# Patient Record
Sex: FEMALE | Race: BLACK OR AFRICAN AMERICAN | NOT HISPANIC OR LATINO | Employment: FULL TIME | URBAN - METROPOLITAN AREA
[De-identification: names, ages, dates, MRNs, and addresses within clinical notes are randomized per-mention and may not be internally consistent; named-entity substitution may affect disease eponyms.]

---

## 2019-10-12 ENCOUNTER — APPOINTMENT (OUTPATIENT)
Dept: CT IMAGING | Facility: CLINIC | Age: 49
End: 2019-10-12
Attending: EMERGENCY MEDICINE
Payer: MEDICAID

## 2019-10-12 ENCOUNTER — HOSPITAL ENCOUNTER (EMERGENCY)
Facility: CLINIC | Age: 49
Discharge: HOME | End: 2019-10-12
Attending: EMERGENCY MEDICINE | Admitting: EMERGENCY MEDICINE
Payer: MEDICAID

## 2019-10-12 VITALS
HEIGHT: 64 IN | RESPIRATION RATE: 11 BRPM | TEMPERATURE: 98.9 F | OXYGEN SATURATION: 100 % | SYSTOLIC BLOOD PRESSURE: 142 MMHG | HEART RATE: 88 BPM | DIASTOLIC BLOOD PRESSURE: 90 MMHG | BODY MASS INDEX: 44.39 KG/M2 | WEIGHT: 260 LBS

## 2019-10-12 DIAGNOSIS — R07.89 ATYPICAL CHEST PAIN: ICD-10-CM

## 2019-10-12 LAB
ANION GAP SERPL CALCULATED.3IONS-SCNC: 5 MMOL/L (ref 3–14)
ANION GAP SERPL CALCULATED.3IONS-SCNC: NORMAL MMOL/L (ref 6–17)
BASOPHILS # BLD AUTO: 0 10E9/L (ref 0–0.2)
BASOPHILS NFR BLD AUTO: 0.3 %
BUN SERPL-MCNC: 13 MG/DL (ref 7–30)
BUN SERPL-MCNC: NORMAL MG/DL (ref 7–30)
CALCIUM SERPL-MCNC: 8.5 MG/DL (ref 8.5–10.1)
CALCIUM SERPL-MCNC: NORMAL MG/DL (ref 8.5–10.1)
CHLORIDE SERPL-SCNC: 108 MMOL/L (ref 94–109)
CHLORIDE SERPL-SCNC: NORMAL MMOL/L (ref 94–109)
CO2 SERPL-SCNC: 27 MMOL/L (ref 20–32)
CO2 SERPL-SCNC: NORMAL MMOL/L (ref 20–32)
CREAT SERPL-MCNC: 0.76 MG/DL (ref 0.52–1.04)
CREAT SERPL-MCNC: NORMAL MG/DL (ref 0.52–1.04)
D DIMER PPP FEU-MCNC: 0.6 UG/ML FEU (ref 0–0.5)
DIFFERENTIAL METHOD BLD: ABNORMAL
EOSINOPHIL # BLD AUTO: 0.2 10E9/L (ref 0–0.7)
EOSINOPHIL NFR BLD AUTO: 2.2 %
ERYTHROCYTE [DISTWIDTH] IN BLOOD BY AUTOMATED COUNT: 14.3 % (ref 10–15)
GFR SERPL CREATININE-BSD FRML MDRD: >90 ML/MIN/{1.73_M2}
GFR SERPL CREATININE-BSD FRML MDRD: NORMAL ML/MIN/{1.73_M2}
GLUCOSE SERPL-MCNC: 112 MG/DL (ref 70–99)
GLUCOSE SERPL-MCNC: NORMAL MG/DL (ref 70–99)
HCT VFR BLD AUTO: 35.2 % (ref 35–47)
HGB BLD-MCNC: 11.6 G/DL (ref 11.7–15.7)
IMM GRANULOCYTES # BLD: 0 10E9/L (ref 0–0.4)
IMM GRANULOCYTES NFR BLD: 0.4 %
INTERPRETATION ECG - MUSE: NORMAL
INTERPRETATION ECG - MUSE: NORMAL
LYMPHOCYTES # BLD AUTO: 2.9 10E9/L (ref 0.8–5.3)
LYMPHOCYTES NFR BLD AUTO: 38.7 %
MCH RBC QN AUTO: 28.5 PG (ref 26.5–33)
MCHC RBC AUTO-ENTMCNC: 33 G/DL (ref 31.5–36.5)
MCV RBC AUTO: 87 FL (ref 78–100)
MONOCYTES # BLD AUTO: 0.5 10E9/L (ref 0–1.3)
MONOCYTES NFR BLD AUTO: 6.6 %
NEUTROPHILS # BLD AUTO: 3.8 10E9/L (ref 1.6–8.3)
NEUTROPHILS NFR BLD AUTO: 51.8 %
NRBC # BLD AUTO: 0 10*3/UL
NRBC BLD AUTO-RTO: 0 /100
PLATELET # BLD AUTO: 253 10E9/L (ref 150–450)
POTASSIUM SERPL-SCNC: 3.9 MMOL/L (ref 3.4–5.3)
POTASSIUM SERPL-SCNC: NORMAL MMOL/L (ref 3.4–5.3)
RBC # BLD AUTO: 4.07 10E12/L (ref 3.8–5.2)
SODIUM SERPL-SCNC: 140 MMOL/L (ref 133–144)
SODIUM SERPL-SCNC: NORMAL MMOL/L (ref 133–144)
TROPONIN I SERPL-MCNC: <0.015 UG/L (ref 0–0.04)
TROPONIN I SERPL-MCNC: <0.015 UG/L (ref 0–0.04)
TROPONIN I SERPL-MCNC: NORMAL UG/L (ref 0–0.04)
WBC # BLD AUTO: 7.4 10E9/L (ref 4–11)

## 2019-10-12 PROCEDURE — 71275 CT ANGIOGRAPHY CHEST: CPT

## 2019-10-12 PROCEDURE — 99285 EMERGENCY DEPT VISIT HI MDM: CPT | Mod: 25

## 2019-10-12 PROCEDURE — 96374 THER/PROPH/DIAG INJ IV PUSH: CPT | Mod: 59

## 2019-10-12 PROCEDURE — 96375 TX/PRO/DX INJ NEW DRUG ADDON: CPT

## 2019-10-12 PROCEDURE — 80048 BASIC METABOLIC PNL TOTAL CA: CPT | Performed by: EMERGENCY MEDICINE

## 2019-10-12 PROCEDURE — 85025 COMPLETE CBC W/AUTO DIFF WBC: CPT | Performed by: EMERGENCY MEDICINE

## 2019-10-12 PROCEDURE — 85379 FIBRIN DEGRADATION QUANT: CPT | Performed by: EMERGENCY MEDICINE

## 2019-10-12 PROCEDURE — 25000128 H RX IP 250 OP 636: Performed by: EMERGENCY MEDICINE

## 2019-10-12 PROCEDURE — 93005 ELECTROCARDIOGRAM TRACING: CPT

## 2019-10-12 PROCEDURE — 93005 ELECTROCARDIOGRAM TRACING: CPT | Mod: 76

## 2019-10-12 PROCEDURE — 25000125 ZZHC RX 250: Performed by: EMERGENCY MEDICINE

## 2019-10-12 PROCEDURE — 84484 ASSAY OF TROPONIN QUANT: CPT | Mod: 91 | Performed by: EMERGENCY MEDICINE

## 2019-10-12 PROCEDURE — 96361 HYDRATE IV INFUSION ADD-ON: CPT

## 2019-10-12 RX ORDER — LORAZEPAM 0.5 MG/1
0.5 TABLET ORAL EVERY 6 HOURS PRN
Qty: 10 TABLET | Refills: 0 | Status: ON HOLD | OUTPATIENT
Start: 2019-10-12 | End: 2020-01-23

## 2019-10-12 RX ORDER — OMEGA-3 FATTY ACIDS/FISH OIL 300-1000MG
200 CAPSULE ORAL EVERY 4 HOURS PRN
Status: ON HOLD | COMMUNITY
End: 2020-01-23

## 2019-10-12 RX ORDER — LORAZEPAM 2 MG/ML
0.5 INJECTION INTRAMUSCULAR ONCE
Status: COMPLETED | OUTPATIENT
Start: 2019-10-12 | End: 2019-10-12

## 2019-10-12 RX ORDER — KETOROLAC TROMETHAMINE 10 MG/1
10 TABLET, FILM COATED ORAL EVERY 6 HOURS PRN
Qty: 10 TABLET | Refills: 0 | Status: ON HOLD | OUTPATIENT
Start: 2019-10-12 | End: 2020-01-23

## 2019-10-12 RX ORDER — IOPAMIDOL 755 MG/ML
80 INJECTION, SOLUTION INTRAVASCULAR ONCE
Status: COMPLETED | OUTPATIENT
Start: 2019-10-12 | End: 2019-10-12

## 2019-10-12 RX ORDER — SODIUM CHLORIDE 9 MG/ML
1000 INJECTION, SOLUTION INTRAVENOUS CONTINUOUS
Status: DISCONTINUED | OUTPATIENT
Start: 2019-10-12 | End: 2019-10-13 | Stop reason: HOSPADM

## 2019-10-12 RX ORDER — KETOROLAC TROMETHAMINE 15 MG/ML
15 INJECTION, SOLUTION INTRAMUSCULAR; INTRAVENOUS ONCE
Status: COMPLETED | OUTPATIENT
Start: 2019-10-12 | End: 2019-10-12

## 2019-10-12 RX ADMIN — IOPAMIDOL 80 ML: 755 INJECTION, SOLUTION INTRAVENOUS at 22:29

## 2019-10-12 RX ADMIN — LORAZEPAM 0.5 MG: 2 INJECTION INTRAMUSCULAR; INTRAVENOUS at 20:45

## 2019-10-12 RX ADMIN — SODIUM CHLORIDE 100 ML: 9 INJECTION, SOLUTION INTRAVENOUS at 22:29

## 2019-10-12 RX ADMIN — SODIUM CHLORIDE 1000 ML: 9 INJECTION, SOLUTION INTRAVENOUS at 20:45

## 2019-10-12 RX ADMIN — KETOROLAC TROMETHAMINE 15 MG: 15 INJECTION, SOLUTION INTRAMUSCULAR; INTRAVENOUS at 20:45

## 2019-10-12 ASSESSMENT — MIFFLIN-ST. JEOR: SCORE: 1794.35

## 2019-10-12 ASSESSMENT — ENCOUNTER SYMPTOMS
DIAPHORESIS: 0
NUMBNESS: 0
SHORTNESS OF BREATH: 0
COUGH: 0

## 2019-10-12 NOTE — ED AVS SNAPSHOT
Emergency Department  [address] [facility] [address]  Phone:  [phone]  Fax:  [phone]                                    Yancy Hoover   MRN: [id number]    Department:   Emergency Department   Date of Visit:  10/12/2019           After Visit Summary Signature Page    I have received my discharge instructions, and my questions have been answered. I have discussed any challenges I see with this plan with the nurse or doctor.    ..........................................................................................................................................  Patient/Patient Representative Signature      ..........................................................................................................................................  Patient Representative Print Name and Relationship to Patient    ..................................................               ................................................  Date                                   Time    ..........................................................................................................................................  Reviewed by Signature/Title    ...................................................              ..............................................  Date                                               Time          22EPIC Rev 08/18

## 2019-10-13 NOTE — ED NOTES
Bed: ED12  Expected date: 10/12/19  Expected time: 8:10 PM  Means of arrival: Ambulance  Comments:  PATRICK [phone] [id number] CP

## 2019-10-13 NOTE — ED TRIAGE NOTES
Patient reports midsternal  Chest discomfort, non radiating, occurs intermediately, on and  Off since 1900. Also reports right arm and leg tingling for some time. Also reports head pressure and dizziness.

## 2019-10-13 NOTE — DISCHARGE INSTRUCTIONS
Your EKG and lab work are normal.  There is no signs of heart attack blood clots to the lungs are only in any other emergency.  We cannot explain all your symptoms tonight with lab tests or imaging.  Please follow-up with a clinic to discuss further work-up for cardiac testing including stress testing as an outpatient.  Return with worsening symptoms increased shortness of breath fever or other concerns.

## 2019-10-13 NOTE — ED PROVIDER NOTES
History   Chief Complaint:  Chest Pain    HPI   Yancy Tran is a 49 year old female, who presents to the ED for evaluation of chest pain. The patient reports having chest discomfort for the past week, however, today the pain became worse an hour and half ago, which is what prompted her to call EMS. She states the pain feels like someone is beating a drum on her chest and the pain is a 7/10. She states the pain radiates to her right arm and right calf. EMS reports she had a systolic pressure of 170 and had some head pressure. Currently she states she has some consistent chest pressure and feels slightly faint. The patient is right handed. She denies any diaphoresis, visual disturbances, shortness of breath, cough, leg swelling, numbness, or any other acute symptoms.    CARDIAC RISK FACTORS:  Sex:    Female  Tobacco:   Yes  Hypertension:   No  Hyperlipidemia:  No  Diabetes:   No  Family History:  No    PE/DVT RISK FACTORS:  Sex:    Female  Hormones:   No  Tobacco:   Yes  Cancer:   No  Travel:   No  Surgery:   No  Other immobilization: No  Personal history:  No  Family history:  No    Allergies:  No known drug allergies    Medications:    The patient is not currently taking any prescribed medications.    Past Medical History:    The patient is not currently taking any prescribed medications.    Past Surgical History:    History reviewed. No pertinent surgical history.    Family History:    History reviewed. No pertinent family history.     Social History:  Smoking status: Yes  Alcohol use: No  Drug use: No  Marital Status:   [2]    Review of Systems   Constitutional: Negative for diaphoresis.   Respiratory: Negative for cough and shortness of breath.    Cardiovascular: Positive for chest pain. Negative for leg swelling.   Neurological: Negative for numbness.   All other systems reviewed and are negative.    Physical Exam     Patient Vitals for the past 24 hrs:   BP Temp Temp src Pulse Heart Rate  "Resp SpO2 Height Weight   10/12/19 2215 -- -- -- -- 101 10 100 % -- --   10/12/19 2200 133/78 -- -- 84 84 13 100 % -- --   10/12/19 2145 -- -- -- -- 91 10 99 % -- --   10/12/19 2130 (!) 129/94 -- -- 95 90 14 99 % -- --   10/12/19 2115 -- -- -- -- 90 17 98 % -- --   10/12/19 2100 133/83 -- -- 88 89 12 99 % -- --   10/12/19 2045 135/87 -- -- -- 89 15 -- -- --   10/12/19 2029 (!) 148/86 98.9  F (37.2  C) Oral -- 90 18 100 % 1.626 m (5' 4\") 117.9 kg (260 lb)     Physical Exam  Vitals signs reviewed.   HENT:      Head: Normocephalic.   Eyes:      Pupils: Pupils are equal, round, and reactive to light.   Neck:      Musculoskeletal: Normal range of motion.   Cardiovascular:      Rate and Rhythm: Normal rate and regular rhythm.      Heart sounds: Normal heart sounds.   Pulmonary:      Effort: Pulmonary effort is normal.      Breath sounds: Normal breath sounds.   Abdominal:      General: Bowel sounds are normal.      Palpations: Abdomen is soft.   Musculoskeletal: Normal range of motion.   Skin:     General: Skin is warm.      Capillary Refill: Capillary refill takes less than 2 seconds.   Neurological:      General: No focal deficit present.      Mental Status: She is alert.   Psychiatric:         Mood and Affect: Mood is anxious.           Emergency Department Course   ECG (20:25:26):  Rate 87 bpm. VA interval 162. QRS duration 84. QT/QTc 344/413. P-R-T axes 71 16 35. Sinus rhythm with occasional premature ventricular complexes. Nonspecific T wave abnormality. Abnormal ECG. Interpreted at 2030 by Zack Kwon MD.    ECG (22:37:28):  Rate 90 bpm. VA interval 180. QRS duration 88. QT/QTc 346/423. P-R-T axes 69 -14 36. Sinus rhythm with occasional premature ventricular complexes. Nonspecific T wave abnormality. Abnormal ECG.  Interpreted at 2242 by Zack Kwon MD.    Imaging:  Radiographic findings were communicated with the patient who voiced understanding of the findings.    CT Chest PE with " contrast:  There is no pulmonary embolus, aortic aneurysm or  dissection. No acute abnormality.    Imaging independently reviewed and agree with radiologist interpretation.    Laboratory:  CBC: HGB 1.6 (L), o/w WNL (WBC 7.4, )    BMP:  (H), o/w WNL (Creatinine 0.76)    Troponin: <0.015  Troponin: <0.015    D-dimer: 0.6 (H)    Interventions:  2045: NS 1L IV Bolus   2045: Toradol 15 mg IV  2045: Ativan 0.5 mg IV    Emergency Department Course:  The patient arrived in the emergency department via EMS.  Past medical records, nursing notes, and vitals reviewed.  2027: I performed an exam of the patient and obtained history, as documented above.  IV inserted and blood drawn.  The patient was sent for a chest CT while in the emergency department, findings above.    2200: I rechecked the patient. Explained findings to patient.    2314: I rechecked the patient. Explained findings to patient.    Findings and plan explained to the Patient. Patient discharged home with instructions regarding supportive care, medications, and reasons to return. The importance of close follow-up was reviewed.     Impression & Plan    Medical Decision Making:  Patient presents with a multiplicity of complaints including chest pain dizziness arm and leg pain.  Patient is obese but well-appearing -American female her EKG shows slight flattening of her T waves compared to an EKG from 10 years ago.  This is nonspecific.  Patient's troponin is negative.  Her symptoms seem atypical and patient seems quite anxious.  She has does complain of leg pain in the setting of chest pain and therefore d-dimer was sent and due to his slight elevation in investigation into PE was performed and also negative.  Patient serial troponins x2 were negative EKGs are unchanged.  Doubt that this EKG was represents a significant cardiac event problem.  Her presentation is likely anxiety and seems to improve with Ativan and Toradol.  I did consider stroke  in the setting of an arm and leg numbness but her symptoms seem to be more arm and leg pain which are not consistent with stroke.  Patient is recommended discharge and follow-up with primary care.  Patient is offered reassurance at discharge.    Diagnosis:    ICD-10-CM    1. Atypical chest pain R07.89      Discharge Medications:  New Prescriptions    KETOROLAC (TORADOL) 10 MG TABLET    Take 1 tablet (10 mg) by mouth every 6 hours as needed for moderate pain    LORAZEPAM (ATIVAN) 0.5 MG TABLET    Take 1 tablet (0.5 mg) by mouth every 6 hours as needed for anxiety     Disposition:  Discharged to home.    Jose Ware  10/12/2019    EMERGENCY DEPARTMENT  Scribe Disclosure:  I, Jose Ware, am serving as a scribe at 8:27 PM on 10/12/2019 to document services personally performed by Zack Kwon MD based on my observations and the provider's statements to me.         Zack Kwon MD  10/13/19 0009

## 2019-10-22 ENCOUNTER — APPOINTMENT (OUTPATIENT)
Dept: CT IMAGING | Facility: CLINIC | Age: 49
End: 2019-10-22
Attending: EMERGENCY MEDICINE
Payer: MEDICAID

## 2019-10-22 ENCOUNTER — HOSPITAL ENCOUNTER (EMERGENCY)
Facility: CLINIC | Age: 49
Discharge: HOME | End: 2019-10-22
Attending: EMERGENCY MEDICINE | Admitting: EMERGENCY MEDICINE
Payer: MEDICAID

## 2019-10-22 VITALS
TEMPERATURE: 99.1 F | OXYGEN SATURATION: 99 % | BODY MASS INDEX: 44.39 KG/M2 | RESPIRATION RATE: 18 BRPM | HEIGHT: 64 IN | SYSTOLIC BLOOD PRESSURE: 152 MMHG | HEART RATE: 91 BPM | DIASTOLIC BLOOD PRESSURE: 90 MMHG | WEIGHT: 260 LBS

## 2019-10-22 DIAGNOSIS — R55 NEAR SYNCOPE: ICD-10-CM

## 2019-10-22 LAB
ALBUMIN SERPL-MCNC: 3.6 G/DL (ref 3.4–5)
ALBUMIN UR-MCNC: NEGATIVE MG/DL
ALP SERPL-CCNC: 66 U/L (ref 40–150)
ALT SERPL W P-5'-P-CCNC: 19 U/L (ref 0–50)
ANION GAP SERPL CALCULATED.3IONS-SCNC: 6 MMOL/L (ref 3–14)
APPEARANCE UR: CLEAR
AST SERPL W P-5'-P-CCNC: 11 U/L (ref 0–45)
BACTERIA #/AREA URNS HPF: ABNORMAL /HPF
BASOPHILS # BLD AUTO: 0 10E9/L (ref 0–0.2)
BASOPHILS NFR BLD AUTO: 0.4 %
BILIRUB SERPL-MCNC: 0.2 MG/DL (ref 0.2–1.3)
BILIRUB UR QL STRIP: NEGATIVE
BUN SERPL-MCNC: 12 MG/DL (ref 7–30)
CALCIUM SERPL-MCNC: 8.3 MG/DL (ref 8.5–10.1)
CHLORIDE SERPL-SCNC: 107 MMOL/L (ref 94–109)
CO2 SERPL-SCNC: 24 MMOL/L (ref 20–32)
COLOR UR AUTO: YELLOW
CREAT SERPL-MCNC: 0.83 MG/DL (ref 0.52–1.04)
DIFFERENTIAL METHOD BLD: NORMAL
EOSINOPHIL # BLD AUTO: 0.2 10E9/L (ref 0–0.7)
EOSINOPHIL NFR BLD AUTO: 2.3 %
ERYTHROCYTE [DISTWIDTH] IN BLOOD BY AUTOMATED COUNT: 14.4 % (ref 10–15)
GFR SERPL CREATININE-BSD FRML MDRD: 83 ML/MIN/{1.73_M2}
GLUCOSE SERPL-MCNC: 102 MG/DL (ref 70–99)
GLUCOSE UR STRIP-MCNC: NEGATIVE MG/DL
HCT VFR BLD AUTO: 37 % (ref 35–47)
HGB BLD-MCNC: 12.2 G/DL (ref 11.7–15.7)
HGB UR QL STRIP: NEGATIVE
IMM GRANULOCYTES # BLD: 0 10E9/L (ref 0–0.4)
IMM GRANULOCYTES NFR BLD: 0.1 %
KETONES UR STRIP-MCNC: NEGATIVE MG/DL
LEUKOCYTE ESTERASE UR QL STRIP: ABNORMAL
LYMPHOCYTES # BLD AUTO: 2.9 10E9/L (ref 0.8–5.3)
LYMPHOCYTES NFR BLD AUTO: 35.1 %
MCH RBC QN AUTO: 28.4 PG (ref 26.5–33)
MCHC RBC AUTO-ENTMCNC: 33 G/DL (ref 31.5–36.5)
MCV RBC AUTO: 86 FL (ref 78–100)
MONOCYTES # BLD AUTO: 0.6 10E9/L (ref 0–1.3)
MONOCYTES NFR BLD AUTO: 7.4 %
MUCOUS THREADS #/AREA URNS LPF: PRESENT /LPF
NEUTROPHILS # BLD AUTO: 4.4 10E9/L (ref 1.6–8.3)
NEUTROPHILS NFR BLD AUTO: 54.7 %
NITRATE UR QL: NEGATIVE
NRBC # BLD AUTO: 0 10*3/UL
NRBC BLD AUTO-RTO: 0 /100
PH UR STRIP: 5.5 PH (ref 5–7)
PLATELET # BLD AUTO: 263 10E9/L (ref 150–450)
POTASSIUM SERPL-SCNC: 3.8 MMOL/L (ref 3.4–5.3)
PROT SERPL-MCNC: 7.8 G/DL (ref 6.8–8.8)
RBC # BLD AUTO: 4.3 10E12/L (ref 3.8–5.2)
RBC #/AREA URNS AUTO: 1 /HPF (ref 0–2)
SODIUM SERPL-SCNC: 137 MMOL/L (ref 133–144)
SOURCE: ABNORMAL
SP GR UR STRIP: 1.02 (ref 1–1.03)
SQUAMOUS #/AREA URNS AUTO: 4 /HPF (ref 0–1)
TROPONIN I SERPL-MCNC: <0.015 UG/L (ref 0–0.04)
TSH SERPL DL<=0.005 MIU/L-ACNC: 2.62 MU/L (ref 0.4–4)
UROBILINOGEN UR STRIP-MCNC: NORMAL MG/DL (ref 0–2)
WBC # BLD AUTO: 8.1 10E9/L (ref 4–11)
WBC #/AREA URNS AUTO: 1 /HPF (ref 0–5)

## 2019-10-22 PROCEDURE — 96361 HYDRATE IV INFUSION ADD-ON: CPT

## 2019-10-22 PROCEDURE — 84443 ASSAY THYROID STIM HORMONE: CPT | Performed by: EMERGENCY MEDICINE

## 2019-10-22 PROCEDURE — 96360 HYDRATION IV INFUSION INIT: CPT

## 2019-10-22 PROCEDURE — 84484 ASSAY OF TROPONIN QUANT: CPT | Performed by: EMERGENCY MEDICINE

## 2019-10-22 PROCEDURE — 81001 URINALYSIS AUTO W/SCOPE: CPT | Performed by: EMERGENCY MEDICINE

## 2019-10-22 PROCEDURE — 85025 COMPLETE CBC W/AUTO DIFF WBC: CPT | Performed by: EMERGENCY MEDICINE

## 2019-10-22 PROCEDURE — 70450 CT HEAD/BRAIN W/O DYE: CPT

## 2019-10-22 PROCEDURE — 99284 EMERGENCY DEPT VISIT MOD MDM: CPT | Mod: 25

## 2019-10-22 PROCEDURE — 25000128 H RX IP 250 OP 636: Performed by: EMERGENCY MEDICINE

## 2019-10-22 PROCEDURE — 80053 COMPREHEN METABOLIC PANEL: CPT | Performed by: EMERGENCY MEDICINE

## 2019-10-22 RX ORDER — SODIUM CHLORIDE 9 MG/ML
1000 INJECTION, SOLUTION INTRAVENOUS CONTINUOUS
Status: DISCONTINUED | OUTPATIENT
Start: 2019-10-22 | End: 2019-10-23 | Stop reason: HOSPADM

## 2019-10-22 RX ADMIN — SODIUM CHLORIDE 1000 ML: 9 INJECTION, SOLUTION INTRAVENOUS at 20:23

## 2019-10-22 ASSESSMENT — ENCOUNTER SYMPTOMS
SHORTNESS OF BREATH: 0
POLYDIPSIA: 1
DIZZINESS: 1
HEADACHES: 1
NUMBNESS: 1
LIGHT-HEADEDNESS: 1

## 2019-10-22 ASSESSMENT — MIFFLIN-ST. JEOR: SCORE: 1794.35

## 2019-10-22 NOTE — ED AVS SNAPSHOT
Emergency Department  [address] [facility] [address]  Phone:  [phone]  Fax:  [phone]                                    Yancy Hoover   MRN: [id number]    Department:   Emergency Department   Date of Visit:  10/22/2019           After Visit Summary Signature Page    I have received my discharge instructions, and my questions have been answered. I have discussed any challenges I see with this plan with the nurse or doctor.    ..........................................................................................................................................  Patient/Patient Representative Signature      ..........................................................................................................................................  Patient Representative Print Name and Relationship to Patient    ..................................................               ................................................  Date                                   Time    ..........................................................................................................................................  Reviewed by Signature/Title    ...................................................              ..............................................  Date                                               Time          22EPIC Rev 08/18

## 2019-10-23 NOTE — ED PROVIDER NOTES
"  History     Chief Complaint:  Dizziness    HPI supplemented with electronic chart review.    HPI   Yancy Hoover is a 49 year old female who presents with dizziness. She reports that she has been having dizzy spells that last 5-30 minutes, are worst in the mornings after she gets up, and have been getting worse for \"a while\". She reports that she also experiences trouble seeing, tingling in her fingers and toes, lightheadedness, a feeling of pressure behind her right eye, and headache with these episodes. Secondary to this she reports intense thirst, urinary frequency, and a metal taste \"like pennies\" in her mouth. She denies shortness of breath. She reports that she was seen here 10 days ago for these symptoms as well as chest pain and was discharged home with prescriptions for Toradol and Ativan which she has taken 2 and 1 doses of, respectively. She denies chest pain since then but reports that her episodes of dizziness and other associated symptoms have continued. She reports that she is a single mother of 4 kids and that her episodes have not stopped her from going to work. She denies recent travel, taking herbal medications, or a change in her diet or living circumstance. She reports a family history of diabetes mellitus but denies a personal history.    Allergies:  Codeine     Medications:    Ativan  Toradol    Past Medical History:    The patient denies any significant past medical history.    Past Surgical History:    C section    Family History:    No past pertinent family history.    Social History:  Smoking status: Current, everyday    Marital Status:       Review of Systems   Eyes: Positive for visual disturbance.   Respiratory: Negative for shortness of breath.    Cardiovascular: Negative for chest pain.   Endocrine: Positive for polydipsia and polyuria.   Neurological: Positive for dizziness, light-headedness, numbness and headaches.   All other systems reviewed and are " "negative.    Physical Exam     Physical Exam    Patient Vitals for the past 24 hrs:   BP Temp Temp src Pulse Heart Rate Resp SpO2 Height Weight   10/22/19 2200 -- -- -- -- -- -- 99 % -- --   10/22/19 2130 -- -- -- -- -- -- 99 % -- --   10/22/19 2100 -- -- -- -- -- -- 99 % -- --   10/22/19 2018 -- -- -- -- -- -- 99 % -- --   10/22/19 2017 (!) 152/90 -- -- 91 -- -- -- -- --   10/22/19 1823 (!) 153/86 99.1  F (37.3  C) Oral -- 116 18 98 % 1.626 m (5' 4\") 117.9 kg (260 lb)       General: Alert and Interactive.   Head: No signs of trauma.   Mouth/Throat: Oropharynx is clear and moist.   Eyes: Conjunctivae are normal. Pupils are equal, round, and reactive to light.   Neck: Normal range of motion. No nuchal rigidity.   CV: Normal rate and regular rhythm.    Resp: Effort normal and breath sounds normal. No respiratory distress.   GI: Soft. There is no tenderness or guarding.   MSK: Normal range of motion. no edema.   Neuro: The patient is alert and oriented to person, place, and time.  PERRLA, EOMI, strength in upper/lower extremities normal and symmetrical.   Sensation normal. Speech normal.  GCS eye subscore is 4. GCS verbal subscore is 5. GCS motor subscore is 6.   Skin: Skin is warm and dry. No rash noted.   Psych: normal mood and affect. behavior is normal.     Emergency Department Course     Imaging:  Radiographic findings were communicated with the patient who voiced understanding of the findings.    CT Head without contrast:   No evidence of acute intracranial hemorrhage, mass, or  Herniation.    Read as per radiology.    Laboratory:  CBC: WBC: 8.1, HGB: 12.2, PLT: 263    CMP: Glucose 102 (H), calcium: 8.3 (L), o/w WNL (Creatinine: 0.83)    TSH: 2.62    2051 Troponin: <0.015    UA with Microscopic: leukocyte esterase: trace, bacteria: few, squamous epithelial: 4 (H), mucous present, o/w WNL    Interventions:  2023: NS 1L IV Bolus     Emergency Department Course:  Nursing notes and vitals reviewed. (1935) I performed " an exam of the patient as documented above.     IV inserted. Medicine administered as documented above. Blood drawn. This was sent to the lab for further testing, results above.    The patient was sent for a head CT while in the emergency department, findings above.     2218 I rechecked the patient and discussed the results of her workup thus far.     Findings and plan explained to the Patient. Patient discharged home with instructions regarding supportive care and reasons to return. The importance of close follow-up was reviewed.    I personally reviewed the laboratory results with the Patient and answered all related questions prior to discharge.    Impression & Plan    Medical Decision Making:  Yancy Hoover is a 49 year old female who presents for evaluation of recurring episodes of near-syncope.  They did not have syncope.  The differential for near-syncope is broad and includes etiologies such as cardiac arrythmia, ACS, aortic stenosis, HOCM, PE, orthostatic hypotension, drugs, situational, carotid hypersensitivity, seizure, TIA, stroke, vasovagal.  There are no signs of a concerning etiology for near- syncope at this point.  In addition, there is no family history of sudden death, no chest pain, no seizure activity or post-ictal period, no murmur, and no signs of orthostasis in the ED, no focal neurologic symptoms, and no complaints of concerning headache.  The workup in the ED is negative and the physical exam is re-assurring.  Supportive outpatient management is therefore indicated.    Diagnosis:    ICD-10-CM    1. Near syncope R55        Disposition:  discharged to home    Isidoro Case  10/22/2019    EMERGENCY DEPARTMENT  Scribe Disclosure:  I, Isidoro Case, am serving as a scribe on 10/22/2019 at 7:35 PM to personally document services performed by Ronan Melton MD based on my observations and the provider's statements to me.     Ronan Melton MD  10/23/19 0051

## 2020-01-23 ENCOUNTER — ANESTHESIA (OUTPATIENT)
Dept: SURGERY | Facility: CLINIC | Age: 50
End: 2020-01-23
Payer: MEDICAID

## 2020-01-23 ENCOUNTER — HOSPITAL ENCOUNTER (OUTPATIENT)
Facility: CLINIC | Age: 50
Discharge: HOME | End: 2020-01-23
Attending: EMERGENCY MEDICINE | Admitting: OBSTETRICS & GYNECOLOGY
Payer: MEDICAID

## 2020-01-23 ENCOUNTER — ANESTHESIA EVENT (OUTPATIENT)
Dept: SURGERY | Facility: CLINIC | Age: 50
End: 2020-01-23
Payer: MEDICAID

## 2020-01-23 ENCOUNTER — APPOINTMENT (OUTPATIENT)
Dept: ULTRASOUND IMAGING | Facility: CLINIC | Age: 50
End: 2020-01-23
Attending: EMERGENCY MEDICINE
Payer: MEDICAID

## 2020-01-23 ENCOUNTER — APPOINTMENT (OUTPATIENT)
Dept: CT IMAGING | Facility: CLINIC | Age: 50
End: 2020-01-23
Attending: EMERGENCY MEDICINE
Payer: MEDICAID

## 2020-01-23 VITALS
TEMPERATURE: 99.5 F | SYSTOLIC BLOOD PRESSURE: 140 MMHG | BODY MASS INDEX: 46.95 KG/M2 | RESPIRATION RATE: 16 BRPM | OXYGEN SATURATION: 95 % | DIASTOLIC BLOOD PRESSURE: 88 MMHG | HEIGHT: 64 IN | HEART RATE: 80 BPM | WEIGHT: 275 LBS

## 2020-01-23 DIAGNOSIS — N83.519 OVARIAN TORSION: Primary | ICD-10-CM

## 2020-01-23 LAB
ALBUMIN SERPL-MCNC: 3.3 G/DL (ref 3.4–5)
ALBUMIN UR-MCNC: 10 MG/DL
ALP SERPL-CCNC: 69 U/L (ref 40–150)
ALT SERPL W P-5'-P-CCNC: 21 U/L (ref 0–50)
ANION GAP SERPL CALCULATED.3IONS-SCNC: 6 MMOL/L (ref 3–14)
APPEARANCE UR: CLEAR
AST SERPL W P-5'-P-CCNC: 16 U/L (ref 0–45)
B-HCG SERPL-ACNC: <1 IU/L (ref 0–5)
BACTERIA #/AREA URNS HPF: ABNORMAL /HPF
BASOPHILS # BLD AUTO: 0 10E9/L (ref 0–0.2)
BASOPHILS NFR BLD AUTO: 0.7 %
BILIRUB SERPL-MCNC: 0.2 MG/DL (ref 0.2–1.3)
BILIRUB UR QL STRIP: NEGATIVE
BUN SERPL-MCNC: 11 MG/DL (ref 7–30)
CALCIUM SERPL-MCNC: 9 MG/DL (ref 8.5–10.1)
CHLORIDE SERPL-SCNC: 110 MMOL/L (ref 94–109)
CO2 SERPL-SCNC: 21 MMOL/L (ref 20–32)
COLOR UR AUTO: YELLOW
CREAT SERPL-MCNC: 0.73 MG/DL (ref 0.52–1.04)
DIFFERENTIAL METHOD BLD: NORMAL
EOSINOPHIL # BLD AUTO: 0.1 10E9/L (ref 0–0.7)
EOSINOPHIL NFR BLD AUTO: 2.1 %
ERYTHROCYTE [DISTWIDTH] IN BLOOD BY AUTOMATED COUNT: 14 % (ref 10–15)
GFR SERPL CREATININE-BSD FRML MDRD: >90 ML/MIN/{1.73_M2}
GLUCOSE SERPL-MCNC: 138 MG/DL (ref 70–99)
GLUCOSE UR STRIP-MCNC: NEGATIVE MG/DL
HCT VFR BLD AUTO: 40.8 % (ref 35–47)
HGB BLD-MCNC: 13 G/DL (ref 11.7–15.7)
HGB UR QL STRIP: NEGATIVE
IMM GRANULOCYTES # BLD: 0 10E9/L (ref 0–0.4)
IMM GRANULOCYTES NFR BLD: 0.2 %
KETONES UR STRIP-MCNC: NEGATIVE MG/DL
LEUKOCYTE ESTERASE UR QL STRIP: NEGATIVE
LIPASE SERPL-CCNC: 89 U/L (ref 73–393)
LYMPHOCYTES # BLD AUTO: 2.1 10E9/L (ref 0.8–5.3)
LYMPHOCYTES NFR BLD AUTO: 36.4 %
MCH RBC QN AUTO: 27.6 PG (ref 26.5–33)
MCHC RBC AUTO-ENTMCNC: 31.9 G/DL (ref 31.5–36.5)
MCV RBC AUTO: 87 FL (ref 78–100)
MONOCYTES # BLD AUTO: 0.4 10E9/L (ref 0–1.3)
MONOCYTES NFR BLD AUTO: 7.2 %
MUCOUS THREADS #/AREA URNS LPF: PRESENT /LPF
NEUTROPHILS # BLD AUTO: 3.1 10E9/L (ref 1.6–8.3)
NEUTROPHILS NFR BLD AUTO: 53.4 %
NITRATE UR QL: NEGATIVE
NRBC # BLD AUTO: 0 10*3/UL
NRBC BLD AUTO-RTO: 0 /100
PH UR STRIP: 6 PH (ref 5–7)
PLATELET # BLD AUTO: 246 10E9/L (ref 150–450)
POTASSIUM SERPL-SCNC: 4.4 MMOL/L (ref 3.4–5.3)
PROT SERPL-MCNC: 7.6 G/DL (ref 6.8–8.8)
RBC # BLD AUTO: 4.71 10E12/L (ref 3.8–5.2)
RBC #/AREA URNS AUTO: 4 /HPF (ref 0–2)
SODIUM SERPL-SCNC: 137 MMOL/L (ref 133–144)
SOURCE: ABNORMAL
SP GR UR STRIP: >1.035 (ref 1–1.03)
SQUAMOUS #/AREA URNS AUTO: 15 /HPF (ref 0–1)
UROBILINOGEN UR STRIP-MCNC: NORMAL MG/DL (ref 0–2)
WBC # BLD AUTO: 5.8 10E9/L (ref 4–11)
WBC #/AREA URNS AUTO: 0 /HPF (ref 0–5)

## 2020-01-23 PROCEDURE — 93976 VASCULAR STUDY: CPT

## 2020-01-23 PROCEDURE — 25800030 ZZH RX IP 258 OP 636: Performed by: EMERGENCY MEDICINE

## 2020-01-23 PROCEDURE — 71000013 ZZH RECOVERY PHASE 1 LEVEL 1 EA ADDTL HR: Performed by: OBSTETRICS & GYNECOLOGY

## 2020-01-23 PROCEDURE — 88305 TISSUE EXAM BY PATHOLOGIST: CPT | Performed by: OBSTETRICS & GYNECOLOGY

## 2020-01-23 PROCEDURE — 96375 TX/PRO/DX INJ NEW DRUG ADDON: CPT

## 2020-01-23 PROCEDURE — 00000155 ZZHCL STATISTIC H-CELL BLOCK W/STAIN: Performed by: OBSTETRICS & GYNECOLOGY

## 2020-01-23 PROCEDURE — 25000128 H RX IP 250 OP 636: Performed by: NURSE ANESTHETIST, CERTIFIED REGISTERED

## 2020-01-23 PROCEDURE — 36000056 ZZH SURGERY LEVEL 3 1ST 30 MIN: Performed by: OBSTETRICS & GYNECOLOGY

## 2020-01-23 PROCEDURE — 25000128 H RX IP 250 OP 636: Performed by: EMERGENCY MEDICINE

## 2020-01-23 PROCEDURE — 80053 COMPREHEN METABOLIC PANEL: CPT | Performed by: EMERGENCY MEDICINE

## 2020-01-23 PROCEDURE — 83690 ASSAY OF LIPASE: CPT | Performed by: EMERGENCY MEDICINE

## 2020-01-23 PROCEDURE — 71000012 ZZH RECOVERY PHASE 1 LEVEL 1 FIRST HR: Performed by: OBSTETRICS & GYNECOLOGY

## 2020-01-23 PROCEDURE — 25000125 ZZHC RX 250: Performed by: NURSE ANESTHETIST, CERTIFIED REGISTERED

## 2020-01-23 PROCEDURE — 81001 URINALYSIS AUTO W/SCOPE: CPT | Performed by: EMERGENCY MEDICINE

## 2020-01-23 PROCEDURE — 74177 CT ABD & PELVIS W/CONTRAST: CPT

## 2020-01-23 PROCEDURE — 85025 COMPLETE CBC W/AUTO DIFF WBC: CPT | Performed by: EMERGENCY MEDICINE

## 2020-01-23 PROCEDURE — 88112 CYTOPATH CELL ENHANCE TECH: CPT | Mod: 26 | Performed by: OBSTETRICS & GYNECOLOGY

## 2020-01-23 PROCEDURE — 25000125 ZZHC RX 250: Performed by: OBSTETRICS & GYNECOLOGY

## 2020-01-23 PROCEDURE — 37000008 ZZH ANESTHESIA TECHNICAL FEE, 1ST 30 MIN: Performed by: OBSTETRICS & GYNECOLOGY

## 2020-01-23 PROCEDURE — 96374 THER/PROPH/DIAG INJ IV PUSH: CPT | Mod: 59

## 2020-01-23 PROCEDURE — 88112 CYTOPATH CELL ENHANCE TECH: CPT | Performed by: OBSTETRICS & GYNECOLOGY

## 2020-01-23 PROCEDURE — 27210794 ZZH OR GENERAL SUPPLY STERILE: Performed by: OBSTETRICS & GYNECOLOGY

## 2020-01-23 PROCEDURE — 99285 EMERGENCY DEPT VISIT HI MDM: CPT | Mod: 25

## 2020-01-23 PROCEDURE — 25800030 ZZH RX IP 258 OP 636: Performed by: NURSE ANESTHETIST, CERTIFIED REGISTERED

## 2020-01-23 PROCEDURE — 84702 CHORIONIC GONADOTROPIN TEST: CPT | Performed by: EMERGENCY MEDICINE

## 2020-01-23 PROCEDURE — 40000170 ZZH STATISTIC PRE-PROCEDURE ASSESSMENT II: Performed by: OBSTETRICS & GYNECOLOGY

## 2020-01-23 PROCEDURE — 88305 TISSUE EXAM BY PATHOLOGIST: CPT | Mod: 26 | Performed by: OBSTETRICS & GYNECOLOGY

## 2020-01-23 PROCEDURE — 25000125 ZZHC RX 250: Performed by: EMERGENCY MEDICINE

## 2020-01-23 PROCEDURE — 25000128 H RX IP 250 OP 636: Performed by: ANESTHESIOLOGY

## 2020-01-23 PROCEDURE — 36000058 ZZH SURGERY LEVEL 3 EA 15 ADDTL MIN: Performed by: OBSTETRICS & GYNECOLOGY

## 2020-01-23 PROCEDURE — 37000009 ZZH ANESTHESIA TECHNICAL FEE, EACH ADDTL 15 MIN: Performed by: OBSTETRICS & GYNECOLOGY

## 2020-01-23 PROCEDURE — 25000132 ZZH RX MED GY IP 250 OP 250 PS 637: Performed by: OBSTETRICS & GYNECOLOGY

## 2020-01-23 PROCEDURE — 25000566 ZZH SEVOFLURANE, EA 15 MIN: Performed by: OBSTETRICS & GYNECOLOGY

## 2020-01-23 RX ORDER — BUPIVACAINE HYDROCHLORIDE AND EPINEPHRINE 2.5; 5 MG/ML; UG/ML
INJECTION, SOLUTION EPIDURAL; INFILTRATION; INTRACAUDAL; PERINEURAL
Status: DISCONTINUED
Start: 2020-01-23 | End: 2020-01-23 | Stop reason: HOSPADM

## 2020-01-23 RX ORDER — ONDANSETRON 2 MG/ML
INJECTION INTRAMUSCULAR; INTRAVENOUS PRN
Status: DISCONTINUED | OUTPATIENT
Start: 2020-01-23 | End: 2020-01-23

## 2020-01-23 RX ORDER — GLYCOPYRROLATE 0.2 MG/ML
INJECTION, SOLUTION INTRAMUSCULAR; INTRAVENOUS PRN
Status: DISCONTINUED | OUTPATIENT
Start: 2020-01-23 | End: 2020-01-23

## 2020-01-23 RX ORDER — HYDROCODONE BITARTRATE AND ACETAMINOPHEN 5; 325 MG/1; MG/1
1-2 TABLET ORAL EVERY 4 HOURS PRN
Qty: 20 TABLET | Refills: 0 | Status: SHIPPED | OUTPATIENT
Start: 2020-01-23 | End: 2020-06-05

## 2020-01-23 RX ORDER — ONDANSETRON 4 MG/1
4 TABLET, ORALLY DISINTEGRATING ORAL EVERY 30 MIN PRN
Status: DISCONTINUED | OUTPATIENT
Start: 2020-01-23 | End: 2020-01-23 | Stop reason: HOSPADM

## 2020-01-23 RX ORDER — METOCLOPRAMIDE HYDROCHLORIDE 5 MG/ML
10 INJECTION INTRAMUSCULAR; INTRAVENOUS ONCE
Status: COMPLETED | OUTPATIENT
Start: 2020-01-23 | End: 2020-01-23

## 2020-01-23 RX ORDER — SODIUM CHLORIDE, SODIUM LACTATE, POTASSIUM CHLORIDE, CALCIUM CHLORIDE 600; 310; 30; 20 MG/100ML; MG/100ML; MG/100ML; MG/100ML
INJECTION, SOLUTION INTRAVENOUS CONTINUOUS PRN
Status: DISCONTINUED | OUTPATIENT
Start: 2020-01-23 | End: 2020-01-23

## 2020-01-23 RX ORDER — ONDANSETRON 2 MG/ML
4 INJECTION INTRAMUSCULAR; INTRAVENOUS EVERY 30 MIN PRN
Status: DISCONTINUED | OUTPATIENT
Start: 2020-01-23 | End: 2020-01-23 | Stop reason: HOSPADM

## 2020-01-23 RX ORDER — HYDROXYZINE HYDROCHLORIDE 25 MG/1
25 TABLET, FILM COATED ORAL
Status: DISCONTINUED | OUTPATIENT
Start: 2020-01-23 | End: 2020-01-23 | Stop reason: HOSPADM

## 2020-01-23 RX ORDER — IOPAMIDOL 755 MG/ML
135 INJECTION, SOLUTION INTRAVASCULAR ONCE
Status: COMPLETED | OUTPATIENT
Start: 2020-01-23 | End: 2020-01-23

## 2020-01-23 RX ORDER — FENTANYL CITRATE 50 UG/ML
25-50 INJECTION, SOLUTION INTRAMUSCULAR; INTRAVENOUS
Status: DISCONTINUED | OUTPATIENT
Start: 2020-01-23 | End: 2020-01-23 | Stop reason: HOSPADM

## 2020-01-23 RX ORDER — HYDROMORPHONE HYDROCHLORIDE 1 MG/ML
0.5 INJECTION, SOLUTION INTRAMUSCULAR; INTRAVENOUS; SUBCUTANEOUS
Status: COMPLETED | OUTPATIENT
Start: 2020-01-23 | End: 2020-01-23

## 2020-01-23 RX ORDER — PROPOFOL 10 MG/ML
INJECTION, EMULSION INTRAVENOUS PRN
Status: DISCONTINUED | OUTPATIENT
Start: 2020-01-23 | End: 2020-01-23

## 2020-01-23 RX ORDER — LIDOCAINE HYDROCHLORIDE 20 MG/ML
INJECTION, SOLUTION INFILTRATION; PERINEURAL PRN
Status: DISCONTINUED | OUTPATIENT
Start: 2020-01-23 | End: 2020-01-23

## 2020-01-23 RX ORDER — MAGNESIUM HYDROXIDE 1200 MG/15ML
LIQUID ORAL PRN
Status: DISCONTINUED | OUTPATIENT
Start: 2020-01-23 | End: 2020-01-23 | Stop reason: HOSPADM

## 2020-01-23 RX ORDER — NEOSTIGMINE METHYLSULFATE 1 MG/ML
VIAL (ML) INJECTION PRN
Status: DISCONTINUED | OUTPATIENT
Start: 2020-01-23 | End: 2020-01-23

## 2020-01-23 RX ORDER — FENTANYL CITRATE 50 UG/ML
INJECTION, SOLUTION INTRAMUSCULAR; INTRAVENOUS PRN
Status: DISCONTINUED | OUTPATIENT
Start: 2020-01-23 | End: 2020-01-23

## 2020-01-23 RX ORDER — BUPIVACAINE HYDROCHLORIDE AND EPINEPHRINE 5; 5 MG/ML; UG/ML
INJECTION, SOLUTION EPIDURAL; INTRACAUDAL; PERINEURAL
Status: DISCONTINUED
Start: 2020-01-23 | End: 2020-01-23 | Stop reason: HOSPADM

## 2020-01-23 RX ORDER — HYDROMORPHONE HYDROCHLORIDE 1 MG/ML
.3-.5 INJECTION, SOLUTION INTRAMUSCULAR; INTRAVENOUS; SUBCUTANEOUS EVERY 5 MIN PRN
Status: DISCONTINUED | OUTPATIENT
Start: 2020-01-23 | End: 2020-01-23 | Stop reason: HOSPADM

## 2020-01-23 RX ORDER — SODIUM CHLORIDE, SODIUM LACTATE, POTASSIUM CHLORIDE, CALCIUM CHLORIDE 600; 310; 30; 20 MG/100ML; MG/100ML; MG/100ML; MG/100ML
INJECTION, SOLUTION INTRAVENOUS CONTINUOUS
Status: DISCONTINUED | OUTPATIENT
Start: 2020-01-23 | End: 2020-01-23 | Stop reason: HOSPADM

## 2020-01-23 RX ORDER — PHENAZOPYRIDINE HYDROCHLORIDE 100 MG/1
200 TABLET, FILM COATED ORAL ONCE
Status: DISCONTINUED | OUTPATIENT
Start: 2020-01-23 | End: 2020-01-23 | Stop reason: HOSPADM

## 2020-01-23 RX ORDER — ONDANSETRON 4 MG/1
4 TABLET, ORALLY DISINTEGRATING ORAL
Status: DISCONTINUED | OUTPATIENT
Start: 2020-01-23 | End: 2020-01-23 | Stop reason: HOSPADM

## 2020-01-23 RX ORDER — PROPOFOL 10 MG/ML
INJECTION, EMULSION INTRAVENOUS CONTINUOUS PRN
Status: DISCONTINUED | OUTPATIENT
Start: 2020-01-23 | End: 2020-01-23

## 2020-01-23 RX ORDER — OMEGA-3 FATTY ACIDS/FISH OIL 300-1000MG
600 CAPSULE ORAL EVERY 6 HOURS PRN
Qty: 60 CAPSULE | Refills: 0 | Status: SHIPPED | OUTPATIENT
Start: 2020-01-23 | End: 2020-07-03

## 2020-01-23 RX ORDER — DEXAMETHASONE SODIUM PHOSPHATE 4 MG/ML
INJECTION, SOLUTION INTRA-ARTICULAR; INTRALESIONAL; INTRAMUSCULAR; INTRAVENOUS; SOFT TISSUE PRN
Status: DISCONTINUED | OUTPATIENT
Start: 2020-01-23 | End: 2020-01-23

## 2020-01-23 RX ORDER — HYDROCODONE BITARTRATE AND ACETAMINOPHEN 5; 325 MG/1; MG/1
2 TABLET ORAL
Status: COMPLETED | OUTPATIENT
Start: 2020-01-23 | End: 2020-01-23

## 2020-01-23 RX ORDER — KETOROLAC TROMETHAMINE 30 MG/ML
INJECTION, SOLUTION INTRAMUSCULAR; INTRAVENOUS PRN
Status: DISCONTINUED | OUTPATIENT
Start: 2020-01-23 | End: 2020-01-23

## 2020-01-23 RX ORDER — NALOXONE HYDROCHLORIDE 0.4 MG/ML
.1-.4 INJECTION, SOLUTION INTRAMUSCULAR; INTRAVENOUS; SUBCUTANEOUS
Status: DISCONTINUED | OUTPATIENT
Start: 2020-01-23 | End: 2020-01-23 | Stop reason: HOSPADM

## 2020-01-23 RX ADMIN — FENTANYL CITRATE 25 MCG: 50 INJECTION, SOLUTION INTRAMUSCULAR; INTRAVENOUS at 17:23

## 2020-01-23 RX ADMIN — ROCURONIUM BROMIDE 50 MG: 10 INJECTION INTRAVENOUS at 16:10

## 2020-01-23 RX ADMIN — FENTANYL CITRATE 100 MCG: 50 INJECTION, SOLUTION INTRAMUSCULAR; INTRAVENOUS at 16:00

## 2020-01-23 RX ADMIN — HYDROMORPHONE HYDROCHLORIDE 0.5 MG: 1 INJECTION, SOLUTION INTRAMUSCULAR; INTRAVENOUS; SUBCUTANEOUS at 17:13

## 2020-01-23 RX ADMIN — KETOROLAC TROMETHAMINE 30 MG: 30 INJECTION, SOLUTION INTRAMUSCULAR at 17:24

## 2020-01-23 RX ADMIN — SODIUM CHLORIDE 1000 ML: 9 INJECTION, SOLUTION INTRAVENOUS at 09:56

## 2020-01-23 RX ADMIN — DEXAMETHASONE SODIUM PHOSPHATE 4 MG: 4 INJECTION, SOLUTION INTRA-ARTICULAR; INTRALESIONAL; INTRAMUSCULAR; INTRAVENOUS; SOFT TISSUE at 16:15

## 2020-01-23 RX ADMIN — FENTANYL CITRATE 50 MCG: 50 INJECTION, SOLUTION INTRAMUSCULAR; INTRAVENOUS at 18:07

## 2020-01-23 RX ADMIN — FENTANYL CITRATE 50 MCG: 50 INJECTION, SOLUTION INTRAMUSCULAR; INTRAVENOUS at 19:06

## 2020-01-23 RX ADMIN — ONDANSETRON 4 MG: 2 INJECTION INTRAMUSCULAR; INTRAVENOUS at 17:23

## 2020-01-23 RX ADMIN — DEXMEDETOMIDINE HYDROCHLORIDE 12 MCG: 100 INJECTION, SOLUTION INTRAVENOUS at 16:15

## 2020-01-23 RX ADMIN — IOPAMIDOL 135 ML: 755 INJECTION, SOLUTION INTRAVENOUS at 10:32

## 2020-01-23 RX ADMIN — LIDOCAINE HYDROCHLORIDE 100 MG: 20 INJECTION, SOLUTION INFILTRATION; PERINEURAL at 16:00

## 2020-01-23 RX ADMIN — NEOSTIGMINE METHYLSULFATE 5 MG: 1 INJECTION, SOLUTION INTRAVENOUS at 17:23

## 2020-01-23 RX ADMIN — HYDROMORPHONE HYDROCHLORIDE 0.5 MG: 1 INJECTION, SOLUTION INTRAMUSCULAR; INTRAVENOUS; SUBCUTANEOUS at 09:56

## 2020-01-23 RX ADMIN — MIDAZOLAM 2 MG: 1 INJECTION INTRAMUSCULAR; INTRAVENOUS at 15:56

## 2020-01-23 RX ADMIN — PROPOFOL 300 MG: 10 INJECTION, EMULSION INTRAVENOUS at 16:00

## 2020-01-23 RX ADMIN — SUCCINYLCHOLINE CHLORIDE 100 MG: 20 INJECTION, SOLUTION INTRAMUSCULAR; INTRAVENOUS; PARENTERAL at 16:00

## 2020-01-23 RX ADMIN — FENTANYL CITRATE 25 MCG: 50 INJECTION, SOLUTION INTRAMUSCULAR; INTRAVENOUS at 17:11

## 2020-01-23 RX ADMIN — SODIUM CHLORIDE, POTASSIUM CHLORIDE, SODIUM LACTATE AND CALCIUM CHLORIDE: 600; 310; 30; 20 INJECTION, SOLUTION INTRAVENOUS at 15:25

## 2020-01-23 RX ADMIN — SODIUM CHLORIDE, POTASSIUM CHLORIDE, SODIUM LACTATE AND CALCIUM CHLORIDE: 600; 310; 30; 20 INJECTION, SOLUTION INTRAVENOUS at 16:48

## 2020-01-23 RX ADMIN — FENTANYL CITRATE 50 MCG: 50 INJECTION, SOLUTION INTRAMUSCULAR; INTRAVENOUS at 16:38

## 2020-01-23 RX ADMIN — HYDROMORPHONE HYDROCHLORIDE 0.5 MG: 1 INJECTION, SOLUTION INTRAMUSCULAR; INTRAVENOUS; SUBCUTANEOUS at 18:18

## 2020-01-23 RX ADMIN — GLYCOPYRROLATE 0.8 MG: 0.2 INJECTION, SOLUTION INTRAMUSCULAR; INTRAVENOUS at 17:23

## 2020-01-23 RX ADMIN — HYDROCODONE BITARTRATE AND ACETAMINOPHEN 2 TABLET: 5; 325 TABLET ORAL at 19:16

## 2020-01-23 RX ADMIN — SODIUM CHLORIDE 80 ML: 9 INJECTION, SOLUTION INTRAVENOUS at 10:32

## 2020-01-23 RX ADMIN — METOCLOPRAMIDE 10 MG: 5 INJECTION, SOLUTION INTRAMUSCULAR; INTRAVENOUS at 09:56

## 2020-01-23 RX ADMIN — PROPOFOL 50 MCG/KG/MIN: 10 INJECTION, EMULSION INTRAVENOUS at 16:08

## 2020-01-23 ASSESSMENT — ENCOUNTER SYMPTOMS
NAUSEA: 1
DYSURIA: 0
BACK PAIN: 1
SEIZURES: 0
ABDOMINAL PAIN: 1
HEMATURIA: 0
DIARRHEA: 0
HEADACHES: 1
FEVER: 0
FREQUENCY: 0
DIZZINESS: 1
VOMITING: 1

## 2020-01-23 ASSESSMENT — COPD QUESTIONNAIRES: COPD: 0

## 2020-01-23 ASSESSMENT — LIFESTYLE VARIABLES: TOBACCO_USE: 0

## 2020-01-23 ASSESSMENT — MIFFLIN-ST. JEOR: SCORE: 1862.39

## 2020-01-23 NOTE — ED NOTES
Bed: ED27  Expected date:   Expected time:   Means of arrival:   [place]  [facility] [place] [facility] - 49 F abd pain eta 0936

## 2020-01-23 NOTE — ED PROVIDER NOTES
"  History     Chief Complaint:  Abdominal Pain      HPI   Yancy Hoover is a 49 year old female who presents via EMS for evaluation of sudden onset of 10/10 bilateral low abdominal pain, with associated nausea, emesis, headache, and dizziness beginning this morning. On EMS arrival, patient was diaphoretic and guarding. She was provided 1 mg dilaudid en route which decreased her pain to 4/10. She describes that she was trying to get into an [employer] when her abdominal pain began and states this was as though her \"abdomen locked up.\" Immediately after onset, she had an episode of emesis and notes she unable to move due to the pain. She remarks that yesterday she was nauseated and gassy with mild abdominal pain and left sided back pain. Here, she notes her nausea has alleviated.     Of note, patient reports of frequent headaches since October for which she has been seen multiple times. She has been managing her headaches with Advil and she expresses concern for the amount of Advil she has been taking. She reports taking less than 6 a day, but has been taking Advil daily. Two days ago, she believed she had taken too much Advil as she woke yesterday feeling unwell.     Denies hematemsis or coffee ground type emesis, diarrhea, fever, rashes, dysuria, frequency, vaginal bleeding, discharge, or any other concerns. Patient has history of C section. Denies the possibility of pregnancy.     Allergies:  Codeine      Medications:    Toradol    Ativan      Past Medical History:    Migraine     Past Surgical History:    C section     Family History:    History reviewed. No pertinent family history.      Social History:  Smoking Status: Current every day smoker  Marital Status:        Review of Systems   Constitutional: Negative for fever.   Gastrointestinal: Positive for abdominal pain, nausea and vomiting. Negative for diarrhea.   Genitourinary: Negative for dysuria, frequency, hematuria, urgency, vaginal bleeding " "and vaginal discharge.   Musculoskeletal: Positive for back pain.   Skin: Negative for rash.   Neurological: Positive for dizziness and headaches.   All other systems reviewed and are negative.      Physical Exam     Patient Vitals for the past 24 hrs:   BP Temp Temp src Pulse Resp SpO2 Height Weight   01/23/20 1445 -- -- -- -- -- 99 % -- --   01/23/20 1430 127/77 -- -- 74 -- 98 % -- --   01/23/20 1400 (!) 135/99 -- -- 82 -- 98 % -- --   01/23/20 1345 137/76 -- -- 73 -- 99 % -- --   01/23/20 1015 -- -- -- -- -- 98 % -- --   01/23/20 1000 -- -- -- -- -- 94 % -- --   01/23/20 0945 -- -- -- -- -- 97 % -- --   01/23/20 0934 (!) 150/95 98  F (36.7  C) Oral 92 18 97 % 1.626 m (5' 4\") 124.7 kg (275 lb)   01/23/20 0930 (!) 150/95 -- -- 94 -- -- -- --        Physical Exam  General: Patient is awake, alert and interactive when I enter the room. Appears uncomfortable.   Head: The scalp, face, and head appear normal  Eyes: The pupils are equal, round, and reactive to light. Conjunctivae and sclerae are normal  ENT: External acoustic canals are normal. The oropharynx is normal without erythema. Uvula is in the midline  Neck: Normal range of motion. No anterior cervical lymphadenopathy noted  CV: Regular rate. S1/S2. No murmurs.   Resp: Lungs are clear without wheezes or rales. No respiratory distress.   GI: Abdomen is soft, no rigidity. No evidence of pulsatile mass. No fluid waves or evidence of ascites. No distension. She is tender to palpation in the epigastrium. There is no rebound or guarding. Bowel sounds are normal. No hernias or bruising are noted in detailed exam. No CVA tenderness.     MS: Normal tone. Joints grossly normal without effusions. No asymmetric leg swelling, calf or thigh tenderness.    Skin: No rash or lesions noted. Normal capillary refill noted  Neuro: Speech is normal and fluent. Face is symmetric. Moving all extremities.   Psych:  Normal affect.  Appropriate interactions.    Emergency Department Course "     Imaging:  Radiographic findings were communicated with the patient who voiced understanding of the findings.    CT Abdomen Pelvis w Contrast  1. 9.1 cm pelvic cystic lesion overlying the urinary bladder and  abutting the uterus and both ovaries, indeterminate, could be ovarian,  can be further evaluated with pelvic ultrasound.  2. Hepatic steatosis.  3. Sclerotic areas along the iliac side of the sacroiliac joints,  bilaterally, left greater than right, likely represent sacroiliitis.  As read by Radiology.    US Pelvic Complete w Transvaginal & Abd/Pel Duplex Limited  1.  10 cm left adnexal simple cyst. Guidelines reproduced below  recommend nonemergent follow-up MRI. Alternatively, ultrasound could  be obtained in 2-3 months to evaluate for resolution.     Management Recommendation:  Simple Cyst:  Reproductive Age Patient  <5 cm: No follow up.  5-7 cm: Yearly US.  >7 cm: MRI  Reference: Asymptomatic Ovarian and Other Adnexal Cysts Imaged at US.  Radiology: Volume 256: Number 3-September 2010  As read by Radiology.    Laboratory:  Lipase: 89  CBC: WBC 5.8, HGB 13.0,    CMP: Glucose 138 (H), chloride 110 (H), albumin 3.3 (L), o/w WNL (Creatinine: 0.73)    UA: specific gravity >1.035 (H), albumin 10, RBC 4 (H), bacteria few, squamous 15 (H), mucous present, o/w negative     Interventions:  0956: NS 1L IV Bolus   0956: Reglan 10 mg IV  0956: Dilaudid 0.5 mg IV    Emergency Department Course:  0943: Nursing notes and vitals reviewed. I performed an exam of the patient as documented above.     IV inserted. Medicine administered as documented above. Blood drawn. This was sent to the lab for further testing, results above. The patient provided a urine sample here in the emergency department. This was sent for laboratory testing, findings above.      The patient was sent for an abdomen pelvis CT and pelvic ultrasound while in the emergency department, findings above.     1130: I rechecked the patient and  discussed the results of her workup thus far.     1325: I rechecked the patient and updated her on the plan.     1330: I consulted with Dr. Cuevas , OB GYN, regarding the patient's history and presentation here in the emergency department.     Findings and plan explained to the Patient. Patient will be sent to the operating room. She is agreeable to this plan.        Impression & Plan      Medical Decision Making:  Patient is a 49-year-old female who presents the emergency department with sudden onset lower abdominal pain with vomiting.  Upon initial evaluation she is tachycardic but otherwise hemodynamically stable with no vital signs.  Patient has some epigastric pain and lower abdominal tenderness but overall does not have a concerning abdomen.  CT scan was obtained which shows large pelvic mass concerning for cyst.  Follow-up pelvic ultrasound confirms 10 cm left-sided simple ovarian cyst.  Unfortunately we are not able to visualize the ovarian tissue on the left side therefore cannot fully rule out ovarian torsion.  Given the patient's overall presentation and work-up here I am still concerned about ovarian torsion.  I discussed the case with on-call OB/GYN who agrees that the patient should be further evaluated.  Patient be taken to the operating room for a laparoscopic evaluation and further treatment.  I discussed the plan with the patient who is amenable at this time.  All questions were answered and patient remained hemodynamically stable under my care.    Diagnosis:    ICD-10-CM    1. Ovarian torsion N83.519 Case Request: operative laparoscopy, left ovarian cystectomy vs. left SALPINGO-OOPHORECTOMY     Case Request: operative laparoscopy, left ovarian cystectomy vs. left SALPINGO-OOPHORECTOMY       Disposition:  Sent to the operating room.     Judie DEL ANGEL, am serving as a scribe at 9:43 AM on 1/23/2020 to document services personally performed by Manuel Arora MD based on my observations and  the provider's statements to me.       Nadia Maria  1/23/2020    EMERGENCY DEPARTMENT       Manuel Arora MD  01/23/20 3571

## 2020-01-23 NOTE — ANESTHESIA CARE TRANSFER NOTE
Patient: Yancy Hoover    Procedure(s):  LAPAROSCOPIC ovarian cystectomy,    Diagnosis: Ovarian torsion [N83.519]  Diagnosis Additional Information: No value filed.    Anesthesia Type:   General, ETT, RSI     Note:  Airway :Face Mask  Patient transferred to:PACU  Comments: Neuromuscular blockade reversed after TOF 4/4, spontaneous respirations, adequate tidal volumes, followed commands to voice, oropharynx suctioned with soft flexible catheter, extubated atraumatically, extubated with suction, airway patent after extubation.  Oxygen via facemask at 6 liters per minute to PACU. Oxygen tubing connected to wall O2 in PACU, SpO2, NiBP, and EKG monitors and alarms on and functioning, report on patient's clinical status given to PACU RN. Handoff Report: Identifed the Patient, Identified the Reponsible Provider, Reviewed the pertinent medical history, Discussed the surgical course, Reviewed Intra-OP anesthesia mangement and issues during anesthesia, Set expectations for post-procedure period and Allowed opportunity for questions and acknowledgement of understanding      Vitals: (Last set prior to Anesthesia Care Transfer)    CRNA VITALS  1/23/2020 1712 - 1/23/2020 1749      1/23/2020             Pulse:  98    SpO2:  95 %    Resp Rate (observed):  (!) 2                Electronically Signed By: LYDIA Lerner CRNA  January 23, 2020  5:49 PM

## 2020-01-23 NOTE — ED TRIAGE NOTES
Pt was at home walking out to the [employer] car when had emesis and sudden onset of 10/10 pain. Pt was sweaty and guarding when EMS arrived. EMS gave 1mg dilaudid IV which brought pain to 4/10. Still has appendix and gallbladder. Pain in located around bladder. Had tubes tied about 5 years ago.

## 2020-01-23 NOTE — ANESTHESIA PREPROCEDURE EVALUATION
Anesthesia Pre-Procedure Evaluation    Patient: Yancy Hoover   MRN: [id number] : 1970          Preoperative Diagnosis: Ovarian torsion [N83.519]    Procedure(s):  LAPAROSCOPIC ovarian cystectomy, possible oopherectomy. Harmonic scalpel.    Past Medical History:   Diagnosis Date     NO ACTIVE PROBLEMS      Past Surgical History:   Procedure Laterality Date     GYN SURGERY      c  section 2014       Anesthesia Evaluation     . Pt has had prior anesthetic. Type: General    No history of anesthetic complications          ROS/MED HX    ENT/Pulmonary:     (+)sleep apnea, doesn't use CPAP , recent URI unresolved light cough: . .   (-) tobacco use, asthma and COPD   Neurologic:     (+)migraines,    (-) seizures and CVA   Cardiovascular:        (-) hypertension and CAD   METS/Exercise Tolerance:  >4 METS   Hematologic:  - neg hematologic  ROS       Musculoskeletal:         GI/Hepatic:        (-) GERD and liver disease   Renal/Genitourinary:      (-) renal disease   Endo:      (-) Type II DM and thyroid disease   Psychiatric:         Infectious Disease:         Malignancy:         Other:                          Physical Exam  Normal systems: cardiovascular, pulmonary and dental    Airway   Mallampati: III  TM distance: >3 FB  Neck ROM: full    Dental   Comment: The patient denies any loose, chipped, or missing teeth.    Cardiovascular       Pulmonary             Lab Results   Component Value Date    WBC 5.8 2020    HGB 13.0 2020    HCT 40.8 2020     2020     2020    POTASSIUM 4.4 2020    CHLORIDE 110 (H) 2020    CO2 21 2020    BUN 11 2020    CR 0.73 2020     (H) 2020    RUSSELL 9.0 2020    ALBUMIN 3.3 (L) 2020    PROTTOTAL 7.6 2020    ALT 21 2020    AST 16 2020    ALKPHOS 69 2020    BILITOTAL 0.2 2020    LIPASE 89 2020    TSH 2.62 10/22/2019       Preop Vitals  BP Readings from  "Last 3 Encounters:   01/23/20 (!) 135/99   10/22/19 (!) 152/90   10/12/19 (!) 142/90    Pulse Readings from Last 3 Encounters:   01/23/20 82   10/22/19 91   10/12/19 88      Resp Readings from Last 3 Encounters:   01/23/20 18   10/22/19 18   10/12/19 11    SpO2 Readings from Last 3 Encounters:   01/23/20 98%   10/22/19 99%   10/12/19 100%      Temp Readings from Last 1 Encounters:   01/23/20 36.7  C (98  F) (Oral)    Ht Readings from Last 1 Encounters:   01/23/20 1.626 m (5' 4\")      Wt Readings from Last 1 Encounters:   01/23/20 124.7 kg (275 lb)    Estimated body mass index is 47.2 kg/m  as calculated from the following:    Height as of this encounter: 1.626 m (5' 4\").    Weight as of this encounter: 124.7 kg (275 lb).       Anesthesia Plan      History & Physical Review  History and physical reviewed and following examination; no interval change.    ASA Status:  2 .    NPO Status:  > 6 hours and waived due to emergency    Plan for General, ETT and RSI with Intravenous induction. Maintenance will be Balanced.    PONV prophylaxis:  Ondansetron (or other 5HT-3) and Dexamethasone or Solumedrol  Additional equipment: Videolaryngoscope      Postoperative Care  Postoperative pain management:  Multi-modal analgesia.  Plan for postoperative opioid use.    Consents  Anesthetic plan, risks, benefits and alternatives discussed with:  Patient..                 Tristian Harrington MD  "

## 2020-01-23 NOTE — H&P
"Labor and delivery admit note.  HPI    Yancy Hoover  is a 49 year old   who presented to the ED for evaluation of sudden onset of 10/10 bilateral low abdominal pain, with associated nausea, emesis, headache, and dizziness beginning this morning. On EMS arrival, patient was diaphoretic and guarding. She was provided 1 mg dilaudid en route which decreased her pain to 4/10. She describes that she was trying to get into an [employer] when her abdominal pain began and states this was as though her \"abdomen locked up.\" Immediately after onset, she had an episode of emesis and notes she unable to move due to the pain. She remarks that yesterday she was nauseated and gassy with mild abdominal pain and left sided back pain. Here, she notes her nausea has alleviated.      She had a CT scan performed which demonstrated a large adnexal mass and subsequent TVUS demonstrated a large 10 cm left adnexal mass with no identifiable blood flow - consistent with ovarian torsion.  OBGYN was contacted to discuss urgent surgery.     Allergies:  Codeine       Medications:    Toradol    Ativan       Past Medical History:    Migraine      Past Surgical History:     x2, bilateral tubal ligation     Family History:    History reviewed. No pertinent family history.       Social History:  Smoking Status: Current every day smoker  Marital Status:          Past Medical History:   Diagnosis Date     NO ACTIVE PROBLEMS      Past Surgical History:   Procedure Laterality Date     GYN SURGERY      c  section      Social History     Tobacco Use     Smoking status: Current Every Day Smoker     Packs/day: 0.00   Substance Use Topics     Alcohol use: Not on file     Drug use: Not on file     Objective  Alert and oriented  /77   Pulse 74   Temp 98  F (36.7  C) (Oral)   Resp 18   Ht 1.626 m (5' 4\")   Wt 124.7 kg (275 lb)   SpO2 99%   BMI 47.20 kg/m    Heart and Lungs- normal  Abdomen - tender, mildly " distended    Assessment/Plan:   - suspected ovarian torsion of left ovarian  Uneventful prenatal course.    Consent for laparoscopic ovarian cystectomy vs. Salpingo-oophorectomy performed  Routine pre-op orders  No antibiotics indicated.    Sofi Cuevas MD

## 2020-01-23 NOTE — LETTER
[facility] PACU  [address]  [place] [address]  Phone: [phone]    January 23, 2020        Yancy Brown Sneha  [address]  [place] [place] [address]          To whom it may concern:    RE: Yancy Beauchampwin    Patient may return to work  Monday 1/27/20 with the following:  Lifting restriction of 15 lbs for 4 weeks.        Sincerely,        Jaymie Gaviria RN

## 2020-01-23 NOTE — DISCHARGE INSTRUCTIONS
Today you received Toradol, an antiinflammatory medication similar to Ibuprofen.  You should not take other antiinflammatory medication, such as Ibuprofen, Motrin, Advil, Aleve, Naprosyn, etc until 11:25pm.     Same Day Surgery Discharge Instructions for  Sedation and General Anesthesia       It's not unusual to feel dizzy, light-headed or faint for up to 24 hours after surgery or while taking pain medication.  If you have these symptoms: sit for a few minutes before standing and have someone assist you when you get up to walk or use the bathroom.      You should rest and relax for the next 24 hours. We recommend you make arrangements to have an adult stay with you for at least 24 hours after your discharge.  Avoid hazardous and strenuous activity.      DO NOT DRIVE any vehicle or operate mechanical equipment for 24 hours following the end of your surgery.  Even though you may feel normal, your reactions may be affected by the medication you have received.      Do not drink alcoholic beverages for 24 hours following surgery.       Slowly progress to your regular diet as you feel able. It's not unusual to feel nauseated and/or vomit after receiving anesthesia.  If you develop these symptoms, drink clear liquids (apple juice, ginger ale, broth, 7-up, etc. ) until you feel better.  If your nausea and vomiting persists for 24 hours, please notify your surgeon.        All narcotic pain medications, along with inactivity and anesthesia, can cause constipation. Drinking plenty of liquids and increasing fiber intake will help.      For any questions of a medical nature, call your surgeon.      Do not make important decisions for 24 hours.      If you had general anesthesia, you may have a sore throat for a couple of days related to the breathing tube used during surgery.  You may use Cepacol lozenges to help with this discomfort.  If it worsens or if you develop a fever, contact your surgeon.       If you feel your pain is  not well managed with the pain medications prescribed by your surgeon, please contact your surgeon's office to let them know so they can address your concerns.       Please call [phone] to schedule a post-op appt with Dr. Cuevas at [facility] at one of her office locations.  Please call if you get intractable pain, or develop signs of infection.      Reasons to contact your surgeon:    1. Signs of possible infection: Check your incision daily for redness, swelling, warmth, red streaks or foul drainage.   2. Elevated temperature.  3. Pain not controlled with pain medication and/or rest.   4. Uncontrolled nausea or vomiting.  5. Any questions or concerns.      **If you have questions or concerns about your procedure,   call Dr Cuevas at  [phone]*

## 2020-01-23 NOTE — ED NOTES
"[facility]  ED Nurse Handoff Report    ED Chief complaint: Abdominal Pain      ED Diagnosis:   Final diagnoses:   None       Code Status: not addressed     Allergies:   Allergies   Allergen Reactions     Codeine        Patient Story: sudden onset abd pain going to work today,   Focused Assessment:  abd pain, hx tubes tied,     Treatments and/or interventions provided: US and ct show ovarian cyst awaiting GYN to come evaluate, has had total 1.5 dilaudid so far   Patient's response to treatments and/or interventions: better pain control     To be done/followed up on inpatient unit:  unknown     Does this patient have any cognitive concerns?: none     Activity level - Baseline/Home:  Independent  Activity Level - Current:   Independent    Patient's Preferred language: English   Needed?: No    Isolation: None  Infection: Not Applicable  Bariatric?: No    Vital Signs:   Vitals:    01/23/20 0934   BP: (!) 150/95   Pulse: 92   Resp: 18   Temp: 98  F (36.7  C)   TempSrc: Oral   SpO2: 97%   Weight: 124.7 kg (275 lb)   Height: 1.626 m (5' 4\")       Cardiac Rhythm:     Was the PSS-3 completed:   Yes  What interventions are required if any?               Family Comments: none   OBS brochure/video discussed/provided to patient/family: No              Name of person given brochure if not patient: na              Relationship to patient: na    For the majority of the shift this patient's behavior was Green.   Behavioral interventions performed were na.    ED NURSE PHONE NUMBER: [phone]       "

## 2020-01-23 NOTE — OP NOTE
Yancy Hoover  January 23, 2020    PREOP DX: suspected left ovarian torsion                         10 cm adnexal mass                          Morbid obesity, class III    POSTOP DX: serous cystadenomoa     PROCEDURE: Laparoscopic left salpingo-oophorectomy, peritoneal washings    ANESTHESIA: EMIL     SURGEON: Sofi Cuevas MD     ASSISTANT: LONNIE Webster    FINDINGS: normal appearing uterus, adhesions of the left adnexa and omental adhesions in the upper abdomen; normal appearing right ovary, some normal appearing left ovarian tissue with a very large mucinous cystadenoma of the left ovary, drained for serous fluid and mucinous fluid    EBL: 50 mL    COMPLICATIONS: none    CONDITION: stable    SPECIMENS: left fallopian tube and ovary and drained ovarian cyst    PROCEDURE:   The patient was brought to the operating room where following adequate general anesthesia and placed in the dorsolithotomy position where she was prepped and draped in routine fashion.   A straight catheter was placed which drained clear yellow urine.  A speculum was placed and the cervix was grasped with a tenaculum. The uterine manipulator was placed and secured.   Attention was directed to the patients abdomen where an infraumbilical incision was made. The step veress was placed and passed the sterile drop test. The abdomen was insufflated following an opening pressure of 4mmHg. The abdomen was filled with 2.5 liters of CO2. The veress was removed and the trocar was placed under direct visualization.   Two accessory ports of 5mm were placed in the right and left lower quadrants under direct visualization.   The upper abdomen was explored with the findings above.  Reverse trendelenberg was needed to drop the large ovarian mass down into the pelvis.  It was opened and drained after peritoneal washings were obtained and suction caused the large cyst to collapse.  The LLQ port was converted to a 12 mm port and an endocatch  bag was utilized to remove the left fallopian tube and ovary.  A tirso nava device was utilized to close the fascia.    Following copious irrigation the operative field was hemostatic. Surgicel powder was placed over the left IP ligament which was slightly oozy, making it hemostatic.There were no complications. The CO2 gas was allow to escape. The accessory ports were removed under direct visualization. The laparoscope and its sheath were removed under direct visualization. The ports were closed with dermabond.  All sponge, needle, instrument counts were correct.  The patient was awakened and removed to the recovery room in stable condition.     Sofi Cuevas MD

## 2020-01-24 NOTE — ANESTHESIA POSTPROCEDURE EVALUATION
Patient: Yancy Hoover    Procedure(s):  LAPAROSCOPIC ovarian cystectomy,    Diagnosis:Ovarian torsion [N83.519]  Diagnosis Additional Information: No value filed.    Anesthesia Type:  General, ETT, RSI    Note:  Anesthesia Post Evaluation    Patient location during evaluation: PACU  Patient participation: Able to fully participate in evaluation  Level of consciousness: awake and alert  Pain management: adequate  Airway patency: patent  Cardiovascular status: acceptable and hemodynamically stable  Respiratory status: acceptable and unassisted  Hydration status: acceptable  PONV: none             Last vitals:  Vitals:    01/23/20 1906 01/23/20 1910 01/23/20 1950   BP: (!) 139/91 (!) 155/91 (!) 140/88   Pulse:  105 80   Resp: 14 10 16   Temp: 37.4  C (99.3  F) 37.5  C (99.5  F)    SpO2: 95% 95%          Electronically Signed By: Ag rFias DO  January 23, 2020  10:26 PM

## 2020-01-27 LAB
COPATH REPORT: NORMAL
COPATH REPORT: NORMAL

## 2020-02-03 ENCOUNTER — APPOINTMENT (OUTPATIENT)
Dept: CT IMAGING | Facility: CLINIC | Age: 50
End: 2020-02-03
Attending: EMERGENCY MEDICINE
Payer: MEDICAID

## 2020-02-03 ENCOUNTER — APPOINTMENT (OUTPATIENT)
Dept: ULTRASOUND IMAGING | Facility: CLINIC | Age: 50
End: 2020-02-03
Attending: EMERGENCY MEDICINE
Payer: MEDICAID

## 2020-02-03 ENCOUNTER — HOSPITAL ENCOUNTER (EMERGENCY)
Facility: CLINIC | Age: 50
Discharge: HOME | End: 2020-02-04
Attending: EMERGENCY MEDICINE | Admitting: EMERGENCY MEDICINE
Payer: MEDICAID

## 2020-02-03 DIAGNOSIS — G89.18 POSTOPERATIVE PAIN: ICD-10-CM

## 2020-02-03 LAB
ALBUMIN SERPL-MCNC: 3.5 G/DL (ref 3.4–5)
ALP SERPL-CCNC: 66 U/L (ref 40–150)
ALT SERPL W P-5'-P-CCNC: 21 U/L (ref 0–50)
ANION GAP SERPL CALCULATED.3IONS-SCNC: 2 MMOL/L (ref 3–14)
AST SERPL W P-5'-P-CCNC: 8 U/L (ref 0–45)
BASOPHILS # BLD AUTO: 0 10E9/L (ref 0–0.2)
BASOPHILS NFR BLD AUTO: 0.2 %
BILIRUB SERPL-MCNC: 0.1 MG/DL (ref 0.2–1.3)
BUN SERPL-MCNC: 15 MG/DL (ref 7–30)
CALCIUM SERPL-MCNC: 8.8 MG/DL (ref 8.5–10.1)
CHLORIDE SERPL-SCNC: 107 MMOL/L (ref 94–109)
CO2 SERPL-SCNC: 27 MMOL/L (ref 20–32)
CREAT SERPL-MCNC: 0.75 MG/DL (ref 0.52–1.04)
D DIMER PPP FEU-MCNC: 1.1 UG/ML FEU (ref 0–0.5)
DIFFERENTIAL METHOD BLD: NORMAL
EOSINOPHIL # BLD AUTO: 0.2 10E9/L (ref 0–0.7)
EOSINOPHIL NFR BLD AUTO: 2.3 %
ERYTHROCYTE [DISTWIDTH] IN BLOOD BY AUTOMATED COUNT: 14.2 % (ref 10–15)
GFR SERPL CREATININE-BSD FRML MDRD: >90 ML/MIN/{1.73_M2}
GLUCOSE SERPL-MCNC: 134 MG/DL (ref 70–99)
HCG SERPL QL: NEGATIVE
HCT VFR BLD AUTO: 37.6 % (ref 35–47)
HGB BLD-MCNC: 12.3 G/DL (ref 11.7–15.7)
IMM GRANULOCYTES # BLD: 0 10E9/L (ref 0–0.4)
IMM GRANULOCYTES NFR BLD: 0.3 %
LYMPHOCYTES # BLD AUTO: 3.6 10E9/L (ref 0.8–5.3)
LYMPHOCYTES NFR BLD AUTO: 35.4 %
MCH RBC QN AUTO: 27.8 PG (ref 26.5–33)
MCHC RBC AUTO-ENTMCNC: 32.7 G/DL (ref 31.5–36.5)
MCV RBC AUTO: 85 FL (ref 78–100)
MONOCYTES # BLD AUTO: 0.7 10E9/L (ref 0–1.3)
MONOCYTES NFR BLD AUTO: 6.7 %
NEUTROPHILS # BLD AUTO: 5.6 10E9/L (ref 1.6–8.3)
NEUTROPHILS NFR BLD AUTO: 55.1 %
NRBC # BLD AUTO: 0 10*3/UL
NRBC BLD AUTO-RTO: 0 /100
PLATELET # BLD AUTO: 294 10E9/L (ref 150–450)
POTASSIUM SERPL-SCNC: 3.8 MMOL/L (ref 3.4–5.3)
PROT SERPL-MCNC: 7.4 G/DL (ref 6.8–8.8)
RBC # BLD AUTO: 4.43 10E12/L (ref 3.8–5.2)
SODIUM SERPL-SCNC: 136 MMOL/L (ref 133–144)
WBC # BLD AUTO: 10.1 10E9/L (ref 4–11)

## 2020-02-03 PROCEDURE — 25000128 H RX IP 250 OP 636: Performed by: EMERGENCY MEDICINE

## 2020-02-03 PROCEDURE — 96374 THER/PROPH/DIAG INJ IV PUSH: CPT | Mod: 59

## 2020-02-03 PROCEDURE — 74177 CT ABD & PELVIS W/CONTRAST: CPT

## 2020-02-03 PROCEDURE — 99285 EMERGENCY DEPT VISIT HI MDM: CPT | Mod: 25

## 2020-02-03 PROCEDURE — 76830 TRANSVAGINAL US NON-OB: CPT

## 2020-02-03 PROCEDURE — 80053 COMPREHEN METABOLIC PANEL: CPT | Performed by: EMERGENCY MEDICINE

## 2020-02-03 PROCEDURE — 85025 COMPLETE CBC W/AUTO DIFF WBC: CPT | Performed by: EMERGENCY MEDICINE

## 2020-02-03 PROCEDURE — 84703 CHORIONIC GONADOTROPIN ASSAY: CPT | Performed by: EMERGENCY MEDICINE

## 2020-02-03 PROCEDURE — 25000125 ZZHC RX 250: Performed by: EMERGENCY MEDICINE

## 2020-02-03 PROCEDURE — 85379 FIBRIN DEGRADATION QUANT: CPT | Performed by: EMERGENCY MEDICINE

## 2020-02-03 RX ORDER — HYDROMORPHONE HYDROCHLORIDE 1 MG/ML
0.5 INJECTION, SOLUTION INTRAMUSCULAR; INTRAVENOUS; SUBCUTANEOUS ONCE
Status: COMPLETED | OUTPATIENT
Start: 2020-02-03 | End: 2020-02-03

## 2020-02-03 RX ORDER — IOPAMIDOL 755 MG/ML
83 INJECTION, SOLUTION INTRAVASCULAR ONCE
Status: COMPLETED | OUTPATIENT
Start: 2020-02-03 | End: 2020-02-03

## 2020-02-03 RX ADMIN — IOPAMIDOL 83 ML: 755 INJECTION, SOLUTION INTRAVENOUS at 23:47

## 2020-02-03 RX ADMIN — SODIUM CHLORIDE 100 ML: 9 INJECTION, SOLUTION INTRAVENOUS at 23:47

## 2020-02-03 RX ADMIN — HYDROMORPHONE HYDROCHLORIDE 0.5 MG: 1 INJECTION, SOLUTION INTRAMUSCULAR; INTRAVENOUS; SUBCUTANEOUS at 22:16

## 2020-02-03 ASSESSMENT — MIFFLIN-ST. JEOR: SCORE: 1862.39

## 2020-02-03 ASSESSMENT — ENCOUNTER SYMPTOMS
CONSTIPATION: 1
COUGH: 0
NAUSEA: 0
SORE THROAT: 0
FEVER: 0
DIARRHEA: 0
ABDOMINAL PAIN: 1

## 2020-02-03 NOTE — ED AVS SNAPSHOT
Emergency Department  [address] [facility] [address]  Phone:  [phone]  Fax:  [phone]                                    Yancy Hoover   MRN: [id number]    Department:   Emergency Department   Date of Visit:  2/3/2020           After Visit Summary Signature Page    I have received my discharge instructions, and my questions have been answered. I have discussed any challenges I see with this plan with the nurse or doctor.    ..........................................................................................................................................  Patient/Patient Representative Signature      ..........................................................................................................................................  Patient Representative Print Name and Relationship to Patient    ..................................................               ................................................  Date                                   Time    ..........................................................................................................................................  Reviewed by Signature/Title    ...................................................              ..............................................  Date                                               Time          22EPIC Rev 08/18

## 2020-02-04 VITALS
TEMPERATURE: 98.6 F | HEART RATE: 104 BPM | HEIGHT: 64 IN | RESPIRATION RATE: 18 BRPM | OXYGEN SATURATION: 100 % | DIASTOLIC BLOOD PRESSURE: 78 MMHG | WEIGHT: 275 LBS | SYSTOLIC BLOOD PRESSURE: 117 MMHG | BODY MASS INDEX: 46.95 KG/M2

## 2020-02-04 LAB
ALBUMIN UR-MCNC: 10 MG/DL
APPEARANCE UR: CLEAR
BILIRUB UR QL STRIP: NEGATIVE
COLOR UR AUTO: YELLOW
GLUCOSE UR STRIP-MCNC: NEGATIVE MG/DL
HGB UR QL STRIP: NEGATIVE
KETONES UR STRIP-MCNC: NEGATIVE MG/DL
LEUKOCYTE ESTERASE UR QL STRIP: NEGATIVE
MUCOUS THREADS #/AREA URNS LPF: PRESENT /LPF
NITRATE UR QL: NEGATIVE
PH UR STRIP: 5.5 PH (ref 5–7)
RBC #/AREA URNS AUTO: 12 /HPF (ref 0–2)
SOURCE: ABNORMAL
SP GR UR STRIP: 1.01 (ref 1–1.03)
SQUAMOUS #/AREA URNS AUTO: 2 /HPF (ref 0–1)
UROBILINOGEN UR STRIP-MCNC: NORMAL MG/DL (ref 0–2)
WBC #/AREA URNS AUTO: 1 /HPF (ref 0–5)

## 2020-02-04 PROCEDURE — 81001 URINALYSIS AUTO W/SCOPE: CPT | Performed by: EMERGENCY MEDICINE

## 2020-02-04 NOTE — ED PROVIDER NOTES
History     Chief Complaint:  Abdominal Pain       HPI   Yancy Hoover is a 49 year old female who presents with abdominal pain. The patient recently had left sided ovarian torsion surgery on  and has been having intermittent abdominal pain since then. She reports having left lower quadrant abdominal pain today that shoots up her left side. She also reports having dizziness, constipation, and shortness of breath when she wakes up in the morning. She has been using hydrocodone and tylenol at home for her pain but states that hydrocodone makes her sleepy. The patient last took tylenol 2 hours prior to arrival. She denies having any fevers, cough, congestion, sore throat, vaginal bleeding, vaginal discharge, diarrhea, or nausea. Her last bowel movement was 2 days ago which is unusual for her. She has a follow up appointment with her OB/GYN in 4 days but she cannot wait until then due to the pain.     Allergies:  Codeine     Medications:    norco     Past Medical History:    Migraine     Past Surgical History:     section   Lap cystectomy ovarian     Family History:    Family history reviewed. No pertinent family history.      Social History:  Smoking Status: Current every day smoker  Marital Status:          Review of Systems   Constitutional: Negative for fever.   HENT: Negative for congestion and sore throat.    Respiratory: Negative for cough.    Gastrointestinal: Positive for abdominal pain (left lower quadrant) and constipation. Negative for diarrhea and nausea.   Genitourinary: Negative for vaginal bleeding and vaginal discharge.   All other systems reviewed and are negative.      Physical Exam     Patient Vitals for the past 24 hrs:   BP Temp Temp src Pulse Heart Rate Resp SpO2 Height Weight   20 0045 117/78 -- -- -- 88 18 100 % -- --   20 2354 123/82 -- -- 104 -- -- 98 % -- --   20 2217 -- -- -- -- -- -- 99 % -- --   20 2142 134/89 98.6  F (37  C) Oral 114  "-- 18 98 % 1.626 m (5' 4\") 124.7 kg (275 lb)        Physical Exam  General: Appears well-developed and well-nourished.   Head: No signs of trauma.   Mouth/Throat: Oropharynx is clear and moist.   CV: Normal rate and regular rhythm.    Resp: Effort normal and breath sounds normal. No respiratory distress.   GI: Soft. There is lower tenderness.  No rebound or guarding.  Normal bowel sounds.  No CVA tenderness.  MSK: Normal range of motion. no edema. No Calf tenderness.  Neuro: The patient is alert and oriented.  Speech normal.  GCS 15  Skin: Skin is warm and dry. No rash noted.   Psych: normal mood and affect. behavior is normal.       Emergency Department Course     Imaging:  Radiology findings were communicated with the patient who voiced understanding of the findings.    CT Chest (PE) Abdomen Pelvis w Contrast  Preliminary Result  IMPRESSION:  1.  No free fluid or abscess.  2.  No bowel obstruction.  3.  2.4 x 2.8 cm right ovarian complex cyst.  Reading per radiology       US Pelvic Complete w Transvaginal & Abd/Pel Duplex Limited  Final Result  IMPRESSION:    1.  3.2 cm complex cyst right ovary is likely a hemorrhagic cyst. No other acute findings.  2.  Difficult Doppler evaluation of right ovary. Arterial waveforms not demonstrated possibly due to technical factors. Venous waveforms seen in periphery of ovary.  Reading per radiology       Laboratory:  Laboratory findings were communicated with the patient who voiced understanding of the findings.    UA with micro: Protein Albumin Urine 10 (A), RBC/HPF 12 (H), Squamous epithelial/HPF urine 2 (H), Mucous urine present (A)  o/w wnl/negative       CBC:  WBC 10.1, HGB 12.3, , o/w WNL     CMP: Glucose 134 (H), anion gap 2 (L), bilirubin total 0.1 (L), o/w WNL (Creatinine: 0.75)     HCG Qualitative pregnancy blood: negative     D dimer quantitative: 1.1 (H)    Interventions:  2216 Dilaudid 0.5 mg IV    Emergency Department Course:  Past medical records, nursing " notes, and vitals reviewed.    2205 I performed an exam of the patient as documented above.    IV was inserted and blood was drawn for laboratory testing, results above.     The patient provided a urine sample here in the emergency department. This was sent for laboratory testing, findings above.     The patient was sent for a CT chest abdomen pelvis and pelvic US while in the emergency department, results above.       0031 Patient rechecked and updated.       Findings and plan explained to the Patient. Patient discharged home with instructions regarding supportive care, medications, and reasons to return. The importance of close follow-up was reviewed.     Impression & Plan     Medical Decision Making:  Yancy Hoover is a 49 year old female who presents to the emergency department today with abdominal pain.  She had recently had surgery for a large ovarian cyst and torsion.  She states that she had been improving but then her pain became worse prompting her to come to the ER.  I did obtain an ultrasound followed by a CT scan along with blood work.  Per the operative note, the gynecologist had removed the patient's left ovary and this was demonstrated on the imaging.  There is no signs of fluid collection, hematoma, abscess, or other significant acute process.  In speaking with the patient, it seems that she had been doing better and had some increase in activities.  This may have exacerbated her postoperative state causing some increased pain this evening.  She states that she had not been using the hydrocodone much recommend that she resume using this along with ibuprofen and heating pad.  She recommended follow with her gynecologist and return for any worsening symptoms or further concerns.      Discharge Diagnosis:    ICD-10-CM    1. Postoperative pain G89.18        Disposition:  The patient is discharged to home.      Scribe Disclosure:  Gagandeep DEL ANGEL, am serving as a scribe at 10:05 PM on 2/3/2020 to  document services personally performed by Isidoro Vora MD based on my observations and the provider's statements to me.      2/3/2020   Isidoro Vora MD Bergenstal, John A, MD  02/05/20 151

## 2020-02-04 NOTE — DISCHARGE INSTRUCTIONS
I do not see any signs of complication from the surgery or other concerning process.  Please use your pain medication to manage your symptoms and follow up closely with your gynecologist for a recheck

## 2020-02-04 NOTE — ED TRIAGE NOTES
Pt c/o left sided abd pain. Pt had a cyst on her left ovary removed on Jan. 23rd and continues to have pain. Pt c/o discomfort in bilateral legs more in the right leg then the left.

## 2020-06-05 ENCOUNTER — APPOINTMENT (OUTPATIENT)
Dept: CT IMAGING | Facility: CLINIC | Age: 50
End: 2020-06-05
Attending: PHYSICIAN ASSISTANT
Payer: COMMERCIAL

## 2020-06-05 ENCOUNTER — HOSPITAL ENCOUNTER (EMERGENCY)
Facility: CLINIC | Age: 50
Discharge: HOME | End: 2020-06-05
Attending: PHYSICIAN ASSISTANT | Admitting: PHYSICIAN ASSISTANT
Payer: COMMERCIAL

## 2020-06-05 ENCOUNTER — APPOINTMENT (OUTPATIENT)
Dept: ULTRASOUND IMAGING | Facility: CLINIC | Age: 50
End: 2020-06-05
Attending: PHYSICIAN ASSISTANT
Payer: COMMERCIAL

## 2020-06-05 VITALS
SYSTOLIC BLOOD PRESSURE: 124 MMHG | WEIGHT: 250 LBS | HEART RATE: 94 BPM | DIASTOLIC BLOOD PRESSURE: 80 MMHG | HEIGHT: 64 IN | TEMPERATURE: 98.1 F | OXYGEN SATURATION: 98 % | BODY MASS INDEX: 42.68 KG/M2 | RESPIRATION RATE: 18 BRPM

## 2020-06-05 DIAGNOSIS — N83.209 OVARIAN CYST: ICD-10-CM

## 2020-06-05 DIAGNOSIS — R10.31 ABDOMINAL PAIN, RIGHT LOWER QUADRANT: ICD-10-CM

## 2020-06-05 LAB
ALBUMIN SERPL-MCNC: 3.4 G/DL (ref 3.4–5)
ALBUMIN UR-MCNC: 10 MG/DL
ALP SERPL-CCNC: 74 U/L (ref 40–150)
ALT SERPL W P-5'-P-CCNC: 23 U/L (ref 0–50)
ANION GAP SERPL CALCULATED.3IONS-SCNC: 6 MMOL/L (ref 3–14)
APPEARANCE UR: CLEAR
AST SERPL W P-5'-P-CCNC: 12 U/L (ref 0–45)
BASOPHILS # BLD AUTO: 0 10E9/L (ref 0–0.2)
BASOPHILS NFR BLD AUTO: 0.3 %
BILIRUB SERPL-MCNC: 0.1 MG/DL (ref 0.2–1.3)
BILIRUB UR QL STRIP: NEGATIVE
BUN SERPL-MCNC: 11 MG/DL (ref 7–30)
CALCIUM SERPL-MCNC: 8.4 MG/DL (ref 8.5–10.1)
CHLORIDE SERPL-SCNC: 106 MMOL/L (ref 94–109)
CO2 SERPL-SCNC: 27 MMOL/L (ref 20–32)
COLOR UR AUTO: YELLOW
CREAT SERPL-MCNC: 0.86 MG/DL (ref 0.52–1.04)
DIFFERENTIAL METHOD BLD: NORMAL
EOSINOPHIL # BLD AUTO: 0.1 10E9/L (ref 0–0.7)
EOSINOPHIL NFR BLD AUTO: 0.8 %
ERYTHROCYTE [DISTWIDTH] IN BLOOD BY AUTOMATED COUNT: 14.3 % (ref 10–15)
GFR SERPL CREATININE-BSD FRML MDRD: 80 ML/MIN/{1.73_M2}
GLUCOSE SERPL-MCNC: 96 MG/DL (ref 70–99)
GLUCOSE UR STRIP-MCNC: NEGATIVE MG/DL
HCG UR QL: NEGATIVE
HCT VFR BLD AUTO: 37.6 % (ref 35–47)
HGB BLD-MCNC: 11.9 G/DL (ref 11.7–15.7)
HGB UR QL STRIP: NEGATIVE
IMM GRANULOCYTES # BLD: 0 10E9/L (ref 0–0.4)
IMM GRANULOCYTES NFR BLD: 0.3 %
KETONES UR STRIP-MCNC: NEGATIVE MG/DL
LEUKOCYTE ESTERASE UR QL STRIP: NEGATIVE
LYMPHOCYTES # BLD AUTO: 2.3 10E9/L (ref 0.8–5.3)
LYMPHOCYTES NFR BLD AUTO: 31 %
MCH RBC QN AUTO: 27.4 PG (ref 26.5–33)
MCHC RBC AUTO-ENTMCNC: 31.6 G/DL (ref 31.5–36.5)
MCV RBC AUTO: 87 FL (ref 78–100)
MONOCYTES # BLD AUTO: 0.4 10E9/L (ref 0–1.3)
MONOCYTES NFR BLD AUTO: 5.2 %
MUCOUS THREADS #/AREA URNS LPF: PRESENT /LPF
NEUTROPHILS # BLD AUTO: 4.7 10E9/L (ref 1.6–8.3)
NEUTROPHILS NFR BLD AUTO: 62.4 %
NITRATE UR QL: NEGATIVE
NRBC # BLD AUTO: 0 10*3/UL
NRBC BLD AUTO-RTO: 0 /100
PH UR STRIP: 5.5 PH (ref 5–7)
PLATELET # BLD AUTO: 294 10E9/L (ref 150–450)
POTASSIUM SERPL-SCNC: 4 MMOL/L (ref 3.4–5.3)
PROT SERPL-MCNC: 7.4 G/DL (ref 6.8–8.8)
RBC # BLD AUTO: 4.34 10E12/L (ref 3.8–5.2)
RBC #/AREA URNS AUTO: 1 /HPF (ref 0–2)
SODIUM SERPL-SCNC: 139 MMOL/L (ref 133–144)
SOURCE: ABNORMAL
SP GR UR STRIP: 1.02 (ref 1–1.03)
SQUAMOUS #/AREA URNS AUTO: 5 /HPF (ref 0–1)
UROBILINOGEN UR STRIP-MCNC: NORMAL MG/DL (ref 0–2)
WBC # BLD AUTO: 7.5 10E9/L (ref 4–11)
WBC #/AREA URNS AUTO: 1 /HPF (ref 0–5)

## 2020-06-05 PROCEDURE — 96374 THER/PROPH/DIAG INJ IV PUSH: CPT | Mod: 59

## 2020-06-05 PROCEDURE — 93976 VASCULAR STUDY: CPT | Mod: XS

## 2020-06-05 PROCEDURE — 25000128 H RX IP 250 OP 636: Performed by: PHYSICIAN ASSISTANT

## 2020-06-05 PROCEDURE — 74177 CT ABD & PELVIS W/CONTRAST: CPT

## 2020-06-05 PROCEDURE — 81025 URINE PREGNANCY TEST: CPT | Performed by: PHYSICIAN ASSISTANT

## 2020-06-05 PROCEDURE — 85025 COMPLETE CBC W/AUTO DIFF WBC: CPT | Performed by: PHYSICIAN ASSISTANT

## 2020-06-05 PROCEDURE — 25000125 ZZHC RX 250: Performed by: PHYSICIAN ASSISTANT

## 2020-06-05 PROCEDURE — 81001 URINALYSIS AUTO W/SCOPE: CPT | Performed by: PHYSICIAN ASSISTANT

## 2020-06-05 PROCEDURE — 80053 COMPREHEN METABOLIC PANEL: CPT | Performed by: PHYSICIAN ASSISTANT

## 2020-06-05 PROCEDURE — 96375 TX/PRO/DX INJ NEW DRUG ADDON: CPT

## 2020-06-05 PROCEDURE — 99285 EMERGENCY DEPT VISIT HI MDM: CPT | Mod: 25

## 2020-06-05 RX ORDER — IOPAMIDOL 755 MG/ML
135 INJECTION, SOLUTION INTRAVASCULAR ONCE
Status: COMPLETED | OUTPATIENT
Start: 2020-06-05 | End: 2020-06-05

## 2020-06-05 RX ORDER — ONDANSETRON 2 MG/ML
4 INJECTION INTRAMUSCULAR; INTRAVENOUS EVERY 30 MIN PRN
Status: DISCONTINUED | OUTPATIENT
Start: 2020-06-05 | End: 2020-06-05 | Stop reason: HOSPADM

## 2020-06-05 RX ORDER — TOPIRAMATE 25 MG/1
25 TABLET, FILM COATED ORAL
COMMUNITY
Start: 2020-05-22 | End: 2020-07-03

## 2020-06-05 RX ORDER — OXYCODONE HYDROCHLORIDE 5 MG/1
5 TABLET ORAL EVERY 6 HOURS PRN
Qty: 10 TABLET | Refills: 0 | Status: SHIPPED | OUTPATIENT
Start: 2020-06-05 | End: 2020-07-03

## 2020-06-05 RX ORDER — HYDROMORPHONE HYDROCHLORIDE 1 MG/ML
0.5 INJECTION, SOLUTION INTRAMUSCULAR; INTRAVENOUS; SUBCUTANEOUS
Status: DISCONTINUED | OUTPATIENT
Start: 2020-06-05 | End: 2020-06-05 | Stop reason: HOSPADM

## 2020-06-05 RX ADMIN — IOPAMIDOL 135 ML: 755 INJECTION, SOLUTION INTRAVENOUS at 16:47

## 2020-06-05 RX ADMIN — HYDROMORPHONE HYDROCHLORIDE 0.5 MG: 1 INJECTION, SOLUTION INTRAMUSCULAR; INTRAVENOUS; SUBCUTANEOUS at 16:02

## 2020-06-05 RX ADMIN — SODIUM CHLORIDE 79 ML: 9 INJECTION, SOLUTION INTRAVENOUS at 16:47

## 2020-06-05 RX ADMIN — ONDANSETRON 4 MG: 2 INJECTION INTRAMUSCULAR; INTRAVENOUS at 16:02

## 2020-06-05 ASSESSMENT — ENCOUNTER SYMPTOMS
NAUSEA: 1
DYSURIA: 0
VOMITING: 1
DIFFICULTY URINATING: 0
FREQUENCY: 0
DIARRHEA: 0
BLOOD IN STOOL: 0
CONSTIPATION: 0
ABDOMINAL PAIN: 1
HEMATURIA: 0

## 2020-06-05 ASSESSMENT — MIFFLIN-ST. JEOR: SCORE: 1748.99

## 2020-06-05 NOTE — ED AVS SNAPSHOT
Emergency Department  [address] [facility] [address]  Phone:  [phone]  Fax:  [phone]                                    Yanyc Hoover   MRN: [id number]    Department:   Emergency Department   Date of Visit:  6/5/2020           After Visit Summary Signature Page    I have received my discharge instructions, and my questions have been answered. I have discussed any challenges I see with this plan with the nurse or doctor.    ..........................................................................................................................................  Patient/Patient Representative Signature      ..........................................................................................................................................  Patient Representative Print Name and Relationship to Patient    ..................................................               ................................................  Date                                   Time    ..........................................................................................................................................  Reviewed by Signature/Title    ...................................................              ..............................................  Date                                               Time          22EPIC Rev 08/18

## 2020-06-05 NOTE — ED TRIAGE NOTES
RLQ abdominal pain. Been feeling pain for about 2 weeks. Got really bad today.  January had emergency surgery for ovarian cyst. This feels the exact same. Unable to sit upright. N/V.     A and Ox4. Airway patent. Respirations are regular and unlabored. Speaking in full sentences. Denies cough. Denies chest pain/pressure. Patient skin is warm, dry and normal color. Cap refill is less than 3 seconds. GCS 15

## 2020-06-05 NOTE — ED PROVIDER NOTES
History     Chief Complaint:  Abdominal Pain      The history is provided by the patient.      Yancy Hoover is a 49 year old female with a history of ovarian cysts who presents for evaluation of RLQ abdominal pain. The patient has been having abdominal pain for the pat two weeks. It worsened at 12PM today when she was getting into the bathtub. The pain is constant with episodes of sharp, stabbing 10/10 pain. She reports associated vomiting. She denies urinary symptoms, abnormal bowel movements, and vaginal complaints.  She denies any risk of STDs.    She has a history of ovarian cysts. She had emergency surgery on a left ovarian cyst six months ago in 2020 as there was concern for ovarian torsion.  The left fallopian tube and ovary were removed.  Obvious ovarian torsion was not seen.  She reports this pain feels the same as her ovarian cyst pain.  No other abdominal surgeries.  She also notes tubal ligation history.  Her OB is Dr. Cuevas with [facility].    Allergies:  No Known Allergies     Medications:    Topiramate     Past Medical History:    Ovarian cyst    Past Surgical History:     section  Laparoscopic cystectomy ovarian    Family History:    No past pertinent family history.    Social History:  Marital Status:   [2]  Dr. Cuevas, [facility]  Positive for tobacco use.  Negative for smokeless tobacco use.  Negative for alcohol use.  Negative for drug use.      Review of Systems   Gastrointestinal: Positive for abdominal pain, nausea and vomiting. Negative for blood in stool, constipation and diarrhea.   Genitourinary: Negative for decreased urine volume, difficulty urinating, dysuria, frequency, hematuria, urgency, vaginal bleeding, vaginal discharge and vaginal pain.   All other systems reviewed and are negative.    Physical Exam     Patient Vitals for the past 24 hrs:   BP Temp Temp src Pulse Resp SpO2 Height Weight   20 1732 124/80 98.1  F (36.7  C) Oral 94 18 98 %  "1.626 m (5' 4\") 113.4 kg (250 lb)   06/05/20 1606 -- -- -- -- -- 99 % -- --     Physical Exam  General: Well appearing, well nourished. Normal mood and affect.  Skin: Good turgor, no rash, no unusual bruising or prominent lesions.  HEENT: Head: Normocephalic, atraumatic, no visible masses.   Eyes: Conjunctiva clear.  Cardiac: Normal rate and regular rhythm, no murmur or gallop.   Lungs: Clear to auscultation.  Abdomen: Right lower quadrant tenderness with palpation.  No rebound or guarding.  No CVA tenderness bilaterally.  Musculoskeletal: Normal gait and station.   Neurologic: Oriented x 3. GCS: 15.  Psychiatric: Intact recent and remote memory, judgment and insight, normal mood and affect.     Emergency Department Course     Imaging:  Radiology findings were communicated with the patient who voiced understanding of the findings.    CMP: Glucose 96, Calcium: 8.4(L), Bilirubin Total: 0.1(L), o/w WNL (Creatinine: 0.86)    CBC: WBC: 7.5, HGB: 11.9, PLT: 294     UA with Microscopic: Protein Albumin: 10, Squamous Epithelial: 5(H), Mucous: present, o/w WNL     HCG Qualitative Pregnancy: Negative     Laboratory:  Laboratory findings were communicated with the patient who voiced understanding of the findings.    CT Abdomen Pelvis w/ IV contrast:   No acute process demonstrated within the abdomen and   pelvis, as per radiology.    US Pelvis Complete w/ transvag & doppler LmtPel:  Simple cyst in the right ovary, no torsion demonstrated, as per radiology.       Procedures:  None    Interventions:  1602 Dilaudid, 0.5 mg, IV  1602 Zofran, 4 mg, IV    Emergency Department Course:  Past medical records, nursing notes, and vitals reviewed.    1545 I performed an exam of the patient as documented above.     IV was inserted and blood was drawn for laboratory testing, results above.  The patient provided a urine sample here in the emergency department. This was sent for laboratory testing, findings above.  The patient was sent for a " pelvic ultrasound and an abdomen/pelvis CT while in the emergency department, results above.     1720 I rechecked the patient and discussed the results of her workup thus far.     Findings and plan explained to the Patient. Patient discharged home with instructions regarding supportive care, medications, and reasons to return. The importance of close follow-up was reviewed. The patient was prescribed oxycodone.     I personally reviewed the laboratory and imaging results with the Patient and answered all related questions prior to discharge.     Impression & Plan     Medical Decision Making:  Yancy Hoover is a 49 year old female who presents the ED today for evaluation of right lower quadrant abdominal pain.  Details of the patient's history can be known the HPI.  Differentials considered included appendicitis, diverticulitis, ovarian cyst, ovarian torsion, ectopic pregnancy, tubo-ovarian abscess, amongst others.  On my exam she did have right lower quadrant discomfort.  This prompted ultrasound given her previous history of large ovarian cyst with concern for torsion.  This did return showing a small simple cyst of the right ovary had good blood flow, unlikely causing torsion or the patient's discomfort.  This then prompted a CT study of the abdomen, which returned showing normal appendix and no other pathology within the abdomen.  CT does show large amount of stool within the right lower quadrant.  This could be contributing to her pain.  She may have had a another small ruptured cyst, or placement of current cyst may be causing her some discomfort.  Her pain has resolved with interventions as noted above.  Again, low concern for torsion given the small size of the cyst, and resolution of pain with reassuring ultrasound.  Advised her to continue Tylenol and ibuprofen at home, oxycodone for breakthrough pain, and follow-up closely with OB next week.  She will return for changing or worsening pain,  fevers, uncontrolled vomiting, new concerns.  UA negative here as well today.  No laboratory abnormalities.  All questions were answered.  She is in agreement with the treatment plan as stated above.    Diagnosis:    ICD-10-CM    1. Abdominal pain, right lower quadrant  R10.31    2. Ovarian cyst  N83.209      Disposition:  Discharged to home.    Discharge Medications:  Discharge Medication List as of 6/5/2020  5:35 PM      START taking these medications    Details   oxyCODONE (ROXICODONE) 5 MG tablet Take 1 tablet (5 mg) by mouth every 6 hours as needed for pain, Disp-10 tablet,R-0, Local Print             Scribe Disclosure:  Arlin DEL ANGEL, am serving as a scribe at 3:45 PM on 6/5/2020 to document services personally performed by Kya Bone PA based on my observations and the provider's statements to me.      EMERGENCY DEPARTMENT    This was created at least in part with a voice recognition software. Mistakes/typos may be present.      Kya Bone PA  06/05/20 [id number]

## 2020-06-05 NOTE — DISCHARGE INSTRUCTIONS
Tylenol and ibuprofen at home for pain control. You can take up to 1000 mg or 1 g of Tylenol.  You can take 600 mg of ibuprofen at one time.  You can take these together every 6 hours if needed.  Always take ibuprofen with food.  Never take more than 4 g (4000 mg) of Tylenol in 1 day.  Do not take this amount for more than 1 week at a time. Oxycodone if pain is not controlled. Return for worsening pain, fevers, persistent nausea and vomiting, new concerns. Call OB on Monday for recheck if pain persists.     Discharge Instructions  Abdominal Pain    Abdominal pain can be caused by many things. Your evaluation today does not show the exact cause for your pain. Your doctor today has decided that it is unlikely your pain is due to a life threatening problem, or a problem requiring surgery or hospital admission. Sometimes those problems cannot be found right away, so it is very important that you follow up as directed.  Sometimes only the changes which occur over time allow the cause of your pain to be found.    Return to the Emergency Department for a recheck in 8-12 hours if your pain continues.  If your pain gets worse, changes in location, or feels different, return to the Emergency Department right away.    ADULTS:  Return to the Emergency Department right away if:    You get an oral temperature above 102oF or as directed by your doctor.  You have blood in your stools (bright red or black, tarry stools).  You keep throwing up or can t drink liquids.  You see blood when you throw up.  You can t have a bowel movement or you can t pass gas.  Your stomach gets bloated or bigger.  Your skin or the whites of your eyes look yellow.  You faint.  You have bloody, frequent or painful urination.  You have new symptoms or anything that worries you.    CHILDREN:  Return to the Emergency Department right away if your child has any of the above-listed symptoms or the following:    Pushes your hand away or screams/cries when  his/her belly is touched.  You notice your child is very fussy or weak.  Your child is very tired and is too tired to eat or drink.  Your child is dehydrated.  Signs of dehydration can be:  Your infant has had no wet diapers in 4-5 hours.  Your older child has not passed urine in 6-8 hours.  Your infant or child starts to have dry mouth and lips, or no saliva or tears.    PREGNANT WOMEN:  Return to the Emergency Department right away if you have any of the above-listed symptoms or the following:    You have bleeding, leaking fluid or passing tissue from the vagina.  You have worse pain or cramping, or pain in your shoulder or back.  You have vomiting that will not stop.  You have painful or bloody urination.  You have a temperature of 100oF or more.  Your baby is not moving as much as usual.  You faint.  You get a bad headache with or without eye problems and abdominal pain.  You have a convulsion or seizure.  You have unusual discharge from your vagina and abdominal pain.    Abdominal pain is pretty common during pregnancy.  Your pain may or may not be related to your pregnancy. You should follow-up closely with your OB doctor so they can evaluate you and your baby.  Until you follow-up with your regular doctor, do the following:     Avoid sex and do not put anything in your vagina.  Drink clear fluids.  Only take medications approved by your doctor.    MORE INFORMATION:    Appendicitis:  A possible cause of abdominal pain in any person who still has their appendix is acute appendicitis. Appendicitis is often hard to diagnose.  Testing does not always rule out early appendicitis or other causes of abdominal pain. Close follow-up with your doctor and re-evaluations may be needed to figure out the reason for your abdominal pain.    Follow-up:  It is very important that you make an appointment with your clinic and go to the appointment.  If you do not follow-up with your primary doctor, it may result in missing an  "important development which could result in permanent injury or disability and/or lasting pain.  If there is any problem keeping your appointment, call your doctor or return to the Emergency Department.    Medications:  Take your medications as directed by your doctor today.  Before using over-the-counter medications, ask your doctor and make sure to take the medications as directed.  If you have any questions about medications, ask your doctor.    Diet:  Resume your normal diet as much as possible, but do not eat fried, fatty or spicy foods while you have pain.  Do not drink alcohol or have caffeine.  Do not smoke tobacco.    Probiotics: If you have been given an antibiotic, you may want to also take a probiotic pill or eat yogurt with live cultures. Probiotics have \"good bacteria\" to help your intestines stay healthy. Studies have shown that probiotics help prevent diarrhea and other intestine problems (including C. diff infection) when you take antibiotics. You can buy these without a prescription in the pharmacy section of the store.     If you were given a prescription for medicine here today, be sure to read all of the information (including the package insert) that comes with your prescription.  This will include important information about the medicine, its side effects, and any warnings that you need to know about.  The pharmacist who fills the prescription can provide more information and answer questions you may have about the medicine.  If you have questions or concerns that the pharmacist cannot address, please call or return to the Emergency Department.         Opioid Medication Information    Pain medications are among the most commonly prescribed medicines, so we are including this information for all our patients. If you did not receive pain medication or get a prescription for pain medicine, you can ignore it.     You may have been given a prescription for an opioid (narcotic) pain medicine and/or " have received a pain medicine while here in the Emergency Department. These medicines can make you drowsy or impaired. You must not drive, operate dangerous equipment, or engage in any other dangerous activities while taking these medications. If you drive while taking these medications, you could be arrested for DUI, or driving under the influence. Do not drink any alcohol while you are taking these medications.     Opioid pain medications can cause addiction. If you have a history of chemical dependency of any type, you are at a higher risk of becoming addicted to pain medications.  Only take these prescribed medications to treat your pain when all other options have been tried. Take it for as short a time and as few doses as possible. Store your pain pills in a secure place, as they are frequently stolen and provide a dangerous opportunity for children or visitors in your house to start abusing these powerful medications. We will not replace any lost or stolen medicine.  As soon as your pain is better, you should flush all your remaining medication.     Many prescription pain medications contain Tylenol  (acetaminophen), including Vicodin , Tylenol #3 , Norco , Lortab , and Percocet .  You should not take any extra pills of Tylenol  if you are using these prescription medications or you can get very sick.  Do not ever take more than 3000 mg of acetaminophen in any 24 hour period.    All opioids tend to cause constipation. Drink plenty of water and eat foods that have a lot of fiber, such as fruits, vegetables, prune juice, apple juice and high fiber cereal.  Take a laxative if you don t move your bowels at least every other day. Miralax , Milk of Magnesia, Colace , or Senna  can be used to keep you regular.      Remember that you can always come back to the Emergency Department if you are not able to see your regular doctor in the amount of time listed above, if you get any new symptoms, or if there is anything  that worries you.

## 2020-06-07 ENCOUNTER — HOSPITAL ENCOUNTER (EMERGENCY)
Facility: CLINIC | Age: 50
Discharge: HOME | End: 2020-06-07
Attending: EMERGENCY MEDICINE | Admitting: EMERGENCY MEDICINE
Payer: COMMERCIAL

## 2020-06-07 ENCOUNTER — APPOINTMENT (OUTPATIENT)
Dept: CT IMAGING | Facility: CLINIC | Age: 50
End: 2020-06-07
Attending: EMERGENCY MEDICINE
Payer: COMMERCIAL

## 2020-06-07 VITALS
SYSTOLIC BLOOD PRESSURE: 143 MMHG | OXYGEN SATURATION: 98 % | DIASTOLIC BLOOD PRESSURE: 74 MMHG | WEIGHT: 280 LBS | HEART RATE: 108 BPM | TEMPERATURE: 98.9 F | RESPIRATION RATE: 18 BRPM | HEIGHT: 64 IN | BODY MASS INDEX: 47.8 KG/M2

## 2020-06-07 DIAGNOSIS — R10.31 ABDOMINAL PAIN, RIGHT LOWER QUADRANT: ICD-10-CM

## 2020-06-07 DIAGNOSIS — R11.2 NAUSEA AND VOMITING, INTRACTABILITY OF VOMITING NOT SPECIFIED, UNSPECIFIED VOMITING TYPE: ICD-10-CM

## 2020-06-07 LAB
ALBUMIN UR-MCNC: 10 MG/DL
ANION GAP SERPL CALCULATED.3IONS-SCNC: 6 MMOL/L (ref 3–14)
APPEARANCE UR: ABNORMAL
BACTERIA #/AREA URNS HPF: ABNORMAL /HPF
BASOPHILS # BLD AUTO: 0 10E9/L (ref 0–0.2)
BASOPHILS NFR BLD AUTO: 0.2 %
BILIRUB UR QL STRIP: NEGATIVE
BUN SERPL-MCNC: 13 MG/DL (ref 7–30)
CALCIUM SERPL-MCNC: 8.8 MG/DL (ref 8.5–10.1)
CHLORIDE SERPL-SCNC: 106 MMOL/L (ref 94–109)
CO2 SERPL-SCNC: 26 MMOL/L (ref 20–32)
COLOR UR AUTO: YELLOW
CREAT SERPL-MCNC: 0.84 MG/DL (ref 0.52–1.04)
DIFFERENTIAL METHOD BLD: NORMAL
EOSINOPHIL # BLD AUTO: 0.1 10E9/L (ref 0–0.7)
EOSINOPHIL NFR BLD AUTO: 1.6 %
ERYTHROCYTE [DISTWIDTH] IN BLOOD BY AUTOMATED COUNT: 14.5 % (ref 10–15)
GFR SERPL CREATININE-BSD FRML MDRD: 82 ML/MIN/{1.73_M2}
GLUCOSE SERPL-MCNC: 121 MG/DL (ref 70–99)
GLUCOSE UR STRIP-MCNC: NEGATIVE MG/DL
HCG UR QL: NEGATIVE
HCT VFR BLD AUTO: 36.9 % (ref 35–47)
HGB BLD-MCNC: 12.1 G/DL (ref 11.7–15.7)
HGB UR QL STRIP: NEGATIVE
IMM GRANULOCYTES # BLD: 0 10E9/L (ref 0–0.4)
IMM GRANULOCYTES NFR BLD: 0.2 %
KETONES UR STRIP-MCNC: 5 MG/DL
LEUKOCYTE ESTERASE UR QL STRIP: ABNORMAL
LYMPHOCYTES # BLD AUTO: 3.1 10E9/L (ref 0.8–5.3)
LYMPHOCYTES NFR BLD AUTO: 34.8 %
MCH RBC QN AUTO: 28.5 PG (ref 26.5–33)
MCHC RBC AUTO-ENTMCNC: 32.8 G/DL (ref 31.5–36.5)
MCV RBC AUTO: 87 FL (ref 78–100)
MONOCYTES # BLD AUTO: 0.5 10E9/L (ref 0–1.3)
MONOCYTES NFR BLD AUTO: 5.8 %
MUCOUS THREADS #/AREA URNS LPF: PRESENT /LPF
NEUTROPHILS # BLD AUTO: 5.2 10E9/L (ref 1.6–8.3)
NEUTROPHILS NFR BLD AUTO: 57.4 %
NITRATE UR QL: NEGATIVE
NRBC # BLD AUTO: 0 10*3/UL
NRBC BLD AUTO-RTO: 0 /100
PH UR STRIP: 6 PH (ref 5–7)
PLATELET # BLD AUTO: 290 10E9/L (ref 150–450)
POTASSIUM SERPL-SCNC: 4.4 MMOL/L (ref 3.4–5.3)
RBC # BLD AUTO: 4.25 10E12/L (ref 3.8–5.2)
RBC #/AREA URNS AUTO: 3 /HPF (ref 0–2)
SODIUM SERPL-SCNC: 138 MMOL/L (ref 133–144)
SOURCE: ABNORMAL
SP GR UR STRIP: 1.03 (ref 1–1.03)
SQUAMOUS #/AREA URNS AUTO: 12 /HPF (ref 0–1)
UROBILINOGEN UR STRIP-MCNC: NORMAL MG/DL (ref 0–2)
WBC # BLD AUTO: 9.1 10E9/L (ref 4–11)
WBC #/AREA URNS AUTO: 5 /HPF (ref 0–5)

## 2020-06-07 PROCEDURE — 99284 EMERGENCY DEPT VISIT MOD MDM: CPT | Mod: 25

## 2020-06-07 PROCEDURE — 81001 URINALYSIS AUTO W/SCOPE: CPT | Performed by: EMERGENCY MEDICINE

## 2020-06-07 PROCEDURE — 25000132 ZZH RX MED GY IP 250 OP 250 PS 637: Performed by: EMERGENCY MEDICINE

## 2020-06-07 PROCEDURE — 74176 CT ABD & PELVIS W/O CONTRAST: CPT

## 2020-06-07 PROCEDURE — 85025 COMPLETE CBC W/AUTO DIFF WBC: CPT | Performed by: EMERGENCY MEDICINE

## 2020-06-07 PROCEDURE — 80048 BASIC METABOLIC PNL TOTAL CA: CPT | Performed by: EMERGENCY MEDICINE

## 2020-06-07 PROCEDURE — 81025 URINE PREGNANCY TEST: CPT | Performed by: EMERGENCY MEDICINE

## 2020-06-07 PROCEDURE — 25000128 H RX IP 250 OP 636: Performed by: EMERGENCY MEDICINE

## 2020-06-07 RX ORDER — ONDANSETRON 4 MG/1
4 TABLET, ORALLY DISINTEGRATING ORAL ONCE
Status: COMPLETED | OUTPATIENT
Start: 2020-06-07 | End: 2020-06-07

## 2020-06-07 RX ORDER — ONDANSETRON 2 MG/ML
4 INJECTION INTRAMUSCULAR; INTRAVENOUS EVERY 30 MIN PRN
Status: DISCONTINUED | OUTPATIENT
Start: 2020-06-07 | End: 2020-06-07 | Stop reason: ALTCHOICE

## 2020-06-07 RX ORDER — OXYCODONE HYDROCHLORIDE 5 MG/1
5 TABLET ORAL ONCE
Status: COMPLETED | OUTPATIENT
Start: 2020-06-07 | End: 2020-06-07

## 2020-06-07 RX ADMIN — ONDANSETRON 4 MG: 4 TABLET, ORALLY DISINTEGRATING ORAL at 20:11

## 2020-06-07 RX ADMIN — OXYCODONE HYDROCHLORIDE 5 MG: 5 TABLET ORAL at 20:11

## 2020-06-07 ASSESSMENT — ENCOUNTER SYMPTOMS
HEMATURIA: 1
BACK PAIN: 0
ABDOMINAL PAIN: 1
SORE THROAT: 0
NAUSEA: 0
FEVER: 0
VOMITING: 1
COUGH: 0

## 2020-06-07 ASSESSMENT — MIFFLIN-ST. JEOR: SCORE: 1885.07

## 2020-06-07 NOTE — ED AVS SNAPSHOT
Emergency Department  [address] [facility] [address]  Phone:  [phone]  Fax:  [phone]                                    Yancy Hoover   MRN: [id number]    Department:   Emergency Department   Date of Visit:  6/7/2020           After Visit Summary Signature Page    I have received my discharge instructions, and my questions have been answered. I have discussed any challenges I see with this plan with the nurse or doctor.    ..........................................................................................................................................  Patient/Patient Representative Signature      ..........................................................................................................................................  Patient Representative Print Name and Relationship to Patient    ..................................................               ................................................  Date                                   Time    ..........................................................................................................................................  Reviewed by Signature/Title    ...................................................              ..............................................  Date                                               Time          22EPIC Rev 08/18

## 2020-06-08 NOTE — DISCHARGE INSTRUCTIONS
*Get plenty of rest and avoid strenuous activities.  *Continue current medications.  *Follow-up with your doctor for a recheck within 12-36 hours.  *Return to the ER if you develop fever, worsening pain, intractable vomiting, faint or feel like you will faint or become worse in any way.      Discharge Instructions  Abdominal Pain    Abdominal pain can be caused by many things. Your evaluation today does not show the exact cause for your pain. Your doctor today has decided that it is unlikely your pain is due to a life threatening problem, or a problem requiring surgery or hospital admission. Sometimes those problems cannot be found right away, so it is very important that you follow up as directed.  Sometimes only the changes which occur over time allow the cause of your pain to be found.    Return to the Emergency Department for a recheck in 8-12 hours if your pain continues.  If your pain gets worse, changes in location, or feels different, return to the Emergency Department right away.    ADULTS:  Return to the Emergency Department right away if:    You get an oral temperature above 102oF or as directed by your doctor.  You have blood in your stools (bright red or black, tarry stools).  You keep throwing up or can t drink liquids.  You see blood when you throw up.  You can t have a bowel movement or you can t pass gas.  Your stomach gets bloated or bigger.  Your skin or the whites of your eyes look yellow.  You faint.  You have bloody, frequent or painful urination.  You have new symptoms or anything that worries you.    CHILDREN:  Return to the Emergency Department right away if your child has any of the above-listed symptoms or the following:    Pushes your hand away or screams/cries when his/her belly is touched.  You notice your child is very fussy or weak.  Your child is very tired and is too tired to eat or drink.  Your child is dehydrated.  Signs of dehydration can be:  Your infant has had no wet diapers  in 4-5 hours.  Your older child has not passed urine in 6-8 hours.  Your infant or child starts to have dry mouth and lips, or no saliva or tears.    PREGNANT WOMEN:  Return to the Emergency Department right away if you have any of the above-listed symptoms or the following:    You have bleeding, leaking fluid or passing tissue from the vagina.  You have worse pain or cramping, or pain in your shoulder or back.  You have vomiting that will not stop.  You have painful or bloody urination.  You have a temperature of 100oF or more.  Your baby is not moving as much as usual.  You faint.  You get a bad headache with or without eye problems and abdominal pain.  You have a convulsion or seizure.  You have unusual discharge from your vagina and abdominal pain.    Abdominal pain is pretty common during pregnancy.  Your pain may or may not be related to your pregnancy. You should follow-up closely with your OB doctor so they can evaluate you and your baby.  Until you follow-up with your regular doctor, do the following:     Avoid sex and do not put anything in your vagina.  Drink clear fluids.  Only take medications approved by your doctor.    MORE INFORMATION:    Appendicitis:  A possible cause of abdominal pain in any person who still has their appendix is acute appendicitis. Appendicitis is often hard to diagnose.  Testing does not always rule out early appendicitis or other causes of abdominal pain. Close follow-up with your doctor and re-evaluations may be needed to figure out the reason for your abdominal pain.    Follow-up:  It is very important that you make an appointment with your clinic and go to the appointment.  If you do not follow-up with your primary doctor, it may result in missing an important development which could result in permanent injury or disability and/or lasting pain.  If there is any problem keeping your appointment, call your doctor or return to the Emergency Department.    Medications:  Take  "your medications as directed by your doctor today.  Before using over-the-counter medications, ask your doctor and make sure to take the medications as directed.  If you have any questions about medications, ask your doctor.    Diet:  Resume your normal diet as much as possible, but do not eat fried, fatty or spicy foods while you have pain.  Do not drink alcohol or have caffeine.  Do not smoke tobacco.    Probiotics: If you have been given an antibiotic, you may want to also take a probiotic pill or eat yogurt with live cultures. Probiotics have \"good bacteria\" to help your intestines stay healthy. Studies have shown that probiotics help prevent diarrhea and other intestine problems (including C. diff infection) when you take antibiotics. You can buy these without a prescription in the pharmacy section of the store.     If you were given a prescription for medicine here today, be sure to read all of the information (including the package insert) that comes with your prescription.  This will include important information about the medicine, its side effects, and any warnings that you need to know about.  The pharmacist who fills the prescription can provide more information and answer questions you may have about the medicine.  If you have questions or concerns that the pharmacist cannot address, please call or return to the Emergency Department.         Opioid Medication Information    Pain medications are among the most commonly prescribed medicines, so we are including this information for all our patients. If you did not receive pain medication or get a prescription for pain medicine, you can ignore it.     You may have been given a prescription for an opioid (narcotic) pain medicine and/or have received a pain medicine while here in the Emergency Department. These medicines can make you drowsy or impaired. You must not drive, operate dangerous equipment, or engage in any other dangerous activities while taking " these medications. If you drive while taking these medications, you could be arrested for DUI, or driving under the influence. Do not drink any alcohol while you are taking these medications.     Opioid pain medications can cause addiction. If you have a history of chemical dependency of any type, you are at a higher risk of becoming addicted to pain medications.  Only take these prescribed medications to treat your pain when all other options have been tried. Take it for as short a time and as few doses as possible. Store your pain pills in a secure place, as they are frequently stolen and provide a dangerous opportunity for children or visitors in your house to start abusing these powerful medications. We will not replace any lost or stolen medicine.  As soon as your pain is better, you should flush all your remaining medication.     Many prescription pain medications contain Tylenol  (acetaminophen), including Vicodin , Tylenol #3 , Norco , Lortab , and Percocet .  You should not take any extra pills of Tylenol  if you are using these prescription medications or you can get very sick.  Do not ever take more than 3000 mg of acetaminophen in any 24 hour period.    All opioids tend to cause constipation. Drink plenty of water and eat foods that have a lot of fiber, such as fruits, vegetables, prune juice, apple juice and high fiber cereal.  Take a laxative if you don t move your bowels at least every other day. Miralax , Milk of Magnesia, Colace , or Senna  can be used to keep you regular.      Remember that you can always come back to the Emergency Department if you are not able to see your regular doctor in the amount of time listed above, if you get any new symptoms, or if there is anything that worries you.      Discharge Instructions  Ovarian Cyst    Abdominal pain can be caused by many things. Your doctor today has found that you have a cyst on the ovary, which appears to be the cause of your pain. Women in  their reproductive years form cysts every month, but only cause pain if they are very large, or if they rupture and release blood or fluid. Fortunately, they rarely require surgery or hospitalization. The pain from a ruptured cyst usually gets gradually better, and should be much better within a few days. If there is a large cyst, it will usually go away within 1-2 months, but needs to be watched to be sure it does go away, since sometimes a large cyst can become a cancer. There can be complications of a cyst, or other problems that cannot be found right away, so it is very important that you follow up as directed.      Return to the Emergency Department for a recheck if your pain gets worse, changes in location, or feels different.    Return to the Emergency Department right away if:  You get an oral temperature above 102oF or as directed by your doctor.  You have blood in your stools (bright red or black, tarry stools), or in your vomit.  You keep throwing up or can t drink liquids.  You can t have a bowel movement or you can t pass gas.  You faint, or feel very weak.  You have bloody, frequent or painful urination.  You have new symptoms or anything that worries you.    What can I do to help myself?  Take any medication prescribed by your doctor.  You may use Tylenol  (acetaminophen) or Advil , Motrin  (ibuprofen) for pain. Be sure to read and follow the package directions, and ask your doctor if you have questions.  Narcotic pain pills. If you have been given a narcotic such as Vicodin  (hydrocodone with acetaminophen), Percocet  (oxycodone with acetaminophen), codeine, do not drive for four hours after you have taken it.  If the narcotic contains Tylenol  (acetaminophen), do not take Tylenol  with it. All narcotics will cause constipation, so eat a high fiber diet.    Avoid sex for several days, because it will probably be painful.    Follow-up:    See your doctor within 2-3 days for a re-check.    If you were  given a prescription for medicine here today, be sure to read all of the information (including the package insert) that comes with your prescription.  This will include important information about the medicine, its side effects, and any warnings that you need to know about.  The pharmacist who fills the prescription can provide more information and answer questions you may have about the medicine.  If you have questions or concerns that the pharmacist cannot address, please call or return to the Emergency Department.         Opioid Medication Information    Pain medications are among the most commonly prescribed medicines, so we are including this information for all our patients. If you did not receive pain medication or get a prescription for pain medicine, you can ignore it.     You may have been given a prescription for an opioid (narcotic) pain medicine and/or have received a pain medicine while here in the Emergency Department. These medicines can make you drowsy or impaired. You must not drive, operate dangerous equipment, or engage in any other dangerous activities while taking these medications. If you drive while taking these medications, you could be arrested for DUI, or driving under the influence. Do not drink any alcohol while you are taking these medications.     Opioid pain medications can cause addiction. If you have a history of chemical dependency of any type, you are at a higher risk of becoming addicted to pain medications.  Only take these prescribed medications to treat your pain when all other options have been tried. Take it for as short a time and as few doses as possible. Store your pain pills in a secure place, as they are frequently stolen and provide a dangerous opportunity for children or visitors in your house to start abusing these powerful medications. We will not replace any lost or stolen medicine.  As soon as your pain is better, you should flush all your remaining medication.      Many prescription pain medications contain Tylenol  (acetaminophen), including Vicodin , Tylenol #3 , Norco , Lortab , and Percocet .  You should not take any extra pills of Tylenol  if you are using these prescription medications or you can get very sick.  Do not ever take more than 3000 mg of acetaminophen in any 24 hour period.    All opioids tend to cause constipation. Drink plenty of water and eat foods that have a lot of fiber, such as fruits, vegetables, prune juice, apple juice and high fiber cereal.  Take a laxative if you don t move your bowels at least every other day. Miralax , Milk of Magnesia, Colace , or Senna  can be used to keep you regular.      Remember that you can always come back to the Emergency Department if you are not able to see your regular doctor in the amount of time listed above, if you get any new symptoms, or if there is anything that worries you.

## 2020-06-08 NOTE — ED TRIAGE NOTES
Pt seen here the other day, diagnosed with ovarian cyst. Pt here for worsening pain with vomiting, pain to LRQ of abdomen.

## 2020-06-08 NOTE — ED PROVIDER NOTES
History     Chief Complaint:  Abdominal Pain    HPI   Yancy Hoover is a 49 year old female with a history of left side ovarian cysts who presents with RLQ abdominal pain. She states that the pain began in the LLQ and later migrated to the right. She was seen in the ED 2 days ago for these symptoms, but they have not improved. She also began vomiting 2 days ago, although she states that is associated with her pain and she does not have nausea unless the pain is severe. She took oxycodone yesterday and both tylenol and ibuprofen today, with minimal relief. She is also experiencing hematuria. She denies fever, sore throat, cough and nausea. She also denies having a rash and back pain. She has had no contact with sick individuals.    From ED Visit 6/5/20:    CT Abdomen Pelvis w/ IV contrast:   No acute process demonstrated within the abdomen and   pelvis, as per radiology.     US Pelvis Complete w/ transvag & doppler LmtPel:  FINDINGS: No fibroids are evident. The uterus is 9.5 x 5.4 x 5.6 cm.  Endometrial stripe measures 15 mm and is normal for patient's age and  menstrual status. The right ovary demonstrates a simple cyst measuring  2.7 x 2.6 x 2.1 cm. The left ovary is surgically absent. Doppler  spectral waveform analysis demonstrates blood flow to the right ovary.  No right adnexal masses are present. No free pelvic fluid is present.        Allergies:  Gadolinium Derivatives     Medications:    Roxicodone  Topamax     Past Medical History:    Ovarian cyst    Past Surgical History:    C section  Ovarian cystectomy  Oophotrectomy    Family History:    Migraines    Social History:  Smoking status: yes, 0.25 ppd  Alcohol use: no  Marital Status:   [2]     Review of Systems   Constitutional: Negative for fever.   HENT: Negative for sore throat.    Respiratory: Negative for cough.    Gastrointestinal: Positive for abdominal pain and vomiting. Negative for nausea.   Genitourinary: Positive for  "hematuria.   Musculoskeletal: Negative for back pain.   Skin: Negative for rash.   All other systems reviewed and are negative.      Physical Exam     Patient Vitals for the past 24 hrs:   BP Temp Temp src Pulse Resp SpO2 Height Weight   06/07/20 1920 (!) 143/74 98.9  F (37.2  C) Oral 108 18 98 % 1.626 m (5' 4\") 127 kg (280 lb)       Physical Exam  General: Well-nourished  Eyes: PERRL, conjunctivae pink no scleral icterus or conjunctival injection  ENT:  Moist mucus membranes, posterior oropharynx clear without erythema or exudates  Respiratory:  Lungs clear to auscultation bilaterally, no crackles/rubs/wheezes.  Good air movement  CV: Normal rate and rhythm, no murmurs/rubs/gallops  GI:  Abdomen soft and protuberant.  Normoactive BS.  Mild right lower quadrant tenderness, no guarding or rebound  Skin: Warm, dry.  No rashes or petechiae  Musculoskeletal: No peripheral edema or calf tenderness.  No flank tenderness to percussion  Neuro: Alert and oriented to person/place/time  Psychiatric: Normal affect      Emergency Department Course     Imaging:  Radiographic findings were communicated with the patient who voiced understanding of the findings.  CT Abdomen Pelvis wo contrast   IMPRESSION:   1. Nonaneurysmal atherosclerosis and pelvic phleboliths are noted.   2. No other significant abnormalities are identified. No evidence for   urinary system calculus or obstruction. No evidence for appendicitis   or diverticulitis. No definite etiology for patient's symptoms is   seen.   3. Evaluation of the solid organs of the abdomen and pelvis is limited   due to lack of IV contrast. Contrast-enhanced study was done 2 days   ago  Reading per radiology    Laboratory:  CBC: o/w WNL. (WBC 9.1, HGB 12.1, )   BMP: Glucose 121 (H), o/w WNL (Creatinine: 0.84)    UA: Urineketon 5, Protein albumin 10, Leukocyte esterase large, RBC/HPF 3 (H), bacteria few, squamous 12 (H), mucous urine present o/w Negative    HCG Qualitative " Urine: negative    Interventions:  2011 Zofran 4mg IV injection   2011 oxycodone 5 mg PO    Emergency Department Course:  Past medical records, nursing notes, and vitals reviewed.  1942: I performed an exam of the patient and obtained history, as documented above.     IV was inserted and blood was drawn for laboratory testing, results above.  The patient provided a urine sample here in the emergency department. This was sent for laboratory testing, findings above.  The patient was sent for a CT while in the emergency department, findings above    2200: I rechecked the patient. Explained findings to patient.    I personally reviewed the laboratory and imaging results with the Patient and answered all related questions prior to discharge.     Findings and plan explained to the Patient. Patient discharged home with instructions regarding supportive care, medications, and reasons to return. The importance of close follow-up was reviewed.    Impression & Plan      Medical Decision Making:  Yancy Hoover is a 49 year old female who presents for recurrent right lower quadrant discomfort.  She was seen in the emergency department 2 days ago and found to have a simple cyst in the right ovary that was small enough that it would not put her at increased risk for torsion.  The CT scan was done with contrast on the other day and given the report of hematuria as well as vomiting with the spasms of pain, I did consider the possibility of ureteric lithiasis versus developing appendicitis.  Urine appears fairly bland and likely contaminated.  She has no leukocytosis or fever to suggest a urinary tract infection.  No other symptoms to suggest this.  She underwent CT scanning and it showed no evidence of kidney stone nor evidence of appendicitis.  No obvious cause for her pain is identified.  She is vitally stable.  I think at this point she safe for discharge home.  She states she has medications to take at home.  She is to  follow-up with her doctor in the next few days.  She will return if she becomes worse in any way.    Diagnosis:    ICD-10-CM   1. Abdominal pain, right lower quadrant  R10.31   2. Nausea and vomiting, intractability of vomiting not specified, unspecified vomiting type  R11.2       Disposition:  discharged to home    Scribe Disclosure:  IZuleyka, am serving as a scribe at 7:42 PM on 6/7/2020 to document services personally performed by Samreen Nolasco MD based on my observations and the provider's statements to me.     Scribe Disclosure:  Libertad DEL ANGEL, am serving as a scribe at 10:07 PM on 6/7/2020 to document services personally performed by Samreen Nolasco MD based on my observations and the provider's statements to me.     Zuleyka Kwan  6/7/2020    EMERGENCY DEPARTMENT       Samreen Nolasco MD  06/08/20 0123

## 2020-07-03 ENCOUNTER — VIRTUAL VISIT (OUTPATIENT)
Dept: ENDOCRINOLOGY | Facility: CLINIC | Age: 50
End: 2020-07-03
Payer: COMMERCIAL

## 2020-07-03 VITALS — HEIGHT: 64 IN | BODY MASS INDEX: 50.02 KG/M2 | WEIGHT: 293 LBS

## 2020-07-03 DIAGNOSIS — E08.00 DIABETES MELLITUS DUE TO UNDERLYING CONDITION WITH HYPEROSMOLARITY WITHOUT COMA, WITHOUT LONG-TERM CURRENT USE OF INSULIN (H): ICD-10-CM

## 2020-07-03 DIAGNOSIS — E66.01 MORBID OBESITY DUE TO EXCESS CALORIES (H): Primary | ICD-10-CM

## 2020-07-03 RX ORDER — TOPIRAMATE 25 MG/1
TABLET, FILM COATED ORAL
Qty: 90 TABLET | Refills: 4 | Status: SHIPPED | OUTPATIENT
Start: 2020-07-03 | End: 2021-09-10

## 2020-07-03 ASSESSMENT — MIFFLIN-ST. JEOR: SCORE: 1953.11

## 2020-07-03 ASSESSMENT — PAIN SCALES - GENERAL: PAINLEVEL: NO PAIN (0)

## 2020-07-03 NOTE — NURSING NOTE
"(   Chief Complaint   Patient presents with     Weight Problem     New weight management visit.    )    ( Weight: 133.8 kg (295 lb)(Pt reported) )  ( Height: 162.6 cm (5' 4\")(Pt reported) )  ( BMI (Calculated): 50.64 )  (   )  ( Cumulative weight loss (lbs): 0 )  (   )  (   )  (   )  (   )    (   )  (   )  (   )  (   )  (   )  (   )  (   )    ( There is no problem list on file for this patient.   )  (   Current Outpatient Medications   Medication Sig Dispense Refill     metFORMIN (GLUCOPHAGE) 500 MG tablet        topiramate (TOPAMAX) 25 MG tablet Take 25 mg by mouth       ibuprofen (ADVIL/MOTRIN) 200 MG capsule Take 3 capsules (600 mg) by mouth every 6 hours as needed for fever (Patient not taking: Reported on 7/3/2020) 60 capsule 0     oxyCODONE (ROXICODONE) 5 MG tablet Take 1 tablet (5 mg) by mouth every 6 hours as needed for pain (Patient not taking: Reported on 7/3/2020) 10 tablet 0    )  ( Diabetes Eval:    )    ( Pain Eval:  No Pain (0) )    ( Wound Eval:       )    (   History   Smoking Status     Former Smoker     Packs/day: 0.25     Years: 20.00     Types: Cigarettes     Quit date: 7/1/2020   Smokeless Tobacco     Never Used    )    ( Signed By:  Berkley Martinez CMA; July 3, 2020; 11:04 AM )    "

## 2020-07-03 NOTE — PATIENT INSTRUCTIONS
Thank you for allowing us the privilege of caring for you. We hope we provided you with the excellent service you deserve.    Please let us know if there is anything else we can do for you so that we can be sure you are completely satisfied with your care experience.    Your visit was with Dr. Velarde today.    Instructions per today's visit:  You have the following goals--  1.  Take a walk for 30 min daily and you have a plan for gradually working toward 10,000 steps daily (and will use your phone tracker)  2.  You have a plan to stop drinking the pop and switch to 0 calorie options; the topiramate can help with this goal as we titrate up  3.  You have a goal to not eat after supper    We are going to work up getting the topiramate up to a higher dose which may help with appetite suppression/cravings and boredom eating, and can help you with your goal of stopping pop. Instructions are below for this med.  You are already working with your other doctor on increasing to full dose metformin.  In the future you and I discussed adding a medication like semaglutide to help also with blood sugar control, and to help with appetite reduction  I have information below about that medication also.    We will schedule a virtual appointment with our medication management pharmacist, Lauren Bloch, in about 4 wks, and with Dr. Velarde again in about 2 months.    Please call our contact center at [phone] to schedule your next appointments.          Additional information for patients--  Meal Replacement Products:    Here is the link to our new e-store where you can purchase our meal replacement products    [facility] E-Store  [email or web address]    The one week starter kit is a great way to sample a variety of products and see what works for you.    If you want more information about the product go to: Fresh Amimon    Free Shipping for orders over $75    Benefits of meal replacements  products:    Portion and calorie control  Improved nutrition  Structured eating  Simplified food choices  Avoid contact with trigger foods            Interested in working with a health ?  Health coaches work with you to improve your overall health and wellbeing.  They look at the whole person, and may involve discussion of different areas of life, including, but not limited to the four pillars of health (sleep, exercise, nutrition, and stress management). Discuss with your care team if you would like to start working a health .    Health Coaching-3 Pack: Schedule by calling [phone]    $99 for three health coaching visits    Visits may be done in person or via phone    Coaching is a partnership between the  and the client; Coaches do not prescribe or diagnose    Coaching helps inspire the client to reach his/her personal goals            Bluetooth Scale:    We hope to provide you with high quality telephone and virtual healthcare visits while social distancing for COVID-19 is necessary, as well as in the future when virtual visits may be more convenient for you.    Our technology team made it possible for Bluetooth scales to send weight measurements to our electronic medical record. This allows weights from you weighing at home to securely flow into the medical record, which will improve telephone and virtual visits.  Additionally, studies have shown that adults actually lose more weight when their weights are automatically sent to someone else, and also that this process is not stressful for those adults.    Below is a link for purchasing the scale, with a discount code for our patients. You may call your insurance company to see if they will reimburse you for the cost of the scale, as a piece of durable medical equipment. The scales only go up to a weight of 400 pounds. This is an issue and we are working with the developer on increasing this. We found no scales that go over 400lb that have  blue-tooth for connecting to Nivela.    Scale to purchase: the [employer]  Body  Scale: [email or web address]=aw.ds    Discount Code: We have a discount code for our patients to bring the cost down to $50, the code is:  withmed     Steps to link the scale to [address] via an Android Phone (you can always disconnect at any point in the future):  1. The order must be placed first before the patient can access Track My Health within [address].  2. Download Google Fit gilbert from the Google Play Store  a. Log in or register using your Google account  3. Download the [address] gilbert from Google Play Store  a. Select add organization  b. Search for [employer] and select it  c. Log into Nivela  d. Select Track My Health  e. Select the green connect my account button  f. When prompted log into your Google account  g. Select okay to confirm the account  4. Download the [employer] Health Mate gilbert from Google Play Store  a. [place] for [employer]  b. Go to profile  c. Tap google fit under the Apps section  d. Select the option to activate Google Fit integration  e. Select the same Google account  f. Select okay to confirm the account  g.  Steps to link the scale to Nivela via an iPhone (you can always disconnect at any point in the future):  **Note [employer] Systems is not available for download on an iPad**  1. The order must be placed first before the patient can access Track My Health within [address].  2. Locate the Health gilbert on your iPhone.  a. Set up your Apple Health account as prompted  b. The Sources page will show Apps that communicate with your Health gilbert. Once all steps are completed, you should see Health Marketbright and [address] listed under the Apps section and your iPhone under the devices section.  i. Select Health Mate  1. Under 'ALLOW  HEALTH MATE  TO WRITE DATA ensure the toggle is on for Weight.  2. This will allow the scale to add your weight to  the Apple Health  ii. Select [employer]  1. Under 'ALLOW  FIGHTER Interactive  TO READ DATA ensure the toggle is on for Weight.  2. This allows [employer] to grab the weight from Honey so your provider can see your weights.  3. Download the [employer] maddie from the Maddie Store  a. Select add organization                                                  b. Search for [employer]s and select it  c. Log into [employer]  d. Select Track My Health  e. Select the green connect my account button  f. Follow prompts to link your device to [employer].  4. Download the [place] health mate maddie in the Maddie Store  a. [place] for [employer]  b. Go to profile  c. [employer] under the Apps section  d. If prompted to allow access with the Health Maddie, toggle weight on for read and write access.      For any questions/concerns contact Ebony Barragan LPN at [phone]    To schedule appointments with our team, please call [phone]    Please call during clinic hours Monday through Friday 8:00a - 4:00p if you have questions or you can contact us via [employer] at anytime.      Lab results will be communicated through My Chart or letter (if My Chart not used). Please call the clinic if you have not received communication after 1 week or if you have any questions.    Clinic Fax: [phone]    Thank you,  Medical Weight Management Team          MEDICATION STARTED AT THIS APPOINTMENT  We are starting topiramate before supper.  Start one tab, 25 mg, for a week. Go up to 50 mg (2 tabs) for the next week. At the third week, take   3 tabs (75 mg).  Stay at 3 tabs until you are seen again. Call the nurse if you have any questions or concerns. (Do not stop taking it if you don't think it's working. For some people it works even though they do not feel much different.)    Topiramate (Topamax) is a medication that is used most often to treat migraine headaches or for seizures. It has also been found to help with weight loss. Although it's not currently FDA approved for  weight loss, it has been used safely for a number of years to help people who are carrying extra weight.     Just how topiramate helps with weight loss has not been exactly determined. However it seems to work on areas of the brain to quiet down signals related to eating.      Topiramate may make you:    >feel less interest in eating in between meals   >think less about food and eating   >find it easier to push the plate away   >find giving up pop easier    >have an easier time eating less    For some of our patients, the pills work right away. They feel and think quite differently about food. Other patients don't feel much of a change but find in fact they have lost weight! Like all weight loss medications, topiramate works best when you help it work.  This means:    1) Have less tempting high calorie (fattening) food around the house or office    2) Have lower calorie food (fruits, vegetables,low fat meats and dairy) for snacks    3) Eat out only one time or less each week.   4) Eat your meals at a table with the TV or computer off.    Side-effects. Topiramate is generally well tolerated. The main side-effects we see are:   Tingling in hands,feet, or face (usually not very troublesome)   Mental confusion and word finding trouble (about 10% of patients have this.)     Feeling sleepy or a bit dopey- this goes away very soon after starting.    One of the dangers of topiramate is the possibility of birth defects--if you get pregnant when you are on it, there is the risk that your baby will be born with a cleft lip or palate.  If you are on topiramate and of child bearing age, you need to be on a reliable form of birth control or refrain from sexual intercourse.     Please refer to the pharmacy insert for more information on side-effects. Since many pharmacists are not familiar with the use of topiramate in weight loss, calling the clinic will get you the most accurate information on the use of this medication for  weight loss.     In order to get refills of this or any medication we prescribe you must be seen in the medical weight mgmt clinic every 2-3 months. Please have your pharmacy fax a refill request.          MEDICATION we discussed as an additional future start    We are starting a GLP-1 (Glucagon-like Peptide-1) medication. Examples of this medication include Byetta, Bydureon, or Victoza, semaglutide (Ozempic), etc. The medication chosen will depend on insurance coverage, and can be changed to the medication that is covered under your plan. These medications work the same, in that they can help you lose weight and control your blood sugar. One of the ways it works is by slowing down the rate that food leaves your stomach. You feel morrell and will eat less.  It also helps regulate hormones that can help improve your blood sugars.    Types of GLP-1 medications include:    Victoza: Starting dose is 0.6 mg for a week, then 1.2 mg for a week, and then 1.8 mg thereafter (if prescribed by doctor). This medication is given daily. Pen needles need to be ordered also.  Ozempic: Starting dose is 0.25 mg once weekly for 4 weeks, and then increase to 0.5 mg weekly. If after 4 weeks of being on 0.5 mg weekly dosing and still wanting additional weight loss and/or blood sugar benefits, check with your doctor to see if they want to increase the dose to 1 mg weekly. Pen needles come in the Ozempic pen box.  Trulicity: Starting dose is 0.75 mg once weekly.  If after 1-3 months of being on 0.75 mg weekly and still wanting additional weight loss and/or blood sugar benefits, check with your doctor to see if they want to increase the dose to 1.5 mg weekly.  Bydureon: Starting dose is 2 mg and is given weekly. The patient should pick one day a week and give the medication on that day. Pen needles need to be ordered also.  Byetta: Starting dose is 5 mcg twice daily. This is given for the first month. Check with the doctor after a month to  see if they want the dose increased to 10 mcg BID. Pen needles need to be ordered also.     Side effects of GLP- Medications include: The most common side effects are all GI related and consist of: nausea, constipation, diarrhea, burping, or gassiness. Patients are advised to eat slowly and less, and nausea typically passes if people can stick it out.     The risk of pancreatitis (inflammation of the pancreas) has been associated with this type of medication, but is very rare.  If you have had pancreatitis in the past, this medication may not be for you. Please let us know about any past history of pancreas problems.      Symptoms of pancreatitis include: Pain in your upper stomach area which  may travel to your back and be worse after eating. Your stomach area may be tender to the touch.  You may have vomiting or nausea and/or have a fever. If you should develop any of these symptoms, stop the medication and contact your primary care doctor. They will do a blood test to check for pancreatitis.         There is a small chance you may have some low blood sugar after taking the medication.   The signs of low blood sugar are:  o Weakness  o Shaky   o Hungry  o Sweating  o Confusion      See below for ways to treat low blood sugar without adding in lots of extra calories.      Treating Low Blood Sugar    If you have symptoms of low blood sugar (sweating, shaking, dizzy, confused) eat 15 grams of carbs and wait 15 minutes:      Glucose Tabs are best for sugars under 70 -  Dex4 or BD Glucose tablets are good, you will need to take 3-4 of these to equal 15 grams.       One small box of raisins    4 oz fruit juice box or   cup fruit juice    1 small apple    1 small banana      cup canned fruit in water      English muffin or a slice of bread with jelly     1 low fat frozen waffle with sugar-free syrup      cup cottage cheese with   cup frozen or fresh blueberries    1 cup skim or low-fat milk      cup whole grain  cereal    4-6 crackers such as Triscuits      This medication is usually covered by insurance for patients with a diagnosis of Type 2 Diabetes. Depending on insurance coverage, the medication may be changed to a different formulary, but they all work in the same way. Sometimes a prior authorization is required, which may take up to 1-2 weeks for an insurance company to make a decision if they will cover the medication. Please be patient, you will be notified either way.     For any questions/concerns contact Ebony Barragan LPN at [phone]     (Do not stop taking it if you don't think it's working. For some people it works without them knowing it.)     In order to get refills of this or any medication we prescribe you must be seen in the medical weight mgmt clinic every 2-4 months. Please have your pharmacy fax a refill request to [phone].

## 2020-07-03 NOTE — PROGRESS NOTES
"Yancy Hoover is a 49 year old female who is being evaluated via a billable telephone visit.      The patient has been notified of following:     \"This telephone visit will be conducted via a call between you and your physician/provider. We have found that certain health care needs can be provided without the need for a physical exam.  This service lets us provide the care you need with a short phone conversation.  If a prescription is necessary we can send it directly to your pharmacy.  If lab work is needed we can place an order for that and you can then stop by our lab to have the test done at a later time.    Telephone visits are billed at different rates depending on your insurance coverage. During this emergency period, for some insurers they may be billed the same as an in-person visit.  Please reach out to your insurance provider with any questions.    If during the course of the call the physician/provider feels a telephone visit is not appropriate, you will not be charged for this service.\"    Patient has given verbal consent for Telephone visit?  Yes    What phone number would you like to be contacted at? [phone]    How would you like to obtain your AVS? Mail a copy    Phone call duration: 50 minutes    Danial Velarde MD        [facility]    PATIENT:  Yancy Hoover  MRN:         [id number]  :         1970  MARLON:         7/3/2020      I had the pleasure of seeing your patient, Yancy Hoover. Full intake/assessment was done to determine barriers to weight loss success and develop a treatment plan. Yancy Hoover is a 49 year old female interested in treatment of medical problems associated with excess weight. She has a height of 5' 4\"[Pt reported[, a weight of 295 lbs 0 oz, and the calculated Body mass index is 50.64 kg/m .    ASSESSMENT/PLAN:    ## morbid obesity  ## T2DM without complications, not treated with ins    Yancy " is a patient with early onset morbid obesity with significant element of familial/genetic influence and with current health consequences. She does need aggressive weight loss plan due to new dx T2DM.  Yancy Hoover eats a high carb diet, eats a high fat diet, eats to obtain specific degree of fullness, and has a lot of liquid calories.    Her problem is complicated by a hunger disorder and strong craving/reward pathways    Her ability to lose weight is impacted by current work life (sedentary work and working at home now with COVID19 pandemic).    PLAN:    Decrease portion sizes  Purge house of food triggers  No meal skipping  Decrease caloric beverages by daily walking plan (has already put this in place with another provider and has plan for escalating as tolerated)  Dietician visit of education  Volumetrics eating plan  Hypocaloric/low fat diet  Increase activity by daily walking     Diabetes   Ancillary testing:  N/A. Frequent home glucose monitoring with report to nurse as normal weight decreases.   Food Plan:  Reduced calorie and Low carbohydrate.  Activity Plan:  Daily walking, can do this after meals (such as after supper in the evenings in the summer--supper is around 5p).  Supplementary:  N/A.  Medication:  Discussed future options The patient is just today beginning medication in pursuit of improved medical status--has prescription for 500mg BID metformin and is aware of potential side effects, today is her first dosing, and she will be increasing to 1000mg BID in a couple of weeks as tolerated. She will re-start topiramate. Was given prescription by another provider for 25mg BID (for migraine HAs) and took for a little whilc but has stopped weeks ago.  She will restart per our up-titration protocol as she was tolerating this med.  Patient was made aware that topiramate is not approved for the treatment of obesity.  There is a mutual understanding of the goals and risks of this therapy. The  patient is in agreement. She is educated on dosage regimen and possible side effects. We also discussed adding another med besides these that she is on now/just starting--GLP1 agonist class if/as covered by her insurance.  Options discussed in detail in terms of potential risks and benefits and possible side effects.  Questions addressed.    Follow up--Lauren Bloch in 4 wks for MTM; Prachi in 8 wks for virtual visit    She also developed goals during the visit--  1.  Take a walk for 30 min daily and she has a plan for gradually working toward 10,000 steps daily (and will use her phone tracker)  2.  She has a plan to stop drinking the pop and switch to 0 calorie options; the topiramate can help with this goal as we titrate up  3.  She has a goal to not eat after supper--eating supper around 5P so this will be time-restricted approach          FOLLOW-UP:   8 weeks with Prachi; 4 wks with Lauren Bloch    Discussed that at the visit with Zulma in 4 wks--could consider adjust topiramate or add phentermine (if able to follow BPs) or add semaglutide  RTC with me in 8 wks    Nutritionist visit in near future also is needed (for programmatic approach as she will benefit from working with nutritionist, has a lot of questions and is highly motivated to make healthy changes); needs MyChart set up--will alert metabolic nurse coordinator regarding the follow up issues        Orders Placed This Encounter   Procedures     MED THERAPY MANAGE REFERRAL           She has the following co-morbidities:  No HTN, no CVD, elevated cholesterol, DM2 newly disgnosed (had HbA1c of 7.0 by her report)      Has struggled with wt whole life, and this is pt's max wt now.    With COVID19 is working at home and sedentary otherwise  Drinking a lot soda; customer service work    Has been working with the following provider on wt loss--  [facility] ([facility] Lantigua)  Pt notes she went to see a wt loss doctor and wanted wt loss pills.  He did labs and told  pt she had DM.  HbA1c 7.0.  Cholesterol was a little high also--208    Pt notes she was referred to a dietitian but the dietitian is not taking new pts and pt has a lot of questions--is motivated to make health changes and halt/slow DM-related progressions/complications..     Off of work this next week so would like to have virtual visit with nutrition then if possible.    [place]--interested in goal of walking 30 min 5 days per wk; gradually work up to 10,000 steps per day (can use phone tracker)    Can stop eating after supper--made goal for this and the family has plans for eating dinner around 5p.    Will cut out the soda and switch to 0 paul options, has been having at least several 20 oz sodas (sprite) daily, did note while on topiramate that it was tasting flat and she found herself leaving unfinished sodas and opening new ones because of this change in taste---> now understands that this is likely related to topiramate and she can set a goal of decreasing/stopping the sugared soda and use this med to help her make this change.    Pt notes cravings and boredom and happens in the evening after supper; she will re-start and shift topiramate to prior to supper with up-titration schedule I provided in AVS.      MEDICATIONS:   Current Outpatient Medications   Medication Sig Dispense Refill     metFORMIN (GLUCOPHAGE) 500 MG tablet        topiramate (TOPAMAX) 25 MG tablet Take 25 mg by mouth       ibuprofen (ADVIL/MOTRIN) 200 MG capsule Take 3 capsules (600 mg) by mouth every 6 hours as needed for fever (Patient not taking: Reported on 7/3/2020) 60 capsule 0     oxyCODONE (ROXICODONE) 5 MG tablet Take 1 tablet (5 mg) by mouth every 6 hours as needed for pain (Patient not taking: Reported on 7/3/2020) 10 tablet 0     PMH--ovarian cyst (and had ibuprofen and oxycodone for awhile after that, none in months); had tubal ligation    DM in the family--grandmother (took insulin) older age    Current  meds--topiramate (25mg BID) but has not taking in 3 wks for HAs (sinus? vs. migraine);  New metformin--starting today (has had prescription but was waiting to start it)  500mg twice per day now and will escalate to 1000 mg BID in a few wks      Daily routine-  Up around 6-7A  BR--2 sodas; or eggs/sausage/toast (daughter cooks 1-2 times per wk)  MAYKEL--little to no lunch (but snacking throughout the day), occasionally sandwich and chips  DI--(night before last) chicken wings, French fries    Has daughter with obesity home from college (COVID19) and they are eating healthier at dinner      ALLERGIES:   Allergies   Allergen Reactions     Gadolinium Derivatives Nausea and Vomiting     Caused excessive vomiting, administered 4mg zofran       Sincerely,    Danial Velarde MD

## 2020-07-03 NOTE — LETTER
"7/3/2020       RE: Yancy Hoover  [address]  [place] [place] [address]     Dear Colleague,    Thank you for referring your patient, Yancy Hoover, to the [facility] WEIGHT MANAGEMENT at [facility]. Please see a copy of my visit note below.    Yancy Hoover is a 49 year old female who is being evaluated via a billable telephone visit.      The patient has been notified of following:     \"This telephone visit will be conducted via a call between you and your physician/provider. We have found that certain health care needs can be provided without the need for a physical exam.  This service lets us provide the care you need with a short phone conversation.  If a prescription is necessary we can send it directly to your pharmacy.  If lab work is needed we can place an order for that and you can then stop by our lab to have the test done at a later time.    Telephone visits are billed at different rates depending on your insurance coverage. During this emergency period, for some insurers they may be billed the same as an in-person visit.  Please reach out to your insurance provider with any questions.    If during the course of the call the physician/provider feels a telephone visit is not appropriate, you will not be charged for this service.\"    Patient has given verbal consent for Telephone visit?  Yes    What phone number would you like to be contacted at? [phone]    How would you like to obtain your AVS? Mail a copy    Phone call duration: 50 minutes    Danial Velarde MD        [facility] Weight Management Consult    PATIENT:  Yancy Hoover  MRN:         [id number]  :         1970  MARLON:         7/3/2020      I had the pleasure of seeing your patient, Yancy Hoover. Full intake/assessment was done to determine barriers to weight loss success and develop a treatment plan. Yancy Hoover is a 49 " "year old female interested in treatment of medical problems associated with excess weight. She has a height of 5' 4\"[Pt reported[, a weight of 295 lbs 0 oz, and the calculated Body mass index is 50.64 kg/m .    ASSESSMENT/PLAN:    ## morbid obesity  ## T2DM without complications, not treated with ins    Yancy is a patient with early onset morbid obesity with significant element of familial/genetic influence and with current health consequences. She does need aggressive weight loss plan due to new dx T2DM.  Yancy Hoover eats a high carb diet, eats a high fat diet, eats to obtain specific degree of fullness, and has a lot of liquid calories.    Her problem is complicated by a hunger disorder and strong craving/reward pathways    Her ability to lose weight is impacted by current work life (sedentary work and working at home now with COVID19 pandemic).    PLAN:    Decrease portion sizes  Purge house of food triggers  No meal skipping  Decrease caloric beverages by daily walking plan (has already put this in place with another provider and has plan for escalating as tolerated)  Dietician visit of education  Volumetrics eating plan  Hypocaloric/low fat diet  Increase activity by daily walking     Diabetes   Ancillary testing:  N/A. Frequent home glucose monitoring with report to nurse as normal weight decreases.   Food Plan:  Reduced calorie and Low carbohydrate.  Activity Plan:  Daily walking, can do this after meals (such as after supper in the evenings in the summer--supper is around 5p).  Supplementary:  N/A.  Medication:  Discussed future options The patient is just today beginning medication in pursuit of improved medical status--has prescription for 500mg BID metformin and is aware of potential side effects, today is her first dosing, and she will be increasing to 1000mg BID in a couple of weeks as tolerated. She will re-start topiramate. Was given prescription by another provider for 25mg BID (for " migraine HAs) and took for a little whilc but has stopped weeks ago.  She will restart per our up-titration protocol as she was tolerating this med.  Patient was made aware that topiramate is not approved for the treatment of obesity.  There is a mutual understanding of the goals and risks of this therapy. The patient is in agreement. She is educated on dosage regimen and possible side effects. We also discussed adding another med besides these that she is on now/just starting--GLP1 agonist class if/as covered by her insurance.  Options discussed in detail in terms of potential risks and benefits and possible side effects.  Questions addressed.    Follow up--Lauren Bloch in 4 wks for MTM; Prachi in 8 wks for virtual visit    She also developed goals during the visit--  1.  Take a walk for 30 min daily and she has a plan for gradually working toward 10,000 steps daily (and will use her phone tracker)  2.  She has a plan to stop drinking the pop and switch to 0 calorie options; the topiramate can help with this goal as we titrate up  3.  She has a goal to not eat after supper--eating supper around 5P so this will be time-restricted approach          FOLLOW-UP:   8 weeks with Prachi; 4 wks with Lauren Bloch    Discussed that at the visit with Zulma in 4 wks--could consider adjust topiramate or add phentermine (if able to follow BPs) or add semaglutide  RTC with me in 8 wks    Nutritionist visit in near future also is needed (for programmatic approach as she will benefit from working with nutritionist, has a lot of questions and is highly motivated to make healthy changes); needs MyChart set up--will alert metabolic nurse coordinator regarding the follow up issues        Orders Placed This Encounter   Procedures     MED THERAPY MANAGE REFERRAL           She has the following co-morbidities:  No HTN, no CVD, elevated cholesterol, DM2 newly disgnosed (had HbA1c of 7.0 by her report)      Has struggled with wt whole life,  and this is pt's max wt now.    With COVID19 is working at home and sedentary otherwise  Drinking a lot soda; customer service work    Has been working with the following provider on wt loss--  [facility] ([facility] Lantigua)  Pt notes she went to see a wt loss doctor and wanted wt loss pills.  He did labs and told pt she had DM.  HbA1c 7.0.  Cholesterol was a little high also--208    Pt notes she was referred to a dietitian but the dietitian is not taking new pts and pt has a lot of questions--is motivated to make health changes and halt/slow DM-related progressions/complications..     Off of work this next week so would like to have virtual visit with nutrition then if possible.    [place] to [place]--interested in goal of walking 30 min 5 days per wk; gradually work up to 10,000 steps per day (can use phone tracker)    Can stop eating after supper--made goal for this and the family has plans for eating dinner around 5p.    Will cut out the soda and switch to 0 paul options, has been having at least several 20 oz sodas (sprite) daily, did note while on topiramate that it was tasting flat and she found herself leaving unfinished sodas and opening new ones because of this change in taste---> now understands that this is likely related to topiramate and she can set a goal of decreasing/stopping the sugared soda and use this med to help her make this change.    Pt notes cravings and boredom and happens in the evening after supper; she will re-start and shift topiramate to prior to supper with up-titration schedule I provided in AVS.      MEDICATIONS:   Current Outpatient Medications   Medication Sig Dispense Refill     metFORMIN (GLUCOPHAGE) 500 MG tablet        topiramate (TOPAMAX) 25 MG tablet Take 25 mg by mouth       ibuprofen (ADVIL/MOTRIN) 200 MG capsule Take 3 capsules (600 mg) by mouth every 6 hours as needed for fever (Patient not taking: Reported on 7/3/2020) 60 capsule 0     oxyCODONE (ROXICODONE) 5 MG  tablet Take 1 tablet (5 mg) by mouth every 6 hours as needed for pain (Patient not taking: Reported on 7/3/2020) 10 tablet 0     PMH--ovarian cyst (and had ibuprofen and oxycodone for awhile after that, none in months); had tubal ligation    DM in the family--grandmother (took insulin) older age    Current meds--topiramate (25mg BID) but has not taking in 3 wks for HAs (sinus? vs. migraine);  New metformin--starting today (has had prescription but was waiting to start it)  500mg twice per day now and will escalate to 1000 mg BID in a few wks      Daily routine-  Up around 6-7A  BR--2 sodas; or eggs/sausage/toast (daughter cooks 1-2 times per wk)  MAYKEL--little to no lunch (but snacking throughout the day), occasionally sandwich and chips  DI--(night before last) chicken wings, French fries    Has daughter with obesity home from college (COVID19) and they are eating healthier at dinner      ALLERGIES:   Allergies   Allergen Reactions     Gadolinium Derivatives Nausea and Vomiting     Caused excessive vomiting, administered 4mg zofran       Sincerely,    Danial Velarde MD

## 2020-07-06 ENCOUNTER — TELEPHONE (OUTPATIENT)
Dept: ENDOCRINOLOGY | Facility: CLINIC | Age: 50
End: 2020-07-06

## 2020-07-06 DIAGNOSIS — E11.9 TYPE 2 DIABETES MELLITUS (H): Primary | ICD-10-CM

## 2020-07-08 ENCOUNTER — TELEPHONE (OUTPATIENT)
Dept: SURGERY | Facility: CLINIC | Age: 50
End: 2020-07-08

## 2020-07-15 ENCOUNTER — TELEPHONE (OUTPATIENT)
Dept: SURGERY | Facility: CLINIC | Age: 50
End: 2020-07-15

## 2020-07-15 NOTE — TELEPHONE ENCOUNTER
Patient interested in weight loss surgery    Yancy Hoover    Spoke to patient and got disconnected. Called again and left message Regarding appointment on 07/16/2020.    Scheduled to see Jeannie Noe NP at 12:30pm.    Instructed to check with insurance company regarding exclusions prior to first appt.    Can find information on bariatrix products at: [email or web address]    Instructed to  view seminar and to call back to set up [employer] to complete pre-visit questionnaire prior to visit at [email or web address].    [phone] contact center phone number      Berkley Martinez CMA

## 2020-07-16 ENCOUNTER — VIRTUAL VISIT (OUTPATIENT)
Dept: SURGERY | Facility: CLINIC | Age: 50
End: 2020-07-16
Payer: COMMERCIAL

## 2020-07-16 VITALS — WEIGHT: 293 LBS | HEIGHT: 64 IN | BODY MASS INDEX: 50.02 KG/M2

## 2020-07-16 DIAGNOSIS — E66.01 MORBID OBESITY (H): Primary | ICD-10-CM

## 2020-07-16 DIAGNOSIS — Z71.3 NUTRITIONAL COUNSELING: ICD-10-CM

## 2020-07-16 DIAGNOSIS — E66.01 CLASS 3 SEVERE OBESITY DUE TO EXCESS CALORIES WITH SERIOUS COMORBIDITY AND BODY MASS INDEX (BMI) OF 50.0 TO 59.9 IN ADULT (H): Primary | ICD-10-CM

## 2020-07-16 DIAGNOSIS — Z72.0 TOBACCO ABUSE: ICD-10-CM

## 2020-07-16 DIAGNOSIS — E66.813 CLASS 3 SEVERE OBESITY DUE TO EXCESS CALORIES WITH SERIOUS COMORBIDITY AND BODY MASS INDEX (BMI) OF 50.0 TO 59.9 IN ADULT (H): Primary | ICD-10-CM

## 2020-07-16 ASSESSMENT — MIFFLIN-ST. JEOR: SCORE: 1957.65

## 2020-07-16 ASSESSMENT — PAIN SCALES - GENERAL: PAINLEVEL: NO PAIN (0)

## 2020-07-16 NOTE — LETTER
"7/16/2020       RE: Yancy Hoover  [address]  [facility] [address]     Dear Colleague,    Thank you for referring your patient, Yancy Hoover, to the [facility] at [facility]. Please see a copy of my visit note below.    Yancy Hoover is a 49 year old female who is being evaluated via a billable video visit.      The patient has been notified of following:     \"This video visit will be conducted via a call between you and your physician/provider. We have found that certain health care needs can be provided without the need for an in-person physical exam.  This service lets us provide the care you need with a video conversation.  If a prescription is necessary we can send it directly to your pharmacy.  If lab work is needed we can place an order for that and you can then stop by our lab to have the test done at a later time.    Video visits are billed at different rates depending on your insurance coverage.  Please reach out to your insurance provider with any questions.    If during the course of the call the physician/provider feels a video visit is not appropriate, you will not be charged for this service.\"    Patient has given verbal consent for Video visit? Yes  How would you like to obtain your AVS? MyChart  If you are dropped from the video visit, the video invite should be resent to: Text to cell phone: [phone]  Will anyone else be joining your video visit? No        Video-Visit Details    Type of service:  Video Visit    Video Start Time: 1:07 pm   Video End Time: 1:39 pm    Time spent with patient: 32 minutes.    Originating Location (pt. Location): Home    Distant Location (provider location):  [facility]     Platform used for Video Visit: [employer] Bariatric Nutrition Consultation Note    Reason For Visit: Nutrition Assessment    Yancy Hoover is a 49 year old " "presenting today for new bariatric nutrition consult. Pt is interested in laparoscopic sleeve gastrectomy with Dr. Pena expected surgery in Oct 2020.  Patient is accompanied by self.  This is pt's first of 3 required nutrition visits prior to surgery.     Pt referred by Jeannie Noe NP on July 16, 2020.  Patient with Co-morbidities of obesity including:  Type II DM yes (diagnosed 1 month ago)  Renal Failure no  Sleep apnea no  Hypertension no   Dyslipidemia yes  Joint pain no  Back pain no  GERD no     Support System Reviewed With Patient 7/16/2020   Who do you have in your support network that can be available to help you for the first 2 weeks after surgery? Yes   Who can you count on for support throughout your weight loss surgery journey? Yes       ANTHROPOMETRICS:  Estimated body mass index is 50.81 kg/m  as calculated from the following:    Height as of an earlier encounter on 7/16/20: 1.626 m (5' 4\").    Weight as of an earlier encounter on 7/16/20: 134.3 kg (296 lb).    Required weight loss goal pre-op: -10 lbs from initial consult weight (goal weight 286 lbs or less before surgery)    Wt Readings from Last 10 Encounters:   07/16/20 134.3 kg (296 lb)   07/03/20 133.8 kg (295 lb)   06/07/20 127 kg (280 lb)   06/05/20 113.4 kg (250 lb)   02/03/20 124.7 kg (275 lb)   01/23/20 124.7 kg (275 lb)   10/22/19 117.9 kg (260 lb)   10/12/19 117.9 kg (260 lb)          7/16/2020   I have tried the following methods to lose weight Watching portions or calories, Exercise, Weight Watchers, Atkins type diet (low carb/high protein), Slimfast       Weight Loss Questions Reviewed With Patient 7/16/2020   How long have you been overweight? Since early childhood       SUPPLEMENT INFORMATION:  Vitamin D (h/o vitamin D deficiency)     MEDICATIONS FOR WEIGHT LOSS:  Metformin, topiramate    NUTRITION HISTORY:  Patient met with Dr. Velarde for a new MWM consult on 7/3/20. At that time they set the following goals:  1.  Take a walk for 30 " min daily and she has a plan for gradually working toward 10,000 steps daily (and will use her phone tracker)  2.  She has a plan to stop drinking the pop and switch to 0 calorie options; the topiramate can help with this goal as we titrate up  3.  She has a goal to not eat after supper--eating supper around 5P so this will be time-restricted approach    Pt reports being up to 6224-4048 steps per day, working on reducing soda (2 sprite per day, reduced from 12 per day), eliminated intake after dinner.      No known food allergies/intolerances.     Doesn't like the taste of water. Needs to have ice cold.     Putting food on a saucer lately to help reduce portion sizes.     Recall Diet Questions Reviewed With Patient 7/16/2020   Describe what you typically consume for breakfast (typical or most recent): I dont eat breakfast - no appetite for breakfast. Occ having a banana with meds   Describe what you typically consume for lunch (typical or most recent): 1pm: Pc of chicken and vegetables; Hamburger and fries - improved choice since DM dx; egg roll   Describe what you typically consume for supper (typical or most recent): Chicken and potatoes; rosales beans fried chicken + couple bites watermelon   Describe what you typically consume as snacks (typical or most recent): Chips, fruit i drink alot of soda   How many ounces of water, or other low calorie drinks, do you drink daily (8 oz=1 glass)? 8 oz   How many ounces of caffeine (coffee, tea, pop) do you drink daily (8 oz=1 glass)? 64 oz or more (2 cans sprite per day)   How many ounces of juice, pop, sweet tea, sports drinks, protein drinks, other sweetened drinks, do you drink daily (8 oz=1 glass)? 64 oz or more   Please indicate the type of milk: 2% (rarely)   Alcohol: none     Eating Habits 7/16/2020   Do you have any dietary restrictions? Yes   Do you currently binge eat (eat a large amount of food in a short time)? No   Are you an emotional eater? No   Do you get up  to eat after falling asleep? Yes   What foods do you crave? Soda       ADDITIONAL INFORMATION:  Patients mother is living with her currently, and helping support healthier diet/lifestyle since diagnosed with DM.     Dining Out History Reviewed With Patient 7/16/2020   How often do you dine out? A couple of times a week.   Where do you dine out? (select all that apply) fast food chains   What types of food do you order when you dine out? Pizza, or buugers and fries       Physical Activity Reviewed With Patient 7/16/2020   How often do you exercise? 1 to 2 times per week   What is the duration of your exercise (in minutes)? 30 Minutes   What types of exercise do you do? walking   What keeps you from being more active?  I should be more active but I just have not gotten around to it     MALNUTRITION  % Intake: Decreased intake does not meet criteria  % Weight Loss: None noted  Subcutaneous Fat Loss: None observed  Muscle Loss: None observed  Fluid Accumulation/Edema: None noted  Malnutrition Diagnosis: Patient does not meet two of the established criteria necessary for diagnosing malnutrition    NUTRITION DIAGNOSIS:  Obesity r/t long history of self-monitoring deficit and excessive energy intake aeb BMI >30 kg/m2.    INTERVENTION:  Intervention Provided/Education Provided on post-op diet guidelines, vitamins/minerals essential post-operatively, GI anatomy of bariatric surgeries, ways to help prepare for post-op diet guidelines pre-operatively, portion/calorie-control, mindful eating and sources of protein. Provided education on the Plate Method including healthy food choice and portion control for pre-operative weight loss and improved BG mgmt. Provided pt with list of goals RD contact information.      Questions Reviewed With Patient 7/16/2020   How ready are you to make changes regarding your weight? Number 1 = Not ready at all to make changes up to 10 = very ready. 10   How confident are you that you can change? 1 =  "Not confident that you will be successful making changes up to 10 = very confident. 10       Patient Understanding: good  Expected Compliance: good    GOALS:  Relating To Eating:  - Eat 3 meals per day  - Use the 9\" Plate method to structure meals (1/2 plate non-starchy vegetables/fruit, 1/4 plate lean protein, 1/4 plate whole grain starch - no more than 1/2 cup carb/meal)   - You may use a protein shake as a meal replacement (see recommendations below)  - Eat slowly (20-30 minutes per meal), chewing foods well (25 chews per bite/applesauce consistency)  - Avoid snacking    Relating to beverages:  - Separate fluids from meals by 30 minutes before, during, and after eating  - Drink 48-64 ounces of fluid per day. Small, frequent sips.   - Conitnue to reduce soda intake. Try limiting to one can per day over next month.     Relating to dietary supplements:  - Start a multivitamin containing iron daily    Relating to activity:  - Increase activity as able. Continue building on steps per day. Try aiming for 5000 steps consistently over next month.    Meal Replacements:  You may purchase meal replacement products online at our e-store. Visit e-store at [email or web address]   - The one week starter kits is a great way to sample a variety of products.  - For recipes and ideas on how to use products, visit - Restlet    *Protein Shake Criteria: no more than 210 Calories, at least 20 grams of protein, and less than 10 grams of sugar     Meal Replacement Shake Options:   [facility] smoothie (160 Calories, 20 g protein)   Premier Protein (160 Calories, 30 g protein)  Slim Fast Advanced Nutrition (180 Calories, 20 g protein)  Muscle Milk, lactose-free, 17 oz bottle (210 Calories, 30 g protein)  Integrated Supplements, no artificial sugars (110 Calories, 20 g protein)  Genepro, unflavored protein powder (60 Calories, 30 g protein)    Meal Replacement Bar Options:  [facility] Protein Shake (160 Calories, 15 g " protein)  Quest Protein Bars (190 Calories, 20 g protein)  Built Bar (170 Calories, 15-20 g protein)  One Protein Bar (210 calories, 20 g protein)  Santiago Signature Protein Bar ([employer]) (190 Calories, 21 g protein)  Pure Protein Bars (180 Calories, 21 g protein)    Frozen Meal Replacements  Healthy Choice  Lean Cuisine  Atkins Meals  Smart Ones    Nutrition Handouts:    Create a Plate  [email or web address]     Protein Sources for Weight Loss  [email or web address]     Carbohydrates  [email or web address]     Mindful Eating  [email or web address]     Diet Guidelines after Weight Loss Surgery  [email or web address]       Sharon Osborn RD, LD                Again, thank you for allowing me to participate in the care of your patient.      Sincerely,    Sharon Osborn RD

## 2020-07-16 NOTE — PROGRESS NOTES
"Yancy Hoover is a 49 year old female who is being evaluated via a billable video visit.      The patient has been notified of following:     \"This video visit will be conducted via a call between you and your physician/provider. We have found that certain health care needs can be provided without the need for an in-person physical exam.  This service lets us provide the care you need with a video conversation.  If a prescription is necessary we can send it directly to your pharmacy.  If lab work is needed we can place an order for that and you can then stop by our lab to have the test done at a later time.    Video visits are billed at different rates depending on your insurance coverage.  Please reach out to your insurance provider with any questions.    If during the course of the call the physician/provider feels a video visit is not appropriate, you will not be charged for this service.\"    Patient has given verbal consent for Video visit? Yes  How would you like to obtain your AVS? MyChart  If you are dropped from the video visit, the video invite should be resent to: Text to cell phone: [phone]  Will anyone else be joining your video visit? No        Video-Visit Details    Type of service:  Video Visit    Video Start Time: 1225  Video End Time: 1304    Originating Location (pt. Location): Home    Distant Location (provider location):   [facility]     Platform used for Video Visit: Radha Noe NP      New Bariatric Surgery Consultation Note    2020    RE: Yancy Hoover  MR#: [id number]  : 1970      Referring provider:       2020   Who referred you? Katie       Chief Complaint/Reason for visit: evaluation for possible weight loss surgery    Dear No Ref-Primary, Physician (General),    I had the pleasure of seeing your patient, Yancy Hoover, to evaluate her obesity and consider her for possible weight loss surgery. As " "you know, Yancy Hoover is 49 year old.  She has a height of 5' 4\", a weight of 296 lbs 0 oz, and calculated Body mass index is 50.81 kg/m .    HISTORY OF PRESENT ILLNESS:  Weight Loss History Reviewed with Patient 7/16/2020   How long have you been overweight? Since early childhood   What is the most that you have ever weighed? 296   What is the most weight you have lost? 50   I have tried the following methods to lose weight Watching portions or calories, Exercise, Weight Watchers, Atkins type diet (low carb/high protein), Slimfast   I have tried the following weight loss medications? (Check all that apply) Topamax/Topiramate   Have you ever had weight loss surgery? No     Always considered overweight or the \"fat kid\"  Before her first child, she had been dieting and lost from 190 to 150. Gained to 200 with pregnancy and has had hard time getting below 200lb since then. Youngest child is 6 years old and since he was born, hasn't gotten below 216lb     Feeling the weight now and wants to discuss weight loss surgery. Was going through the process 10 years ago but then lost her job and insurance.     Has a friend who had sleeve which didn't work for her but then she had a gastric bypass which was beneficial.     CO-MORBIDITIES OF OBESITY INCLUDE:     7/16/2020   I have the following health issues associated with obesity: Type II Diabetes     Gerd: none   No hx blood clots   Sleep: snores, wakes herself with snoring, has been told she stops breathing at night, has never followed up with sleep clinic, headaches in the morning, doesn't wake up refreshed,       PAST MEDICAL HISTORY:  Past Medical History:   Diagnosis Date     NO ACTIVE PROBLEMS      Headaches in the morning, have improved since starting metformin for DMII     PAST SURGICAL HISTORY:  Past Surgical History:   Procedure Laterality Date     GYN SURGERY      c  section 2014     LAPAROSCOPIC CYSTECTOMY OVARIAN (BENIGN) Left 1/23/2020    Procedure: " LAPAROSCOPIC ovarian cystectomy,;  Surgeon: Sofi Cuevas MD;  Location: [facility] OR       FAMILY HISTORY:   No family history on file.    SOCIAL HISTORY:   Social History Questions Reviewed With Patient 7/16/2020   Which best describes your employment status (select all that apply) I work full-time   If you work, what is your occupation?    Which best describes your marital status:    Do you have children? Yes   Who do you have in your support network that can be available to help you for the first 2 weeks after surgery? Yes   Who can you count on for support throughout your weight loss surgery journey? Yes   Can you afford 3 meals a day?  Yes   Can you afford 50-60 dollars a month for vitamins? Yes     Newly working from home with covid     HABITS:     7/16/2020   Have you ever used any of the following nicotine products? Cigarettes   If you previously used any of these products, what year did you quit? 2020   Have you or are you currently using street drugs or prescription strength medication for which you do not have a prescription for? No   Do you have a history of chemical dependency (alcohol or drug abuse)? No     Nicotine quit date 5/1/2020- didn't like it any more   -noticed increased weight gain since quitting, no medications or cessation aids       PSYCHOLOGICAL HISTORY:   Psychological History Reviewed With Patient 7/16/2020   Have you ever attempted suicide? Never.   Have you had thoughts of suicide in the past year? No   Have you ever been hospitalized for mental illness or a suicide attempt? Never.   Do you have a history of chronic pain? No   Have you ever been diagnosed with fibromyalgia? No   Are you currently seeing a therapist or counselor?  No   Are you currently seeing a psychiatrist? No       ROS:     7/16/2020   Skin:  None of the above   HEENT: None of these   Musculoskeletal: None of the above   Cardiovascular: None of the above   Pulmonary: Snoring, Awaken  "from sleep to catch your breath   Gastrointestinal: None of the above   Genitourinary: None of the above   Hematological: None of the above   Neurological: None of the above   Female only: Regular menstrual cycles       EATING BEHAVIORS:     7/16/2020   Have you or anyone else thought that you had an eating disorder? No   Do you currently binge eat (eat a large amount of food in a short time)? No   Are you an emotional eater? No   Do you get up to eat after falling asleep? Yes     Drinks a lot of caloric beverages- especially soda. At one time would drink a 12pack a day. This has significantly decreased since diagnosis of DMII but she does still drink some soda. (DMII diagnosis 1 month ago)    EXERCISE:     7/16/2020   How often do you exercise? 1 to 2 times per week   What is the duration of your exercise (in minutes)? 30 Minutes   What types of exercise do you do? walking   What keeps you from being more active?  I should be more active but I just have not gotten around to it       MEDICATIONS:  Current Outpatient Medications   Medication Sig Dispense Refill     metFORMIN (GLUCOPHAGE) 500 MG tablet        topiramate (TOPAMAX) 25 MG tablet wk1--25 mg before supper; wk2--50 mg then, wk3 and after--75 mg 90 tablet 4       ALLERGIES:  Allergies   Allergen Reactions     Gadolinium Derivatives Nausea and Vomiting     Caused excessive vomiting, administered 4mg zofran       LABS/IMAGING/MEDICAL RECORDS REVIEW:     PHYSICAL EXAM:  Ht 1.626 m (5' 4\")   Wt 134.3 kg (296 lb)   BMI 50.81 kg/m      In summary, Yancy Hoover has Class III obesity with a body mass index of Body mass index is 50.81 kg/m . kg/m2 and the comorbidities stated above. She completed an informational seminar and is a candidate for the laparoscopic gastric sleeve.  She will have to complete the following pre-requisites:    Received weight loss goal of 10 lb prior to surgery.  3 dietitian visits   Have preoperative laboratory tests " drawn.  Psychological Evaluation with MMPI and clearance for weight loss surgery.  Letter of clearance from the following sleep medicine, primary care provider     Today in the office we discussed gastric sleeve surgery. Preoperative, perioperative, and postoperative processes, management, and follow up were addressed.  Risks and benefits were outlined including the risk of death, staple line leak (1-2%), PE, DVT, ulcer, worsening GERD, N/V, stricture, hernia, wound infection, weight regain, and vitamin deficiencies. I emphasized exercise and activity along with appropriate food choice as the main foundation for weight loss with surgery providing surgical reinforcement of this.  All questions were answered.  A goal sheet and support group handout were given to the patient.      Once the patient has completed the requirements in their task list and there are no further recommendations, the pt will be allowed to see the surgeon of her choice for consultation on the laparoscopic gastric sleeve surgery. Patient verbalizes understanding of the process to surgery and expectations for the postoperative period including the need for lifelong lifestyle changes, vitamin supplementation, and laboratory monitoring.        Sincerely,     Jeannie Noe NP      Bariatric Task List  Status:  Is patient a candidate for bariatric surgery?:  patient is a candidate for bariatric surgery -     Cleared to schedule surgeon consult?:    -     Status:    -     Surgeon: Dr. Pena -     Tentative surgery month/year: October 2020 -        Insurance: Insurance:  [employer] -     [employer]igna: PCP Recommendation and Medical Clearance:    -     HP Referral:  Needed -     Other:    -        Patient Info: Initial Weight:  296 -     Date of Initial Weight/Height:  7/16/2020 -     Goal Weight (lbs):  286 -     Required Weight Loss:  10 -     Surgery Type:  sleeve gastrectomy -     Multidisciplinary Meeting:    -        Dietician Visits: Structured weight  "loss required by insurance?:  structured weight loss required -     Dietician Visit 1:  Needed -     Dietician Visit 2:  Needed -     Dietician Visit 3:  Needed -     Dietician Visit 4:    -     Dietician Visit 5:    -     Dietician Visit 6:    -     Dietician Visit additional:    -     Clearance from dietician to see surgeon?:    -     Dietician Notes:    -        Psychological Evaluation: Psych eval:  Needed -     Therapist letter of support:    -     Psychiatrist letter of support:    -     Establish care with therapist:    -     Complete eating disorder evaluation:    -     Letter of clearance from therapist/eating disorder program:    -     Other:    -        Lab Work: Complete Blood Count:  Completed -     Comprehensive Metabolic Panel:  Completed -     Vitamin D:  Needed -     Hgb A1c:  Needed -     PTH:  Needed -     H. pylori:    -     TSH:  Completed - 10/22/19   Nicotine Testing:  Needed - Quit Date 5/1/2020   Other:    -        Consults/ Clearance: Sleep Medicine:  Needed -     Cardiac:    -     Pain:   -     Dental:    -     Endocrine:    -     Gastroenterology:    -     Vascular Medicine:    -     Hematology:    -     Medical Weight Management:   -     Physical Therapy/Exercise:    -     Nephrology:    -     Neurology:    -     Pulmonology:    -     Rheumatology:    -     Other    -     Other    -     Other    -           Testing: UGI:    -     EGD:    -     Other:    -        PCP: Establish care with PCP:  Completed -     Follow up with PCP:    -     PCP letter of support:  Needed -        Smoking: Quit tobacco use (3 months smoke free)?:    -     Quit date:    -        Patient Education:  Information Session:  Completed -     Given \"Making your decision\" handout?:  Given \"\"Making your decision\"\" handout? -     Given support group information?:  support group contact information provided -     Attended support group?:    -     Support plan in place?:  Completed -     Research consents signed?:    -   "      Additional Surgery Requirements: Review Coag plan:    -     HgA1c <8:  Needed -     Inpatient pain consult:    -     Final nicotine screen:    -     Dental work complete:    -     Birth control plan:    -     Other Requirements:    -     Other Requirements:    -        Final Tasks:  Before surgery online class:  Needed -     Before surgery online class website link:  [email or web address]   After surgery online class:  Needed -     After surgery online class website link:  [email or web address]   Nurse visit for weigh-in and information:  Needed -     Pre-assessment clinic visit with anesthesia team for H&P:  Needed -     Final labs (Hgb, plt, T&S, UA):  Needed -        Notes:   -

## 2020-07-16 NOTE — LETTER
Date:July 25, 2020      Patient was self referred, no letter generated. Do not send.        [facility] Health Information

## 2020-07-16 NOTE — PATIENT INSTRUCTIONS
"Thank you for allowing us the privilege of caring for you. We hope we provided you with the excellent service you deserve.   Please let us know if there is anything else we can do for you so that we can be sure you are completely satisfied with your care experience.    To ensure the quality of our services you may be receiving a patient satisfaction survey from an independent patient satisfaction monitoring company.    The greatest compliment you can give is a \"Likely to Recommend\"    Your visit was with Jeannie Noe NP today.    Instructions per today's visit:   Labs as ordered (including nicotine test)  Schedule Sleep Consult- [phone]  Schedule Psych Consult - see list of providers  See Dr. Pena next month (surgeon)  See Dietitan monthly  Work on weight loss   Lauren Bloch, MTM pharmacist 8/3/2020  Continue with Dr. Velarde for medical weight management and diabetes     Please call our contact center at [phone] to schedule your next appointments.  For any nursing questions or concerns call Ebony Barragan LPN at [phone] or Samreen Burton RN at [phone]  Please call during clinic hours Monday through Friday 8:00a - 4:00p if you have questions or you can contact us via [facility] at anytime and we will reply during clinic hours.    Lab results will be communicated through My Chart or letter (if My Chart not used). Please call the clinic if you have not received communication after 1 week or if you have any questions.?  Clinic Fax: [phone]  ______________________________________________________________________________________________________________________  Meal Replacement Products:    Here is the link to our new e-store where you can purchase our meal replacement products    [facility] E-Techpoint  [email or web address]    The one week starter kit is a great way to sample a variety of products and see what works for you.    If you want more information about the product go to: Fresh Steps " Meals  [place] Thin brick    Free Shipping for orders over $75     Benefits of meal replacements products:    Portion and calorie control  Improved nutrition  Structured eating  Simplified food choices  Avoid contact with trigger foods  _________________________________________________________________________________________________________________________________  Interested in working with a health ?  Health coaches work with you to improve your overall health and wellbeing.  They look at the whole person, and may involve discussion of different areas of life, including, but not limited to the four pillars of health (sleep, exercise, nutrition, and stress management). Discuss with your care team if you would like to start working a health .  Health Coaching-3 Pack: Schedule by calling [phone]    $99 for three health coaching visits    Visits may be done in person or via phone    Coaching is a partnership between the  and the client; Coaches do not prescribe or diagnose    Coaching helps inspire the client to reach his/her personal goals   _________________________________________________________________________________________________________________________________  Healthy Lifestyle Support Group   [facility] Weight Management Program  Virtual Support Group Now Available    60 minutes of small group guided discussion, support and resources    Led by Health , Darya Mccloud    For questions, and to receive the invite information, contact Darya at [email or web address]    All our welcome!    One Friday per month, 12:30pm to 1:30pm  SELF COMPASSION: July 31st  CREATING MY WELLNESS VISION: August 28th  MINDFULNESS PRACTICES FOR A CALM BODY & MIND: September 25th  MINDFUL EATING TOOLS: October 30th  HEALTHY& HAPPY HOLIDAYS: November 20th  OPEN FORUM: December  18th  _______________________________________________________________________________________________________________________________  Connections: Bariatric Care support group  Due to the Covid-19 restrictions, we will be doing our support group virtually using Microsoft Teams. You will need to get an invitation to the group from Dariusz Yang, Ph.D., LP at [email or web address] and to learn about using Microsoft Teams. The next meeting is on Tuesday, July 14 between 6:30 and 8 PM and there will be no formal speaker.    This group meets the second Tuesday of each month from 6:30 to 8 PM and will be held again at [facility] in the [address] [place] [address] (A-B room) when the Covid-19 restrictions are lifted. The group is led by Dariusz Yang, Ph.D., Licensed Psychologist, [facility] Comprehensive Weight Management Program. There is no cost for group participation and is open to all [facility] (and those external to this program) pre- and post-operative bariatric surgery patients as well as their support system.   _________________________________________________________________________________________________________________________________  [facility] Get Moving Program  Our team of physical therapists is trained to help you understand and take control of your condition. They will perform a thorough evaluation to determine your ability for activity and develop a customized plan to fit your goals and physical ability.  Scheduling: Unsure if the Get Moving program is right for you? Discuss the program with your medical provider or diabetes educator. You can also call us at [phone] to ask questions or schedule an appointment.   [facility] Get Moving Program  ______________________________________________________________________________________________________________________  Bluetooth Scale:    We hope to provide you with high quality telephone and virtual  healthcare visits while social distancing for COVID-19 is necessary, as well as in the future when virtual visits may be more convenient for you.     Our technology team made it possible for Bluetooth scales to send weight measurements to our electronic medical record. This allows weights from you weighing at home to securely flow into the medical record, which will improve telephone and virtual visits.   Additionally, studies have shown that adults actually lose more weight when their weights are automatically sent to someone else, and also that this process is not stressful for those adults.    Below is a link for purchasing the scale, with a discount code for our patients. You may call your insurance company to see if they will reimburse you for the cost of the scale, as a piece of durable medical equipment. The scales only go up to a weight of 400 pounds. This is an issue and we are working with the developer on increasing this. We found no scales that go over 400lb that have blue-tooth for connecting to [employer] [facility].    Scale to purchase: the OnRequest Images  Body  Scale: https://[email or web address].Dezineforce/us/en/body/shop?[email or web address]=aw.ds    Discount Code: We have a discount code for our patients to bring the cost down to $50, the code is:  withmed     Steps to link the scale to [facility] via an Android Phone (you can always disconnect at any point in the future):  1. The order must be placed first before the patient can access Track My Health within [employer].  2. Download Google Fit gilbert from the Google Play Store   a. Log in or register using your Google account   3. Download the Mir [facility]acha gilbert from Google Play Store  a. Select add organization   b. Search for [facility] and select it   c. Log into [facility]  d. Select Track My Health   e. Select the green connect my account button   f. When prompted log into your Google account   g. Select okay to confirm the account   4. Download  the [employer] Mate maddie from Google Play Store  a. Mcadoo for WithinLinqia   b. Go to profile   c. Tap google fit under the Apps section  d. Select the option to activate Google Fit integration   e. Select the same Google account   f. Select okay to confirm the account  g.   Steps to link the scale to Nanovi via an iPhone (you can always disconnect at any point in the future):  **Note Taggify is not available for download on an iPad**  1. The order must be placed first before the patient can access Track My Health within Nanovi.  2. Locate the Health maddie on your iPhone.  a. Set up your Apple Health account as prompted  b. The Sources page will show Apps that communicate with your Health maddie. Once all steps are completed, you should see FreeWheel and InstyBookt listed under the Apps section and your iPhone under the devices section.  i. Select Better Finance Mate  1. Under 'ALLOW  Datamyne  TO WRITE DATA ensure the toggle is on for Weight.  2. This will allow the scale to add your weight to the Apple Health  ii. Select InstyBookt  1. Under 'ALLOW  [employer]  TO READ DATA ensure the toggle is on for Weight.  2. This allows Nanovi to grab the weight from Taggify so your provider can see your weights.  3. Download the Nanovi maddie from the Maddie Store   a. Select add organization                                                  b. Search for BrandBacker and select it  c. Log into Nanovi  d. Select Track My Health   e. Select the green connect my account button   f. Follow prompts to link your device to Nanovi.  4. Download the [employer] health mate maddie in the Maddie Store   a. Mcadoo for Withings   b. Go to profile   c. Atlas Powered under the Apps section  d. If prompted to allow access with the Health Maddie, toggle weight on for read and write access.   ______________________________________________________________________________________________________________________    Thank you,  [facility] Comprehensive Weight  Management Team      Bariatric Task List  Status:  Is patient a candidate for bariatric surgery?:  patient is a candidate for bariatric surgery -     Cleared to schedule surgeon consult?:    -     Status:    -     Surgeon: Dr. Pena -     Tentative surgery month/year: October 2020 -        Insurance: Insurance:  [employer] -     Cigna: PCP Recommendation and Medical Clearance:    -     HP Referral:  Needed -     Other:    -        Patient Info: Initial Weight:  296 -     Date of Initial Weight/Height:  7/16/2020 -     Goal Weight (lbs):  286 -     Required Weight Loss:  10 -     Surgery Type:  sleeve gastrectomy -     Multidisciplinary Meeting:    -        Dietician Visits: Structured weight loss required by insurance?:  structured weight loss required -     Dietician Visit 1:  Needed -     Dietician Visit 2:  Needed -     Dietician Visit 3:  Needed -     Dietician Visit 4:    -     Dietician Visit 5:    -     Dietician Visit 6:    -     Dietician Visit additional:    -     Clearance from dietician to see surgeon?:    -     Dietician Notes:    -        Psychological Evaluation: Psych eval:  Needed -     Therapist letter of support:    -     Psychiatrist letter of support:    -     Establish care with therapist:    -     Complete eating disorder evaluation:    -     Letter of clearance from therapist/eating disorder program:    -     Other:    -        Lab Work: Complete Blood Count:  Completed -     Comprehensive Metabolic Panel:  Completed -     Vitamin D:  Needed -     Hgb A1c:  Needed -     PTH:  Needed -     H. pylori:    -     TSH:  Completed - 10/22/19   Nicotine Testing:  Needed - Quit Date 5/1/2020   Other:    -        Consults/ Clearance: Sleep Medicine:  Needed -     Cardiac:    -     Pain:   -     Dental:    -     Endocrine:    -     Gastroenterology:    -     Vascular Medicine:    -     Hematology:    -     Medical Weight Management:   -     Physical Therapy/Exercise:    -     Nephrology:    -    "  Neurology:    -     Pulmonology:    -     Rheumatology:    -     Other    -     Other    -     Other    -           Testing: UGI:    -     EGD:    -     Other:    -        PCP: Establish care with PCP:  Completed -     Follow up with PCP:    -     PCP letter of support:  Needed -        Smoking: Quit tobacco use (3 months smoke free)?:    -     Quit date:    -        Patient Education:  Information Session:  Completed -     Given \"Making your decision\" handout?:  Given \"\"Making your decision\"\" handout? -     Given support group information?:  support group contact information provided -     Attended support group?:    -     Support plan in place?:  Completed -     Research consents signed?:    -        Additional Surgery Requirements: Review Coag plan:    -     HgA1c <8:  Needed -     Inpatient pain consult:    -     Final nicotine screen:    -     Dental work complete:    -     Birth control plan:    -     Other Requirements:    -     Other Requirements:    -        Final Tasks:  Before surgery online class:  Needed -     Before surgery online class website link:  [email or web address]   After surgery online class:  Needed -     After surgery online class website link:  [email or web address]   Nurse visit for weigh-in and information:  Needed -     Pre-assessment clinic visit with anesthesia team for H&P:  Needed -     Final labs (Hgb, plt, T&S, UA):  Needed -        Notes:   -                              "

## 2020-07-16 NOTE — LETTER
Date:July 25, 2020      Patient was self referred, no letter generated. Do not send.        [facility] Health Information

## 2020-07-16 NOTE — Clinical Note
Patient is very new to [employer]. She just got it today. I am having stephany mail everything to her. But, I did encourage her to practice with [facility]. But, she may need some encouragement :)

## 2020-07-16 NOTE — Clinical Note
Can you please send her NBS packet to her by mail? Please add list of psych providers. Also needs clearance from sleep and primary care provider     Thanks!

## 2020-07-16 NOTE — NURSING NOTE
"(   Chief Complaint   Patient presents with     Weight Problem     new [facility], [place]    )    ( Weight: 134.3 kg (296 lb)(per pt) )  ( Height: 162.6 cm (5' 4\") )  ( BMI (Calculated): 50.81 )  (   )  ( Cumulative weight loss (lbs): -1 )  ( Last Visits Weight: 133.8 kg (295 lb) )  ( Wt change since last visit (lbs): 1 )  (   )  (   )    (   )  (   )  (   )  (   )  (   )  (   )  (   )    ( There is no problem list on file for this patient.   )  (   Current Outpatient Medications   Medication Sig Dispense Refill     metFORMIN (GLUCOPHAGE) 500 MG tablet        topiramate (TOPAMAX) 25 MG tablet wk1--25 mg before supper; wk2--50 mg then, wk3 and after--75 mg 90 tablet 4    )  ( Diabetes Eval:    )    ( Pain Eval:  No Pain (0) )    ( Wound Eval:       )    (   History   Smoking Status     Former Smoker     Packs/day: 0.25     Years: 20.00     Types: Cigarettes     Quit date: 5/1/2020   Smokeless Tobacco     Never Used    )    ( Signed By:  Navarro Aguirre, EMT; July 16, 2020; 12:20 PM )   "

## 2020-07-16 NOTE — LETTER
"2020       RE: Yancy Hoover  [address]  [facility] [address]     Dear Colleague,    Thank you for referring your patient, Yancy Hoover, to the [facility] at [facility]. Please see a copy of my visit note below.    Yancy Hoover is a 49 year old female who is being evaluated via a billable video visit.      The patient has been notified of following:     \"This video visit will be conducted via a call between you and your physician/provider. We have found that certain health care needs can be provided without the need for an in-person physical exam.  This service lets us provide the care you need with a video conversation.  If a prescription is necessary we can send it directly to your pharmacy.  If lab work is needed we can place an order for that and you can then stop by our lab to have the test done at a later time.    Video visits are billed at different rates depending on your insurance coverage.  Please reach out to your insurance provider with any questions.    If during the course of the call the physician/provider feels a video visit is not appropriate, you will not be charged for this service.\"    Patient has given verbal consent for Video visit? Yes  How would you like to obtain your AVS? MyChart  If you are dropped from the video visit, the video invite should be resent to: Text to cell phone: [phone]  Will anyone else be joining your video visit? No        Video-Visit Details    Type of service:  Video Visit    Video Start Time: 1225  Video End Time: 1304    Originating Location (pt. Location): Home    Distant Location (provider location):   [facility]     Platform used for Video Visit: Radha Noe NP      New Bariatric Surgery Consultation Note    2020    RE: Yancy Hoover  MR#: [id number]  : 1970      Referring provider:       " "7/16/2020   Who referred you? Katie       Chief Complaint/Reason for visit: evaluation for possible weight loss surgery    Dear No Ref-Primary, Physician (General),    I had the pleasure of seeing your patient, Yancy Hoover, to evaluate her obesity and consider her for possible weight loss surgery. As you know, Yancy Hoover is 49 year old.  She has a height of 5' 4\", a weight of 296 lbs 0 oz, and calculated Body mass index is 50.81 kg/m .    HISTORY OF PRESENT ILLNESS:  Weight Loss History Reviewed with Patient 7/16/2020   How long have you been overweight? Since early childhood   What is the most that you have ever weighed? 296   What is the most weight you have lost? 50   I have tried the following methods to lose weight Watching portions or calories, Exercise, Weight Watchers, Atkins type diet (low carb/high protein), Slimfast   I have tried the following weight loss medications? (Check all that apply) Topamax/Topiramate   Have you ever had weight loss surgery? No     Always considered overweight or the \"fat kid\"  Before her first child, she had been dieting and lost from 190 to 150. Gained to 200 with pregnancy and has had hard time getting below 200lb since then. Youngest child is 6 years old and since he was born, hasn't gotten below 216lb     Feeling the weight now and wants to discuss weight loss surgery. Was going through the process 10 years ago but then lost her job and insurance.     Has a friend who had sleeve which didn't work for her but then she had a gastric bypass which was beneficial.     CO-MORBIDITIES OF OBESITY INCLUDE:     7/16/2020   I have the following health issues associated with obesity: Type II Diabetes     Gerd: none   No hx blood clots   Sleep: snores, wakes herself with snoring, has been told she stops breathing at night, has never followed up with sleep clinic, headaches in the morning, doesn't wake up refreshed,       PAST MEDICAL HISTORY:  Past Medical " History:   Diagnosis Date     NO ACTIVE PROBLEMS      Headaches in the morning, have improved since starting metformin for DMII     PAST SURGICAL HISTORY:  Past Surgical History:   Procedure Laterality Date     GYN SURGERY      c  section 2014     LAPAROSCOPIC CYSTECTOMY OVARIAN (BENIGN) Left 1/23/2020    Procedure: LAPAROSCOPIC ovarian cystectomy,;  Surgeon: Sofi Cuevas MD;  Location:  OR       FAMILY HISTORY:   No family history on file.    SOCIAL HISTORY:   Social History Questions Reviewed With Patient 7/16/2020   Which best describes your employment status (select all that apply) I work full-time   If you work, what is your occupation?    Which best describes your marital status:    Do you have children? Yes   Who do you have in your support network that can be available to help you for the first 2 weeks after surgery? Yes   Who can you count on for support throughout your weight loss surgery journey? Yes   Can you afford 3 meals a day?  Yes   Can you afford 50-60 dollars a month for vitamins? Yes     Newly working from home with covid     HABITS:     7/16/2020   Have you ever used any of the following nicotine products? Cigarettes   If you previously used any of these products, what year did you quit? 2020   Have you or are you currently using street drugs or prescription strength medication for which you do not have a prescription for? No   Do you have a history of chemical dependency (alcohol or drug abuse)? No     Nicotine quit date 5/1/2020- didn't like it any more   -noticed increased weight gain since quitting, no medications or cessation aids       PSYCHOLOGICAL HISTORY:   Psychological History Reviewed With Patient 7/16/2020   Have you ever attempted suicide? Never.   Have you had thoughts of suicide in the past year? No   Have you ever been hospitalized for mental illness or a suicide attempt? Never.   Do you have a history of chronic pain? No   Have you  "ever been diagnosed with fibromyalgia? No   Are you currently seeing a therapist or counselor?  No   Are you currently seeing a psychiatrist? No       ROS:     7/16/2020   Skin:  None of the above   HEENT: None of these   Musculoskeletal: None of the above   Cardiovascular: None of the above   Pulmonary: Snoring, Awaken from sleep to catch your breath   Gastrointestinal: None of the above   Genitourinary: None of the above   Hematological: None of the above   Neurological: None of the above   Female only: Regular menstrual cycles       EATING BEHAVIORS:     7/16/2020   Have you or anyone else thought that you had an eating disorder? No   Do you currently binge eat (eat a large amount of food in a short time)? No   Are you an emotional eater? No   Do you get up to eat after falling asleep? Yes     Drinks a lot of caloric beverages- especially soda. At one time would drink a 12pack a day. This has significantly decreased since diagnosis of DMII but she does still drink some soda. (DMII diagnosis 1 month ago)    EXERCISE:     7/16/2020   How often do you exercise? 1 to 2 times per week   What is the duration of your exercise (in minutes)? 30 Minutes   What types of exercise do you do? walking   What keeps you from being more active?  I should be more active but I just have not gotten around to it       MEDICATIONS:  Current Outpatient Medications   Medication Sig Dispense Refill     metFORMIN (GLUCOPHAGE) 500 MG tablet        topiramate (TOPAMAX) 25 MG tablet wk1--25 mg before supper; wk2--50 mg then, wk3 and after--75 mg 90 tablet 4       ALLERGIES:  Allergies   Allergen Reactions     Gadolinium Derivatives Nausea and Vomiting     Caused excessive vomiting, administered 4mg zofran       LABS/IMAGING/MEDICAL RECORDS REVIEW:     PHYSICAL EXAM:  Ht 1.626 m (5' 4\")   Wt 134.3 kg (296 lb)   BMI 50.81 kg/m      In summary, Yancy Hoover has Class III obesity with a body mass index of Body mass index is 50.81 " kg/m . kg/m2 and the comorbidities stated above. She completed an informational seminar and is a candidate for the laparoscopic gastric sleeve.  She will have to complete the following pre-requisites:    Received weight loss goal of 10 lb prior to surgery.  3 dietitian visits   Have preoperative laboratory tests drawn.  Psychological Evaluation with MMPI and clearance for weight loss surgery.  Letter of clearance from the following sleep medicine, primary care provider     Today in the office we discussed gastric sleeve surgery. Preoperative, perioperative, and postoperative processes, management, and follow up were addressed.  Risks and benefits were outlined including the risk of death, staple line leak (1-2%), PE, DVT, ulcer, worsening GERD, N/V, stricture, hernia, wound infection, weight regain, and vitamin deficiencies. I emphasized exercise and activity along with appropriate food choice as the main foundation for weight loss with surgery providing surgical reinforcement of this.  All questions were answered.  A goal sheet and support group handout were given to the patient.      Once the patient has completed the requirements in their task list and there are no further recommendations, the pt will be allowed to see the surgeon of her choice for consultation on the laparoscopic gastric sleeve surgery. Patient verbalizes understanding of the process to surgery and expectations for the postoperative period including the need for lifelong lifestyle changes, vitamin supplementation, and laboratory monitoring.        Sincerely,     Jeannie Noe NP      Bariatric Task List  Status:  Is patient a candidate for bariatric surgery?:  patient is a candidate for bariatric surgery -     Cleared to schedule surgeon consult?:    -     Status:    -     Surgeon: Dr. Pena -     Tentative surgery month/year: October 2020 -        Insurance: Insurance:  [employer] -     [employer]ign[employer]: PCP Recommendation and Medical Clearance:    -      HP Referral:  Needed -     Other:    -        Patient Info: Initial Weight:  296 -     Date of Initial Weight/Height:  7/16/2020 -     Goal Weight (lbs):  286 -     Required Weight Loss:  10 -     Surgery Type:  sleeve gastrectomy -     Multidisciplinary Meeting:    -        Dietician Visits: Structured weight loss required by insurance?:  structured weight loss required -     Dietician Visit 1:  Needed -     Dietician Visit 2:  Needed -     Dietician Visit 3:  Needed -     Dietician Visit 4:    -     Dietician Visit 5:    -     Dietician Visit 6:    -     Dietician Visit additional:    -     Clearance from dietician to see surgeon?:    -     Dietician Notes:    -        Psychological Evaluation: Psych eval:  Needed -     Therapist letter of support:    -     Psychiatrist letter of support:    -     Establish care with therapist:    -     Complete eating disorder evaluation:    -     Letter of clearance from therapist/eating disorder program:    -     Other:    -        Lab Work: Complete Blood Count:  Completed -     Comprehensive Metabolic Panel:  Completed -     Vitamin D:  Needed -     Hgb A1c:  Needed -     PTH:  Needed -     H. pylori:    -     TSH:  Completed - 10/22/19   Nicotine Testing:  Needed - Quit Date 5/1/2020   Other:    -        Consults/ Clearance: Sleep Medicine:  Needed -     Cardiac:    -     Pain:   -     Dental:    -     Endocrine:    -     Gastroenterology:    -     Vascular Medicine:    -     Hematology:    -     Medical Weight Management:   -     Physical Therapy/Exercise:    -     Nephrology:    -     Neurology:    -     Pulmonology:    -     Rheumatology:    -     Other    -     Other    -     Other    -           Testing: UGI:    -     EGD:    -     Other:    -        PCP: Establish care with PCP:  Completed -     Follow up with PCP:    -     PCP letter of support:  Needed -        Smoking: Quit tobacco use (3 months smoke free)?:    -     Quit date:    -        Patient Education:   "Information Session:  Completed -     Given \"Making your decision\" handout?:  Given \"\"Making your decision\"\" handout? -     Given support group information?:  support group contact information provided -     Attended support group?:    -     Support plan in place?:  Completed -     Research consents signed?:    -        Additional Surgery Requirements: Review Coag plan:    -     HgA1c <8:  Needed -     Inpatient pain consult:    -     Final nicotine screen:    -     Dental work complete:    -     Birth control plan:    -     Other Requirements:    -     Other Requirements:    -        Final Tasks:  Before surgery online class:  Needed -     Before surgery online class website link:  [email or web address]   After surgery online class:  Needed -     After surgery online class website link:  [email or web address]   Nurse visit for weigh-in and information:  Needed -     Pre-assessment clinic visit with anesthesia team for H&P:  Needed -     Final labs (Hgb, plt, T&S, UA):  Needed -        Notes:   -              Again, thank you for allowing me to participate in the care of your patient.      Sincerely,    Jaennie Noe NP      "

## 2020-07-16 NOTE — PROGRESS NOTES
"Yancy Hoover is a 49 year old female who is being evaluated via a billable video visit.      The patient has been notified of following:     \"This video visit will be conducted via a call between you and your physician/provider. We have found that certain health care needs can be provided without the need for an in-person physical exam.  This service lets us provide the care you need with a video conversation.  If a prescription is necessary we can send it directly to your pharmacy.  If lab work is needed we can place an order for that and you can then stop by our lab to have the test done at a later time.    Video visits are billed at different rates depending on your insurance coverage.  Please reach out to your insurance provider with any questions.    If during the course of the call the physician/provider feels a video visit is not appropriate, you will not be charged for this service.\"    Patient has given verbal consent for Video visit? Yes  How would you like to obtain your AVS? MyChart  If you are dropped from the video visit, the video invite should be resent to: Text to cell phone: [phone]  Will anyone else be joining your video visit? No        Video-Visit Details    Type of service:  Video Visit    Video Start Time: 1:07 pm   Video End Time: 1:39 pm    Time spent with patient: 32 minutes.    Originating Location (pt. Location): Home    Distant Location (provider location):  [facility]     Platform used for Video Visit: [employer]    New Bariatric Nutrition Consultation Note    Reason For Visit: Nutrition Assessment    Yancy Hoover is a 49 year old presenting today for new bariatric nutrition consult. Pt is interested in laparoscopic sleeve gastrectomy with Dr. Pena expected surgery in Oct 2020.  Patient is accompanied by self.  This is pt's first of 3 required nutrition visits prior to surgery.     Pt referred by Jeannie Noe NP on July 16, 2020.  Patient " "with Co-morbidities of obesity including:  Type II DM yes (diagnosed 1 month ago)  Renal Failure no  Sleep apnea no  Hypertension no   Dyslipidemia yes  Joint pain no  Back pain no  GERD no     Support System Reviewed With Patient 7/16/2020   Who do you have in your support network that can be available to help you for the first 2 weeks after surgery? Yes   Who can you count on for support throughout your weight loss surgery journey? Yes       ANTHROPOMETRICS:  Estimated body mass index is 50.81 kg/m  as calculated from the following:    Height as of an earlier encounter on 7/16/20: 1.626 m (5' 4\").    Weight as of an earlier encounter on 7/16/20: 134.3 kg (296 lb).    Required weight loss goal pre-op: -10 lbs from initial consult weight (goal weight 286 lbs or less before surgery)    Wt Readings from Last 10 Encounters:   07/16/20 134.3 kg (296 lb)   07/03/20 133.8 kg (295 lb)   06/07/20 127 kg (280 lb)   06/05/20 113.4 kg (250 lb)   02/03/20 124.7 kg (275 lb)   01/23/20 124.7 kg (275 lb)   10/22/19 117.9 kg (260 lb)   10/12/19 117.9 kg (260 lb)          7/16/2020   I have tried the following methods to lose weight Watching portions or calories, Exercise, Weight Watchers, Atkins type diet (low carb/high protein), Slimfast       Weight Loss Questions Reviewed With Patient 7/16/2020   How long have you been overweight? Since early childhood       SUPPLEMENT INFORMATION:  Vitamin D (h/o vitamin D deficiency)     MEDICATIONS FOR WEIGHT LOSS:  Metformin, topiramate    NUTRITION HISTORY:  Patient met with Dr. Velarde for a new MWM consult on 7/3/20. At that time they set the following goals:  1.  Take a walk for 30 min daily and she has a plan for gradually working toward 10,000 steps daily (and will use her phone tracker)  2.  She has a plan to stop drinking the pop and switch to 0 calorie options; the topiramate can help with this goal as we titrate up  3.  She has a goal to not eat after supper--eating supper around 5P " so this will be time-restricted approach    Pt reports being up to 3950-8136 steps per day, working on reducing soda (2 sprite per day, reduced from 12 per day), eliminated intake after dinner.      No known food allergies/intolerances.     Doesn't like the taste of water. Needs to have ice cold.     Putting food on a saucer lately to help reduce portion sizes.     Recall Diet Questions Reviewed With Patient 7/16/2020   Describe what you typically consume for breakfast (typical or most recent): I dont eat breakfast - no appetite for breakfast. Occ having a banana with meds   Describe what you typically consume for lunch (typical or most recent): 1pm: Pc of chicken and vegetables; Hamburger and fries - improved choice since DM dx; egg roll   Describe what you typically consume for supper (typical or most recent): Chicken and potatoes; rosales beans fried chicken + couple bites watermelon   Describe what you typically consume as snacks (typical or most recent): Chips, fruit i drink alot of soda   How many ounces of water, or other low calorie drinks, do you drink daily (8 oz=1 glass)? 8 oz   How many ounces of caffeine (coffee, tea, pop) do you drink daily (8 oz=1 glass)? 64 oz or more (2 cans sprite per day)   How many ounces of juice, pop, sweet tea, sports drinks, protein drinks, other sweetened drinks, do you drink daily (8 oz=1 glass)? 64 oz or more   Please indicate the type of milk: 2% (rarely)   Alcohol: none     Eating Habits 7/16/2020   Do you have any dietary restrictions? Yes   Do you currently binge eat (eat a large amount of food in a short time)? No   Are you an emotional eater? No   Do you get up to eat after falling asleep? Yes   What foods do you crave? Soda       ADDITIONAL INFORMATION:  Patients mother is living with her currently, and helping support healthier diet/lifestyle since diagnosed with DM.     Dining Out History Reviewed With Patient 7/16/2020   How often do you dine out? A couple of times  "a week.   Where do you dine out? (select all that apply) fast food chains   What types of food do you order when you dine out? Pizza, or buugers and fries       Physical Activity Reviewed With Patient 7/16/2020   How often do you exercise? 1 to 2 times per week   What is the duration of your exercise (in minutes)? 30 Minutes   What types of exercise do you do? walking   What keeps you from being more active?  I should be more active but I just have not gotten around to it     MALNUTRITION  % Intake: Decreased intake does not meet criteria  % Weight Loss: None noted  Subcutaneous Fat Loss: None observed  Muscle Loss: None observed  Fluid Accumulation/Edema: None noted  Malnutrition Diagnosis: Patient does not meet two of the established criteria necessary for diagnosing malnutrition    NUTRITION DIAGNOSIS:  Obesity r/t long history of self-monitoring deficit and excessive energy intake aeb BMI >30 kg/m2.    INTERVENTION:  Intervention Provided/Education Provided on post-op diet guidelines, vitamins/minerals essential post-operatively, GI anatomy of bariatric surgeries, ways to help prepare for post-op diet guidelines pre-operatively, portion/calorie-control, mindful eating and sources of protein. Provided education on the Plate Method including healthy food choice and portion control for pre-operative weight loss and improved BG mgmt. Provided pt with list of goals RD contact information.      Questions Reviewed With Patient 7/16/2020   How ready are you to make changes regarding your weight? Number 1 = Not ready at all to make changes up to 10 = very ready. 10   How confident are you that you can change? 1 = Not confident that you will be successful making changes up to 10 = very confident. 10       Patient Understanding: good  Expected Compliance: good    GOALS:  Relating To Eating:  - Eat 3 meals per day  - Use the 9\" Plate method to structure meals (1/2 plate non-starchy vegetables/fruit, 1/4 plate lean protein, " 1/4 plate whole grain starch - no more than 1/2 cup carb/meal)   - You may use a protein shake as a meal replacement (see recommendations below)  - Eat slowly (20-30 minutes per meal), chewing foods well (25 chews per bite/applesauce consistency)  - Avoid snacking    Relating to beverages:  - Separate fluids from meals by 30 minutes before, during, and after eating  - Drink 48-64 ounces of fluid per day. Small, frequent sips.   - Conitnue to reduce soda intake. Try limiting to one can per day over next month.     Relating to dietary supplements:  - Start a multivitamin containing iron daily    Relating to activity:  - Increase activity as able. Continue building on steps per day. Try aiming for 5000 steps consistently over next month.    Meal Replacements:  You may purchase meal replacement products online at our e-store. Visit e-store at [email or web address]   - The one week starter kits is a great way to sample a variety of products.  - For recipes and ideas on how to use products, visit - Coupad    *Protein Shake Criteria: no more than 210 Calories, at least 20 grams of protein, and less than 10 grams of sugar     Meal Replacement Shake Options:   [facility] smoothie (160 Calories, 20 g protein)   Premier Protein (160 Calories, 30 g protein)  Slim Fast Advanced Nutrition (180 Calories, 20 g protein)  Muscle Milk, lactose-free, 17 oz bottle (210 Calories, 30 g protein)  Integrated Supplements, no artificial sugars (110 Calories, 20 g protein)  Genepro, unflavored protein powder (60 Calories, 30 g protein)    Meal Replacement Bar Options:  [facility] Protein Shake (160 Calories, 15 g protein)  Quest Protein Bars (190 Calories, 20 g protein)  Built Bar (170 Calories, 15-20 g protein)  One Protein Bar (210 calories, 20 g protein)  Orgas Signature Protein Bar ([employer]) (190 Calories, 21 g protein)  Pure Protein Bars (180 Calories, 21 g protein)    Frozen Meal Replacements  Healthy  Choice  Lean Cuisine  Atkins Meals  Smart Ones    Nutrition Handouts:    Create a Plate  [email or web address]     Protein Sources for Weight Loss  [email or web address]     Carbohydrates  [email or web address]     Mindful Eating  [email or web address]     Diet Guidelines after Weight Loss Surgery  [email or web address]       Sharon Osborn RD, LD

## 2020-07-17 NOTE — PROGRESS NOTES
"Yancy Hoover is a 49 year old female who is being evaluated via a billable video visit.      The patient has been notified of following:     \"This video visit will be conducted via a call between you and your physician/provider. We have found that certain health care needs can be provided without the need for an in-person physical exam.  This service lets us provide the care you need with a video conversation.  If a prescription is necessary we can send it directly to your pharmacy.  If lab work is needed we can place an order for that and you can then stop by our lab to have the test done at a later time.    Video visits are billed at different rates depending on your insurance coverage.  Please reach out to your insurance provider with any questions.    If during the course of the call the physician/provider feels a video visit is not appropriate, you will not be charged for this service.\"    Patient has given verbal consent for Video visit? Yes  How would you like to obtain your AVS? MyChart  If you are dropped from the video visit, the video invite should be resent to: Text to cell phone: [phone]  Will anyone else be joining your video visit? No      Video-Visit Details    Type of service:  Video Visit    Video Start Time: 1pm  Video End Time: 1:30pm    Originating Location (pt. Location): Home    Distant Location (provider location):  [facility]     Platform used for Video Visit: [email or web address] consult for high clinical risk for obstructive sleep apnea and need for sleep clearance prior to bariatric surgery.    49-year-old female with past medical history of type 2 diabetes, obesity (BMI of 50.8).    Overall, her primary sleep concern today is socially disruptive snoring.  She denies any daytime fatigue or sleepiness.  She denies any difficulty falling asleep, but does have frequent awakenings at nighttime to urinate.    She reports very loud disruptive snoring, this " has been present since childhood but appears to have worsened that she has gained weight.  She does report observed apnea and choking/gasping arousals.  Family history of sleep apnea and a few cousins, but no direct family members.    Prior to social distancing, sleep pattern was approximately 10 PM-6 AM.  Currently in bed around 1 AM falls asleep quickly.  Will awaken 3-5 times, usually to use the bathroom/urinate, she is somewhat vague if she has difficulty returning to sleep and at times may watch TV or play games on her phone until she returns to sleep.  She will awaken naturally between 9-9:30 AM.  Denies any daytime napping.    Negative narcolepsy triad.  Denies any sleepwalking or other abnormal nocturnal behaviors.    10 point ROS of systems including Constitutional, Eyes, Respiratory, Cardiovascular, Gastroenterology, Genitourinary, Integumentary, Muscularskeletal, Psychiatric were all negative except for pertinent positives noted in my HPI.    Physical Exam  Constitutional:       General: She is not in acute distress.     Appearance: Normal appearance. She is obese. She is not ill-appearing, toxic-appearing or diaphoretic.   HENT:      Head: Normocephalic and atraumatic.      Right Ear: External ear normal.      Left Ear: External ear normal.      Nose: Nose normal.   Eyes:      Conjunctiva/sclera: Conjunctivae normal.   Pulmonary:      Effort: Pulmonary effort is normal. No respiratory distress.   Skin:     Coloration: Skin is not jaundiced or pale.      Findings: No bruising or erythema.   Neurological:      General: No focal deficit present.      Mental Status: She is alert and oriented to person, place, and time.   Psychiatric:         Mood and Affect: Mood normal.         Behavior: Behavior normal.         Thought Content: Thought content normal.         Judgment: Judgment normal.       Assessment/plan:    - High pretest probability for obstructive sleep apnea with stop bang score of 3+  - Increased  risk for hypoventilation with BMI greater than 50    We discussed obstructive sleep apnea and its potential long-term health concerns, and need for clearance from a bariatric surgery standpoint.  Given BMI greater than 50, not felt to be a good candidate for home sleep testing.  Plan to proceed with PSG with TCM and prestudy venous blood gas.  We also reviewed that she will require coronavirus testing within 72 hours before her sleep study.      Chip Nieves MD, MD

## 2020-07-20 ENCOUNTER — TELEPHONE (OUTPATIENT)
Dept: SURGERY | Facility: CLINIC | Age: 50
End: 2020-07-20

## 2020-07-20 ENCOUNTER — VIRTUAL VISIT (OUTPATIENT)
Dept: SLEEP MEDICINE | Facility: CLINIC | Age: 50
End: 2020-07-20
Payer: COMMERCIAL

## 2020-07-20 DIAGNOSIS — R06.81 APNEA: ICD-10-CM

## 2020-07-20 DIAGNOSIS — E66.813 CLASS 3 SEVERE OBESITY DUE TO EXCESS CALORIES WITH BODY MASS INDEX (BMI) OF 50.0 TO 59.9 IN ADULT, UNSPECIFIED WHETHER SERIOUS COMORBIDITY PRESENT (H): ICD-10-CM

## 2020-07-20 DIAGNOSIS — R06.83 SNORING: Primary | ICD-10-CM

## 2020-07-20 DIAGNOSIS — E66.01 CLASS 3 SEVERE OBESITY DUE TO EXCESS CALORIES WITH BODY MASS INDEX (BMI) OF 50.0 TO 59.9 IN ADULT, UNSPECIFIED WHETHER SERIOUS COMORBIDITY PRESENT (H): ICD-10-CM

## 2020-07-20 PROCEDURE — 99204 OFFICE O/P NEW MOD 45 MIN: CPT | Mod: GT | Performed by: FAMILY MEDICINE

## 2020-07-20 NOTE — TELEPHONE ENCOUNTER
Called [employer] to see if patient's policy has coverage for bariatric surgery, it does.     Called patient to discuss [employer] phone call criteria prior to bariatric surgery. Per patient the best time for [employer] to call her is before 9:15 a.m.     [employer] phone referral submitted 7/20/20.

## 2020-07-30 ENCOUNTER — APPOINTMENT (OUTPATIENT)
Dept: CT IMAGING | Facility: CLINIC | Age: 50
End: 2020-07-30
Attending: PHYSICIAN ASSISTANT
Payer: COMMERCIAL

## 2020-07-30 ENCOUNTER — HOSPITAL ENCOUNTER (EMERGENCY)
Facility: CLINIC | Age: 50
Discharge: HOME | End: 2020-07-30
Attending: PHYSICIAN ASSISTANT | Admitting: PHYSICIAN ASSISTANT
Payer: COMMERCIAL

## 2020-07-30 ENCOUNTER — CARE COORDINATION (OUTPATIENT)
Dept: ENDOCRINOLOGY | Facility: CLINIC | Age: 50
End: 2020-07-30

## 2020-07-30 VITALS
OXYGEN SATURATION: 99 % | HEIGHT: 64 IN | SYSTOLIC BLOOD PRESSURE: 128 MMHG | WEIGHT: 293 LBS | DIASTOLIC BLOOD PRESSURE: 84 MMHG | BODY MASS INDEX: 50.02 KG/M2 | RESPIRATION RATE: 16 BRPM | TEMPERATURE: 96.4 F

## 2020-07-30 DIAGNOSIS — H53.8 BLURRED VISION, BILATERAL: ICD-10-CM

## 2020-07-30 DIAGNOSIS — R42 LIGHTHEADEDNESS: ICD-10-CM

## 2020-07-30 DIAGNOSIS — M62.81 GENERALIZED MUSCLE WEAKNESS: ICD-10-CM

## 2020-07-30 DIAGNOSIS — T50.905A ADVERSE EFFECT OF DRUG, INITIAL ENCOUNTER: ICD-10-CM

## 2020-07-30 LAB
ALBUMIN SERPL-MCNC: 3.2 G/DL (ref 3.4–5)
ALP SERPL-CCNC: 64 U/L (ref 40–150)
ALT SERPL W P-5'-P-CCNC: 22 U/L (ref 0–50)
ANION GAP SERPL CALCULATED.3IONS-SCNC: 3 MMOL/L (ref 3–14)
AST SERPL W P-5'-P-CCNC: 19 U/L (ref 0–45)
BASOPHILS # BLD AUTO: 0 10E9/L (ref 0–0.2)
BASOPHILS NFR BLD AUTO: 0.2 %
BILIRUB SERPL-MCNC: 0.2 MG/DL (ref 0.2–1.3)
BUN SERPL-MCNC: 11 MG/DL (ref 7–30)
CALCIUM SERPL-MCNC: 8.2 MG/DL (ref 8.5–10.1)
CHLORIDE SERPL-SCNC: 108 MMOL/L (ref 94–109)
CO2 SERPL-SCNC: 26 MMOL/L (ref 20–32)
CREAT SERPL-MCNC: 0.84 MG/DL (ref 0.52–1.04)
DIFFERENTIAL METHOD BLD: NORMAL
EOSINOPHIL # BLD AUTO: 0.1 10E9/L (ref 0–0.7)
EOSINOPHIL NFR BLD AUTO: 1.7 %
ERYTHROCYTE [DISTWIDTH] IN BLOOD BY AUTOMATED COUNT: 14.5 % (ref 10–15)
GFR SERPL CREATININE-BSD FRML MDRD: 81 ML/MIN/{1.73_M2}
GLUCOSE BLDC GLUCOMTR-MCNC: 133 MG/DL (ref 70–99)
GLUCOSE SERPL-MCNC: 158 MG/DL (ref 70–99)
HCT VFR BLD AUTO: 38.1 % (ref 35–47)
HGB BLD-MCNC: 12.4 G/DL (ref 11.7–15.7)
IMM GRANULOCYTES # BLD: 0 10E9/L (ref 0–0.4)
IMM GRANULOCYTES NFR BLD: 0.2 %
INTERPRETATION ECG - MUSE: NORMAL
LYMPHOCYTES # BLD AUTO: 1.9 10E9/L (ref 0.8–5.3)
LYMPHOCYTES NFR BLD AUTO: 30.3 %
MCH RBC QN AUTO: 28.1 PG (ref 26.5–33)
MCHC RBC AUTO-ENTMCNC: 32.5 G/DL (ref 31.5–36.5)
MCV RBC AUTO: 86 FL (ref 78–100)
MONOCYTES # BLD AUTO: 0.3 10E9/L (ref 0–1.3)
MONOCYTES NFR BLD AUTO: 4.8 %
NEUTROPHILS # BLD AUTO: 4 10E9/L (ref 1.6–8.3)
NEUTROPHILS NFR BLD AUTO: 62.8 %
NRBC # BLD AUTO: 0 10*3/UL
NRBC BLD AUTO-RTO: 0 /100
PLATELET # BLD AUTO: 282 10E9/L (ref 150–450)
POTASSIUM SERPL-SCNC: 4.1 MMOL/L (ref 3.4–5.3)
PROT SERPL-MCNC: 7.4 G/DL (ref 6.8–8.8)
RBC # BLD AUTO: 4.42 10E12/L (ref 3.8–5.2)
SODIUM SERPL-SCNC: 137 MMOL/L (ref 133–144)
TROPONIN I SERPL-MCNC: <0.015 UG/L (ref 0–0.04)
TSH SERPL DL<=0.005 MIU/L-ACNC: 0.95 MU/L (ref 0.4–4)
WBC # BLD AUTO: 6.3 10E9/L (ref 4–11)

## 2020-07-30 PROCEDURE — 25000132 ZZH RX MED GY IP 250 OP 250 PS 637: Performed by: PHYSICIAN ASSISTANT

## 2020-07-30 PROCEDURE — 85025 COMPLETE CBC W/AUTO DIFF WBC: CPT | Performed by: EMERGENCY MEDICINE

## 2020-07-30 PROCEDURE — 99285 EMERGENCY DEPT VISIT HI MDM: CPT | Mod: 25

## 2020-07-30 PROCEDURE — 84443 ASSAY THYROID STIM HORMONE: CPT | Performed by: PHYSICIAN ASSISTANT

## 2020-07-30 PROCEDURE — 25800030 ZZH RX IP 258 OP 636: Performed by: PHYSICIAN ASSISTANT

## 2020-07-30 PROCEDURE — 93005 ELECTROCARDIOGRAM TRACING: CPT

## 2020-07-30 PROCEDURE — 70450 CT HEAD/BRAIN W/O DYE: CPT

## 2020-07-30 PROCEDURE — 96360 HYDRATION IV INFUSION INIT: CPT

## 2020-07-30 PROCEDURE — 25000125 ZZHC RX 250: Performed by: PHYSICIAN ASSISTANT

## 2020-07-30 PROCEDURE — 84484 ASSAY OF TROPONIN QUANT: CPT | Performed by: EMERGENCY MEDICINE

## 2020-07-30 PROCEDURE — 00000146 ZZHCL STATISTIC GLUCOSE BY METER IP

## 2020-07-30 PROCEDURE — 96361 HYDRATE IV INFUSION ADD-ON: CPT

## 2020-07-30 PROCEDURE — 80053 COMPREHEN METABOLIC PANEL: CPT | Performed by: EMERGENCY MEDICINE

## 2020-07-30 PROCEDURE — 84443 ASSAY THYROID STIM HORMONE: CPT | Performed by: EMERGENCY MEDICINE

## 2020-07-30 RX ORDER — MECLIZINE HYDROCHLORIDE 25 MG/1
25 TABLET ORAL ONCE
Status: COMPLETED | OUTPATIENT
Start: 2020-07-30 | End: 2020-07-30

## 2020-07-30 RX ORDER — ONDANSETRON 4 MG/1
4 TABLET, ORALLY DISINTEGRATING ORAL EVERY 8 HOURS PRN
Qty: 10 TABLET | Refills: 0 | Status: SHIPPED | OUTPATIENT
Start: 2020-07-30 | End: 2021-10-18

## 2020-07-30 RX ORDER — MECLIZINE HYDROCHLORIDE 25 MG/1
25 TABLET ORAL 3 TIMES DAILY PRN
Qty: 15 TABLET | Refills: 1 | Status: SHIPPED | OUTPATIENT
Start: 2020-07-30 | End: 2021-10-18

## 2020-07-30 RX ADMIN — MECLIZINE HYDROCHLORIDE 25 MG: 25 TABLET ORAL at 12:00

## 2020-07-30 RX ADMIN — FLUORESCEIN SODIUM 600 MCG: 0.6 STRIP OPHTHALMIC at 13:12

## 2020-07-30 RX ADMIN — SODIUM CHLORIDE 1000 ML: 9 INJECTION, SOLUTION INTRAVENOUS at 11:35

## 2020-07-30 ASSESSMENT — ENCOUNTER SYMPTOMS
HEADACHES: 1
CHILLS: 0
FEVER: 0
NUMBNESS: 1
NECK PAIN: 0
SHORTNESS OF BREATH: 0
MYALGIAS: 0
DYSURIA: 0
CONSTIPATION: 1
HEMATURIA: 0
FREQUENCY: 1
DIFFICULTY URINATING: 0
LIGHT-HEADEDNESS: 1

## 2020-07-30 ASSESSMENT — MIFFLIN-ST. JEOR: SCORE: 1957.65

## 2020-07-30 NOTE — ED AVS SNAPSHOT
Emergency Department  [address] [facility] [address]  Phone:  [phone]  Fax:  [phone]                                    Yancy Hoover   MRN: [id number]    Department:   Emergency Department   Date of Visit:  7/30/2020           After Visit Summary Signature Page    I have received my discharge instructions, and my questions have been answered. I have discussed any challenges I see with this plan with the nurse or doctor.    ..........................................................................................................................................  Patient/Patient Representative Signature      ..........................................................................................................................................  Patient Representative Print Name and Relationship to Patient    ..................................................               ................................................  Date                                   Time    ..........................................................................................................................................  Reviewed by Signature/Title    ...................................................              ..............................................  Date                                               Time          22EPIC Rev 08/18

## 2020-07-30 NOTE — ED PROVIDER NOTES
History   Chief Complaint  Generalized Weakness    The history is provided by the patient.      Yancy Hoover is a 49 year old female with a history of diabetes on metformin who presents for evaluation of two days of lightheadedness as well as blurred vision and tingling in her fingers and toes. No falls or head trauma though. She has some watering from her eyes, but no burning. She does not wear contacts or glasses. She endorses having a headache, but this is not a new symptom for her. She does not have any urinary symptoms outside of frequency. Yancy also endorses some abdominal discomfort and constipation. However, she was diagnosed with diabetes about a month ago and started on Metformin, which she endorses taking with meals. She has not been checking her blood sugar. She also had some right arm pain when coming in, that has since resolved, but denies chest pain and shortness of breath. She also denies fevers, chills, neck pain, and body aches. No known exposure to COVID.     Allergies  Gadolinium Derivatives    Medications  Metformin   Topamax     Past Medical History  Ovarian cyst     Past Surgical History   section  Laparoscopic cystectomy ovarian     Family History  Sister - migraines     Social History  The patient was unaccompanied to the ED.  Smoking Status: former smoker  Smokeless Tobacco: never  Alcohol Use: not currently   Drug Use: never    Review of Systems   Constitutional: Negative for chills and fever.   Eyes: Positive for visual disturbance.   Respiratory: Negative for shortness of breath.    Cardiovascular: Negative for chest pain.   Gastrointestinal: Positive for constipation.   Genitourinary: Positive for frequency. Negative for decreased urine volume, difficulty urinating, dysuria and hematuria.   Musculoskeletal: Negative for myalgias and neck pain.   Neurological: Positive for light-headedness, numbness and headaches.   All other systems reviewed and are  "negative.    Physical Exam     Physical Exam    Patient Vitals for the past 24 hrs:   BP Temp Temp src Heart Rate Resp SpO2 Height Weight   20 1040 128/84 96.4  F (35.8  C) Temporal 104 16 99 % 1.626 m (5' 4\") 134.3 kg (296 lb)     General: Alert and interactive. Appears well. Cooperative and pleasant.     Eyes: The pupils are equal and round. EOMs intact. No scleral icterus.  Wood's Lamp: No focal uptake to suggest corneal abrasions, corneal ulcer, herpes ophthalmicus.   ENT: No abnormalities to the external nose or ears. Mucous membranes moist. Posterior oropharynx is non-erythematous.      Neck: Trachea is in the midline. No nuchal rigidity.     CV: Regular rate and rhythm. S1 and S2 normal without murmur, click, gallop or rub.   Resp: Breath sounds are clear bilaterally, without rhonchi, wheezes, rales. Non-labored, no retractions or accessory muscle use.     GI: Abdomen is soft without distension. No tenderness to palpation. No peritoneal signs.    MS: Moving all extremities well. Good muscle tone.   Skin: Warm and dry. No rash or lesions noted.  Neuro:  Speech is normal and fluent.   Face is symmetric without droop. CNs II-XII intact. Negative pronator drift. Finger to nose intact. Heel to shin intact.   Right Arm: Good  strength. 5/5 elbow flexion. 5/5 elbow extension. Sensation intact to light touch.   Left Arm: Good  strength. 5/5 elbow flexion. 5/5 elbow extension. Sensation intact to light touch.   Right Le/5 straight leg raise, 5/5 knee flexion, 5/5 knee extension, 5/5 dorsiflexion, 5/5 plantar flexion. Sensation intact to light touch.   Left Le/5 straight leg raise, 5/5 knee flexion, 5/5 knee extension, 5/5 dorsiflexion, 5/5 plantar flexion. Sensation intact to light touch. Gait: Normal.    Psych: Awake. Alert.  Normal affect. Appropriate interactions.  Lymph: No anterior or posterior cervical lymphadenopathy noted.    Emergency Department Course   EKG  Indication: " lightheaded  Time: 1137  Rate 94 bpm. IN interval 170. QRS duration 82. QT/QTc 370/462. P-R-T axes 66 -15 44.   Normal sinus rhythm  Nonspecific T wave abnormality    Read time: 1140  Read by Dr. Jacobs    Imaging:  Radiology findings were communicated with the patient who voiced understanding of the findings.    Head CT w/o contrast:  IMPRESSION: Normal CT scan of the head.  Report per radiology     Laboratory:  Laboratory findings were communicated with the patient who voiced understanding of the findings.    TSH with free T4 reflex: 0.95  Troponin (Collected 1046): <0.015  CMP: glucose 158 (H), calcium 8.2 (L), albumin 3.2 (L) o/w WNL (Creatinine 0.84)  CBC: AWNL (WBC 6.3, HGB 12.4, )    Glucose by Meter (Collected 1109): 133 (H)    Interventions:  1135 NS 1L IV Bolus  1200 Meclizine 25 mg PO    Emergency Department Course:  Past medical records, nursing notes, and vitals reviewed.    1118 I physically examined the patient as documented above.    EKG obtained in the ED, see results above.     IV was inserted and blood was drawn for laboratory testing, results above.    The patient was sent for radiographs while in the emergency department, results above.     1215 I rechecked the patient and discussed the findings of their workup thus far.    1300 I performed a wood's lamp exam.      Findings and plan explained to the Patient. Patient discharged home with instructions regarding supportive care, medications, and reasons to return. The importance of close follow-up was reviewed. The patient was prescribed Meclizine and Zofran.     I personally reviewed the laboratory and imaging results with the Patient and answered all related questions prior to discharge.     Impression & Plan   Medical Decision Making:  Yancy Hoover is a 49 year old female with a history of diabetes, recently started on Metformin per primary care, who presents for evaluation of generalized lightheadedness, intermittent blurry vision,  and generalized muscle weakness. Her work up here has been negative and reassuring. I considered a broad differential here for her symptoms, including CVA, tumor, intracranial hemorrhage, anemia, infectious process, metabolic abnormality and many others. Hemoglobin is normal here, there is no leukocytosis or electrolyte abnormalities. Her glucose is 158. EKG is not ischemic and her troponin is negative. The patient has no chest pain or shortness of breath to suggest PE or cardiopulmonary etiology. Thyroid function is within the normal range. I obtained a CT of her head given her lightheadedness, intermittent dizziness, intermittent blurry vision. CT reveal no signs of hemorrhage, mass, acute infarct, or anomaly. Eyes appear normal on wood's lamp exam without any scratches or obvious abnormalities. No pain, tearing, redness to suggest keratitis, glaucoma. She was given a liter of fluid here and her symptoms completely resolved. I suspect a large portion of this is dehydration, possibly a result of Metformin-induced diarrhea.Neurologic exam is completely normal, and I am highly doubtful of further cerebrovascular process. I suggested using Zofran and Meclizine for her symptoms and talking to her doctor promptly about alternatives to Metformin. She will return her immediately for weakness in her arms or legs, difficulty speaking, loss of vision, or other worrisome concerns. If any blurry vision persists, she may need to follow up with ophthalmologist.     Diagnosis:    ICD-10-CM    1. Generalized muscle weakness  M62.81    2. Adverse effect of drug, initial encounter  T50.905A     Metformin   3. Blurred vision, bilateral  H53.8    4. Lightheadedness  R42      Disposition:  Discharged to home.    Discharge Medications:  New Prescriptions    MECLIZINE (ANTIVERT) 25 MG TABLET    Take 1 tablet (25 mg) by mouth 3 times daily as needed for dizziness    ONDANSETRON (ZOFRAN ODT) 4 MG ODT TAB    Take 1 tablet (4 mg) by mouth  every 8 hours as needed for nausea       Scribe Disclosure:  I, Zulema Wheeler, am serving as a scribe at 11:03 AM on 7/30/2020 to document services personally performed by Alva Dominguez PA-C based on my observations and the provider's statements to me.      Alva Dominguez PA-C  07/30/20 2010

## 2020-07-30 NOTE — PROGRESS NOTES
Bariatric Task List Updated.  Patient information and clearance letters sent to patient via [employer].  Bariatric Task List  Status:  Is patient a candidate for bariatric surgery?:  patient is a candidate for bariatric surgery -     Cleared to schedule surgeon consult?:    -     Status:    -     Surgeon: Dr. Pena -     Tentative surgery month/year: October 2020 -        Insurance: Insurance:  [employer] -     [employer]: PCP Recommendation and Medical Clearance:    -     HP Referral:  Needed -     Other:    -        Patient Info: Initial Weight:  296 -     Date of Initial Weight/Height:  7/16/2020 -     Goal Weight (lbs):  286 -     Required Weight Loss:  10 -     Surgery Type:  sleeve gastrectomy -     Multidisciplinary Meeting:    -        Dietician Visits: Structured weight loss required by insurance?:  structured weight loss required -     Dietician Visit 1:  Needed -     Dietician Visit 2:  Needed -     Dietician Visit 3:  Needed -     Dietician Visit 4:    -     Dietician Visit 5:    -     Dietician Visit 6:    -     Dietician Visit additional:    -     Clearance from dietician to see surgeon?:    -     Dietician Notes:    -        Psychological Evaluation: Psych eval:  Needed - List and letter sent 07/30/20 -AS   Therapist letter of support:    -     Psychiatrist letter of support:    -     Establish care with therapist:    -     Complete eating disorder evaluation:    -     Letter of clearance from therapist/eating disorder program:    -     Other:    -        Lab Work: Complete Blood Count:  Completed - 7/30/20 -AS   Comprehensive Metabolic Panel:  Completed - 7/30/20 -AS   Vitamin D:  Needed - Ordered 7/16/20 -AS   Hgb A1c:  Needed - Ordered 7/16/20 -AS   PTH:  Needed - Ordered 7/16/20 -AS   H. pylori:    -     TSH:  Completed - 10/22/19; 7/30/20   Nicotine Testing:  Needed - Quit Date 5/1/2020; Ordered 7/16/20 -AS   Other:    -        Consults/ Clearance: Sleep Medicine:  Needed - Letter sent 07/30/20 -AS  "  Cardiac:    -     Pain:   -     Dental:    -     Endocrine:    -     Gastroenterology:    -     Vascular Medicine:    -     Hematology:    -     Medical Weight Management:   -     Physical Therapy/Exercise:    -     Nephrology:    -     Neurology:    -     Pulmonology:    -     Rheumatology:    -     Other    -     Other    -     Other    -           Testing: UGI:    -     EGD:    -     Other:    -        PCP: Establish care with PCP:  Completed -     Follow up with PCP:    -     PCP letter of support:  Needed - Letter sent 07/30/20 -AS      Smoking: Quit tobacco use (3 months smoke free)?:    -     Quit date:    -        Patient Education:  Information Session:  Completed -     Given \"Making your decision\" handout?:  Given \"\"Making your decision\"\" handout? - 07/30/20 -AS   Given support group information?:  support group contact information provided - 07/30/20 -AS   Attended support group?:    -     Support plan in place?:  Completed -     Research consents signed?:    -        Additional Surgery Requirements: Review Coag plan:    -     HgA1c <8:  Needed -     Inpatient pain consult:    -     Final nicotine screen:    -     Dental work complete:    -     Birth control plan:    -     Other Requirements:    -     Other Requirements:    -        Final Tasks:  Before surgery online class:  Needed -     Before surgery online class website link:  [email or web address]   After surgery online class:  Needed -     After surgery online class website link:  [email or web address]   Nurse visit for weigh-in and information:  Needed -     Pre-assessment clinic visit with anesthesia team for H&P:  Needed -     Final labs (Hgb, plt, T&S, UA):  Needed -        Notes:   -           "

## 2020-07-30 NOTE — DISCHARGE INSTRUCTIONS
Schedule an appointment with your primary care to discuss metformin.  Use Zofran as needed for nausea.  Use meclizine if you feel dizzy.  Make sure you are drinking appropriate amounts of water.  Return immediately for loss of vision, weakness in your arms or legs, difficulty speaking, other neurologic deficits.  See an eye doctor if any of your eye blurry symptoms persist.  Discharge Instructions  Dizziness (Lightheaded)  Today you were seen for dizziness.  Dizziness can be caused by many things and it can be very difficult to determine the cause of dizziness.  At this time, your provider has found no signs that your dizziness is due to a serious or life-threatening condition. However, sometimes there is a serious problem that does not show up right away, and it is important for you to follow up with your regular provider as instructed.  Generally, every Emergency Department visit should have a follow-up clinic visit with either a primary or a specialty clinic/provider. Please follow-up as instructed by your emergency provider today.      Return to the Emergency Department if:    You pass out (fainting or falling out), especially during exercise.    You develop chest pain, chest pressure or difficulty breathing.  Your feel an irregular heartbeat.  You have excessive vaginal bleeding, or blood in your stool or vomit (throw up).  You have a high fever.  Your symptoms get worse or more frequent.    If when you begin to feel dizzy or lightheaded, it is important to sit down or lay down immediately to prevent injury from falling.  If you were given a prescription for medicine here today, be sure to read all of the information (including the package insert) that comes with your prescription.  This will include important information about the medicine, its side effects, and any warnings that you need to know about.  The pharmacist who fills the prescription can provide more information and answer questions you may have  about the medicine.  If you have questions or concerns that the pharmacist cannot address, please call or return to the Emergency Department.   Remember that you can always come back to the Emergency Department if you are not able to see your regular provider in the amount of time listed above, if you get any new symptoms, or if there is anything that worries you.

## 2020-08-02 NOTE — PROGRESS NOTES
MT ENCOUNTER  SUBJECTIVE/OBJECTIVE:                           Yancy Hoover is a 49 year old female called for an initial visit. She was referred to me from Jeannie Noe CNP.      Patient consented to a telehealth visit: yes  Telemedicine Visit Details  Type of service:  Telephone visit  Start Time: 8:31 AM - Patient requested call back in 5 minutes from this. Called back at that time (8:37 AM) and completed visit. Visit was cut short as patient had to go back to work (was on break).   End Time: 9:05 AM  Originating Location (pt. Location): Home  Distant Location (provider location):  [facility]  Mode of Communication:  Telephone    Chief Complaint: Metformin - diarrhea, upset stomach.    Allergies/ADRs: Reviewed in EHR  Tobacco: She quit smoking at end of May 2020. She reports that it has been a struggle not to smoke at times. She would get headaches when smoking.   Alcohol: not currently using  Caffeine: 0-2 sodas/week, used to be a heavy soda drinker before finding out her diagnosis of T2DM.   Activity: see below   PMH: Reviewed in EHR. No hx heart attack or stroke. No Hx HTN.     Medication Adherence/Access:   Patient takes medications directly from bottles.  Patient takes medications 2 time(s) per day.   Per patient, misses medication 0 times per week.     Obesity:   Topiramate 75 mg once daily in evening.     Followed by Jeannie Noe NP, seen 7/16/2020 for New Bariatric Consult. Was also previously seen by Dr. Velarde, in [facility] on 7/3/2020. Initial bariatric labs ordered, not completed yet. History of ED visit 7/30, complained of blurred vision, tingling in fingers/toes, and lightheadedness - was given fluids and ondansetron and meclizine for dizziness and improved. Never used meclizine or ondansetron. She reports she had increased topiramate 75 mg daily on 7/27. She does have tingling in hands/feet sporadically, but now not bothersome. Reports blurred vision is  "ongoing because glasses are broken and Eye doctor isn't open. Had greater issues of blurred vision that day at ED, No eye pain but this is now resolved. No lightheadedness or dizziness since ED visit.    Weight History: overweight as child, gained weight with pregnancy. Since pregnancy, hasn't been able to get lower than 216 before rebounding. Went through surgical process 10 years ago but wasn't able to complete due to losing insurance. Referred to sleep clinic due to snoring and stops breathing at night.   Diet/Eating Habits: Patient reports history of drinking a lot of caloric beverages, especially soda (would drink one 12 pack/day), decreased soda since T2DM diagnosis, but still drinks, this last week hasn't drank any soda.    Exercise/Activity: did not have time to discuss today    Initial Consult Weight 7/16/2020: 296 lb    Wt Readings from Last 4 Encounters:   07/30/20 296 lb (134.3 kg)   07/16/20 296 lb (134.3 kg)   07/03/20 295 lb (133.8 kg)   06/07/20 280 lb (127 kg)     Estimated body mass index is 50.81 kg/m  as calculated from the following:    Height as of 7/30/20: 5' 4\" (1.626 m).    Weight as of 7/30/20: 296 lb (134.3 kg).    BP Readings from Last 3 Encounters:   07/30/20 128/84   06/07/20 (!) 143/74   06/05/20 124/80     Potassium   Date Value Ref Range Status   07/30/2020 4.1 3.4 - 5.3 mmol/L Final     Comment:     Specimen slightly hemolyzed, potassium may be falsely elevated       Type 2 Diabetes:    Metformin  mg twice daily (breakfast and dinner) - recent start ~ 1 month ago    Recent diabetes diagnosis. Pt is experiencing the following side effects: GI upset, nausea and diarrhea. Not every day, but comes on suddenly. She reports that when she went to Emergency Room, providers there thought it was dehydration associated with metformin induced diarrhea.   SMBG: never.   Symptoms of low blood sugar? none  Symptoms of high blood sugar? none  Eye exam: due  Foot exam: due  Diet/Exercise: see " above  Aspirin: Not taking   Statin: No   ACEi/ARB: No.   Urine Albumin: No results found for: UMALCR     Creatinine   Date Value Ref Range Status   07/30/2020 0.84 0.52 - 1.04 mg/dL Final     GFR Estimate   Date Value Ref Range Status   07/30/2020 81 >60 mL/min/[1.73_m2] Final     Comment:     Non  GFR Calc  Starting 12/18/2018, serum creatinine based estimated GFR (eGFR) will be   calculated using the Chronic Kidney Disease Epidemiology Collaboration   (CKD-EPI) equation.       GFR Estimate If Black   Date Value Ref Range Status   07/30/2020 >90 >60 mL/min/[1.73_m2] Final     Comment:      GFR Calc  Starting 12/18/2018, serum creatinine based estimated GFR (eGFR) will be   calculated using the Chronic Kidney Disease Epidemiology Collaboration   (CKD-EPI) equation.       No A1c on file or in CareEverywhere - patient reports that A1c was 7% at beginning of July 2020.     ASSESSMENT:                            Medication Adherence: good, no issues identified    Obesity: Stable. Patient would benefit from continuing topiramate for now as potential side effects of topiramate (blurred vision, tingling, dizziness/lightheadedness) are resolved or much improved and patient wishes to continue.     Type 2 Diabetes: Needs improvement. Patient would benefit from changing metformin from IR to ER to reduce GI side effects. Discussed current issues of some metformin ER products and NMDA content and that not all ER metformin products have been found to contain this chemical, product she would get from pharmacy has not been recalled. Would benefit from BMP and A1c repeat in future, ~ 2 months. Did not have times to discuss statin, so will hold off until next visit. Due to patient not having other CV risk factors discussed, can hold off on low dose aspirin use for now.    PLAN:                            1. Continue topiramate 75 mg daily in evening.     2. Switch Metformin from IR to ER - new  prescription sent to pharmacy. Patient can take metformin  mg tablet: 1 tablet twice daily with meals or 2 tablets once daily with meals.     3. Important to drink at least 64 oz water daily    4. Continue to refrain from soda intake.     5. Continue to work on nutrition goals set with dietitian.     6. Labs to be completed in 2 months (A1c, BMP, Urine albumin). Nurse working on getting access to previous labs taken.     Future: statin     I spent 28 minutes with this patient today. All changes were made via collaborative practice agreement with Dr. Velarde. A copy of the visit note was provided to the patient's referring provider.    Will follow up in 1 month.    The patient was sent via [employer] a summary of these recommendations.     Lauren Bloch, PharmD  Medication Therapy Management Pharmacist   [facility]   Phone: [phone]

## 2020-08-03 ENCOUNTER — ALLIED HEALTH/NURSE VISIT (OUTPATIENT)
Dept: PHARMACY | Facility: CLINIC | Age: 50
End: 2020-08-03
Payer: COMMERCIAL

## 2020-08-03 DIAGNOSIS — E66.813 CLASS 3 SEVERE OBESITY DUE TO EXCESS CALORIES WITH SERIOUS COMORBIDITY AND BODY MASS INDEX (BMI) OF 50.0 TO 59.9 IN ADULT (H): ICD-10-CM

## 2020-08-03 DIAGNOSIS — E66.01 CLASS 3 SEVERE OBESITY DUE TO EXCESS CALORIES WITH SERIOUS COMORBIDITY AND BODY MASS INDEX (BMI) OF 50.0 TO 59.9 IN ADULT (H): ICD-10-CM

## 2020-08-03 DIAGNOSIS — E11.9 TYPE 2 DIABETES MELLITUS WITHOUT COMPLICATION, WITHOUT LONG-TERM CURRENT USE OF INSULIN (H): Primary | ICD-10-CM

## 2020-08-03 PROCEDURE — 99607 MTMS BY PHARM ADDL 15 MIN: CPT | Performed by: PHARMACIST

## 2020-08-03 PROCEDURE — 99605 MTMS BY PHARM NP 15 MIN: CPT | Performed by: PHARMACIST

## 2020-08-03 RX ORDER — METFORMIN HCL 500 MG
500 TABLET, EXTENDED RELEASE 24 HR ORAL 2 TIMES DAILY WITH MEALS
Qty: 180 TABLET | Refills: 1 | Status: SHIPPED | OUTPATIENT
Start: 2020-08-03 | End: 2021-02-07

## 2020-08-03 NOTE — Clinical Note
We had to do an abbreviated visit but in that time her side effects from increasing topiramate have resolved. I switched metformin from IR to ER due to continued GI effects with IR. She is to ensure she is staying adequately hydrated. She is going to continue to work with dietitian with those goals set. I am following up with her in 1 month. Zulma DE JESUS

## 2020-08-18 ENCOUNTER — VIRTUAL VISIT (OUTPATIENT)
Dept: SURGERY | Facility: CLINIC | Age: 50
End: 2020-08-18
Payer: COMMERCIAL

## 2020-08-18 DIAGNOSIS — Z71.3 NUTRITIONAL COUNSELING: Primary | ICD-10-CM

## 2020-08-18 DIAGNOSIS — E66.813 CLASS 3 SEVERE OBESITY DUE TO EXCESS CALORIES WITH SERIOUS COMORBIDITY AND BODY MASS INDEX (BMI) OF 50.0 TO 59.9 IN ADULT (H): ICD-10-CM

## 2020-08-18 DIAGNOSIS — E66.01 CLASS 3 SEVERE OBESITY DUE TO EXCESS CALORIES WITH SERIOUS COMORBIDITY AND BODY MASS INDEX (BMI) OF 50.0 TO 59.9 IN ADULT (H): ICD-10-CM

## 2020-08-18 NOTE — PROGRESS NOTES
"Yancy Hoover is a 49 year old female who is being evaluated via a billable video visit.      The patient has been notified of following:     \"This video visit will be conducted via a call between you and your physician/provider. We have found that certain health care needs can be provided without the need for an in-person physical exam.  This service lets us provide the care you need with a video conversation.  If a prescription is necessary we can send it directly to your pharmacy.  If lab work is needed we can place an order for that and you can then stop by our lab to have the test done at a later time.    Video visits are billed at different rates depending on your insurance coverage.  Please reach out to your insurance provider with any questions.    If during the course of the call the physician/provider feels a video visit is not appropriate, you will not be charged for this service.\"    Patient has given verbal consent for Video visit? Yes  How would you like to obtain your AVS? MyChart  If you are dropped from the video visit, the video invite should be resent to: Text to cell phone: [phone]  Will anyone else be joining your video visit? No        Video-Visit Details    Type of service:  Video Visit    Video Start Time: 7:00  Video End Time: 7:28   Time spent with patient: 28 minutes.    Originating Location (pt. Location): Home    Distant Location (provider location):  [employer] [facility]     Platform used for Video Visit: Wellkeeper     During this virtual visit the patient is located in [place], patient verifies this as the location during the entirety of this visit.       Bariatric Nutrition Consultation Note    Reason For Visit: Nutrition Assessment    Yancy Hoover is a 49 year old presenting today for return bariatric nutrition consult. Pt is interested in laparoscopic sleeve gastrectomy with Dr. Alcaraz expected surgery in Oct 2020.  Patient is accompanied by self.  " "This is pt's 2nd of 3 required nutrition visits prior to surgery.     Pt referred by Jeannie Noe NP on July 16, 2020.  Patient with Co-morbidities of obesity including:  Type II DM yes (diagnosed 1 month ago)  Renal Failure no  Sleep apnea no  Hypertension no   Dyslipidemia yes  Joint pain no  Back pain no  GERD no     Support System Reviewed With Patient 7/16/2020   Who do you have in your support network that can be available to help you for the first 2 weeks after surgery? Yes   Who can you count on for support throughout your weight loss surgery journey? Yes       ANTHROPOMETRICS:  Estimated body mass index is 50.81 kg/m  as calculated from the following:    Height as of an earlier encounter on 7/16/20: 1.626 m (5' 4\").    Weight as of an earlier encounter on 7/16/20: 134.3 kg (296 lb).    Current weight (per pt report): 287 (-9 lbs since initial)    Required weight loss goal pre-op: -10 lbs from initial consult weight (goal weight 286 lbs or less before surgery)    Wt Readings from Last 10 Encounters:   07/30/20 134.3 kg (296 lb)   07/16/20 134.3 kg (296 lb)   07/03/20 133.8 kg (295 lb)   06/07/20 127 kg (280 lb)   06/05/20 113.4 kg (250 lb)   02/03/20 124.7 kg (275 lb)   01/23/20 124.7 kg (275 lb)   10/22/19 117.9 kg (260 lb)   10/12/19 117.9 kg (260 lb)          7/16/2020   I have tried the following methods to lose weight Watching portions or calories, Exercise, Weight Watchers, Atkins type diet (low carb/high protein), Slimfast       Weight Loss Questions Reviewed With Patient 7/16/2020   How long have you been overweight? Since early childhood       SUPPLEMENT INFORMATION:  Vitamin D (h/o vitamin D deficiency), Flinstones complete with iron     MEDICATIONS FOR WEIGHT LOSS:  Metformin, topiramate    NUTRITION HISTORY:  Pt reports being up to 0852-5717 steps per day, working on reducing soda (2 sprite per day, reduced from 12 per day), eliminated intake after dinner.      No known food " "allergies/intolerances.     Doesn't like the taste of water. Needs to have ice cold.     Putting food on a saucer lately to help reduce portion sizes.     Over the past month, pt has eliminated pork intake. Eliminated soda intake.     Recent Diet Recall:  Breakfast: Smoothie (Kale, strawberries, bananas, almond milk, protein powder); 2 boiled egg and pc of honey wheat toast    Lunch: skipping (d/t busy at work)  - PM snack: Occ trail mix (nuts, dried fruit, yogurt covered raisins)   Dinner: Baked chicken/slamon/shelfish and 1 cup vegetables (asparagus, green beans, half ear of corn)   Beverages: Water (almost at 50 oz), occ Ice   Dining Out: Once for her bday    Progress Towards Previous Goals:  Relating To Eating:  - Eat 3 meals per day - Improving, lunch is often a snack   - Use the 9\" Plate method to structure meals (1/2 plate non-starchy vegetables/fruit, 1/4 plate lean protein, 1/4 plate whole grain starch - no more than 1/2 cup carb/meal) - Improving, focusing on lean protein at meals, and consuming servings of fruits/vegetables, limiting starches.     - You may use a protein shake as a meal replacement (see recommendations below)   - Eat slowly (20-30 minutes per meal), chewing foods well (25 chews per bite/applesauce consistency) - Improving, focusing on chewing foods to a fine texture, taking smaller bites.   - Avoid snacking - Improving, avoiding after dinner. Only having if skipped lunch. Discussed high-calorie content of trail mix.     Relating to beverages:  - Separate fluids from meals by 30 minutes before, during, and after eating - Improving  - Drink 48-64 ounces of fluid per day. Small, frequent sips. - Improving, finds she is forgetting to drink after work. Drinking about 50 oz per day.   - Conitnue to reduce soda intake. Try limiting to one can per day over next month. - Met, has stopped buying it, and eliminated intake.     Relating to dietary supplements:  - Start a multivitamin containing iron " daily - Met, continues    Relating to activity:  - Increase activity as able. Continue building on steps per day. Try aiming for 5000 steps consistently over next month. - Improving. Trying to get a 30 mins walk daily, finding difficult with current overtime work scheduled. Was able 8,000 steps in yesterday.     Eating Habits 7/16/2020   Do you have any dietary restrictions? Yes   Do you currently binge eat (eat a large amount of food in a short time)? No   Are you an emotional eater? No   Do you get up to eat after falling asleep? Yes   What foods do you crave? Soda       ADDITIONAL INFORMATION:  Patients mother is living with her currently, and helping support healthier diet/lifestyle since diagnosed with DM.     Dining Out History Reviewed With Patient 7/16/2020   How often do you dine out? A couple of times a week.   Where do you dine out? (select all that apply) fast food chains   What types of food do you order when you dine out? Pizza, or buugers and fries       Physical Activity Reviewed With Patient 7/16/2020   How often do you exercise? 1 to 2 times per week   What is the duration of your exercise (in minutes)? 30 Minutes   What types of exercise do you do? walking   What keeps you from being more active?  I should be more active but I just have not gotten around to it     MALNUTRITION  % Intake: Decreased intake does not meet criteria  % Weight Loss: None noted  Subcutaneous Fat Loss: None observed  Muscle Loss: None observed  Fluid Accumulation/Edema: None noted  Malnutrition Diagnosis: Patient does not meet two of the established criteria necessary for diagnosing malnutrition    NUTRITION DIAGNOSIS:  Obesity r/t long history of self-monitoring deficit and excessive energy intake aeb BMI >30 kg/m2. - Improving/continues    INTERVENTION:  Intervention Provided/Education Provided:  - Reviewed post-op diet guidelines, ways to help prepare for post-op diet guidelines pre-operatively, portion/calorie-control,  "mindful eating and sources of protein.   - Reviewed progress towards previous goals  - Reviewed healthy food choice and portion control for pre-operative weight loss and improved BG mgmt.   - Provided encouragement and support. Praised pt on positive diet and lifestyle changes she has made.   - Reviewed task list  - Provided pt with list of goals RD contact information.      Questions Reviewed With Patient 7/16/2020   How ready are you to make changes regarding your weight? Number 1 = Not ready at all to make changes up to 10 = very ready. 10   How confident are you that you can change? 1 = Not confident that you will be successful making changes up to 10 = very confident. 10       Patient Understanding: good  Expected Compliance: good    GOALS:  Relating To Eating:  - Eat 3 meals per day. Try to have some protein at lunch.  - Use the 9\" Plate method to structure meals (1/2 plate non-starchy vegetables/fruit, 1/4 plate lean protein, 1/4 plate whole grain starch - no more than 1/2 cup carb/meal)   - You may use a protein shake as a meal replacement (see recommendations below)  - Eat slowly (20-30 minutes per meal), chewing foods well (25 chews per bite/applesauce consistency)  - Avoid snacking    Relating to beverages:  - Separate fluids from meals by 30 minutes before, during, and after eating  - Drink 64 ounces of fluid per day. Small, frequent sips.   - Conitnue to reduce soda/carbonated beverage intake. Try limiting to one per week.     Relating to dietary supplements:  - Continue multivitamin containing iron daily    Relating to activity:  - Increase activity as able. Continue building on steps per day. Try aiming for 8000 steps consistently over next month.    Meal Replacements:  You may purchase meal replacement products online at our e-store. Visit e-store at [email or web address]   - The one week starter kits is a great way to sample a variety of products.  - For recipes and ideas on how to use " products, visit - [email or web address]    *Protein Shake Criteria: no more than 210 Calories, at least 20 grams of protein, and less than 10 grams of sugar     Meal Replacement Shake Options:   [facility] smoothie (160 Calories, 20 g protein)   Premier Protein (160 Calories, 30 g protein)  Slim Fast Advanced Nutrition (180 Calories, 20 g protein)  Muscle Milk, lactose-free, 17 oz bottle (210 Calories, 30 g protein)  Integrated Supplements, no artificial sugars (110 Calories, 20 g protein)  Genepro, unflavored protein powder (60 Calories, 30 g protein)    Meal Replacement Bar Options:  [facility] Protein Shake (160 Calories, 15 g protein)  Quest Protein Bars (190 Calories, 20 g protein)  Built Bar (170 Calories, 15-20 g protein)  One Protein Bar (210 calories, 20 g protein)  [employer] Signature Protein Bar ([employer]) (190 Calories, 21 g protein)  Pure Protein Bars (180 Calories, 21 g protein)    Frozen Meal Replacements  Healthy Choice  Lean Cuisine  Atkins Meals  Smart Ones    Nutrition Handouts:    Create a Plate  [email or web address]     Protein Sources for Weight Loss  [email or web address]     Carbohydrates  [email or web address]     Mindful Eating  [email or web address]     Diet Guidelines after Weight Loss Surgery  [email or web address]       Sharon Osborn RD, LD

## 2020-08-18 NOTE — LETTER
"8/18/2020       RE: Yancy Hoover  [address]  [facility] [address]     Dear Colleague,    Thank you for referring your patient, Yancy Hoover, to the [facility] at [facility]. Please see a copy of my visit note below.    Yancy Hoover is a 49 year old female who is being evaluated via a billable video visit.        Bariatric Nutrition Consultation Note    Reason For Visit: Nutrition Assessment    Yancy Hoover is a 49 year old presenting today for return bariatric nutrition consult. Pt is interested in laparoscopic sleeve gastrectomy with Dr. Alcaraz expected surgery in Oct 2020.  Patient is accompanied by self.  This is pt's 2nd of 3 required nutrition visits prior to surgery.     Pt referred by Jeannie Noe NP on July 16, 2020.  Patient with Co-morbidities of obesity including:  Type II DM yes (diagnosed 1 month ago)  Renal Failure no  Sleep apnea no  Hypertension no   Dyslipidemia yes  Joint pain no  Back pain no  GERD no     Support System Reviewed With Patient 7/16/2020   Who do you have in your support network that can be available to help you for the first 2 weeks after surgery? Yes   Who can you count on for support throughout your weight loss surgery journey? Yes       ANTHROPOMETRICS:  Estimated body mass index is 50.81 kg/m  as calculated from the following:    Height as of an earlier encounter on 7/16/20: 1.626 m (5' 4\").    Weight as of an earlier encounter on 7/16/20: 134.3 kg (296 lb).    Current weight (per pt report): 287 (-9 lbs since initial)    Required weight loss goal pre-op: -10 lbs from initial consult weight (goal weight 286 lbs or less before surgery)    Wt Readings from Last 10 Encounters:   07/30/20 134.3 kg (296 lb)   07/16/20 134.3 kg (296 lb)   07/03/20 133.8 kg (295 lb)   06/07/20 127 kg (280 lb)   06/05/20 113.4 kg (250 lb)   02/03/20 124.7 kg (275 lb)   01/23/20 124.7 kg " "(275 lb)   10/22/19 117.9 kg (260 lb)   10/12/19 117.9 kg (260 lb)          7/16/2020   I have tried the following methods to lose weight Watching portions or calories, Exercise, Weight Watchers, Atkins type diet (low carb/high protein), Slimfast       Weight Loss Questions Reviewed With Patient 7/16/2020   How long have you been overweight? Since early childhood       SUPPLEMENT INFORMATION:  Vitamin D (h/o vitamin D deficiency), Flinstones complete with iron     MEDICATIONS FOR WEIGHT LOSS:  Metformin, topiramate    NUTRITION HISTORY:  Pt reports being up to 0605-2764 steps per day, working on reducing soda (2 sprite per day, reduced from 12 per day), eliminated intake after dinner.      No known food allergies/intolerances.     Doesn't like the taste of water. Needs to have ice cold.     Putting food on a saucer lately to help reduce portion sizes.     Over the past month, pt has eliminated pork intake. Eliminated soda intake.     Recent Diet Recall:  Breakfast: Smoothie (Kale, strawberries, bananas, almond milk, protein powder); 2 boiled egg and pc of honey wheat toast    Lunch: skipping (d/t busy at work)  - PM snack: Occ trail mix (nuts, dried fruit, yogurt covered raisins)   Dinner: Baked chicken/slamon/shelfish and 1 cup vegetables (asparagus, green beans, half ear of corn)   Beverages: Water (almost at 50 oz), occ Ice   Dining Out: Once for her bday    Progress Towards Previous Goals:  Relating To Eating:  - Eat 3 meals per day - Improving, lunch is often a snack   - Use the 9\" Plate method to structure meals (1/2 plate non-starchy vegetables/fruit, 1/4 plate lean protein, 1/4 plate whole grain starch - no more than 1/2 cup carb/meal) - Improving, focusing on lean protein at meals, and consuming servings of fruits/vegetables, limiting starches.     - You may use a protein shake as a meal replacement (see recommendations below)   - Eat slowly (20-30 minutes per meal), chewing foods well (25 chews per " bite/applesauce consistency) - Improving, focusing on chewing foods to a fine texture, taking smaller bites.   - Avoid snacking - Improving, avoiding after dinner. Only having if skipped lunch. Discussed high-calorie content of trail mix.     Relating to beverages:  - Separate fluids from meals by 30 minutes before, during, and after eating - Improving  - Drink 48-64 ounces of fluid per day. Small, frequent sips. - Improving, finds she is forgetting to drink after work. Drinking about 50 oz per day.   - Conitnue to reduce soda intake. Try limiting to one can per day over next month. - Met, has stopped buying it, and eliminated intake.     Relating to dietary supplements:  - Start a multivitamin containing iron daily - Met, continues    Relating to activity:  - Increase activity as able. Continue building on steps per day. Try aiming for 5000 steps consistently over next month. - Improving. Trying to get a 30 mins walk daily, finding difficult with current overtime work scheduled. Was able 8,000 steps in yesterday.     Eating Habits 7/16/2020   Do you have any dietary restrictions? Yes   Do you currently binge eat (eat a large amount of food in a short time)? No   Are you an emotional eater? No   Do you get up to eat after falling asleep? Yes   What foods do you crave? Soda       ADDITIONAL INFORMATION:  Patients mother is living with her currently, and helping support healthier diet/lifestyle since diagnosed with DM.       Dining Out History Reviewed With Patient 7/16/2020   How often do you dine out? A couple of times a week.   Where do you dine out? (select all that apply) fast food chains   What types of food do you order when you dine out? Pizza, or buugers and fries       Physical Activity Reviewed With Patient 7/16/2020   How often do you exercise? 1 to 2 times per week   What is the duration of your exercise (in minutes)? 30 Minutes   What types of exercise do you do? walking   What keeps you from being more  "active?  I should be more active but I just have not gotten around to it     MALNUTRITION  % Intake: Decreased intake does not meet criteria  % Weight Loss: None noted  Subcutaneous Fat Loss: None observed  Muscle Loss: None observed  Fluid Accumulation/Edema: None noted  Malnutrition Diagnosis: Patient does not meet two of the established criteria necessary for diagnosing malnutrition    NUTRITION DIAGNOSIS:  Obesity r/t long history of self-monitoring deficit and excessive energy intake aeb BMI >30 kg/m2. - Improving/continues    INTERVENTION:  Intervention Provided/Education Provided:  - Reviewed post-op diet guidelines, ways to help prepare for post-op diet guidelines pre-operatively, portion/calorie-control, mindful eating and sources of protein.   - Reviewed progress towards previous goals  - Reviewed healthy food choice and portion control for pre-operative weight loss and improved BG mgmt.   - Provided encouragement and support. Praised pt on positive diet and lifestyle changes she has made.   - Reviewed task list  - Provided pt with list of goals RD contact information.      Questions Reviewed With Patient 7/16/2020   How ready are you to make changes regarding your weight? Number 1 = Not ready at all to make changes up to 10 = very ready. 10   How confident are you that you can change? 1 = Not confident that you will be successful making changes up to 10 = very confident. 10       Patient Understanding: good  Expected Compliance: good        GOALS:  Relating To Eating:  - Eat 3 meals per day. Try to have some protein at lunch.  - Use the 9\" Plate method to structure meals (1/2 plate non-starchy vegetables/fruit, 1/4 plate lean protein, 1/4 plate whole grain starch - no more than 1/2 cup carb/meal)   - You may use a protein shake as a meal replacement (see recommendations below)  - Eat slowly (20-30 minutes per meal), chewing foods well (25 chews per bite/applesauce consistency)  - Avoid " snacking    Relating to beverages:  - Separate fluids from meals by 30 minutes before, during, and after eating  - Drink 64 ounces of fluid per day. Small, frequent sips.   - Conitnue to reduce soda/carbonated beverage intake. Try limiting to one per week.     Relating to dietary supplements:  - Continue multivitamin containing iron daily    Relating to activity:  - Increase activity as able. Continue building on steps per day. Try aiming for 8000 steps consistently over next month.    Meal Replacements:  You may purchase meal replacement products online at our e-store. Visit e-store at [email or web address]   - The one week starter kits is a great way to sample a variety of products.  - For recipes and ideas on how to use products, visit - [employer]    *Protein Shake Criteria: no more than 210 Calories, at least 20 grams of protein, and less than 10 grams of sugar     Meal Replacement Shake Options:   [facility] smoothie (160 Calories, 20 g protein)   Premier Protein (160 Calories, 30 g protein)  Slim Fast Advanced Nutrition (180 Calories, 20 g protein)  Muscle Milk, lactose-free, 17 oz bottle (210 Calories, 30 g protein)  Integrated Supplements, no artificial sugars (110 Calories, 20 g protein)  Genepro, unflavored protein powder (60 Calories, 30 g protein)    Meal Replacement Bar Options:  [facility] Protein Shake (160 Calories, 15 g protein)  Quest Protein Bars (190 Calories, 20 g protein)  Built Bar (170 Calories, 15-20 g protein)  One Protein Bar (210 calories, 20 g protein)  [place] Signature Protein Bar ([employer]) (190 Calories, 21 g protein)  Pure Protein Bars (180 Calories, 21 g protein)    Frozen Meal Replacements  Healthy Choice  Lean Cuisine  Atkins Meals  Smart Ones    Nutrition Handouts:    Create a Plate  [email or web address]     Protein Sources for Weight Loss  [email or web address]     Carbohydrates  [email or web address]     Mindful  Eating  [email or web address]     Diet Guidelines after Weight Loss Surgery  [email or web address]       Sharon Osborn RD, LD

## 2020-08-21 ENCOUNTER — CARE COORDINATION (OUTPATIENT)
Dept: SURGERY | Facility: CLINIC | Age: 50
End: 2020-08-21

## 2020-08-21 DIAGNOSIS — E66.01 MORBID OBESITY (H): Primary | ICD-10-CM

## 2020-08-21 NOTE — PROGRESS NOTES
"Tasklist updated.    Bariatric Task List  Status:  Is patient a candidate for bariatric surgery?:  patient is a candidate for bariatric surgery -     Cleared to schedule surgeon consult?:  cleared to schedule surgeon consult -     Status:  surgery evaluation in process -     Surgeon: Dr. Robbie Alcaraz -     Tentative surgery month/year: October 2020 -        Insurance: Insurance:  [employer] -      Referral:  Needed -     Other:  Do the 5 coaching phone calls. Call Ray at [phone] to get registered. -        Dietician Visits: Structured weight loss required by insurance?:  structured weight loss required -     Dietician Visit 1:  Completed - 7/16/20 KB   Dietician Visit 2:  Completed - 8/18/20 KB   Dietician Visit 3:  Needed - Scheduled 9/22/20 KB   Dietician Visit additional:    -  Monthly as needed to reach goal weight. Call [phone] to schedule. bks      Psychological Evaluation: Psych eval:  Needed - List and letter sent 07/30/20 -AS      Lab Work: Complete Blood Count:  Completed - 7/30/20 -AS   Comprehensive Metabolic Panel:  Completed - 7/30/20 -AS   Vitamin D:  Needed - Ordered 7/16/20 -AS   Hgb A1c:  Needed - Ordered 7/16/20 -AS   PTH:  Needed - Ordered 7/16/20 -AS   H. pylori:    -     TSH:  Completed - 10/22/19; 7/30/20   Nicotine Testing:  Needed - Quit Date 5/1/2020; Ordered 7/16/20 -AS   Other:  Needed - Lipids      Consults/ Clearance: Sleep Medicine:  Needed - Letter sent 07/30/20 -AS; 7/20/20 Met with Dr Nieves, needs sleep study. bks         PCP: Establish care with PCP:  Completed -     PCP letter of support:  Needed - Letter sent 07/30/20 -AS. Fax to Dr Alcaraz at [phone]. bks      Smoking: Quit tobacco use (3 months smoke free)?:    -     Quit date:    -        Patient Education:  Information Session:  Completed -     Given \"Making your decision\" handout?:  Given \"\"Making your decision\"\" handout? - 07/30/20 -AS   Given support group information?:  support group contact " information provided - 07/30/20 -AS   Attended support group?:    -     Support plan in place?:  Completed -        Additional Surgery Requirements: HgA1c <8:  Needed -        Final Tasks:  Before surgery online class:  Needed -     Before surgery online class website link:  [email or web address]   After surgery online class:  Needed -     After surgery online class website link:  [email or web address]   Nurse visit for weigh-in and information:  Needed -     Pre-assessment clinic visit with anesthesia team for H&P:  Needed -     Final labs (Hgb, plt, T&S, UA):  Needed -        Notes: Please register for the Get Well Loop when you get an email invitation and a surgery date.     The Get Well Loop will give you information via email or text messages that can help you be more successful before and after surgery.  It can also help answer any questions you may have.   -

## 2020-09-18 ENCOUNTER — TELEPHONE (OUTPATIENT)
Dept: SLEEP MEDICINE | Facility: CLINIC | Age: 50
End: 2020-09-18

## 2020-09-18 NOTE — TELEPHONE ENCOUNTER
Patient called today.    [facility]    Please contact patient.    Thank you.    Central Scheduling  Fani JOHNSON

## 2020-10-05 NOTE — PROGRESS NOTES
"Yancy Hoover is a 49 year old female who is being evaluated via a billable video visit.      The patient has been notified of following:     \"This video visit will be conducted via a call between you and your physician/provider. We have found that certain health care needs can be provided without the need for an in-person physical exam.  This service lets us provide the care you need with a video conversation.  If a prescription is necessary we can send it directly to your pharmacy.  If lab work is needed we can place an order for that and you can then stop by our lab to have the test done at a later time.    Video visits are billed at different rates depending on your insurance coverage.  Please reach out to your insurance provider with any questions.    If during the course of the call the physician/provider feels a video visit is not appropriate, you will not be charged for this service.\"    Patient has given verbal consent for Video visit? Yes  How would you like to obtain your AVS? MyChart  If you are dropped from the video visit, the video invite should be resent to: Text to cell phone: [phone]  Will anyone else be joining your video visit? No        Video-Visit Details    Type of service:  Video Visit    Video Start Time: 7:37 AM  Video End Time: 7:55 AM  Time spent with patient: 18 minutes.    Originating Location (pt. Location): Home    Distant Location (provider location):  [facility]     Platform used for Video Visit: [employer]     During this virtual visit the patient is located in [place], patient verifies this as the location during the entirety of this visit.       Bariatric Nutrition Consultation Note    Reason For Visit: Nutrition Assessment    Yancy Hoover is a 49 year old presenting today for return bariatric nutrition consult. Pt is interested in laparoscopic sleeve gastrectomy with Dr. Alcaraz expected surgery in Oct 2020.  Patient is accompanied by " "self.  This is pt's 3rd of 3 required nutrition visits prior to surgery.     Pt referred by Jeannie Noe NP on July 16, 2020.  Patient with Co-morbidities of obesity including:  Type II DM yes (diagnosed 1 month ago)  Renal Failure no  Sleep apnea no  Hypertension no   Dyslipidemia yes  Joint pain no  Back pain no  GERD no     Support System Reviewed With Patient 7/16/2020   Who do you have in your support network that can be available to help you for the first 2 weeks after surgery? Yes   Who can you count on for support throughout your weight loss surgery journey? Yes       ANTHROPOMETRICS:  Estimated body mass index is 50.81 kg/m  as calculated from the following:    Height as of an earlier encounter on 7/16/20: 1.626 m (5' 4\").    Weight as of an earlier encounter on 7/16/20: 134.3 kg (296 lb).    Current weight (per pt report): 280 lbs  (-7 lbs over the past month, -16 lbs since initial)    Required weight loss goal pre-op: -10 lbs from initial consult weight (goal weight 286 lbs or less before surgery)    Wt Readings from Last 10 Encounters:   07/30/20 134.3 kg (296 lb)   07/16/20 134.3 kg (296 lb)   07/03/20 133.8 kg (295 lb)   06/07/20 127 kg (280 lb)   06/05/20 113.4 kg (250 lb)   02/03/20 124.7 kg (275 lb)   01/23/20 124.7 kg (275 lb)   10/22/19 117.9 kg (260 lb)   10/12/19 117.9 kg (260 lb)          7/16/2020   I have tried the following methods to lose weight Watching portions or calories, Exercise, Weight Watchers, Atkins type diet (low carb/high protein), Slimfast       Weight Loss Questions Reviewed With Patient 7/16/2020   How long have you been overweight? Since early childhood       SUPPLEMENT INFORMATION:  Vitamin D (h/o vitamin D deficiency), Flinstones complete with iron     MEDICATIONS FOR WEIGHT LOSS:  Metformin, topiramate    NUTRITION HISTORY:  Pt reports being up to 2420-9639 steps per day, working on reducing soda (2 sprite per day, reduced from 12 per day), eliminated intake after dinner.  " "    No known food allergies/intolerances.     Doesn't like the taste of water. Needs to have ice cold.     Putting food on a saucer lately to help reduce portion sizes.     Eliminated red meat. Eliminated soda intake.     Finding difficulty getting exercise in this month. Working 12 hour shifts at work (6a-6p). Continues to avoid soda (took sip from one in past month and did not like how sweet it tasted)    Recent Diet Recall:  Breakfast: protein smoothie (apple, kale, strawberries and protein powder)  Lunch: protein smoothie (similar to bfast)  Dinner: chicken and vegetables (salad)  Snacks: None. occ apple or grapes   Beverages: Water (64+ oz/day)    Progress Towards Previous Goals:  Relating To Eating:  - Eat 3 meals per day. Try to have some protein at lunch. - Met, continues  - Use the 9\" Plate method to structure meals (1/2 plate non-starchy vegetables/fruit, 1/4 plate lean protein, 1/4 plate whole grain starch - no more than 1/2 cup carb/meal) - Met, continues. Getting in multiple servings of fruit and veggies daily. Using lean protein sources. Avoiding starch.     - You may use a protein shake as a meal replacement (see recommendations below)  - Eat slowly (20-30 minutes per meal), chewing foods well (25 chews per bite/applesauce consistency) -  Improving, occ forgetting   - Avoid snacking - Met, continues. Occ apple or grapes.     Relating to beverages:  - Separate fluids from meals by 30 minutes before, during, and after eating -Improving, finding a bit difficult at times. Reports a hard habit to rework.  - Drink 64 ounces of fluid per day. Small, frequent sips. - Met, continues  - Conitnue to reduce soda/carbonated beverage intake. Try limiting to one per week. - Met, continues    Relating to dietary supplements:  - Continue multivitamin containing iron daily - Met, continues    Relating to activity:  - Increase activity as able. Continue building on steps per day. Try aiming for 8000 steps consistently " over next month. - Continues to track steps, avg 5000 steps per day.     Eating Habits 7/16/2020   Do you have any dietary restrictions? Yes   Do you currently binge eat (eat a large amount of food in a short time)? No   Are you an emotional eater? No   Do you get up to eat after falling asleep? Yes   What foods do you crave? Soda       ADDITIONAL INFORMATION:  Patients mother is living with her currently, and helping support healthier diet/lifestyle since diagnosed with DM.     Dining Out History Reviewed With Patient 7/16/2020   How often do you dine out? A couple of times a week.   Where do you dine out? (select all that apply) fast food chains   What types of food do you order when you dine out? Pizza, or buugers and fries       Physical Activity Reviewed With Patient 7/16/2020   How often do you exercise? 1 to 2 times per week   What is the duration of your exercise (in minutes)? 30 Minutes   What types of exercise do you do? walking   What keeps you from being more active?  I should be more active but I just have not gotten around to it     MALNUTRITION  % Intake: Decreased intake does not meet criteria  % Weight Loss: Does not meet criteria  Subcutaneous Fat Loss: None observed  Muscle Loss: None observed  Fluid Accumulation/Edema: None noted  Malnutrition Diagnosis: Patient does not meet two of the established criteria necessary for diagnosing malnutrition    NUTRITION DIAGNOSIS:  Obesity r/t long history of self-monitoring deficit and excessive energy intake aeb BMI >30 kg/m2. - Improving/continues    INTERVENTION:  - Reviewed post-op diet guidelines, ways to help prepare for post-op diet guidelines pre-operatively, portion/calorie-control, mindful eating and sources of protein.   - Reviewed progress towards previous goals  - Reviewed healthy food choice and portion control for pre-operative weight loss and improved BG mgmt.   - Provided encouragement and support. Praised pt on positive diet and lifestyle  "changes she has made.   - Reviewed task list  - Provided pt with list of goals RD contact information.      Questions Reviewed With Patient 7/16/2020   How ready are you to make changes regarding your weight? Number 1 = Not ready at all to make changes up to 10 = very ready. 10   How confident are you that you can change? 1 = Not confident that you will be successful making changes up to 10 = very confident. 10       Patient Understanding: good  Expected Compliance: good    GOALS:  Relating To Eating:  - Eat 3 meals per day. Try to have some protein at lunch.  - Use the 9\" Plate method to structure meals (1/2 plate non-starchy vegetables/fruit, 1/4 plate lean protein, 1/4 plate whole grain starch - no more than 1/2 cup carb/meal)   - You may use a protein shake as a meal replacement (see recommendations below)  - Eat slowly (20-30 minutes per meal), chewing foods well (25 chews per bite/applesauce consistency)   - Write a post-it reminder to prompt you at beginning of meal  - Avoid snacking    Relating to beverages:  - Separate fluids from meals by 30 minutes before, during, and after eating   - Do not take beverage to table while eating.  - Drink 64 ounces of fluid per day. Small, frequent sips.   - Conitnue to reduce soda/carbonated beverage intake. Try limiting to one per week.     Relating to dietary supplements:  - Continue multivitamin containing iron daily    Relating to activity:  - Increase activity as able. Continue building on steps per day. Try aiming for 8000 steps consistently over next month.    Meal Replacements:  You may purchase meal replacement products online at our e-store. Visit e-store at [email or web address]   - The one week starter kits is a great way to sample a variety of products.  - For recipes and ideas on how to use products, visit - [employer]    *Protein Shake Criteria: no more than 210 Calories, at least 20 grams of protein, and less than 10 grams of sugar     Meal " Replacement Shake Options:   [facility] smoothie (160 Calories, 20 g protein)   Premier Protein (160 Calories, 30 g protein)  Slim Fast Advanced Nutrition (180 Calories, 20 g protein)  Muscle Milk, lactose-free, 17 oz bottle (210 Calories, 30 g protein)  Integrated Supplements, no artificial sugars (110 Calories, 20 g protein)  Genepro, unflavored protein powder (60 Calories, 30 g protein)    Meal Replacement Bar Options:  [facility] Protein Shake (160 Calories, 15 g protein)  Quest Protein Bars (190 Calories, 20 g protein)  Built Bar (170 Calories, 15-20 g protein)  One Protein Bar (210 calories, 20 g protein)  [place] Signature Protein Bar ([employer]) (190 Calories, 21 g protein)  Pure Protein Bars (180 Calories, 21 g protein)    Frozen Meal Replacements  Healthy Choice  Lean Cuisine  Atkins Meals  Smart Ones        Sharon Osborn RD, LD

## 2020-10-06 ENCOUNTER — VIRTUAL VISIT (OUTPATIENT)
Dept: ENDOCRINOLOGY | Facility: CLINIC | Age: 50
End: 2020-10-06
Payer: COMMERCIAL

## 2020-10-06 DIAGNOSIS — Z71.3 NUTRITIONAL COUNSELING: Primary | ICD-10-CM

## 2020-10-06 DIAGNOSIS — E66.9 OBESITY: ICD-10-CM

## 2020-10-06 DIAGNOSIS — E08.00 DIABETES MELLITUS DUE TO UNDERLYING CONDITION WITH HYPEROSMOLARITY WITHOUT COMA, WITHOUT LONG-TERM CURRENT USE OF INSULIN (H): ICD-10-CM

## 2020-10-06 PROCEDURE — 97803 MED NUTRITION INDIV SUBSEQ: CPT | Mod: GT | Performed by: DIETITIAN, REGISTERED

## 2020-10-06 NOTE — LETTER
"10/6/2020       RE: Yancy Hoover  [address]  [facility] [address]     Dear Colleague,    Thank you for referring your patient, Yancy Hoover, to the [facility] [place] [facility]. Please see a copy of my visit note below.    Yancy Hoover is a 49 year old female who is being evaluated via a billable video visit.      The patient has been notified of following:     \"This video visit will be conducted via a call between you and your physician/provider. We have found that certain health care needs can be provided without the need for an in-person physical exam.  This service lets us provide the care you need with a video conversation.  If a prescription is necessary we can send it directly to your pharmacy.  If lab work is needed we can place an order for that and you can then stop by our lab to have the test done at a later time.    Video visits are billed at different rates depending on your insurance coverage.  Please reach out to your insurance provider with any questions.    If during the course of the call the physician/provider feels a video visit is not appropriate, you will not be charged for this service.\"    Patient has given verbal consent for Video visit? Yes  How would you like to obtain your AVS? MyChart  If you are dropped from the video visit, the video invite should be resent to: Text to cell phone: [phone]  Will anyone else be joining your video visit? No        Video-Visit Details    Type of service:  Video Visit    Video Start Time: 7:37 AM  Video End Time: 7:55 AM  Time spent with patient: 18 minutes.    Originating Location (pt. Location): Home    Distant Location (provider location):  [facility] SURGICAL WEIGHT MANAGEMENT     Platform used for Video Visit: Done In :60 Seconds     During this virtual visit the patient is located in [place], patient verifies this as the location during the " "entirety of this visit.       Bariatric Nutrition Consultation Note    Reason For Visit: Nutrition Assessment    Yancy Hoover is a 49 year old presenting today for return bariatric nutrition consult. Pt is interested in laparoscopic sleeve gastrectomy with Dr. Alcaraz expected surgery in Oct 2020.  Patient is accompanied by self.  This is pt's 3rd of 3 required nutrition visits prior to surgery.     Pt referred by Jeannie Noe NP on July 16, 2020.  Patient with Co-morbidities of obesity including:  Type II DM yes (diagnosed 1 month ago)  Renal Failure no  Sleep apnea no  Hypertension no   Dyslipidemia yes  Joint pain no  Back pain no  GERD no     Support System Reviewed With Patient 7/16/2020   Who do you have in your support network that can be available to help you for the first 2 weeks after surgery? Yes   Who can you count on for support throughout your weight loss surgery journey? Yes       ANTHROPOMETRICS:  Estimated body mass index is 50.81 kg/m  as calculated from the following:    Height as of an earlier encounter on 7/16/20: 1.626 m (5' 4\").    Weight as of an earlier encounter on 7/16/20: 134.3 kg (296 lb).    Current weight (per pt report): 280 lbs  (-7 lbs over the past month, -16 lbs since initial)    Required weight loss goal pre-op: -10 lbs from initial consult weight (goal weight 286 lbs or less before surgery)    Wt Readings from Last 10 Encounters:   07/30/20 134.3 kg (296 lb)   07/16/20 134.3 kg (296 lb)   07/03/20 133.8 kg (295 lb)   06/07/20 127 kg (280 lb)   06/05/20 113.4 kg (250 lb)   02/03/20 124.7 kg (275 lb)   01/23/20 124.7 kg (275 lb)   10/22/19 117.9 kg (260 lb)   10/12/19 117.9 kg (260 lb)          7/16/2020   I have tried the following methods to lose weight Watching portions or calories, Exercise, Weight Watchers, Atkins type diet (low carb/high protein), Slimfast       Weight Loss Questions Reviewed With Patient 7/16/2020   How long have you been overweight? Since early " "childhood       SUPPLEMENT INFORMATION:  Vitamin D (h/o vitamin D deficiency), Estefanía complete with iron     MEDICATIONS FOR WEIGHT LOSS:  Metformin, topiramate    NUTRITION HISTORY:  Pt reports being up to 1397-6963 steps per day, working on reducing soda (2 sprite per day, reduced from 12 per day), eliminated intake after dinner.      No known food allergies/intolerances.     Doesn't like the taste of water. Needs to have ice cold.     Putting food on a saucer lately to help reduce portion sizes.     Eliminated red meat. Eliminated soda intake.     Finding difficulty getting exercise in this month. Working 12 hour shifts at work (6a-6p). Continues to avoid soda (took sip from one in past month and did not like how sweet it tasted)    Recent Diet Recall:  Breakfast: protein smoothie (apple, kale, strawberries and protein powder)  Lunch: protein smoothie (similar to bfast)  Dinner: chicken and vegetables (salad)  Snacks: None. occ apple or grapes   Beverages: Water (64+ oz/day)    Progress Towards Previous Goals:  Relating To Eating:  - Eat 3 meals per day. Try to have some protein at lunch. - Met, continues  - Use the 9\" Plate method to structure meals (1/2 plate non-starchy vegetables/fruit, 1/4 plate lean protein, 1/4 plate whole grain starch - no more than 1/2 cup carb/meal) - Met, continues. Getting in multiple servings of fruit and veggies daily. Using lean protein sources. Avoiding starch.     - You may use a protein shake as a meal replacement (see recommendations below)  - Eat slowly (20-30 minutes per meal), chewing foods well (25 chews per bite/applesauce consistency) -  Improving, occ forgetting   - Avoid snacking - Met, continues. Occ apple or grapes.     Relating to beverages:  - Separate fluids from meals by 30 minutes before, during, and after eating -Improving, finding a bit difficult at times. Reports a hard habit to rework.  - Drink 64 ounces of fluid per day. Small, frequent sips. - Met, " continues  - Conitnue to reduce soda/carbonated beverage intake. Try limiting to one per week. - Met, continues    Relating to dietary supplements:  - Continue multivitamin containing iron daily - Met, continues    Relating to activity:  - Increase activity as able. Continue building on steps per day. Try aiming for 8000 steps consistently over next month. - Continues to track steps, avg 5000 steps per day.     Eating Habits 7/16/2020   Do you have any dietary restrictions? Yes   Do you currently binge eat (eat a large amount of food in a short time)? No   Are you an emotional eater? No   Do you get up to eat after falling asleep? Yes   What foods do you crave? Soda       ADDITIONAL INFORMATION:  Patients mother is living with her currently, and helping support healthier diet/lifestyle since diagnosed with DM.     Dining Out History Reviewed With Patient 7/16/2020   How often do you dine out? A couple of times a week.   Where do you dine out? (select all that apply) fast food chains   What types of food do you order when you dine out? Pizza, or buugers and fries       Physical Activity Reviewed With Patient 7/16/2020   How often do you exercise? 1 to 2 times per week   What is the duration of your exercise (in minutes)? 30 Minutes   What types of exercise do you do? walking   What keeps you from being more active?  I should be more active but I just have not gotten around to it     MALNUTRITION  % Intake: Decreased intake does not meet criteria  % Weight Loss: Does not meet criteria  Subcutaneous Fat Loss: None observed  Muscle Loss: None observed  Fluid Accumulation/Edema: None noted  Malnutrition Diagnosis: Patient does not meet two of the established criteria necessary for diagnosing malnutrition    NUTRITION DIAGNOSIS:  Obesity r/t long history of self-monitoring deficit and excessive energy intake aeb BMI >30 kg/m2. - Improving/continues    INTERVENTION:  - Reviewed post-op diet guidelines, ways to help prepare  "for post-op diet guidelines pre-operatively, portion/calorie-control, mindful eating and sources of protein.   - Reviewed progress towards previous goals  - Reviewed healthy food choice and portion control for pre-operative weight loss and improved BG mgmt.   - Provided encouragement and support. Praised pt on positive diet and lifestyle changes she has made.   - Reviewed task list  - Provided pt with list of goals RD contact information.      Questions Reviewed With Patient 7/16/2020   How ready are you to make changes regarding your weight? Number 1 = Not ready at all to make changes up to 10 = very ready. 10   How confident are you that you can change? 1 = Not confident that you will be successful making changes up to 10 = very confident. 10       Patient Understanding: good  Expected Compliance: good    GOALS:  Relating To Eating:  - Eat 3 meals per day. Try to have some protein at lunch.  - Use the 9\" Plate method to structure meals (1/2 plate non-starchy vegetables/fruit, 1/4 plate lean protein, 1/4 plate whole grain starch - no more than 1/2 cup carb/meal)   - You may use a protein shake as a meal replacement (see recommendations below)  - Eat slowly (20-30 minutes per meal), chewing foods well (25 chews per bite/applesauce consistency)   - Write a post-it reminder to prompt you at beginning of meal  - Avoid snacking    Relating to beverages:  - Separate fluids from meals by 30 minutes before, during, and after eating   - Do not take beverage to table while eating.  - Drink 64 ounces of fluid per day. Small, frequent sips.   - Conitnue to reduce soda/carbonated beverage intake. Try limiting to one per week.     Relating to dietary supplements:  - Continue multivitamin containing iron daily    Relating to activity:  - Increase activity as able. Continue building on steps per day. Try aiming for 8000 steps consistently over next month.    Meal Replacements:  You may purchase meal replacement products online at " our e-store. Visit e-store at [email or web address]   - The one week starter kits is a great way to sample a variety of products.  - For recipes and ideas on how to use products, visit - [employer]    *Protein Shake Criteria: no more than 210 Calories, at least 20 grams of protein, and less than 10 grams of sugar     Meal Replacement Shake Options:   [facility] smoothie (160 Calories, 20 g protein)   Premier Protein (160 Calories, 30 g protein)  Slim Fast Advanced Nutrition (180 Calories, 20 g protein)  Muscle Milk, lactose-free, 17 oz bottle (210 Calories, 30 g protein)  Integrated Supplements, no artificial sugars (110 Calories, 20 g protein)  Genepro, unflavored protein powder (60 Calories, 30 g protein)    Meal Replacement Bar Options:  [facility] Protein Shake (160 Calories, 15 g protein)  Quest Protein Bars (190 Calories, 20 g protein)  Built Bar (170 Calories, 15-20 g protein)  One Protein Bar (210 calories, 20 g protein)  [place] Signature Protein Bar ([employer]) (190 Calories, 21 g protein)  Pure Protein Bars (180 Calories, 21 g protein)    Frozen Meal Replacements  Healthy Choice  Lean Cuisine  Atkins Meals  Smart Ones        Sharon Osborn, ALISON, LD

## 2020-10-18 ENCOUNTER — APPOINTMENT (OUTPATIENT)
Dept: CT IMAGING | Facility: CLINIC | Age: 50
End: 2020-10-18
Attending: EMERGENCY MEDICINE
Payer: COMMERCIAL

## 2020-10-18 ENCOUNTER — HOSPITAL ENCOUNTER (EMERGENCY)
Facility: CLINIC | Age: 50
Discharge: HOME | End: 2020-10-18
Attending: EMERGENCY MEDICINE | Admitting: EMERGENCY MEDICINE
Payer: COMMERCIAL

## 2020-10-18 VITALS
OXYGEN SATURATION: 97 % | RESPIRATION RATE: 12 BRPM | TEMPERATURE: 98 F | DIASTOLIC BLOOD PRESSURE: 75 MMHG | HEIGHT: 64 IN | SYSTOLIC BLOOD PRESSURE: 109 MMHG | BODY MASS INDEX: 49.51 KG/M2 | WEIGHT: 290 LBS | HEART RATE: 96 BPM

## 2020-10-18 DIAGNOSIS — R07.89 ATYPICAL CHEST PAIN: ICD-10-CM

## 2020-10-18 LAB
ALBUMIN SERPL-MCNC: 3.8 G/DL (ref 3.4–5)
ALP SERPL-CCNC: 73 U/L (ref 40–150)
ALT SERPL W P-5'-P-CCNC: 27 U/L (ref 0–50)
ANION GAP SERPL CALCULATED.3IONS-SCNC: 3 MMOL/L (ref 3–14)
AST SERPL W P-5'-P-CCNC: 30 U/L (ref 0–45)
BASOPHILS # BLD AUTO: 0 10E9/L (ref 0–0.2)
BASOPHILS NFR BLD AUTO: 0.4 %
BILIRUB SERPL-MCNC: 0.3 MG/DL (ref 0.2–1.3)
BUN SERPL-MCNC: 11 MG/DL (ref 7–30)
CALCIUM SERPL-MCNC: 8.5 MG/DL (ref 8.5–10.1)
CHLORIDE SERPL-SCNC: 103 MMOL/L (ref 94–109)
CO2 SERPL-SCNC: 28 MMOL/L (ref 20–32)
CREAT SERPL-MCNC: 0.93 MG/DL (ref 0.52–1.04)
CRP SERPL-MCNC: 13 MG/L (ref 0–8)
D DIMER PPP FEU-MCNC: 0.6 UG/ML FEU (ref 0–0.5)
DIFFERENTIAL METHOD BLD: NORMAL
EOSINOPHIL # BLD AUTO: 0.1 10E9/L (ref 0–0.7)
EOSINOPHIL NFR BLD AUTO: 1.4 %
ERYTHROCYTE [DISTWIDTH] IN BLOOD BY AUTOMATED COUNT: 14.6 % (ref 10–15)
GFR SERPL CREATININE-BSD FRML MDRD: 72 ML/MIN/{1.73_M2}
GLUCOSE SERPL-MCNC: 164 MG/DL (ref 70–99)
HCG SERPL QL: NEGATIVE
HCT VFR BLD AUTO: 39.9 % (ref 35–47)
HGB BLD-MCNC: 12.9 G/DL (ref 11.7–15.7)
IMM GRANULOCYTES # BLD: 0 10E9/L (ref 0–0.4)
IMM GRANULOCYTES NFR BLD: 0.3 %
INTERPRETATION ECG - MUSE: NORMAL
LYMPHOCYTES # BLD AUTO: 3.3 10E9/L (ref 0.8–5.3)
LYMPHOCYTES NFR BLD AUTO: 42.1 %
MCH RBC QN AUTO: 28.5 PG (ref 26.5–33)
MCHC RBC AUTO-ENTMCNC: 32.3 G/DL (ref 31.5–36.5)
MCV RBC AUTO: 88 FL (ref 78–100)
MONOCYTES # BLD AUTO: 0.5 10E9/L (ref 0–1.3)
MONOCYTES NFR BLD AUTO: 5.8 %
NEUTROPHILS # BLD AUTO: 3.9 10E9/L (ref 1.6–8.3)
NEUTROPHILS NFR BLD AUTO: 50 %
NRBC # BLD AUTO: 0 10*3/UL
NRBC BLD AUTO-RTO: 0 /100
PLATELET # BLD AUTO: 311 10E9/L (ref 150–450)
POTASSIUM SERPL-SCNC: 4.7 MMOL/L (ref 3.4–5.3)
PROT SERPL-MCNC: 8.2 G/DL (ref 6.8–8.8)
RBC # BLD AUTO: 4.52 10E12/L (ref 3.8–5.2)
SODIUM SERPL-SCNC: 134 MMOL/L (ref 133–144)
TROPONIN I SERPL-MCNC: <0.015 UG/L (ref 0–0.04)
WBC # BLD AUTO: 7.8 10E9/L (ref 4–11)

## 2020-10-18 PROCEDURE — 86140 C-REACTIVE PROTEIN: CPT | Performed by: EMERGENCY MEDICINE

## 2020-10-18 PROCEDURE — 80053 COMPREHEN METABOLIC PANEL: CPT | Performed by: EMERGENCY MEDICINE

## 2020-10-18 PROCEDURE — 85025 COMPLETE CBC W/AUTO DIFF WBC: CPT | Performed by: EMERGENCY MEDICINE

## 2020-10-18 PROCEDURE — 99285 EMERGENCY DEPT VISIT HI MDM: CPT | Mod: 25

## 2020-10-18 PROCEDURE — 74177 CT ABD & PELVIS W/CONTRAST: CPT

## 2020-10-18 PROCEDURE — 85379 FIBRIN DEGRADATION QUANT: CPT | Performed by: EMERGENCY MEDICINE

## 2020-10-18 PROCEDURE — 250N000011 HC RX IP 250 OP 636: Performed by: EMERGENCY MEDICINE

## 2020-10-18 PROCEDURE — 84703 CHORIONIC GONADOTROPIN ASSAY: CPT | Performed by: EMERGENCY MEDICINE

## 2020-10-18 PROCEDURE — 84484 ASSAY OF TROPONIN QUANT: CPT | Performed by: EMERGENCY MEDICINE

## 2020-10-18 PROCEDURE — 250N000009 HC RX 250: Performed by: EMERGENCY MEDICINE

## 2020-10-18 PROCEDURE — 96374 THER/PROPH/DIAG INJ IV PUSH: CPT | Mod: 59

## 2020-10-18 PROCEDURE — 93005 ELECTROCARDIOGRAM TRACING: CPT

## 2020-10-18 RX ORDER — KETOROLAC TROMETHAMINE 15 MG/ML
15 INJECTION, SOLUTION INTRAMUSCULAR; INTRAVENOUS ONCE
Status: COMPLETED | OUTPATIENT
Start: 2020-10-18 | End: 2020-10-18

## 2020-10-18 RX ORDER — IOPAMIDOL 755 MG/ML
80 INJECTION, SOLUTION INTRAVASCULAR ONCE
Status: COMPLETED | OUTPATIENT
Start: 2020-10-18 | End: 2020-10-18

## 2020-10-18 RX ADMIN — IOPAMIDOL 80 ML: 755 INJECTION, SOLUTION INTRAVENOUS at 21:22

## 2020-10-18 RX ADMIN — KETOROLAC TROMETHAMINE 15 MG: 15 INJECTION, SOLUTION INTRAMUSCULAR; INTRAVENOUS at 22:21

## 2020-10-18 RX ADMIN — SODIUM CHLORIDE 70 ML: 9 INJECTION, SOLUTION INTRAVENOUS at 21:22

## 2020-10-18 ASSESSMENT — ENCOUNTER SYMPTOMS
FEVER: 0
HEMATURIA: 0
LIGHT-HEADEDNESS: 1
DYSURIA: 0
BACK PAIN: 1
DIARRHEA: 0
COUGH: 0
CONSTIPATION: 0
SORE THROAT: 0
FREQUENCY: 0
SHORTNESS OF BREATH: 0
FACIAL SWELLING: 0

## 2020-10-18 ASSESSMENT — MIFFLIN-ST. JEOR: SCORE: 1925.43

## 2020-10-18 NOTE — ED AVS SNAPSHOT
[facility] Emergency Dept  [address] [facility] [address]  Phone: [phone]  Fax: [phone]                                    Yancy Hoover   MRN: [id number]    Department: [facility]   Date of Visit: 10/18/2020           After Visit Summary Signature Page    I have received my discharge instructions, and my questions have been answered. I have discussed any challenges I see with this plan with the nurse or doctor.    ..........................................................................................................................................  Patient/Patient Representative Signature      ..........................................................................................................................................  Patient Representative Print Name and Relationship to Patient    ..................................................               ................................................  Date                                   Time    ..........................................................................................................................................  Reviewed by Signature/Title    ...................................................              ..............................................  Date                                               Time          22EPIC Rev 08/18

## 2020-10-19 NOTE — ED TRIAGE NOTES
Pt c/o 8/10 chest tightness that radiates to the back started yesterday with SOB. Pt states that she was laying down when it started. Denies n/v or cardiac problems in the past.

## 2020-10-19 NOTE — ED PROVIDER NOTES
History     Chief Complaint:  Chest Pain      HPI   Yancy Hoover is a 50 year old female who presents for the evaluation of chest pain. The patient reports that yesterday evening she started to experience a discomfort in her chest that was intermittent. She notes that the pain was relieved when she woke up today, but then returned and became constant at 1300 this afternoon, prompting her to the ED. She describes that the pain is a stabbing pain, is more prominent in the left side of her chest, and is radiating to the left side of her back. Patient also endorses intermittent lightheadedness since her chest pain first started. The patient denies fever, cough, sore throat, shortness of breath, diarrhea, constipation, dysuria, hematuria, a change in frequency or urgency of urination, ankle swelling, rash, mouth swelling, and other issues. No recent foreign travel. No known heavy lifting or straining that could have caused her pain.     CARDIAC RISK FACTORS:  Sex:    Female  Tobacco:   Current some day smoker  Hypertension:   No  Hyperlipidemia:  No  Diabetes:   Yes  Family History:  No    PE/DVT RISK FACTORS:  Sex:    Female  Hormones:   No  Tobacco:   Current some day smoker  Cancer:   No  Travel:   No  Surgery:   No  Other immobilization: No  Personal history:  No  Family history:  No     Allergies:  Gabolinium derivatives      Medications:    Antivert  Metformin  Topamax     Past Medical History:    Ovarian cyst   Diabetes   Vertigo     Past Surgical History:    Cystectomy ovaria - 1/23/2020     Family History:    History reviewed. No pertinent family history.      Social History:  Smoking status: Current some day smoker  Alcohol use: Not currently   Drug use: No  PCP: No Ref-Primary, Physician   Marital Status:  Single [1]     Review of Systems   Constitutional: Negative for fever.   HENT: Negative for facial swelling and sore throat.    Respiratory: Negative for cough and shortness of breath.   "  Cardiovascular: Positive for chest pain. Negative for leg swelling.   Gastrointestinal: Negative for constipation and diarrhea.   Genitourinary: Negative for dysuria, frequency, hematuria and urgency.   Musculoskeletal: Positive for back pain.   Skin: Negative for rash.   Neurological: Positive for light-headedness.   All other systems reviewed and are negative.      Physical Exam     Patient Vitals for the past 24 hrs:   BP Temp Temp src Pulse Resp SpO2 Height Weight   10/18/20 2205 -- -- -- 96 12 97 % -- --   10/18/20 2200 109/75 -- -- 92 14 97 % -- --   10/18/20 2100 128/82 -- -- 94 -- 98 % -- --   10/18/20 2005 128/82 98  F (36.7  C) Oral 105 20 97 % 1.626 m (5' 4\") 131.5 kg (290 lb)     Right arm BP: 119/82  Left arm BP: 128/82     Physical Exam  Physical Exam   Constitutional:  Patient is oriented to person, place, and time. They appear well-developed and well-nourished. Mild distress secondary to chest pain   HENT:   Mouth/Throat:   Oropharynx is clear and moist.   Eyes:    Conjunctivae normal and EOM are normal. Pupils are equal, round, and reactive to light.   Neck:    Normal range of motion.   Cardiovascular: Normal rate, regular rhythm and normal heart sounds.  Exam reveals no gallop and no friction rub.  No murmur heard. No pleuritic pain, no reproducible pain.   Pulmonary/Chest:  Effort normal and breath sounds normal. Patient has no wheezes. Patient has no rales.   Abdominal:   Soft. Bowel sounds are normal. Patient exhibits no mass. There is no tenderness. There is no rebound and no guarding.   Musculoskeletal:  Normal range of motion. Patient exhibits no edema.   Neurological:   Patient is alert and oriented to person, place, and time. Patient has normal strength. No cranial nerve deficit or sensory deficit. GCS 15  Skin:   Skin is warm and dry. No rash noted. No erythema.   Psychiatric:   Patient has a normal mood and affect. Patient's behavior is normal. Judgment and thought content normal. "     Emergency Department Course   ECG (20:05:30):  Rate 105 bpm. GA interval 54. QRS duration 94. QT/QTc 334/441. P-R-T axes 65 -4 48. Sinus tachycardia. Nonspecific T wave abnormality. Abnormal ECG. No significant change compared to ECG dated 6/30/20.  Interpreted at 2010 by Oliva Canales MD.     Imaging:  Radiographic findings were communicated with the patient who voiced understanding of the findings.  CT Aortic Survey w Contrast   IMPRESSION:  1.  No aortic aneurysm or dissection.  2.  No acute findings in the chest, abdomen and pelvis.  As read by Radiology.    Laboratory:  CRP Inflammation: 13.0 (H)  HCG qualitative pregnancy (blood): Negative   CBC: WNL (WBC 7.8, HGB 12.9, )  CMP: Glucose 164 (H), WNL (Creatinine 0.93)  D dimer quantitative (2022): 0.6 (H)   Troponin (2022): <0.015     Interventions:  2215, Toradol, 15 mg, IV    Emergency Department Course:  Past medical records, nursing notes, and vitals reviewed.  2026: I performed an exam of the patient and obtained history, as documented above.     IV inserted and blood drawn.     The patient was sent for an aortic survey CT while in the emergency department, findings above.    2204: I rechecked the patient. Explained findings to patient.     Findings and plan explained to the Patient. Patient discharged home with instructions regarding supportive care, medications, and reasons to return. The importance of close follow-up was reviewed.     Impression & Plan    Medical Decision Making:  Yancy Hoover is a 50 year old female presenting with atypical chest pain. EKG shows no evidence of ischemia or arrhythmia. Basic blood work as well as advanced imaging was performed. Her troponin was within normal limits. Her CRP is slightly elevated, she had no pleuritic pain. Due to her complaint of left sided chest pain going to the back that was not provoked anything in particular, I did feel that an aortic survey needed to be done to make  sure that there was no evidence of dissection, PE, or other vascular catastrophe. Fortunately the chest CT shows nothing acute including pneumothorax, pericardial effusion, pleural effusion. The remainder of her blood work was unremarkable as well. At this point I do not see any serious cause for her chest pain. Given her work up here and her risk factors, she is safe for discharge. She will follow up closely with her PCP.     Diagnosis:    ICD-10-CM    1. Atypical chest pain  R07.89        Disposition:  Discharged to home.    Scribe Disclosure:  I, Chip Hernandez, am serving as a scribe at 8:15 PM on 10/18/2020 to document services personally performed by Oliva Canales MD based on my observations and the provider's statements to me.      Chip Hernandez  10/18/2020   [facility] EMERGENCY DEPT       Oliva Canales MD  10/19/20 0111

## 2020-10-23 ENCOUNTER — THERAPY VISIT (OUTPATIENT)
Dept: SLEEP MEDICINE | Facility: CLINIC | Age: 50
End: 2020-10-23
Payer: COMMERCIAL

## 2020-10-23 DIAGNOSIS — R06.81 APNEA: ICD-10-CM

## 2020-10-23 DIAGNOSIS — E66.813 CLASS 3 SEVERE OBESITY DUE TO EXCESS CALORIES WITH BODY MASS INDEX (BMI) OF 50.0 TO 59.9 IN ADULT, UNSPECIFIED WHETHER SERIOUS COMORBIDITY PRESENT (H): ICD-10-CM

## 2020-10-23 DIAGNOSIS — E66.01 CLASS 3 SEVERE OBESITY DUE TO EXCESS CALORIES WITH BODY MASS INDEX (BMI) OF 50.0 TO 59.9 IN ADULT, UNSPECIFIED WHETHER SERIOUS COMORBIDITY PRESENT (H): ICD-10-CM

## 2020-10-23 DIAGNOSIS — R06.83 SNORING: ICD-10-CM

## 2020-10-23 PROCEDURE — 95810 POLYSOM 6/> YRS 4/> PARAM: CPT | Performed by: FAMILY MEDICINE

## 2020-10-26 LAB — SLPCOMP: NORMAL

## 2020-11-06 ENCOUNTER — VIRTUAL VISIT (OUTPATIENT)
Dept: SLEEP MEDICINE | Facility: CLINIC | Age: 50
End: 2020-11-06
Payer: COMMERCIAL

## 2020-11-06 DIAGNOSIS — E66.813 CLASS 3 SEVERE OBESITY DUE TO EXCESS CALORIES WITH BODY MASS INDEX (BMI) OF 50.0 TO 59.9 IN ADULT, UNSPECIFIED WHETHER SERIOUS COMORBIDITY PRESENT (H): ICD-10-CM

## 2020-11-06 DIAGNOSIS — E11.9 TYPE 2 DIABETES MELLITUS WITHOUT COMPLICATION, UNSPECIFIED WHETHER LONG TERM INSULIN USE (H): ICD-10-CM

## 2020-11-06 DIAGNOSIS — E66.01 CLASS 3 SEVERE OBESITY DUE TO EXCESS CALORIES WITH BODY MASS INDEX (BMI) OF 50.0 TO 59.9 IN ADULT, UNSPECIFIED WHETHER SERIOUS COMORBIDITY PRESENT (H): ICD-10-CM

## 2020-11-06 DIAGNOSIS — G47.33 OSA (OBSTRUCTIVE SLEEP APNEA): Primary | ICD-10-CM

## 2020-11-06 PROCEDURE — 99214 OFFICE O/P EST MOD 30 MIN: CPT | Mod: GT | Performed by: FAMILY MEDICINE

## 2020-11-06 NOTE — PROGRESS NOTES
"Yancy Hoover is a 50 year old female who is being evaluated via a billable video visit.      The patient has been notified of following:     \"This video visit will be conducted via a call between you and your physician/provider. We have found that certain health care needs can be provided without the need for an in-person physical exam.  This service lets us provide the care you need with a video conversation.  If a prescription is necessary we can send it directly to your pharmacy.  If lab work is needed we can place an order for that and you can then stop by our lab to have the test done at a later time.    Video visits are billed at different rates depending on your insurance coverage.  Please reach out to your insurance provider with any questions.    If during the course of the call the physician/provider feels a video visit is not appropriate, you will not be charged for this service.\"    Patient has given verbal consent for Video visit? Yes  How would you like to obtain your AVS? MyChart  If you are dropped from the video visit, the video invite should be resent to:   Will anyone else be joining your video visit? No      Video-Visit Details    Type of service:  Video Visit    Video Start Time: 0930  Video End Time: 9:50 AM    Originating Location (pt. Location): Home    Distant Location (provider location):  [facility] SLEEP [facility]     Platform used for Video Visit: Ule    Virtual visit to review sleep testing results.    Assessment:  -Severe obstructive sleep apnea with sleep associated hypoxemia  -Concern for hypoventilation is borderline with relative increase of TCM by more than 10, though with no time greater than 55 and oximetry not suggestive of sustained hypoxemia.    Plan:  -I strongly recommended to treat this level obstructive sleep apnea to normalize SPO2, comorbid type 2 diabetes and obesity, and to reduce long-term cardiovascular risk factors.  -We will start " treatment with CPAP auto titrate 5-15 cm H2O.  Would recommend follow-up overnight oxygen recording to ensure no residual hypoxemia on treatment.    49-year-old female with past medical history of type 2 diabetes, obesity (BMI of ~50).  Her primary sleep concern was socially disruptive snoring.    Polysomnogram performed October 23, 2020 with weight 290 pounds, BMI 50.1.  Total sleep time for 15 minutes.  Time in supine REM of 40.5 minutes.  AHI 41.1, RDI 45.5.  Baseline SPO2 91.5%.  Estuardo SPO2 59.2% in supine REM.  Time spent less than or equal to 88% of 39 minutes.  Transcutaneous carbon oxide monitoring was performed with maximum change of 12-13 mmHg, though 0 minutes at or greater than 55 mmHg.  No PLM's observed.  EKG appeared normal sinus rhythm with a relatively high average heart rate of 96.1 bpm.    10 point ROS of systems including Constitutional, Eyes, Respiratory, Cardiovascular, Gastroenterology, Genitourinary, Integumentary, Muscularskeletal, Psychiatric were all negative except for pertinent positives noted in my HPI.    Physical Exam  Constitutional:       General: She is not in acute distress.     Appearance: Normal appearance. She is obese. She is not ill-appearing, toxic-appearing or diaphoretic.   HENT:      Head: Normocephalic and atraumatic.      Right Ear: External ear normal.      Left Ear: External ear normal.      Nose: Nose normal.   Eyes:      Conjunctiva/sclera: Conjunctivae normal.   Pulmonary:      Effort: Pulmonary effort is normal. No respiratory distress.   Skin:     Coloration: Skin is not jaundiced or pale.      Findings: No bruising or erythema.   Neurological:      General: No focal deficit present.      Mental Status: She is alert and oriented to person, place, and time.   Psychiatric:         Mood and Affect: Mood normal.         Behavior: Behavior normal.         Thought Content: Thought content normal.         Judgment: Judgment normal.           Chip Nieves MD,  MD

## 2020-11-12 ENCOUNTER — TELEPHONE (OUTPATIENT)
Dept: SLEEP MEDICINE | Facility: CLINIC | Age: 50
End: 2020-11-12

## 2020-11-19 ENCOUNTER — DOCUMENTATION ONLY (OUTPATIENT)
Dept: SLEEP MEDICINE | Facility: CLINIC | Age: 50
End: 2020-11-19

## 2020-11-19 NOTE — PROGRESS NOTES
Patient was offered choice of vendor and chose [facility].  [facility] [place] [facility].  Patient received a [employer] Respironics DreamStation Auto. Pressures were set at 5-15 cm H2O.   Patient s ramp is 5 cm H2O for 30 min and FLEX/EPR is A Flex, 2.  Patient received a Resmed Mask name: Airfit N20 Nasal mask Size Medium, heated tubing and heated humidifier.  Patient  does need to meet compliance (usage only).  Patient has a follow up on TBD with Dr. Nieves.    Tatianna Torres

## 2020-11-19 NOTE — PROGRESS NOTES
Patient called for virtual mask consult via telephone visit for mask fitting appointment on November 19, 2020.  Patient requested to switch masks because general discomfort and mask leak.  Patient stated that the Medium cushion is too small and requested size Large.  Discussed the following masks: Resmed Airtouch N20 Nasal mask.  Patient decided to switch from Resmed Airfit N20 Nasal mask to a Resmed Mask name: Airtouch Nasal mask size Large.  Mask shipped to patient.

## 2020-11-24 ENCOUNTER — DOCUMENTATION ONLY (OUTPATIENT)
Dept: SLEEP MEDICINE | Facility: CLINIC | Age: 50
End: 2020-11-24

## 2020-11-24 NOTE — PROGRESS NOTES
3 DAY STM VISIT    Diagnostic AHI: 41.1    PSG    Patient contacted for 3 day STM visit    Confirmed with patient at time of call- Yes Patient is still interested in STM service     Subjective measures: Patient reports that she received a new head gear and that has improved comfort.       Replacement device: No  STM ordered by provider: Yes     Device type: Auto-CPAP  PAP settings from order::  CPAP min 5 cm  H20       CPAP max 15 cm  H20        Mask type:    Nasal Mask               Assessment: Nightly usage over four hours.  Action plan: Patient to have 14 day STM visit. Patient has a follow up visit scheduled:   not required by insurance.     Total time spent on accessing and  interpreting remote patient PAP therapy data  10 minutes  Total time spent counseling, coaching  and reviewing PAP therapy data with patient  3 minutes  [id number] no

## 2020-12-04 ENCOUNTER — DOCUMENTATION ONLY (OUTPATIENT)
Dept: SLEEP MEDICINE | Facility: CLINIC | Age: 50
End: 2020-12-04

## 2020-12-04 NOTE — PROGRESS NOTES
14  DAY STM VISIT    Diagnostic AHI: 41.1    PSG    Subjective measures:   Patient reports she is having problems finding a good fit for her nose.  She has been working on mask fit.  She is feeling benefit from therapy   She is working on trying to remember to put it on.     Assessment: Pt not meeting objective benchmarks for compliance  Patient meeting subjective benchmarks.     Action plan: pt to have 30 day STM visit.      Device type: Auto-CPAP    PAP settings: CPAP min 5.0 cm  H20       CPAP max 15.0 cm  H20           Mask type:  Nasal Mask    Objective measures: 14 day rolling measures data in [place]      Compliance  64.3%      Leak  12  lpm  last  upload      AHI 2.1   last  upload      Average number of minutes 124      Objective measure goal  Compliance   Goal >70%  Leak   Goal < 24 lpm  AHI  Goal < 5  Usage  Goal >240        Total time spent on accessing and interpreting remote patient PAP therapy data  10 minutes    Total time spent counseling, coaching  and reviewing PAP therapy data with patient  3 minutes    [id number]  [id number]  no (3 day STM)

## 2020-12-23 ENCOUNTER — DOCUMENTATION ONLY (OUTPATIENT)
Dept: SLEEP MEDICINE | Facility: CLINIC | Age: 50
End: 2020-12-23
Payer: COMMERCIAL

## 2020-12-23 NOTE — PROGRESS NOTES
30 DAY [facility] VISIT    Diagnostic AHI: 41.1  PSG      Message left for patient to return call     Assessment: Pt not meeting objective benchmarks for     Action plan: waiting for patient to return call.   Patient has not scheduled a follow up visit.  Device type: Auto-CPAP  PAP settings: CPAP min 5.0 cm  H20     CPAP max 15.0 cm  H20     90th % pressure 9.7  cm  H20    Mask type:  Nasal Mask    Objective measures: 14 day rolling measures         Compliance  20 %     % of night spent in large leak  4928 % last  upload      AHI 3.33   last  upload      Average number of minutes 243          Objective measure goal  Compliance   Goal >70%  Leak   Goal < 10%  AHI  Goal < 5  Usage  Goal >240      Total time spent on accessing and interpreting remote patient PAP therapy data  10 minutes    Total time spent counseling, coaching  and reviewing PAP therapy data with patient  1 minutes    [id number] no this call  [id number]  at 3 or 14 day [facility]

## 2021-01-15 ENCOUNTER — HEALTH MAINTENANCE LETTER (OUTPATIENT)
Age: 51
End: 2021-01-15

## 2021-01-21 ENCOUNTER — TRANSFERRED RECORDS (OUTPATIENT)
Dept: HEALTH INFORMATION MANAGEMENT | Facility: CLINIC | Age: 51
End: 2021-01-21

## 2021-01-21 LAB
ALT SERPL-CCNC: 18 U/L (ref 0–55)
AST SERPL-CCNC: 13 U/L (ref 10–40)
CREAT SERPL-MCNC: 0.88 MG/DL (ref 0.55–1.02)
GFR SERPL CREATININE-BSD FRML MDRD: >60 ML/MIN/1.73M2
GLUCOSE SERPL-MCNC: 139 MG/DL (ref 70–100)
HBA1C MFR BLD: 7.4 % (ref 0–5.7)
POTASSIUM SERPL-SCNC: 4 MMOL/L (ref 3.5–5.1)

## 2021-01-23 ENCOUNTER — HEALTH MAINTENANCE LETTER (OUTPATIENT)
Age: 51
End: 2021-01-23

## 2021-01-29 ENCOUNTER — VIRTUAL VISIT (OUTPATIENT)
Dept: ENDOCRINOLOGY | Facility: CLINIC | Age: 51
End: 2021-01-29
Payer: COMMERCIAL

## 2021-01-29 VITALS — BODY MASS INDEX: 48.82 KG/M2 | WEIGHT: 293 LBS | HEIGHT: 65 IN

## 2021-01-29 DIAGNOSIS — E66.813 CLASS 3 SEVERE OBESITY DUE TO EXCESS CALORIES WITH BODY MASS INDEX (BMI) OF 50.0 TO 59.9 IN ADULT, UNSPECIFIED WHETHER SERIOUS COMORBIDITY PRESENT (H): Primary | ICD-10-CM

## 2021-01-29 DIAGNOSIS — E11.9 TYPE 2 DIABETES MELLITUS WITHOUT COMPLICATION, UNSPECIFIED WHETHER LONG TERM INSULIN USE (H): ICD-10-CM

## 2021-01-29 DIAGNOSIS — E11.9 TYPE 2 DIABETES MELLITUS WITHOUT COMPLICATION, WITHOUT LONG-TERM CURRENT USE OF INSULIN (H): ICD-10-CM

## 2021-01-29 DIAGNOSIS — E66.01 CLASS 3 SEVERE OBESITY DUE TO EXCESS CALORIES WITH BODY MASS INDEX (BMI) OF 50.0 TO 59.9 IN ADULT, UNSPECIFIED WHETHER SERIOUS COMORBIDITY PRESENT (H): Primary | ICD-10-CM

## 2021-01-29 PROCEDURE — 99215 OFFICE O/P EST HI 40 MIN: CPT | Mod: GT | Performed by: INTERNAL MEDICINE

## 2021-01-29 RX ORDER — BLOOD-GLUCOSE CONTROL, NORMAL
EACH MISCELLANEOUS
Qty: 1 BOTTLE | Refills: 3 | Status: SHIPPED | OUTPATIENT
Start: 2021-01-29

## 2021-01-29 RX ORDER — LANCETS
EACH MISCELLANEOUS
Qty: 102 EACH | Refills: 5 | Status: SHIPPED | OUTPATIENT
Start: 2021-01-29 | End: 2023-01-13

## 2021-01-29 RX ORDER — GLUCOSAMINE HCL/CHONDROITIN SU 500-400 MG
CAPSULE ORAL
Qty: 100 EACH | Refills: 5 | Status: SHIPPED | OUTPATIENT
Start: 2021-01-29 | End: 2022-03-03

## 2021-01-29 RX ORDER — BLOOD SUGAR DIAGNOSTIC
STRIP MISCELLANEOUS
Qty: 100 STRIP | Refills: 5 | Status: SHIPPED | OUTPATIENT
Start: 2021-01-29 | End: 2023-01-13

## 2021-01-29 ASSESSMENT — PAIN SCALES - GENERAL: PAINLEVEL: NO PAIN (0)

## 2021-01-29 ASSESSMENT — MIFFLIN-ST. JEOR: SCORE: 1960.59

## 2021-01-29 NOTE — LETTER
"2021       RE: Yancy Hoover  [address]  [facility] [address]     Dear Colleague,    Thank you for referring your patient, Yancy Hoover, to the [facility] [place] at [facility]. Please see a copy of my visit note below.    Yancy is a 50 year old who is being evaluated via a billable video visit.      How would you like to obtain your AVS? MyChart  If the video visit is dropped, the invitation should be resent by: Text to cell phone: [phone]  Will anyone else be joining your video visit? No    Video Start Time: 12:06  Video-Visit Details    Type of service:  Video Visit    Video End Time:12:22    Originating Location (pt. Location): Home    Distant Location (provider location):  [facility] [place]     Platform used for Video Visit: [employer] [facility] Weight Management Note     Yancy Hoover  MRN:  [id number]  :  1970  MARLON:  2021    Dear Physician No Ref-Primary,    I had the pleasure of seeing your patient Yancy Hoover. She is a 50 year old female who I am continuing to see for treatment of obesity related to:       2020   I have the following health issues associated with obesity: Type II Diabetes       INTERVAL HISTORY:  Returns, last seen about 6-7 mo ago; reviewed and relevant history is as follows, as extracted from earlier note:    --------------------------  \"She has the following co-morbidities:  No HTN, no CVD, elevated cholesterol, DM2 newly disgnosed (had HbA1c of 7.0 by her report)        Has struggled with wt whole life, and this is pt's max wt now.     With COVID19 is working at home and sedentary otherwise  Drinking a lot soda; customer service work     Has been working with the following provider on wt loss--  [facility] (Mack Lantigua)  Pt notes she went to see a wt loss doctor and wanted wt " loss pills.  He did labs and told pt she had DM.  HbA1c 7.0.  Cholesterol was a little high also--208     Pt notes she was referred to a dietitian but the dietitian is not taking new pts and pt has a lot of questions--is motivated to make health changes and halt/slow DM-related progressions/complications..      Off of work this next week so would like to have virtual visit with nutrition then if possible.     [place]--interested in goal of walking 30 min 5 days per wk; gradually work up to 10,000 steps per day (can use phone tracker)     Can stop eating after supper--made goal for this and the family has plans for eating dinner around 5p.     Will cut out the soda and switch to 0 paul options, has been having at least several 20 oz sodas (sprite) daily, did note while on topiramate that it was tasting flat and she found herself leaving unfinished sodas and opening new ones because of this change in taste---> now understands that this is likely related to topiramate and she can set a goal of decreasing/stopping the sugared soda and use this med to help her make this change.     Pt notes cravings and boredom and happens in the evening after supper; she will re-start and shift topiramate to prior to supper with up-titration schedule I provided in AVS.        MEDICATIONS:   Current Outpatient Prescriptions          Current Outpatient Medications   Medication Sig Dispense Refill     metFORMIN (GLUCOPHAGE) 500 MG tablet           topiramate (TOPAMAX) 25 MG tablet Take 25 mg by mouth         ibuprofen (ADVIL/MOTRIN) 200 MG capsule Take 3 capsules (600 mg) by mouth every 6 hours as needed for fever (Patient not taking: Reported on 7/3/2020) 60 capsule 0     oxyCODONE (ROXICODONE) 5 MG tablet Take 1 tablet (5 mg) by mouth every 6 hours as needed for pain (Patient not taking: Reported on 7/3/2020) 10 tablet 0         PMH--ovarian cyst (and had ibuprofen and oxycodone for awhile after that, none in months);  "had tubal ligation     DM in the family--grandmother (took insulin) older age     Current meds--topiramate (25mg BID) but has not taking in 3 wks for HAs (sinus? vs. migraine);  New metformin--starting today (has had prescription but was waiting to start it)  500mg twice per day now and will escalate to 1000 mg BID in a few wks        Daily routine-  Up around 6-7A  BR--2 sodas; or eggs/sausage/toast (daughter cooks 1-2 times per wk)  MAYKEL--little to no lunch (but snacking throughout the day), occasionally sandwich and chips  DI--(night before last) chicken wings, French fries     Has daughter with obesity home from college (COVID19) and they are eating healthier at dinner\"     --------------------------      Mor recently, in the interim she notes the following--  Having ear problems (parotiditis) and saw ENT and was given prednisone which has now been discontinued (seveal days ago) ( had ear tubes placed)    Waking feeling jittery/lightheadedness    Will eat something and take meds--OK around 8a    Current meds--  Metformin (ahs not taken regularly lately, lots of travel--several COVID-related deaths in the family)  Last 3 wks has been taking 500mg BID    topiramate--not taking regularly (only with headaches)    Pt needs to lose 15# (296# baseline) on the bariatric surgical pathway to meet her surgery goal    Pt notes she emotional eater)    CURRENT WEIGHT:   296 lbs 0 oz   Body mass index is 50.02 kg/m .    Initial Weight (lbs): 295 lbs  Last Visits Weight: 134.3 kg (296 lb)  Cumulative weight loss (lbs): -1  Weight Loss Percentage: -0.34%    Changes and Difficulties 1/29/2021   I have made the following changes to my diet since my last visit: no   With regards to my diet, I am still struggling with: yes strugglings-family funerals   I have made the following changes to my activity/exercise since my last visit: no   With regards to my activity/exercise, I am still struggling with: family issues       VITALS:  Ht " "1.638 m (5' 4.5\")   Wt 134.3 kg (296 lb)   BMI 50.02 kg/m      MEDICATIONS:   Current Outpatient Medications   Medication Sig Dispense Refill     metFORMIN (GLUCOPHAGE-XR) 500 MG 24 hr tablet Take 1 tablet (500 mg) by mouth 2 times daily (with meals) 180 tablet 1     topiramate (TOPAMAX) 25 MG tablet wk1--25 mg before supper; wk2--50 mg then, wk3 and after--75 mg 90 tablet 4     meclizine (ANTIVERT) 25 MG tablet Take 1 tablet (25 mg) by mouth 3 times daily as needed for dizziness (Patient not taking: Reported on 1/29/2021) 15 tablet 1     ondansetron (ZOFRAN ODT) 4 MG ODT tab Take 1 tablet (4 mg) by mouth every 8 hours as needed for nausea (Patient not taking: Reported on 1/29/2021) 10 tablet 0       Weight Loss Medication History Reviewed With Patient 1/29/2021   Are you having any side effects from the weight loss medication that we have prescribed you? No   If you are having side effects please describe: none       ASSESSMENT/PLAN:     ## morbid obesity  ## T2DM without complications, not treated with ins     Yancy is a patient with early onset morbid obesity with significant element of familial/genetic influence and with current health consequences. She does need aggressive weight loss plan due to new dx T2DM.  Yancy Hoover eats a high carb diet, eats a high fat diet, eats to obtain specific degree of fullness, and has a lot of liquid calories.     Her problem is complicated by a hunger disorder and strong craving/reward pathways     Her ability to lose weight is impacted by current work life (sedentary work and working at home now with COVID19 pandemic).     PLAN:    Decrease portion sizes  Purge house of food triggers  No meal skipping  Decrease caloric beverages by daily walking plan (has already put this in place with another provider and has plan for escalating as tolerated)  Dietician visit of education  Volumetrics eating plan  Hypocaloric/low fat diet  Increase activity by daily walking "      Diabetes   Ancillary testing:  N/A. Frequent home glucose monitoring with report to nurse as normal weight decreases.   Food Plan:  Reduced calorie and Low carbohydrate.  Activity Plan:  Daily walking, can do this after meals (such as after supper in the evenings in the summer--supper is around 5p).  Supplementary:  N/A.  Medication:  see comments below     Additionally--  --send meter and test strips for 1-2 times daily testing  --for metformin (currently taking 500 BID) will go to 1000A/500P for one wk then 1000 BID thereafter as tolerated  --RTC in 6-8 wks; possibly add Ozempic then (sending info in AVS)      Time: approx 45 min total same day time spent on evaluation, management, counseling, education, & motivational interviewing; combined previsit prep and post visit charting/follow up care    Sincerely,    Danial Velarde MD

## 2021-01-29 NOTE — PROGRESS NOTES
"Yancy is a 50 year old who is being evaluated via a billable video visit.      How would you like to obtain your AVS? MyChart  If the video visit is dropped, the invitation should be resent by: Text to cell phone: [phone]  Will anyone else be joining your video visit? No    Video Start Time: 12:06  Video-Visit Details    Type of service:  Video Visit    Video End Time:12:22    Originating Location (pt. Location): Home    Distant Location (provider location):  [facility] [place]     Platform used for Video Visit: [employer]         [facility] Weight Management Note     Yancy Hoover  MRN:  [id number]  :  1970  MARLON:  2021    Dear Physician No Ref-Primary,    I had the pleasure of seeing your patient Yancy Hoover. She is a 50 year old female who I am continuing to see for treatment of obesity related to:       2020   I have the following health issues associated with obesity: Type II Diabetes       INTERVAL HISTORY:  Returns, last seen about 6-7 mo ago; reviewed and relevant history is as follows, as extracted from earlier note:    --------------------------  \"She has the following co-morbidities:  No HTN, no CVD, elevated cholesterol, DM2 newly disgnosed (had HbA1c of 7.0 by her report)        Has struggled with wt whole life, and this is pt's max wt now.     With COVID19 is working at home and sedentary otherwise  Drinking a lot soda; customer service work     Has been working with the following provider on wt loss--  [facility] (Mack Lantigua)  Pt notes she went to see a wt loss doctor and wanted wt loss pills.  He did labs and told pt she had DM.  HbA1c 7.0.  Cholesterol was a little high also--208     Pt notes she was referred to a dietitian but the dietitian is not taking new pts and pt has a lot of questions--is motivated to make health changes and halt/slow DM-related progressions/complications..      Off of work this next week " so would like to have virtual visit with nutrition then if possible.     [place]--interested in goal of walking 30 min 5 days per wk; gradually work up to 10,000 steps per day (can use phone tracker)     Can stop eating after supper--made goal for this and the family has plans for eating dinner around 5p.     Will cut out the soda and switch to 0 paul options, has been having at least several 20 oz sodas (sprite) daily, did note while on topiramate that it was tasting flat and she found herself leaving unfinished sodas and opening new ones because of this change in taste---> now understands that this is likely related to topiramate and she can set a goal of decreasing/stopping the sugared soda and use this med to help her make this change.     Pt notes cravings and boredom and happens in the evening after supper; she will re-start and shift topiramate to prior to supper with up-titration schedule I provided in AVS.        MEDICATIONS:   Current Outpatient Prescriptions          Current Outpatient Medications   Medication Sig Dispense Refill     metFORMIN (GLUCOPHAGE) 500 MG tablet           topiramate (TOPAMAX) 25 MG tablet Take 25 mg by mouth         ibuprofen (ADVIL/MOTRIN) 200 MG capsule Take 3 capsules (600 mg) by mouth every 6 hours as needed for fever (Patient not taking: Reported on 7/3/2020) 60 capsule 0     oxyCODONE (ROXICODONE) 5 MG tablet Take 1 tablet (5 mg) by mouth every 6 hours as needed for pain (Patient not taking: Reported on 7/3/2020) 10 tablet 0         PMH--ovarian cyst (and had ibuprofen and oxycodone for awhile after that, none in months); had tubal ligation     DM in the family--grandmother (took insulin) older age     Current meds--topiramate (25mg BID) but has not taking in 3 wks for HAs (sinus? vs. migraine);  New metformin--starting today (has had prescription but was waiting to start it)  500mg twice per day now and will escalate to 1000 mg BID in a few  "wks        Daily routine-  Up around 6-7A  BR--2 sodas; or eggs/sausage/toast (daughter cooks 1-2 times per wk)  MAYKEL--little to no lunch (but snacking throughout the day), occasionally sandwich and chips  DI--(night before last) chicken wings, French fries     Has daughter with obesity home from college (COVID19) and they are eating healthier at dinner\"     --------------------------      Mor recently, in the interim she notes the following--  Having ear problems (parotiditis) and saw ENT and was given prednisone which has now been discontinued (seveal days ago) ( had ear tubes placed)    Waking feeling jittery/lightheadedness    Will eat something and take meds--OK around 8a    Current meds--  Metformin (ahs not taken regularly lately, lots of travel--several COVID-related deaths in the family)  Last 3 wks has been taking 500mg BID    topiramate--not taking regularly (only with headaches)    Pt needs to lose 15# (296# baseline) on the bariatric surgical pathway to meet her surgery goal    Pt notes she emotional eater)    CURRENT WEIGHT:   296 lbs 0 oz   Body mass index is 50.02 kg/m .    Initial Weight (lbs): 295 lbs  Last Visits Weight: 134.3 kg (296 lb)  Cumulative weight loss (lbs): -1  Weight Loss Percentage: -0.34%    Changes and Difficulties 1/29/2021   I have made the following changes to my diet since my last visit: no   With regards to my diet, I am still struggling with: yes strugglings-family funerals   I have made the following changes to my activity/exercise since my last visit: no   With regards to my activity/exercise, I am still struggling with: family issues       VITALS:  Ht 1.638 m (5' 4.5\")   Wt 134.3 kg (296 lb)   BMI 50.02 kg/m      MEDICATIONS:   Current Outpatient Medications   Medication Sig Dispense Refill     metFORMIN (GLUCOPHAGE-XR) 500 MG 24 hr tablet Take 1 tablet (500 mg) by mouth 2 times daily (with meals) 180 tablet 1     topiramate (TOPAMAX) 25 MG tablet wk1--25 mg before supper; " wk2--50 mg then, wk3 and after--75 mg 90 tablet 4     meclizine (ANTIVERT) 25 MG tablet Take 1 tablet (25 mg) by mouth 3 times daily as needed for dizziness (Patient not taking: Reported on 1/29/2021) 15 tablet 1     ondansetron (ZOFRAN ODT) 4 MG ODT tab Take 1 tablet (4 mg) by mouth every 8 hours as needed for nausea (Patient not taking: Reported on 1/29/2021) 10 tablet 0       Weight Loss Medication History Reviewed With Patient 1/29/2021   Are you having any side effects from the weight loss medication that we have prescribed you? No   If you are having side effects please describe: none       ASSESSMENT/PLAN:     ## morbid obesity  ## T2DM without complications, not treated with ins     Yancy is a patient with early onset morbid obesity with significant element of familial/genetic influence and with current health consequences. She does need aggressive weight loss plan due to new dx T2DM.  Yancy Hoover eats a high carb diet, eats a high fat diet, eats to obtain specific degree of fullness, and has a lot of liquid calories.     Her problem is complicated by a hunger disorder and strong craving/reward pathways     Her ability to lose weight is impacted by current work life (sedentary work and working at home now with COVID19 pandemic).     PLAN:    Decrease portion sizes  Purge house of food triggers  No meal skipping  Decrease caloric beverages by daily walking plan (has already put this in place with another provider and has plan for escalating as tolerated)  Dietician visit of education  Volumetrics eating plan  Hypocaloric/low fat diet  Increase activity by daily walking      Diabetes   Ancillary testing:  N/A. Frequent home glucose monitoring with report to nurse as normal weight decreases.   Food Plan:  Reduced calorie and Low carbohydrate.  Activity Plan:  Daily walking, can do this after meals (such as after supper in the evenings in the summer--supper is around 5p).  Supplementary:   N/A.  Medication:  see comments below     Additionally--  --send meter and test strips for 1-2 times daily testing  --for metformin (currently taking 500 BID) will go to 1000A/500P for one wk then 1000 BID thereafter as tolerated  --RTC in 6-8 wks; possibly add Ozempic then (sending info in AVS)      Time: approx 45 min total same day time spent on evaluation, management, counseling, education, & motivational interviewing; combined previsit prep and post visit charting/follow up care    Sincerely,    Danial Velarde MD

## 2021-01-29 NOTE — NURSING NOTE
Chief Complaint   Patient presents with     Follow Up     Follow-up nwm       There were no vitals filed for this visit.    There is no height or weight on file to calculate BMI.

## 2021-01-30 NOTE — PATIENT INSTRUCTIONS
Thank you for allowing us the privilege of caring for you. We hope we provided you with the excellent service you deserve.    Please let us know if there is anything else we can do for you so that we can be sure you are completely satisfied with your care experience.    Your visit was with Dr. Velarde today.    Instructions per today's visit:  --prescription sent for meter and test strips for 1-2 times daily testing; please test fasting sugars and on some days do a second sugar test later in the day  --for metformin (given that you are currently taking 500 BID) you can go to 1000A/500P for one wk then 1000mg BID thereafter as tolerated  --return in 6-8 wks for virtual visit with Dr. Velarde; possibly we can  add Ozempic then (see info below in the after visit summary)    Please call our contact center at [phone] to schedule your next appointments.    Meal Replacement Products:    Here is the link to our new e-store where you can purchase our meal replacement products    [facility]/store    The one week starter kit is a great way to sample a variety of products and see what works for you.    If you want more information about the product go to: [employer]    Free Shipping for orders over $75    Benefits of meal replacements products:    Portion and calorie control  Improved nutrition  Structured eating  Simplified food choices  Avoid contact with trigger foods    Interested in working with a health ?  Health coaches work with you to improve your overall health and wellbeing.  They look at the whole person, and may involve discussion of different areas of life, including, but not limited to the four pillars of health (sleep, exercise, nutrition, and stress management). Discuss with your care team if you would like to start working a health .    Health Coaching-3 Pack: Schedule by calling [phone]    $99 for three health coaching visits     Visits may be done in person or via phone    Coaching is a partnership between the  and the client; Coaches do not prescribe or diagnose    Coaching helps inspire the client to reach his/her personal goals    Bluetooth Scale:    We hope to provide you with high quality telephone and virtual healthcare visits while social distancing for COVID-19 is necessary, as well as in the future when virtual visits may be more convenient for you.    Our technology team made it possible for Bluetooth scales to send weight measurements to our electronic medical record. This allows weights from you weighing at home to securely flow into the medical record, which will improve telephone and virtual visits.  Additionally, studies have shown that adults actually lose more weight when their weights are automatically sent to someone else, and also that this process is not stressful for those adults.    Below is a link for purchasing the scale, with a discount code for our patients. You may call your insurance company to see if they will reimburse you for the cost of the scale, as a piece of durable medical equipment. The scales only go up to a weight of 400 pounds. This is an issue and we are working with the developer on increasing this. We found no scales that go over 400lb that have blue-tooth for connecting to [employer].    Scale to purchase: the Mobicious  Body  Scale: [email or web address]=aw.ds    Discount Code: We have a discount code for our patients to bring the cost down to $50, the code is:  withmed     Steps to link the scale to [employer] via an Android Phone (you can always disconnect at any point in the future):  1. The order must be placed first before the patient can access Track My Health within [employer].  2. Download Google Fit gilbert from the Google Play Store  a. Log in or register using your Google account  3. Download the [employer]  maddie from Google Play Store  a. Select add organization  b. Search for [email or web address] and select it  c. Log into [employer]Invoice  d. Select Track My Health  e. Select the green connect my account button  f. When prompted log into your Google account  g. Select okay to confirm the account  4. Download the [employer] Health Mate maddie from Google Play Store  a. [place] for [employer]  b. Go to profile  c. Tap google fit under the Apps section  d. Select the option to activate Google Fit integration  e. Select the same Google account  f. Select okay to confirm the account  g.  Steps to link the scale to [employer] via an iPhone (you can always disconnect at any point in the future):  **Note Jobool is not available for download on an iPad**  1. The order must be placed first before the patient can access "[employer] - The Simple, Fast and Elegant Tablet Sales App" Health within [employer].  2. Locate the Health maddie on your iPhone.  a. Set up your Apple Health account as prompted  b. The Sources page will show Apps that communicate with your Health maddie. Once all steps are completed, you should see [employer] and MyChart listed under the Apps section and your iPhone under the devices section.  i. Select Health Mate  1. Under 'ALLOW  HEALTH MATE  TO WRITE DATA ensure the toggle is on for Weight.  2. This will allow the scale to add your weight to the Apple Health  ii. Select MyChart  1. Under 'ALLOW  [employer]ART  TO READ DATA ensure the toggle is on for Weight.  2. This allows Tiger [place] to grab the weight from Jobool so your provider can see your weights.  3. Download the [employer] maddie from the Maddie Store  a. Select add organization                                                  b. Search for [place] and select it  c. Log into [employer]  d. Select Track My Health  e. Select the green connect my account button  f. Follow prompts to link your device to [employer].  4. Download the [facility] health mate maddie in the Maddie Store  a. [employer] Withings  b. Go to profile  c. Tap Health under the Apps  section  d. If prompted to allow access with the Health Maddie, toggle weight on for read and write access.      For any questions/concerns contact Ebony Barragan LPN at [phone]    To schedule appointments with our team, please call [phone]    Please call during clinic hours Monday through Friday 8:00a - 4:00p if you have questions or you can contact us via SemiNex at anytime.      Lab results will be communicated through My Chart or letter (if My Chart not used). Please call the clinic if you have not received communication after 1 week or if you have any questions.    Clinic Fax: [phone]    Thank you,  Medical Weight Management Team      MEDICATION we discussed could be started in the future to help with blood sugars and appetite control to support wt loss    We are considering starting a GLP-1 (Glucagon-like Peptide-1) medication. Examples of this medication include Byetta, Bydureon, or Victoza, Ozempic, etc. The medication chosen will depend on insurance coverage, and can be changed to the medication that is covered under your plan. These medications work the same, in that they can help you lose weight and control your blood sugar. One of the ways it works is by slowing down the rate that food leaves your stomach. You feel morrell and will eat less.  It also helps regulate hormones that can help improve your blood sugars.    Usually short acting insulins or oral agents are stopped or decreased by the doctor at the appointment. Low blood sugars are rare but can happen if patients are on insulin or other oral agents. If you notice consistent low sugars or high sugars, your medication may need to be adjusted after your appointment. If this is the case, please call RN and provide her your blood sugar record from the last 3-4 days. The nurse will get in touch with the doctor and call you back/NEON Concierge message with recommendations. We tolerate high sugars for a bit, so if sugars are running 180-200, this is  ok. As weight starts dropping the blood sugars should too. If readings are consistently over 200 for 1-2 weeks, then you should call the doctor/nurse.    Types of GLP-1 medications include:    Victoza: Starting dose is 0.6 mg for a week, then 1.2 mg for a week, and then 1.8 mg thereafter (if prescribed by doctor). This medication is given daily. Pen needles need to be ordered also.  Ozempic: Starting dose is 0.25 mg once weekly for 4 weeks, and then increase to 0.5 mg weekly. If after 4 weeks of being on 0.5 mg weekly dosing and still wanting additional weight loss and/or blood sugar benefits, check with your doctor to see if they want to increase the dose to 1 mg weekly. Pen needles come in the Ozempic pen box.  Trulicity: Starting dose is 0.75 mg once weekly.  If after 1-3 months of being on 0.75 mg weekly and still wanting additional weight loss and/or blood sugar benefits, check with your doctor to see if they want to increase the dose to 1.5 mg weekly.  Bydureon: Starting dose is 2 mg and is given weekly. The patient should pick one day a week and give the medication on that day. Pen needles need to be ordered also.  Byetta: Starting dose is 5 mcg twice daily. This is given for the first month. Check with the doctor after a month to see if they want the dose increased to 10 mcg BID. Pen needles need to be ordered also.     Side effects of GLP- Medications include: The most common side effects are all GI related and consist of: nausea, constipation, diarrhea, burping, or gassiness. Patients are advised to eat slowly and less, and nausea typically passes if people can stick it out.     The risk of pancreatitis (inflammation of the pancreas) has been associated with this type of medication, but is very rare.  If you have had pancreatitis in the past, this medication may not be for you. Please let us know about any past history of pancreas problems.      Symptoms of pancreatitis include: Pain in your upper stomach  area which  may travel to your back and be worse after eating. Your stomach area may be tender to the touch.  You may have vomiting or nausea and/or have a fever. If you should develop any of these symptoms, stop the medication and contact your primary care doctor. They will do a blood test to check for pancreatitis.         There is a small chance you may have some low blood sugar after taking the medication.   The signs of low blood sugar are:  o Weakness  o Shaky   o Hungry  o Sweating  o Confusion      See below for ways to treat low blood sugar without adding in lots of extra calories.      Treating Low Blood Sugar    If you have symptoms of low blood sugar (sweating, shaking, dizzy, confused) eat 15 grams of carbs and wait 15 minutes:      Glucose Tabs are best for sugars under 70 -  Dex4 or BD Glucose tablets are good, you will need to take 3-4 of these to equal 15 grams.       One small box of raisins    4 oz fruit juice box or   cup fruit juice    1 small apple    1 small banana      cup canned fruit in water      English muffin or a slice of bread with jelly     1 low fat frozen waffle with sugar-free syrup      cup cottage cheese with   cup frozen or fresh blueberries    1 cup skim or low-fat milk      cup whole grain cereal    4-6 crackers such as Triscuits      This medication is usually covered by insurance for patients with a diagnosis of Type 2 Diabetes. Depending on insurance coverage, the medication may be changed to a different formulary, but they all work in the same way. Sometimes a prior authorization is required, which may take up to 1-2 weeks for an insurance company to make a decision if they will cover the medication. Please be patient, you will be notified either way.     For any questions/concerns contact Ebony Barragan LPN at [phone]     (Do not stop taking it if you don't think it's working. For some people it works without them knowing it.)     In order to get refills of this or  any medication we prescribe you must be seen in the medical weight mgmt clinic every 2-4 months. Please have your pharmacy fax a refill request to [phone].

## 2021-02-07 RX ORDER — METFORMIN HCL 500 MG
TABLET, EXTENDED RELEASE 24 HR ORAL
Qty: 360 TABLET | Refills: 3 | Status: SHIPPED | OUTPATIENT
Start: 2021-02-07 | End: 2021-09-27

## 2021-02-09 ENCOUNTER — VIRTUAL VISIT (OUTPATIENT)
Dept: ENDOCRINOLOGY | Facility: CLINIC | Age: 51
End: 2021-02-09
Payer: COMMERCIAL

## 2021-02-09 DIAGNOSIS — E11.9 TYPE 2 DIABETES MELLITUS WITHOUT COMPLICATION, WITHOUT LONG-TERM CURRENT USE OF INSULIN (H): ICD-10-CM

## 2021-02-09 DIAGNOSIS — E66.9 OBESITY: ICD-10-CM

## 2021-02-09 DIAGNOSIS — Z71.3 NUTRITIONAL COUNSELING: Primary | ICD-10-CM

## 2021-02-09 PROCEDURE — 97803 MED NUTRITION INDIV SUBSEQ: CPT | Mod: GT | Performed by: DIETITIAN, REGISTERED

## 2021-02-09 NOTE — PATIENT INSTRUCTIONS
"Juan J Haines    Follow-up with RD in one month.    Thank you,    Sharon Osborn, RD, LD  If you would like to schedule or reschedule an appointment with the RD, please call [phone]    Nutrition Goals  Relating To Eating:  - Eat 3 meals per day.   - Use the 9\" Plate method to structure meals (1/2 plate non-starchy vegetables/fruit, 1/4 plate lean protein, 1/4 plate whole grain starch - no more than 1/2 cup carb/meal)   - You may use a protein smoothie as a meal replacement   - Eat slowly (20-30 minutes per meal), chewing foods well (25 chews per bite/applesauce consistency)  - Avoid snacking    Relating to beverages:  - Separate fluids from meals by 30 minutes before, during, and after eating  - Drink 64 ounces of fluid per day. Small, frequent sips.   - Conitnue to avoid soda/carbonated beverage intake.     Relating to dietary supplements:  - Continue multivitamin containing iron daily    Relating to activity:  - Set aside 15 mins per day for exercise. Increase activity as able.     Meal Replacements:  You may purchase meal replacement products online at our e-store. Visit e-store at [email or web address]   - The one week starter kits is a great way to sample a variety of products.  - For recipes and ideas on how to use products, visit - [employer]    Meal Replacement Shake Options:   [facility] smoothie (160 Calories, 20 g protein)   Premier Protein (160 Calories, 30 g protein)  Slim Fast Advanced Nutrition (180 Calories, 20 g protein)  Muscle Milk, lactose-free, 17 oz bottle (210 Calories, 30 g protein)  Integrated Supplements, no artificial sugars (110 Calories, 20 g protein)  Genepro, unflavored protein powder (60 Calories, 30 g protein)  *Protein Shake Criteria: no more than 210 Calories, at least 20 grams of protein, and less than 10 grams of sugar     Meal Replacement Bar Options:  [facility] Protein Shake (160 Calories, 15 g protein)  Quest Protein Bars (190 Calories, 20 g protein)  Built " Bar (170 Calories, 15-20 g protein)  One Protein Bar (210 calories, 20 g protein)  Santiago Signature Protein Bar ([employer]) (190 Calories, 21 g protein)  Pure Protein Bars (180 Calories, 21 g protein)    Frozen Meal Replacements  Healthy Choice  Lean Cuisine  Atkins Meals  Smart Ones    Interested in working with a health ?  Health coaches work with you to improve your overall health and wellbeing.  They look at the whole person, and may involve discussion of different areas of life, including, but not limited to the four pillars of health (sleep, exercise, nutrition, and stress management). Discuss with your care team if you would like to start working a health .    Health Coaching-3 Pack:    $99 for three health coaching visits    Visits may be done in person or via phone    Coaching is a partnership between the  and the client; Coaches do not prescribe or diagnose    Coaching helps inspire the client to reach his/her personal goals      Virtual Support Groups are Available             Healthy Lifestyle Support Group      All are welcome!     Facilitator: Darya Mccloud, Certified Health     - Meets once a month on a Friday from 12:30pm to 1:30pm.  - Due to Covid-19 she is doing this Support Group virtually using Microsoft Teams.  - 60 minutes of small group guided discussion, support and resources.  - Please email Darya directly at [email or web address] to receive monthly invitations.  - If you opt to participate in individual health coaching sessions or for general questions, contact Darya via email.  - Darya will send out invites for each session, so the phone number and the conference ID may change for each session.     2020 Meetings      December 18 - Open Forum      2021 Meetings January 29 - How to Stay Active and Healthy during the Winter Months   February 26  - Reading Food Labels: What do I Need to Know?, Guest Speaker: Sharon Osborn, RD   March 19  - Finding Health, Happiness and  Confidence at Every Size; Guest Speaker, Health Psychologist Fellow  April 30 -  Healthy Eating on a Budget, Guest Speaker: Laxmi Walker RD   May 21 - Open Forum

## 2021-02-09 NOTE — LETTER
"2/9/2021       RE: Yancy Hoover  [address]  [facility] [address]     Dear Colleague,    Thank you for referring your patient, Yancy Hoover, to the [facility] [place] [facility]. Please see a copy of my visit note below.    Yancy Hoover is a 49 year old female who is being evaluated via a billable video visit.      The patient has been notified of following:     \"This video visit will be conducted via a call between you and your physician/provider. We have found that certain health care needs can be provided without the need for an in-person physical exam.  This service lets us provide the care you need with a video conversation.  If a prescription is necessary we can send it directly to your pharmacy.  If lab work is needed we can place an order for that and you can then stop by our lab to have the test done at a later time.    Video visits are billed at different rates depending on your insurance coverage.  Please reach out to your insurance provider with any questions.    If during the course of the call the physician/provider feels a video visit is not appropriate, you will not be charged for this service.\"    Patient has given verbal consent for Video visit? Yes  How would you like to obtain your AVS? MyChart  If you are dropped from the video visit, the video invite should be resent to: Text to cell phone: [phone]  Will anyone else be joining your video visit? No        Video-Visit Details    Type of service:  Video Visit    Video Start Time: 3:28 PM  Video End Time: 3:49 PM  Time spent with patient: 21 minutes.    Originating Location (pt. Location): Home    Distant Location (provider location):  [facility] SURGICAL WEIGHT MANAGEMENT     Platform used for Video Visit: DaisyBill     During this virtual visit the patient is located in [place], patient verifies this as the location during " "the entirety of this visit.       Bariatric Nutrition Consultation Note    Reason For Visit: Nutrition Assessment    Yancy Hoover is a 49 year old presenting today for return bariatric nutrition consult. Pt is interested in laparoscopic sleeve gastrectomy with Dr. Alcaraz expected surgery in [place].  Patient is accompanied by self.  Patient has completed all required nutrition visits prior to surgery.     Coordination note: Pt states she is wanting to hold off on surgery for 3 months so she can lose wt and better manage blood sugars.     Pt referred by Jeannie Noe NP on July 16, 2020 and Dr. Velarde.  Patient with Co-morbidities of obesity including:  Type II DM yes (diagnosed 1 month ago)  Renal Failure no  Sleep apnea no  Hypertension no   Dyslipidemia yes  Joint pain no  Back pain no  GERD no     Support System Reviewed With Patient 7/16/2020   Who do you have in your support network that can be available to help you for the first 2 weeks after surgery? Yes   Who can you count on for support throughout your weight loss surgery journey? Yes       ANTHROPOMETRICS:  Initial visit:  Estimated body mass index is 50.81 kg/m  as calculated from the following:    Height as of an earlier encounter on 7/16/20: 1.626 m (5' 4\").    Weight as of an earlier encounter on 7/16/20: 134.3 kg (296 lb).    Current weight (per pt report): 308 lbs with BMI 52.9 (+28 lbs over the past month, +12 lbs since initial)    Required weight loss goal pre-op: -10 lbs from initial consult weight (goal weight 286 lbs or less before surgery)    Wt Readings from Last 10 Encounters:   01/29/21 134.3 kg (296 lb)   10/18/20 131.5 kg (290 lb)   07/30/20 134.3 kg (296 lb)   07/16/20 134.3 kg (296 lb)   07/03/20 133.8 kg (295 lb)   06/07/20 127 kg (280 lb)   06/05/20 113.4 kg (250 lb)   02/03/20 124.7 kg (275 lb)   01/23/20 124.7 kg (275 lb)   10/22/19 117.9 kg (260 lb)          7/16/2020   I have tried the following methods to lose weight Watching " "portions or calories, Exercise, Weight Watchers, Atkins type diet (low carb/high protein), Slimfast       Weight Loss Questions Reviewed With Patient 7/16/2020   How long have you been overweight? Since early childhood       SUPPLEMENT INFORMATION:  Vitamin D (h/o vitamin D deficiency), Estefanía complete with iron     MEDICATIONS FOR WEIGHT LOSS:  Metformin, topiramate    NUTRITION HISTORY:  Pt reports being up to 8278-3904 steps per day, working on reducing soda (2 sprite per day, reduced from 12 per day), eliminated intake after dinner.    No known food allergies/intolerances.  Doesn't like the taste of water. Needs to have ice cold. Eliminated red meat. Eliminated soda intake.  Putting food on a saucer lately to help reduce portion sizes.     Pt states today she has been through a lot of stress in the past few months. She has had many family members pass away from COVID, and has had issues with her ears that led to her being put on prednisone for a short period. This led to an increase in emotional eating, increased hunger, difficulty controlling portion sizes and worsened BG control (HgbA1c 7.4 on 1/21/21). Pt notes this is the highest wt she has ever been, feeling embarrassed and tearful during visit. Really focusing on get back to previous diet changes - protein smoothie meal replacements, decreased starches, avoiding snacking and drinking calorie free beverages only.     Recent Diet Recall:  Breakfast: protein smoothie (kale/spinach, strawberries/apple and protein powder 23 gm pro)  Lunch: protein smoothie (similar to bfast)  Dinner: chicken (5 oz) and vegetables (salad, green beans)  Snacks: Trying to avoid, drink water instead.  Beverages: Water (64+ oz/day), crystal lite. Occ apple juice.     Progress Towards Previous Goals:  Relating To Eating:  - Eat 3 meals per day. Try to have some protein at lunch.  Met, continues  - Use the 9\" Plate method to structure meals (1/2 plate non-starchy vegetables/fruit, " 1/4 plate lean protein, 1/4 plate whole grain starch - no more than 1/2 cup carb/meal) - Improving over past couple weeks, using protein smoothie for meals or focusing on lean protein and non-starchy veg.    - You may use a protein shake as a meal replacement (see recommendations below)  - Eat slowly (20-30 minutes per meal), chewing foods well (25 chews per bite/applesauce consistency)  Met, continues   - Write a post-it reminder to prompt you at beginning of meal  - Avoid snacking - Improving, pt notes she is really focusing on avoiding snacking. Focusing on hydration between meals.     Relating to beverages:  - Separate fluids from meals by 30 minutes before, during, and after eating - Met, continues   - Do not take beverage to table while eating.  - Drink 64 ounces of fluid per day. Small, frequent sips. - Met, continues  - Conitnue to reduce soda/carbonated beverage intake. Try limiting to one per week. - Improving    Relating to dietary supplements:  - Continue multivitamin containing iron daily  Met, continues    Relating to activity:  - Increase activity as able. Continue building on steps per day. Try aiming for 8000 steps consistently over next month. - Improving, not as able to take walks as she was before with the cold weather, but is focusing on taking stairs in her house 10 times, jumping jacks, squats.     Eating Habits 7/16/2020   Do you have any dietary restrictions? Yes   Do you currently binge eat (eat a large amount of food in a short time)? No   Are you an emotional eater? No   Do you get up to eat after falling asleep? Yes   What foods do you crave? Soda       ADDITIONAL INFORMATION:  Patients mother is living with her currently, and helping support healthier diet/lifestyle since diagnosed with DM.     Dining Out History Reviewed With Patient 7/16/2020   How often do you dine out? A couple of times a week.   Where do you dine out? (select all that apply) fast food chains   What types of food do  "you order when you dine out? Pizza, or buugers and fries       Physical Activity Reviewed With Patient 7/16/2020   How often do you exercise? 1 to 2 times per week   What is the duration of your exercise (in minutes)? 30 Minutes   What types of exercise do you do? walking   What keeps you from being more active?  I should be more active but I just have not gotten around to it       NUTRITION DIAGNOSIS:  Obesity r/t long history of self-monitoring deficit and excessive energy intake aeb BMI >30 kg/m2. - Improving/continues    INTERVENTION:  - Reviewed post-op diet guidelines, ways to help prepare for post-op diet guidelines pre-operatively, portion/calorie-control, mindful eating and sources of protein.   - Reviewed progress towards previous goals  - Reviewed healthy food choice and portion control for pre-operative weight loss and improved BG mgmt.   - Provided encouragement and support. Praised pt on positive diet and lifestyle changes she has started to re-focus on.    - Reviewed task list  - Provided pt with list of goals RD contact information.      Questions Reviewed With Patient 7/16/2020   How ready are you to make changes regarding your weight? Number 1 = Not ready at all to make changes up to 10 = very ready. 10   How confident are you that you can change? 1 = Not confident that you will be successful making changes up to 10 = very confident. 10       Patient Understanding: good  Expected Compliance: good    GOALS:  Relating To Eating:  - Eat 3 meals per day.   - Use the 9\" Plate method to structure meals (1/2 plate non-starchy vegetables/fruit, 1/4 plate lean protein, 1/4 plate whole grain starch - no more than 1/2 cup carb/meal)   - You may use a protein smoothie as a meal replacement   - Eat slowly (20-30 minutes per meal), chewing foods well (25 chews per bite/applesauce consistency)  - Avoid snacking    Relating to beverages:  - Separate fluids from meals by 30 minutes before, during, and after " eating  - Drink 64 ounces of fluid per day. Small, frequent sips.   - Conitnue to avoid soda/carbonated beverage intake.     Relating to dietary supplements:  - Continue multivitamin containing iron daily    Relating to activity:  - Set aside 15 mins per day for exercise. Increase activity as able.     Meal Replacements:  You may purchase meal replacement products online at our e-store. Visit e-store at [email or web address]   - The one week starter kits is a great way to sample a variety of products.  - For recipes and ideas on how to use products, visit - Pingup    Meal Replacement Shake Options:   [facility] smoothie (160 Calories, 20 g protein)   Premier Protein (160 Calories, 30 g protein)  Slim Fast Advanced Nutrition (180 Calories, 20 g protein)  Muscle Milk, lactose-free, 17 oz bottle (210 Calories, 30 g protein)  Integrated Supplements, no artificial sugars (110 Calories, 20 g protein)  Genepro, unflavored protein powder (60 Calories, 30 g protein)  *Protein Shake Criteria: no more than 210 Calories, at least 20 grams of protein, and less than 10 grams of sugar     Meal Replacement Bar Options:  [facility] Protein Shake (160 Calories, 15 g protein)  Quest Protein Bars (190 Calories, 20 g protein)  Built Bar (170 Calories, 15-20 g protein)  One Protein Bar (210 calories, 20 g protein)  [place] Signature Protein Bar ([employer]) (190 Calories, 21 g protein)  Pure Protein Bars (180 Calories, 21 g protein)    Frozen Meal Replacements  Healthy Choice  Lean Cuisine  Atkins Meals  Smart Ones        Sharon Osborn RD, LD

## 2021-02-09 NOTE — PROGRESS NOTES
"Yancy Hoover is a 49 year old female who is being evaluated via a billable video visit.      The patient has been notified of following:     \"This video visit will be conducted via a call between you and your physician/provider. We have found that certain health care needs can be provided without the need for an in-person physical exam.  This service lets us provide the care you need with a video conversation.  If a prescription is necessary we can send it directly to your pharmacy.  If lab work is needed we can place an order for that and you can then stop by our lab to have the test done at a later time.    Video visits are billed at different rates depending on your insurance coverage.  Please reach out to your insurance provider with any questions.    If during the course of the call the physician/provider feels a video visit is not appropriate, you will not be charged for this service.\"    Patient has given verbal consent for Video visit? Yes  How would you like to obtain your AVS? MyChart  If you are dropped from the video visit, the video invite should be resent to: Text to cell phone: [phone]  Will anyone else be joining your video visit? No        Video-Visit Details    Type of service:  Video Visit    Video Start Time: 3:28 PM  Video End Time: 3:49 PM  Time spent with patient: 21 minutes.    Originating Location (pt. Location): Home    Distant Location (provider location):  [facility] SURGICAL WEIGHT MANAGEMENT     Platform used for Video Visit: Twillion     During this virtual visit the patient is located in [place], patient verifies this as the location during the entirety of this visit.       Bariatric Nutrition Consultation Note    Reason For Visit: Nutrition Assessment    Yancy Hoover is a 49 year old presenting today for return bariatric nutrition consult. Pt is interested in laparoscopic sleeve gastrectomy with Dr. Alcaraz expected surgery in [place].  Patient is accompanied by self.  " "Patient has completed all required nutrition visits prior to surgery.     Coordination note: Pt states she is wanting to hold off on surgery for 3 months so she can lose wt and better manage blood sugars.     Pt referred by Jeannie Noe NP on July 16, 2020 and Dr. Velarde.  Patient with Co-morbidities of obesity including:  Type II DM yes (diagnosed 1 month ago)  Renal Failure no  Sleep apnea no  Hypertension no   Dyslipidemia yes  Joint pain no  Back pain no  GERD no     Support System Reviewed With Patient 7/16/2020   Who do you have in your support network that can be available to help you for the first 2 weeks after surgery? Yes   Who can you count on for support throughout your weight loss surgery journey? Yes       ANTHROPOMETRICS:  Initial visit:  Estimated body mass index is 50.81 kg/m  as calculated from the following:    Height as of an earlier encounter on 7/16/20: 1.626 m (5' 4\").    Weight as of an earlier encounter on 7/16/20: 134.3 kg (296 lb).    Current weight (per pt report): 308 lbs with BMI 52.9 (+28 lbs over the past month, +12 lbs since initial)    Required weight loss goal pre-op: -10 lbs from initial consult weight (goal weight 286 lbs or less before surgery)    Wt Readings from Last 10 Encounters:   01/29/21 134.3 kg (296 lb)   10/18/20 131.5 kg (290 lb)   07/30/20 134.3 kg (296 lb)   07/16/20 134.3 kg (296 lb)   07/03/20 133.8 kg (295 lb)   06/07/20 127 kg (280 lb)   06/05/20 113.4 kg (250 lb)   02/03/20 124.7 kg (275 lb)   01/23/20 124.7 kg (275 lb)   10/22/19 117.9 kg (260 lb)          7/16/2020   I have tried the following methods to lose weight Watching portions or calories, Exercise, Weight Watchers, Atkins type diet (low carb/high protein), Slimfast       Weight Loss Questions Reviewed With Patient 7/16/2020   How long have you been overweight? Since early childhood       SUPPLEMENT INFORMATION:  Vitamin D (h/o vitamin D deficiency), Flinstones complete with iron     MEDICATIONS FOR " "WEIGHT LOSS:  Metformin, topiramate    NUTRITION HISTORY:  Pt reports being up to 6173-6192 steps per day, working on reducing soda (2 sprite per day, reduced from 12 per day), eliminated intake after dinner.    No known food allergies/intolerances.  Doesn't like the taste of water. Needs to have ice cold. Eliminated red meat. Eliminated soda intake.  Putting food on a saucer lately to help reduce portion sizes.     Pt states today she has been through a lot of stress in the past few months. She has had many family members pass away from COVID, and has had issues with her ears that led to her being put on prednisone for a short period. This led to an increase in emotional eating, increased hunger, difficulty controlling portion sizes and worsened BG control (HgbA1c 7.4 on 1/21/21). Pt notes this is the highest wt she has ever been, feeling embarrassed and tearful during visit. Really focusing on get back to previous diet changes - protein smoothie meal replacements, decreased starches, avoiding snacking and drinking calorie free beverages only.     Recent Diet Recall:  Breakfast: protein smoothie (kale/spinach, strawberries/apple and protein powder 23 gm pro)  Lunch: protein smoothie (similar to bfast)  Dinner: chicken (5 oz) and vegetables (salad, green beans)  Snacks: Trying to avoid, drink water instead.  Beverages: Water (64+ oz/day), crystal lite. Occ apple juice.     Progress Towards Previous Goals:  Relating To Eating:  - Eat 3 meals per day. Try to have some protein at lunch.  Met, continues  - Use the 9\" Plate method to structure meals (1/2 plate non-starchy vegetables/fruit, 1/4 plate lean protein, 1/4 plate whole grain starch - no more than 1/2 cup carb/meal) - Improving over past couple weeks, using protein smoothie for meals or focusing on lean protein and non-starchy veg.    - You may use a protein shake as a meal replacement (see recommendations below)  - Eat slowly (20-30 minutes per meal), chewing " foods well (25 chews per bite/applesauce consistency)  Met, continues   - Write a post-it reminder to prompt you at beginning of meal  - Avoid snacking - Improving, pt notes she is really focusing on avoiding snacking. Focusing on hydration between meals.     Relating to beverages:  - Separate fluids from meals by 30 minutes before, during, and after eating - Met, continues   - Do not take beverage to table while eating.  - Drink 64 ounces of fluid per day. Small, frequent sips. - Met, continues  - Conitnue to reduce soda/carbonated beverage intake. Try limiting to one per week. - Improving    Relating to dietary supplements:  - Continue multivitamin containing iron daily  Met, continues    Relating to activity:  - Increase activity as able. Continue building on steps per day. Try aiming for 8000 steps consistently over next month. - Improving, not as able to take walks as she was before with the cold weather, but is focusing on taking stairs in her house 10 times, jumping jacks, squats.     Eating Habits 7/16/2020   Do you have any dietary restrictions? Yes   Do you currently binge eat (eat a large amount of food in a short time)? No   Are you an emotional eater? No   Do you get up to eat after falling asleep? Yes   What foods do you crave? Soda       ADDITIONAL INFORMATION:  Patients mother is living with her currently, and helping support healthier diet/lifestyle since diagnosed with DM.     Dining Out History Reviewed With Patient 7/16/2020   How often do you dine out? A couple of times a week.   Where do you dine out? (select all that apply) fast food chains   What types of food do you order when you dine out? Pizza, or buugers and fries       Physical Activity Reviewed With Patient 7/16/2020   How often do you exercise? 1 to 2 times per week   What is the duration of your exercise (in minutes)? 30 Minutes   What types of exercise do you do? walking   What keeps you from being more active?  I should be more  "active but I just have not gotten around to it       NUTRITION DIAGNOSIS:  Obesity r/t long history of self-monitoring deficit and excessive energy intake aeb BMI >30 kg/m2. - Improving/continues    INTERVENTION:  - Reviewed post-op diet guidelines, ways to help prepare for post-op diet guidelines pre-operatively, portion/calorie-control, mindful eating and sources of protein.   - Reviewed progress towards previous goals  - Reviewed healthy food choice and portion control for pre-operative weight loss and improved BG mgmt.   - Provided encouragement and support. Praised pt on positive diet and lifestyle changes she has started to re-focus on.    - Reviewed task list  - Provided pt with list of goals RD contact information.      Questions Reviewed With Patient 7/16/2020   How ready are you to make changes regarding your weight? Number 1 = Not ready at all to make changes up to 10 = very ready. 10   How confident are you that you can change? 1 = Not confident that you will be successful making changes up to 10 = very confident. 10       Patient Understanding: good  Expected Compliance: good    GOALS:  Relating To Eating:  - Eat 3 meals per day.   - Use the 9\" Plate method to structure meals (1/2 plate non-starchy vegetables/fruit, 1/4 plate lean protein, 1/4 plate whole grain starch - no more than 1/2 cup carb/meal)   - You may use a protein smoothie as a meal replacement   - Eat slowly (20-30 minutes per meal), chewing foods well (25 chews per bite/applesauce consistency)  - Avoid snacking    Relating to beverages:  - Separate fluids from meals by 30 minutes before, during, and after eating  - Drink 64 ounces of fluid per day. Small, frequent sips.   - Conitnue to avoid soda/carbonated beverage intake.     Relating to dietary supplements:  - Continue multivitamin containing iron daily    Relating to activity:  - Set aside 15 mins per day for exercise. Increase activity as able.     Meal Replacements:  You may " purchase meal replacement products online at our e-store. Visit e-store at [email or web address]   - The one week starter kits is a great way to sample a variety of products.  - For recipes and ideas on how to use products, visit - Samasource    Meal Replacement Shake Options:   [facility] smoothie (160 Calories, 20 g protein)   Premier Protein (160 Calories, 30 g protein)  Slim Fast Advanced Nutrition (180 Calories, 20 g protein)  Muscle Milk, lactose-free, 17 oz bottle (210 Calories, 30 g protein)  Integrated Supplements, no artificial sugars (110 Calories, 20 g protein)  Genepro, unflavored protein powder (60 Calories, 30 g protein)  *Protein Shake Criteria: no more than 210 Calories, at least 20 grams of protein, and less than 10 grams of sugar     Meal Replacement Bar Options:  [facility] Protein Shake (160 Calories, 15 g protein)  Quest Protein Bars (190 Calories, 20 g protein)  Built Bar (170 Calories, 15-20 g protein)  One Protein Bar (210 calories, 20 g protein)  [place] Signature Protein Bar ([employer]) (190 Calories, 21 g protein)  Pure Protein Bars (180 Calories, 21 g protein)    Frozen Meal Replacements  Healthy Choice  Lean Cuisine  Atkins Meals  Smart Ones        Sharon Osborn RD, LD

## 2021-03-29 NOTE — PROGRESS NOTES
"Yancy Hoover is a 49 year old female who is being evaluated via a billable video visit.      The patient has been notified of following:     \"This video visit will be conducted via a call between you and your physician/provider. We have found that certain health care needs can be provided without the need for an in-person physical exam.  This service lets us provide the care you need with a video conversation.  If a prescription is necessary we can send it directly to your pharmacy.  If lab work is needed we can place an order for that and you can then stop by our lab to have the test done at a later time.    Video visits are billed at different rates depending on your insurance coverage.  Please reach out to your insurance provider with any questions.    If during the course of the call the physician/provider feels a video visit is not appropriate, you will not be charged for this service.\"    Patient has given verbal consent for Video visit? Yes  How would you like to obtain your AVS? MyChart  If you are dropped from the video visit, the video invite should be resent to: Text to cell phone: [phone]  Will anyone else be joining your video visit? No  {If patient encounters technical issues they should call [phone] :[id number]}      Video-Visit Details    Type of service:  Video Visit    Video Start Time:   Video End Time:   Time spent with patient: *** minutes.    Originating Location (pt. Location): Home    Distant Location (provider location):  [facility]     Platform used for Video Visit: [employer]     During this virtual visit the patient is located in [place], patient verifies this as the location during the entirety of this visit.       Bariatric Nutrition Consultation Note    Reason For Visit: Nutrition Assessment    Yancy Hoover is a 49 year old presenting today for return bariatric nutrition consult. Pt is interested in laparoscopic sleeve gastrectomy with Dr." "Lissa expected surgery in [place].  Patient is accompanied by self.  Patient has completed all required nutrition visits prior to surgery.     Coordination note: Pt states she is wanting to hold off on surgery for 3 months so she can lose wt and better manage blood sugars.     Pt referred by Jeannie Noe NP on July 16, 2020 and Dr. Velarde.  Patient with Co-morbidities of obesity including:  Type II DM yes (diagnosed 1 month ago)  Renal Failure no  Sleep apnea no  Hypertension no   Dyslipidemia yes  Joint pain no  Back pain no  GERD no     Support System Reviewed With Patient 7/16/2020   Who do you have in your support network that can be available to help you for the first 2 weeks after surgery? Yes   Who can you count on for support throughout your weight loss surgery journey? Yes       ANTHROPOMETRICS:  Initial visit:  Estimated body mass index is 50.81 kg/m  as calculated from the following:    Height as of an earlier encounter on 7/16/20: 1.626 m (5' 4\").    Weight as of an earlier encounter on 7/16/20: 134.3 kg (296 lb).    Current weight (per pt report): *** 308 lbs with BMI 52.9 (+28 lbs over the past month, +12 lbs since initial)    Required weight loss goal pre-op: -10 lbs from initial consult weight (goal weight 286 lbs or less before surgery)    Wt Readings from Last 10 Encounters:   01/29/21 134.3 kg (296 lb)   10/18/20 131.5 kg (290 lb)   07/30/20 134.3 kg (296 lb)   07/16/20 134.3 kg (296 lb)   07/03/20 133.8 kg (295 lb)   06/07/20 127 kg (280 lb)   06/05/20 113.4 kg (250 lb)   02/03/20 124.7 kg (275 lb)   01/23/20 124.7 kg (275 lb)   10/22/19 117.9 kg (260 lb)          7/16/2020   I have tried the following methods to lose weight Watching portions or calories, Exercise, Weight Watchers, Atkins type diet (low carb/high protein), Slimfast       Weight Loss Questions Reviewed With Patient 7/16/2020   How long have you been overweight? Since early childhood       SUPPLEMENT INFORMATION:  Vitamin D (h/o " "vitamin D deficiency), Estefanía complete with iron     MEDICATIONS FOR WEIGHT LOSS:  Metformin, topiramate    NUTRITION HISTORY:  Pt reports being up to 6236-4698 steps per day, working on reducing soda (2 sprite per day, reduced from 12 per day), eliminated intake after dinner.    No known food allergies/intolerances.  Doesn't like the taste of water. Needs to have ice cold. Eliminated red meat. Eliminated soda intake.  Putting food on a saucer lately to help reduce portion sizes.     Pt reports a lot of stress in the past few months. She has had many family members pass away from COVID, and has had issues with her ears that led to her being put on prednisone for a short period. This led to an increase in emotional eating, increased hunger, difficulty controlling portion sizes and worsened BG control (HgbA1c 7.4 on 1/21/21). Pt notes this is the highest wt she has ever been, feeling embarrassed and tearful during visit. Really focusing on get back to previous diet changes - protein smoothie meal replacements, decreased starches, avoiding snacking and drinking calorie free beverages only.     Recent Diet Recall:  Breakfast: protein smoothie (kale/spinach, strawberries/apple and protein powder 23 gm pro)  Lunch: protein smoothie (similar to bfast)  Dinner: chicken (5 oz) and vegetables (salad, green beans)  Snacks: Trying to avoid, drink water instead.  Beverages: Water (64+ oz/day), crystal lite. Occ apple juice.     Progress Towards Previous Goals:  Relating To Eating:  - Eat 3 meals per day.   - Use the 9\" Plate method to structure meals (1/2 plate non-starchy vegetables/fruit, 1/4 plate lean protein, 1/4 plate whole grain starch - no more than 1/2 cup carb/meal)   - You may use a protein smoothie as a meal replacement   - Eat slowly (20-30 minutes per meal), chewing foods well (25 chews per bite/applesauce consistency)  - Avoid snacking    Relating to beverages:  - Separate fluids from meals by 30 minutes " before, during, and after eating  - Drink 64 ounces of fluid per day. Small, frequent sips.   - Conitnue to avoid soda/carbonated beverage intake.     Relating to dietary supplements:  - Continue multivitamin containing iron daily    Relating to activity:  - Set aside 15 mins per day for exercise. Increase activity as able.     Eating Habits 7/16/2020   Do you have any dietary restrictions? Yes   Do you currently binge eat (eat a large amount of food in a short time)? No   Are you an emotional eater? No   Do you get up to eat after falling asleep? Yes   What foods do you crave? Soda       ADDITIONAL INFORMATION:  Patients mother is living with her currently, and helping support healthier diet/lifestyle since diagnosed with DM.     Dining Out History Reviewed With Patient 7/16/2020   How often do you dine out? A couple of times a week.   Where do you dine out? (select all that apply) fast food chains   What types of food do you order when you dine out? Pizza, or buugers and fries       Physical Activity Reviewed With Patient 7/16/2020   How often do you exercise? 1 to 2 times per week   What is the duration of your exercise (in minutes)? 30 Minutes   What types of exercise do you do? walking   What keeps you from being more active?  I should be more active but I just have not gotten around to it       NUTRITION DIAGNOSIS:  Obesity r/t long history of self-monitoring deficit and excessive energy intake aeb BMI >30 kg/m2. - Improving/continues    INTERVENTION:  - Reviewed post-op diet guidelines, ways to help prepare for post-op diet guidelines pre-operatively, portion/calorie-control, mindful eating and sources of protein.   - Reviewed progress towards previous goals  - Reviewed healthy food choice and portion control for pre-operative weight loss and improved BG mgmt.   - Provided encouragement and support. Praised pt on positive diet and lifestyle changes she has started to re-focus on.    - Reviewed task list  -  "Provided pt with list of goals RD contact information.      Questions Reviewed With Patient 7/16/2020   How ready are you to make changes regarding your weight? Number 1 = Not ready at all to make changes up to 10 = very ready. 10   How confident are you that you can change? 1 = Not confident that you will be successful making changes up to 10 = very confident. 10       Patient Understanding: good  Expected Compliance: good    GOALS:  Relating To Eating:  - Eat 3 meals per day.   - Use the 9\" Plate method to structure meals (1/2 plate non-starchy vegetables/fruit, 1/4 plate lean protein, 1/4 plate whole grain starch - no more than 1/2 cup carb/meal)   - You may use a protein smoothie as a meal replacement   - Eat slowly (20-30 minutes per meal), chewing foods well (25 chews per bite/applesauce consistency)  - Avoid snacking    Relating to beverages:  - Separate fluids from meals by 30 minutes before, during, and after eating  - Drink 64 ounces of fluid per day. Small, frequent sips.   - Conitnue to avoid soda/carbonated beverage intake.     Relating to dietary supplements:  - Continue multivitamin containing iron daily    Relating to activity:  - Set aside 15 mins per day for exercise. Increase activity as able.     Meal Replacements:  You may purchase meal replacement products online at our e-store. Visit e-store at [email or web address]   - The one week starter kits is a great way to sample a variety of products.  - For recipes and ideas on how to use products, visit - [employer]    Meal Replacement Shake Options:   [facility] smoothie (160 Calories, 20 g protein)   Premier Protein (160 Calories, 30 g protein)  Slim Fast Advanced Nutrition (180 Calories, 20 g protein)  Muscle Milk, lactose-free, 17 oz bottle (210 Calories, 30 g protein)  Integrated Supplements, no artificial sugars (110 Calories, 20 g protein)  Genepro, unflavored protein powder (60 Calories, 30 g protein)  *Protein Shake " Criteria: no more than 210 Calories, at least 20 grams of protein, and less than 10 grams of sugar     Meal Replacement Bar Options:  [facility] Protein Shake (160 Calories, 15 g protein)  Quest Protein Bars (190 Calories, 20 g protein)  Built Bar (170 Calories, 15-20 g protein)  One Protein Bar (210 calories, 20 g protein)  [place] Signature Protein Bar ([employer]) (190 Calories, 21 g protein)  Pure Protein Bars (180 Calories, 21 g protein)    Frozen Meal Replacements  Healthy Choice  Lean Cuisine  Atkins Meals  Smart Ones        Sharon Osborn, ALISON, LD

## 2021-03-30 ENCOUNTER — VIRTUAL VISIT (OUTPATIENT)
Dept: ENDOCRINOLOGY | Facility: CLINIC | Age: 51
End: 2021-03-30
Payer: COMMERCIAL

## 2021-03-30 DIAGNOSIS — E66.9 OBESITY: ICD-10-CM

## 2021-03-30 DIAGNOSIS — Z71.3 NUTRITIONAL COUNSELING: Primary | ICD-10-CM

## 2021-03-30 PROCEDURE — 97803 MED NUTRITION INDIV SUBSEQ: CPT | Mod: GT | Performed by: DIETITIAN, REGISTERED

## 2021-03-30 NOTE — PATIENT INSTRUCTIONS
"Nutrition Goals:  Relating To Eating:  - Eat 3 meals per day.   - Use the 9\" Plate method to structure meals (1/2 plate non-starchy vegetables/fruit, 1/4 plate lean protein, 1/4 plate whole grain starch - no more than 1/2 cup carb/meal)   - You may use a protein smoothie as a meal replacement   - Eat slowly (20-30 minutes per meal), chewing foods well (25 chews per bite/applesauce consistency)  - Avoid snacking    Relating to beverages:  - Separate fluids from meals by 30 minutes before, during, and after eating  - Drink 64 ounces of fluid per day. Small, frequent sips.   - Conitnue to avoid soda/carbonated beverage intake.     Relating to dietary supplements:  - Continue multivitamin containing iron daily    Relating to activity:  - Increase activity as able.     Meal Replacements:  You may purchase meal replacement products online at our e-store. Visit e-store at [email or web address]   - The one week starter kits is a great way to sample a variety of products.  - For recipes and ideas on how to use products, visit - [employer]    Meal Replacement Shake Options:   [facility] smoothie (160 Calories, 20 g protein)   Premier Protein (160 Calories, 30 g protein)  Slim Fast Advanced Nutrition (180 Calories, 20 g protein)  Muscle Milk, lactose-free, 17 oz bottle (210 Calories, 30 g protein)  Integrated Supplements, no artificial sugars (110 Calories, 20 g protein)  Genepro, unflavored protein powder (60 Calories, 30 g protein)  *Protein Shake Criteria: no more than 210 Calories, at least 20 grams of protein, and less than 10 grams of sugar     Meal Replacement Bar Options:  [facility] Protein Shake (160 Calories, 15 g protein)  Quest Protein Bars (190 Calories, 20 g protein)  Built Bar (170 Calories, 15-20 g protein)  One Protein Bar (210 calories, 20 g protein)  [place] Signature Protein Bar ([employer]) (190 Calories, 21 g protein)  Pure Protein Bars (180 Calories, 21 g protein)    Frozen Meal " Replacements  Healthy Choice  Lean Cuisine  Atkins Meals  Smart Ones

## 2021-03-30 NOTE — LETTER
"3/30/2021       RE: Yancy Hoover  [address]  [facility] [address]     Dear Colleague,    Thank you for referring your patient, Yancy Hoover, to the [facility] [place] at [facility]. Please see a copy of my visit note below.    Yancy Hoover is a 49 year old female who is being evaluated via a billable video visit.      The patient has been notified of following:     \"This video visit will be conducted via a call between you and your physician/provider. We have found that certain health care needs can be provided without the need for an in-person physical exam.  This service lets us provide the care you need with a video conversation.  If a prescription is necessary we can send it directly to your pharmacy.  If lab work is needed we can place an order for that and you can then stop by our lab to have the test done at a later time.    Video visits are billed at different rates depending on your insurance coverage.  Please reach out to your insurance provider with any questions.    If during the course of the call the physician/provider feels a video visit is not appropriate, you will not be charged for this service.\"    Patient has given verbal consent for Video visit? Yes  How would you like to obtain your AVS? MyChart  If you are dropped from the video visit, the video invite should be resent to: Text to cell phone: [phone]  Will anyone else be joining your video visit? No        Video-Visit Details    Type of service:  Video Visit    Video Start Time: 7:03 AM  Video End Time: 7:20 AM  Time spent with patient: 17 minutes.    Originating Location (pt. Location): Home    Distant Location (provider location):  [facility]     Platform used for Video Visit: [employer]     During this virtual visit the patient is located in [place], patient verifies this as the location during " "the entirety of this visit.       Bariatric Nutrition Consultation Note    Reason For Visit: Nutrition Assessment    Yancy Hoover is a 49 year old presenting today for return bariatric nutrition consult. Pt is interested in laparoscopic sleeve gastrectomy with Dr. Alcaraz expected surgery in [place].  Patient is accompanied by self.  Patient has completed all required nutrition visits prior to surgery.     Coordination note: Pt is working towards surgery, completing items on task list as able.    Pt referred by Jeannie Noe NP on July 16, 2020 and Dr. Velarde.  Patient with Co-morbidities of obesity including:  Type II DM yes   Renal Failure no  Sleep apnea no  Hypertension no   Dyslipidemia yes  Joint pain no  Back pain no  GERD no     Support System Reviewed With Patient 7/16/2020   Who do you have in your support network that can be available to help you for the first 2 weeks after surgery? Yes   Who can you count on for support throughout your weight loss surgery journey? Yes       ANTHROPOMETRICS:  Initial visit:  Estimated body mass index is 50.81 kg/m  as calculated from the following:    Height as of an earlier encounter on 7/16/20: 1.626 m (5' 4\").    Weight as of an earlier encounter on 7/16/20: 134.3 kg (296 lb).    Current weight (per pt report): 298 lbs(-10 lbs over the past month, +2 lbs since initial)    Required weight loss goal pre-op: -10 lbs from initial consult weight (goal weight 286 lbs or less before surgery)    Wt Readings from Last 10 Encounters:   01/29/21 134.3 kg (296 lb)   10/18/20 131.5 kg (290 lb)   07/30/20 134.3 kg (296 lb)   07/16/20 134.3 kg (296 lb)   07/03/20 133.8 kg (295 lb)   06/07/20 127 kg (280 lb)   06/05/20 113.4 kg (250 lb)   02/03/20 124.7 kg (275 lb)   01/23/20 124.7 kg (275 lb)   10/22/19 117.9 kg (260 lb)          7/16/2020   I have tried the following methods to lose weight Watching portions or calories, Exercise, Weight Watchers, Atkins type diet (low carb/high " "protein), Slimfast       Weight Loss Questions Reviewed With Patient 7/16/2020   How long have you been overweight? Since early childhood       SUPPLEMENT INFORMATION:  Vitamin D (h/o vitamin D deficiency), Estefanía complete with iron     MEDICATIONS FOR WEIGHT LOSS:  Metformin, topiramate    NUTRITION HISTORY:  Pt reports being up to 5371-9471 steps per day, working on reducing soda (2 sprite per day, reduced from 12 per day), eliminated intake after dinner.    No known food allergies/intolerances.  Doesn't like the taste of water. Needs to have ice cold. Eliminated red meat. Eliminated soda intake.  Putting food on a saucer lately to help reduce portion sizes.     Feb 2021: Pt reports a lot of stress in the past few months. She has had many family members pass away from COVID, and has had issues with her ears that led to her being put on prednisone for a short period. This led to an increase in emotional eating, increased hunger, difficulty controlling portion sizes and worsened BG control (HgbA1c 7.4 on 1/21/21). Pt notes this is the highest wt she has ever been, feeling embarrassed and tearful during visit. Really focusing on get back to previous diet changes - protein smoothie meal replacements, decreased starches, avoiding snacking and drinking calorie free beverages only.     March 2021: Taking walks with neighbor daily for 15-30 mins. Reduced appetite, only eating 1-2 meals per day. Has eliminated red meat. Focusing on 3 oz lean protein and fruits and vegetables at meals.     Recent Diet Recall:  Breakfast: skipping  Lunch: half turkey/ham sandwich with fruit and veggies   Dinner: baked chicken (3 oz) and fruits and vegetables (salad, green beans)  Snacks: Avoiding for the most part.  Beverages: Water (64+ oz/day), crystal lite.     Progress Towards Previous Goals:  Relating To Eating:  - Eat 3 meals per day. - not met, hard to get in bfast lately. Intends on adding protein shake back in.   - Use the 9\" " Plate method to structure meals (1/2 plate non-starchy vegetables/fruit, 1/4 plate lean protein, 1/4 plate whole grain starch - no more than 1/2 cup carb/meal) - Improving   - You may use a protein smoothie as a meal replacement   - Eat slowly (20-30 minutes per meal), chewing foods well (25 chews per bite/applesauce consistency) - Improving  - Avoid snacking - Met, continues    Relating to beverages:  - Separate fluids from meals by 30 minutes before, during, and after eating - Improving  - Drink 64 ounces of fluid per day. Small, frequent sips. - Met, continues  - Conitnue to avoid soda/carbonated beverage intake. - Met, continues    Relating to dietary supplements:  - Continue multivitamin containing iron daily - Met, continues    Relating to activity:  - Set aside 15 mins per day for exercise. Increase activity as able. - Met, continues    Eating Habits 7/16/2020   Do you have any dietary restrictions? Yes   Do you currently binge eat (eat a large amount of food in a short time)? No   Are you an emotional eater? No   Do you get up to eat after falling asleep? Yes   What foods do you crave? Soda       ADDITIONAL INFORMATION:  Patients mother is living with her currently, and helping support healthier diet/lifestyle since diagnosed with DM.     Dining Out History Reviewed With Patient 7/16/2020   How often do you dine out? A couple of times a week.   Where do you dine out? (select all that apply) fast food chains   What types of food do you order when you dine out? Pizza, or buugers and fries       Physical Activity Reviewed With Patient 7/16/2020   How often do you exercise? 1 to 2 times per week   What is the duration of your exercise (in minutes)? 30 Minutes   What types of exercise do you do? walking   What keeps you from being more active?  I should be more active but I just have not gotten around to it       NUTRITION DIAGNOSIS:  Obesity r/t long history of self-monitoring deficit and excessive energy intake  "aeb BMI >30 kg/m2. - Improving/continues    INTERVENTION:  - Reviewed post-op diet guidelines, ways to help prepare for post-op diet guidelines pre-operatively, portion/calorie-control, mindful eating and sources of protein.   - Reviewed progress towards previous goals  - Encouraged more servings of vegetables than fruit, and inclusion of another serving of protein daily.   - Provided encouragement and support. Praised pt on positive diet and lifestyle changes she has started to re-focus on.    - Reviewed task list  - Provided pt with list of goals RD contact information.      Questions Reviewed With Patient 7/16/2020   How ready are you to make changes regarding your weight? Number 1 = Not ready at all to make changes up to 10 = very ready. 10   How confident are you that you can change? 1 = Not confident that you will be successful making changes up to 10 = very confident. 10       Patient Understanding: good  Expected Compliance: good    GOALS:  Relating To Eating:  - Eat 3 meals per day.   - Use the 9\" Plate method to structure meals (1/2 plate non-starchy vegetables/fruit, 1/4 plate lean protein, 1/4 plate whole grain starch - no more than 1/2 cup carb/meal)   - You may use a protein smoothie as a meal replacement   - Eat slowly (20-30 minutes per meal), chewing foods well (25 chews per bite/applesauce consistency)  - Avoid snacking    Relating to beverages:  - Separate fluids from meals by 30 minutes before, during, and after eating  - Drink 64 ounces of fluid per day. Small, frequent sips.   - Conitnue to avoid soda/carbonated beverage intake.     Relating to dietary supplements:  - Continue multivitamin containing iron daily    Relating to activity:  - Increase activity as able.     Meal Replacements:  You may purchase meal replacement products online at our e-store. Visit e-store at [email or web address]   - The one week starter kits is a great way to sample a variety of products.  - For recipes and " ideas on how to use products, visit - [employer]    Meal Replacement Shake Options:   [facility] smoothie (160 Calories, 20 g protein)   Premier Protein (160 Calories, 30 g protein)  Slim Fast Advanced Nutrition (180 Calories, 20 g protein)  Muscle Milk, lactose-free, 17 oz bottle (210 Calories, 30 g protein)  Integrated Supplements, no artificial sugars (110 Calories, 20 g protein)  Genepro, unflavored protein powder (60 Calories, 30 g protein)  *Protein Shake Criteria: no more than 210 Calories, at least 20 grams of protein, and less than 10 grams of sugar     Meal Replacement Bar Options:  [facility] Protein Shake (160 Calories, 15 g protein)  Quest Protein Bars (190 Calories, 20 g protein)  Built Bar (170 Calories, 15-20 g protein)  One Protein Bar (210 calories, 20 g protein)  [place] Signature Protein Bar ([employer]) (190 Calories, 21 g protein)  Pure Protein Bars (180 Calories, 21 g protein)    Frozen Meal Replacements  Healthy Choice  Lean Cuisine  Atkins Meals  Smart Ones        Sharon Osborn RD, LD

## 2021-03-30 NOTE — PROGRESS NOTES
"Yancy Hoover is a 49 year old female who is being evaluated via a billable video visit.      The patient has been notified of following:     \"This video visit will be conducted via a call between you and your physician/provider. We have found that certain health care needs can be provided without the need for an in-person physical exam.  This service lets us provide the care you need with a video conversation.  If a prescription is necessary we can send it directly to your pharmacy.  If lab work is needed we can place an order for that and you can then stop by our lab to have the test done at a later time.    Video visits are billed at different rates depending on your insurance coverage.  Please reach out to your insurance provider with any questions.    If during the course of the call the physician/provider feels a video visit is not appropriate, you will not be charged for this service.\"    Patient has given verbal consent for Video visit? Yes  How would you like to obtain your AVS? MyChart  If you are dropped from the video visit, the video invite should be resent to: Text to cell phone: [phone]  Will anyone else be joining your video visit? No        Video-Visit Details    Type of service:  Video Visit    Video Start Time: 7:03 AM  Video End Time: 7:20 AM  Time spent with patient: 17 minutes.    Originating Location (pt. Location): Home    Distant Location (provider location):  [facility]     Platform used for Video Visit: [employer]     During this virtual visit the patient is located in [place], patient verifies this as the location during the entirety of this visit.       Bariatric Nutrition Consultation Note    Reason For Visit: Nutrition Assessment    Yancy Hoover is a 49 year old presenting today for return bariatric nutrition consult. Pt is interested in laparoscopic sleeve gastrectomy with Dr. Alcaraz expected surgery in TBD.  Patient is accompanied by self.  " "Patient has completed all required nutrition visits prior to surgery.     Coordination note: Pt is working towards surgery, completing items on task list as able.    Pt referred by Jeannie Noe NP on July 16, 2020 and Dr. Velarde.  Patient with Co-morbidities of obesity including:  Type II DM yes   Renal Failure no  Sleep apnea no  Hypertension no   Dyslipidemia yes  Joint pain no  Back pain no  GERD no     Support System Reviewed With Patient 7/16/2020   Who do you have in your support network that can be available to help you for the first 2 weeks after surgery? Yes   Who can you count on for support throughout your weight loss surgery journey? Yes       ANTHROPOMETRICS:  Initial visit:  Estimated body mass index is 50.81 kg/m  as calculated from the following:    Height as of an earlier encounter on 7/16/20: 1.626 m (5' 4\").    Weight as of an earlier encounter on 7/16/20: 134.3 kg (296 lb).    Current weight (per pt report): 298 lbs(-10 lbs over the past month, +2 lbs since initial)    Required weight loss goal pre-op: -10 lbs from initial consult weight (goal weight 286 lbs or less before surgery)    Wt Readings from Last 10 Encounters:   01/29/21 134.3 kg (296 lb)   10/18/20 131.5 kg (290 lb)   07/30/20 134.3 kg (296 lb)   07/16/20 134.3 kg (296 lb)   07/03/20 133.8 kg (295 lb)   06/07/20 127 kg (280 lb)   06/05/20 113.4 kg (250 lb)   02/03/20 124.7 kg (275 lb)   01/23/20 124.7 kg (275 lb)   10/22/19 117.9 kg (260 lb)          7/16/2020   I have tried the following methods to lose weight Watching portions or calories, Exercise, Weight Watchers, Atkins type diet (low carb/high protein), Slimfast       Weight Loss Questions Reviewed With Patient 7/16/2020   How long have you been overweight? Since early childhood       SUPPLEMENT INFORMATION:  Vitamin D (h/o vitamin D deficiency), Flinstones complete with iron     MEDICATIONS FOR WEIGHT LOSS:  Metformin, topiramate    NUTRITION HISTORY:  Pt reports being up to " "4048-7181 steps per day, working on reducing soda (2 sprite per day, reduced from 12 per day), eliminated intake after dinner.    No known food allergies/intolerances.  Doesn't like the taste of water. Needs to have ice cold. Eliminated red meat. Eliminated soda intake.  Putting food on a saucer lately to help reduce portion sizes.     Feb 2021: Pt reports a lot of stress in the past few months. She has had many family members pass away from COVID, and has had issues with her ears that led to her being put on prednisone for a short period. This led to an increase in emotional eating, increased hunger, difficulty controlling portion sizes and worsened BG control (HgbA1c 7.4 on 1/21/21). Pt notes this is the highest wt she has ever been, feeling embarrassed and tearful during visit. Really focusing on get back to previous diet changes - protein smoothie meal replacements, decreased starches, avoiding snacking and drinking calorie free beverages only.     March 2021: Taking walks with neighbor daily for 15-30 mins. Reduced appetite, only eating 1-2 meals per day. Has eliminated red meat. Focusing on 3 oz lean protein and fruits and vegetables at meals.     Recent Diet Recall:  Breakfast: skipping  Lunch: half turkey/ham sandwich with fruit and veggies   Dinner: baked chicken (3 oz) and fruits and vegetables (salad, green beans)  Snacks: Avoiding for the most part.  Beverages: Water (64+ oz/day), crystal lite.     Progress Towards Previous Goals:  Relating To Eating:  - Eat 3 meals per day. - not met, hard to get in bfast lately. Intends on adding protein shake back in.   - Use the 9\" Plate method to structure meals (1/2 plate non-starchy vegetables/fruit, 1/4 plate lean protein, 1/4 plate whole grain starch - no more than 1/2 cup carb/meal) - Improving   - You may use a protein smoothie as a meal replacement   - Eat slowly (20-30 minutes per meal), chewing foods well (25 chews per bite/applesauce consistency) - " Improving  - Avoid snacking - Met, continues    Relating to beverages:  - Separate fluids from meals by 30 minutes before, during, and after eating - Improving  - Drink 64 ounces of fluid per day. Small, frequent sips. - Met, continues  - Conitnue to avoid soda/carbonated beverage intake. - Met, continues    Relating to dietary supplements:  - Continue multivitamin containing iron daily - Met, continues    Relating to activity:  - Set aside 15 mins per day for exercise. Increase activity as able. - Met, continues    Eating Habits 7/16/2020   Do you have any dietary restrictions? Yes   Do you currently binge eat (eat a large amount of food in a short time)? No   Are you an emotional eater? No   Do you get up to eat after falling asleep? Yes   What foods do you crave? Soda       ADDITIONAL INFORMATION:  Patients mother is living with her currently, and helping support healthier diet/lifestyle since diagnosed with DM.     Dining Out History Reviewed With Patient 7/16/2020   How often do you dine out? A couple of times a week.   Where do you dine out? (select all that apply) fast food chains   What types of food do you order when you dine out? Pizza, or buugers and fries       Physical Activity Reviewed With Patient 7/16/2020   How often do you exercise? 1 to 2 times per week   What is the duration of your exercise (in minutes)? 30 Minutes   What types of exercise do you do? walking   What keeps you from being more active?  I should be more active but I just have not gotten around to it       NUTRITION DIAGNOSIS:  Obesity r/t long history of self-monitoring deficit and excessive energy intake aeb BMI >30 kg/m2. - Improving/continues    INTERVENTION:  - Reviewed post-op diet guidelines, ways to help prepare for post-op diet guidelines pre-operatively, portion/calorie-control, mindful eating and sources of protein.   - Reviewed progress towards previous goals  - Encouraged more servings of vegetables than fruit, and  "inclusion of another serving of protein daily.   - Provided encouragement and support. Praised pt on positive diet and lifestyle changes she has started to re-focus on.    - Reviewed task list  - Provided pt with list of goals RD contact information.      Questions Reviewed With Patient 7/16/2020   How ready are you to make changes regarding your weight? Number 1 = Not ready at all to make changes up to 10 = very ready. 10   How confident are you that you can change? 1 = Not confident that you will be successful making changes up to 10 = very confident. 10       Patient Understanding: good  Expected Compliance: good    GOALS:  Relating To Eating:  - Eat 3 meals per day.   - Use the 9\" Plate method to structure meals (1/2 plate non-starchy vegetables/fruit, 1/4 plate lean protein, 1/4 plate whole grain starch - no more than 1/2 cup carb/meal)   - You may use a protein smoothie as a meal replacement   - Eat slowly (20-30 minutes per meal), chewing foods well (25 chews per bite/applesauce consistency)  - Avoid snacking    Relating to beverages:  - Separate fluids from meals by 30 minutes before, during, and after eating  - Drink 64 ounces of fluid per day. Small, frequent sips.   - Conitnue to avoid soda/carbonated beverage intake.     Relating to dietary supplements:  - Continue multivitamin containing iron daily    Relating to activity:  - Increase activity as able.     Meal Replacements:  You may purchase meal replacement products online at our e-store. Visit e-store at [email or web address]   - The one week starter kits is a great way to sample a variety of products.  - For recipes and ideas on how to use products, visit - SeeMe    Meal Replacement Shake Options:   [employer] [facility] smoothie (160 Calories, 20 g protein)   Premier Protein (160 Calories, 30 g protein)  Slim Fast Advanced Nutrition (180 Calories, 20 g protein)  Muscle Milk, lactose-free, 17 oz bottle (210 Calories, 30 g " protein)  Integrated Supplements, no artificial sugars (110 Calories, 20 g protein)  Genepro, unflavored protein powder (60 Calories, 30 g protein)  *Protein Shake Criteria: no more than 210 Calories, at least 20 grams of protein, and less than 10 grams of sugar     Meal Replacement Bar Options:  [facility] Protein Shake (160 Calories, 15 g protein)  Quest Protein Bars (190 Calories, 20 g protein)  Built Bar (170 Calories, 15-20 g protein)  One Protein Bar (210 calories, 20 g protein)  [place] Signature Protein Bar ([employer]) (190 Calories, 21 g protein)  Pure Protein Bars (180 Calories, 21 g protein)    Frozen Meal Replacements  Healthy Choice  Lean Cuisine  Atkins Meals  Smart Ones        Sharon Osborn RD, LD

## 2021-04-02 ENCOUNTER — VIRTUAL VISIT (OUTPATIENT)
Dept: ENDOCRINOLOGY | Facility: CLINIC | Age: 51
End: 2021-04-02
Payer: COMMERCIAL

## 2021-04-02 VITALS — HEIGHT: 64 IN | WEIGHT: 293 LBS | BODY MASS INDEX: 50.02 KG/M2

## 2021-04-02 DIAGNOSIS — E66.813 CLASS 3 SEVERE OBESITY DUE TO EXCESS CALORIES WITH BODY MASS INDEX (BMI) OF 50.0 TO 59.9 IN ADULT, UNSPECIFIED WHETHER SERIOUS COMORBIDITY PRESENT (H): Primary | ICD-10-CM

## 2021-04-02 DIAGNOSIS — E66.01 CLASS 3 SEVERE OBESITY DUE TO EXCESS CALORIES WITH BODY MASS INDEX (BMI) OF 50.0 TO 59.9 IN ADULT, UNSPECIFIED WHETHER SERIOUS COMORBIDITY PRESENT (H): Primary | ICD-10-CM

## 2021-04-02 DIAGNOSIS — E11.9 TYPE 2 DIABETES MELLITUS WITHOUT COMPLICATION, WITHOUT LONG-TERM CURRENT USE OF INSULIN (H): ICD-10-CM

## 2021-04-02 PROCEDURE — 99214 OFFICE O/P EST MOD 30 MIN: CPT | Mod: GT | Performed by: INTERNAL MEDICINE

## 2021-04-02 RX ORDER — CYCLOBENZAPRINE HCL 10 MG
10 TABLET ORAL 3 TIMES DAILY PRN
COMMUNITY
End: 2021-10-18

## 2021-04-02 ASSESSMENT — PAIN SCALES - GENERAL: PAINLEVEL: NO PAIN (0)

## 2021-04-02 ASSESSMENT — MIFFLIN-ST. JEOR: SCORE: 1957.18

## 2021-04-02 NOTE — NURSING NOTE
"Chief Complaint   Patient presents with     Follow Up     Follow-up Weight Management       Vitals:    04/02/21 1106   Weight: 134.7 kg (297 lb)   Height: 1.626 m (5' 4\")       Body mass index is 50.98 kg/m .                            "

## 2021-04-02 NOTE — PATIENT INSTRUCTIONS
Thank you for allowing us the privilege of caring for you. We hope we provided you with the excellent service you deserve.    Please let us know if there is anything else we can do for you so that we can be sure you are completely satisfied with your care experience.    Your visit was with Dr. Velarde today.    Instructions per today's visit:  --We will see if we can get the Ozempic covered/started. We will be following up with you on this. See information below.  --We can plan return visits with Lauren Bloch in about 4 wks and with Dr. Velarde again in about 2 mo.      Please call our contact center at [phone] to schedule your next appointments.    Meal Replacement Products:    Here is the link to our new e-store where you can purchase our meal replacement products    [email or web address]    The one week starter kit is a great way to sample a variety of products and see what works for you.    If you want more information about the product go to: Validus    Free Shipping for orders over $75    Benefits of meal replacements products:    Portion and calorie control  Improved nutrition  Structured eating  Simplified food choices  Avoid contact with trigger foods    Interested in working with a health ?  Health coaches work with you to improve your overall health and wellbeing.  They look at the whole person, and may involve discussion of different areas of life, including, but not limited to the four pillars of health (sleep, exercise, nutrition, and stress management). Discuss with your care team if you would like to start working a health .    Health Coaching-3 Pack: Schedule by calling [phone]    $99 for three health coaching visits    Visits may be done in person or via phone    Coaching is a partnership between the  and the client; Coaches do not prescribe or diagnose    Coaching helps inspire the client to reach his/her personal  goals    Bluetooth Scale:    We hope to provide you with high quality telephone and virtual healthcare visits while social distancing for COVID-19 is necessary, as well as in the future when virtual visits may be more convenient for you.    Our technology team made it possible for Bluetooth scales to send weight measurements to our electronic medical record. This allows weights from you weighing at home to securely flow into the medical record, which will improve telephone and virtual visits.  Additionally, studies have shown that adults actually lose more weight when their weights are automatically sent to someone else, and also that this process is not stressful for those adults.    Below is a link for purchasing the scale, with a discount code for our patients. You may call your insurance company to see if they will reimburse you for the cost of the scale, as a piece of durable medical equipment. The scales only go up to a weight of 400 pounds. This is an issue and we are working with the developer on increasing this. We found no scales that go over 400lb that have blue-tooth for connecting to [employer].    Scale to purchase: the [employer]  Body  Scale: [email or web address]=aw.ds    Discount Code: We have a discount code for our patients to bring the cost down to $50, the code is:  withmed     Steps to link the scale to [employer] via an Android Phone (you can always disconnect at any point in the future):  1. The order must be placed first before the patient can access Track My Health within [employer].  2. Download Google Fit gilbert from the Google Play Store  a. Log in or register using your Google account  3. Download the Emb[employer] gilbert from Google Play Store  a. Select add organization  b. Search for TORIA and select it  c. Log into [employer]  d. Select Track My Health  e. Select the green connect my account button  f. When prompted  log into your Google account  g. Select okay to confirm the account  4. Download the Withings Health Mate maddie from Google Play Store  a. Morton for Hactus  b. Go to profile  c. Tap google fit under the Apps section  d. Select the option to activate Google Fit integration  e. Select the same Google account  f. Select okay to confirm the account  g.  Steps to link the scale to Predikt via an iPhone (you can always disconnect at any point in the future):  **Note MogoTix is not available for download on an iPad**  1. The order must be placed first before the patient can access Track My Health within Predikt.  2. Locate the Health maddie on your iPhone.  a. Set up your Apple Health account as prompted  b. The Sources page will show Apps that communicate with your Health maddie. Once all steps are completed, you should see ZenDay and Pinyon Technologieshart listed under the Apps section and your iPhone under the devices section.  i. Select Health Mate  1. Under 'ALLOW  HEALTH [employer]  TO WRITE DATA ensure the toggle is on for Weight.  2. This will allow the scale to add your weight to the Apple Health  ii. Select P[employer]on [employer]hart  1. Under 'ALLOW  [employer]  TO READ DATA ensure the toggle is on for Weight.  2. This allows Predikt to grab the weight from MogoTix so your provider can see your weights.  3. Download the Crispifyt maddie from the Maddie Store  a. Select add organization                                                  b. Search for HeatGenie and select it  c. Log into Predikt  d. Select Track My Health  e. Select the green connect my account button  f. Follow prompts to link your device to Predikt.  4. Download the Withings health mate maddie in the Maddie Store  a. Morton for Hactus  b. Go to profile  c. Tap Health under the Apps section  d. If prompted to allow access with the Health Maddie, toggle weight on for read and write access.      For any questions/concerns contact Ebony Barragan LPN at [phone]    To schedule appointments  with our team, please call [phone]    Please call during clinic hours Monday through Friday 8:00a - 4:00p if you have questions or you can contact us via [employer] at anytime.      Lab results will be communicated through My Chart or letter (if My Chart not used). Please call the clinic if you have not received communication after 1 week or if you have any questions.    Clinic Fax: [phone]    Thank you,  Medical Weight Management Team      MEDICATION STARTED AT THIS APPOINTMENT    We are starting a GLP-1 (Glucagon-like Peptide-1) medication. Examples of this medication include Byetta, Bydureon, or Victoza, etc. The medication chosen will depend on insurance coverage, and can be changed to the medication that is covered under your plan. These medications work the same, in that they can help you lose weight and control your blood sugar. One of the ways it works is by slowing down the rate that food leaves your stomach. You feel morrell and will eat less.  It also helps regulate hormones that can help improve your blood sugars.    Usually short acting insulins or oral agents are stopped or decreased by the doctor at the appointment. Low blood sugars are rare but can happen if patients are on insulin or other oral agents. If you notice consistent low sugars or high sugars, your medication may need to be adjusted after your appointment. If this is the case, please call RN and provide her your blood sugar record from the last 3-4 days. The nurse will get in touch with the doctor and call you back/Logical Apps message with recommendations. We tolerate high sugars for a bit, so if sugars are running 180-200, this is ok. As weight starts dropping the blood sugars should too. If readings are consistently over 200 for 1-2 weeks, then you should call the doctor/nurse.    Types of GLP-1 medications include:    Victoza: Starting dose is 0.6 mg for a week, then 1.2 mg for a week, and then 1.8 mg thereafter (if prescribed by  doctor). This medication is given daily. Pen needles need to be ordered also.  Ozempic: Starting dose is 0.25 mg once weekly for 4 weeks, and then increase to 0.5 mg weekly. If after 4 weeks of being on 0.5 mg weekly dosing and still wanting additional weight loss and/or blood sugar benefits, check with your doctor to see if they want to increase the dose to 1 mg weekly. Pen needles come in the Ozempic pen box.  Trulicity: Starting dose is 0.75 mg once weekly.  If after 1-3 months of being on 0.75 mg weekly and still wanting additional weight loss and/or blood sugar benefits, check with your doctor to see if they want to increase the dose to 1.5 mg weekly.  Bydureon: Starting dose is 2 mg and is given weekly. The patient should pick one day a week and give the medication on that day. Pen needles need to be ordered also.  Byetta: Starting dose is 5 mcg twice daily. This is given for the first month. Check with the doctor after a month to see if they want the dose increased to 10 mcg BID. Pen needles need to be ordered also.     Side effects of GLP- Medications include: The most common side effects are all GI related and consist of: nausea, constipation, diarrhea, burping, or gassiness. Patients are advised to eat slowly and less, and nausea typically passes if people can stick it out.     The risk of pancreatitis (inflammation of the pancreas) has been associated with this type of medication, but is very rare.  If you have had pancreatitis in the past, this medication may not be for you. Please let us know about any past history of pancreas problems.      Symptoms of pancreatitis include: Pain in your upper stomach area which  may travel to your back and be worse after eating. Your stomach area may be tender to the touch.  You may have vomiting or nausea and/or have a fever. If you should develop any of these symptoms, stop the medication and contact your primary care doctor. They will do a blood test to check for  pancreatitis.         There is a small chance you may have some low blood sugar after taking the medication.   The signs of low blood sugar are:  o Weakness  o Shaky   o Hungry  o Sweating  o Confusion      See below for ways to treat low blood sugar without adding in lots of extra calories.      Treating Low Blood Sugar    If you have symptoms of low blood sugar (sweating, shaking, dizzy, confused) eat 15 grams of carbs and wait 15 minutes:      Glucose Tabs are best for sugars under 70 -  Dex4 or BD Glucose tablets are good, you will need to take 3-4 of these to equal 15 grams.       One small box of raisins    4 oz fruit juice box or   cup fruit juice    1 small apple    1 small banana      cup canned fruit in water      English muffin or a slice of bread with jelly     1 low fat frozen waffle with sugar-free syrup      cup cottage cheese with   cup frozen or fresh blueberries    1 cup skim or low-fat milk      cup whole grain cereal    4-6 crackers such as Triscuits      This medication is usually covered by insurance for patients with a diagnosis of Type 2 Diabetes. Depending on insurance coverage, the medication may be changed to a different formulary, but they all work in the same way. Sometimes a prior authorization is required, which may take up to 1-2 weeks for an insurance company to make a decision if they will cover the medication. Please be patient, you will be notified either way.     For any questions/concerns contact Ebony Shelton LPN at [phone]     (Do not stop taking it if you don't think it's working. For some people it works without them knowing it.)     In order to get refills of this or any medication we prescribe you must be seen in the medical weight mgmt clinic every 2-4 months. Please have your pharmacy fax a refill request to [phone].

## 2021-04-02 NOTE — PROGRESS NOTES
"Yancy is a 50 year old who is being evaluated via a billable video visit.      How would you like to obtain your AVS? MyChart  If the video visit is dropped, the invitation should be resent by: Text to cell phone: [phone]  Will anyone else be joining your video visit? No      Video Start Time: 11:29  Video-Visit Details    Type of service:  Video Visit    Video End Time:11:46    Originating Location (pt. Location): Home    Distant Location (provider location):  [facility] [place]     Platform used for Video Visit: One[employer]         [facility] Medical Weight Management Note     Yancy Hoover  MRN:  [id number]  :  1970  MARLON:  2021    Dear Physician No Ref-Primary,    I had the pleasure of seeing your patient Yancy Hoover. She is a 50 year old female who I am continuing to see for treatment of obesity related to:       2020   I have the following health issues associated with obesity: Type II Diabetes       INTERVAL HISTORY:    Returns, last seen about 2 mo ago; reviewed and relevant history is as follows, as extracted from earlier note:     --------------------------  \"She has the following co-morbidities:  No HTN, no CVD, elevated cholesterol, DM2 newly disgnosed (had HbA1c of 7.0 by her report)        Has struggled with wt whole life, and this is pt's max wt now.     With COVID19 is working at home and sedentary otherwise  Drinking a lot soda; customer service work     Has been working with the following provider on wt loss--  [facility] (Mack Lantigua)  Pt notes she went to see a wt loss doctor and wanted wt loss pills.  He did labs and told pt she had DM.  HbA1c 7.0.  Cholesterol was a little high also--208     Pt notes she was referred to a dietitian but the dietitian is not taking new pts and pt has a lot of questions--is motivated to make health changes and halt/slow DM-related progressions/complications..      Off of work this next " week so would like to have virtual visit with nutrition then if possible.     [place]--interested in goal of walking 30 min 5 days per wk; gradually work up to 10,000 steps per day (can use phone tracker)     Can stop eating after supper--made goal for this and the family has plans for eating dinner around 5p.     Will cut out the soda and switch to 0 paul options, has been having at least several 20 oz sodas (sprite) daily, did note while on topiramate that it was tasting flat and she found herself leaving unfinished sodas and opening new ones because of this change in taste---> now understands that this is likely related to topiramate and she can set a goal of decreasing/stopping the sugared soda and use this med to help her make this change.     Pt notes cravings and boredom and happens in the evening after supper; she will re-start and shift topiramate to prior to supper with up-titration schedule I provided in AVS.  --------------------------    In the interim she notes the following--  --working through the bariatric pathway.  --needing to meet with psychologist  --needing to get referral from PCP  --had been on prednisone (for parotiditis but ultimately saw ENT and was diagnosed with OM--pred discontinued)    Sugars lately--  Sugars around 120 (only checks fasting and will get some bedtime sugars instead)    Working on following a low calorie food plan  Working on getting 10,000 steps daily (trying to--but working 12 hour days)    Meals done by 6p--MISSY component to her plan also    CURRENT WEIGHT:   297 lbs 0 oz    Initial Weight (lbs): 295 lbs  Last Visits Weight: 134.3 kg (296 lb)  Cumulative weight loss (lbs): -2  Weight Loss Percentage: -0.68%    Changes and Difficulties 4/2/2021   I have made the following changes to my diet since my last visit: More e   With regards to my diet, I am still struggling with: -   I have made the following changes to my activity/exercise since my last  "visit: -   With regards to my activity/exercise, I am still struggling with: -       VITALS:  Ht 1.626 m (5' 4\")   Wt 134.7 kg (297 lb)   BMI 50.98 kg/m      MEDICATIONS:   Current Outpatient Medications   Medication Sig Dispense Refill     alcohol swab prep pads Use to swab area of injection/shahla as directed. 100 each 5     blood glucose (ACCU-CHEK SMARTVIEW) test strip Use to test blood sugar 1-2 times daily. 100 strip 5     blood glucose calibration (ACCU-CHEK SMARTVIEW CONTROL) solution Use to calibrate blood glucose monitor as directed. 1 Bottle 3     blood glucose monitoring (ACCU-CHEK FASTCLIX) lancets Use to test blood sugar 1-2 times daily or as directed. 102 each 5     blood glucose monitoring (ACCU-CHEK FAWAD SMARTVIEW) meter device kit Use to test blood sugar 1-2 times daily. 1 kit 0     cyclobenzaprine (FLEXERIL) 10 MG tablet Take 10 mg by mouth 3 times daily as needed for muscle spasms       meclizine (ANTIVERT) 25 MG tablet Take 1 tablet (25 mg) by mouth 3 times daily as needed for dizziness 15 tablet 1     metFORMIN (GLUCOPHAGE-XR) 500 MG 24 hr tablet For 1 wk take 2 tablets AM, and 1 tablet PM; then increase to 2 tablets twice daily (or you can take all 4 tablets at the same time each day if easier) 360 tablet 3     ondansetron (ZOFRAN ODT) 4 MG ODT tab Take 1 tablet (4 mg) by mouth every 8 hours as needed for nausea 10 tablet 0     topiramate (TOPAMAX) 25 MG tablet wk1--25 mg before supper; wk2--50 mg then, wk3 and after--75 mg 90 tablet 4       Weight Loss Medication History Reviewed With Patient 1/29/2021   Are you having any side effects from the weight loss medication that we have prescribed you? No   If you are having side effects please describe: none          ASSESSMENT/PLAN:     ## morbid obesity  ## T2DM without complications, not treated with ins     Yancy is a patient with early onset morbid obesity with significant element of familial/genetic influence and with current health " consequences. She does need aggressive weight loss plan due to new dx T2DM.  Yancy Hoover eats a high carb diet, eats a high fat diet, eats to obtain specific degree of fullness, and has a lot of liquid calories.     Her problem is complicated by a hunger disorder and strong craving/reward pathways     Her ability to lose weight is impacted by current work life (sedentary work and working at home now with COVID19 pandemic).     PLAN:    (continues)  Decrease portion sizes  Purge house of food triggers  No meal skipping  Decrease caloric beverages by daily walking plan (has already put this in place with another provider and has plan for escalating as tolerated)  Dietician visit of education  Volumetrics eating plan  Hypocaloric/low fat diet  Increase activity by daily walking      Diabetes   Ancillary testing:  N/A. Frequent home glucose monitoring with report to nurse as normal weight decreases.   Food Plan:  Reduced calorie and Low carbohydrate.  Activity Plan:  Daily walking, can do this after meals (such as after supper in the evenings in the summer--supper is around 5p).  Supplementary:  N/A.  Medication:  see comments below     additionally  --try for GLP1 (starting with Ozempic)  --RTC with me 4-6 wks if possible  --continue full dose metformin--she is tolerating and benefiting from this med      Time: approx 35 min spent on evaluation, management, counseling, education, & motivational interviewing (video visit) coupled with previsit planning and post visit charting/follow up care same day    Sincerely,    Danial Velarde MD

## 2021-04-02 NOTE — LETTER
"2021       RE: Yancy Hoover  [address]  [place] [facility] [address]     Dear Colleague,    Thank you for referring your patient, Yancy Hoover, to the [facility] [place] at [facility]. Please see a copy of my visit note below.    Yancy is a 50 year old who is being evaluated via a billable video visit.      How would you like to obtain your AVS? MyChart  If the video visit is dropped, the invitation should be resent by: Text to cell phone: [phone]  Will anyone else be joining your video visit? No      Video Start Time: 11:29  Video-Visit Details    Type of service:  Video Visit    Video End Time:11:46    Originating Location (pt. Location): Home    Distant Location (provider location):  [facility] [place]     Platform used for Video Visit: [employer]         Return Medical Weight Management Note     Yancy Hoover  MRN:  [id number]  :  1970  MARLON:  2021    Dear Physician No Ref-Primary,    I had the pleasure of seeing your patient Yancy Hoover. She is a 50 year old female who I am continuing to see for treatment of obesity related to:       2020   I have the following health issues associated with obesity: Type II Diabetes       INTERVAL HISTORY:    Returns, last seen about 2 mo ago; reviewed and relevant history is as follows, as extracted from earlier note:     --------------------------  \"She has the following co-morbidities:  No HTN, no CVD, elevated cholesterol, DM2 newly disgnosed (had HbA1c of 7.0 by her report)        Has struggled with wt whole life, and this is pt's max wt now.     With COVID19 is working at home and sedentary otherwise  Drinking a lot soda; customer service work     Has been working with the following provider on wt loss--  [facility] (Mack Lantigua)  Pt notes she went to see a wt loss doctor and wanted wt " loss pills.  He did labs and told pt she had DM.  HbA1c 7.0.  Cholesterol was a little high also--208     Pt notes she was referred to a dietitian but the dietitian is not taking new pts and pt has a lot of questions--is motivated to make health changes and halt/slow DM-related progressions/complications..      Off of work this next week so would like to have virtual visit with nutrition then if possible.     [place]--interested in goal of walking 30 min 5 days per wk; gradually work up to 10,000 steps per day (can use phone tracker)     Can stop eating after supper--made goal for this and the family has plans for eating dinner around 5p.     Will cut out the soda and switch to 0 paul options, has been having at least several 20 oz sodas (sprite) daily, did note while on topiramate that it was tasting flat and she found herself leaving unfinished sodas and opening new ones because of this change in taste---> now understands that this is likely related to topiramate and she can set a goal of decreasing/stopping the sugared soda and use this med to help her make this change.     Pt notes cravings and boredom and happens in the evening after supper; she will re-start and shift topiramate to prior to supper with up-titration schedule I provided in AVS.  --------------------------    In the interim she notes the following--  --working through the bariatric pathway.  --needing to meet with psychologist  --needing to get referral from PCP  --had been on prednisone (for parotiditis but ultimately saw ENT and was diagnosed with OM--pred discontinued)    Sugars lately--  Sugars around 120 (only checks fasting and will get some bedtime sugars instead)    Working on following a low calorie food plan  Working on getting 10,000 steps daily (trying to--but working 12 hour days)    Meals done by 6p--MISSY component to her plan also    CURRENT WEIGHT:   297 lbs 0 oz    Initial Weight (lbs): 295 lbs  Last Visits  "Weight: 134.3 kg (296 lb)  Cumulative weight loss (lbs): -2  Weight Loss Percentage: -0.68%    Changes and Difficulties 4/2/2021   I have made the following changes to my diet since my last visit: More e   With regards to my diet, I am still struggling with: -   I have made the following changes to my activity/exercise since my last visit: -   With regards to my activity/exercise, I am still struggling with: -       VITALS:  Ht 1.626 m (5' 4\")   Wt 134.7 kg (297 lb)   BMI 50.98 kg/m      MEDICATIONS:   Current Outpatient Medications   Medication Sig Dispense Refill     alcohol swab prep pads Use to swab area of injection/shahla as directed. 100 each 5     blood glucose (ACCU-CHEK SMARTVIEW) test strip Use to test blood sugar 1-2 times daily. 100 strip 5     blood glucose calibration (ACCU-CHEK SMARTVIEW CONTROL) solution Use to calibrate blood glucose monitor as directed. 1 Bottle 3     blood glucose monitoring (ACCU-CHEK FASTCLIX) lancets Use to test blood sugar 1-2 times daily or as directed. 102 each 5     blood glucose monitoring (ACCU-CHEK FAWAD SMARTVIEW) meter device kit Use to test blood sugar 1-2 times daily. 1 kit 0     cyclobenzaprine (FLEXERIL) 10 MG tablet Take 10 mg by mouth 3 times daily as needed for muscle spasms       meclizine (ANTIVERT) 25 MG tablet Take 1 tablet (25 mg) by mouth 3 times daily as needed for dizziness 15 tablet 1     metFORMIN (GLUCOPHAGE-XR) 500 MG 24 hr tablet For 1 wk take 2 tablets AM, and 1 tablet PM; then increase to 2 tablets twice daily (or you can take all 4 tablets at the same time each day if easier) 360 tablet 3     ondansetron (ZOFRAN ODT) 4 MG ODT tab Take 1 tablet (4 mg) by mouth every 8 hours as needed for nausea 10 tablet 0     topiramate (TOPAMAX) 25 MG tablet wk1--25 mg before supper; wk2--50 mg then, wk3 and after--75 mg 90 tablet 4       Weight Loss Medication History Reviewed With Patient 1/29/2021   Are you having any side effects from the weight loss " medication that we have prescribed you? No   If you are having side effects please describe: none          ASSESSMENT/PLAN:     ## morbid obesity  ## T2DM without complications, not treated with ins     Yancy is a patient with early onset morbid obesity with significant element of familial/genetic influence and with current health consequences. She does need aggressive weight loss plan due to new dx T2DM.  Yancy Hoover eats a high carb diet, eats a high fat diet, eats to obtain specific degree of fullness, and has a lot of liquid calories.     Her problem is complicated by a hunger disorder and strong craving/reward pathways     Her ability to lose weight is impacted by current work life (sedentary work and working at home now with COVID19 pandemic).     PLAN:    (continues)  Decrease portion sizes  Purge house of food triggers  No meal skipping  Decrease caloric beverages by daily walking plan (has already put this in place with another provider and has plan for escalating as tolerated)  Dietician visit of education  Volumetrics eating plan  Hypocaloric/low fat diet  Increase activity by daily walking      Diabetes   Ancillary testing:  N/A. Frequent home glucose monitoring with report to nurse as normal weight decreases.   Food Plan:  Reduced calorie and Low carbohydrate.  Activity Plan:  Daily walking, can do this after meals (such as after supper in the evenings in the summer--supper is around 5p).  Supplementary:  N/A.  Medication:  see comments below     additionally  --try for GLP1 (starting with Ozempic)  --RTC with me 4-6 wks if possible  --continue full dose metformin--she is tolerating and benefiting from this med      Time: approx 35 min spent on evaluation, management, counseling, education, & motivational interviewing (video visit) coupled with previsit planning and post visit charting/follow up care same day    Sincerely,    Danial Velarde MD

## 2021-04-20 ENCOUNTER — IMMUNIZATION (OUTPATIENT)
Dept: NURSING | Facility: CLINIC | Age: 51
End: 2021-04-20
Payer: COMMERCIAL

## 2021-04-20 PROCEDURE — 91300 PR COVID VAC PFIZER DIL RECON 30 MCG/0.3 ML IM: CPT

## 2021-04-20 PROCEDURE — 0001A PR COVID VAC PFIZER DIL RECON 30 MCG/0.3 ML IM: CPT

## 2021-04-23 NOTE — PROGRESS NOTES
"Yancy Hoover is a 49 year old female who is being evaluated via a billable video visit.      The patient has been notified of following:     \"This video visit will be conducted via a call between you and your physician/provider. We have found that certain health care needs can be provided without the need for an in-person physical exam.  This service lets us provide the care you need with a video conversation.  If a prescription is necessary we can send it directly to your pharmacy.  If lab work is needed we can place an order for that and you can then stop by our lab to have the test done at a later time.    Video visits are billed at different rates depending on your insurance coverage.  Please reach out to your insurance provider with any questions.    If during the course of the call the physician/provider feels a video visit is not appropriate, you will not be charged for this service.\"    Patient has given verbal consent for Video visit? Yes  How would you like to obtain your AVS? MyChart  If you are dropped from the video visit, the video invite should be resent to: Text to cell phone: [phone]  Will anyone else be joining your video visit? No        Video-Visit Details    Type of service:  Video Visit    Video Start Time: 1:05 PM  Video End Time: 1:27 PM  Time spent with patient: 22 minutes.    Originating Location (pt. Location): Home    Distant Location (provider location):  [facility]     Platform used for Video Visit: [employer]     During this virtual visit the patient is located in [place], patient verifies this as the location during the entirety of this visit.       Bariatric Nutrition Consultation Note    Reason For Visit: Nutrition Assessment    Yancy Hoover is a 49 year old presenting today for return bariatric nutrition consult. Pt is interested in laparoscopic sleeve gastrectomy with Dr. Alcaraz expected surgery in TBD.  Patient is accompanied by self.  " "Patient has completed all required nutrition visits prior to surgery.     Coordination note: Pt is working towards surgery, completing items on task list as able.    Pt referred by Jeannie Noe NP on July 16, 2020 and Dr. Velarde.  Patient with Co-morbidities of obesity including:  Type II DM yes   Renal Failure no  Sleep apnea no  Hypertension no   Dyslipidemia yes  Joint pain no  Back pain no  GERD no     Support System Reviewed With Patient 7/16/2020   Who do you have in your support network that can be available to help you for the first 2 weeks after surgery? Yes   Who can you count on for support throughout your weight loss surgery journey? Yes       ANTHROPOMETRICS:  Initial visit:  Estimated body mass index is 50.81 kg/m  as calculated from the following:    Height as of an earlier encounter on 7/16/20: 1.626 m (5' 4\").    Weight as of an earlier encounter on 7/16/20: 134.3 kg (296 lb).    Current weight (per pt report): 286 lbs (-12 lbs over the past month, -10 lbs since initial)    Required weight loss goal pre-op: -10 lbs from initial consult weight (goal weight 286 lbs or less before surgery)    Wt Readings from Last 10 Encounters:   04/02/21 134.7 kg (297 lb)   01/29/21 134.3 kg (296 lb)   10/18/20 131.5 kg (290 lb)   07/30/20 134.3 kg (296 lb)   07/16/20 134.3 kg (296 lb)   07/03/20 133.8 kg (295 lb)   06/07/20 127 kg (280 lb)   06/05/20 113.4 kg (250 lb)   02/03/20 124.7 kg (275 lb)   01/23/20 124.7 kg (275 lb)          7/16/2020   I have tried the following methods to lose weight Watching portions or calories, Exercise, Weight Watchers, Atkins type diet (low carb/high protein), Slimfast       Weight Loss Questions Reviewed With Patient 7/16/2020   How long have you been overweight? Since early childhood       SUPPLEMENT INFORMATION:  Vitamin D (h/o vitamin D deficiency), Flinstones complete with iron     MEDICATIONS FOR WEIGHT LOSS:  Metformin, topiramate    NUTRITION HISTORY:  Pt reports being up to " 0545-9894 steps per day, working on reducing soda (2 sprite per day, reduced from 12 per day), eliminated intake after dinner.    No known food allergies/intolerances.  Doesn't like the taste of water. Needs to have ice cold. Eliminated red meat. Eliminated soda intake.  Putting food on a saucer lately to help reduce portion sizes.     Feb 2021: Pt reports a lot of stress in the past few months. She has had many family members pass away from COVID, and has had issues with her ears that led to her being put on prednisone for a short period. This led to an increase in emotional eating, increased hunger, difficulty controlling portion sizes and worsened BG control (HgbA1c 7.4 on 1/21/21). Pt notes this is the highest wt she has ever been, feeling embarrassed and tearful during visit. Really focusing on get back to previous diet changes - protein smoothie meal replacements, decreased starches, avoiding snacking and drinking calorie free beverages only.     March 2021: Taking walks with neighbor daily for 15-30 mins. Reduced appetite, only eating 1-2 meals per day. Has eliminated red meat. Focusing on 3 oz lean protein and fruits and vegetables at meals.     April 2021: Met fluid goals, consistently consuming 64 oz/day. Focusing on just having meals TID, avoiding snacking between.  Continues to take walks in the am for 60 hours.  Good support from neighbors with walking routine. Pt mentions she is interested in more pant based meals.     Recent Diet Recall:  Breakfast: smoothie (kale, pineapple/apple, protein powder)  Lunch: salad (L/T, broccoli, cauliflower and cucumber + cheese occ and 2 tsp balsamic vinegar); turkey sandwich on flat bread + 100 paul chip   Dinner: baked skinless chicken (3 oz) and fruits and vegetables (salad, green beans)  Snacks: Avoiding for the most part.  Beverages: Water (64+ oz/day), crystal lite.     Progress Towards Previous Goals:  Relating To Eating:  - Eat 3 meals per day. - Met,  "continues  - Use the 9\" Plate method to structure meals (1/2 plate non-starchy vegetables/fruit, 1/4 plate lean protein, 1/4 plate whole grain starch - no more than 1/2 cup carb/meal) - Met, continues   - You may use a protein smoothie as a meal replacement   - Eat slowly (20-30 minutes per meal), chewing foods well (25 chews per bite/applesauce consistency) - Improving  - Avoid snacking -- Met, continues    Relating to beverages:  - Separate fluids from meals by 30 minutes before, during, and after eating - Improving, not drinking while eating, going 15-20 mins after meals before drinking again.   - Drink 64 ounces of fluid per day. Small, frequent sips. - Met, continues  - Conitnue to avoid soda/carbonated beverage intake. - MEt, continues    Relating to dietary supplements:  - Continue multivitamin containing iron daily - Met, continues    Relating to activity:  - Increase activity as able. - Met, continues    Eating Habits 7/16/2020   Do you have any dietary restrictions? Yes   Do you currently binge eat (eat a large amount of food in a short time)? No   Are you an emotional eater? No   Do you get up to eat after falling asleep? Yes   What foods do you crave? Soda       ADDITIONAL INFORMATION:  Patients mother is living with her currently, and helping support healthier diet/lifestyle since diagnosed with DM.     Dining Out History Reviewed With Patient 7/16/2020   How often do you dine out? A couple of times a week.   Where do you dine out? (select all that apply) fast food chains   What types of food do you order when you dine out? Pizza, or buugers and fries       Physical Activity Reviewed With Patient 7/16/2020   How often do you exercise? 1 to 2 times per week   What is the duration of your exercise (in minutes)? 30 Minutes   What types of exercise do you do? walking   What keeps you from being more active?  I should be more active but I just have not gotten around to it       NUTRITION DIAGNOSIS:  Obesity " r/t long history of self-monitoring deficit and excessive energy intake aeb BMI >30 kg/m2. - Improving/continues    INTERVENTION:  - Reviewed post-op diet guidelines, ways to help prepare for post-op diet guidelines pre-operatively, portion/calorie-control, mindful eating and sources of protein.   - Reviewed progress towards previous goals  - Provided encouragement and support. Praised pt on positive diet and lifestyle changes she has started to re-focus on.    - Reviewed task list  - Provided pt with list of goals RD contact information.      Questions Reviewed With Patient 7/16/2020   How ready are you to make changes regarding your weight? Number 1 = Not ready at all to make changes up to 10 = very ready. 10   How confident are you that you can change? 1 = Not confident that you will be successful making changes up to 10 = very confident. 10       Patient Understanding: good  Expected Compliance: good    GOALS:  Relating To Eating:  - Eat 3 meals per day.   - Eat slowly (20-30 minutes per meal), chewing foods well (25 chews per bite/applesauce consistency)  - Avoid snacking    Relating to beverages:  - Separate fluids from meals by 30 minutes before, during, and after eating  - Drink 64 ounces of fluid per day. Small, frequent sips.     Relating to activity:  - Keep up the great work with your walking routine!    Example Plant Based Meal Plan:  Breakfast: 3/4 cup oatmeal with 8 oz soy milk, 2 tbsp dante seeds and 1/2 cup frozen berries (400 calories, 18 gm protein, 50 gm carbs, and 16 gm fat)    Lunch: 1/2 cup tempeh with 1/2 cup brown rice + 1/2+ cup broccoli with 1 tbsp soy sauce and jodee and garlic (300 calories, 20 gm protein, 36 gm carbs, and 10 gm fat)    Dinner: 2/3 cup quinoa with 1/3 cup edemame, 1/2 cup bell peppers, 1/2 small tomato, 2 tsp olive oil, 1 oz low-fat mozzarella, and italian seasoning (374 calories, 20 gm protein, 42 gm carbs, 15 gm fat.     High-Protein Plant Based Recipe Resources:  The  High Protein Plant Based Cookbook (sold at [employer])  https://www.Ostendo Technologies.[employer]/recipe-categories/high-protein  www.[employer] Systems.[employer]  Www.maufait.org  https://www.SanJet [employer]/best-high-protein-vegan-breakfast-recipes/      Sharon Osborn RD, LD

## 2021-04-26 ENCOUNTER — VIRTUAL VISIT (OUTPATIENT)
Dept: ENDOCRINOLOGY | Facility: CLINIC | Age: 51
End: 2021-04-26
Payer: COMMERCIAL

## 2021-04-26 DIAGNOSIS — E66.9 OBESITY: ICD-10-CM

## 2021-04-26 DIAGNOSIS — E11.9 TYPE 2 DIABETES MELLITUS WITHOUT COMPLICATION, WITHOUT LONG-TERM CURRENT USE OF INSULIN (H): ICD-10-CM

## 2021-04-26 DIAGNOSIS — Z71.3 NUTRITIONAL COUNSELING: Primary | ICD-10-CM

## 2021-04-26 DIAGNOSIS — E66.813 CLASS 3 SEVERE OBESITY DUE TO EXCESS CALORIES WITH BODY MASS INDEX (BMI) OF 50.0 TO 59.9 IN ADULT, UNSPECIFIED WHETHER SERIOUS COMORBIDITY PRESENT (H): ICD-10-CM

## 2021-04-26 DIAGNOSIS — E66.01 CLASS 3 SEVERE OBESITY DUE TO EXCESS CALORIES WITH BODY MASS INDEX (BMI) OF 50.0 TO 59.9 IN ADULT, UNSPECIFIED WHETHER SERIOUS COMORBIDITY PRESENT (H): ICD-10-CM

## 2021-04-26 PROCEDURE — 97803 MED NUTRITION INDIV SUBSEQ: CPT | Mod: GT | Performed by: DIETITIAN, REGISTERED

## 2021-04-26 NOTE — PATIENT INSTRUCTIONS
Juan J Haines,    Follow-up with RD in one month.     Thank you,    Sharon Osborn, RD, LD  If you would like to schedule or reschedule an appointment with the RD, please call [phone]    Nutrition Goals  Relating To Eating:  - Eat 3 meals per day.   - Eat slowly (20-30 minutes per meal), chewing foods well (25 chews per bite/applesauce consistency)  - Avoid snacking    Relating to beverages:  - Separate fluids from meals by 30 minutes before, during, and after eating  - Drink 64 ounces of fluid per day. Small, frequent sips.     Relating to activity:  - Keep up the great work with your walking routine!    Example Plant Based Meal Plan:  Breakfast: 3/4 cup oatmeal with 8 oz soy milk, 2 tbsp dante seeds and 1/2 cup frozen berries (400 calories, 18 gm protein, 50 gm carbs, and 16 gm fat)    Lunch: 1/2 cup tempeh with 1/2 cup brown rice + 1/2+ cup broccoli with 1 tbsp soy sauce and jodee and garlic (300 calories, 20 gm protein, 36 gm carbs, and 10 gm fat)    Dinner: 2/3 cup quinoa with 1/3 cup edemame, 1/2 cup bell peppers, 1/2 small tomato, 2 tsp olive oil, 1 oz low-fat mozzarella, and italian seasoning (374 calories, 20 gm protein, 42 gm carbs, 15 gm fat.     High-Protein Plant Based Recipe Resources:  The High Protein Plant Based Cookbook (sold at [employer])  https://www.NextGreatPlace.Weilos/recipe-categories/high-protein  www.Ideagen.com  Www.Gemmyo.org  https://www.Fishlabs.Weilos/best-high-protein-vegan-breakfast-recipes/      Interested in working with a health ?  Health coaches work with you to improve your overall health and wellbeing.  They look at the whole person, and may involve discussion of different areas of life, including, but not limited to the four pillars of health (sleep, exercise, nutrition, and stress management). Discuss with your care team if you would like to start working a health .    Health Coaching-3 Pack:    $99 for three health coaching visits    Visits may be done in person  or via phone    Coaching is a partnership between the  and the client; Coaches do not prescribe or diagnose    Coaching helps inspire the client to reach his/her personal goals      Virtual Support Groups are Available             Healthy Lifestyle Support Group      All are welcome!     Facilitator: Darya Mccloud, Certified Health     - Meets once a month on a Friday from 12:30pm to 1:30pm.  - Due to Covid-19 she is doing this Support Group virtually using Microsoft Teams.  - 60 minutes of small group guided discussion, support and resources.  - Please email Darya directly at [email or web address] to receive monthly invitations.  - If you opt to participate in individual health coaching sessions or for general questions, contact Darya via email.  - Draya will send out invites for each session, so the phone number and the conference ID may change for each session.     2020 Meetings      December 18 - Open Forum      2021 Meetings      January 29 - How to Stay Active and Healthy during the Winter Months   February 26  - Reading Food Labels: What do I Need to Know?, Guest Speaker: Sharon Osborn RD   March 19  - Finding Health, Happiness and Confidence at Every Size; Guest Speaker, Health Psychologist Fellow  April 30 -  Healthy Eating on a Budget, Guest Speaker: Laxmi Walker RD   May 21 - Open Forum

## 2021-04-26 NOTE — LETTER
"4/26/2021       RE: Yancy Hoover  [address]  [place] [address]     Dear Colleague,    Thank you for referring your patient, Yancy Hoover, to the [facility] [place] [facility]. Please see a copy of my visit note below.    Yancy Hoover is a 49 year old female who is being evaluated via a billable video visit.      The patient has been notified of following:     \"This video visit will be conducted via a call between you and your physician/provider. We have found that certain health care needs can be provided without the need for an in-person physical exam.  This service lets us provide the care you need with a video conversation.  If a prescription is necessary we can send it directly to your pharmacy.  If lab work is needed we can place an order for that and you can then stop by our lab to have the test done at a later time.    Video visits are billed at different rates depending on your insurance coverage.  Please reach out to your insurance provider with any questions.    If during the course of the call the physician/provider feels a video visit is not appropriate, you will not be charged for this service.\"    Patient has given verbal consent for Video visit? Yes  How would you like to obtain your AVS? MyChart  If you are dropped from the video visit, the video invite should be resent to: Text to cell phone: [phone]  Will anyone else be joining your video visit? No        Video-Visit Details    Type of service:  Video Visit    Video Start Time: 1:05 PM  Video End Time: 1:27 PM  Time spent with patient: 22 minutes.    Originating Location (pt. Location): Home    Distant Location (provider location):  [facility]     Platform used for Video Visit: [employer]     During this virtual visit the patient is located in [place], patient verifies this as the location during " "the entirety of this visit.       Bariatric Nutrition Consultation Note    Reason For Visit: Nutrition Assessment    Yancy Hoover is a 49 year old presenting today for return bariatric nutrition consult. Pt is interested in laparoscopic sleeve gastrectomy with Dr. Alcaraz expected surgery in [place].  Patient is accompanied by self.  Patient has completed all required nutrition visits prior to surgery.     Coordination note: Pt is working towards surgery, completing items on task list as able.    Pt referred by Jeannie Noe NP on July 16, 2020 and Dr. Velarde.  Patient with Co-morbidities of obesity including:  Type II DM yes   Renal Failure no  Sleep apnea no  Hypertension no   Dyslipidemia yes  Joint pain no  Back pain no  GERD no     Support System Reviewed With Patient 7/16/2020   Who do you have in your support network that can be available to help you for the first 2 weeks after surgery? Yes   Who can you count on for support throughout your weight loss surgery journey? Yes       ANTHROPOMETRICS:  Initial visit:  Estimated body mass index is 50.81 kg/m  as calculated from the following:    Height as of an earlier encounter on 7/16/20: 1.626 m (5' 4\").    Weight as of an earlier encounter on 7/16/20: 134.3 kg (296 lb).    Current weight (per pt report): 286 lbs (-12 lbs over the past month, -10 lbs since initial)    Required weight loss goal pre-op: -10 lbs from initial consult weight (goal weight 286 lbs or less before surgery)    Wt Readings from Last 10 Encounters:   04/02/21 134.7 kg (297 lb)   01/29/21 134.3 kg (296 lb)   10/18/20 131.5 kg (290 lb)   07/30/20 134.3 kg (296 lb)   07/16/20 134.3 kg (296 lb)   07/03/20 133.8 kg (295 lb)   06/07/20 127 kg (280 lb)   06/05/20 113.4 kg (250 lb)   02/03/20 124.7 kg (275 lb)   01/23/20 124.7 kg (275 lb)          7/16/2020   I have tried the following methods to lose weight Watching portions or calories, Exercise, Weight Watchers, Atkins type diet (low " carb/high protein), Slimfast       Weight Loss Questions Reviewed With Patient 7/16/2020   How long have you been overweight? Since early childhood       SUPPLEMENT INFORMATION:  Vitamin D (h/o vitamin D deficiency), Flinstones complete with iron     MEDICATIONS FOR WEIGHT LOSS:  Metformin, topiramate    NUTRITION HISTORY:  Pt reports being up to 9362-1669 steps per day, working on reducing soda (2 sprite per day, reduced from 12 per day), eliminated intake after dinner.    No known food allergies/intolerances.  Doesn't like the taste of water. Needs to have ice cold. Eliminated red meat. Eliminated soda intake.  Putting food on a saucer lately to help reduce portion sizes.     Feb 2021: Pt reports a lot of stress in the past few months. She has had many [facility], and has had issues with her ears that led to her being put on prednisone for a short period. This led to an increase in emotional eating, increased hunger, difficulty controlling portion sizes and worsened BG control (HgbA1c 7.4 on 1/21/21). Pt notes this is the highest wt she has ever been, feeling embarrassed and tearful during visit. Really focusing on get back to previous diet changes - protein smoothie meal replacements, decreased starches, avoiding snacking and drinking calorie free beverages only.     March 2021: Taking walks with neighbor daily for 15-30 mins. Reduced appetite, only eating 1-2 meals per day. Has eliminated red meat. Focusing on 3 oz lean protein and fruits and vegetables at meals.     April 2021: Met fluid goals, consistently consuming 64 oz/day. Focusing on just having meals TID, avoiding snacking between.  Continues to take walks in the am for 60 hours.  Good support from neighbors with walking routine. Pt mentions she is interested in more pant based meals.     Recent Diet Recall:  Breakfast: smoothie (kale, pineapple/apple, protein powder)  Lunch: salad (L/T, broccoli, cauliflower and cucumber +  "cheese occ and 2 tsp balsamic vinegar); turkey sandwich on flat bread + 100 paul chip   Dinner: baked skinless chicken (3 oz) and fruits and vegetables (salad, green beans)  Snacks: Avoiding for the most part.  Beverages: Water (64+ oz/day), crystal lite.     Progress Towards Previous Goals:  Relating To Eating:  - Eat 3 meals per day. - Met, continues  - Use the 9\" Plate method to structure meals (1/2 plate non-starchy vegetables/fruit, 1/4 plate lean protein, 1/4 plate whole grain starch - no more than 1/2 cup carb/meal) - Met, continues   - You may use a protein smoothie as a meal replacement   - Eat slowly (20-30 minutes per meal), chewing foods well (25 chews per bite/applesauce consistency) - Improving  - Avoid snacking -- Met, continues    Relating to beverages:  - Separate fluids from meals by 30 minutes before, during, and after eating - Improving, not drinking while eating, going 15-20 mins after meals before drinking again.   - Drink 64 ounces of fluid per day. Small, frequent sips. - Met, continues  - Conitnue to avoid soda/carbonated beverage intake. - MEt, continues    Relating to dietary supplements:  - Continue multivitamin containing iron daily - Met, continues    Relating to activity:  - Increase activity as able. - Met, continues    Eating Habits 7/16/2020   Do you have any dietary restrictions? Yes   Do you currently binge eat (eat a large amount of food in a short time)? No   Are you an emotional eater? No   Do you get up to eat after falling asleep? Yes   What foods do you crave? Soda       ADDITIONAL INFORMATION:  Patients mother is living with her currently, and helping support healthier diet/lifestyle since diagnosed with DM.     Dining Out History Reviewed With Patient 7/16/2020   How often do you dine out? A couple of times a week.   Where do you dine out? (select all that apply) fast food chains   What types of food do you order when you dine out? Pizza, or buugers and fries       Physical " Activity Reviewed With Patient 7/16/2020   How often do you exercise? 1 to 2 times per week   What is the duration of your exercise (in minutes)? 30 Minutes   What types of exercise do you do? walking   What keeps you from being more active?  I should be more active but I just have not gotten around to it       NUTRITION DIAGNOSIS:  Obesity r/t long history of self-monitoring deficit and excessive energy intake aeb BMI >30 kg/m2. - Improving/continues    INTERVENTION:  - Reviewed post-op diet guidelines, ways to help prepare for post-op diet guidelines pre-operatively, portion/calorie-control, mindful eating and sources of protein.   - Reviewed progress towards previous goals  - Provided encouragement and support. Praised pt on positive diet and lifestyle changes she has started to re-focus on.    - Reviewed task list  - Provided pt with list of goals RD contact information.      Questions Reviewed With Patient 7/16/2020   How ready are you to make changes regarding your weight? Number 1 = Not ready at all to make changes up to 10 = very ready. 10   How confident are you that you can change? 1 = Not confident that you will be successful making changes up to 10 = very confident. 10       Patient Understanding: good  Expected Compliance: good    GOALS:  Relating To Eating:  - Eat 3 meals per day.   - Eat slowly (20-30 minutes per meal), chewing foods well (25 chews per bite/applesauce consistency)  - Avoid snacking    Relating to beverages:  - Separate fluids from meals by 30 minutes before, during, and after eating  - Drink 64 ounces of fluid per day. Small, frequent sips.     Relating to activity:  - Keep up the great work with your walking routine!    Example Plant Based Meal Plan:  Breakfast: 3/4 cup oatmeal with 8 oz soy milk, 2 tbsp dante seeds and 1/2 cup frozen berries (400 calories, 18 gm protein, 50 gm carbs, and 16 gm fat)    Lunch: 1/2 cup tempeh with 1/2 cup brown rice + 1/2+ cup broccoli with 1 tbsp soy  sauce and jodee and garlic (300 calories, 20 gm protein, 36 gm carbs, and 10 gm fat)    Dinner: 2/3 cup quinoa with 1/3 cup edemame, 1/2 cup bell peppers, 1/2 small tomato, 2 tsp olive oil, 1 oz low-fat mozzarella, and italian seasoning (374 calories, 20 gm protein, 42 gm carbs, 15 gm fat.     High-Protein Plant Based Recipe Resources:  The High Protein Plant Based Cookbook (sold at [employer])  https://www.[employer]XL ricardo [employer]c/recipe-categories/high-protein  www.ThreatMetrix.QXL ricardo plc  Www.Leyou software.org  https://www.H[employer]en Sciences/best-high-protein-vegan-breakfast-recipes/      Sharon Osborn RD, RAJI

## 2021-05-09 ENCOUNTER — HEALTH MAINTENANCE LETTER (OUTPATIENT)
Age: 51
End: 2021-05-09

## 2021-05-11 ENCOUNTER — IMMUNIZATION (OUTPATIENT)
Dept: NURSING | Facility: CLINIC | Age: 51
End: 2021-05-11
Attending: INTERNAL MEDICINE
Payer: COMMERCIAL

## 2021-05-11 PROCEDURE — 0002A PR COVID VAC PFIZER DIL RECON 30 MCG/0.3 ML IM: CPT

## 2021-05-11 PROCEDURE — 91300 PR COVID VAC PFIZER DIL RECON 30 MCG/0.3 ML IM: CPT

## 2021-05-13 ENCOUNTER — CARE COORDINATION (OUTPATIENT)
Dept: ENDOCRINOLOGY | Facility: CLINIC | Age: 51
End: 2021-05-13

## 2021-05-13 NOTE — PROGRESS NOTES
Tasklist reviewed with patient over the phone, updated and sent to patient via [employer].    Bariatric Task List  Status:  Is patient a candidate for bariatric surgery?:  patient is a candidate for bariatric surgery -     Cleared to schedule surgeon consult?:  cleared to schedule surgeon consult - Call [phone] to schedule with Dr Alcaraz. s   Status:  surgery evaluation in process -     Surgeon: Dr. Robbie Alcaraz -     Tentative surgery month/year: To be determined -        Insurance: Insurance:  [employer] -     Cigna: PCP Recommendation and Medical Clearance:    -     HP Referral:  Completed - Done. bks   Other:  Do the 5 coaching phone calls. Call Ray at [phone] to get registered. -  Done      Patient Info: Initial Weight:  296 -     Date of Initial Weight/Height:  7/16/2020 -     Goal Weight (lbs):  286 -     Required Weight Loss:  10 -     Surgery Type:  sleeve gastrectomy -        Dietician Visits: Structured weight loss required by insurance?:  structured weight loss required -     Dietician Visit 1:  Completed - 7/16/20 KB   Dietician Visit 2:  Completed - 8/18/20 KB   Dietician Visit 3:  Completed - 10/6/20 done. bks   Dietician Visit 4:  Completed - 2/9/21 bks   Dietician Visit 5:  Completed - 3/30/21 done   Dietician Visit 6:  Completed - 4/26/21 done. bks   Dietician Visit additional:  Needed - Monthly as needed to reach goal weight, and post-op ed. Call [phone] to schedule. 5/18/21 appt. bks      Psychological Evaluation: Psych eval:  Needed - List and letter sent 07/30/20 -AS; Dr Maritza Bender [phone],or Dr Krissy Tsai [phone]. [facility]      Lab Work:  Call [phone] to schedule at the [facility] ([facility]) lab. Complete Blood Count:  Completed - 7/30/20 -AS   Comprehensive Metabolic Panel:  Completed - 7/30/20 -AS   Vitamin D:  Needed - Ordered 7/16/20 -AS   Hgb A1c:  Completed - Ordered 7/16/20 -AS; 1/21/20= 7.4 bks   PTH:  Needed - Ordered 7/16/20 -AS   TSH:   "Completed - 10/22/19; 7/30/20   Nicotine Testing:  Needed - Quit Date 5/1/2020; Ordered 7/16/20 -AS   Other:  Needed - Lipids      Consults/ Clearance: Sleep Medicine:  Needed - Letter sent 07/30/20 -AS; 7/20/20 Met with Dr Nieves, sleep study done. Call [phone] for follow-up visit. [facility]      PCP: Establish care with PCP:  Completed -     PCP letter of support:  Needed - Letter sent 07/30/20 -AS. Fax to Dr Alcaraz at [phone]. [facility]      Patient Education:  Information Session:  Completed -     Given \"Making your decision\" handout?:  Given \"\"Making your decision\"\" handout? - 07/30/20 -AS   Given support group information?:  support group contact information provided - 07/30/20 -AS   Attended support group?:    -     Support plan in place?:  Completed -        Additional Surgery Requirements: Other Requirements:  covid test 4 days before surgery date bks -     Other Requirements:  postop appts at 1 week and 31+ days bks -        Final Tasks:  Before surgery online class:  Needed -     Before surgery online class website link:  [email or web address]   After surgery online class:  Needed -     After surgery online class website link:  [email or web address]   Nurse visit for weigh-in and information:  Needed -     Pre-assessment clinic visit with anesthesia team for H&P:  Needed -     Final labs (Hgb, plt, T&S, UA):  Needed -        Notes: Please register for the Get Well Loop when you get an email invitation and a surgery date.     The Get Well Loop will give you information via email or text messages that can help you be more successful before and after surgery.  It can also help answer any questions you may have.   -           "

## 2021-06-14 ENCOUNTER — TELEPHONE (OUTPATIENT)
Dept: PHARMACY | Facility: CLINIC | Age: 51
End: 2021-06-14

## 2021-06-14 NOTE — TELEPHONE ENCOUNTER
Please abstract the following data from this visit with this patient into the appropriate field in Epic:    Other Tests found in the patient's chart through Chart Review/Care Everywhere:    A1c done by this group [facility] on this date: 1/21/2021    Note to Abstraction: If this section is blank, no results were found via Chart Review/Care Everywhere.    The above is a part of D5 work -     Lauren Bloch, PharmD  Medication Therapy Management Pharmacist   [facility] [place]

## 2021-08-29 ENCOUNTER — HEALTH MAINTENANCE LETTER (OUTPATIENT)
Age: 51
End: 2021-08-29

## 2021-09-02 ENCOUNTER — TELEPHONE (OUTPATIENT)
Dept: SLEEP MEDICINE | Facility: CLINIC | Age: 51
End: 2021-09-02

## 2021-09-02 NOTE — TELEPHONE ENCOUNTER
RETURNED THE PT CALL AND THE PT STATED NO AIR IS COMING FROM THE CPAP MACHINE. INSTRUCTED THE PT TO FU WITH RP SO THEY CAN REPLACE THE DEVICE. PT STATED SHE WILL DO SO.

## 2021-09-09 NOTE — PROGRESS NOTES
"Yancy Hoover is a 49 year old female who is being evaluated via a billable video visit.      The patient has been notified of following:     \"This video visit will be conducted via a call between you and your physician/provider. We have found that certain health care needs can be provided without the need for an in-person physical exam.  This service lets us provide the care you need with a video conversation.  If a prescription is necessary we can send it directly to your pharmacy.  If lab work is needed we can place an order for that and you can then stop by our lab to have the test done at a later time.    Video visits are billed at different rates depending on your insurance coverage.  Please reach out to your insurance provider with any questions.    If during the course of the call the physician/provider feels a video visit is not appropriate, you will not be charged for this service.\"    Patient has given verbal consent for Video visit? Yes  How would you like to obtain your AVS? MyChart  If you are dropped from the video visit, the video invite should be resent to: Text to cell phone: [phone]  Will anyone else be joining your video visit? No        Video-Visit Details    Type of service:  Video Visit    Video Start Time:  11:33 AM  Video End Time: 11:56 AM  Time spent with patient: 23 minutes.    Originating Location (pt. Location): Home    Distant Location (provider location):  [facility]     Platform used for Video Visit: [employer]     During this virtual visit the patient is located in [place], patient verifies this as the location during the entirety of this visit.       Bariatric Nutrition Consultation Note    Reason For Visit: Nutrition Assessment    Yancy Hoover is a 51 year old presenting today for return bariatric nutrition consult. Pt is interested in laparoscopic sleeve gastrectomy with Dr. Alcaraz expected surgery in TBD.  Patient is accompanied by self. " " Recommend continued monthly RD visits until surgery to help with continued weight loss and post-op diet education.    Coordination note: Pt is working towards surgery, completing items on task list as able. Started smoking again. Plans to reach out to PCP for patch. Part on CPAP was recalled, stopped using, getting a new machine. Tried to get appointment with psych for bariatric eval, was told she needed a referral. Referral placed.       Pt referred by Jeannie Noe NP on July 16, 2020 and Dr. Velarde.  Patient with Co-morbidities of obesity including:  Type II DM yes   Renal Failure no  Sleep apnea no  Hypertension no   Dyslipidemia yes  Joint pain no  Back pain no  GERD no     Support System Reviewed With Patient 7/16/2020   Who do you have in your support network that can be available to help you for the first 2 weeks after surgery? Yes   Who can you count on for support throughout your weight loss surgery journey? Yes       ANTHROPOMETRICS:  Initial visit:  Estimated body mass index is 50.81 kg/m  as calculated from the following:    Height as of an earlier encounter on 7/16/20: 1.626 m (5' 4\").    Weight as of an earlier encounter on 7/16/20: 134.3 kg (296 lb).    Current weight (per pt report): \"Above 300\"  (+~15 lbs over the past 5 months)    Required weight loss goal pre-op: -10 lbs from initial consult weight (goal weight 286 lbs or less before surgery)    Wt Readings from Last 10 Encounters:   04/02/21 134.7 kg (297 lb)   01/29/21 134.3 kg (296 lb)   10/18/20 131.5 kg (290 lb)   07/30/20 134.3 kg (296 lb)   07/16/20 134.3 kg (296 lb)   07/03/20 133.8 kg (295 lb)   06/07/20 127 kg (280 lb)   06/05/20 113.4 kg (250 lb)   02/03/20 124.7 kg (275 lb)   01/23/20 124.7 kg (275 lb)          7/16/2020   I have tried the following methods to lose weight Watching portions or calories, Exercise, Weight Watchers, Atkins type diet (low carb/high protein), Slimfast       Weight Loss Questions Reviewed With Patient " 7/16/2020   How long have you been overweight? Since early childhood       SUPPLEMENT INFORMATION:  Vitamin D (h/o vitamin D deficiency), Estefanía complete with iron     MEDICATIONS FOR WEIGHT LOSS:  Metformin, topiramate. Pt reports she will be starting an injection with Dr. Velarde today.     NUTRITION HISTORY:   No known food allergies/intolerances.  Doesn't like the taste of water. Needs to have ice cold. Eliminated red meat.   Putting food on a saucer lately to help reduce portion sizes.     Feb 2021: Pt reports a lot of stress in the past few months. She has had many family members pass away from COVID, and has had issues with her ears that led to her being put on prednisone for a short period. This led to an increase in emotional eating, increased hunger, difficulty controlling portion sizes and worsened BG control (HgbA1c 7.4 on 1/21/21). Pt notes this is the highest wt she has ever been, feeling embarrassed and tearful during visit. Really focusing on get back to previous diet changes - protein smoothie meal replacements, decreased starches, avoiding snacking and drinking calorie free beverages only.     March 2021: Taking walks with neighbor daily for 15-30 mins. Reduced appetite, only eating 1-2 meals per day. Has eliminated red meat. Focusing on 3 oz lean protein and fruits and vegetables at meals.     April 2021: Met fluid goals, consistently consuming 64 oz/day. Focusing on just having meals TID, avoiding snacking between.  Continues to take walks in the am for 60 hours.  Good support from neighbors with walking routine. Pt mentions she is interested in more pant based meals.     Sept 2021: Really struggling with loss of family members (brother, mom) recently. Started smoking and drinking soda again. Continues more pescatarian diet, occ will have chicken. Getting back on track with meal planning.     Recent Diet Recall:   Breakfast: smoothie (kale, pineapple/apple, protein powder)  Lunch: salad (L/T,  broccoli, cauliflower and cucumber + cheese occ and 2 tsp balsamic vinegar); turkey sandwich on flat bread + 100 paul chip   Dinner: baked skinless chicken (3 oz) and fruits and vegetables (salad, green beans)  Snacks: Avoiding for the most part.  Beverages: a lot of soda lately    Progress Towards Previous Goals:  Relating To Eating:  - Eat 3 meals per day. - Met, continues  - Eat slowly (20-30 minutes per meal), chewing foods well (25 chews per bite/applesauce consistency) - Improving  - Avoid snacking - Did not discuss today    Relating to beverages:  - Separate fluids from meals by 30 minutes before, during, and after eating - Improving  - Drink 64 ounces of fluid per day. Small, frequent sips. - Improving    Relating to activity:  - Keep up the great work with your walking routine! - restrarted recently and has started biking.     Eating Habits 7/16/2020   Do you have any dietary restrictions? Yes   Do you currently binge eat (eat a large amount of food in a short time)? No   Are you an emotional eater? No   Do you get up to eat after falling asleep? Yes   What foods do you crave? Soda       ADDITIONAL INFORMATION:  A lot of stress recently r/t COVID and death of close family members.     Dining Out History Reviewed With Patient 7/16/2020   How often do you dine out? A couple of times a week.   Where do you dine out? (select all that apply) fast food chains   What types of food do you order when you dine out? Pizza, or buugers and fries       Physical Activity Reviewed With Patient 7/16/2020   How often do you exercise? 1 to 2 times per week   What is the duration of your exercise (in minutes)? 30 Minutes   What types of exercise do you do? walking   What keeps you from being more active?  I should be more active but I just have not gotten around to it       NUTRITION DIAGNOSIS:  Obesity r/t long history of self-monitoring deficit and excessive energy intake aeb BMI >30 kg/m2. -  Improving/continues    INTERVENTION:  - Reviewed post-op diet guidelines, ways to help prepare for post-op diet guidelines pre-operatively, portion/calorie-control, mindful eating and sources of protein.   - Reviewed progress towards previous goals  - Provided encouragement and support. Praised pt on positive diet and lifestyle changes she has started to re-focus on.    - Reviewed task list  - Placed referral for mental health and bariatric psychological eval.   - Discussed   - Provided pt with list of goals RD contact information.      Questions Reviewed With Patient 7/16/2020   How ready are you to make changes regarding your weight? Number 1 = Not ready at all to make changes up to 10 = very ready. 10   How confident are you that you can change? 1 = Not confident that you will be successful making changes up to 10 = very confident. 10       Patient Understanding: good  Expected Compliance: good    GOALS:  Relating To Eating:  - Eat 3 meals per day.   - Eat slowly (20-30 minutes per meal), chewing foods well (25 chews per bite/applesauce consistency)  - Avoid snacking    Relating to beverages:  - Separate fluids from meals by 30 minutes before, during, and after eating  - Drink 64 ounces of fluid per day. Small, frequent sips.     Relating to activity:  - Increase exercise as able.  ng (374 calories, 20 gm protein, 42 gm carbs, 15 gm fat.     High-Protein Plant Based Recipe Resources:  The High Protein Plant Based Cookbook (sold at [employer])  https://www.[employer]PiniOn/recipe-categories/high-protein  www.[employer].PiniOn  Www.[employer].org  https://www.[employer]/best-high-protein-vegan-breakfast-recipes/      Sharon Osborn RD, LD

## 2021-09-10 ENCOUNTER — VIRTUAL VISIT (OUTPATIENT)
Dept: ENDOCRINOLOGY | Facility: CLINIC | Age: 51
End: 2021-09-10
Payer: COMMERCIAL

## 2021-09-10 VITALS — BODY MASS INDEX: 50.98 KG/M2 | HEIGHT: 64 IN

## 2021-09-10 DIAGNOSIS — E11.9 TYPE 2 DIABETES MELLITUS WITHOUT COMPLICATION, WITHOUT LONG-TERM CURRENT USE OF INSULIN (H): ICD-10-CM

## 2021-09-10 DIAGNOSIS — E66.9 OBESITY: ICD-10-CM

## 2021-09-10 DIAGNOSIS — E66.813 CLASS 3 SEVERE OBESITY DUE TO EXCESS CALORIES WITH BODY MASS INDEX (BMI) OF 50.0 TO 59.9 IN ADULT, UNSPECIFIED WHETHER SERIOUS COMORBIDITY PRESENT (H): Primary | ICD-10-CM

## 2021-09-10 DIAGNOSIS — E66.01 CLASS 3 SEVERE OBESITY DUE TO EXCESS CALORIES WITH BODY MASS INDEX (BMI) OF 50.0 TO 59.9 IN ADULT, UNSPECIFIED WHETHER SERIOUS COMORBIDITY PRESENT (H): Primary | ICD-10-CM

## 2021-09-10 DIAGNOSIS — Z71.3 NUTRITIONAL COUNSELING: Primary | ICD-10-CM

## 2021-09-10 PROCEDURE — 99214 OFFICE O/P EST MOD 30 MIN: CPT | Mod: GT | Performed by: INTERNAL MEDICINE

## 2021-09-10 PROCEDURE — 97803 MED NUTRITION INDIV SUBSEQ: CPT | Mod: GT | Performed by: DIETITIAN, REGISTERED

## 2021-09-10 ASSESSMENT — PAIN SCALES - GENERAL: PAINLEVEL: NO PAIN (0)

## 2021-09-10 NOTE — LETTER
"9/10/2021       RE: Yancy Hoover  [address]  [facility] [address]     Dear Colleague,    Thank you for referring your patient, Yancy Hoover, to the [facility] [place] [facility]. Please see a copy of my visit note below.        Return Medical Weight Management Note     Yancy Hoover  MRN:  [id number]  :  1970  AMRLON:  9/10/2021    Dear Physician No Ref-Primary,    I had the pleasure of seeing your patient Yancy Hoover. She is a 51 year old female who I am continuing to see for treatment of obesity related to:       2020   I have the following health issues associated with obesity: Type II Diabetes       INTERVAL HISTORY:    Returns, last seen about 5 mo ago; reviewed and relevant history is as follows, as extracted from earlier note:     --------------------------  \"She has the following co-morbidities:  No HTN, no CVD, elevated cholesterol, DM2 newly disgnosed (had HbA1c of 7.0 by her report)        Has struggled with wt whole life, and this is pt's max wt now.     With COVID19 is working at home and sedentary otherwise  Drinking a lot soda; customer service work     Has been working with the following provider on wt loss--  [facility] (Mack Lantigua)  Pt notes she went to see a wt loss doctor and wanted wt loss pills.  He did labs and told pt she had DM.  HbA1c 7.0.  Cholesterol was a little high also--208     Pt notes she was referred to a dietitian but the dietitian is not taking new pts and pt has a lot of questions--is motivated to make health changes and halt/slow DM-related progressions/complications..      Off of work this next week so would like to have virtual visit with nutrition then if possible.     [place]--interested in goal of walking 30 min 5 days per wk; gradually work up to 10,000 steps per day (can use phone tracker)     Can " "stop eating after supper--made goal for this and the family has plans for eating dinner around 5p.     Will cut out the soda and switch to 0 palu options, has been having at least several 20 oz sodas (sprite) daily, did note while on topiramate that it was tasting flat and she found herself leaving unfinished sodas and opening new ones because of this change in taste---> now understands that this is likely related to topiramate and she can set a goal of decreasing/stopping the sugared soda and use this med to help her make this change.     Pt notes cravings and boredom and happens in the evening after supper; she will re-start and shift topiramate to prior to supper with up-titration schedule I provided in AVS.       LAST WEIGHT:   297 lbs 0 oz\"  --------------------------    Since last seen--  --recall on cpap machine, and pt got new one on Fri (but does not work); clearance on this one is needed for surgery  --never got the Ozempic in April that was ordered; placing a new order to her pharmacy  --doctor wants HbA1c below surgery before pt gets surgery also  --psychologist appointment: has not happened for some reason (needs follow up); asking to be reconnected with them (had tried to reach them without follow up before)  --pt has resumed smoking (will start a nicotine patch)  --under a lot of stress and difficult to focus on wt loss with multiple family members dying of COVID19    Regarding current meds--  Metformin XR 2 g daily (taking regularly)  Not on topiramate now--tried prior (for headaches) but stopped post ear tubes and stopped.    Checking fasting BS most days--good control of those (lower 100s) but no nonfasting data  Still on bariatric pathway    Labs in 7/21--[facility]    CURRENT WEIGHT:   0 lbs 0 oz   300#    Initial Weight (lbs): 295 lbs  Last Visits Weight: 134.7 kg (297 lb)   Body mass index is 50.98 kg/m .          Changes and Difficulties 4/2/2021   I have made the following changes to my " "diet since my last visit: More e   With regards to my diet, I am still struggling with: -   I have made the following changes to my activity/exercise since my last visit: -   With regards to my activity/exercise, I am still struggling with: -       VITALS:  Ht 1.626 m (5' 4\")   BMI 50.98 kg/m      MEDICATIONS:   Current Outpatient Medications   Medication Sig Dispense Refill     alcohol swab prep pads Use to swab area of injection/shahla as directed. 100 each 5     blood glucose (ACCU-CHEK SMARTVIEW) test strip Use to test blood sugar 1-2 times daily. 100 strip 5     blood glucose calibration (ACCU-CHEK SMARTVIEW CONTROL) solution Use to calibrate blood glucose monitor as directed. 1 Bottle 3     blood glucose monitoring (ACCU-CHEK FASTCLIX) lancets Use to test blood sugar 1-2 times daily or as directed. 102 each 5     blood glucose monitoring (ACCU-CHEK FAWAD SMARTVIEW) meter device kit Use to test blood sugar 1-2 times daily. 1 kit 0     cyclobenzaprine (FLEXERIL) 10 MG tablet Take 10 mg by mouth 3 times daily as needed for muscle spasms       meclizine (ANTIVERT) 25 MG tablet Take 1 tablet (25 mg) by mouth 3 times daily as needed for dizziness 15 tablet 1     metFORMIN (GLUCOPHAGE-XR) 500 MG 24 hr tablet For 1 wk take 2 tablets AM, and 1 tablet PM; then increase to 2 tablets twice daily (or you can take all 4 tablets at the same time each day if easier) 360 tablet 3     ondansetron (ZOFRAN ODT) 4 MG ODT tab Take 1 tablet (4 mg) by mouth every 8 hours as needed for nausea 10 tablet 0     Semaglutide,0.25 or 0.5MG/DOS, 2 MG/1.5ML SOPN Wk 1--begin with 0.25mg weekly; wk 3--increase to 0.50mg weekly as tolerated (Patient not taking: Reported on 9/10/2021) 4.5 mL 1     topiramate (TOPAMAX) 25 MG tablet wk1--25 mg before supper; wk2--50 mg then, wk3 and after--75 mg (Patient not taking: Reported on 9/10/2021) 90 tablet 4       Weight Loss Medication History Reviewed With Patient 1/29/2021   Are you having any side " effects from the weight loss medication that we have prescribed you? No   If you are having side effects please describe: none         ASSESSMENT/PLAN:     ## morbid obesity  ## T2DM without complications, not treated with ins     Yancy is a patient with early onset morbid obesity with significant element of familial/genetic influence and with current health consequences. She does need aggressive weight loss plan due to new dx T2DM.  Yancy Hoover eats a high carb diet, eats a high fat diet, eats to obtain specific degree of fullness, and has a lot of liquid calories.     Her problem is complicated by a hunger disorder and strong craving/reward pathways     Her ability to lose weight is impacted by current work life (sedentary work and working at home now with COVID19 pandemic).     PLAN:    (continues)  Decrease portion sizes  Purge house of food triggers  No meal skipping  Decrease caloric beverages by daily walking plan (has already put this in place with another provider and has plan for escalating as tolerated)  Dietician visit of education  Volumetrics eating plan  Hypocaloric/low fat diet  Increase activity by daily walking      Diabetes   Ancillary testing:  N/A. Frequent home glucose monitoring with report to nurse as normal weight decreases.   Food Plan:  Reduced calorie and Low carbohydrate.  Activity Plan:  Daily walking, can do this after meals (such as after supper in the evenings in the summer--supper is around 5p).  Supplementary:  N/A.  Medication:  see comments below     additionally  --try for GLP1 (starting with Ozempic--again)  --continue full dose metformin--she is tolerating and benefiting from this med  --RTC wilmer in 4-6 wks, me in 2-3 mo        Time: approx 35 min spent on evaluation, management, counseling, education, & motivational interviewing (video visit) coupled with previsit planning and post visit charting/follow up care same day     Sincerely,     Danial Velarde,  MD

## 2021-09-10 NOTE — NURSING NOTE
"Chief Complaint   Patient presents with     RECHECK     Mwm follow up.       Vitals:    09/10/21 1038   Height: 1.626 m (5' 4\")       Body mass index is 50.98 kg/m .                          Carrie Zarate, EMT  "

## 2021-09-10 NOTE — PATIENT INSTRUCTIONS
Juan J Haines,    Follow-up with RD in 1 month.     Thank you,    Sharon Osborn, RD, LD  If you would like to schedule or reschedule an appointment with the RD, please call [phone]    Nutrition Goals  Relating To Eating:  - Eat 3 meals per day.   - Eat slowly (20-30 minutes per meal), chewing foods well (25 chews per bite/applesauce consistency)  - Avoid snacking    Relating to beverages:  - Separate fluids from meals by 30 minutes before, during, and after eating  - Drink 64 ounces of fluid per day. Small, frequent sips.     Relating to activity:  - Increase exercise as able.  ng (374 calories, 20 gm protein, 42 gm carbs, 15 gm fat.     High-Protein Plant Based Recipe Resources:  The High Protein Plant Based Cookbook (sold at [employer])  https://www.Epoxy/recipe-categories/high-protein  www.RadioRx.[employer]  Www.[employer].aXess america  https://www.[facility]/best-high-protein-vegan-breakfast-recipes/        Interested in working with a health ?  Health coaches work with you to improve your overall health and wellbeing.  They look at the whole person, and may involve discussion of different areas of life, including, but not limited to the four pillars of health (sleep, exercise, nutrition, and stress management). Discuss with your care team if you would like to start working a health .    Health Coaching-3 Pack:    $99 for three health coaching visits    Visits may be done in person or via phone    Coaching is a partnership between the  and the client; Coaches do not prescribe or diagnose    Coaching helps inspire the client to reach his/her personal goals      COMPREHENSIVE WEIGHT MANAGEMENT PROGRAM  VIRTUAL SUPPORT GROUPS    For Support Group Information:      We offer support groups for patients who are working on weight loss and considering, preparing for or have had weight loss surgery.   There is no cost for this opportunity.  You are invited to attend the?Virtual Support Groups?provided  "by any of the following locations:    1. [facility] via [employer] Teams with Larisa Cuba RN  2.   [place] via [employer] Teams with Dariusz Yang, PhD, LP  3.   [place] via [employer] Teams with Darya Cuello RN  4.   [facility] via [employer] Teams with Darya Mccloud [facility]    The following Support Group information can also be found on our website:  [email or web address]      [facility] Weight Loss Surgery Support Group    [facility] Weight Loss Surgery Support Group  The support group is a patient-lead forum that meets monthly to share experiences, encouragement and education. It is open to those who have had weight loss surgery, are scheduled for surgery, and those who are considering surgery.   WHEN: This group meets on the 3rd Wednesday of each month from 5:00PM - 6:00PM virtually using Microsoft Teams.   FACILITATOR: Led by Larisa Cuba, the program's Clinical Coordinator.   TO REGISTER: Please contact the clinic via AppRedeem or call the nurse line directly at [phone] to inform our staff that you would like an invite sent to you and to let us know the email you would like the invite sent to. Prior to the meeting, a link with directions on how to join the meeting will be sent to you.    2021 Meetings  August 18: \"Let's Talk\" a time for the group to share.  September 15: \"Let's Talk\" a time for the group to share.  October 20: \"Let's Talk\" a time for the group to share.  November 17: Anca Deras RD, RAJI \"Protein, Metabolism and Meal Planning\"  December 15: Israel Boles RD, LD will speak, \"Recipe Modification\"    [facility] Support Groups    Connections: Bariatric Care Support Group?  This is open to all [facility] (and those external to this program) pre- and post- operative bariatric surgery patients as well as their support system.   WHEN: This group meets " "the 2nd Tuesday of each month from 6:30 PM - 8:00 PM virtually using Microsoft Teams.   FACILITATOR: Led by Dariusz Yang, Ph.D who is a Licensed Psychologist with the [facility] Comprehensive Weight Management Program.   TO REGISTER: Please send an email to Dariusz Yang, Ph.D.,  at?[email or web address]?if you would like an invitation to the group and to learn about using Microsoft Teams.    2021 Meetings  August 10: Open Forum  September 14: Guest Speaker: Darya Cuello RN,CBN, CIC, [facility] Comprehensive Weight Management Program, \"Your Hospital Stay and Post-Operative Compliance\"  October 12: Open Forum  November 9: Guest Speaker: Tara Ramey RD,LD, [facility] Comprehensive Weight Management Program,\"Holiday Eating\".  December 14: Guest Speaker Aline Butts MD, MPH, [facility], [facility], \"Body Contouring Surgery for Bariatric Surgery Patients\"     Connections: Post-Operative Bariatric Surgery Support Group  This is a support group for [facility] bariatric patients (and those external to [facility]) who have had bariatric surgery and are at least 3 months post-surgery.  WHEN: This support group meets the 4th Wednesday of the month from 11:00 AM - 12:00 PM virtually using Microsoft Teams.   FACILITATOR: Led by Certified Bariatric Nurse, Darya Cuello RN.   TO REGISTER: Please send an email to Darya at [email or web address] if you would like an invitation to the group and to learn about using Noknoker.      [facility] Healthy Lifestyle Virtual Support Group    Healthy Lifestyle Virtual Support Group?  This is 60 minutes of small group guided discussion, support and resources. All are welcome who want a healthy lifestyle.  WHEN: This group meets monthly on a Friday from 12:30 PM - to 1:30 PM virtually using Microsoft Teams.   FACILITATOR: Led by National Board Certified Health , Darya Mccloud [facility]. "   TO REGISTER: Please send an email to Darya at?[email or web address] to receive monthly invites to the group or if you have any questions about having a health .  Prior to the meeting, a link with directions on how to join the meeting will be sent to you.    2021 Meetings  August 27: Open Forum  September 24: Sleep and the 7 Types of Rest  October 29: Open Forum  November 19: Gratitude     December 10: Open Forum

## 2021-09-10 NOTE — PROGRESS NOTES
"Yancy is a 51 year old who is being evaluated via a billable video visit.      How would you like to obtain your AVS? MyChart  If the video visit is dropped, the invitation should be resent by: Text to cell phone: [phone]  Will anyone else be joining your video visit? No      Video Start Time: 11:10  Video-Visit Details    Type of service:  Video Visit    Video End Time:11:27    Originating Location (pt. Location): Home    Distant Location (provider location):  [facility] WEIGHT MANAGEMENT [facility] [place]     Platform used for Video Visit: [employer]         [facility] Medical Weight Management Note     Yancy Hoover  MRN:  [id number]  :  1970  MARLON:  9/10/2021    Dear Physician No Ref-Primary,    I had the pleasure of seeing your patient Yancy Hoover. She is a 51 year old female who I am continuing to see for treatment of obesity related to:       2020   I have the following health issues associated with obesity: Type II Diabetes       INTERVAL HISTORY:    Returns, last seen about 5 mo ago; reviewed and relevant history is as follows, as extracted from earlier note:     --------------------------  \"She has the following co-morbidities:  No HTN, no CVD, elevated cholesterol, DM2 newly disgnosed (had HbA1c of 7.0 by her report)        Has struggled with wt whole life, and this is pt's max wt now.     With COVID19 is working at home and sedentary otherwise  Drinking a lot soda; customer service work     Has been working with the following provider on wt loss--  [facility] (Mack Lantigua)  Pt notes she went to see a wt loss doctor and wanted wt loss pills.  He did labs and told pt she had DM.  HbA1c 7.0.  Cholesterol was a little high also--208     Pt notes she was referred to a dietitian but the dietitian is not taking new pts and pt has a lot of questions--is motivated to make health changes and halt/slow DM-related progressions/complications..      Off of work this next " "week so would like to have virtual visit with nutrition then if possible.     [place]--interested in goal of walking 30 min 5 days per wk; gradually work up to 10,000 steps per day (can use phone tracker)     Can stop eating after supper--made goal for this and the family has plans for eating dinner around 5p.     Will cut out the soda and switch to 0 paul options, has been having at least several 20 oz sodas (sprite) daily, did note while on topiramate that it was tasting flat and she found herself leaving unfinished sodas and opening new ones because of this change in taste---> now understands that this is likely related to topiramate and she can set a goal of decreasing/stopping the sugared soda and use this med to help her make this change.     Pt notes cravings and boredom and happens in the evening after supper; she will re-start and shift topiramate to prior to supper with up-titration schedule I provided in AVS.       LAST WEIGHT:   297 lbs 0 oz\"  --------------------------    Since last seen--  --recall on cpap machine, and pt got new one on Fri (but does not work); clearance on this one is needed for surgery  --never got the Ozempic in April that was ordered; placing a new order to her pharmacy  --doctor wants HbA1c below surgery before pt gets surgery also  --psychologist appointment: has not happened for some reason (needs follow up); asking to be reconnected with them (had tried to reach them without follow up before)  --pt has resumed smoking (will start a nicotine patch)  --under a lot of stress and difficult to focus on wt loss with multiple family members dying of COVID19    Regarding current meds--  Metformin XR 2 g daily (taking regularly)  Not on topiramate now--tried prior (for headaches) but stopped post ear tubes and stopped.    Checking fasting BS most days--good control of those (lower 100s) but no nonfasting data  Still on bariatric pathway    Labs in " "7/21--[facility]    CURRENT WEIGHT:   0 lbs 0 oz   300#    Initial Weight (lbs): 295 lbs  Last Visits Weight: 134.7 kg (297 lb)   Body mass index is 50.98 kg/m .          Changes and Difficulties 4/2/2021   I have made the following changes to my diet since my last visit: More e   With regards to my diet, I am still struggling with: -   I have made the following changes to my activity/exercise since my last visit: -   With regards to my activity/exercise, I am still struggling with: -       VITALS:  Ht 1.626 m (5' 4\")   BMI 50.98 kg/m      MEDICATIONS:   Current Outpatient Medications   Medication Sig Dispense Refill     alcohol swab prep pads Use to swab area of injection/shahla as directed. 100 each 5     blood glucose (ACCU-CHEK SMARTVIEW) test strip Use to test blood sugar 1-2 times daily. 100 strip 5     blood glucose calibration (ACCU-CHEK SMARTVIEW CONTROL) solution Use to calibrate blood glucose monitor as directed. 1 Bottle 3     blood glucose monitoring (ACCU-CHEK FASTCLIX) lancets Use to test blood sugar 1-2 times daily or as directed. 102 each 5     blood glucose monitoring (ACCU-CHEK FAWAD SMARTVIEW) meter device kit Use to test blood sugar 1-2 times daily. 1 kit 0     cyclobenzaprine (FLEXERIL) 10 MG tablet Take 10 mg by mouth 3 times daily as needed for muscle spasms       meclizine (ANTIVERT) 25 MG tablet Take 1 tablet (25 mg) by mouth 3 times daily as needed for dizziness 15 tablet 1     metFORMIN (GLUCOPHAGE-XR) 500 MG 24 hr tablet For 1 wk take 2 tablets AM, and 1 tablet PM; then increase to 2 tablets twice daily (or you can take all 4 tablets at the same time each day if easier) 360 tablet 3     ondansetron (ZOFRAN ODT) 4 MG ODT tab Take 1 tablet (4 mg) by mouth every 8 hours as needed for nausea 10 tablet 0     Semaglutide,0.25 or 0.5MG/DOS, 2 MG/1.5ML SOPN Wk 1--begin with 0.25mg weekly; wk 3--increase to 0.50mg weekly as tolerated (Patient not taking: Reported on 9/10/2021) 4.5 mL 1     " topiramate (TOPAMAX) 25 MG tablet wk1--25 mg before supper; wk2--50 mg then, wk3 and after--75 mg (Patient not taking: Reported on 9/10/2021) 90 tablet 4       Weight Loss Medication History Reviewed With Patient 1/29/2021   Are you having any side effects from the weight loss medication that we have prescribed you? No   If you are having side effects please describe: none         ASSESSMENT/PLAN:     ## morbid obesity  ## T2DM without complications, not treated with ins     Yancy is a patient with early onset morbid obesity with significant element of familial/genetic influence and with current health consequences. She does need aggressive weight loss plan due to new dx T2DM.  Yancy Hoover eats a high carb diet, eats a high fat diet, eats to obtain specific degree of fullness, and has a lot of liquid calories.     Her problem is complicated by a hunger disorder and strong craving/reward pathways     Her ability to lose weight is impacted by current work life (sedentary work and working at home now with COVID19 pandemic).     PLAN:    (continues)  Decrease portion sizes  Purge house of food triggers  No meal skipping  Decrease caloric beverages by daily walking plan (has already put this in place with another provider and has plan for escalating as tolerated)  Dietician visit of education  Volumetrics eating plan  Hypocaloric/low fat diet  Increase activity by daily walking      Diabetes   Ancillary testing:  N/A. Frequent home glucose monitoring with report to nurse as normal weight decreases.   Food Plan:  Reduced calorie and Low carbohydrate.  Activity Plan:  Daily walking, can do this after meals (such as after supper in the evenings in the summer--supper is around 5p).  Supplementary:  N/A.  Medication:  see comments below     additionally  --try for GLP1 (starting with Ozempic--again)  --continue full dose metformin--she is tolerating and benefiting from this med  --RTC wilmer in 4-6 wks, me in 2-3  mo        Time: approx 35 min spent on evaluation, management, counseling, education, & motivational interviewing (video visit) coupled with previsit planning and post visit charting/follow up care same day     Sincerely,     Danial Velarde MD         Sincerely,    Danial Velarde MD

## 2021-09-10 NOTE — PATIENT INSTRUCTIONS
Thank you for allowing us the privilege of caring for you. We hope we provided you with the excellent service you deserve.    Please let us know if there is anything else we can do for you so that we can be sure you are completely satisfied with your care experience.    Your visit was with Dr. Velarde today.    Instructions per today's visit:  --Ozempic prescription was sent again  --continue full dose metformin--she is tolerating and benefiting from this med  --return with Lauren Bloch in 4-6 wks, with Dr. Velarde in 2-3 mo    Please call our contact center at [phone] to schedule your next appointments.    Meal Replacement Products:    Here is the link to our new e-store where you can purchase our meal replacement products    [employer].Tail/store    The one week starter kit is a great way to sample a variety of products and see what works for you.    If you want more information about the product go to: WriteLatex    Free Shipping for orders over $75    Benefits of meal replacements products:    Portion and calorie control  Improved nutrition  Structured eating  Simplified food choices  Avoid contact with trigger foods    Interested in working with a health ?  Health coaches work with you to improve your overall health and wellbeing.  They look at the whole person, and may involve discussion of different areas of life, including, but not limited to the four pillars of health (sleep, exercise, nutrition, and stress management). Discuss with your care team if you would like to start working a health .    Health Coaching-3 Pack: Schedule by calling [phone]    $99 for three health coaching visits    Visits may be done in person or via phone    Coaching is a partnership between the  and the client; Coaches do not prescribe or diagnose    Coaching helps inspire the client to reach his/her personal goals    Bluetooth Scale:    We hope to provide  you with high quality telephone and virtual healthcare visits while social distancing for COVID-19 is necessary, as well as in the future when virtual visits may be more convenient for you.    Our technology team made it possible for Bluetooth scales to send weight measurements to our electronic medical record. This allows weights from you weighing at home to securely flow into the medical record, which will improve telephone and virtual visits.  Additionally, studies have shown that adults actually lose more weight when their weights are automatically sent to someone else, and also that this process is not stressful for those adults.    Below is a link for purchasing the scale, with a discount code for our patients. You may call your insurance company to see if they will reimburse you for the cost of the scale, as a piece of durable medical equipment. The scales only go up to a weight of 400 pounds. This is an issue and we are working with the developer on increasing this. We found no scales that go over 400lb that have blue-tooth for connecting to iGo.    Scale to purchase: the ZUGGI  Body  Scale: https://www.Verient.[employer]/us/en/body/shop?gclid=[email or web address]&gclsrc=aw.ds    Discount Code: We have a discount code for our patients to bring the cost down to $50, the code is:  withmed     Steps to link the scale to iGo via an Android Phone (you can always disconnect at any point in the future):  1. The order must be placed first before the patient can access Track My Health within iGo.  2. Download Google Fit gilbert from the Google Play Store  a. Log in or register using your Google account  3. Download the iGo gilbert from Google Play Store  a. Select add organization  b. Search for [employer] and select it  c. Log into iGo  d. Select Track My Health  e. Select the green connect my account button  f. When prompted log into your Google account  g. Select okay to  confirm the account  4. Download the Withings Health Mate maddie from Google Play Store  a. [place] for [employer]  b. Go to profile  c. Tap google fit under the Apps section  d. Select the option to activate Google Fit integration  e. Select the same Google account  f. Select okay to confirm the account  g.  Steps to link the scale to "[employer] via an iPhone (you can always disconnect at any point in the future):  **Note Edfolio is not available for download on an iPad**  1. The order must be placed first before the patient can access Track My Health within "[employer].  2. Locate the Health maddie on your iPhone.  a. Set up your Apple Health account as prompted  b. The Sources page will show Apps that communicate with your Health maddie. Once all steps are completed, you should see Anturis and Metconnexhart listed under the Apps section and your iPhone under the devices section.  i. Select Health Mate  1. Under 'ALLOW  HEALTH MATE  TO WRITE DATA ensure the toggle is on for Weight.  2. This will allow the scale to add your weight to the Apple Health  ii. Select Metconnexhart  1. Under 'ALLOW  [employer] VillageT  TO READ DATA ensure the toggle is on for Weight.  2. This allows "[employer] to grab the weight from Edfolio so your provider can see your weights.  3. Download the "[employer] maddie from the Maddie Store  a. Select add organization                                                  b. Search for [employer]ferize and select it  c. Log into "[employer]  d. Select Track My Health  e. Select the green connect my account button  f. Follow prompts to link your device to "[employer].  4. Download the Withings health mate maddie in the Maddie Store  a. [place] for Within[employer]zlong  b. Go to profile  c. Tap Health under the Apps section  d. If prompted to allow access with the Health Maddie, toggle weight on for read and write access.      For any questions/concerns contact Ebony Barragan LPN at [phone]    To schedule appointments with our team, please call [phone]    Please  call during clinic hours Monday through Friday 8:00a - 4:00p if you have questions or you can contact us via VC VISION at anytime.      Lab results will be communicated through My Chart or letter (if My Chart not used). Please call the clinic if you have not received communication after 1 week or if you have any questions.    Clinic Fax: [phone]    Thank you,  Medical Weight Management Team

## 2021-09-10 NOTE — LETTER
"9/10/2021       RE: Yancy Hoover  [address]  [facility] [address]     Dear Colleague,    Thank you for referring your patient, Yancy Hoover, to the [facility] [place] [facility]. Please see a copy of my visit note below.    Yancy Hoover is a 49 year old female who is being evaluated via a billable video visit.      The patient has been notified of following:     \"This video visit will be conducted via a call between you and your physician/provider. We have found that certain health care needs can be provided without the need for an in-person physical exam.  This service lets us provide the care you need with a video conversation.  If a prescription is necessary we can send it directly to your pharmacy.  If lab work is needed we can place an order for that and you can then stop by our lab to have the test done at a later time.    Video visits are billed at different rates depending on your insurance coverage.  Please reach out to your insurance provider with any questions.    If during the course of the call the physician/provider feels a video visit is not appropriate, you will not be charged for this service.\"    Patient has given verbal consent for Video visit? Yes  How would you like to obtain your AVS? MyChart  If you are dropped from the video visit, the video invite should be resent to: Text to cell phone: [phone]  Will anyone else be joining your video visit? No        Video-Visit Details    Type of service:  Video Visit    Video Start Time:  11:33 AM  Video End Time: 11:56 AM  Time spent with patient: 23 minutes.    Originating Location (pt. Location): Home    Distant Location (provider location):  [facility]     Platform used for Video Visit: [employer]     During this virtual visit the patient is located in [place], patient verifies this as the location " "during the entirety of this visit.       Bariatric Nutrition Consultation Note    Reason For Visit: Nutrition Assessment    Yancy Hoover is a 51 year old presenting today for return bariatric nutrition consult. Pt is interested in laparoscopic sleeve gastrectomy with Dr. Alcaraz expected surgery in TBD.  Patient is accompanied by self.  Recommend continued monthly RD visits until surgery to help with continued weight loss and post-op diet education.    Coordination note: Pt is working towards surgery, completing items on task list as able. Started smoking again. Plans to reach out to PCP for patch. Part on CPAP was recalled, stopped using, getting a new machine. Tried to get appointment with psych for bariatric eval, was told she needed a referral. Referral placed.       Pt referred by Jeannie Noe NP on July 16, 2020 and Dr. Velarde.  Patient with Co-morbidities of obesity including:  Type II DM yes   Renal Failure no  Sleep apnea no  Hypertension no   Dyslipidemia yes  Joint pain no  Back pain no  GERD no     Support System Reviewed With Patient 7/16/2020   Who do you have in your support network that can be available to help you for the first 2 weeks after surgery? Yes   Who can you count on for support throughout your weight loss surgery journey? Yes       ANTHROPOMETRICS:  Initial visit:  Estimated body mass index is 50.81 kg/m  as calculated from the following:    Height as of an earlier encounter on 7/16/20: 1.626 m (5' 4\").    Weight as of an earlier encounter on 7/16/20: 134.3 kg (296 lb).    Current weight (per pt report): \"Above 300\"  (+~15 lbs over the past 5 months)    Required weight loss goal pre-op: -10 lbs from initial consult weight (goal weight 286 lbs or less before surgery)    Wt Readings from Last 10 Encounters:   04/02/21 134.7 kg (297 lb)   01/29/21 134.3 kg (296 lb)   10/18/20 131.5 kg (290 lb)   07/30/20 134.3 kg (296 lb)   07/16/20 134.3 kg (296 lb)   07/03/20 133.8 kg (295 lb) "   06/07/20 127 kg (280 lb)   06/05/20 113.4 kg (250 lb)   02/03/20 124.7 kg (275 lb)   01/23/20 124.7 kg (275 lb)          7/16/2020   I have tried the following methods to lose weight Watching portions or calories, Exercise, Weight Watchers, Atkins type diet (low carb/high protein), Slimfast       Weight Loss Questions Reviewed With Patient 7/16/2020   How long have you been overweight? Since early childhood       SUPPLEMENT INFORMATION:  Vitamin D (h/o vitamin D deficiency), Flinstones complete with iron     MEDICATIONS FOR WEIGHT LOSS:  Metformin, topiramate. Pt reports she will be starting an injection with Dr. Velarde today.     NUTRITION HISTORY:   No known food allergies/intolerances.  Doesn't like the taste of water. Needs to have ice cold. Eliminated red meat.   Putting food on a saucer lately to help reduce portion sizes.     Feb 2021: Pt reports a lot of stress in the past few months. She has had many family members pass away from COVID, and has had issues with her ears that led to her being put on prednisone for a short period. This led to an increase in emotional eating, increased hunger, difficulty controlling portion sizes and worsened BG control (HgbA1c 7.4 on 1/21/21). Pt notes this is the highest wt she has ever been, feeling embarrassed and tearful during visit. Really focusing on get back to previous diet changes - protein smoothie meal replacements, decreased starches, avoiding snacking and drinking calorie free beverages only.     March 2021: Taking walks with neighbor daily for 15-30 mins. Reduced appetite, only eating 1-2 meals per day. Has eliminated red meat. Focusing on 3 oz lean protein and fruits and vegetables at meals.     April 2021: Met fluid goals, consistently consuming 64 oz/day. Focusing on just having meals TID, avoiding snacking between.  Continues to take walks in the am for 60 hours.  Good support from neighbors with walking routine. Pt mentions she is interested in more pant  based meals.     Sept 2021: Really struggling with loss of family members (brother, mom) recently. Started smoking and drinking soda again. Continues more pescatarian diet, occ will have chicken. Getting back on track with meal planning.     Recent Diet Recall:   Breakfast: smoothie (kale, pineapple/apple, protein powder)  Lunch: salad (L/T, broccoli, cauliflower and cucumber + cheese occ and 2 tsp balsamic vinegar); turkey sandwich on flat bread + 100 paul chip   Dinner: baked skinless chicken (3 oz) and fruits and vegetables (salad, green beans)  Snacks: Avoiding for the most part.  Beverages: a lot of soda lately    Progress Towards Previous Goals:  Relating To Eating:  - Eat 3 meals per day. - Met, continues  - Eat slowly (20-30 minutes per meal), chewing foods well (25 chews per bite/applesauce consistency) - Improving  - Avoid snacking - Did not discuss today    Relating to beverages:  - Separate fluids from meals by 30 minutes before, during, and after eating - Improving  - Drink 64 ounces of fluid per day. Small, frequent sips. - Improving    Relating to activity:  - Keep up the great work with your walking routine! - restrarted recently and has started biking.     Eating Habits 7/16/2020   Do you have any dietary restrictions? Yes   Do you currently binge eat (eat a large amount of food in a short time)? No   Are you an emotional eater? No   Do you get up to eat after falling asleep? Yes   What foods do you crave? Soda       ADDITIONAL INFORMATION:  A lot of stress recently r/t COVID and death of close family members.     Dining Out History Reviewed With Patient 7/16/2020   How often do you dine out? A couple of times a week.   Where do you dine out? (select all that apply) fast food chains   What types of food do you order when you dine out? Pizza, or buugers and fries       Physical Activity Reviewed With Patient 7/16/2020   How often do you exercise? 1 to 2 times per week   What is the duration of your  exercise (in minutes)? 30 Minutes   What types of exercise do you do? walking   What keeps you from being more active?  I should be more active but I just have not gotten around to it       NUTRITION DIAGNOSIS:  Obesity r/t long history of self-monitoring deficit and excessive energy intake aeb BMI >30 kg/m2. - Improving/continues    INTERVENTION:  - Reviewed post-op diet guidelines, ways to help prepare for post-op diet guidelines pre-operatively, portion/calorie-control, mindful eating and sources of protein.   - Reviewed progress towards previous goals  - Provided encouragement and support. Praised pt on positive diet and lifestyle changes she has started to re-focus on.    - Reviewed task list  - Placed referral for mental health and bariatric psychological eval.   - Discussed   - Provided pt with list of goals RD contact information.      Questions Reviewed With Patient 7/16/2020   How ready are you to make changes regarding your weight? Number 1 = Not ready at all to make changes up to 10 = very ready. 10   How confident are you that you can change? 1 = Not confident that you will be successful making changes up to 10 = very confident. 10       Patient Understanding: good  Expected Compliance: good    GOALS:  Relating To Eating:  - Eat 3 meals per day.   - Eat slowly (20-30 minutes per meal), chewing foods well (25 chews per bite/applesauce consistency)  - Avoid snacking    Relating to beverages:  - Separate fluids from meals by 30 minutes before, during, and after eating  - Drink 64 ounces of fluid per day. Small, frequent sips.     Relating to activity:  - Increase exercise as able.  ng (374 calories, 20 gm protein, 42 gm carbs, 15 gm fat.     High-Protein Plant Based Recipe Resources:  The High Protein Plant Based Cookbook (sold at  [employer])  https://www.Clacendix.Tiange/recipe-categories/high-protein  www.New Relic.Tiange  Www.[employer]  https://www.Launchpilots.Tiange/best-high-protein-vegan-breakfast-recipes/      Sharon Osborn RD, LD

## 2021-09-13 ENCOUNTER — TELEPHONE (OUTPATIENT)
Dept: ENDOCRINOLOGY | Facility: CLINIC | Age: 51
End: 2021-09-13

## 2021-09-13 NOTE — TELEPHONE ENCOUNTER
PRIOR AUTHORIZATION DENIED    Medication: semaglutide (OZEMPIC) 2 MG/1.5ML SOPN pen - DENIED    Denial Date: 9/13/2021     Denial Rational:       Appeal Information:

## 2021-09-13 NOTE — TELEPHONE ENCOUNTER
PA was denied. This medication does not need to be filled. Message to provider to prescribe alternative.

## 2021-09-16 ENCOUNTER — TELEPHONE (OUTPATIENT)
Dept: ENDOCRINOLOGY | Facility: CLINIC | Age: 51
End: 2021-09-16

## 2021-09-16 NOTE — TELEPHONE ENCOUNTER
pls call pt to discuss the rejected PA. Looks like other meds need tried first?    [phone]  **OK to leave detailed VM

## 2021-09-16 NOTE — TELEPHONE ENCOUNTER
Called and spoke with patient in regards to Ozempic denial. Advise that clinci did receive the denial and message has been forwarded on to Dr. Velarde to see if she would like to try an alternative. Patient understands that if a new medication is prescribed, a new PA will be started, and will likely take about 1 week to hear back. Patient would like an update via [employer] once a plan is in place.

## 2021-09-20 ENCOUNTER — TELEPHONE (OUTPATIENT)
Dept: ENDOCRINOLOGY | Facility: CLINIC | Age: 51
End: 2021-09-20

## 2021-09-20 DIAGNOSIS — E11.9 TYPE 2 DIABETES MELLITUS WITHOUT COMPLICATION, WITHOUT LONG-TERM CURRENT USE OF INSULIN (H): Primary | ICD-10-CM

## 2021-09-20 DIAGNOSIS — E66.813 CLASS 3 SEVERE OBESITY DUE TO EXCESS CALORIES WITH BODY MASS INDEX (BMI) OF 50.0 TO 59.9 IN ADULT, UNSPECIFIED WHETHER SERIOUS COMORBIDITY PRESENT (H): ICD-10-CM

## 2021-09-20 DIAGNOSIS — E66.01 CLASS 3 SEVERE OBESITY DUE TO EXCESS CALORIES WITH BODY MASS INDEX (BMI) OF 50.0 TO 59.9 IN ADULT, UNSPECIFIED WHETHER SERIOUS COMORBIDITY PRESENT (H): ICD-10-CM

## 2021-09-20 RX ORDER — LIRAGLUTIDE 6 MG/ML
INJECTION SUBCUTANEOUS
Qty: 27 ML | Refills: 1 | Status: SHIPPED | OUTPATIENT
Start: 2021-09-20 | End: 2021-11-30

## 2021-09-20 NOTE — TELEPHONE ENCOUNTER
Prior Authorization Retail Medication Request    Medication/Dose: Victoza  ICD code (if different than what is on RX):    Type 2 diabetes mellitus without complication, without long-term current use of insulin (H) [E11.9]  - Primary       Class 3 severe obesity due to excess calories with body mass index (BMI) of 50.0 to 59.9 in adult, unspecified whether serious comorbidity present (H) [E66.01, Z68.43]           Previously Tried and Failed:  History of diet and exercise, Topiramate, currently on Metformin  Rationale:  I had the pleasure of seeing your patient Yancy Hoover. She is a 51 year old female who I am continuing to see for treatment of obesity related to: Type II Diabetes. Ozempic denied by insurance. Yancy is a patient with early onset morbid obesity with significant element of familial/genetic influence and with current health consequences. She does need aggressive weight loss plan due to new dx T2DM.  Yancy Hoover eats a high carb diet, eats a high fat diet, eats to obtain specific degree of fullness, and has a lot of liquid calories.     Her problem is complicated by a hunger disorder and strong craving/reward pathways     Her ability to lose weight is impacted by current work life (sedentary work and working at home now with COVID19 pandemic).    Insurance Name:    Insurance ID:        Pharmacy Information (if different than what is on RX)  Name:  [id number] [place], [place] - [address] AT [facility] & [facility] [address] [facility] [address]  Phone:  [phone]

## 2021-09-21 NOTE — TELEPHONE ENCOUNTER
Central Prior Authorization Team   Phone: [phone]      PA Initiation    Medication: Victoza-PA initiated  Insurance Company: [employer] - Phone [phone] Fax [phone]  Pharmacy Filling the Rx: "[facility] #[id number] [address] AT [facility] & [address] [place] [address]  Filling Pharmacy Phone: [phone]  Filling Pharmacy Fax:    Start Date: 9/21/2021

## 2021-09-22 NOTE — TELEPHONE ENCOUNTER
Prior Authorization Not Needed per Insurance    Medication: Victoza-PA not needed  Insurance Company: [employer] - Phone [phone] Fax [phone]  Expected CoPay:      Pharmacy Filling the Rx: [id number] [address] AT [address]  Pharmacy Notified: Yes  Patient Notified: No

## 2021-09-27 DIAGNOSIS — E66.01 CLASS 3 SEVERE OBESITY DUE TO EXCESS CALORIES WITH BODY MASS INDEX (BMI) OF 50.0 TO 59.9 IN ADULT, UNSPECIFIED WHETHER SERIOUS COMORBIDITY PRESENT (H): Primary | ICD-10-CM

## 2021-09-27 DIAGNOSIS — E11.9 TYPE 2 DIABETES MELLITUS WITHOUT COMPLICATION, WITHOUT LONG-TERM CURRENT USE OF INSULIN (H): ICD-10-CM

## 2021-09-27 DIAGNOSIS — E66.813 CLASS 3 SEVERE OBESITY DUE TO EXCESS CALORIES WITH BODY MASS INDEX (BMI) OF 50.0 TO 59.9 IN ADULT, UNSPECIFIED WHETHER SERIOUS COMORBIDITY PRESENT (H): Primary | ICD-10-CM

## 2021-09-27 RX ORDER — TOPIRAMATE 25 MG/1
75 TABLET, FILM COATED ORAL AT BEDTIME
Qty: 90 TABLET | Refills: 3 | Status: SHIPPED | OUTPATIENT
Start: 2021-09-27 | End: 2021-12-10

## 2021-09-27 RX ORDER — METFORMIN HCL 500 MG
TABLET, EXTENDED RELEASE 24 HR ORAL
Qty: 360 TABLET | Refills: 3 | Status: SHIPPED | OUTPATIENT
Start: 2021-09-27 | End: 2022-10-20

## 2021-09-27 NOTE — TELEPHONE ENCOUNTER
Per Dr. Velarde, ok to refill Metformin and Topiramate. Patient confirmed she is taking 75mg daily of Topiramate.
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
## 2021-10-11 ENCOUNTER — VIRTUAL VISIT (OUTPATIENT)
Dept: ENDOCRINOLOGY | Facility: CLINIC | Age: 51
End: 2021-10-11
Payer: COMMERCIAL

## 2021-10-11 DIAGNOSIS — E66.9 OBESITY: ICD-10-CM

## 2021-10-11 DIAGNOSIS — E11.9 TYPE 2 DIABETES MELLITUS WITHOUT COMPLICATION, WITHOUT LONG-TERM CURRENT USE OF INSULIN (H): ICD-10-CM

## 2021-10-11 DIAGNOSIS — Z71.3 NUTRITIONAL COUNSELING: Primary | ICD-10-CM

## 2021-10-11 PROCEDURE — 97803 MED NUTRITION INDIV SUBSEQ: CPT | Mod: GT | Performed by: DIETITIAN, REGISTERED

## 2021-10-11 NOTE — PATIENT INSTRUCTIONS
Juan J Haines,    Follow-up with RD in 1 month.     Thank you,    Sharon Osborn, RD, LD  If you would like to schedule or reschedule an appointment with the RD, please call [phone]    Nutrition Goals  Relating To Eating:  - Eat 3 meals per day.   - Eat slowly (20-30 minutes per meal), chewing foods well (25 chews per bite/applesauce consistency)  - Avoid snacking    Relating to beverages:  - Separate fluids from meals by 30 minutes before, during, and after eating  - Drink 64 ounces of fluid per day. Small, frequent sips.     Relating to activity:  - Increase exercise as able.      High-Protein Plant Based Recipe Resources:  The High Protein Plant Based Cookbook (sold at [employer])  https://www.[employer]/recipe-categories/high-protein  www.[employer]  Www.[employer]  https://www.[employer]/best-high-protein-vegan-breakfast-recipes/    Interested in working with a health ?  Health coaches work with you to improve your overall health and wellbeing.  They look at the whole person, and may involve discussion of different areas of life, including, but not limited to the four pillars of health (sleep, exercise, nutrition, and stress management). Discuss with your care team if you would like to start working a health .    Health Coaching-3 Pack:    $99 for three health coaching visits    Visits may be done in person or via phone    Coaching is a partnership between the  and the client; Coaches do not prescribe or diagnose    Coaching helps inspire the client to reach his/her personal goals      COMPREHENSIVE WEIGHT MANAGEMENT PROGRAM  VIRTUAL SUPPORT GROUPS    For Support Group Information:      We offer support groups for patients who are working on weight loss and considering, preparing for or have had weight loss surgery.   There is no cost for this opportunity.  You are invited to attend the?Virtual Support Groups?provided by any of the following locations:    1. [place] via  "[employer] Teams with Larisa Cuba RN  2.   [place] via [employer] Teams with Dariusz Yang, PhD, LP  3.   [place] via [employer] Teams with Darya Cuello RN  4.   [facility] via Weimi Teams with Darya Mccloud [facility]    The following Support Group information can also be found on our website:  [email or web address]      [facility] Weight Loss Surgery Support Group    [facility] Weight Loss Surgery Support Group  The support group is a patient-lead forum that meets monthly to share experiences, encouragement and education. It is open to those who have had weight loss surgery, are scheduled for surgery, and those who are considering surgery.   WHEN: This group meets on the 3rd Wednesday of each month from 5:00PM - 6:00PM virtually using Microsoft Teams.   FACILITATOR: Led by Larisa Cuba, the program's Clinical Coordinator.   TO REGISTER: Please contact the clinic via Eloquii or call the nurse line directly at [phone] to inform our staff that you would like an invite sent to you and to let us know the email you would like the invite sent to. Prior to the meeting, a link with directions on how to join the meeting will be sent to you.    2021 Meetings  August 18: \"Let's Talk\" a time for the group to share.  September 15: \"Let's Talk\" a time for the group to share.  October 20: \"Let's Talk\" a time for the group to share.  November 17: Anca Deras RD, RAJI \"Protein, Metabolism and Meal Planning\"  December 15: Israel Boles RD, LD will speak, \"Recipe Modification\"    [facility] Support Groups    Connections: Bariatric Care Support Group?  This is open to all [facility] (and those external to this program) pre- and post- operative bariatric surgery patients as well as their support system.   WHEN: This group meets the 2nd Tuesday of each month from 6:30 PM - 8:00 PM " "virtually using Microsoft Teams.   FACILITATOR: Led by Dariusz Yang, Ph.D who is a Licensed Psychologist with the [facility] Comprehensive Weight Management Program.   TO REGISTER: Please send an email to Dariusz Yang, Ph.D.,  at?[email or web address]?if you would like an invitation to the group and to learn about using Microsoft Teams.    2021 Meetings  August 10: Open Forum  September 14: Guest Speaker: Darya Cuello RN,CBN, CIC, [facility] Comprehensive Weight Management Program, \"Your Hospital Stay and Post-Operative Compliance\"  October 12: Open Forum  November 9: Guest Speaker: Tara Ramey RD,LD, [facility] Comprehensive Weight Management Program,\"Holiday Eating\".  December 14: Guest Speaker Aline Butts MD, MPH, [facility], \"Body Contouring Surgery for Bariatric Surgery Patients\"     Connections: Post-Operative Bariatric Surgery Support Group  This is a support group for [facility] bariatric patients (and those external to [facility]) who have had bariatric surgery and are at least 3 months post-surgery.  WHEN: This support group meets the 4th Wednesday of the month from 11:00 AM - 12:00 PM virtually using Microsoft Teams.   FACILITATOR: Led by Certified Bariatric Nurse, Darya Cuello RN.   TO REGISTER: Please send an email to Darya at [email or web address] if you would like an invitation to the group and to learn about using [employer].      [facility] Healthy Lifestyle Virtual Support Group    Healthy Lifestyle Virtual Support Group?  This is 60 minutes of small group guided discussion, support and resources. All are welcome who want a healthy lifestyle.  WHEN: This group meets monthly on a Friday from 12:30 PM - to 1:30 PM virtually using Microsoft Teams.   FACILITATOR: Led by National Board Certified Health , Darya Mccloud [facility].   TO REGISTER: Please send an email to Darya " at?[email or web address] to receive monthly invites to the group or if you have any questions about having a health .  Prior to the meeting, a link with directions on how to join the meeting will be sent to you.    2021 Meetings  August 27: Open Forum  September 24: Sleep and the 7 Types of Rest  October 29: Open Forum  November 19: Gratitude     December 10: Open Forum
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
## 2021-10-12 ENCOUNTER — DOCUMENTATION ONLY (OUTPATIENT)
Dept: SLEEP MEDICINE | Facility: CLINIC | Age: 51
End: 2021-10-12

## 2021-10-18 ENCOUNTER — VIRTUAL VISIT (OUTPATIENT)
Dept: PHARMACY | Facility: CLINIC | Age: 51
End: 2021-10-18
Payer: COMMERCIAL

## 2021-10-18 DIAGNOSIS — E11.9 TYPE 2 DIABETES MELLITUS WITHOUT COMPLICATION, WITHOUT LONG-TERM CURRENT USE OF INSULIN (H): Primary | ICD-10-CM

## 2021-10-18 DIAGNOSIS — E66.813 CLASS 3 SEVERE OBESITY DUE TO EXCESS CALORIES WITH SERIOUS COMORBIDITY AND BODY MASS INDEX (BMI) OF 50.0 TO 59.9 IN ADULT (H): ICD-10-CM

## 2021-10-18 DIAGNOSIS — Z78.9 TAKES DIETARY SUPPLEMENTS: ICD-10-CM

## 2021-10-18 DIAGNOSIS — E66.01 CLASS 3 SEVERE OBESITY DUE TO EXCESS CALORIES WITH SERIOUS COMORBIDITY AND BODY MASS INDEX (BMI) OF 50.0 TO 59.9 IN ADULT (H): ICD-10-CM

## 2021-10-18 PROCEDURE — 99605 MTMS BY PHARM NP 15 MIN: CPT | Performed by: PHARMACIST

## 2021-10-18 PROCEDURE — 99607 MTMS BY PHARM ADDL 15 MIN: CPT | Performed by: PHARMACIST

## 2021-10-18 RX ORDER — CHOLECALCIFEROL (VITAMIN D3) 50 MCG
1 TABLET ORAL DAILY
COMMUNITY

## 2021-10-18 RX ORDER — PEDI MULTIVIT NO.25/FOLIC ACID 300 MCG
1 TABLET,CHEWABLE ORAL DAILY
COMMUNITY

## 2021-10-18 NOTE — PATIENT INSTRUCTIONS
Recommendations from today's MTM visit:                                                    MTM (medication therapy management) is a service provided by a clinical pharmacist designed to help you get the most of out of your medicines.   Today we reviewed what your medicines are for, how to know if they are working, that your medicines are safe and how to make your medicine regimen as easy as possible.      1. Continue current regimen.     2. Check blood sugars a couple times per week AM fasting. Goal is AM fasting between 80 to 130 mg/dL.     3. Follow up with your primary care provider to get your labs completed as you are due - A1c, BMP, Albumin/creatinine ratio, etc.       Follow-up: Return in about 4 months (around 2/18/2022) for Medication Therapy Management Pharmacist Visit, Call [phone] to schedule.    It was great to speak with you today.  I value your experience and would be very thankful for your time with providing feedback on our clinic survey. You may receive a survey via email or text message in the next few days.       My Clinical Pharmacist's contact information:                                                      Please feel free to contact me with any questions or concerns you have.      Lauren Bloch, PharmD  Medication Therapy Management Pharmacist   [facility] [place]

## 2021-10-18 NOTE — PROGRESS NOTES
Medication Therapy Management (MTM) Encounter    ASSESSMENT:                            Medication Adherence/Access: No issues identified     Obesity: Progressing, no changes today.     Type 2 Diabetes:  Status unknown. Patient would benefit from minimum SMBG: Check blood sugars fasting a coupel times per week, and updated Hemoglobin A1c.    Supplements: Stable.     PLAN:                            1. Continue current regimen.     2. Check blood sugars a couple times per week AM fasting. Goal is AM fasting between 80 to 130 mg/dL.     3. Follow up with your primary care provider to get your labs completed as you are due - A1c, BMP, Albumin/creatinine ratio, etc.     Follow-up: Return in about 4 months (around 2/18/2022) for Medication Therapy Management Pharmacist Visit, Call [phone] to schedule. Or sooner if you would like to discuss options to help quit smoking and discussing options to help with this.     SUBJECTIVE/OBJECTIVE:                          Yancy Hoover is a 51 year old female called for a follow-up visit. She was referred to me from Dr. Velarde.  Today's visit is a follow-up MTM visit from 8/2020. First visit of 2021. Due for comprehensive review of medications because it has been more than a year since last comprehensive review of medications.      Reason for visit: Victoza start follow up.    Allergies/ADRs: Reviewed in chart  Past Medical History: Reviewed in chart  Tobacco: She reports that she has been smoking cigarettes. She has a 5.00 pack-year smoking history. She has never used smokeless tobacco.Tobacco Cessation Action Plan:   Information offered: Patient not interested at this time It was discussed and she reports she is aware that she need to be at least 3 months tobacco free prior to bariatric surgery.   Alcohol: not currently using    Medication Adherence/Access: no issues reported    Medication Adherence/Access:   Patient takes medications directly from bottles.  Patient takes  medications 2 time(s) per day.   Per patient, misses medication 0 times per week.      Obesity:   Topiramate 75 mg once daily in evening - restarted 1 month ago  Victoza 1.8 mg daily     Followed up with Dr. Velarde, last seen 9/10/2021, started Victoza at that time. Topiramate restarted over the last month too. Medication side effects: none. She still is potentially considering bariatric surgery.   Weight History: overweight as child, gained weight with pregnancy. Since pregnancy, hasn't been able to get lower than 216 before rebounding. Went through surgical process 10 years ago but wasn't able to complete due to losing insurance. Referred to sleep clinic due to snoring and stops breathing at night.   Diet/Eating Habits: Patient reports over the last month, she notices she isn't hungry as she usually is.  Drinking 64 oz water per day. Recently started eating/drinking breakfast. Eating protein shakes for breakfast and lunch and healthy dinner. No snacks in between. She is feeling satisfied between each meal. No more soda intake since restarting topiramate.   Exercise/Activity: She has been walking more this month, working up to 10,000 steps per day. As of now she is at 5,000 to 7,000 steps per day.  She is being more purposeful about activity during her day, taking steps. Muscle cramping at times, wonders if related to walking and water intake. She plans to increase water intake and promote stretching before and after walks to see if helpful. If not helpful, she plans to follow up with primary care provider about this.      Current weight: 293 lb   Weight when Victoza started: 315 lb   Initial Consult Weight 7/16/2020: 296 lb  Goal Weight for Surgery: 286 lb     Wt Readings from Last 4 Encounters:   04/02/21 297 lb (134.7 kg)   01/29/21 296 lb (134.3 kg)   10/18/20 290 lb (131.5 kg)   07/30/20 296 lb (134.3 kg)     Estimated body mass index is 50.98 kg/m  as calculated from the following:    Height as of 9/10/21: 5'  "4\" (1.626 m).    Weight as of 4/2/21: 297 lb (134.7 kg).    Type 2 Diabetes:    Metformin XR 1000 mg twice daily with meals   Victoza 1.8 mg daily - started 1 month ago.      Followed up with Dr. Velarde, last seen 9/10/2021. Tried to get Ozempic covered, wasn't covered so switched to Victoza. Medication side effects: none. No nausea/vomiting. Reports when having bowel movements, not constipated but volume is less. No straining. She will continue to monitor. She reports increased fiber to diet recently. She has a glucometer, but not testing. She feels comfortable with how to use the machine. She reports she has follow up with primary care provider for yearly appointment in November, planning on getting labs at that time.   SMBG: never.   Symptoms of low blood sugar? none  Symptoms of high blood sugar? none  Eye exam: due  Foot exam: due  Diet/Exercise: see above  Aspirin: Not taking   Statin: No   ACEi/ARB: No.   Urine Albumin: No results found for: UMALCR      Creatinine   Date Value Ref Range Status   01/21/2021 0.88 0.55 - 1.02 mg/dL Final     GFR Estimate   Date Value Ref Range Status   01/21/2021 >60 >60 ml/min/1.73m2 Final     GFR Estimate If Black   Date Value Ref Range Status   10/18/2020 83 >60 mL/min/[1.73_m2] Final     Comment:      GFR Calc  Starting 12/18/2018, serum creatinine based estimated GFR (eGFR) will be   calculated using the Chronic Kidney Disease Epidemiology Collaboration   (CKD-EPI) equation.       Hemoglobin A1C   Date Value Ref Range Status   01/21/2021 7.4 (H) <=5.6 % Final     Supplements:   Vitamin D 2000 international unit(s) daily   Calvin Chewable Multivitamin daily     No concerns. She has been using since starting with dietitian and had been considering surgery.   ----------------    I spent 22 minutes with this patient today. A copy of the visit note was provided to the patient's referring provider.    The patient was sent via [employer] a summary of these " recommendations.     Lauren Bloch, PharmD  Medication Therapy Management Pharmacist   [facility] [place]    Telemedicine Visit Details  Type of service:  Telephone visit  Start Time: 8:32 AM  End Time: 8:54 AM  Originating Location (patient location): Home  Distant Location (provider location):  [facility] [place]     Medication Therapy Recommendations  Type 2 diabetes mellitus without complication, unspecified whether long term insulin use (H)    Current Medication: liraglutide (VICTOZA PEN) 18 MG/3ML solution   Rationale: Medication requires monitoring - Needs additional monitoring   Recommendation: Self-Monitoring - Check AM fasting a couple timesper week to see where things are at.   Status: Patient Agreed - Adherence/Education

## 2021-10-18 NOTE — Clinical Note
Pre-Bariatric Surgery, working with Jeannie and Dr. Velarde. Started Victoza 1 month ago, down 22 lb from start of 315 lb when starting Victoza. She is still intersted in surgery. Goal weight 286 lb, looks like it has been a little while since following up with Jeannie. Zulma DE JESUS

## 2021-10-20 ENCOUNTER — TELEPHONE (OUTPATIENT)
Dept: SLEEP MEDICINE | Facility: CLINIC | Age: 51
End: 2021-10-20

## 2021-10-20 NOTE — TELEPHONE ENCOUNTER
PT WAS A NO SHOW TO PAP TROUBLESHOOT APPT. CALLED PT AND LVM FOR THE PT TO CALL [facility] TO RE-SCHEDULE CPAP TROUBLESHOOT APPT IF NEEDED [phone].

## 2021-10-24 ENCOUNTER — HEALTH MAINTENANCE LETTER (OUTPATIENT)
Age: 51
End: 2021-10-24

## 2021-12-10 ENCOUNTER — VIRTUAL VISIT (OUTPATIENT)
Dept: ENDOCRINOLOGY | Facility: CLINIC | Age: 51
End: 2021-12-10
Payer: COMMERCIAL

## 2021-12-10 VITALS — BODY MASS INDEX: 47.65 KG/M2 | WEIGHT: 286 LBS | HEIGHT: 65 IN

## 2021-12-10 DIAGNOSIS — E66.813 CLASS 3 SEVERE OBESITY DUE TO EXCESS CALORIES WITH SERIOUS COMORBIDITY AND BODY MASS INDEX (BMI) OF 50.0 TO 59.9 IN ADULT (H): Primary | ICD-10-CM

## 2021-12-10 DIAGNOSIS — E11.9 TYPE 2 DIABETES MELLITUS WITHOUT COMPLICATION, WITHOUT LONG-TERM CURRENT USE OF INSULIN (H): ICD-10-CM

## 2021-12-10 DIAGNOSIS — E66.01 CLASS 3 SEVERE OBESITY DUE TO EXCESS CALORIES WITH SERIOUS COMORBIDITY AND BODY MASS INDEX (BMI) OF 50.0 TO 59.9 IN ADULT (H): Primary | ICD-10-CM

## 2021-12-10 PROCEDURE — 99215 OFFICE O/P EST HI 40 MIN: CPT | Mod: GT | Performed by: INTERNAL MEDICINE

## 2021-12-10 RX ORDER — TOPIRAMATE 25 MG/1
75 TABLET, FILM COATED ORAL AT BEDTIME
Qty: 90 TABLET | Refills: 3 | Status: SHIPPED | OUTPATIENT
Start: 2021-12-10 | End: 2022-05-19

## 2021-12-10 ASSESSMENT — PAIN SCALES - GENERAL: PAINLEVEL: NO PAIN (0)

## 2021-12-10 ASSESSMENT — MIFFLIN-ST. JEOR: SCORE: 1910.23

## 2021-12-10 NOTE — NURSING NOTE
"Chief Complaint   Patient presents with     RECHECK     Follow-up Weight Management       Vitals:    12/10/21 1010   Weight: 129.7 kg (286 lb)   Height: 1.638 m (5' 4.5\")       Body mass index is 48.33 kg/m .                          "

## 2021-12-10 NOTE — PROGRESS NOTES
"Yancy is a 51 year old who is being evaluated via a billable video visit.      How would you like to obtain your AVS? MyChart  If the video visit is dropped, the invitation should be resent by: Text to cell phone: [phone]  Will anyone else be joining your video visit? No    Video Start Time: 10:49  Video-Visit Details    Type of service:  Video Visit    Video End Time:11:09    Originating Location (pt. Location): Home    Distant Location (provider location):  [facility] [place]     Platform used for Video Visit: [facility] Weight [facility] Note     Yancy Hoover  MRN:  [id number]  :  1970  MARLON:  12/10/2021    Dear Physician No Ref-Primary,    I had the pleasure of seeing your patient Yancy Hoover. She is a 51 year old female who I am continuing to see for treatment of obesity related to:       2020   I have the following health issues associated with obesity: Type II Diabetes       INTERVAL HISTORY:  --last seen about 3 mo ago; reviewed and relevant history, as extracted, includes the following--     --------------------------  \"She has the following co-morbidities:  No HTN, no CVD, elevated cholesterol, DM2 newly disgnosed (had HbA1c of 7.0 by her report)        Has struggled with wt whole life, and this is pt's max wt now.     With COVID19 is working at home and sedentary otherwise  Drinking a lot soda; customer service work     Has been working with the following provider on wt loss--  [facility] (Mack Lantigua)  Pt notes she went to see a wt loss doctor and wanted wt loss pills.  He did labs and told pt she had DM.  HbA1c 7.0.  Cholesterol was a little high also--208     Pt notes she was referred to a dietitian but the dietitian is not taking new pts and pt has a lot of questions--is motivated to make health changes and halt/slow DM-related progressions/complications..      Off of work this next week so would like to " "have virtual visit with nutrition then if possible.     [place]--interested in goal of walking 30 min 5 days per wk; gradually work up to 10,000 steps per day (can use phone tracker)     Can stop eating after supper--made goal for this and the family has plans for eating dinner around 5p.     Will cut out the soda and switch to 0 paul options, has been having at least several 20 oz sodas (sprite) daily, did note while on topiramate that it was tasting flat and she found herself leaving unfinished sodas and opening new ones because of this change in taste---> now understands that this is likely related to topiramate and she can set a goal of decreasing/stopping the sugared soda and use this med to help her make this change.     Pt notes cravings and boredom and happens in the evening after supper; she will re-start and shift topiramate to prior to supper with up-titration schedule I provided in AVS.         Since last seen with Henrietta and she is tolerating the top dose and getting appetitive suppression with it, also good blood sugar control  --no gaps in therapy  --clothes are fitting better (has a \"get back into\" dress)  --activity is less now (schedule is not permitting)  --less frequent BMs on the Saxenda (discussed using fiber and adequate hydration, occasional stool softener)    --with food lately: (has never been a breakfast person) having a Premier shake, (shot around 12p) lunch around 1p, supper before 6p (working on MISSY with eating window between 10-6p)    CURRENT WEIGHT:   286 lbs 0 oz   Body mass index is 48.33 kg/m .    Initial Weight (lbs): 295 lbs  Last Visits Weight: 134.7 kg (297 lb)  Cumulative weight loss (lbs): 9  Weight Loss Percentage: 3.05%   (she notes that 315# is her wt at last visit)    Changes and Difficulties 4/2/2021   I have made the following changes to my diet since my last visit: More e   With regards to my diet, I am still struggling with: -   I have made the " "following changes to my activity/exercise since my last visit: -   With regards to my activity/exercise, I am still struggling with: -       VITALS:  Ht 1.638 m (5' 4.5\")   Wt 129.7 kg (286 lb)   BMI 48.33 kg/m      MEDICATIONS:   Current Outpatient Medications   Medication Sig Dispense Refill     alcohol swab prep pads Use to swab area of injection/shahla as directed. 100 each 5     blood glucose (ACCU-CHEK SMARTVIEW) test strip Use to test blood sugar 1-2 times daily. 100 strip 5     blood glucose calibration (ACCU-CHEK SMARTVIEW CONTROL) solution Use to calibrate blood glucose monitor as directed. 1 Bottle 3     blood glucose monitoring (ACCU-CHEK FASTCLIX) lancets Use to test blood sugar 1-2 times daily or as directed. 102 each 5     blood glucose monitoring (ACCU-CHEK FAWAD SMARTVIEW) meter device kit Use to test blood sugar 1-2 times daily. 1 kit 0     childrens multivitamin w/iron (FLINTSTONES COMPLETE) 18 MG chewable tablet Take 1 chew tab by mouth daily       insulin pen needle (31G X 5 MM) 31G X 5 MM miscellaneous Use 1 pen needle daily with Victoza or as directed. 100 each 1     liraglutide (VICTOZA PEN) 18 MG/3ML solution Inject 1.8 mg Subcutaneous daily 9 mL 3     metFORMIN (GLUCOPHAGE-XR) 500 MG 24 hr tablet 2 tablets twice daily (or you can take all 4 tablets at the same time each day if easier) 360 tablet 3     topiramate (TOPAMAX) 25 MG tablet Take 3 tablets (75 mg) by mouth At Bedtime 90 tablet 3     vitamin D3 (CHOLECALCIFEROL) 50 mcg (2000 units) tablet Take 1 tablet by mouth daily         Weight Loss Medication History Reviewed With Patient 12/7/2021   Are you having any side effects from the weight loss medication that we have prescribed you? No   If you are having side effects please describe: -       ASSESSMENT/PLAN:     ## morbid obesity  ## T2DM without complications, not treated with ins     Yancy is a patient with early onset morbid obesity with significant element of familial/genetic " influence and with current health consequences. She does need aggressive weight loss plan due to new dx T2DM.  Yancy Hoover ate a high carb diet, a high fat diet, to obtain specific degree of fullness, and had a lot of liquid calories. She is making good changes in support of healthy wt.     Her problem is complicated by a hunger disorder and strong craving/reward pathways        PLAN:    (continues)  Decrease portion sizes  Purge house of food triggers  No meal skipping  Decrease caloric beverages by daily walking plan (has already put this in place with another provider and has plan for escalating as tolerated)  Dietician visit of education  Volumetrics eating plan  Hypocaloric/low fat diet  Increase activity by daily walking      Diabetes   Ancillary testing:  N/A.continue with her glucose monitoring  Food Plan:  Reduced calorie and Low carbohydrate.  Activity Plan:  Daily walking, can do this after meals (such as after supper in the evenings in the summer--supper is around 5p).  Supplementary:  N/A.  Medication:  see comments below     additionally  --continue full dose metformin, continue full dose Saxenda--she is tolerating and benefiting from this med  --her additional health goals in support of wt loss:  1. Get gym membership (go weekdays for an hour)  2.  Continue with food plans (see above) and medication support    RTC with me in 2-3 mo, with Lauren Bloch in 4-6 wks, pt is also making an appointment with Sharon Osborn to be seen soon (needed to reschedule the appt planned for Nov)       Time: approx 41 min spent on evaluation, management, counseling, education, & motivational interviewing (video visit) coupled with previsit planning and post visit charting/follow up care same day    Sincerely,    Danial Velarde MD

## 2021-12-10 NOTE — LETTER
"12/10/2021       RE: Yancy Hoover  [address]  [place] [address]     Dear Colleague,    Thank you for referring your patient, Yancy Hoover, to the [facility] [place] at [facility]. Please see a copy of my visit note below.    Yancy is a 51 year old who is being evaluated via a billable video visit.      How would you like to obtain your AVS? MyChart  If the video visit is dropped, the invitation should be resent by: Text to cell phone: [phone]  Will anyone else be joining your video visit? No    Video Start Time: 10:49  Video-Visit Details    Type of service:  Video Visit    Video End Time:11:09    Originating Location (pt. Location): Home    Distant Location (provider location):  [facility] [place]     Platform used for Video Visit: [facility] Note     Yancy Hoover  MRN:  [id number]  :  1970  MARLON:  12/10/2021    Dear Physician No Ref-Primary,    I had the pleasure of seeing your patient Yancy Hoover. She is a 51 year old female who I am continuing to see for treatment of obesity related to:       2020   I have the following health issues associated with obesity: Type II Diabetes       INTERVAL HISTORY:  --last seen about 3 mo ago; reviewed and relevant history, as extracted, includes the following--     --------------------------  \"She has the following co-morbidities:  No HTN, no CVD, elevated cholesterol, DM2 newly disgnosed (had HbA1c of 7.0 by her report)        Has struggled with wt whole life, and this is pt's max wt now.     With COVID19 is working at home and sedentary otherwise  Drinking a lot soda; customer service work     Has been working with the following provider on wt loss--  [facility] (Mack Lantigua)  Pt notes she went to see a wt loss doctor and wanted wt loss pills. " " He did labs and told pt she had DM.  HbA1c 7.0.  Cholesterol was a little high also--208     Pt notes she was referred to a dietitian but the dietitian is not taking new pts and pt has a lot of questions--is motivated to make health changes and halt/slow DM-related progressions/complications..      Off of work this next week so would like to have virtual visit with nutrition then if possible.     [place]--interested in goal of walking 30 min 5 days per wk; gradually work up to 10,000 steps per day (can use phone tracker)     Can stop eating after supper--made goal for this and the family has plans for eating dinner around 5p.     Will cut out the soda and switch to 0 paul options, has been having at least several 20 oz sodas (sprite) daily, did note while on topiramate that it was tasting flat and she found herself leaving unfinished sodas and opening new ones because of this change in taste---> now understands that this is likely related to topiramate and she can set a goal of decreasing/stopping the sugared soda and use this med to help her make this change.     Pt notes cravings and boredom and happens in the evening after supper; she will re-start and shift topiramate to prior to supper with up-titration schedule I provided in AVS.         Since last seen with Henrietta and she is tolerating the top dose and getting appetitive suppression with it, also good blood sugar control  --no gaps in therapy  --clothes are fitting better (has a \"get back into\" dress)  --activity is less now (schedule is not permitting)  --less frequent BMs on the Saxenda (discussed using fiber and adequate hydration, occasional stool softener)    --with food lately: (has never been a breakfast person) having a Premier shake, (shot around 12p) lunch around 1p, supper before 6p (working on MISSY with eating window between 10-6p)    CURRENT WEIGHT:   286 lbs 0 oz   Body mass index is 48.33 kg/m .    Initial Weight (lbs): 295 " "lbs  Last Visits Weight: 134.7 kg (297 lb)  Cumulative weight loss (lbs): 9  Weight Loss Percentage: 3.05%   (she notes that 315# is her wt at last visit)    Changes and Difficulties 4/2/2021   I have made the following changes to my diet since my last visit: More e   With regards to my diet, I am still struggling with: -   I have made the following changes to my activity/exercise since my last visit: -   With regards to my activity/exercise, I am still struggling with: -       VITALS:  Ht 1.638 m (5' 4.5\")   Wt 129.7 kg (286 lb)   BMI 48.33 kg/m      MEDICATIONS:   Current Outpatient Medications   Medication Sig Dispense Refill     alcohol swab prep pads Use to swab area of injection/shahla as directed. 100 each 5     blood glucose (ACCU-CHEK SMARTVIEW) test strip Use to test blood sugar 1-2 times daily. 100 strip 5     blood glucose calibration (ACCU-CHEK SMARTVIEW CONTROL) solution Use to calibrate blood glucose monitor as directed. 1 Bottle 3     blood glucose monitoring (ACCU-CHEK FASTCLIX) lancets Use to test blood sugar 1-2 times daily or as directed. 102 each 5     blood glucose monitoring (ACCU-CHEK FAWAD SMARTVIEW) meter device kit Use to test blood sugar 1-2 times daily. 1 kit 0     childrens multivitamin w/iron (FLINTSTONES COMPLETE) 18 MG chewable tablet Take 1 chew tab by mouth daily       insulin pen needle (31G X 5 MM) 31G X 5 MM miscellaneous Use 1 pen needle daily with Victoza or as directed. 100 each 1     liraglutide (VICTOZA PEN) 18 MG/3ML solution Inject 1.8 mg Subcutaneous daily 9 mL 3     metFORMIN (GLUCOPHAGE-XR) 500 MG 24 hr tablet 2 tablets twice daily (or you can take all 4 tablets at the same time each day if easier) 360 tablet 3     topiramate (TOPAMAX) 25 MG tablet Take 3 tablets (75 mg) by mouth At Bedtime 90 tablet 3     vitamin D3 (CHOLECALCIFEROL) 50 mcg (2000 units) tablet Take 1 tablet by mouth daily         Weight Loss Medication History Reviewed With Patient 12/7/2021   Are you " having any side effects from the weight loss medication that we have prescribed you? No   If you are having side effects please describe: -       ASSESSMENT/PLAN:     ## morbid obesity  ## T2DM without complications, not treated with ins     Yancy is a patient with early onset morbid obesity with significant element of familial/genetic influence and with current health consequences. She does need aggressive weight loss plan due to new dx T2DM.  Yancy Hoover ate a high carb diet, a high fat diet, to obtain specific degree of fullness, and had a lot of liquid calories. She is making good changes in support of healthy wt.     Her problem is complicated by a hunger disorder and strong craving/reward pathways        PLAN:    (continues)  Decrease portion sizes  Purge house of food triggers  No meal skipping  Decrease caloric beverages by daily walking plan (has already put this in place with another provider and has plan for escalating as tolerated)  Dietician visit of education  Volumetrics eating plan  Hypocaloric/low fat diet  Increase activity by daily walking      Diabetes   Ancillary testing:  N/A.continue with her glucose monitoring  Food Plan:  Reduced calorie and Low carbohydrate.  Activity Plan:  Daily walking, can do this after meals (such as after supper in the evenings in the summer--supper is around 5p).  Supplementary:  N/A.  Medication:  see comments below     additionally  --continue full dose metformin, continue full dose Saxenda--she is tolerating and benefiting from this med  --her additional health goals in support of wt loss:  1. Get gym membership (go weekdays for an hour)  2.  Continue with food plans (see above) and medication support    RTC with me in 2-3 mo, with Lauren Bloch in 4-6 wks, pt is also making an appointment with Sharon Osborn to be seen soon (needed to reschedule the appt planned for Nov)       Time: approx 41 min spent on evaluation, management, counseling, education,  & motivational interviewing (video visit) coupled with previsit planning and post visit charting/follow up care same day    Sincerely,    Danial Velarde MD

## 2021-12-11 NOTE — PATIENT INSTRUCTIONS
Thank you for allowing us the privilege of caring for you. We hope we provided you with the excellent service you deserve.    Please let us know if there is anything else we can do for you so that we can be sure you are completely satisfied with your care experience.    Your visit was with Dr. Vaca today.    Instructions per today's visit:  --we can continue with the same medication support for diabetes and wt loss, Saxenda, topiramate and metformin. Please continue your sugar monitoring, and also please get the labs done at your most convenient [facility]. For constipation/irregular bowel movement issues we discussed getting good hydration (try to get around 6-8 8-oz glasses daily) and we discussed adding in a fiber supplement (such as a psyllium supplement, a Benefiber or Metamucil supplement--follow the directions and use with water).  --other goals you discussed:  1. Get gym membership (go weekdays for an hour)  2.  Continue with food plans (see above) and medication support    return with Dr. Vaca in 2-3 mo, with Lauren Bloch in about 4-6 wks    Please call our contact center at [phone] to schedule your next appointments.    Meal Replacement Products:    Here is the link to our new e-store where you can purchase our meal replacement products     [employer] [place] [employer] [email or web address][email or web address]/store    The one week starter kit is a great way to sample a variety of products and see what works for you.    If you want more information about the product go to: [employer]    Free Shipping for orders over $75    Benefits of meal replacements products:    Portion and calorie control  Improved nutrition  Structured eating  Simplified food choices  Avoid contact with trigger foods    Interested in working with a health ?  Health coaches work with you to improve your overall health and wellbeing.  They look at the whole person, and may involve discussion of different areas  of life, including, but not limited to the four pillars of health (sleep, exercise, nutrition, and stress management). Discuss with your care team if you would like to start working a health .    Health Coaching-3 Pack: Schedule by calling [phone]    $99 for three health coaching visits    Visits may be done in person or via phone    Coaching is a partnership between the  and the client; Coaches do not prescribe or diagnose    Coaching helps inspire the client to reach his/her personal goals    Bluetooth Scale:    We hope to provide you with high quality telephone and virtual healthcare visits while social distancing for COVID-19 is necessary, as well as in the future when virtual visits may be more convenient for you.    Our technology team made it possible for Bluetooth scales to send weight measurements to our electronic medical record. This allows weights from you weighing at home to securely flow into the medical record, which will improve telephone and virtual visits.  Additionally, studies have shown that adults actually lose more weight when their weights are automatically sent to someone else, and also that this process is not stressful for those adults.    Below is a link for purchasing the scale, with a discount code for our patients. You may call your insurance company to see if they will reimburse you for the cost of the scale, as a piece of durable medical equipment. The scales only go up to a weight of 400 pounds. This is an issue and we are working with the developer on increasing this. We found no scales that go over 400lb that have blue-tooth for connecting to [employer].    Scale to purchase: the Wowo  Body  Scale: [email or web address]/shop?[email or web address]=aw.ds    Discount Code: We have a discount code for our patients to bring the cost down to $50, the code is:  withmed     Steps to link the scale to [employer] via  an Android Phone (you can always disconnect at any point in the future):  1. The order must be placed first before the patient can access Track My Health within [employer]t.  2. Download Google Fit maddie from the Google Play Store  a. Log in or register using your Google account  3. Download the Inventergyhart maddie from Google Play Store  a. Select add organization  b. Search for inSparq and select it  c. Log into [place]  d. Select Track My Health  e. Select the green connect my account button  f. When prompted log into your Google account  g. Select okay to confirm the account  4. Download the [employer]ings Health Mate maddie from Google Play Store  a. [place] for SYLLETA  b. Go to profile  c. Tap google fit under the Apps section  d. Select the option to activate Google Fit integration  e. Select the same Google account  f. Select okay to confirm the account  g.  Steps to link the scale to [employer] via an iPhone (you can always disconnect at any point in the future):  **Note NEXGRID is not available for download on an iPad**  1. The order must be placed first before the patient can access Track My Health within [employer]t.  2. Locate the Health maddie on your iPhone.  a. Set up your Apple Health account as prompted  b. The Sources page will show Apps that communicate with your Health maddie. Once all steps are completed, you should see Health Mate and MyChart listed under the Apps section and your iPhone under the devices section.  i. Select Health Mate  1. Under 'ALLOW  HEALTH MATE  TO WRITE DATA ensure the toggle is on for Weight.  2. This will allow the scale to add your weight to the Apple Health  ii. Select MyChart  1. Under 'ALLOW  MYCHART  TO READ DATA ensure the toggle is on for Weight.  2. This allows [employer] to grab the weight from NEXGRID so your provider can see your weights.  3. Download the MyChart maddie from the Maddie Store  a. Select add organization                                                  b. Search for  [place] and select it  c. Log into Workable  d. Select Track My Health  e. Select the green connect my account button  f. Follow prompts to link your device to Workable.  4. Download the Withings health mate maddie in the Maddie Store  a. [place] for [employer]ister  b. Go to profile  c. Tap Health under the Apps section  d. If prompted to allow access with the Health Maddie, toggle weight on for read and write access.      For any questions/concerns contact Ebony Barragan LPN at [phone]    To schedule appointments with our team, please call [phone]    Please call during clinic hours Monday through Friday 8:00a - 4:00p if you have questions or you can contact us via Workable at anytime.      Lab results will be communicated through My Chart or letter (if My Chart not used). Please call the clinic if you have not received communication after 1 week or if you have any questions.    Clinic Fax: [phone]    Thank you,  Medical Weight Management Team

## 2021-12-13 ENCOUNTER — TELEPHONE (OUTPATIENT)
Dept: PHARMACY | Facility: CLINIC | Age: 51
End: 2021-12-13
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
## 2021-12-14 ENCOUNTER — VIRTUAL VISIT (OUTPATIENT)
Dept: ENDOCRINOLOGY | Facility: CLINIC | Age: 51
End: 2021-12-14
Payer: COMMERCIAL

## 2021-12-14 DIAGNOSIS — E66.9 OBESITY: ICD-10-CM

## 2021-12-14 DIAGNOSIS — Z71.3 NUTRITIONAL COUNSELING: Primary | ICD-10-CM

## 2021-12-14 DIAGNOSIS — E11.9 TYPE 2 DIABETES MELLITUS WITHOUT COMPLICATION, WITHOUT LONG-TERM CURRENT USE OF INSULIN (H): ICD-10-CM

## 2021-12-14 PROCEDURE — 97803 MED NUTRITION INDIV SUBSEQ: CPT | Mod: GT | Performed by: DIETITIAN, REGISTERED
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
## 2021-12-14 NOTE — PATIENT INSTRUCTIONS
Juan J Haines,    Follow-up with RD in 1 month.     Thank you,    Sharon Osborn, RD, LD  If you would like to schedule or reschedule an appointment with the RD, please call [phone]    Nutrition Goals  Relating To Eating:  - Eat 3 meals per day.   - Eat slowly (20-30 minutes per meal), chewing foods well (25 chews per bite/applesauce consistency)  - Avoid snacking    Relating to beverages:  - Separate fluids from meals by 30 minutes before, during, and after eating  - Drink 64 ounces of fluid per day. Small, frequent sips.     Relating to activity:  - Increase exercise as able.    High Fiber Foods:  Bran cereal, 1/3 cup, 8.6 gm fiber   Cooked kidney beans   cup 7.9 gm  Cooked lentils   cup 7.8 gm  Cooked black beans   cup 7.6 gm  Canned chickpeas   cup 5.3 gm  Baked beans   cup 5.2 gm  Pear 1 5.1 gm  Soybeans   cup 5.1 gm  Quinoa   cup 5 gm  Baked sweet potato, with skin 1 medium 4.8 gm  Baked potato, with skin 1 medium 4.4 gm  Cooked frozen green peas   cup 4.4 gm  Bulgur   cup 4.1 gm  Cooked frozen mixed vegetables   cup 4 gm  Raspberries   cup 4 gm  Blackberries   cup 3.8 gm  Almonds 1 oz 3.5 gm  Cooked frozen spinach   cup 3.5 gm  Vegetable or soy shereen 1 each 3.4 gm  Apple 1 medium 3.3 gm  Dried dates 5 pieces 3.3 gm        High-Protein Plant Based Recipe Resources:  The High Protein Plant Based Cookbook (sold at [employer])  https://www.utoopia.niiu/recipe-categories/high-protein  www.Victrio.com  Www.myNoticePeriod.com.org  https://www.IRIS-RFID.niiu/best-high-protein-vegan-breakfast-recipes/      Interested in working with a health ?  Health coaches work with you to improve your overall health and wellbeing.  They look at the whole person, and may involve discussion of different areas of life, including, but not limited to the four pillars of health (sleep, exercise, nutrition, and stress management). Discuss with your care team if you would like to start working a health .    Health Coaching-3  Pack:    $99 for three health coaching visits    Visits may be done in person or via phone    Coaching is a partnership between the  and the client; Coaches do not prescribe or diagnose    Coaching helps inspire the client to reach his/her personal goals      COMPREHENSIVE WEIGHT MANAGEMENT PROGRAM  VIRTUAL SUPPORT GROUPS    For Support Group Information:      We offer support groups for patients who are working on weight loss and considering, preparing for or have had weight loss surgery.   There is no cost for this opportunity.  You are invited to attend the?Virtual Support Groups?provided by any of the following locations:    1. [facility] via Microsoft Teams with Larisa Cuba RN  2.   [place] via Friend Traveler with Dariusz Yang, PhD, LP  3.   [place] via Friend Traveler with Darya Cuello RN  4.   [facility] via Microsoft Teams with Darya Mccloud [facility]    The following Support Group information can also be found on our website:  [email or web address].[email or web address]/treatments/weight-loss-surgery-support-groups      [facility] Weight Loss Surgery Support Group    [facility] Weight Loss Surgery Support Group  The support group is a patient-lead forum that meets monthly to share experiences, encouragement and education. It is open to those who have had weight loss surgery, are scheduled for surgery, and those who are considering surgery.   WHEN: This group meets on the 3rd Wednesday of each month from 5:00PM - 6:00PM virtually using Microsoft Teams.   FACILITATOR: Led by Larisa Cuba RD, LD, RN, the program's Clinical Coordinator.   TO REGISTER: Please contact the clinic via Datapipe or call the nurse line directly at [phone] to inform our staff that you would like an invite sent to you and to let us know the email you would like the invite sent to. Prior to the meeting, a link with directions on how to join the meeting will be sent to you.    2021 Meetings  August  "18: \"Let's Talk\" a time for the group to share.  September 15: \"Let's Talk\" a time for the group to share.  October 20: \"Let's Talk\" a time for the group to share.  November 17: Guest Speaker: Anca Deras RD, RAJI \"Protein, Metabolism and Meal Planning\"  December 15: Guest Speaker: Israel Boles RD, LD will speak, \"Recipe Modification\"    2022 Meetings January 19 February 16 March 16: Guest Speakers: Madison Lee, PhD, and Delicia Cedeño PsyD,YASIR  April 20  May 18  Rhonda 15    [facility] Clin[facility] Support Groups    Connections: Bariatric Care Support Group?  This is open to all [facility] (and those external to this program) pre- and post- operative bariatric surgery patients as well as their support system.   WHEN: This group meets the 2nd Tuesday of each month from 6:30 PM - 8:00 PM virtually using Microsoft Teams.   FACILITATOR: Led by Dariusz Yang, Ph.D who is a Licensed Psychologist with the [facility] Comprehensive Weight Management Program.   TO REGISTER: Please send an email to Dariusz Yang, Ph.D.,  [email or web address]?if you would like an invitation to the group and to learn about using Microsoft Teams.    2021 Meetings  August 10: Open Forum  September 14: Guest Speaker: Darya Cuello RN,CBN, CIC, [facility] Comprehensive Weight Management Program, \"Your Hospital Stay and Post-Operative Compliance\"  October 12: Open Forum  November 9: Guest Speaker: Tara Ramey RD,RAJI, [facility] Comprehensive Weight Management Program,\"Holiday Eating\".  December 14: Guest Speaker Aline Butts MD, MPH, [facility], [facility], \"Body Contouring Surgery for Bariatric Surgery Patients\"     2022 Meetings  January 11  February 8  March 8  April 12  May 10  Rhonda 14    Connections: Post-Operative Bariatric Surgery Support Group  This is a support group for [facility] bariatric patients (and those external to [facility]) who " "have had bariatric surgery and are at least 3 months post-surgery.  WHEN: This support group meets the 4th Wednesday of the month from 11:00 AM - 12:00 PM virtually using Microsoft Teams.   FACILITATOR: Led by Certified Bariatric Nurse, Darya Cuello RN.   TO REGISTER: Please send an email to Darya at [email or web address] if you would like an invitation to the group and to learn about using Microsoft Teams.    2022 Meetings  January 26  February 23  March 23  April 27  May 25  Rhonda 22    [facility] Healthy Lifestyle Virtual Support Group    Healthy Lifestyle Virtual Support Group?  This is 60 minutes of small group guided discussion, support and resources. All are welcome who want a healthy lifestyle.  WHEN: This group meets monthly on a Friday from 12:30 PM - 1:30 PM virtually using Microsoft Teams.   FACILITATOR: Led by National Board Certified Health and , Darya Mccloud [facility].   TO REGISTER: Please send an email to Darya [email or web address] to receive monthly invites to the group or if you have any questions about having a health .  Prior to the meeting, a link with directions on how to join the meeting will be sent to you.    2021 Meetings  August 27: Open Snapvine  September 24: Sleep and the 7 Types of Rest  October 29: Open Forum  November 19: Gratitude     December 10: Open Forum    2022 Meetings  January 21: Leanne Coffey MS, RN, CIC, CBN, \"Healthy Habits\"  February 25: Open Forum  March 18: \"Setting Limits and Boundaries\"  April 29: Mary Anne Corbett RD, \"Meal Planning Made Easy\"  May 20: Open Forum  June: To be determined          "

## 2021-12-15 ENCOUNTER — TELEPHONE (OUTPATIENT)
Dept: ENDOCRINOLOGY | Facility: CLINIC | Age: 51
End: 2021-12-15
Payer: COMMERCIAL

## 2021-12-15 NOTE — TELEPHONE ENCOUNTER
ruddym to schedule 2-3 month follow up with REBECCA CALVO, left call center phone number and sent mychart.

## 2021-12-19 ENCOUNTER — HEALTH MAINTENANCE LETTER (OUTPATIENT)
Age: 51
End: 2021-12-19

## 2021-12-22 ENCOUNTER — VIRTUAL VISIT (OUTPATIENT)
Dept: PHARMACY | Facility: CLINIC | Age: 51
End: 2021-12-22
Payer: COMMERCIAL

## 2021-12-22 DIAGNOSIS — E11.9 TYPE 2 DIABETES MELLITUS WITHOUT COMPLICATION, WITHOUT LONG-TERM CURRENT USE OF INSULIN (H): ICD-10-CM

## 2021-12-22 DIAGNOSIS — Z72.0 TOBACCO ABUSE: Primary | ICD-10-CM

## 2021-12-22 PROCEDURE — 99607 MTMS BY PHARM ADDL 15 MIN: CPT | Performed by: PHARMACIST

## 2021-12-22 PROCEDURE — 99606 MTMS BY PHARM EST 15 MIN: CPT | Performed by: PHARMACIST

## 2021-12-22 RX ORDER — NICOTINE 21 MG/24HR
1 PATCH, TRANSDERMAL 24 HOURS TRANSDERMAL EVERY 24 HOURS
Qty: 28 PATCH | Refills: 1 | Status: SHIPPED | OUTPATIENT
Start: 2021-12-22 | End: 2022-09-08

## 2021-12-22 NOTE — PATIENT INSTRUCTIONS
Recommendations from today's MTM visit:                                                         1. On Quit Date - no more smoking - get rid of all aspects of smoking (cigarettes, lighters, chetan trays, etc).   -Start Nicotine 14 mg patch - 1 patch once daily.   -Start Nicotine 4 mg gum - 1 piece every 1-2 hours as needed for smoking. Remember the chew and park method when using the gum. Do not eat or drink when using gum.     2. Get labs completed, assisted with rescheduling lab appt - 12/28 at 7 am     3. Please schedule follow up with Jeannie Noe CNP to check in to see where things are at from a bariatric surgery perspective since it has been over a year since you have seen her. [phone] to schedule.     Follow-up: 1 month, will send patient mychart to check in 2 weeks from quit date.       It was great to speak with you today.  I value your experience and would be very thankful for your time with providing feedback on our clinic survey. You may receive a survey via email or text message in the next few days.       My Clinical Pharmacist's contact information:                                                      Please feel free to contact me with any questions or concerns you have.      Lauren Bloch, PharmD  Medication Therapy Management Pharmacist   [facility] Weight Management [place]

## 2021-12-22 NOTE — Clinical Note
We mainly discussed smoking cessation and coming up with plan for that. Briefly checked in on meds for T2DM and WM and tolerating well. She didn't have weight to give and was due for labs so helped her schedule lab appt to get a1c completed. She sees you Osmar DE JESUS

## 2021-12-22 NOTE — PROGRESS NOTES
Medication Therapy Management (MTM) Encounter    ASSESSMENT:                            Medication Adherence/Access: No issues identified    Type 2 Diabetes:  Would benefit from getting labs completed as A1c due and unsure of current A1c status. Patient would benefit from checking blood sugars at least a couple times a week Am fasting. Would benefit from scheduling follow up with Jeannie Noe CNP in near future to check in on status towards bariatric surgery in future.     Smoking cessation: Patient continues to use tobacco. Patient is ready to quit using tobacco.  Based on patient preferences and history, patient would benefit from identifying a quit date nicotine gum nicotine patch.  We discussed: benefits of quitting, ways to cope with cravings and manage triggers, ways to prepare for a quit date, ways to reduce stress to prevent relapse and total abstinence.Patient would benefit from using Nicotine 14 mg patch due to using ~10 cigarettes per day and Nicotine 4 mg gum as needed due to first cigarette of day within 30 minutes of awakening. Jeannie Noe CNP did authorize the following plan for smoking cessation.     PLAN:                            1. On Quit Date - no more smoking - get rid of all aspects of smoking (cigarettes, lighters, chetan trays, etc).   -Start Nicotine 14 mg patch - 1 patch once daily.   -Start Nicotine 4 mg gum - 1 piece every 1-2 hours as needed for smoking. Remember the chew and park method when using the gum. Do not eat or drink when using gum.     2. Get labs completed, assisted with rescheduling lab appt - 12/28 at 7 am     3. Please schedule follow up with Jeannie Noe CNP to check in to see where things are at from a bariatric surgery perspective since it has been over a year since you have seen her. [phone] to schedule.     4. Test blood sugar a couple times per week AM fasting.     Follow-up: 1 month, will send patient mychart to check in 2 weeks from quit date.      SUBJECTIVE/OBJECTIVE:                          Yancy Hoover is a 51 year old female called for a follow-up visit. She was referred to me from Jeannie Noe CNP and Dr. Velarde.  Today's visit is a follow-up MTM visit from 10/18/2021.     Reason for visit: Would like to discuss options to help with quitting smoking.    Allergies/ADRs: Reviewed in chart  Past Medical History: Reviewed in chart  Tobacco: She reports that she has been smoking cigarettes. She has a 5.00 pack-year smoking history. She has never used smokeless tobacco.Tobacco Cessation Action Plan:   see below  Alcohol: not currently using    Medication Adherence/Access: no issues reported    Type 2 Diabetes:    Metformin XR 1000 mg twice daily with meals   Victoza 1.8 mg daily - started 1 month ago.      Followed up with Dr. Velarde, last seen 12/2021 for Medical Weight Management. Previously worked with Jeannie Noe NP, seen 7/16/2020 for New Bariatric Consult, has not followed up since. However, she is still interested in bariatric surgery. She has been working with dietitian almost monthly. Tried to get Ozempic covered, wasn't covered so switched to Victoza. Medication side effects: none. No nausea/vomiting. Hasn't been checking sugars, prefers not to. Forgot to go to last lab appointment. Requesting assistance with making appointment for lab.   SMBG: never.   Symptoms of low blood sugar? none  Symptoms of high blood sugar? none  Eye exam: due  Foot exam: due  Diet/Exercise: see above  Aspirin: Not taking   Statin: No   ACEi/ARB: No.   Urine Albumin: No results found for: UMALCR     Hemoglobin A1C POCT   Date Value Ref Range Status   01/21/2021 7.4 (H) <=5.6 % Final     Smoking Cessation:    Had been tobacco free for several months then started smoking again in September due to death of family/friend. How much does patient smoke:  0.5 pack per day, first cigarette is right away when waking up in the morning. She does not smoke in the house. Wants to  quit smoking for her children. Children and her have talked about ways to assist with stopping smoking. She previously used straw for hand to mouth sensation and was helpful. She feels she needs medication assistance because finds more difficult than the last time to quit smoking. She is aware she needs to be tobacco free for at least 3 months before bariatric surgery.   Why does patient smoke: Stress - big reason why smoking. She enjoys smoking cigarette after eating or having coffee with smoking. She also smokes for something to do.   Tried quitting in the past: Yes.  How many times:  1.  For how lon year and 2 months.   What worked/didn t work in the past: previously she had been able to wean off then stop. Hasn't tried nicotine replacement therapy before.    Why does patient want to quit (motivators for quitting): bariatric surgery  What scares patient about quitting? Being able to manage stress     Is patient currently pregnant: No  History of seizures/bipolar disease: No  ----------------      I spent 30 minutes with this patient today. All changes were made via verbal approval with Jeannie Noe CNP. A copy of the visit note was provided to the patient's referring provider.    The patient was sent via Gema a summary of these recommendations.     Lauren Bloch, PharmD  Medication Therapy Management Pharmacist   [facility]    Telemedicine Visit Details  Type of service:  Telephone visit  Start Time: 12:00 PM  End Time: 12:30 PM  Originating Location (patient location): Home  Distant Location (provider location):  [facility]     Medication Therapy Recommendations  Tobacco abuse    Rationale: Untreated condition - Needs additional medication therapy - Indication   Recommendation: Start Medication - [facility] Nicotine 4 MG Gum   Status: Accepted per Provider         Type 2 diabetes mellitus without complication, unspecified whether  long term insulin use (H)    Current Medication: blood glucose (ACCU-CHEK SMARTVIEW) test strip   Rationale: Patient forgets to take - Adherence - Adherence   Recommendation: Provide Education   Status: Patient Agreed - Adherence/Education          Current Medication: metFORMIN (GLUCOPHAGE-XR) 500 MG 24 hr tablet   Rationale: Medication requires monitoring - Needs additional monitoring   Recommendation: Order Lab   Status: Patient Agreed - Adherence/Education

## 2021-12-29 ENCOUNTER — TELEPHONE (OUTPATIENT)
Dept: ENDOCRINOLOGY | Facility: CLINIC | Age: 51
End: 2021-12-29
Payer: COMMERCIAL

## 2021-12-29 NOTE — TELEPHONE ENCOUNTER
Called patient as she is still pursuing bariatric surgery. Had completed [facility] phone calls but that was in November 2020. Calls are good for 1 year. Submitted referral electronically today. Best time to call is 8 a.m. weekdays.
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
## 2022-01-17 ENCOUNTER — VIRTUAL VISIT (OUTPATIENT)
Dept: ENDOCRINOLOGY | Facility: CLINIC | Age: 52
End: 2022-01-17
Payer: COMMERCIAL

## 2022-01-17 DIAGNOSIS — Z71.3 NUTRITIONAL COUNSELING: Primary | ICD-10-CM

## 2022-01-17 DIAGNOSIS — E11.9 TYPE 2 DIABETES MELLITUS WITHOUT COMPLICATION, WITHOUT LONG-TERM CURRENT USE OF INSULIN (H): ICD-10-CM

## 2022-01-17 DIAGNOSIS — E66.9 OBESITY: ICD-10-CM

## 2022-01-17 PROCEDURE — 97803 MED NUTRITION INDIV SUBSEQ: CPT | Mod: TEL | Performed by: DIETITIAN, REGISTERED

## 2022-01-17 NOTE — LETTER
Date:January 18, 2022      Patient was self referred, no letter generated. Do not send.        [facility] Health Information
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
## 2022-01-17 NOTE — PATIENT INSTRUCTIONS
Juan J Haines,    Follow-up with RD in 1 month.     Thank you,    Sharon Osborn, RD, LD  If you would like to schedule or reschedule an appointment with the RD, please call [phone]    Nutrition Goals  Relating To Eating:  - Eat 3 meals per day.   - Eat slowly (20-30 minutes per meal), chewing foods well (25 chews per bite/applesauce consistency)  - Avoid snacking    Relating to beverages:  - Separate fluids from meals by 30 minutes before, during, and after eating  - Drink 64 ounces of fluid per day. Small, frequent sips.     Relating to activity:  - Increase exercise as able.    High Fiber Foods:  Bran cereal, 1/3 cup, 8.6 gm fiber   Cooked kidney beans   cup 7.9 gm  Cooked lentils   cup 7.8 gm  Cooked black beans   cup 7.6 gm  Canned chickpeas   cup 5.3 gm  Baked beans   cup 5.2 gm  Pear 1 5.1 gm  Soybeans   cup 5.1 gm  Quinoa   cup 5 gm  Baked sweet potato, with skin 1 medium 4.8 gm  Baked potato, with skin 1 medium 4.4 gm  Cooked frozen green peas   cup 4.4 gm  Bulgur   cup 4.1 gm  Cooked frozen mixed vegetables   cup 4 gm  Raspberries   cup 4 gm  Blackberries   cup 3.8 gm  Almonds 1 oz 3.5 gm  Cooked frozen spinach   cup 3.5 gm  Vegetable or soy shereen 1 each 3.4 gm  Apple 1 medium 3.3 gm  Dried dates 5 pieces 3.3 gm      High-Protein Plant Based Recipe Resources:  The High Protein Plant Based Cookbook (sold at [employer])  https://www.[employer].Praccel/recipe-categories/high-protein  www.IroFit.com  Www.Regalos Y Amigos.org  https://www.Sharypic.Praccel/best-high-protein-vegan-breakfast-recipes/      Interested in working with a health ?  Health coaches work with you to improve your overall health and wellbeing.  They look at the whole person, and may involve discussion of different areas of life, including, but not limited to the four pillars of health (sleep, exercise, nutrition, and stress management). Discuss with your care team if you would like to start working a health .    Health Coaching-3  Pack:    $99 for three health coaching visits    Visits may be done in person or via phone    Coaching is a partnership between the  and the client; Coaches do not prescribe or diagnose    Coaching helps inspire the client to reach his/her personal goals      COMPREHENSIVE WEIGHT MANAGEMENT PROGRAM  VIRTUAL SUPPORT GROUPS    For Support Group Information:      We offer support groups for patients who are working on weight loss and considering, preparing for or have had weight loss surgery.   There is no cost for this opportunity.  You are invited to attend the?Virtual Support Groups?provided by any of the following locations:    1. [facility] via Microsoft Teams with Larisa Cuba RN  2.   [place] via [employer] with Dariusz Yang, PhD, LP  3.   [place] via [employer] with Darya Cuello RN  4.   [facility] via Microsoft Teams with Darya Mccloud [facility]    The following Support Group information can also be found on our website:  [email or web address]-support-groups      [facility] Weight Loss Surgery Support Group    [facility] Weight Loss Surgery Support Group  The support group is a patient-lead forum that meets monthly to share experiences, encouragement and education. It is open to those who have had weight loss surgery, are scheduled for surgery, and those who are considering surgery.   WHEN: This group meets on the 3rd Wednesday of each month from 5:00PM - 6:00PM virtually using Microsoft Teams.   FACILITATOR: Led by Larisa Cuba RD, LD, RN, the program's Clinical Coordinator.   TO REGISTER: Please contact the clinic via [employer] or call the nurse line directly at [phone] to inform our staff that you would like an invite sent to you and to let us know the email you would like the invite sent to. Prior to the meeting, a link with directions on how to join the meeting will be sent to you.    2022 Meetings  January  "19: \"Let's Talk\" a time for the group to share.  February 16: \"Let's Talk\" a time for the group to share.  March 16: Guest Speakers: Psychologists, Madison Lee, PhD,LP and Delicia Cedeño, PsTara,  April 20: Guest Speaker: Health , Lisette Jimenez, FMCHC,CHES, CPT  May 18: Guest Speaker: Dietitian, Israel Boles, RD, LP  Rhonda 15: \"Let's Talk\" a time for the group to share.  July 20: \"Let's Talk\" a time for the group to share.  August 17: TBA  September 21: TBA  October 19: Guest Speaker: Dr Jaylen Christine MD Pulmonologist and Sleep Medicine Physician, \"Getting a Good Night's Sleep\".  November 16: TBA  December 21: TBA    [facility] Support Groups    Connections: Bariatric Care Support Group?  This is open to all [facility] (and those external to this program) pre- and post- operative bariatric surgery patients as well as their support system.   WHEN: This group meets the 2nd Tuesday of each month from 6:30 PM - 8:00 PM virtually using Microsoft Teams.   FACILITATOR: Led by Dariusz Yang, Ph.D who is a Licensed Psychologist with the [facility] Comprehensive Weight Management Program.   TO REGISTER: Please send an email to Dariusz Yang, Ph.D.,  [email or web address]?if you would like an invitation to the group and to learn about using Microsoft Teams.    2022 Meetings  January 11: Lissett Morales, PharmD, Pharmacy Resident at [facility], \"Medications and Bariatric Surgery\".  February 8: Open Forum  March 8  April 12  May 10  Rhonda 14    Connections: Post-Operative Bariatric Surgery Support Group  This is a support group for [facility] bariatric patients (and those external to [facility]) who have had bariatric surgery and are at least 3 months post-surgery.  WHEN: This support group meets the 4th Wednesday of the month from 11:00 AM - 12:00 PM virtually using Microsoft Teams.   FACILITATOR: Led by Certified Bariatric Nurse, Darya Cuello, " "RN.   TO REGISTER: Please send an email to Darya at [email or web address] if you would like an invitation to the group and to learn about using Microsoft Teams.    2022 Meetings  January 26  February 23  March 23  April 27  May 25  Rhonda 22    [facility] Healthy Lifestyle Virtual Support Group    Healthy Lifestyle Virtual Support Group?  This is 60 minutes of small group guided discussion, support and resources. All are welcome who want a healthy lifestyle.  WHEN: This group meets monthly on a Friday from 12:30 PM - 1:30 PM virtually using Microsoft Teams.   FACILITATOR: Led by National Board Certified Health and , Darya Mccloud, [facility].   TO REGISTER: Please send an email to Darya at?[email or web address] to receive monthly invites to the group or if you have any questions about having a health .  Prior to the meeting, a link with directions on how to join the meeting will be sent to you.    2022 Meetings  January 21: Leanne Coffey MS, RN, CIC, CBN, \"Healthy Habits\"  February 25: Open Forum  March 18: \"Setting Limits and Boundaries\"  April 29: Mary Anne Corbett RD, \"Meal Planning Made Easy\"  May 20: Open Forum  June: To be determined          "

## 2022-01-24 ENCOUNTER — VIRTUAL VISIT (OUTPATIENT)
Dept: PHARMACY | Facility: CLINIC | Age: 52
End: 2022-01-24
Payer: COMMERCIAL

## 2022-01-24 DIAGNOSIS — E11.9 TYPE 2 DIABETES MELLITUS WITHOUT COMPLICATION, WITHOUT LONG-TERM CURRENT USE OF INSULIN (H): Primary | ICD-10-CM

## 2022-01-24 DIAGNOSIS — Z72.0 TOBACCO ABUSE: ICD-10-CM

## 2022-01-24 PROCEDURE — 99605 MTMS BY PHARM NP 15 MIN: CPT | Performed by: PHARMACIST

## 2022-01-24 PROCEDURE — 99607 MTMS BY PHARM ADDL 15 MIN: CPT | Performed by: PHARMACIST

## 2022-01-24 NOTE — PROGRESS NOTES
Medication Therapy Management (MTM) Encounter    ASSESSMENT:                            Medication Adherence/Access: No issues identified    Type 2 Diabetes: A1c not at goal <7%, but overdue for repeat. Sugars generally appear to be within goal. Would benefit from continuing to check AM fasting and post prandial a couple times per week and keeping track of sugars.     Smoking Cessation: The patient would benefit from staying on the same combination of nicotine 14 mg patches and nicotine 4 mg gum that she is currently using. This can be reassessed in two weeks to determine if it is appropriate for her to decrease to the 7 mg patches. Additionally, because the patient talks about not liking the nicotine gum, she could instead try chewing a strong-flavored non-nicotine gum to see if that helps with some of her cravings as well.    PLAN:                            1. Check your blood sugar a few times per week either in the morning before eating breakfast or two hours after a meal.    2. Continuing Nicotine 14 mg patch daily and Nicotine 4 mg gum as needed. She can try using gum with a strong flavor to help with cravings instead of the nicotine gum if she would prefer.     3. Due for labs, already ordered previously.     Follow-up: 2 weeks via [employer] and Return in about 6 weeks (around 3/7/2022).    SUBJECTIVE/OBJECTIVE:                          Yancy Hoover is a 51 year old female called for a follow-up visit.  Today's visit is a follow-up MTM visit from 12/22/2021.     Reason for visit: Smoking cessation follow up.    Allergies/ADRs: Reviewed in chart  Past Medical History: Reviewed in chart  Tobacco: She reports that she has been smoking cigarettes. She has a 5.00 pack-year smoking history. She has never used smokeless tobacco.Tobacco Cessation Action Plan: quit date 01/03/2022.    Medication Adherence/Access: no issues reported    Type 2 Diabetes:    Metformin XR 1000 mg twice daily with meals   Victoza 1.8 mg  "daily - started 2 months ago.     Patient feels like she has plateaued with her weight the last few weeks. She has been looking online at healthy meal options. She also states that due to being extremely busy at home she has found times where she forgets to use her Victoza dose.    Followed up with Dr. Velarde, last seen 12/2021 for Medical Weight Management. Previously worked with Jeannie Noe NP, seen 7/16/2020 for New Bariatric Consult, has not followed up since. However, she is still interested in bariatric surgery. She has been working with dietitian almost monthly. Tried to get Ozempic covered, wasn't covered so switched to Victoza. Medication side effects: none.  SMBG: infrequently (a few times per week), typically runs  mg/dl (patient unsure of fasting versus postprandial readings)  Symptoms of low blood sugar? none  Symptoms of high blood sugar? none  Eye exam: due  Foot exam: due  Diet/Exercise: see above  Aspirin: Not taking   Statin: No   ACEi/ARB: No.   Urine Albumin: No results found for: UMALCR            Hemoglobin A1C POCT   Date Value Ref Range Status   01/21/2021 7.4 (H) <=5.6 % Final      Smoking Cessation:  Nicotine patch 14 mg daily  Nicotine gum 4 mg every 1-2 hours as directed    Patient states that she has had some triggers that make her want to smoke (stress, typically in the morning though doesn't occur every day) but states she has been \"really good\" during her smoking cessation although it has been difficult. Patient states she smokes around 3 cigarettes per week. Patient reports vivid dreams that have been resolved by removing the patch at night. Does not like using the gum due to taste (\"it feels like I'm taking the tobacco out of the cigarette and putting it in my mouth\"), but has used it with success and uses the chew and park method. Patient has found that playing Solitaire on her phone during downtime at work to avoid smoking has been helpful. She will also go on walks with her " neighbor on lunch to get some time to herself.     Had been tobacco free for several months then started smoking again in September due to death of family/friend. Previously was smoking half pack per day before quitting. See previous note for more information on smoking history.   ----------------      I spent 17 minutes with this patient today. All changes were made via collaborative practice agreement with Dr. Velarde and Jeannie Noe CNP. A copy of the visit note was provided to the patient's provider(s).    The patient was sent via [employer] a summary of these recommendations.     Lauren Bloch, PharmD, BCACP   Medication Therapy Management Pharmacist   [facility]      Telemedicine Visit Details  Type of service:  Telephone visit  Start Time: 12:41 PM  End Time: 12:58 PM  Originating Location (patient location): Home  Distant Location (provider location):  [facility]     Medication Therapy Recommendations  Type 2 diabetes mellitus without complication, unspecified whether long term insulin use (H)    Current Medication: metFORMIN (GLUCOPHAGE-XR) 500 MG 24 hr tablet   Rationale: Medication requires monitoring - Needs additional monitoring   Recommendation: Order Lab   Status: Patient Agreed - Adherence/Education

## 2022-01-24 NOTE — PATIENT INSTRUCTIONS
Recommendations from today's MTM visit:                                                      1. Check your blood sugar a few times per week either in the morning before eating breakfast or two hours after a meal.    2. You can try using gum with a strong flavor to help with cravings instead of the nicotine gum.    3. Due for labs     Follow-up: I will reach out to you in 2 weeks to check in to see how quitting smoking is continuing to go. Otherwise follow up   Return in about 6 weeks (around 3/7/2022).    It was great to speak with you today.  I value your experience and would be very thankful for your time with providing feedback on our clinic survey. You may receive a survey via email or text message in the next few days.       My Clinical Pharmacist's contact information:                                                      Please feel free to contact me with any questions or concerns you have.      Lauren Bloch, PharmD  Medication Therapy Management Pharmacist   [facility] [place]

## 2022-01-27 ENCOUNTER — APPOINTMENT (OUTPATIENT)
Dept: CT IMAGING | Facility: CLINIC | Age: 52
End: 2022-01-27
Attending: PHYSICIAN ASSISTANT
Payer: COMMERCIAL

## 2022-01-27 ENCOUNTER — HOSPITAL ENCOUNTER (EMERGENCY)
Facility: CLINIC | Age: 52
Discharge: HOME | End: 2022-01-27
Attending: PHYSICIAN ASSISTANT | Admitting: PHYSICIAN ASSISTANT
Payer: COMMERCIAL

## 2022-01-27 VITALS
SYSTOLIC BLOOD PRESSURE: 121 MMHG | BODY MASS INDEX: 48.82 KG/M2 | DIASTOLIC BLOOD PRESSURE: 89 MMHG | WEIGHT: 293 LBS | HEART RATE: 98 BPM | RESPIRATION RATE: 20 BRPM | OXYGEN SATURATION: 99 % | HEIGHT: 65 IN | TEMPERATURE: 98.5 F

## 2022-01-27 DIAGNOSIS — R10.32 LLQ ABDOMINAL PAIN: ICD-10-CM

## 2022-01-27 LAB
ALBUMIN SERPL-MCNC: 3.5 G/DL (ref 3.4–5)
ALBUMIN UR-MCNC: 50 MG/DL
ALP SERPL-CCNC: 74 U/L (ref 40–150)
ALT SERPL W P-5'-P-CCNC: 37 U/L (ref 0–50)
ANION GAP SERPL CALCULATED.3IONS-SCNC: 5 MMOL/L (ref 3–14)
APPEARANCE UR: CLEAR
AST SERPL W P-5'-P-CCNC: 17 U/L (ref 0–45)
B-HCG FREE SERPL-ACNC: <5 IU/L (ref 0–5)
BACTERIA #/AREA URNS HPF: ABNORMAL /HPF
BASOPHILS # BLD AUTO: 0 10E3/UL (ref 0–0.2)
BASOPHILS NFR BLD AUTO: 1 %
BILIRUB SERPL-MCNC: 0.1 MG/DL (ref 0.2–1.3)
BILIRUB UR QL STRIP: NEGATIVE
BUN SERPL-MCNC: 13 MG/DL (ref 7–30)
CALCIUM SERPL-MCNC: 8.9 MG/DL (ref 8.5–10.1)
CHLORIDE BLD-SCNC: 103 MMOL/L (ref 94–109)
CO2 SERPL-SCNC: 28 MMOL/L (ref 20–32)
COLOR UR AUTO: YELLOW
CREAT SERPL-MCNC: 0.96 MG/DL (ref 0.52–1.04)
EOSINOPHIL # BLD AUTO: 0.1 10E3/UL (ref 0–0.7)
EOSINOPHIL NFR BLD AUTO: 2 %
ERYTHROCYTE [DISTWIDTH] IN BLOOD BY AUTOMATED COUNT: 14.2 % (ref 10–15)
GFR SERPL CREATININE-BSD FRML MDRD: 72 ML/MIN/1.73M2
GLUCOSE BLD-MCNC: 151 MG/DL (ref 70–99)
GLUCOSE UR STRIP-MCNC: NEGATIVE MG/DL
HCT VFR BLD AUTO: 40.5 % (ref 35–47)
HGB BLD-MCNC: 12.9 G/DL (ref 11.7–15.7)
HGB UR QL STRIP: ABNORMAL
IMM GRANULOCYTES # BLD: 0 10E3/UL
IMM GRANULOCYTES NFR BLD: 1 %
KETONES UR STRIP-MCNC: NEGATIVE MG/DL
LEUKOCYTE ESTERASE UR QL STRIP: NEGATIVE
LYMPHOCYTES # BLD AUTO: 1.6 10E3/UL (ref 0.8–5.3)
LYMPHOCYTES NFR BLD AUTO: 40 %
MCH RBC QN AUTO: 27.9 PG (ref 26.5–33)
MCHC RBC AUTO-ENTMCNC: 31.9 G/DL (ref 31.5–36.5)
MCV RBC AUTO: 88 FL (ref 78–100)
MONOCYTES # BLD AUTO: 0.6 10E3/UL (ref 0–1.3)
MONOCYTES NFR BLD AUTO: 14 %
MUCOUS THREADS #/AREA URNS LPF: PRESENT /LPF
NEUTROPHILS # BLD AUTO: 1.8 10E3/UL (ref 1.6–8.3)
NEUTROPHILS NFR BLD AUTO: 42 %
NITRATE UR QL: NEGATIVE
NRBC # BLD AUTO: 0 10E3/UL
NRBC BLD AUTO-RTO: 0 /100
PH UR STRIP: 6 [PH] (ref 5–7)
PLATELET # BLD AUTO: 257 10E3/UL (ref 150–450)
POTASSIUM BLD-SCNC: 3.9 MMOL/L (ref 3.4–5.3)
PROT SERPL-MCNC: 7.7 G/DL (ref 6.8–8.8)
RBC # BLD AUTO: 4.62 10E6/UL (ref 3.8–5.2)
RBC URINE: 15 /HPF
SODIUM SERPL-SCNC: 136 MMOL/L (ref 133–144)
SP GR UR STRIP: 1.01 (ref 1–1.03)
SQUAMOUS EPITHELIAL: 6 /HPF
UROBILINOGEN UR STRIP-MCNC: NORMAL MG/DL
WBC # BLD AUTO: 4.2 10E3/UL (ref 4–11)
WBC URINE: 9 /HPF

## 2022-01-27 PROCEDURE — 80053 COMPREHEN METABOLIC PANEL: CPT | Performed by: PHYSICIAN ASSISTANT

## 2022-01-27 PROCEDURE — 96374 THER/PROPH/DIAG INJ IV PUSH: CPT | Mod: 59

## 2022-01-27 PROCEDURE — 36415 COLL VENOUS BLD VENIPUNCTURE: CPT | Performed by: PHYSICIAN ASSISTANT

## 2022-01-27 PROCEDURE — 250N000009 HC RX 250: Performed by: PHYSICIAN ASSISTANT

## 2022-01-27 PROCEDURE — 87086 URINE CULTURE/COLONY COUNT: CPT | Performed by: PHYSICIAN ASSISTANT

## 2022-01-27 PROCEDURE — 250N000011 HC RX IP 250 OP 636: Performed by: PHYSICIAN ASSISTANT

## 2022-01-27 PROCEDURE — 84702 CHORIONIC GONADOTROPIN TEST: CPT

## 2022-01-27 PROCEDURE — 82040 ASSAY OF SERUM ALBUMIN: CPT | Performed by: PHYSICIAN ASSISTANT

## 2022-01-27 PROCEDURE — 81001 URINALYSIS AUTO W/SCOPE: CPT | Performed by: PHYSICIAN ASSISTANT

## 2022-01-27 PROCEDURE — 85004 AUTOMATED DIFF WBC COUNT: CPT | Performed by: PHYSICIAN ASSISTANT

## 2022-01-27 PROCEDURE — 74177 CT ABD & PELVIS W/CONTRAST: CPT

## 2022-01-27 PROCEDURE — 99285 EMERGENCY DEPT VISIT HI MDM: CPT | Mod: 25

## 2022-01-27 RX ORDER — IOPAMIDOL 755 MG/ML
135 INJECTION, SOLUTION INTRAVASCULAR ONCE
Status: COMPLETED | OUTPATIENT
Start: 2022-01-27 | End: 2022-01-27

## 2022-01-27 RX ORDER — HYDROMORPHONE HYDROCHLORIDE 1 MG/ML
0.5 INJECTION, SOLUTION INTRAMUSCULAR; INTRAVENOUS; SUBCUTANEOUS ONCE
Status: COMPLETED | OUTPATIENT
Start: 2022-01-27 | End: 2022-01-27

## 2022-01-27 RX ADMIN — SODIUM CHLORIDE 79 ML: 9 INJECTION, SOLUTION INTRAVENOUS at 16:28

## 2022-01-27 RX ADMIN — HYDROMORPHONE HYDROCHLORIDE 0.5 MG: 1 INJECTION, SOLUTION INTRAMUSCULAR; INTRAVENOUS; SUBCUTANEOUS at 15:55

## 2022-01-27 RX ADMIN — IOPAMIDOL 135 ML: 755 INJECTION, SOLUTION INTRAVENOUS at 16:27

## 2022-01-27 ASSESSMENT — MIFFLIN-ST. JEOR: SCORE: 1981.67

## 2022-01-27 NOTE — ED NOTES
Bed: ED19  Expected date:   Expected time:   Means of arrival:   Comments:  [facility] - 446 - 51 F abd pain eta 3926

## 2022-01-27 NOTE — ED TRIAGE NOTES
BIBA from home for sudden severe RLQ pain radiating to R flank. Reports having intermittent pain in this area for a few months. Feels like when she has had a prior ovarian cyst. Nausea, 1 episode of emesis. EMS gave 15mg toradol and 4mg Zofran ODT. . VSS.

## 2022-01-27 NOTE — LETTER
January 27, 2022      To Whom It May Concern:      Yancy Hoover was seen in our Emergency Department today, 01/27/22.  I expect her condition to improve over the next 2 days.  She may return to work when improved.    Sincerely,        Jaspreet Causey PA-C

## 2022-01-27 NOTE — ED PROVIDER NOTES
History     Chief Complaint:  Abdominal Pain      HPI   Yancy Hoover is a 51 year old female who presents with LLQ abdominal pain.  This has been intermittent for several months but worse today.  No vomiting, constipation, dysuria, hematuria, bloody stools, or vaginal pain/ discharge.  No fever or chills.  She does still have menstrual perioids though they have become more irregular recently.  No chest pain or SOB.  Prior hx of ovarian cyst and symptoms feel somewhat similar.      Review of Systems   All other systems reviewed and are negative.      Allergies:  Gadolinium Derivatives      Medications:    alcohol swab prep pads  blood glucose (ACCU-CHEK SMARTVIEW) test strip  blood glucose calibration (ACCU-CHEK SMARTVIEW CONTROL) solution  blood glucose monitoring (ACCU-CHEK FASTCLIX) lancets  blood glucose monitoring (ACCU-CHEK FAWAD SMARTVIEW) meter device kit  childrens multivitamin w/iron (FLINTSTONES COMPLETE) 18 MG chewable tablet  insulin pen needle (31G X 5 MM) 31G X 5 MM miscellaneous  liraglutide (VICTOZA PEN) 18 MG/3ML solution  metFORMIN (GLUCOPHAGE-XR) 500 MG 24 hr tablet  nicotine (NICODERM CQ) 14 MG/24HR 24 hr patch  nicotine polacrilex (NICORETTE) 4 MG gum  topiramate (TOPAMAX) 25 MG tablet  vitamin D3 (CHOLECALCIFEROL) 50 mcg (2000 units) tablet        Past Medical History:    Past Medical History:   Diagnosis Date     NO ACTIVE PROBLEMS      Patient Active Problem List    Diagnosis Date Noted     LANDON (obstructive sleep apnea) 11/06/2020     Priority: Medium     -Severe obstructive sleep apnea with sleep associated hypoxemia  -Concern for hypoventilation is borderline with relative increase of TCM by more than 10, though with no time greater than 55 and oximetry not suggestive of sustained hypoxemia.    Polysomnogram performed October 23, 2020 with weight 290 pounds, BMI 50.1.  Total sleep time for 15 minutes.  Time in supine REM of 40.5 minutes.  AHI 41.1, RDI 45.5.  Baseline SPO2  "91.5%.  Estuardo SPO2 59.2% in supine REM.  Time spent less than or equal to 88% of 39 minutes.  Transcutaneous carbon oxide monitoring was performed with maximum change of 12-13 mmHg, though 0 minutes at or greater than 55 mmHg.  No PLM's observed.  EKG appeared normal sinus rhythm with a relatively high average heart rate of 96.1 bpm.         Class 3 severe obesity due to excess calories with body mass index (BMI) of 50.0 to 59.9 in adult, unspecified whether serious comorbidity present (H) 11/06/2020     Priority: Medium     Type 2 diabetes mellitus without complication, unspecified whether long term insulin use (H) 11/06/2020     Priority: Medium        Past Surgical History:    Past Surgical History:   Procedure Laterality Date     GYN SURGERY      c  section 2014     LAPAROSCOPIC CYSTECTOMY OVARIAN (BENIGN) Left 1/23/2020    Procedure: LAPAROSCOPIC ovarian cystectomy,;  Surgeon: Sofi Cuevas MD;  Location:  OR       Family History:    No family history on file.    Social History:  Presents alone  No alcohol use    Physical Exam     Patient Vitals for the past 24 hrs:   BP Temp Temp src Pulse Resp SpO2 Height Weight   01/27/22 1805 121/89 -- -- 98 20 99 % -- --   01/27/22 1700 -- -- -- -- -- 97 % -- --   01/27/22 1600 -- -- -- -- -- 98 % -- --   01/27/22 1530 -- -- -- -- -- 99 % -- --   01/27/22 1509 111/88 98.5  F (36.9  C) Oral 106 20 100 % 1.651 m (5' 5\") 136.1 kg (300 lb)       Physical Exam  General: Awake, alert, pleasant, non-toxic.  Head:  Scalp is NC/AT  Eyes:  Conjunctiva normal, PERRL  ENT:  The external nose and ears are normal.   Neck:  Normal range of motion without rigidity.  CV:  Regular rate and rhythm    No pathologic murmur, rubs, or gallops.  Resp:  Breath sounds are clear bilaterally.  No crackles, wheezes, rhonchi, stridor.    Non-labored, no retractions or accessory muscle use  Abdomen: Abdomen is soft, no distension, mild LLQ tenderness, no masses. No CVA tenderness.  MS:  No " lower extremity edema or asymmetric calf swelling. No midline cervical, thoracic, or lumbar tenderness  Skin:  Warm and dry, No rash or lesions noted.  Neuro:  Alert and oriented x3.  GCS 15 No gross motor deficits.  No facial asymmetry.  Psych: Awake. Alert. Normal affect. Appropriate interactions.      Emergency Department Course     Imaging:  Abd/pelvis CT,  IV  contrast only TRAUMA / AAA   Final Result   IMPRESSION:    1. No acute pathology in the abdomen or pelvis.   2. Hepatic steatosis.      ASHLY SOTO MD            SYSTEM ID:  [id number]        Laboratory:  Labs Ordered and Resulted from Time of ED Arrival to Time of ED Departure   COMPREHENSIVE METABOLIC PANEL - Abnormal       Result Value    Sodium 136      Potassium 3.9      Chloride 103      Carbon Dioxide (CO2) 28      Anion Gap 5      Urea Nitrogen 13      Creatinine 0.96      Calcium 8.9      Glucose 151 (*)     Alkaline Phosphatase 74      AST 17      ALT 37      Protein Total 7.7      Albumin 3.5      Bilirubin Total 0.1 (*)     GFR Estimate 72     ROUTINE UA WITH MICROSCOPIC REFLEX TO CULTURE - Abnormal    Color Urine Yellow      Appearance Urine Clear      Glucose Urine Negative      Bilirubin Urine Negative      Ketones Urine Negative      Specific Gravity Urine 1.015      Blood Urine Moderate (*)     pH Urine 6.0      Protein Albumin Urine 50  (*)     Urobilinogen Urine Normal      Nitrite Urine Negative      Leukocyte Esterase Urine Negative      Bacteria Urine Few (*)     Mucus Urine Present (*)     RBC Urine 15 (*)     WBC Urine 9 (*)     Squamous Epithelials Urine 6 (*)    ISTAT HCG QUANTITATIVE PREGNANCY POCT - Normal    HCG QUANTITATIVE POCT <5.0     CBC WITH PLATELETS AND DIFFERENTIAL    WBC Count 4.2      RBC Count 4.62      Hemoglobin 12.9      Hematocrit 40.5      MCV 88      MCH 27.9      MCHC 31.9      RDW 14.2      Platelet Count 257      % Neutrophils 42      % Lymphocytes 40      % Monocytes 14      % Eosinophils 2      %  Basophils 1      % Immature Granulocytes 1      NRBCs per 100 WBC 0      Absolute Neutrophils 1.8      Absolute Lymphocytes 1.6      Absolute Monocytes 0.6      Absolute Eosinophils 0.1      Absolute Basophils 0.0      Absolute Immature Granulocytes 0.0      Absolute NRBCs 0.0     URINE CULTURE     Emergency Department Course:  Reviewed:  I reviewed nursing notes, vitals, past history and care everywhere    Assessments:   I obtained history and examined the patient as noted above.    I rechecked the patient and explained findings.     Interventions:  Medications   HYDROmorphone (PF) (DILAUDID) injection 0.5 mg (0.5 mg Intravenous Given 22 7856)   iopamidol (ISOVUE-370) solution 135 mL (135 mLs Intravenous Given 22 1627)   Saline Flush (79 mLs Intravenous Given 22 1628)       Disposition:  The patient was discharged to home.    Impression & Plan      Medical Decision Makin yo female who presents with LLQ abdominal pain.  Ongoing for several months and intermittent.  CT w/contrast negative.  UA not convincing for infection, afebrile with normal WBC, no urinary symptoms doubt pyelo. Will cx but would not treat with antibiotics at this time.  Negative pregnancy test.  No symptoms to suggest PID.  Ovaries appear normal strongly doubt torsion.  Symptoms clearly reproducible isolated to lower abdomen strongly doubt ACS, PE, thoracic aortic dissection.  Felt better after symptomatic treatment.  Unclear etiology of sxs.  Pt comfortable going home will have her follow-up with pcp. Return if new worsening or changing symptoms.    Diagnosis:    ICD-10-CM    1. LLQ abdominal pain  R10.32        Discharge Medications:  Discharge Medication List as of 2022  5:59 PM            Scribe Disclosure:  Jaspreet DEL ANGEL PA-C, am serving as a scribe at 3:44 PM on 2022 to document services personally performed by Jaspreet Causey PA-C based on my observations and the provider's statements to me.       Jaspreet Causey PA-C  01/27/22 2211

## 2022-01-29 LAB — BACTERIA UR CULT: NORMAL

## 2022-02-07 DIAGNOSIS — E66.01 CLASS 3 SEVERE OBESITY DUE TO EXCESS CALORIES WITH BODY MASS INDEX (BMI) OF 50.0 TO 59.9 IN ADULT, UNSPECIFIED WHETHER SERIOUS COMORBIDITY PRESENT (H): ICD-10-CM

## 2022-02-07 DIAGNOSIS — E11.9 TYPE 2 DIABETES MELLITUS WITHOUT COMPLICATION, WITHOUT LONG-TERM CURRENT USE OF INSULIN (H): ICD-10-CM

## 2022-02-07 DIAGNOSIS — E66.813 CLASS 3 SEVERE OBESITY DUE TO EXCESS CALORIES WITH BODY MASS INDEX (BMI) OF 50.0 TO 59.9 IN ADULT, UNSPECIFIED WHETHER SERIOUS COMORBIDITY PRESENT (H): ICD-10-CM

## 2022-02-08 RX ORDER — LIRAGLUTIDE 6 MG/ML
1.8 INJECTION SUBCUTANEOUS DAILY
Qty: 9 ML | Refills: 1 | Status: SHIPPED | OUTPATIENT
Start: 2022-02-08 | End: 2022-05-20

## 2022-02-08 NOTE — TELEPHONE ENCOUNTER
Patient had appointment with MTM last month and is scheduled with MD next month. Sending refills to pharmacy.

## 2022-02-08 NOTE — TELEPHONE ENCOUNTER
liraglutide (VICTOZA PEN) 18 MG/3ML solution   Last Written Prescription Date:  11/30/21  Last Fill Quantity: 9ml,   # refills: 3  Last Office Visit : 12/10/21  Future Office visit:  3/18/22    Routing refill request to provider for review/approval because:  A1C past due

## 2022-02-13 ENCOUNTER — HEALTH MAINTENANCE LETTER (OUTPATIENT)
Age: 52
End: 2022-02-13

## 2022-02-28 DIAGNOSIS — E11.9 TYPE 2 DIABETES MELLITUS WITHOUT COMPLICATION, UNSPECIFIED WHETHER LONG TERM INSULIN USE (H): ICD-10-CM

## 2022-02-28 DIAGNOSIS — E66.813 CLASS 3 SEVERE OBESITY DUE TO EXCESS CALORIES WITH BODY MASS INDEX (BMI) OF 50.0 TO 59.9 IN ADULT, UNSPECIFIED WHETHER SERIOUS COMORBIDITY PRESENT (H): ICD-10-CM

## 2022-02-28 DIAGNOSIS — E11.9 TYPE 2 DIABETES MELLITUS WITHOUT COMPLICATION, WITHOUT LONG-TERM CURRENT USE OF INSULIN (H): ICD-10-CM

## 2022-02-28 DIAGNOSIS — E66.01 CLASS 3 SEVERE OBESITY DUE TO EXCESS CALORIES WITH BODY MASS INDEX (BMI) OF 50.0 TO 59.9 IN ADULT, UNSPECIFIED WHETHER SERIOUS COMORBIDITY PRESENT (H): ICD-10-CM

## 2022-03-03 RX ORDER — GLUCOSAMINE HCL/CHONDROITIN SU 500-400 MG
CAPSULE ORAL
Qty: 100 EACH | Refills: 5 | Status: SHIPPED | OUTPATIENT
Start: 2022-03-03 | End: 2022-12-16

## 2022-03-03 NOTE — TELEPHONE ENCOUNTER
Last Clinic Visit: 12/10/2021  [facility] [place]  Danial eVlarde MD  Recommended 2-3 month follow up  [place] 3/18/22
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
## 2022-03-21 ENCOUNTER — VIRTUAL VISIT (OUTPATIENT)
Dept: ENDOCRINOLOGY | Facility: CLINIC | Age: 52
End: 2022-03-21
Payer: COMMERCIAL

## 2022-03-21 DIAGNOSIS — E66.9 OBESITY: ICD-10-CM

## 2022-03-21 DIAGNOSIS — E11.9 TYPE 2 DIABETES MELLITUS WITHOUT COMPLICATION, WITHOUT LONG-TERM CURRENT USE OF INSULIN (H): ICD-10-CM

## 2022-03-21 DIAGNOSIS — Z71.3 NUTRITIONAL COUNSELING: Primary | ICD-10-CM

## 2022-03-21 PROCEDURE — 97803 MED NUTRITION INDIV SUBSEQ: CPT | Mod: TEL | Performed by: DIETITIAN, REGISTERED

## 2022-03-21 NOTE — PATIENT INSTRUCTIONS
Juan J Haines,    Follow-up with RD in 1 month.     Thank you,    Sharon Osborn, RD, LD  If you would like to schedule or reschedule an appointment with the RD, please call [phone]    Nutrition Goals  1. Take medication consistently. Set a reminder on your phone.   2. Start walking for 30 mins 2-3 times per week.  3. Continue to use portion control with snacks    High-Protein Plant Based Recipe Resources:  The High Protein Plant Based Cookbook (sold at [employer])  https://www.[employer]/recipe-categories/high-protein  www.Momo  Www.Semantria.TenderTree  https://www.[employer]/best-high-protein-vegan-breakfast-recipes/    Interested in working with a health ?  Health coaches work with you to improve your overall health and wellbeing.  They look at the whole person, and may involve discussion of different areas of life, including, but not limited to the four pillars of health (sleep, exercise, nutrition, and stress management). Discuss with your care team if you would like to start working a health .    Health Coaching-3 Pack:    $99 for three health coaching visits    Visits may be done in person or via phone    Coaching is a partnership between the  and the client; Coaches do not prescribe or diagnose    Coaching helps inspire the client to reach his/her personal goals      COMPREHENSIVE WEIGHT MANAGEMENT PROGRAM  VIRTUAL SUPPORT GROUPS    For Support Group Information:      We offer support groups for patients who are working on weight loss and considering, preparing for or have had weight loss surgery.   There is no cost for this opportunity.  You are invited to attend the?Virtual Support Groups?provided by any of the following locations:    1. [facility] via slinkset Teams with Larisa Cuba RN  2.   [place] via [email or web address].COM with Dariusz Yang, PhD, LP  3.   [place] via MOO.COM with Darya Cuello RN  4.   [facility] via Microsoft Teams with Darya Mccloud,  "[facility]    The following Support Group information can also be found on our website:  [email or web address]      [facility] Weight Loss Surgery Support Group    [facility] Weight Loss Surgery Support Group  The support group is a patient-lead forum that meets monthly to share experiences, encouragement and education. It is open to those who have had weight loss surgery, are scheduled for surgery, and those who are considering surgery.   WHEN: This group meets on the 3rd Wednesday of each month from 5:00PM - 6:00PM virtually using Microsoft Teams.   FACILITATOR: Led by Larisa Cuba RD, LD, RN, the program's Clinical Coordinator.   TO REGISTER: Please contact the clinic via [employer] or call the nurse line directly at [phone] to inform our staff that you would like an invite sent to you and to let us know the email you would like the invite sent to. Prior to the meeting, a link with directions on how to join the meeting will be sent to you.    2022 Meetings  January 19: \"Let's Talk\" a time for the group to share.  February 16: \"Let's Talk\" a time for the group to share.  March 16: Guest Speakers: Psychologists, Madison Lee, PhD,LP and Delicia Cedeño PsyD,  April 20: Guest Speaker: Health , Lisette Jimenez, [facility],CHES, CPT  May 18: Guest Speaker: DietitianIsrael, ALISON, LP  Rhonda 15: \"Let's Talk\" a time for the group to share.  July 20: \"Let's Talk\" a time for the group to share.  August 17: TBA  September 21: TBA  October 19: Guest Speaker: Dr Jaylen Christine MD Pulmonologist and Sleep Medicine Physician, \"Getting a Good Night's Sleep\".  November 16: TBA  December 21: TBA    [facility] Support Groups    Connections: Bariatric Care Support Group?  This is open to all [facility] (and those external to this program) pre- and post- operative bariatric surgery patients as well as " "their support system.   WHEN: This group meets the 2nd Tuesday of each month from 6:30 PM - 8:00 PM virtually using Microsoft Teams.   FACILITATOR: Led by Dariusz Yang, Ph.D who is a Licensed Psychologist with the [facility] Comprehensive Weight Management Program.   TO REGISTER: Please send an email to Dariusz Yang, Ph.D., LP at?[email or web address]?if you would like an invitation to the group and to learn about using Microsoft Teams.    2022 Meetings  January 11: Lissett Morales, PharmD, Pharmacy Resident at [facility], \"Medications and Bariatric Surgery\".  February 8: Open Forum  March 8  April 12  May 10  Rhonda 14    Connections: Post-Operative Bariatric Surgery Support Group  This is a support group for [facility] bariatric patients (and those external to [facility]) who have had bariatric surgery and are at least 3 months post-surgery.  WHEN: This support group meets the 4th Wednesday of the month from 11:00 AM - 12:00 PM virtually using Microsoft Teams.   FACILITATOR: Led by Certified Bariatric Nurse, Darya Cuello RN.   TO REGISTER: Please send an email to Darya at [email or web address] if you would like an invitation to the group and to learn about using Microsoft Teams.    2022 Meetings  January 26  February 23  March 23  April 27  May 25  Rhonda 22    [facility] Healthy Lifestyle Virtual Support Group    Healthy Lifestyle Virtual Support Group?  This is 60 minutes of small group guided discussion, support and resources. All are welcome who want a healthy lifestyle.  WHEN: This group meets monthly on a Friday from 12:30 PM - 1:30 PM virtually using Microsoft Teams.   FACILITATOR: Led by National Board Certified Health and , Darya Mccloud [facility].   TO REGISTER: Please send an email to Darya at?[email or web address] to receive monthly invites to the group or if you have any questions about having a health .  Prior " "to the meeting, a link with directions on how to join the meeting will be sent to you.    2022 Meetings  January 21: Leanne Coffey MS, RN, CIC, CBN, \"Healthy Habits\"  February 25: Open Forum  March 18: \"Setting Limits and Boundaries\"  April 29: Mary Anne Corbett RD, \"Meal Planning Made Easy\"  May 20: Open Forum  June: To be determined          "

## 2022-03-21 NOTE — LETTER
Date:March 21, 2022      Patient was self referred, no letter generated. Do not send.        [facility] Health Information
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
## 2022-03-24 ENCOUNTER — TELEPHONE (OUTPATIENT)
Dept: ENDOCRINOLOGY | Facility: CLINIC | Age: 52
End: 2022-03-24
Payer: COMMERCIAL

## 2022-03-24 DIAGNOSIS — E66.813 CLASS 3 SEVERE OBESITY DUE TO EXCESS CALORIES WITH BODY MASS INDEX (BMI) OF 50.0 TO 59.9 IN ADULT, UNSPECIFIED WHETHER SERIOUS COMORBIDITY PRESENT (H): ICD-10-CM

## 2022-03-24 DIAGNOSIS — E66.01 CLASS 3 SEVERE OBESITY DUE TO EXCESS CALORIES WITH BODY MASS INDEX (BMI) OF 50.0 TO 59.9 IN ADULT, UNSPECIFIED WHETHER SERIOUS COMORBIDITY PRESENT (H): ICD-10-CM

## 2022-03-24 DIAGNOSIS — E11.9 TYPE 2 DIABETES MELLITUS WITHOUT COMPLICATION, WITHOUT LONG-TERM CURRENT USE OF INSULIN (H): Primary | ICD-10-CM

## 2022-03-24 NOTE — TELEPHONE ENCOUNTER
Reason for call:  Other   Patient called regarding (reason for call): prescription  Additional comments: Patient uses the Victoza pen and recently picked up what she thought was a refill of needles for the pen, however when she got home and tried to use them, they were syringes. Patient tried to bring them back to the pharmacy, but they would not take them and said that was the script that was submitted from MD Velarde. Please send a new script for the patient's needles for the pen to the [facility] on [address] in [place]. Patient can be reached by phone for any questions/updates.     Phone number to reach patient:  Home number on file [phone] (home)    Best Time:  Anytime     Can we leave a detailed message on this number?  YES    Travel screening: Not Applicable

## 2022-04-10 ENCOUNTER — HEALTH MAINTENANCE LETTER (OUTPATIENT)
Age: 52
End: 2022-04-10

## 2022-05-19 DIAGNOSIS — E66.01 CLASS 3 SEVERE OBESITY DUE TO EXCESS CALORIES WITH SERIOUS COMORBIDITY AND BODY MASS INDEX (BMI) OF 50.0 TO 59.9 IN ADULT (H): ICD-10-CM

## 2022-05-19 DIAGNOSIS — E66.813 CLASS 3 SEVERE OBESITY DUE TO EXCESS CALORIES WITH SERIOUS COMORBIDITY AND BODY MASS INDEX (BMI) OF 50.0 TO 59.9 IN ADULT (H): ICD-10-CM

## 2022-05-19 RX ORDER — TOPIRAMATE 25 MG/1
75 TABLET, FILM COATED ORAL AT BEDTIME
Qty: 90 TABLET | Refills: 0 | Status: SHIPPED | OUTPATIENT
Start: 2022-05-19 | End: 2022-05-20

## 2022-05-19 NOTE — TELEPHONE ENCOUNTER
LCV:12/10/2021  [facility] [place]  NCV: 5-20-22 1-27-22 Labs: Cr, K, Cl, Anion gap ( WNL)

## 2022-05-20 ENCOUNTER — VIRTUAL VISIT (OUTPATIENT)
Dept: ENDOCRINOLOGY | Facility: CLINIC | Age: 52
End: 2022-05-20
Payer: COMMERCIAL

## 2022-05-20 VITALS — HEIGHT: 65 IN | WEIGHT: 274 LBS | BODY MASS INDEX: 45.65 KG/M2

## 2022-05-20 DIAGNOSIS — E66.813 CLASS 3 SEVERE OBESITY DUE TO EXCESS CALORIES WITH SERIOUS COMORBIDITY AND BODY MASS INDEX (BMI) OF 45.0 TO 49.9 IN ADULT (H): Primary | ICD-10-CM

## 2022-05-20 DIAGNOSIS — E66.01 CLASS 3 SEVERE OBESITY DUE TO EXCESS CALORIES WITH SERIOUS COMORBIDITY AND BODY MASS INDEX (BMI) OF 45.0 TO 49.9 IN ADULT (H): Primary | ICD-10-CM

## 2022-05-20 DIAGNOSIS — E11.9 TYPE 2 DIABETES MELLITUS WITHOUT COMPLICATION, WITHOUT LONG-TERM CURRENT USE OF INSULIN (H): ICD-10-CM

## 2022-05-20 PROCEDURE — 99215 OFFICE O/P EST HI 40 MIN: CPT | Mod: TEL | Performed by: INTERNAL MEDICINE

## 2022-05-20 RX ORDER — LIRAGLUTIDE 6 MG/ML
1.8 INJECTION SUBCUTANEOUS DAILY
Qty: 27 ML | Refills: 1 | Status: SHIPPED | OUTPATIENT
Start: 2022-05-20 | End: 2022-10-05 | Stop reason: ALTCHOICE

## 2022-05-20 RX ORDER — TOPIRAMATE 25 MG/1
75 TABLET, FILM COATED ORAL AT BEDTIME
Qty: 270 TABLET | Refills: 1 | Status: SHIPPED | OUTPATIENT
Start: 2022-05-20 | End: 2022-09-08 | Stop reason: SINTOL

## 2022-05-20 ASSESSMENT — PAIN SCALES - GENERAL: PAINLEVEL: NO PAIN (0)

## 2022-05-20 NOTE — LETTER
Date:May 23, 2022      Patient was self referred, no letter generated. Do not send.        [facility] Health Information

## 2022-05-20 NOTE — PROGRESS NOTES
"Yancy is a 51 year old who is being evaluated via a billable telephone visit.      What phone number would you like to be contacted at? [phone]  How would you like to obtain your AVS? Nell  Phone call duration: 23 minutes    During this phone visit the patient is located in [place], patient verifies this as the location during the entirety of this visit.         Return Medical Weight Management Note     Yancy Hoover  MRN:  [id number]  :  1970  MARLON:  2022    Dear Physician No Ref-Primary,    I had the pleasure of seeing your patient Yancy Hoover. She is a 51 year old female who I am continuing to see for treatment of obesity related to:       2020   I have the following health issues associated with obesity: Type II Diabetes       INTERVAL HISTORY:    --last seen about 5 mo ago; reviewed and relevant history, as extracted, includes the following--     --------------------------  \"She has the following co-morbidities:  No HTN, no CVD, elevated cholesterol, DM2 newly disgnosed (had HbA1c of 7.0 by her report)        Has struggled with wt whole life, and this is pt's max wt now.     With COVID19 is working at home and sedentary otherwise  Drinking a lot soda; customer service work     Has been working with the following provider on wt loss--  [facility] (Mack Lantigua)  Pt notes she went to see a wt loss doctor and wanted wt loss pills.  He did labs and told pt she had DM.  HbA1c 7.0.  Cholesterol was a little high also--208     Pt notes she was referred to a dietitian but the dietitian is not taking new pts and pt has a lot of questions--is motivated to make health changes and halt/slow DM-related progressions/complications..      Off of work this next week so would like to have virtual visit with nutrition then if possible.     Lives next to [facility]--interested in goal of walking 30 min 5 days per wk; gradually work up to 10,000 steps per day (can use phone " "tracker)     Can stop eating after supper--made goal for this and the family has plans for eating dinner around 5p.     Will cut out the soda and switch to 0 paul options, has been having at least several 20 oz sodas (sprite) daily, did note while on topiramate that it was tasting flat and she found herself leaving unfinished sodas and opening new ones because of this change in taste---> now understands that this is likely related to topiramate and she can set a goal of decreasing/stopping the sugared soda and use this med to help her make this change.     Pt notes cravings and boredom and happens in the evening after supper; she will re-start and shift topiramate to prior to supper with up-titration schedule I provided in AVS.\"    --------------------------    LAST WEIGHT:   286 lbs 0 oz   Body mass index is 48.33 kg/m .      Last visit with Sharon Osborn 3/21/22  ---------------------------  \"Yancy Hoover is a 51 year old presenting today for return bariatric nutrition consult. Pt is interested in laparoscopic sleeve gastrectomy with Dr. Alcaraz expected surgery in [facility].  Patient is accompanied by self.  Recommend continued monthly RD visits until surgery to help with continued weight loss and post-op diet education.     Coordination note: Pt is working towards surgery, completing items on task list as able.   Needs a new CPAP, will cost $2800. Neighbors helping raise money.  Started NRT, no cigarettes since 1/3/22.  Called [facility] for phone visit, said she did not need to complete visits again. Encouraged pt to get this in writing.  Needs to establish with PCP, had been seeing OBGYN for preventative cares.         Pt referred by Jeannie Noe NP on July 16, 2020 and Dr. Velarde.  Patient with Co-morbidities of obesity including:  Type II DM yes   Renal Failure no  Sleep apnea no  Hypertension no   Dyslipidemia yes  Joint pain no  Back pain no  GERD no \"    \"Recent Diet Recall:   Breakfast: Premier protein shake " "  Lunch: protein shake  Dinner: chicken/turkey + veggies   Snacks: 10-15 peanuts   Beverages: water     Progress Towards Previous Goals:  Relating To Eating:  - Eat 3 meals per day. - Met, continues  - Eat slowly (20-30 minutes per meal), chewing foods well (25 chews per bite/applesauce consistency) - Met, continues  - Avoid snacking - Has been more challenging this month. However she does continue to lose weight.      Relating to beverages:  - Separate fluids from meals by 30 minutes before, during, and after eating - Improving  - Drink 64 ounces of fluid per day. Small, frequent sips. - Improving     Relating to activity:  - Increase exercise as able. - No change.\"  ---------------------------    Since last seen--  --current struggles--cravings and temptations, snacking remains a problem, and soda (filidiae likes soda; pt did set ground rule that soda cannot enter the house)  --eating habits--eating more healthy food choices   --had been getting up in the morning and taking 30 min- 1 hr walks; has been more difficult to find the time lately. Will now use walking 7 min to the store instead of driving to fit in activity (had gotten to 10,000 steps daily but lately had dropped down to 4000)  --Victoza is really helpful with appetite when taking but will sometimes forget to take for a few days and have to titrate up again  --bought a journal for reflection  --has  A goal for losing 2# per week for this month  --had an appointment with psychologist but had cancel (son had [id number]), so will reschedule  --had a sleep appointment that she needs to reschedule  --looked up a 1200 calories/d food plan which she plans to follow          CURRENT WEIGHT:   274 lbs 0 oz Body mass index is 46.31 kg/m .    Initial Weight (lbs): 295 lbs  Last Visits Weight: 129.7 kg (286 lb)  Cumulative weight loss (lbs): 21  Weight Loss Percentage: 7.12%    Changes and Difficulties 4/2/2021   I have made the following changes to my diet since my " "last visit: More e   With regards to my diet, I am still struggling with: -   I have made the following changes to my activity/exercise since my last visit: -   With regards to my activity/exercise, I am still struggling with: -       VITALS:  Ht 1.638 m (5' 4.5\")   Wt 124.3 kg (274 lb)   BMI 46.31 kg/m      MEDICATIONS:   Current Outpatient Medications   Medication Sig Dispense Refill     alcohol swab prep pads Use to swab area of injection/shahla as directed. 100 each 5     blood glucose (ACCU-CHEK SMARTVIEW) test strip Use to test blood sugar 1-2 times daily. 100 strip 5     blood glucose calibration (ACCU-CHEK SMARTVIEW CONTROL) solution Use to calibrate blood glucose monitor as directed. 1 Bottle 3     blood glucose monitoring (ACCU-CHEK FASTCLIX) lancets Use to test blood sugar 1-2 times daily or as directed. 102 each 5     blood glucose monitoring (ACCU-CHEK FAWAD SMARTVIEW) meter device kit Use to test blood sugar 1-2 times daily. 1 kit 0     childrens multivitamin w/iron (FLINTSTONES COMPLETE) 18 MG chewable tablet Take 1 chew tab by mouth daily       insulin pen needle (31G X 5 MM) 31G X 5 MM miscellaneous Use 1 pen needle daily with Victoza or as directed. 100 each 1     liraglutide (VICTOZA PEN) 18 MG/3ML solution Inject 1.8 mg Subcutaneous daily Please complete labs. 9 mL 1     metFORMIN (GLUCOPHAGE-XR) 500 MG 24 hr tablet 2 tablets twice daily (or you can take all 4 tablets at the same time each day if easier) 360 tablet 3     nicotine (NICODERM CQ) 14 MG/24HR 24 hr patch Place 1 patch onto the skin every 24 hours 28 patch 1     nicotine polacrilex (NICORETTE) 4 MG gum Chew one piece every 1-2 hours as directed. 100 each 1     topiramate (TOPAMAX) 25 MG tablet Take 3 tablets (75 mg) by mouth At Bedtime 90 tablet 0     vitamin D3 (CHOLECALCIFEROL) 50 mcg (2000 units) tablet Take 1 tablet by mouth daily         Weight Loss Medication History Reviewed With Patient 5/19/2022   Are you having any side " effects from the weight loss medication that we have prescribed you? No   If you are having side effects please describe: -       ASSESSMENT/PLAN:     ## morbid obesity  ## T2DM without complications, not treated with ins     Yancy is a patient with early onset morbid obesity with significant element of familial/genetic influence and with current health consequences. She does need aggressive weight loss plan due to new dx T2DM.  Yancy Hoover ate a high carb diet, a high fat diet, to obtain specific degree of fullness, and had a lot of liquid calories. She is making good changes in support of healthy wt.     Her problem is complicated by a hunger disorder and strong craving/reward pathways        PLAN:    (continues)  Decrease portion sizes  Purge house of food triggers  No meal skipping  Decrease caloric beverages by daily walking plan (has already put this in place with another provider and has plan for escalating as tolerated)  Dietician visit of education  Volumetrics eating plan  Hypocaloric/low fat diet  Increase activity by daily walking      Diabetes   Ancillary testing:  N/A.continue with her glucose monitoring  Food Plan:  Reduced calorie and Low carbohydrate.  Activity Plan:  Daily walking, can do this after meals (such as after supper in the evenings in the summer--supper is around 5p).  Supplementary:  N/A.  Medication:  see comments below     additionally  --continue full dose metformin, continue full dose Victoza--she is tolerating and benefiting from this med; also topiramate at current dose  --her additional health goals in support of wt loss:  1. Get gym membership (go weekdays for an hour)  2.  Continue with food plans (see above) and medication support  --continue current pharm support, on bariatric pathway  --RTC in 2-3 mo  --she will get the labs I ordered on 11/3/21--they look to be still open (basic metabolic panel, urine microalbumin, [id number]--hqlhwb BS range approx 80-120s with her  meter readings)  --pt is also making an appointment with Sharon Osborn to be seen soon        Time: approx 45 min spent on evaluation, management, counseling, education, & motivational interviewing (video visit) coupled with previsit planning and post visit charting/follow up care same day    Sincerely,    Danial Velarde MD

## 2022-05-20 NOTE — NURSING NOTE
"(   Chief Complaint   Patient presents with     RECHECK     Return MWM    )    ( Weight: 124.3 kg (274 lb) (Patient reported) )  ( Height: 163.8 cm (5' 4.5\") )  ( BMI (Calculated): 46.31 )  (   )  (   )  (   )  (   )  (   )  (   )    (   )  (   )  (   )  (   )  (   )  (   )  (   )    (   Patient Active Problem List   Diagnosis     LANDON (obstructive sleep apnea)     Class 3 severe obesity due to excess calories with body mass index (BMI) of 50.0 to 59.9 in adult, unspecified whether serious comorbidity present (H)     Type 2 diabetes mellitus without complication, unspecified whether long term insulin use (H)    )  (   Current Outpatient Medications   Medication Sig Dispense Refill     alcohol swab prep pads Use to swab area of injection/shahla as directed. 100 each 5     blood glucose (ACCU-CHEK SMARTVIEW) test strip Use to test blood sugar 1-2 times daily. 100 strip 5     blood glucose calibration (ACCU-CHEK SMARTVIEW CONTROL) solution Use to calibrate blood glucose monitor as directed. 1 Bottle 3     blood glucose monitoring (ACCU-CHEK FASTCLIX) lancets Use to test blood sugar 1-2 times daily or as directed. 102 each 5     blood glucose monitoring (ACCU-CHEK FAWAD SMARTVIEW) meter device kit Use to test blood sugar 1-2 times daily. 1 kit 0     childrens multivitamin w/iron (FLINTSTONES COMPLETE) 18 MG chewable tablet Take 1 chew tab by mouth daily       insulin pen needle (31G X 5 MM) 31G X 5 MM miscellaneous Use 1 pen needle daily with Victoza or as directed. 100 each 1     liraglutide (VICTOZA PEN) 18 MG/3ML solution Inject 1.8 mg Subcutaneous daily Please complete labs. 9 mL 1     metFORMIN (GLUCOPHAGE-XR) 500 MG 24 hr tablet 2 tablets twice daily (or you can take all 4 tablets at the same time each day if easier) 360 tablet 3     nicotine (NICODERM CQ) 14 MG/24HR 24 hr patch Place 1 patch onto the skin every 24 hours 28 patch 1     nicotine polacrilex (NICORETTE) 4 MG gum Chew one piece every 1-2 hours as " directed. 100 each 1     topiramate (TOPAMAX) 25 MG tablet Take 3 tablets (75 mg) by mouth At Bedtime 90 tablet 0     vitamin D3 (CHOLECALCIFEROL) 50 mcg (2000 units) tablet Take 1 tablet by mouth daily      )  ( Diabetes Eval:    )    ( Pain Eval:  No Pain (0) )    ( Wound Eval:       )    (   History   Smoking Status     Current Some Day Smoker     Packs/day: 0.25     Years: 20.00     Types: Cigarettes     Last attempt to quit: 5/1/2020   Smokeless Tobacco     Never Used    )    ( Signed By:  Miladys Sainz, EMT; May 20, 2022; 11:27 AM )

## 2022-05-20 NOTE — PATIENT INSTRUCTIONS
Thank you for allowing us the privilege of caring for you. We hope we provided you with the excellent service you deserve.    Please let us know if there is anything else we can do for you so that we can be sure you are completely satisfied with your care experience.    Your visit was with Dr. Vaca today.    Instructions per today's visit:  --continue full dose metformin, continue full dose Victoza and topiramate  --her additional health goals in support of wt loss:  1. Continue to fit walking and other activity into your day as able; work on building up your daily steps count  2.  Continue with food plans and medication support taking medication regularly as prescribed (use phone reminders or other ways to help you remember)  --continue on bariatric pathway  --we can plan return with Dr. Vaca again in 2-3 mo  --please get the labs I ordered on 11/3/21--they look to be still open (basic metabolic panel, urine microalbumin, HbA1c  --make an appointment with Sharon Osborn to be seen soon to follow up on nutrition    Please call our contact center at [phone] to schedule your next appointments.    Meal Replacement Products:    Here is the link to our new e-store where you can purchase our meal replacement products    [email or web address]    The one week starter kit is a great way to sample a variety of products and see what works for you.    If you want more information about the product go to: [employer]als  [employer]    Free Shipping for orders over $75    Benefits of meal replacements products:    Portion and calorie control  Improved nutrition  Structured eating  Simplified food choices  Avoid contact with trigger foods    Interested in working with a health ?  Health coaches work with you to improve your overall health and wellbeing.  They look at the whole person, and may involve discussion of different areas of life, including, but not limited to the four  pillars of health (sleep, exercise, nutrition, and stress management). Discuss with your care team if you would like to start working a health .    Health Coaching-3 Pack: Schedule by calling [phone]    $99 for three health coaching visits    Visits may be done in person or via phone    Coaching is a partnership between the  and the client; Coaches do not prescribe or diagnose    Coaching helps inspire the client to reach his/her personal goals    Bluetooth Scale:    We hope to provide you with high quality telephone and virtual healthcare visits while social distancing for COVID-19 is necessary, as well as in the future when virtual visits may be more convenient for you.    Our technology team made it possible for Bluetooth scales to send weight measurements to our electronic medical record. This allows weights from you weighing at home to securely flow into the medical record, which will improve telephone and virtual visits.  Additionally, studies have shown that adults actually lose more weight when their weights are automatically sent to someone else, and also that this process is not stressful for those adults.    Below is a link for purchasing the scale, with a discount code for our patients. You may call your insurance company to see if they will reimburse you for the cost of the scale, as a piece of durable medical equipment. The scales only go up to a weight of 400 pounds. This is an issue and we are working with the developer on increasing this. We found no scales that go over 400lb that have blue-tooth for connecting to Zerply.    Scale to purchase: the nPicker  Body  Scale: [email or web address]/[email or web address]=aw.ds    Discount Code: We have a discount code for our patients to bring the cost down to $50, the code is:  withmed     Steps to link the scale to Zerply via an Android Phone (you can always disconnect at  any point in the future):  1. The order must be placed first before the patient can access Track My Health within P21t.  2. Download Google Fit maddie from the Google Play Store  a. Log in or register using your Google account  3. Download the MyChart maddie from Google Play Store  a. Select add organization  b. Search for [employer] and select it  c. Log into Package Conciergehart  d. Select Track My Health  e. Select the green connect my account button  f. When prompted log into your Google account  g. Select okay to confirm the account  4. Download the [employer] Health Mate maddie from Google Play Store  a. [place] for [employer]  b. Go to profile  c. Tap google fit under the Apps section  d. Select the option to activate Google Fit integration  e. Select the same Google account  f. Select okay to confirm the account  g.  Steps to link the scale to Zeenoh via an iPhone (you can always disconnect at any point in the future):  **Note [facility] is not available for download on an iPad**  1. The order must be placed first before the patient can access Track My Health within P21t.  2. Locate the Health maddie on your iPhone.  a. Set up your Apple Health account as prompted  b. The Sources page will show Apps that communicate with your Health maddie. Once all steps are completed, you should see Health Mate and MyChart listed under the Apps section and your iPhone under the devices section.  i. Select Health Mate  1. Under 'ALLOW  HEALTH MATE  TO WRITE DATA ensure the toggle is on for Weight.  2. This will allow the scale to add your weight to the Apple Health  ii. Select MyChart  1. Under 'ALLOW  MYCHART  TO READ DATA ensure the toggle is on for Weight.  2. This allows Zeenoh to grab the weight from [facility] so your provider can see your weights.  3. Download the MyChart maddie from the Maddie Store  a. Select add organization                                                  b. Search for [employer] and select it  c. Log into P21t  d. Select  Track My Health  e. Select the green connect my account button  f. Follow prompts to link your device to [employer].  4. Download the Withings health mate maddie in the Maddie Store  a. [place] for [facility] (PRN)  b. Go to profile  c. [employer] under the Apps section  d. If prompted to allow access with the Health Maddie, toggle weight on for read and write access.      For any questions/concerns contact Ebony Barragan LPN at [phone]    To schedule appointments with our team, please call [phone]    Please call during clinic hours Monday through Friday 8:00a - 4:00p if you have questions or you can contact us via [employer] at anytime.      Lab results will be communicated through My Chart or letter (if My Chart not used). Please call the clinic if you have not received communication after 1 week or if you have any questions.    Clinic Fax: [phone]    Thank you,  Medical Weight Management Team

## 2022-05-20 NOTE — LETTER
"2022       RE: Yancy Hoover  [address]  [facility] [address]     Dear Colleague,    Thank you for referring your patient, Yancy Hoover, to the [facility] [place] [facility]. Please see a copy of my visit note below.    Yancy is a 51 year old who is being evaluated via a billable telephone visit.      What phone number would you like to be contacted at? [phone]  How would you like to obtain your AVS? MyChart  Phone call duration: 23 minutes    During this phone visit the patient is located in [place], patient verifies this as the location during the entirety of this visit.         Return Medical Weight Management Note     Yancy Hoover  MRN:  [id number]  :  1970  MARLON:  2022    Dear Physician No Ref-Primary,    I had the pleasure of seeing your patient Yancy Hoover. She is a 51 year old female who I am continuing to see for treatment of obesity related to:       2020   I have the following health issues associated with obesity: Type II Diabetes       INTERVAL HISTORY:    --last seen about 5 mo ago; reviewed and relevant history, as extracted, includes the following--     --------------------------  \"She has the following co-morbidities:  No HTN, no CVD, elevated cholesterol, DM2 newly disgnosed (had HbA1c of 7.0 by her report)        Has struggled with wt whole life, and this is pt's max wt now.     With COVID19 is working at home and sedentary otherwise  Drinking a lot soda; customer service work     Has been working with the following provider on wt loss--  [facility] (Mack Lantigua)  Pt notes she went to see a wt loss doctor and wanted wt loss pills.  He did labs and told pt she had DM.  HbA1c 7.0.  Cholesterol was a little high also--208     Pt notes she was referred to a dietitian but the dietitian is not taking new pts and pt has a lot of questions--is " "motivated to make health changes and halt/slow DM-related progressions/complications..      Off of work this next week so would like to have virtual visit with nutrition then if possible.     [place] to [place]--interested in goal of walking 30 min 5 days per wk; gradually work up to 10,000 steps per day (can use phone tracker)     Can stop eating after supper--made goal for this and the family has plans for eating dinner around 5p.     Will cut out the soda and switch to 0 paul options, has been having at least several 20 oz sodas (sprite) daily, did note while on topiramate that it was tasting flat and she found herself leaving unfinished sodas and opening new ones because of this change in taste---> now understands that this is likely related to topiramate and she can set a goal of decreasing/stopping the sugared soda and use this med to help her make this change.     Pt notes cravings and boredom and happens in the evening after supper; she will re-start and shift topiramate to prior to supper with up-titration schedule I provided in AVS.\"    --------------------------    LAST WEIGHT:   286 lbs 0 oz   Body mass index is 48.33 kg/m .      Last visit with Sharon Osborn 3/21/22  ---------------------------  \"Yancy Hoover is a 51 year old presenting today for return bariatric nutrition consult. Pt is interested in laparoscopic sleeve gastrectomy with Dr. Alcaraz expected surgery in [place].  Patient is accompanied by self.  Recommend continued monthly RD visits until surgery to help with continued weight loss and post-op diet education.     Coordination note: Pt is working towards surgery, completing items on task list as able.   Needs a new CPAP, will cost $2800. Neighbors helping raise money.  Started NRT, no cigarettes since 1/3/22.  Called [facility] for phone visit, said she did not need to complete visits again. Encouraged pt to get this in writing.  Needs to establish with PCP, had been seeing OBGYN for " "preventative cares.         Pt referred by Jeannie Noe NP on July 16, 2020 and Dr. Velarde.  Patient with Co-morbidities of obesity including:  Type II DM yes   Renal Failure no  Sleep apnea no  Hypertension no   Dyslipidemia yes  Joint pain no  Back pain no  GERD no \"    \"Recent Diet Recall:   Breakfast: Premier protein shake   Lunch: protein shake  Dinner: chicken/turkey + veggies   Snacks: 10-15 peanuts   Beverages: water     Progress Towards Previous Goals:  Relating To Eating:  - Eat 3 meals per day. - Met, continues  - Eat slowly (20-30 minutes per meal), chewing foods well (25 chews per bite/applesauce consistency) - Met, continues  - Avoid snacking - Has been more challenging this month. However she does continue to lose weight.      Relating to beverages:  - Separate fluids from meals by 30 minutes before, during, and after eating - Improving  - Drink 64 ounces of fluid per day. Small, frequent sips. - Improving     Relating to activity:  - Increase exercise as able. - No change.\"  ---------------------------    Since last seen--  --current struggles--cravings and temptations, snacking remains a problem, and soda (fiancee likes soda; pt did set ground rule that soda cannot enter the house)  --eating habits--eating more healthy food choices   --had been getting up in the morning and taking 30 min- 1 hr walks; has been more difficult to find the time lately. Will now use walking 7 min to the store instead of driving to fit in activity (had gotten to 10,000 steps daily but lately had dropped down to 4000)  --Victoza is really helpful with appetite when taking but will sometimes forget to take for a few days and have to titrate up again  --bought a journal for reflection  --has  A goal for losing 2# per week for this month  --had an appointment with psychologist but had cancel (son had ROXANAID19), so will reschedule  --had a sleep appointment that she needs to reschedule  --looked up a 1200 calories/d food plan " "which she plans to follow          CURRENT WEIGHT:   274 lbs 0 oz Body mass index is 46.31 kg/m .    Initial Weight (lbs): 295 lbs  Last Visits Weight: 129.7 kg (286 lb)  Cumulative weight loss (lbs): 21  Weight Loss Percentage: 7.12%    Changes and Difficulties 4/2/2021   I have made the following changes to my diet since my last visit: More e   With regards to my diet, I am still struggling with: -   I have made the following changes to my activity/exercise since my last visit: -   With regards to my activity/exercise, I am still struggling with: -       VITALS:  Ht 1.638 m (5' 4.5\")   Wt 124.3 kg (274 lb)   BMI 46.31 kg/m      MEDICATIONS:   Current Outpatient Medications   Medication Sig Dispense Refill     alcohol swab prep pads Use to swab area of injection/shahla as directed. 100 each 5     blood glucose (ACCU-CHEK SMARTVIEW) test strip Use to test blood sugar 1-2 times daily. 100 strip 5     blood glucose calibration (ACCU-CHEK SMARTVIEW CONTROL) solution Use to calibrate blood glucose monitor as directed. 1 Bottle 3     blood glucose monitoring (ACCU-CHEK FASTCLIX) lancets Use to test blood sugar 1-2 times daily or as directed. 102 each 5     blood glucose monitoring (ACCU-CHEK FAWAD SMARTVIEW) meter device kit Use to test blood sugar 1-2 times daily. 1 kit 0     childrens multivitamin w/iron (FLINTSTONES COMPLETE) 18 MG chewable tablet Take 1 chew tab by mouth daily       insulin pen needle (31G X 5 MM) 31G X 5 MM miscellaneous Use 1 pen needle daily with Victoza or as directed. 100 each 1     liraglutide (VICTOZA PEN) 18 MG/3ML solution Inject 1.8 mg Subcutaneous daily Please complete labs. 9 mL 1     metFORMIN (GLUCOPHAGE-XR) 500 MG 24 hr tablet 2 tablets twice daily (or you can take all 4 tablets at the same time each day if easier) 360 tablet 3     nicotine (NICODERM CQ) 14 MG/24HR 24 hr patch Place 1 patch onto the skin every 24 hours 28 patch 1     nicotine polacrilex (NICORETTE) 4 MG gum Chew one " piece every 1-2 hours as directed. 100 each 1     topiramate (TOPAMAX) 25 MG tablet Take 3 tablets (75 mg) by mouth At Bedtime 90 tablet 0     vitamin D3 (CHOLECALCIFEROL) 50 mcg (2000 units) tablet Take 1 tablet by mouth daily         Weight Loss Medication History Reviewed With Patient 5/19/2022   Are you having any side effects from the weight loss medication that we have prescribed you? No   If you are having side effects please describe: -       ASSESSMENT/PLAN:     ## morbid obesity  ## T2DM without complications, not treated with ins     Yancy is a patient with early onset morbid obesity with significant element of familial/genetic influence and with current health consequences. She does need aggressive weight loss plan due to new dx T2DM.  Yancy Hoover ate a high carb diet, a high fat diet, to obtain specific degree of fullness, and had a lot of liquid calories. She is making good changes in support of healthy wt.     Her problem is complicated by a hunger disorder and strong craving/reward pathways        PLAN:    (continues)  Decrease portion sizes  Purge house of food triggers  No meal skipping  Decrease caloric beverages by daily walking plan (has already put this in place with another provider and has plan for escalating as tolerated)  Dietician visit of education  Volumetrics eating plan  Hypocaloric/low fat diet  Increase activity by daily walking      Diabetes   Ancillary testing:  N/A.continue with her glucose monitoring  Food Plan:  Reduced calorie and Low carbohydrate.  Activity Plan:  Daily walking, can do this after meals (such as after supper in the evenings in the summer--supper is around 5p).  Supplementary:  N/A.  Medication:  see comments below     additionally  --continue full dose metformin, continue full dose Victoza--she is tolerating and benefiting from this med; also topiramate at current dose  --her additional health goals in support of wt loss:  1. Get gym membership (go  weekdays for an hour)  2.  Continue with food plans (see above) and medication support  --continue current pharm support, on bariatric pathway  --RTC in 2-3 mo  --she will get the labs I ordered on 11/3/21--they look to be still open (basic metabolic panel, urine microalbumin, NlR2q--nacrvt BS range approx 80-120s with her meter readings)  --pt is also making an appointment with Sharon Osborn to be seen soon        Time: approx 45 min spent on evaluation, management, counseling, education, & motivational interviewing (video visit) coupled with previsit planning and post visit charting/follow up care same day    Sincerely,    Danial Velarde MD          Again, thank you for allowing me to participate in the care of your patient.      Sincerely,    Danial Velarde MD

## 2022-05-26 ENCOUNTER — TELEPHONE (OUTPATIENT)
Dept: ENDOCRINOLOGY | Facility: CLINIC | Age: 52
End: 2022-05-26
Payer: COMMERCIAL

## 2022-07-31 ENCOUNTER — HEALTH MAINTENANCE LETTER (OUTPATIENT)
Age: 52
End: 2022-07-31

## 2022-08-21 DIAGNOSIS — E66.01 CLASS 3 SEVERE OBESITY DUE TO EXCESS CALORIES WITH SERIOUS COMORBIDITY AND BODY MASS INDEX (BMI) OF 45.0 TO 49.9 IN ADULT (H): ICD-10-CM

## 2022-08-21 DIAGNOSIS — E66.813 CLASS 3 SEVERE OBESITY DUE TO EXCESS CALORIES WITH SERIOUS COMORBIDITY AND BODY MASS INDEX (BMI) OF 45.0 TO 49.9 IN ADULT (H): ICD-10-CM

## 2022-08-21 DIAGNOSIS — E11.9 TYPE 2 DIABETES MELLITUS WITHOUT COMPLICATION, WITHOUT LONG-TERM CURRENT USE OF INSULIN (H): ICD-10-CM

## 2022-08-25 DIAGNOSIS — E66.01 CLASS 3 SEVERE OBESITY DUE TO EXCESS CALORIES WITH BODY MASS INDEX (BMI) OF 50.0 TO 59.9 IN ADULT, UNSPECIFIED WHETHER SERIOUS COMORBIDITY PRESENT (H): ICD-10-CM

## 2022-08-25 DIAGNOSIS — E11.9 TYPE 2 DIABETES MELLITUS WITHOUT COMPLICATION, WITHOUT LONG-TERM CURRENT USE OF INSULIN (H): ICD-10-CM

## 2022-08-25 DIAGNOSIS — E66.813 CLASS 3 SEVERE OBESITY DUE TO EXCESS CALORIES WITH BODY MASS INDEX (BMI) OF 50.0 TO 59.9 IN ADULT, UNSPECIFIED WHETHER SERIOUS COMORBIDITY PRESENT (H): ICD-10-CM

## 2022-08-25 RX ORDER — LIRAGLUTIDE 6 MG/ML
1.8 INJECTION SUBCUTANEOUS DAILY
Qty: 27 ML | Refills: 1 | OUTPATIENT
Start: 2022-08-25

## 2022-08-25 NOTE — TELEPHONE ENCOUNTER
Patient should have another 90 day refill on file at pharmacy. Due for appointment with Dr. Velarde. Nell message sent to patient to schedule.

## 2022-08-25 NOTE — TELEPHONE ENCOUNTER
liraglutide (VICTOZA PEN) 18 MG/3ML solution  Last Written Prescription Date:   5/20/2022  Last Fill Quantity: 27,   # refills: 1  Last Office Visit : 5/20/2022  Future Office visit:  None    Routing refill request to provider for review/approval because:  Med refused  Pt needing follow up appointment for Pt care  Refer to clinic/scheduling for review     Nadia Jeffries RN  Central Triage Red Flags/Med Refills    Instructions  5/20/2022       Return in about 10 weeks (around 7/29/2022).  Thank you for allowing us the privilege of caring for you. We hope we provided you with the excellent service you deserve.     Please let us know if there is anything else we can do for you so that we can be sure you are completely satisfied with your care experience.     Your visit was with Dr. Vaca today.     Instructions per today's visit:  --continue full dose metformin, continue full dose Victoza and topiramate  --her additional health goals in support of wt loss:  1. Continue to fit walking and other activity into your day as able; work on building up your daily steps count  2.  Continue with food plans and medication support taking medication regularly as prescribed (use phone reminders or other ways to help you remember)  --continue on bariatric pathway  --we can plan return with Dr. Vaca again in 2-3 mo  --please get the labs I ordered on 11/3/21--they look to be still open (basic metabolic panel, urine microalbumin, HbA1c  --make an appointment with Sharon Osborn to be seen soon to follow up on nutrition     Please call our contact center at [phone] to schedule your next appointments.

## 2022-08-25 NOTE — TELEPHONE ENCOUNTER
Refill request from pharmacy for pen needles for Victoza. She has 90 day supply of Victoza at the pharmacy but no pen needles. Routed to RN for sign off.

## 2022-09-08 ENCOUNTER — VIRTUAL VISIT (OUTPATIENT)
Dept: PHARMACY | Facility: CLINIC | Age: 52
End: 2022-09-08
Payer: COMMERCIAL

## 2022-09-08 ENCOUNTER — TELEPHONE (OUTPATIENT)
Dept: ENDOCRINOLOGY | Facility: CLINIC | Age: 52
End: 2022-09-08

## 2022-09-08 DIAGNOSIS — Z72.0 TOBACCO ABUSE: ICD-10-CM

## 2022-09-08 DIAGNOSIS — E11.9 TYPE 2 DIABETES MELLITUS WITHOUT COMPLICATION, WITHOUT LONG-TERM CURRENT USE OF INSULIN (H): Primary | ICD-10-CM

## 2022-09-08 DIAGNOSIS — E66.01 CLASS 3 SEVERE OBESITY DUE TO EXCESS CALORIES WITH BODY MASS INDEX (BMI) OF 50.0 TO 59.9 IN ADULT, UNSPECIFIED WHETHER SERIOUS COMORBIDITY PRESENT (H): ICD-10-CM

## 2022-09-08 DIAGNOSIS — Z78.9 TAKES DIETARY SUPPLEMENTS: ICD-10-CM

## 2022-09-08 DIAGNOSIS — E66.813 CLASS 3 SEVERE OBESITY DUE TO EXCESS CALORIES WITH BODY MASS INDEX (BMI) OF 50.0 TO 59.9 IN ADULT, UNSPECIFIED WHETHER SERIOUS COMORBIDITY PRESENT (H): ICD-10-CM

## 2022-09-08 PROCEDURE — 99607 MTMS BY PHARM ADDL 15 MIN: CPT | Performed by: PHARMACIST

## 2022-09-08 PROCEDURE — 99606 MTMS BY PHARM EST 15 MIN: CPT | Performed by: PHARMACIST

## 2022-09-08 RX ORDER — LIRAGLUTIDE 6 MG/ML
INJECTION, SOLUTION SUBCUTANEOUS
Qty: 15 ML | Refills: 2 | Status: SHIPPED | OUTPATIENT
Start: 2022-09-08 | End: 2022-10-21

## 2022-09-08 RX ORDER — LIRAGLUTIDE 6 MG/ML
INJECTION, SOLUTION SUBCUTANEOUS
Qty: 15 ML | Refills: 2 | OUTPATIENT
Start: 2022-09-08 | End: 2022-09-08

## 2022-09-08 NOTE — Clinical Note
FYI - going to try transitioning from Victoza to Saxenda - she follow up with you in 5 weeks. Zulma DE JESUS

## 2022-09-08 NOTE — PATIENT INSTRUCTIONS
"Recommendations from today's MTM visit:                                                    MTM (medication therapy management) is a service provided by a clinical pharmacist designed to help you get the most of out of your medicines.   Today we reviewed what your medicines are for, how to know if they are working, that your medicines are safe and how to make your medicine regimen as easy as possible.      1. Consider Saxenda - will start Prior Authorization process.     2. Patient to get labs completed - A1c, lipids, albumin/creat ratio    3. Due to see your primary care provider - call to schedule follow up with them    Follow-up: Dr. Velarde in 5 weeks as scheduled, then in 3 months with MTM pharmacist (discuss smoking cessation? and Saxenda check in).    It was great speaking with you today.  I value your experience and would be very thankful for your time in providing feedback in our clinic survey. In the next few days, you may receive an email or text message from [facility] with a link to a survey related to your  clinical pharmacist.\"     My Clinical Pharmacist's contact information:                                                      Please feel free to contact me with any questions or concerns you have.      Lauren Bloch, PharmD  Medication Therapy Management Pharmacist   [facility] [place]             "

## 2022-09-08 NOTE — TELEPHONE ENCOUNTER
"Prior Authorization Retail Medication Request    Medication/Dose: Saxenda  ICD code (if different than what is on RX):  Same as on RX  Previously Tried and Failed:  Diet and exercise alone for at least 3 months without sustainable weight loss success. Metformin; Topiramate - unmanageable side effects  Rationale: Weight loss medication(s) are indicated due to patient having a BMI of at least 30 kg/m2, including Saxenda. Patient is currently on Victoza 1.8 mg daily for Type 2 Diabetes. Plan is if Saxenda is approved patient will transition from Victoza to Saxenda. Phentermine or other stimulant containing medication is not appropriate to prescribe at this time due to unknown blood pressure status and last blood pressure that is on file is elevated above blood pressure goals. Also due to patient's age, no recent EKG results.     Estimated body mass index is 46.31 kg/m  as calculated from the following:    Height as of 5/20/22: 5' 4.5\" (1.638 m).    Weight as of 5/20/22: 274 lb (124.3 kg).    Insurance Name:  [employer]  Insurance ID:  [id number]     Pharmacy Information (if different than what is on RX)  Name:  [facility]   Phone:  [phone]    Lauren Bloch, PharmD, BCACP   Medication Therapy Management Pharmacist   [facility]          "

## 2022-09-08 NOTE — PROGRESS NOTES
Medication Therapy Management (MTM) Encounter    ASSESSMENT:                            Medication Adherence/Access: No issues identified    Type 2 Diabetes/Obesity:  A1c, albumin/creatinine ratio, and lipids due - ordered. AM fasting at goal  mg/dL. Could consider transitioning from Victoza to Saxenda to maximize dosing for weight loss.     Smoking cessation: Patient continues to use tobacco. Patient is not ready to quit using tobacco.     Supplements: Stable.     PLAN:                            1. Consider Saxenda - will start Prior Authorization process.     2. Patient to get labs completed - A1c, lipids, albumin/creat ratio    3. Due to see your primary care provider - call to schedule follow up with them    Follow-up: Dr. Velarde in 5 weeks as scheduled, then in 3 months with MTM pharmacist (discuss smoking cessation? and Saxenda check in).    SUBJECTIVE/OBJECTIVE:                          Yancy Hoover is a 52 year old female called for a follow-up visit.  Today's visit is a follow-up MTM visit from 1/24/2022.      Reason for visit: Doesn't feel the Victoza is helping with weight loss.    Allergies/ADRs: Reviewed in chart  Past Medical History: Reviewed in chart  Tobacco: She reports that she has been smoking cigarettes. She has a 5.00 pack-year smoking history. She has never used smokeless tobacco.Nicotine/Tobacco Cessation Plan:   see below  Alcohol: not currently using    Medication Adherence/Access: no issues reported    Type 2 Diabetes/Obesity:    Metformin XR 1000 mg twice daily with meals   Victoza 1.8 mg daily in AM    - stopped      Followed up with Dr. Velarde, last seen 5/2022 for Medical Weight Management. Medication side effects: none. She reports at one point she thought the Victoza was working well but now not feeling it is working well for her anymore. She had been noticing some portion lowering and decreasing snacking but now feels either worn off or got used to effects. She stopped  topiramate due to pain/tingling in feet - has been off of it for 3+ months.   SMBG: 3 times per week; AM fastin-120 mg/dL    Symptoms of low blood sugar? none  Symptoms of high blood sugar? none  Eye exam: due  Foot exam: due  Diet/Exercise: see above  Aspirin: Not taking   Statin: No   ACEi/ARB: No.   Urine Albumin: No results found for: UMALCR      Lab Results   Component Value Date    A1C 7.4 2021     Weight: 270 lb   Wt Readings from Last 4 Encounters:   22 274 lb (124.3 kg)   22 300 lb (136.1 kg)   12/10/21 286 lb (129.7 kg)   21 297 lb (134.7 kg)     Smoking Cessation:       She reports that she has been dealing with a greater amount of stress. When she tried patch before after last MTM visit and she did find the patch helpful, but then was no longer helpful due to greater stress in her life.  Smoking > 1/2 pack per day. First cigarette is within 30 minutes of waking up. She does want to quit smoking in the future, but just doesn't feel that she is confident she could be successful in quitting within the next 30 days at this time.     Supplements:   Vitamin D 2000 international unit(s) daily   Multivitamin daily     No concerns.   Vitamin D Deficiency Screening Results:  No results found for: VITDT  ----------------      I spent 25 minutes with this patient today. All changes were made via collaborative practice agreement with Dr. Velarde. A copy of the visit note was provided to the patient's provider(s).    The patient was sent via [employer] a summary of these recommendations.     Lauren Bloch, PharmD, BCACP   Medication Therapy Management Pharmacist   [facility]    Telemedicine Visit Details  Type of service:  Telephone visit  Start Time: 1:30 PM  End Time: 1:55 PM  Originating Location (patient location): Home  Distant Location (provider location):  [facility]     Medication Therapy  Recommendations  Class 3 severe obesity due to excess calories with body mass index (BMI) of 50.0 to 59.9 in adult, unspecified whether serious comorbidity present (H)    Current Medication: liraglutide (VICTOZA PEN) 18 MG/3ML solution   Rationale: Dose too low - Dosage too low - Effectiveness   Recommendation: Increase Dose - Saxenda 18 MG/3ML Sopn   Status: Accepted per CPA         Type 2 diabetes mellitus without complication, unspecified whether long term insulin use (H)    Current Medication: metFORMIN (GLUCOPHAGE-XR) 500 MG 24 hr tablet   Rationale: Medication requires monitoring - Needs additional monitoring   Recommendation: Order Lab   Status: Accepted per CPA

## 2022-09-09 NOTE — TELEPHONE ENCOUNTER
Prior Authorization Approval    Authorization Effective Date: 8/9/2022  Authorization Expiration Date: 3/9/2023  Medication: Saxenda-PA approved  Approved Dose/Quantity:   Reference #:     Insurance Company: [employer] - Phone [phone] Fax [phone]  Expected CoPay:       CoPay Card Available:      Foundation Assistance Needed:    Which Pharmacy is filling the prescription (Not needed for infusion/clinic administered): [id number] [address] AT [address]  Pharmacy Notified: Yes  Patient Notified: No

## 2022-09-09 NOTE — TELEPHONE ENCOUNTER
Central Prior Authorization Team   Phone: [phone]      PA Initiation    Medication: Saxenda-PA initiated  Insurance Company: [employer] - Phone [phone] Fax [phone]  Pharmacy Filling the Rx: [id number] [place], [place] - [facility] AT [facility] & [address] [place] [address]  Filling Pharmacy Phone: [phone]  Filling Pharmacy Fax:    Start Date: 9/9/2022

## 2022-10-03 DIAGNOSIS — E66.813 CLASS 3 SEVERE OBESITY DUE TO EXCESS CALORIES WITH SERIOUS COMORBIDITY AND BODY MASS INDEX (BMI) OF 45.0 TO 49.9 IN ADULT (H): ICD-10-CM

## 2022-10-03 DIAGNOSIS — E11.9 TYPE 2 DIABETES MELLITUS WITHOUT COMPLICATION, WITHOUT LONG-TERM CURRENT USE OF INSULIN (H): ICD-10-CM

## 2022-10-03 DIAGNOSIS — E66.01 CLASS 3 SEVERE OBESITY DUE TO EXCESS CALORIES WITH SERIOUS COMORBIDITY AND BODY MASS INDEX (BMI) OF 45.0 TO 49.9 IN ADULT (H): ICD-10-CM

## 2022-10-03 RX ORDER — LIRAGLUTIDE 6 MG/ML
1.8 INJECTION SUBCUTANEOUS DAILY
Qty: 27 ML | Refills: 1 | Status: CANCELLED | OUTPATIENT
Start: 2022-10-03

## 2022-10-05 NOTE — TELEPHONE ENCOUNTER
liraglutide (VICTOZA PEN) 18 MG/3ML solution    Last Written Prescription Date:  5/20/22  Last Fill Quantity: 27ml,   # refills: 1  Last Office Visit : 5/20/22  Future Office visit: 10/14/22    Routing refill request to provider for review/approval because: A1C

## 2022-10-05 NOTE — TELEPHONE ENCOUNTER
Patient was switched from Victoza to Saxenda by [facility] pharmacist at last visit. Will disregard this request.

## 2022-10-16 ENCOUNTER — HEALTH MAINTENANCE LETTER (OUTPATIENT)
Age: 52
End: 2022-10-16

## 2022-10-21 ENCOUNTER — VIRTUAL VISIT (OUTPATIENT)
Dept: ENDOCRINOLOGY | Facility: CLINIC | Age: 52
End: 2022-10-21
Payer: COMMERCIAL

## 2022-10-21 VITALS — BODY MASS INDEX: 49.51 KG/M2 | HEIGHT: 64 IN | WEIGHT: 290 LBS

## 2022-10-21 DIAGNOSIS — E11.9 TYPE 2 DIABETES MELLITUS WITHOUT COMPLICATION, WITHOUT LONG-TERM CURRENT USE OF INSULIN (H): ICD-10-CM

## 2022-10-21 DIAGNOSIS — E66.01 CLASS 3 SEVERE OBESITY DUE TO EXCESS CALORIES WITH SERIOUS COMORBIDITY IN ADULT, UNSPECIFIED BMI (H): ICD-10-CM

## 2022-10-21 DIAGNOSIS — E66.813 CLASS 3 SEVERE OBESITY DUE TO EXCESS CALORIES WITH SERIOUS COMORBIDITY IN ADULT, UNSPECIFIED BMI (H): ICD-10-CM

## 2022-10-21 PROCEDURE — 99213 OFFICE O/P EST LOW 20 MIN: CPT | Mod: GT | Performed by: INTERNAL MEDICINE

## 2022-10-21 RX ORDER — LIRAGLUTIDE 6 MG/ML
INJECTION, SOLUTION SUBCUTANEOUS
Qty: 45 ML | Refills: 1 | Status: SHIPPED | OUTPATIENT
Start: 2022-10-21 | End: 2023-01-13

## 2022-10-21 RX ORDER — METFORMIN HCL 500 MG
1000 TABLET, EXTENDED RELEASE 24 HR ORAL 2 TIMES DAILY WITH MEALS
Qty: 360 TABLET | Refills: 3 | Status: SHIPPED | OUTPATIENT
Start: 2022-10-21 | End: 2022-12-16

## 2022-10-21 ASSESSMENT — PAIN SCALES - GENERAL: PAINLEVEL: NO PAIN (0)

## 2022-10-21 NOTE — LETTER
"10/21/2022       RE: Yancy Hoover  [address]  [facility] [address]     Dear Colleague,    Thank you for referring your patient, Yancy Hoover, to the [facility] [place] at [facility]. Please see a copy of my visit note below.    Yancy Hoover is a 52 year old who is being evaluated via a billable video visit.      How would you like to obtain your AVS? MyChart  If the video visit is dropped, the invitation should be resent by: Text to cell phone: [phone]  Will anyone else be joining your video visit? No     During this virtual visit the patient is located in [place], patient verifies this as the location during the entirety of this visit.     Video-Visit Details  Video Start Time: approx 12:45    Type of service:  Video Visit    Video End Time: approx 12;55    Originating Location (pt. Location): Home  Distant Location (provider location):  provider off-site  Platform used for Video Visit: MEME Harden 10/21/2022      11:26 AM        Return Medical Weight Management Note     Yancy Hoover  MRN:  [id number]  :  1970  MARLON:  10/21/2022    Dear Physician No Ref-Primary,    I had the pleasure of seeing your patient Yancy Hoover. She is a 52 year old female who I am continuing to see for treatment of obesity related to:       2020   I have the following health issues associated with obesity: Type II Diabetes       INTERVAL HISTORY:    --earlier visit; reviewed and relevant history, as extracted, includes the following--     --------------------------  \"She has the following co-morbidities:  No HTN, no CVD, elevated cholesterol, DM2 newly disgnosed (had HbA1c of 7.0 by her report)        Has struggled with wt whole life, and this is pt's max wt now.     With COVID19 is working at home and sedentary otherwise  Drinking a lot soda; customer service " "work     Has been working with the following provider on wt loss--  [facility] (M[facility] Lantigua)  Pt notes she went to see a wt loss doctor and wanted wt loss pills.  He did labs and told pt she had DM.  HbA1c 7.0.  Cholesterol was a little high also--208     Pt notes she was referred to a dietitian but the dietitian is not taking new pts and pt has a lot of questions--is motivated to make health changes and halt/slow DM-related progressions/complications..      Off of work this next week so would like to have virtual visit with nutrition then if possible.     [place]--interested in goal of walking 30 min 5 days per wk; gradually work up to 10,000 steps per day (can use phone tracker)     Can stop eating after supper--made goal for this and the family has plans for eating dinner around 5p.     Will cut out the soda and switch to 0 paul options, has been having at least several 20 oz sodas (sprite) daily, did note while on topiramate that it was tasting flat and she found herself leaving unfinished sodas and opening new ones because of this change in taste---> now understands that this is likely related to topiramate and she can set a goal of decreasing/stopping the sugared soda and use this med to help her make this change.     Pt notes cravings and boredom and happens in the evening after supper; she will re-start and shift topiramate to prior to supper with up-titration schedule I provided in AVS.           Since last seen with Henrietta and she is tolerating the top dose and getting appetitive suppression with it, also good blood sugar control  --no gaps in therapy  --clothes are fitting better (has a \"get back into\" dress)  --activity is less now (schedule is not permitting)  --less frequent BMs on the Saxenda (discussed using fiber and adequate hydration, occasional stool softener)     --with food lately: (has never been a breakfast person) having a Premier shake, (shot around 12p) lunch around " "1p, supper before 6p (working on [employer] with eating window between 10-6p)     LAST WEIGHT:   286 lbs 0 oz   Body mass index is 48.33 kg/m .    Since last seen of note is the following--  --out of Saxenda for about 3 wks (pharmacy has not had it; she is going to try a new pharmacy)  --continuing metformin    --has gym membership--hired .  She will be back to the gym tomorrow  --will be getting to the grocery store for healthier food tonight; will get back on track with that  --teamwork with other family members who are working on wt loss  --will be getting labs soon (co-morbidities screening)  --prior constipation with GLP1 but improved with fiber/adequate hydration  --has nutritional information from [facility]     CURRENT WEIGHT:   290 lbs 0 oz   Body mass index is 49.78 kg/m .    Initial Weight (lbs): 295 lbs  Last Visits Weight: 124.3 kg (274 lb)  Cumulative weight loss (lbs): 5  Weight Loss Percentage: 1.69%    Changes and Difficulties 4/2/2021   I have made the following changes to my diet since my last visit: More e   With regards to my diet, I am still struggling with: -   I have made the following changes to my activity/exercise since my last visit: -   With regards to my activity/exercise, I am still struggling with: -       VITALS:  Ht 1.626 m (5' 4\")   Wt 131.5 kg (290 lb)   BMI 49.78 kg/m      MEDICATIONS:   Current Outpatient Medications   Medication Sig Dispense Refill     alcohol swab prep pads Use to swab area of injection/shahla as directed. 100 each 5     blood glucose (ACCU-CHEK SMARTVIEW) test strip Use to test blood sugar 1-2 times daily. 100 strip 5     blood glucose calibration (ACCU-CHEK SMARTVIEW CONTROL) solution Use to calibrate blood glucose monitor as directed. 1 Bottle 3     blood glucose monitoring (ACCU-CHEK FASTCLIX) lancets Use to test blood sugar 1-2 times daily or as directed. 102 each 5     blood glucose monitoring (ACCU-CHEK FAWAD SMARTVIEW) meter device kit Use to " test blood sugar 1-2 times daily. 1 kit 0     childrens multivitamin w/iron (FLINTSTONES COMPLETE) 18 MG chewable tablet Take 1 chew tab by mouth daily       insulin pen needle (32G X 6 MM) 32G X 6 MM miscellaneous Use 1 pen needles daily or as directed with saxenda. 100 each 1     metFORMIN (GLUCOPHAGE XR) 500 MG 24 hr tablet Take 2 tablets (1,000 mg) by mouth 2 times daily (with meals) 360 tablet 3     vitamin D3 (CHOLECALCIFEROL) 50 mcg (2000 units) tablet Take 1 tablet by mouth daily       liraglutide - Weight Management (SAXENDA) 18 MG/3ML pen Inject 2.4 mg Subcutaneous daily for 7 days, THEN 3 mg daily. Stop Victoza when starting Saxenda. (Patient not taking: Reported on 10/21/2022) 15 mL 2       Weight Loss Medication History Reviewed With Patient 5/19/2022   Are you having any side effects from the weight loss medication that we have prescribed you? No   If you are having side effects please describe: -            ASSESSMENT/PLAN:     ## morbid obesity  ## T2DM without complications, not treated with ins     Yancy is a patient with early onset morbid obesity with significant element of familial/genetic influence and with current health consequences. She does need aggressive weight loss plan due to new dx T2DM.  Yancy Hoover ate a high carb diet, a high fat diet, to obtain specific degree of fullness, and had a lot of liquid calories. She is making good changes in support of healthy wt.     Her problem is complicated by a hunger disorder and strong craving/reward pathways        PLAN:    (continues)  Decrease portion sizes  Purge house of food triggers  No meal skipping  Decrease caloric beverages by daily walking plan (has already put this in place with another provider and has plan for escalating as tolerated)  Dietician visit of education  Volumetrics eating plan  Hypocaloric/low fat diet  Increase activity by daily walking      Diabetes   Ancillary testing:  N/A.continue with her glucose  monitoring  Food Plan:  Reduced calorie and Low carbohydrate.  Activity Plan:  Daily walking, can do this after meals (such as after supper in the evenings in the summer--supper is around 5p).  Supplementary:  N/A.  Medication:  see comments below     additionally  --continue full dose metformin, resume/titrate to full dose Saxenda--she is tolerating and benefiting from this med  --her additional health goals in support of wt loss:  1. Get gym membership (go weekdays for an hour)  2.  Continue with food plans (see above) and medication support     RTC with me in 2-3 mo        Time: approx 21 min spent on evaluation, management, counseling, education, & motivational interviewing (video visit) coupled with previsit planning and post visit charting/follow up care same day       Sincerely,    Danial Velarde MD      Again, thank you for allowing me to participate in the care of your patient.      Sincerely,    Danial Velarde MD

## 2022-10-21 NOTE — PROGRESS NOTES
"Yancy Hoover is a 52 year old who is being evaluated via a billable video visit.      How would you like to obtain your AVS? MyChart  If the video visit is dropped, the invitation should be resent by: Text to cell phone: [phone]  Will anyone else be joining your video visit? No     During this virtual visit the patient is located in [place], patient verifies this as the location during the entirety of this visit.     Video-Visit Details  Video Start Time: approx 12:45    Type of service:  Video Visit    Video End Time: approx 12;55    Originating Location (pt. Location): Home  Distant Location (provider location):  provider off-site  Platform used for Video Visit: MEME Harden 10/21/2022      11:26 AM        Return Medical Weight Management Note     Yacny Hoover  MRN:  [id number]  :  1970  MARLON:  10/21/2022    Dear Physician No Ref-Primary,    I had the pleasure of seeing your patient Yancy Hoover. She is a 52 year old female who I am continuing to see for treatment of obesity related to:       2020   I have the following health issues associated with obesity: Type II Diabetes       INTERVAL HISTORY:    --earlier visit; reviewed and relevant history, as extracted, includes the following--     --------------------------  \"She has the following co-morbidities:  No HTN, no CVD, elevated cholesterol, DM2 newly disgnosed (had HbA1c of 7.0 by her report)        Has struggled with wt whole life, and this is pt's max wt now.     With COVID19 is working at home and sedentary otherwise  Drinking a lot soda; customer service work     Has been working with the following provider on wt loss--  [facility] (M[facility] Lantigua)  Pt notes she went to see a wt loss doctor and wanted wt loss pills.  He did labs and told pt she had DM.  HbA1c 7.0.  Cholesterol was a little high also--208     Pt notes she was referred to a dietitian but the dietitian is not taking new pts and pt has a " "lot of questions--is motivated to make health changes and halt/slow DM-related progressions/complications..      Off of work this next week so would like to have virtual visit with nutrition then if possible.     [place]--interested in goal of walking 30 min 5 days per wk; gradually work up to 10,000 steps per day (can use phone tracker)     Can stop eating after supper--made goal for this and the family has plans for eating dinner around 5p.     Will cut out the soda and switch to 0 paul options, has been having at least several 20 oz sodas (sprite) daily, did note while on topiramate that it was tasting flat and she found herself leaving unfinished sodas and opening new ones because of this change in taste---> now understands that this is likely related to topiramate and she can set a goal of decreasing/stopping the sugared soda and use this med to help her make this change.     Pt notes cravings and boredom and happens in the evening after supper; she will re-start and shift topiramate to prior to supper with up-titration schedule I provided in AVS.           Since last seen with Saxenda and she is tolerating the top dose and getting appetitive suppression with it, also good blood sugar control  --no gaps in therapy  --clothes are fitting better (has a \"get back into\" dress)  --activity is less now (schedule is not permitting)  --less frequent BMs on the Saxenda (discussed using fiber and adequate hydration, occasional stool softener)     --with food lately: (has never been a breakfast person) having a Premier shake, (shot around 12p) lunch around 1p, supper before 6p (working on [employer] with eating window between 10-6p)     LAST WEIGHT:   286 lbs 0 oz   Body mass index is 48.33 kg/m .    Since last seen of note is the following--  --out of Saxenda for about 3 wks (pharmacy has not had it; she is going to try a new pharmacy)  --continuing metformin    --has gym membership--hired .  " "She will be back to the gym tomorrow  --will be getting to the grocery store for healthier food tonight; will get back on track with that  --teamwork with other family members who are working on wt loss  --will be getting labs soon (co-morbidities screening)  --prior constipation with GLP1 but improved with fiber/adequate hydration  --has nutritional information from [facility]     CURRENT WEIGHT:   290 lbs 0 oz   Body mass index is 49.78 kg/m .    Initial Weight (lbs): 295 lbs  Last Visits Weight: 124.3 kg (274 lb)  Cumulative weight loss (lbs): 5  Weight Loss Percentage: 1.69%    Changes and Difficulties 4/2/2021   I have made the following changes to my diet since my last visit: More e   With regards to my diet, I am still struggling with: -   I have made the following changes to my activity/exercise since my last visit: -   With regards to my activity/exercise, I am still struggling with: -       VITALS:  Ht 1.626 m (5' 4\")   Wt 131.5 kg (290 lb)   BMI 49.78 kg/m      MEDICATIONS:   Current Outpatient Medications   Medication Sig Dispense Refill     alcohol swab prep pads Use to swab area of injection/shahla as directed. 100 each 5     blood glucose (ACCU-CHEK SMARTVIEW) test strip Use to test blood sugar 1-2 times daily. 100 strip 5     blood glucose calibration (ACCU-CHEK SMARTVIEW CONTROL) solution Use to calibrate blood glucose monitor as directed. 1 Bottle 3     blood glucose monitoring (ACCU-CHEK FASTCLIX) lancets Use to test blood sugar 1-2 times daily or as directed. 102 each 5     blood glucose monitoring (ACCU-CHEK FAWAD SMARTVIEW) meter device kit Use to test blood sugar 1-2 times daily. 1 kit 0     childrens multivitamin w/iron (FLINTSTONES COMPLETE) 18 MG chewable tablet Take 1 chew tab by mouth daily       insulin pen needle (32G X 6 MM) 32G X 6 MM miscellaneous Use 1 pen needles daily or as directed with saxenda. 100 each 1     metFORMIN (GLUCOPHAGE XR) 500 MG 24 hr tablet Take 2 tablets (1,000 mg) " by mouth 2 times daily (with meals) 360 tablet 3     vitamin D3 (CHOLECALCIFEROL) 50 mcg (2000 units) tablet Take 1 tablet by mouth daily       liraglutide - Weight Management (SAXENDA) 18 MG/3ML pen Inject 2.4 mg Subcutaneous daily for 7 days, THEN 3 mg daily. Stop Victoza when starting Saxenda. (Patient not taking: Reported on 10/21/2022) 15 mL 2       Weight Loss Medication History Reviewed With Patient 5/19/2022   Are you having any side effects from the weight loss medication that we have prescribed you? No   If you are having side effects please describe: -            ASSESSMENT/PLAN:     ## morbid obesity  ## T2DM without complications, not treated with ins     Yancy is a patient with early onset morbid obesity with significant element of familial/genetic influence and with current health consequences. She does need aggressive weight loss plan due to new dx T2DM.  Yancy Hoover ate a high carb diet, a high fat diet, to obtain specific degree of fullness, and had a lot of liquid calories. She is making good changes in support of healthy wt.     Her problem is complicated by a hunger disorder and strong craving/reward pathways        PLAN:    (continues)  Decrease portion sizes  Purge house of food triggers  No meal skipping  Decrease caloric beverages by daily walking plan (has already put this in place with another provider and has plan for escalating as tolerated)  Dietician visit of education  Volumetrics eating plan  Hypocaloric/low fat diet  Increase activity by daily walking      Diabetes   Ancillary testing:  N/A.continue with her glucose monitoring  Food Plan:  Reduced calorie and Low carbohydrate.  Activity Plan:  Daily walking, can do this after meals (such as after supper in the evenings in the summer--supper is around 5p).  Supplementary:  N/A.  Medication:  see comments below     additionally  --continue full dose metformin, resume/titrate to full dose Saxenda--she is tolerating and  benefiting from this med  --her additional health goals in support of wt loss:  1. Get gym membership (go weekdays for an hour)  2.  Continue with food plans (see above) and medication support     RTC with me in 2-3 mo        Time: approx 21 min spent on evaluation, management, counseling, education, & motivational interviewing (video visit) coupled with previsit planning and post visit charting/follow up care same day       Sincerely,    Danial Velarde MD

## 2022-10-21 NOTE — NURSING NOTE
"(   Chief Complaint   Patient presents with     RECHECK     Return MWM    )    ( Weight: 131.5 kg (290 lb) (Pt reported) )  ( Height: 162.6 cm (5' 4\") )  ( BMI (Calculated): 49.78 )  (   )  (   )  (   )  (   )  (   )  (   )    (   )  (   )  (   )  (   )  (   )  (   )  (   )    (   Patient Active Problem List   Diagnosis     LANDON (obstructive sleep apnea)     Class 3 severe obesity due to excess calories with body mass index (BMI) of 50.0 to 59.9 in adult, unspecified whether serious comorbidity present (H)     Type 2 diabetes mellitus without complication, unspecified whether long term insulin use (H)    )  (   Current Outpatient Medications   Medication Sig Dispense Refill     alcohol swab prep pads Use to swab area of injection/shahla as directed. 100 each 5     blood glucose (ACCU-CHEK SMARTVIEW) test strip Use to test blood sugar 1-2 times daily. 100 strip 5     blood glucose calibration (ACCU-CHEK SMARTVIEW CONTROL) solution Use to calibrate blood glucose monitor as directed. 1 Bottle 3     blood glucose monitoring (ACCU-CHEK FASTCLIX) lancets Use to test blood sugar 1-2 times daily or as directed. 102 each 5     blood glucose monitoring (ACCU-CHEK FAWAD SMARTVIEW) meter device kit Use to test blood sugar 1-2 times daily. 1 kit 0     childrens multivitamin w/iron (FLINTSTONES COMPLETE) 18 MG chewable tablet Take 1 chew tab by mouth daily       insulin pen needle (32G X 6 MM) 32G X 6 MM miscellaneous Use 1 pen needles daily or as directed with saxenda. 100 each 1     metFORMIN (GLUCOPHAGE XR) 500 MG 24 hr tablet Take 2 tablets (1,000 mg) by mouth 2 times daily (with meals) 360 tablet 3     vitamin D3 (CHOLECALCIFEROL) 50 mcg (2000 units) tablet Take 1 tablet by mouth daily       liraglutide - Weight Management (SAXENDA) 18 MG/3ML pen Inject 2.4 mg Subcutaneous daily for 7 days, THEN 3 mg daily. Stop Victoza when starting Saxenda. (Patient not taking: Reported on 10/21/2022) 15 mL 2    )  ( Diabetes Eval:   "  )    ( Pain Eval:  No Pain (0) )    ( Wound Eval:       )    (   History   Smoking Status     Every Day     Packs/day: 0.50     Years: 20.00     Types: Cigarettes     Last attempt to quit: 5/1/2020   Smokeless Tobacco     Never    )    ( Signed By:  Eris Borrero, EMT; October 21, 2022; 12:02 PM )

## 2022-10-21 NOTE — LETTER
Date:October 24, 2022      Patient was self referred, no letter generated. Do not send.        [facility] Health Information

## 2022-10-22 NOTE — PATIENT INSTRUCTIONS
Thank you for allowing us the privilege of caring for you. We hope we provided you with the excellent service you deserve.    Please let us know if there is anything else we can do for you so that we can be sure you are completely satisfied with your care experience.    Your visit was with Dr. Velarde today.    Instructions per today's visit:  --we are resuming the Saxenda and continuing metformin to support your healthy food and activity goals; prescriptions sent to the other pharmacy ([facility] on [address]); please let us know if you have any problems or questions between visits  --we can plan on return with Dr. Velarde again in about 2-3 mo    Please call our contact center at [phone] to schedule your next appointments.    Meal Replacement Products:    Here is the link to our new e-store where you can purchase our meal replacement products    [employer]/store    The one week starter kit is a great way to sample a variety of products and see what works for you.    If you want more information about the product go to: [employer]    Free Shipping for orders over $75    Benefits of meal replacements products:    Portion and calorie control  Improved nutrition  Structured eating  Simplified food choices  Avoid contact with trigger foods    Interested in working with a health ?  Health coaches work with you to improve your overall health and wellbeing.  They look at the whole person, and may involve discussion of different areas of life, including, but not limited to the four pillars of health (sleep, exercise, nutrition, and stress management). Discuss with your care team if you would like to start working a health .    Health Coaching-3 Pack: Schedule by calling [phone]    $99 for three health coaching visits    Visits may be done in person or via phone    Coaching is a partnership between the  and the client; Coaches do not prescribe or  diagnose    Coaching helps inspire the client to reach his/her personal goals    Bluetooth Scale:    We hope to provide you with high quality telephone and virtual healthcare visits while social distancing for COVID-19 is necessary, as well as in the future when virtual visits may be more convenient for you.    Our technology team made it possible for Bluetooth scales to send weight measurements to our electronic medical record. This allows weights from you weighing at home to securely flow into the medical record, which will improve telephone and virtual visits.  Additionally, studies have shown that adults actually lose more weight when their weights are automatically sent to someone else, and also that this process is not stressful for those adults.    Below is a link for purchasing the scale, with a discount code for our patients. You may call your insurance company to see if they will reimburse you for the cost of the scale, as a piece of durable medical equipment. The scales only go up to a weight of 400 pounds. This is an issue and we are working with the developer on increasing this. We found no scales that go over 400lb that have blue-tooth for connecting to [employer].    Scale to purchase: the PerfectPost  Body  Scale: [email or web address]=aw.ds    Discount Code: We have a discount code for our patients to bring the cost down to $50, the code is:  withmed     Steps to link the scale to [employer] via an Android Phone (you can always disconnect at any point in the future):  1. The order must be placed first before the patient can access Track My Health within [employer].  2. Download Google Fit gilbert from the Google Play Store  a. Log in or register using your Google account  3. Download the [employer] gilbert from Google Play Store  a. Select add organization  b. Search for Crunchbutton and select it  c. Log into [employer]  d. Select Track My  Health  e. Select the green connect my account button  f. When prompted log into your Google account  g. Select okay to confirm the account  4. Download the [employer] Health Mate maddie from Google Play Store  a. Moira for [employer]erral[employer]andy  b. Go to profile  c. Tap google fit under the Apps section  d. Select the option to activate Google Fit integration  e. Select the same Google account  f. Select okay to confirm the account  g.  Steps to link the scale to [employer] via an iPhone (you can always disconnect at any point in the future):  **Note Likez is not available for download on an iPad**  1. The order must be placed first before the patient can access Track Omnikles Health within [employer].  2. Locate the Health maddie on your iPhone.  a. Set up your Apple Health account as prompted  b. The Sources page will show Apps that communicate with your Health maddie. Once all steps are completed, you should see [place] Coverage and [employer] listed under the Apps section and your iPhone under the devices section.  i. Select Health Mate  1. Under 'ALLOW  HEALTH [employer]  TO WRITE DATA ensure the toggle is on for Weight.  2. This will allow the scale to add your weight to the Apple Health  ii. Select [employer]  1. Under 'ALLOW  [employer]  TO READ DATA ensure the toggle is on for Weight.  2. This allows [employer] to grab the weight from Likez so your provider can see your weights.  3. Download the Preactt maddie from the Maddie Store  a. Select add organization                                                  b. Search for [employer] and select it  c. Log into [employer]net  d. Select Track Omnikles Health  e. Select the green connect my account button  f. Follow prompts to link your device to [employer].  4. Download the Withings health mate maddie in the Maddie Store  a. Moira for Within[employer]erralCandy  b. Go to profile  c. Tap Health under the Apps section  d. If prompted to allow access with the Health Maddie, toggle weight on for read and write access.      For any questions/concerns  contact Ebony Barragan LPN at [phone]    To schedule appointments with our team, please call [phone]    Please call during clinic hours Monday through Friday 8:00a - 4:00p if you have questions or you can contact us via [employer] at anytime.      Lab results will be communicated through My Chart or letter (if My Chart not used). Please call the clinic if you have not received communication after 1 week or if you have any questions.    Clinic Fax: [phone]    Thank you,  Medical Weight Management Team

## 2022-10-25 ENCOUNTER — TELEPHONE (OUTPATIENT)
Dept: ENDOCRINOLOGY | Facility: CLINIC | Age: 52
End: 2022-10-25

## 2022-10-28 ENCOUNTER — LAB (OUTPATIENT)
Dept: LAB | Facility: CLINIC | Age: 52
End: 2022-10-28
Payer: COMMERCIAL

## 2022-10-28 DIAGNOSIS — E11.9 TYPE 2 DIABETES MELLITUS WITHOUT COMPLICATION, WITHOUT LONG-TERM CURRENT USE OF INSULIN (H): Primary | ICD-10-CM

## 2022-10-28 DIAGNOSIS — E66.813 CLASS 3 SEVERE OBESITY DUE TO EXCESS CALORIES WITH SERIOUS COMORBIDITY AND BODY MASS INDEX (BMI) OF 50.0 TO 59.9 IN ADULT (H): ICD-10-CM

## 2022-10-28 DIAGNOSIS — E66.01 CLASS 3 SEVERE OBESITY DUE TO EXCESS CALORIES WITH SERIOUS COMORBIDITY AND BODY MASS INDEX (BMI) OF 50.0 TO 59.9 IN ADULT (H): ICD-10-CM

## 2022-10-28 LAB
ANION GAP SERPL CALCULATED.3IONS-SCNC: 8 MMOL/L (ref 7–15)
BUN SERPL-MCNC: 8.7 MG/DL (ref 6–20)
CALCIUM SERPL-MCNC: 9.3 MG/DL (ref 8.6–10)
CHLORIDE SERPL-SCNC: 103 MMOL/L (ref 98–107)
CHOLEST SERPL-MCNC: 191 MG/DL
CREAT SERPL-MCNC: 0.92 MG/DL (ref 0.51–0.95)
DEPRECATED HCO3 PLAS-SCNC: 28 MMOL/L (ref 22–29)
GFR SERPL CREATININE-BSD FRML MDRD: 75 ML/MIN/1.73M2
GLUCOSE SERPL-MCNC: 165 MG/DL (ref 70–99)
HBA1C MFR BLD: 8.1 %
HDLC SERPL-MCNC: 37 MG/DL
LDLC SERPL CALC-MCNC: 136 MG/DL
NONHDLC SERPL-MCNC: 154 MG/DL
POTASSIUM SERPL-SCNC: 4.7 MMOL/L (ref 3.4–5.3)
SODIUM SERPL-SCNC: 139 MMOL/L (ref 136–145)
TRIGL SERPL-MCNC: 89 MG/DL

## 2022-10-28 PROCEDURE — 99000 SPECIMEN HANDLING OFFICE-LAB: CPT | Performed by: PATHOLOGY

## 2022-10-28 PROCEDURE — 36415 COLL VENOUS BLD VENIPUNCTURE: CPT | Performed by: PATHOLOGY

## 2022-10-28 PROCEDURE — 83036 HEMOGLOBIN GLYCOSYLATED A1C: CPT | Mod: 90 | Performed by: PATHOLOGY

## 2022-10-28 PROCEDURE — 80048 BASIC METABOLIC PNL TOTAL CA: CPT | Performed by: PATHOLOGY

## 2022-10-28 PROCEDURE — 80061 LIPID PANEL: CPT | Mod: 90 | Performed by: PATHOLOGY

## 2022-11-09 ENCOUNTER — APPOINTMENT (OUTPATIENT)
Dept: CT IMAGING | Facility: CLINIC | Age: 52
End: 2022-11-09
Attending: EMERGENCY MEDICINE
Payer: COMMERCIAL

## 2022-11-09 ENCOUNTER — HOSPITAL ENCOUNTER (EMERGENCY)
Facility: CLINIC | Age: 52
Discharge: HOME | End: 2022-11-09
Attending: EMERGENCY MEDICINE | Admitting: EMERGENCY MEDICINE
Payer: COMMERCIAL

## 2022-11-09 VITALS
HEIGHT: 65 IN | BODY MASS INDEX: 46.65 KG/M2 | TEMPERATURE: 98.5 F | OXYGEN SATURATION: 98 % | RESPIRATION RATE: 22 BRPM | WEIGHT: 280 LBS | HEART RATE: 95 BPM | SYSTOLIC BLOOD PRESSURE: 143 MMHG | DIASTOLIC BLOOD PRESSURE: 89 MMHG

## 2022-11-09 DIAGNOSIS — R10.32 ABDOMINAL PAIN, LEFT LOWER QUADRANT: ICD-10-CM

## 2022-11-09 DIAGNOSIS — K76.0 HEPATIC STEATOSIS: ICD-10-CM

## 2022-11-09 DIAGNOSIS — R11.2 NAUSEA, VOMITING, AND DIARRHEA: ICD-10-CM

## 2022-11-09 DIAGNOSIS — R19.7 NAUSEA, VOMITING, AND DIARRHEA: ICD-10-CM

## 2022-11-09 LAB
ALBUMIN SERPL-MCNC: 3.3 G/DL (ref 3.4–5)
ALBUMIN UR-MCNC: 50 MG/DL
ALP SERPL-CCNC: 70 U/L (ref 40–150)
ALT SERPL W P-5'-P-CCNC: 30 U/L (ref 0–50)
ANION GAP SERPL CALCULATED.3IONS-SCNC: 5 MMOL/L (ref 3–14)
APPEARANCE UR: CLEAR
AST SERPL W P-5'-P-CCNC: 20 U/L (ref 0–45)
BACTERIA #/AREA URNS HPF: ABNORMAL /HPF
BASOPHILS # BLD AUTO: 0 10E3/UL (ref 0–0.2)
BASOPHILS NFR BLD AUTO: 0 %
BILIRUB SERPL-MCNC: 0.7 MG/DL (ref 0.2–1.3)
BILIRUB UR QL STRIP: NEGATIVE
BUN SERPL-MCNC: 9 MG/DL (ref 7–30)
CALCIUM SERPL-MCNC: 8.7 MG/DL (ref 8.5–10.1)
CHLORIDE BLD-SCNC: 107 MMOL/L (ref 94–109)
CO2 SERPL-SCNC: 30 MMOL/L (ref 20–32)
COLOR UR AUTO: YELLOW
CREAT SERPL-MCNC: 0.86 MG/DL (ref 0.52–1.04)
EOSINOPHIL # BLD AUTO: 0.1 10E3/UL (ref 0–0.7)
EOSINOPHIL NFR BLD AUTO: 2 %
ERYTHROCYTE [DISTWIDTH] IN BLOOD BY AUTOMATED COUNT: 14.1 % (ref 10–15)
FLUAV RNA SPEC QL NAA+PROBE: NEGATIVE
FLUBV RNA RESP QL NAA+PROBE: NEGATIVE
GFR SERPL CREATININE-BSD FRML MDRD: 81 ML/MIN/1.73M2
GLUCOSE BLD-MCNC: 162 MG/DL (ref 70–99)
GLUCOSE BLDC GLUCOMTR-MCNC: 161 MG/DL (ref 70–99)
GLUCOSE UR STRIP-MCNC: NEGATIVE MG/DL
HCT VFR BLD AUTO: 39 % (ref 35–47)
HGB BLD-MCNC: 12.6 G/DL (ref 11.7–15.7)
HGB UR QL STRIP: NEGATIVE
HOLD SPECIMEN: NORMAL
IMM GRANULOCYTES # BLD: 0 10E3/UL
IMM GRANULOCYTES NFR BLD: 0 %
KETONES UR STRIP-MCNC: NEGATIVE MG/DL
LEUKOCYTE ESTERASE UR QL STRIP: NEGATIVE
LIPASE SERPL-CCNC: 138 U/L (ref 73–393)
LYMPHOCYTES # BLD AUTO: 2.2 10E3/UL (ref 0.8–5.3)
LYMPHOCYTES NFR BLD AUTO: 31 %
MCH RBC QN AUTO: 28.5 PG (ref 26.5–33)
MCHC RBC AUTO-ENTMCNC: 32.3 G/DL (ref 31.5–36.5)
MCV RBC AUTO: 88 FL (ref 78–100)
MONOCYTES # BLD AUTO: 0.6 10E3/UL (ref 0–1.3)
MONOCYTES NFR BLD AUTO: 8 %
MUCOUS THREADS #/AREA URNS LPF: PRESENT /LPF
NEUTROPHILS # BLD AUTO: 4.2 10E3/UL (ref 1.6–8.3)
NEUTROPHILS NFR BLD AUTO: 59 %
NITRATE UR QL: NEGATIVE
NRBC # BLD AUTO: 0 10E3/UL
NRBC BLD AUTO-RTO: 0 /100
PH UR STRIP: 6 [PH] (ref 5–7)
PLATELET # BLD AUTO: 241 10E3/UL (ref 150–450)
POTASSIUM BLD-SCNC: 4.2 MMOL/L (ref 3.4–5.3)
PROT SERPL-MCNC: 7.4 G/DL (ref 6.8–8.8)
RBC # BLD AUTO: 4.42 10E6/UL (ref 3.8–5.2)
RBC URINE: 0 /HPF
RSV RNA SPEC NAA+PROBE: NEGATIVE
SARS-COV-2 RNA RESP QL NAA+PROBE: NEGATIVE
SODIUM SERPL-SCNC: 142 MMOL/L (ref 133–144)
SP GR UR STRIP: 1.03 (ref 1–1.03)
SQUAMOUS EPITHELIAL: 4 /HPF
UROBILINOGEN UR STRIP-MCNC: 2 MG/DL
WBC # BLD AUTO: 7.2 10E3/UL (ref 4–11)
WBC URINE: 1 /HPF

## 2022-11-09 PROCEDURE — 250N000009 HC RX 250: Performed by: EMERGENCY MEDICINE

## 2022-11-09 PROCEDURE — 96360 HYDRATION IV INFUSION INIT: CPT | Mod: 59

## 2022-11-09 PROCEDURE — 74177 CT ABD & PELVIS W/CONTRAST: CPT

## 2022-11-09 PROCEDURE — 250N000011 HC RX IP 250 OP 636: Performed by: EMERGENCY MEDICINE

## 2022-11-09 PROCEDURE — 99285 EMERGENCY DEPT VISIT HI MDM: CPT | Mod: 25

## 2022-11-09 PROCEDURE — 36415 COLL VENOUS BLD VENIPUNCTURE: CPT | Performed by: EMERGENCY MEDICINE

## 2022-11-09 PROCEDURE — C9803 HOPD COVID-19 SPEC COLLECT: HCPCS

## 2022-11-09 PROCEDURE — 81001 URINALYSIS AUTO W/SCOPE: CPT | Performed by: EMERGENCY MEDICINE

## 2022-11-09 PROCEDURE — 258N000003 HC RX IP 258 OP 636: Performed by: EMERGENCY MEDICINE

## 2022-11-09 PROCEDURE — 85004 AUTOMATED DIFF WBC COUNT: CPT | Performed by: EMERGENCY MEDICINE

## 2022-11-09 PROCEDURE — 96361 HYDRATE IV INFUSION ADD-ON: CPT

## 2022-11-09 PROCEDURE — 80053 COMPREHEN METABOLIC PANEL: CPT | Performed by: EMERGENCY MEDICINE

## 2022-11-09 PROCEDURE — 87637 SARSCOV2&INF A&B&RSV AMP PRB: CPT | Performed by: EMERGENCY MEDICINE

## 2022-11-09 PROCEDURE — 83690 ASSAY OF LIPASE: CPT | Performed by: EMERGENCY MEDICINE

## 2022-11-09 RX ORDER — IOPAMIDOL 755 MG/ML
135 INJECTION, SOLUTION INTRAVASCULAR ONCE
Status: COMPLETED | OUTPATIENT
Start: 2022-11-09 | End: 2022-11-09

## 2022-11-09 RX ORDER — ONDANSETRON 4 MG/1
4 TABLET, ORALLY DISINTEGRATING ORAL ONCE
Status: COMPLETED | OUTPATIENT
Start: 2022-11-09 | End: 2022-11-09

## 2022-11-09 RX ADMIN — ONDANSETRON 4 MG: 4 TABLET, ORALLY DISINTEGRATING ORAL at 18:27

## 2022-11-09 RX ADMIN — SODIUM CHLORIDE 79 ML: 900 INJECTION INTRAVENOUS at 17:07

## 2022-11-09 RX ADMIN — SODIUM CHLORIDE 1000 ML: 9 INJECTION, SOLUTION INTRAVENOUS at 16:29

## 2022-11-09 RX ADMIN — IOPAMIDOL 135 ML: 755 INJECTION, SOLUTION INTRAVENOUS at 17:06

## 2022-11-09 ASSESSMENT — ACTIVITIES OF DAILY LIVING (ADL): ADLS_ACUITY_SCORE: 35

## 2022-11-09 NOTE — LETTER
November 9, 2022      To Whom It May Concern:      Yancy Hoover was seen in our Emergency Department today, 11/09/22.  I expect her condition to improve over the next 2-3 days.  She may return to work when improved.    Sincerely,        Darya BURCH RN

## 2022-11-09 NOTE — ED NOTES
Bed: ED09  Expected date:   Expected time:   Means of arrival:   Comments:  [facility] - 446 - 52 F abd pain eta 1515

## 2022-11-09 NOTE — ED PROVIDER NOTES
History     Chief Complaint:  Abdominal Pain       HPI   Yancy Hoover is a 52 year old female who presents for evaluation of abdominal pain.  She developed pain in the left lower quadrant in her abdomen that wraps around to her right flank.  She also has nausea, vomiting, and diarrhea.  She has no urinary changes.  No trauma to the abdomen.  No fevers or chills.  She has a history of similar pain when she had ovarian cyst, however, she is status post left oophorectomy.  She has no pain in the right pelvis or right side of the abdomen.    ROS:  Review of Systems   Constitutional: Negative for chills and fever.   Respiratory: Negative for cough and shortness of breath.    Cardiovascular: Negative for chest pain.   Gastrointestinal: Positive for abdominal pain, diarrhea, nausea and vomiting. Negative for blood in stool.   Genitourinary: Negative for dysuria.   All other systems reviewed and are negative.        Allergies:  Gadolinium Derivatives     Medications:    alcohol swab prep pads  blood glucose (ACCU-CHEK SMARTVIEW) test strip  blood glucose calibration (ACCU-CHEK SMARTVIEW CONTROL) solution  blood glucose monitoring (ACCU-CHEK FASTCLIX) lancets  blood glucose monitoring (ACCU-CHEK FAWAD SMARTVIEW) meter device kit  childrens multivitamin w/iron (FLINTSTONES COMPLETE) 18 MG chewable tablet  insulin pen needle (32G X 6 MM) 32G X 6 MM miscellaneous  liraglutide - Weight Management (SAXENDA) 18 MG/3ML pen  metFORMIN (GLUCOPHAGE XR) 500 MG 24 hr tablet  vitamin D3 (CHOLECALCIFEROL) 50 mcg (2000 units) tablet        Past Medical History:    Past Medical History:   Diagnosis Date     NO ACTIVE PROBLEMS        Past Surgical History:    Past Surgical History:   Procedure Laterality Date     GYN SURGERY      c  section 2014     LAPAROSCOPIC CYSTECTOMY OVARIAN (BENIGN) Left 1/23/2020    Procedure: LAPAROSCOPIC ovarian cystectomy,;  Surgeon: Sofi Cuevas MD;  Location:  OR        Family History:  "   family history is not on file.    Social History:   reports that she has been smoking cigarettes. She has a 10.00 pack-year smoking history. She has never used smokeless tobacco. She reports that she does not currently use alcohol. She reports that she does not use drugs.  PCP: No Ref-Primary, Physician     Physical Exam     Patient Vitals for the past 24 hrs:   BP Temp Temp src Pulse Resp SpO2 Height Weight   11/09/22 1523 -- 98.5  F (36.9  C) -- -- 22 -- 1.638 m (5' 4.5\") 127 kg (280 lb)   11/09/22 1522 (!) 137/97 98.4  F (36.9  C) Oral 95 20 97 % -- --        Physical Exam  Constitutional: Well appearing.  HEENT: Atraumatic.    Moist mucous membranes.  Neck: Soft.  Supple.   Cardiac: Regular rate and rhythm.  No murmur or rub.  Respiratory: Clear to auscultation bilaterally.  No respiratory distress.  No wheezing, rhonchi, or rales.  Abdomen: Soft with minimal left lower quadrant tenderness to palpation..  No rebound or guarding.  Nondistended.  Musculoskeletal: No edema.  Normal range of motion.  Neurologic: Alert.  Normal tone and bulk.  Normal gait.  Skin: No rashes.  No edema.  Psych: Normal affect.  Normal behavior.      Emergency Department Course       Imaging:  CT Abdomen Pelvis w Contrast   Final Result   IMPRESSION:    1.  Hepatic steatosis.   2.  No etiology identified to explain patient's left lower quadrant and right flank pain.         Report per radiology    Laboratory:  Labs Ordered and Resulted from Time of ED Arrival to Time of ED Departure   GLUCOSE BY METER - Abnormal       Result Value    GLUCOSE BY METER POCT 161 (*)    COMPREHENSIVE METABOLIC PANEL    Sodium 142      Potassium 4.2      Chloride 107      Carbon Dioxide (CO2)        Anion Gap        Urea Nitrogen        Creatinine        Calcium        Glucose        Alkaline Phosphatase        AST        ALT        Protein Total        Albumin        Bilirubin Total        GFR Estimate       CBC WITH PLATELETS AND DIFFERENTIAL    WBC " Count 7.2      RBC Count 4.42      Hemoglobin 12.6      Hematocrit 39.0      MCV 88      MCH 28.5      MCHC 32.3      RDW 14.1      Platelet Count 241      % Neutrophils 59      % Lymphocytes 31      % Monocytes 8      % Eosinophils 2      % Basophils 0      % Immature Granulocytes 0      NRBCs per 100 WBC 0      Absolute Neutrophils 4.2      Absolute Lymphocytes 2.2      Absolute Monocytes 0.6      Absolute Eosinophils 0.1      Absolute Basophils 0.0      Absolute Immature Granulocytes 0.0      Absolute NRBCs 0.0     LIPASE   ROUTINE UA WITH MICROSCOPIC REFLEX TO CULTURE        Procedures       Emergency Department Course:             Reviewed:  I reviewed nursing notes, vitals and past medical history    Interventions:  Medications - No data to display     Disposition:  The patient was discharged to home.     Impression & Plan      Medical Decision Making:  Yancy Hoover is a 52-year-old woman who is afebrile and hemodynamically stable.  Her abdomen is soft and benign on exam.  Lab work-up as noted as above.  CT scan of the abdomen pelvis is noted as above with no acute abnormalities.  I discussed her chronic findings with her.  She only has pain on the left side of her lower abdomen but does not have an ovary on the side so we did not obtain an ultrasound to rule out ovarian torsion.  She was given the above intervention with good improvement and felt comfortable discharging home.  We discussed home supportive care and close primary care follow-up.  Discussed potential viral GI illness.  She is not tolerating p.o. intake.  She feels countable discharging home has no further concerns.  We gave her strict return precautions.  Questions were answered and she was in no distress at time of discharge.    Diagnosis:    ICD-10-CM    1. Nausea, vomiting, and diarrhea  R11.2     R19.7       2. Abdominal pain, left lower quadrant  R10.32       3. Hepatic steatosis  K76.0            Discharge  Medications:  Discharge Medication List as of 11/9/2022  6:23 PM           11/9/2022   Tristian Martinez MD Salay, Nicholas J, MD  11/14/22 144

## 2022-11-09 NOTE — ED TRIAGE NOTES
Patient presents via EMS. Per report, patient had sudden onset of LLQ abdominal pain that started around 1400. Pain 10/10 radiates to her R flank. Pt reports hx of ovarian cyst that feels similar. 100 mcg of fent given in route. VSS. AOx4.      Triage Assessment     Row Name 11/09/22 1523       Triage Assessment (Adult)    Airway WDL WDL       Respiratory WDL    Respiratory WDL WDL       Skin Circulation/Temperature WDL    Skin Circulation/Temperature WDL WDL       Cardiac WDL    Cardiac WDL WDL       Peripheral/Neurovascular WDL    Peripheral Neurovascular WDL WDL       Cognitive/Neuro/Behavioral WDL    Cognitive/Neuro/Behavioral WDL WDL

## 2022-11-10 NOTE — DISCHARGE INSTRUCTIONS
Discharge Instructions  Vomiting    You have been seen today for vomiting (throwing up). This is usually caused by a virus, but some bacteria, parasites, medicines or other medical conditions can cause similar symptoms. At this time your provider does not find that your vomiting is a sign of anything dangerous or life-threatening. However, sometimes the signs of serious illness do not show up right away. If you have new or worse symptoms, you may need to be seen again in the Emergency Department or by your primary provider. Remember that serious problems like appendicitis can start as vomiting.    Generally, every Emergency Department visit should have a follow-up clinic visit with either a primary or a specialty clinic/provider. Please follow-up as instructed by your emergency provider today.    Return to the Emergency Department if:  You keep vomiting and you are not able to keep liquids down.   You feel you are getting dehydrated, such as being very thirsty, not urinating (peeing) at least every 8-12 hours, or feeling faint or lightheaded.   You develop a new fever, or your fever continues for more than 2 days.   You have abdominal (belly pain) that seems worse than cramps, is in one spot, or is getting worse over time. Appendicitis usually causes pain in the right lower abdomen (to the right and below your belly button) so watch for pain in this location.  You have blood in your vomit or stools.   You feel very weak.  You are not starting to improve within 24 hours of your visit here.     What can I do to help myself?  The most important thing to do is to drink clear liquids. If you have been vomiting a lot, it is best to have only small, frequent sips of liquids. Drinking too much at once may cause more vomiting. If you are vomiting often, you must replace minerals, sodium and potassium lost with your illness. Pedialyte  is the best available rehydration liquid but some find that it doesn t taste good so sports  drinks are an alterative. You can also drink clear liquids such as water, weak tea, apple juice, and 7-Up . Avoid acid liquids (orange), caffeine (coffee) or alcohol. Do not drink milk until you no longer have diarrhea (loose stools).   After liquids are staying down, you may start eating mild foods. Soda crackers, toast, plain noodles, gelatin, applesauce and bananas are good first choices. Avoid foods that have acid, are spicy, fatty or have a lot of fiber (such as meats, coarse grains, vegetables). You may start eating these foods again in about 3 days when you are better.   Sometimes treatment includes prescription medicine to prevent nausea (sick to your stomach) and vomiting. If your provider prescribes these for you, take them as directed.   Do not take ibuprofen, naproxen, or other nonsteroidal anti-inflammatory (NSAID) medicines without checking with your healthcare provider.     If you were given a prescription for medicine here today, be sure to read all of the information (including the package insert) that comes with your prescription.  This will include important information about the medicine, its side effects, and any warnings that you need to know about.  The pharmacist who fills the prescription can provide more information and answer questions you may have about the medicine.  If you have questions or concerns that the pharmacist cannot address, please call or return to the Emergency Department.     Remember that you can always come back to the Emergency Department if you are not able to see your regular provider in the amount of time listed above, if you get any new symptoms, or if there is anything that worries you.      Discharge Instructions  Adult Diarrhea    You have been seen today for diarrhea (loose stools). This is usually caused by a virus, but some bacteria, parasites, medicines, or other medical conditions can cause similar symptoms. At this time your provider does not find that your  diarrhea is a sign of anything dangerous or life-threatening. However, sometimes the signs of serious illness do not show up right away. If you have new or worse symptoms, you may need to be seen again in the Emergency Department or by your primary provider.     Generally, every Emergency Department visit should have a follow-up clinic visit with either a primary or a specialty clinic/provider. Please follow-up as instructed by your emergency provider today.    Return to the Emergency Department if:  You feel you are getting dehydrated, such as being very thirsty, not urinating (peeing) like usual, or feeling faint or lightheaded.   You develop a new fever.  You have abdominal (belly) pain that seems worse than cramps, is in one spot, or is getting worse over time.   You have blood in your stool or your stool becomes black.  (Remember that if you take Pepto-Bismol , this will turn your stool black).   You feel very weak.    What can I do to help myself?  The most important thing to do is to drink clear liquids.   It is best to have only small, frequent sips of liquids. Drinking too much at once may cause more diarrhea. You should also replace minerals, sodium and potassium lost with diarrhea. Pedialyte  and sports drinks can help you replace these minerals. You can also drink clear liquids such as water, weak tea, apple juice, and 7-Up . Avoid acidic liquids (orange juice), caffeine (coffee) or alcohol. Milk products will make the diarrhea worse.  Eat bland (plain) foods. Soda crackers, toast, plain noodles, gelatin, applesauce and bananas are good first choices. Avoid foods that have acid, are spicy, fatty or fibrous (such as meats, coarse grains, vegetables). You may start eating these foods again in about 3 days when you are better.   Sometimes treatment includes prescription medicine to prevent diarrhea. If your provider prescribes these for you, take them as directed.   Nonprescription medicine is available for  "the treatment of diarrhea and can be very effective. If you use it, make sure you use the dose recommended on the package. Check with your healthcare provider before you use any medicine for diarrhea.   Do not take ibuprofen, or other nonsteroidal anti-inflammatory medicines, without checking with your healthcare provider.   Probiotics: If you have been given an antibiotic, you may want to also take a probiotic pill or eat yogurt with live cultures. Probiotics have \"good bacteria\" to help your intestines stay healthy. Studies have shown that probiotics help prevent diarrhea and other intestine problems (including C. diff infection) when you take antibiotics. You can buy these without a prescription in the pharmacy section of the store.   If you were given a prescription for medicine here today, be sure to read all of the information (including the package insert) that comes with your prescription.  This will include important information about the medicine, its side effects, and any warnings that you need to know about.  The pharmacist who fills the prescription can provide more information and answer questions you may have about the medicine.  If you have questions or concerns that the pharmacist cannot address, please call or return to the Emergency Department.  Remember that you can always come back to the Emergency Department if you are not able to see your regular provider in the amount of time listed above, if you get any new symptoms, or if there is anything that worries you.    Discharge Instructions  Abdominal Pain    Abdominal pain (belly pain) can be caused by many things. Your evaluation today does not show the exact cause for your pain. Your provider today has decided that it is unlikely your pain is due to a life threatening problem, or a problem requiring surgery or hospital admission. Sometimes those problems cannot be found right away, so it is very important that you follow up as directed.  " Sometimes only the changes which occur over time allow the cause of your pain to be found.    Generally, every Emergency Department visit should have a follow-up clinic visit with either a primary or a specialty clinic/provider. Please follow-up as instructed by your emergency provider today. With abdominal pain, we often recommend very close follow-up, such as the following day.    ADULTS:  Return to the Emergency Department right away if:    You get an oral temperature above 102oF or as directed by your provider.  You have blood in your stools. This may be bright red or appear as black, tarry stools.    You keep vomiting (throwing up) or cannot drink liquids.  You see blood when you vomit.   You cannot have a bowel movement or you cannot pass gas.  Your stomach gets bloated or bigger.  Your skin or the whites of your eyes look yellow.  You faint.  You have bloody, frequent or painful urination (peeing).  You have new symptoms or anything that worries you.    CHILDREN:  Return to the Emergency Department right away if your child has any of the above-listed symptoms or the following:    Pushes your hand away or screams/cries when his/her belly is touched.  You notice your child is very fussy or weak.  Your child is very tired and is too tired to eat or drink.  Your child is dehydrated.  Signs of dehydration can be:  Significant change in the amount of wet diapers/urine.  Your infant or child starts to have dry mouth and lips, or no saliva (spit) or tears.    PREGNANT WOMEN:  Return to the Emergency Department right away if you have any of the above-listed symptoms or the following:    You have bleeding, leaking fluid or passing tissue from the vagina.  You have worse pain or cramping, or pain in your shoulder or back.  You have vomiting that will not stop.  You have a temperature of 100oF or more.  Your baby is not moving as much as usual.  You faint.  You get a bad headache with or without eye problems and  "abdominal pain.  You have a seizure.  You have unusual discharge from your vagina and abdominal pain.    Abdominal pain is pretty common during pregnancy.  Your pain may or may not be related to your pregnancy. You should follow-up closely with your OB provider so they can evaluate you and your baby.  Until you follow-up with your regular provider, do the following:     Avoid sex and do not put anything in your vagina.  Drink clear fluids.  Only take medications approved by your provider.    MORE INFORMATION:    Appendicitis:  A possible cause of abdominal pain in any person who still has their appendix is acute appendicitis. Appendicitis is often hard to diagnose.  Testing does not always rule out early appendicitis or other causes of abdominal pain. Close follow-up with your provider and re-evaluations may be needed to figure out the reason for your abdominal pain.    Follow-up:  It is very important that you make an appointment with your clinic and go to the appointment.  If you do not follow-up with your primary provider, it may result in missing an important development which could result in permanent injury or disability and/or lasting pain.  If there is any problem keeping your appointment, call your provider or return to the Emergency Department.    Medications:  Take your medications as directed by your provider today.  Before using over-the-counter medications, ask your provider and make sure to take the medications as directed.  If you have any questions about medications, ask your provider.    Diet:  Resume your normal diet as much as possible, but do not eat fried, fatty or spicy foods while you have pain.  Do not drink alcohol or have caffeine.  Do not smoke tobacco.    Probiotics: If you have been given an antibiotic, you may want to also take a probiotic pill or eat yogurt with live cultures. Probiotics have \"good bacteria\" to help your intestines stay healthy. Studies have shown that probiotics help " prevent diarrhea (loose stools) and other intestine problems (including C. diff infection) when you take antibiotics. You can buy these without a prescription in the pharmacy section of the store.     If you were given a prescription for medicine here today, be sure to read all of the information (including the package insert) that comes with your prescription.  This will include important information about the medicine, its side effects, and any warnings that you need to know about.  The pharmacist who fills the prescription can provide more information and answer questions you may have about the medicine.  If you have questions or concerns that the pharmacist cannot address, please call or return to the Emergency Department.       Remember that you can always come back to the Emergency Department if you are not able to see your regular provider in the amount of time listed above, if you get any new symptoms, or if there is anything that worries you.

## 2022-11-11 ENCOUNTER — VIRTUAL VISIT (OUTPATIENT)
Dept: PHARMACY | Facility: CLINIC | Age: 52
End: 2022-11-11
Payer: COMMERCIAL

## 2022-11-11 DIAGNOSIS — E78.5 HYPERLIPIDEMIA LDL GOAL <100: ICD-10-CM

## 2022-11-11 DIAGNOSIS — E66.813 CLASS 3 SEVERE OBESITY DUE TO EXCESS CALORIES WITH BODY MASS INDEX (BMI) OF 50.0 TO 59.9 IN ADULT, UNSPECIFIED WHETHER SERIOUS COMORBIDITY PRESENT (H): Primary | ICD-10-CM

## 2022-11-11 DIAGNOSIS — R11.2 NAUSEA AND VOMITING, UNSPECIFIED VOMITING TYPE: ICD-10-CM

## 2022-11-11 DIAGNOSIS — Z78.9 TAKES DIETARY SUPPLEMENTS: ICD-10-CM

## 2022-11-11 DIAGNOSIS — Z72.0 TOBACCO ABUSE: ICD-10-CM

## 2022-11-11 DIAGNOSIS — E66.01 CLASS 3 SEVERE OBESITY DUE TO EXCESS CALORIES WITH BODY MASS INDEX (BMI) OF 50.0 TO 59.9 IN ADULT, UNSPECIFIED WHETHER SERIOUS COMORBIDITY PRESENT (H): Primary | ICD-10-CM

## 2022-11-11 DIAGNOSIS — E11.9 TYPE 2 DIABETES MELLITUS WITHOUT COMPLICATION, UNSPECIFIED WHETHER LONG TERM INSULIN USE (H): ICD-10-CM

## 2022-11-11 PROCEDURE — 99606 MTMS BY PHARM EST 15 MIN: CPT | Performed by: PHARMACIST

## 2022-11-11 PROCEDURE — 99607 MTMS BY PHARM ADDL 15 MIN: CPT | Performed by: PHARMACIST

## 2022-11-11 RX ORDER — NICOTINE 21 MG/24HR
1 PATCH, TRANSDERMAL 24 HOURS TRANSDERMAL EVERY 24 HOURS
Qty: 100 PATCH | Refills: 1 | Status: SHIPPED | OUTPATIENT
Start: 2022-11-11 | End: 2022-12-16

## 2022-11-11 RX ORDER — ONDANSETRON 4 MG/1
4 TABLET, ORALLY DISINTEGRATING ORAL EVERY 8 HOURS PRN
Qty: 10 TABLET | Refills: 0 | Status: SHIPPED | OUTPATIENT
Start: 2022-11-11 | End: 2024-05-02

## 2022-11-11 RX ORDER — ROSUVASTATIN CALCIUM 10 MG/1
10 TABLET, COATED ORAL DAILY
Qty: 90 TABLET | Refills: 3 | Status: SHIPPED | OUTPATIENT
Start: 2022-11-11 | End: 2024-05-07 | Stop reason: DRUGHIGH

## 2022-11-11 NOTE — Clinical Note
Juan J Velarde,  I got to meet with Yancy today.  Had recent ED visit for intractable nausea vomiting and diarrhea.  Unlikely related to Saxenda as had been on 1.8 mg Victoza in the past without issue.  But slowed down titration as also increasing adherence to metformin.  Also sent in prescription for Zofran to help with tolerability.  And educated on hydration.    Patient is motivated to make change to improve health.  Started nicotine replacement today as well.  Patient to follow-up with pharmacist in 4 weeks.  Micki Martins, DeniaD, MPH Medication Therapy Management Pharmacist [facility]  Secure Voicemail: [phone]

## 2022-11-11 NOTE — PROGRESS NOTES
Medication Therapy Management (MTM) Encounter    ASSESSMENT:                            Medication Adherence/Access: Educated patient may take metformin extended release once daily as more adherent in the morning.  Due to recent nausea and vomiting recommended slow titration back on full 2 g dose daily.      Type 2 Diabetes/Obesity: A1c not at goal less than 7% and not currently monitoring blood sugar at home.   Patient tolerated Victoza 1.8 mg dose previously, so likely current nausea vomiting diarrhea not related to medication.  To help patient recover sent Zofran prescription to use as needed and recommended slower titration of Saxenda until feeling better.  Agree with plan to continue Saxenda to maximize dosing for weight loss.  Optimizing and regular dosing of metformin (see above) also may help intrinsic insulin sensitivity in addition to macrovascular benefit.  Did not have time to discuss aspirin for primary prevention today -we will discuss at future visit. Albumin/creatinine ratio due -did not discuss at today's visit we will follow-up at future visit.    Smoking cessation: Patient ready to start tobacco cessation therapy today.  Sent 21 mg NicoDerm patch today as patient smoking a pack a day of cigarettes.  Also sent refill for 4 mg Nicorette gum to help with cravings.    Hyperlipidemia: Patient not at goal of LDL less than 100 mg/dL nor on moderate intensity statin with history of diabetes.      Supplements: Due for vitamin D lab -educated patient on importance of establishing with PCP.  Deferred to PCP for vitamin D management.      PLAN:                            1) Hold on Saxenda titration - keep 1.2 mg daily dose for 1 more week (you may keep it at 1.2 mg daily longer if needed until you feel better), then increase to 1.8 mg daily for at least 2 weeks.  If you are feeling okay you may try 2.4 mg daily for few days before our next visit on December 16.    2) Restart metformin with food in the  morning.  Start 2 tablets once daily in the morning for 1 week.  Week 2: 3 tablets once daily in the morning.  Week 3: 4 tablets once daily in the morning.    3) I sent you a prescription for Zofran for nausea 4 mg tablets which dissolve in your mouth.  You may take 1 every 8 hours if you are still having nausea and vomiting.  Keep pushing fluids to help with dehydration.  This will help you feel better.  I recommend cold Pedialyte to help replenish fluids and electrolytes.  Water is also a good choice.    4) We are starting rosuvastatin 10 mg once daily for cholesterol and heart health.  As discussed monitor for muscle pain and weakness.  If you notice these things please contact me.    5) I am proud of you for taking the steps to quit smoking!  You have done it before and I know you can do it again.  We are here to help you.  I sent as discussed NicoDerm 21 mg patches for you to use once daily to help fight cravings overall.  I also sent for milligram Nicorette gum for you to chew every 2 hours if needed for cigarette cravings.  If her craving a cigarette grabbed that gum chew it until peppery and then park it in your gum between your teeth and cheek to help satisfy cravings.  You may chew 1 piece the gum for up to 30 minutes.  Do not use more than 24 pieces of gum in 24 hours.    6) Start testing blood sugars once daily before breakfast and keep log of these numbers.  Pharmacist will discuss at next visit.    7) Due to see your primary care provider -keep working to find a provider who may work better with your work schedule.  It is important for you to have a primary care provider to help support you in your over all wellbeing.    Follow-up: December 16th at 12:30 PM for video visit with and at goal pharmacist.    SUBJECTIVE/OBJECTIVE:                          Yancy Hoover is a 52 year old female seen for a follow-up visit.  Today's visit is a follow-up MT visit from 9/8/22     Reason for visit: Medication  Therapy Management - Weight Management.    Allergies/ADRs: Reviewed in chart  Past Medical History: Reviewed in chart  Tobacco: She reports that she has been smoking cigarettes. She has a 10.00 pack-year smoking history. She has never used smokeless tobacco.Nicotine/Tobacco Cessation Plan:  Pharmacotherapies : Nicotine patch and Nicotine gum see below    Alcohol: not currently using  Caffeine: 1 cup coffee daily    Medication Adherence/Access: Has difficult time remembering to take medications twice daily.  Believes in the past 2 weeks has only remembered to take metformin twice daily maybe 5 days.  Never forgets morning meds.  Gets most meds through [facility] mail order which helps with refills.  Patient having difficulty finding primary care provider who is hours work well with her schedule.    Type 2 Diabetes/Obesity:    Metformin XR 1000 mg twice daily with meals   Saxenda 1.8 mg once daily  - increased Tuesday   - switched to Saxenda   - stopped      Followed up with Dr. Velarde, last seen 10/21/22 for Medical Weight Management.  Patient was seen in ED 2 days ago for intractable vomiting and diarrhea.  She believes this was related to food poisoning, it was also the same day she increased her Saxenda dose.  Patient felt she was tolerating earlier doses of Saxenda and Victoza well previously. Difficult for patient to tell whether new dose of Saxenda was working well for blood sugar appetite control as has not been feeling well for the past several days. She stopped topiramate due to pain/tingling in feet - has been off of it for 3+ months.  Patient notes that her diabetes and weight are not well controlled at this moment because she has not been focusing on taking care of herself as she would like.  Her fiancé has been transitioned home from hospital after severe injury which requires a lot of care.  She also has children at home who she cares for.  SMBG: Patient has not been monitoring blood sugars as  has been moving and has not gotten out glucometer yet  Symptoms of low blood sugar? none  Symptoms of high blood sugar? none  Eye exam: due  Foot exam: due  Diet/Exercise: see above  Aspirin: Not taking   Statin: No   ACEi/ARB: No.   Urine Albumin: No results found for: UMALCR      Lab Results   Component Value Date    A1C 8.1 10/28/2022    A1C 7.4 01/21/2021       Weight: 280 lb - 11/9/22 in ED  Wt Readings from Last 4 Encounters:   05/20/22 274 lb (124.3 kg)   01/27/22 300 lb (136.1 kg)   12/10/21 286 lb (129.7 kg)   04/02/21 297 lb (134.7 kg)     Smoking Cessation:         Patient interested in starting therapies for smoking cessation. Has had success in the past with NRT patch + gum.  Finds current nicotine patch and gum not effective.  Smokes about 1 pack/day and first cigarette <30 mins after waking.    Hyperlipidemia: Current therapy includes no current medications.    The 10-year ASCVD risk score (Gabo DÍAZ, et al., 2019) is: 13%    Values used to calculate the score:      Age: 52 years      Sex: Female      Is Non- : No      Diabetic: Yes      Tobacco smoker: Yes      Systolic Blood Pressure: 143 mmHg      Is BP treated: No      HDL Cholesterol: 37 mg/dL      Total Cholesterol: 191 mg/dL  Recent Labs   Lab Test 10/28/22  1043   CHOL 191   HDL 37*   *   TRIG 89         Supplements:   Vitamin D 2000 international unit(s) daily   Multivitamin daily     No concerns.   Vitamin D Deficiency Screening Results: None recent    Today's Vitals: There were no vitals taken for this visit.  ----------------      I spent 33 minutes with this patient today. All changes were made via collaborative practice agreement with Dr. Danial Velarde. A copy of the visit note was provided to the patient's provider(s).    The patient was sent via [employer] a summary of these recommendations.     Micki Martins, PharmD, MPH  Medication Therapy Management Pharmacist  [facility]    Secure Voicemail: [phone]      Telemedicine Visit Details  Type of service:  Video Conference via [employer]  Start Time: 8:40 AM  End Time: 9:13 AM  Originating Location (pt. Location): Home      Distant Location (provider location):  Off-site  Provider has received verbal consent for a visit from the patient? Yes     Medication Therapy Recommendations  Class 3 severe obesity due to excess calories with body mass index (BMI) of 50.0 to 59.9 in adult, unspecified whether serious comorbidity present (H)    Current Medication: liraglutide - Weight Management (SAXENDA) 18 MG/3ML pen   Rationale: Dosage increase/decrease too fast - Adverse medication event - Safety   Recommendation: Provide Education - Slow titration of Saxenda.  And Zofran for as needed nausea   Status: Accepted per CPA         Hyperlipidemia LDL goal <100    Rationale: Untreated condition - Needs additional medication therapy - Indication   Recommendation: Start Medication - rosuvastatin 10 MG tablet - Start moderate intensity statin   Status: Accepted per CPA         Tobacco abuse    Rationale: Untreated condition - Needs additional medication therapy - Indication   Recommendation: Start Medication - Start NRT   Status: Accepted per CPA         Type 2 diabetes mellitus without complication, unspecified whether long term insulin use (H)    Current Medication: liraglutide - Weight Management (SAXENDA) 18 MG/3ML pen   Rationale: Medication requires monitoring - Needs additional monitoring - Effectiveness   Recommendation: Self-Monitoring - Educated to restart SMBG   Status: Patient Agreed - Adherence/Education          Current Medication: metFORMIN (GLUCOPHAGE XR) 500 MG 24 hr tablet   Rationale: Patient forgets to take - Adherence - Adherence   Recommendation: Provide Education - Moved to more convenient once daily dosing with slow titration   Status: Accepted per CPA

## 2022-11-14 ASSESSMENT — ENCOUNTER SYMPTOMS
SHORTNESS OF BREATH: 0
COUGH: 0
FEVER: 0
NAUSEA: 1
CHILLS: 0
DYSURIA: 0
VOMITING: 1
DIARRHEA: 1
ABDOMINAL PAIN: 1
BLOOD IN STOOL: 0

## 2022-12-16 ENCOUNTER — VIRTUAL VISIT (OUTPATIENT)
Dept: PHARMACY | Facility: CLINIC | Age: 52
End: 2022-12-16
Payer: COMMERCIAL

## 2022-12-16 ENCOUNTER — MYC MEDICAL ADVICE (OUTPATIENT)
Dept: PHARMACY | Facility: CLINIC | Age: 52
End: 2022-12-16

## 2022-12-16 DIAGNOSIS — E11.9 TYPE 2 DIABETES MELLITUS WITHOUT COMPLICATION, UNSPECIFIED WHETHER LONG TERM INSULIN USE (H): ICD-10-CM

## 2022-12-16 DIAGNOSIS — E11.9 TYPE 2 DIABETES MELLITUS WITHOUT COMPLICATION, WITHOUT LONG-TERM CURRENT USE OF INSULIN (H): ICD-10-CM

## 2022-12-16 DIAGNOSIS — E66.813 CLASS 3 SEVERE OBESITY DUE TO EXCESS CALORIES WITH SERIOUS COMORBIDITY IN ADULT, UNSPECIFIED BMI (H): Primary | ICD-10-CM

## 2022-12-16 DIAGNOSIS — E78.5 HYPERLIPIDEMIA LDL GOAL <100: ICD-10-CM

## 2022-12-16 DIAGNOSIS — E66.01 CLASS 3 SEVERE OBESITY DUE TO EXCESS CALORIES WITH SERIOUS COMORBIDITY IN ADULT, UNSPECIFIED BMI (H): Primary | ICD-10-CM

## 2022-12-16 DIAGNOSIS — Z72.0 TOBACCO ABUSE: ICD-10-CM

## 2022-12-16 PROCEDURE — 99606 MTMS BY PHARM EST 15 MIN: CPT | Performed by: PHARMACIST

## 2022-12-16 PROCEDURE — 99607 MTMS BY PHARM ADDL 15 MIN: CPT | Performed by: PHARMACIST

## 2022-12-16 RX ORDER — METFORMIN HCL 500 MG
1000 TABLET, EXTENDED RELEASE 24 HR ORAL 2 TIMES DAILY WITH MEALS
Qty: 360 TABLET | Refills: 3 | Status: SHIPPED | OUTPATIENT
Start: 2022-12-16 | End: 2024-05-07

## 2022-12-16 RX ORDER — NICOTINE 21 MG/24HR
1 PATCH, TRANSDERMAL 24 HOURS TRANSDERMAL EVERY 24 HOURS
Qty: 100 PATCH | Refills: 1 | Status: SHIPPED | OUTPATIENT
Start: 2022-12-16 | End: 2024-05-02

## 2022-12-16 RX ORDER — GLUCOSAMINE HCL/CHONDROITIN SU 500-400 MG
CAPSULE ORAL
Qty: 100 EACH | Refills: 5 | Status: SHIPPED | OUTPATIENT
Start: 2022-12-16 | End: 2024-01-18

## 2022-12-16 NOTE — PROGRESS NOTES
Medication Therapy Management (MTM) Encounter    ASSESSMENT:                            Medication Adherence/Access: Improving.  See below for considerations      Type 2 Diabetes/Obesity: A1c not at goal less than 7% and not currently monitoring blood sugar at home.   Patient not feeling well.  Recommended focus on adherence of Saxenda 1.2 mg dose.  As had some intolerability on 1.8 mg daily dose previously, will focus on adherence of current dose before titrating Saxenda. We will consider increase in a few weeks with follow-up visit when feeling better.  Did not have time to discuss aspirin for primary prevention today -we will discuss at future visit. Albumin/creatinine ratio due -did not discuss at today's visit we will follow-up at future visit.    Smoking cessation: At goal.  Discussed tapering therapies today.  Patient declined as current dose is working well however still having cravings.  Her 8-year-old son helps her to have motivation to remain off of cigarettes.    Hyperlipidemia: Improving.  Patient on moderate intensity statin with comorbid diabetes.  Due for lipid panel in about 1 month.      PLAN:                            1) keep current Saxenda dose at 1.2 mg daily -work on taking this daily to help with our plan to increase at her next visit.    2) refill sent for metformin, alcohol pads, pen needles, nicotine patch, nicotine gum.    3) Start testing blood sugars once daily before breakfast and keep log of these numbers.  Pharmacist will discuss at next visit.    4) Due to see your primary care provider -keep working to find a provider who may work better with your work schedule.  It is important for you to have a primary care provider to help support you in your over all wellbeing.      Follow-up: January 13, 2023 at 12:30 PM for video visit with mike Peres      SUBJECTIVE/OBJECTIVE:                          Yancy Hoover is a 52 year old female contacted via secure video for a  follow-up visit.  Today's visit is a follow-up MTM visit from 11/11/22.     Reason for visit: Medication Therapy Management - Weight Management.    Allergies/ADRs: Reviewed in chart  Past Medical History: Reviewed in chart  Tobacco: She reports that she quit smoking about 2 weeks ago. Her smoking use included cigarettes. She has a 10.00 pack-year smoking history. She has never used smokeless tobacco.Nicotine/Tobacco Cessation Plan:   Pharmacotherapies : Nicotine patch and Nicotine gum  Nicotine patch 21 mg daily  Nicotine gum 4 mg every 1-2 hours as directed    Alcohol: not currently using  Caffeine: 1 cup coffee daily    Medication Adherence/Access: Patient has had fewer missed doses since last visit.  Due for refills on pen needles, alcohol pads, NicoDerm, Nicorette, metformin.  Through [facility] specialty mail order which helps with refills.  Patient having difficulty finding primary care provider who is hours work well with her schedule.    Type 2 Diabetes/Obesity:    Metformin XR 1000 mg twice daily with meals   Saxenda 1.2 mg once daily         Followed up with Dr. Velarde, last seen 10/21/22 for Medical Weight Management.  Patient notes that her diabetes and weight are not well controlled at this moment because she has not been focusing on taking care of herself as she would like.  Her fiancé has been transitioned home from hospital after severe injury which requires a lot of care.  She also has children at home who she cares for.  Patient denies side effects.  Feels better on lower dose of Saxenda 1.2 mg daily. Though occasionally misses Saxenda recently as has not been feeling well.  Patient willing and interested to increase Saxenda as has not seen significant weight loss on current dose.  SMBG: Patient has not been monitoring blood sugars as has been moving and has not gotten out glucometer yet  Symptoms of low blood sugar? none  Symptoms of high blood sugar? none  Eye exam: due  Foot  exam: due  Diet/Exercise: see above  Aspirin: Not taking   Statin: No   ACEi/ARB: No.   Urine Albumin: No results found for: UMALCR      Lab Results   Component Value Date    A1C 8.1 10/28/2022    A1C 7.4 01/21/2021       Weight: 280 lb - 11/9/22 in ED  Wt Readings from Last 4 Encounters:   05/20/22 274 lb (124.3 kg)   01/27/22 300 lb (136.1 kg)   12/10/21 286 lb (129.7 kg)   04/02/21 297 lb (134.7 kg)       Hyperlipidemia:   Rosuvastatin 10 mg once daily    Patient started this after last visit with Pharm.D.  Denies side effects.  The 10-year ASCVD risk score (Gabo DÍAZ, et al., 2019) is: 13%    Values used to calculate the score:      Age: 52 years      Sex: Female      Is Non- : No      Diabetic: Yes      Tobacco smoker: Yes      Systolic Blood Pressure: 143 mmHg      Is BP treated: No      HDL Cholesterol: 37 mg/dL      Total Cholesterol: 191 mg/dL  Recent Labs   Lab Test 10/28/22  1043   CHOL 191   HDL 37*   *   TRIG 89           Today's Vitals: There were no vitals taken for this visit. -Virtual visit  ----------------      I spent 23 minutes with this patient today. All changes were made via collaborative practice agreement with Dr. Danial Velarde MD. A copy of the visit note was provided to the patient's provider(s).    A summary of these recommendations was sent via Wedivite.    Micki Martins, DeniaD, MPH  Medication Therapy Management Pharmacist   [facility] (through 1/20/23)        Telemedicine Visit Details  Type of service:  Video Conference via Healthkart  Start Time: 12:37 PM  End Time: 1 PM  Originating Location (pt. Location): Home      Distant Location (provider location):  Off-site  Provider has received verbal consent for a visit from the patient? Yes     Medication Therapy Recommendations  Type 2 diabetes mellitus without complication, unspecified whether long term insulin use (H)    Current Medication: liraglutide - Weight  Management (SAXENDA) 18 MG/3ML pen   Rationale: Medication requires monitoring - Needs additional monitoring - Effectiveness   Recommendation: Self-Monitoring - Recommend monitoring blood sugar at home   Status: Patient Agreed - Adherence/Education

## 2022-12-16 NOTE — Clinical Note
Dr. Velarde,  I met with Yancy today and we are working on adherence as patient not feeling well.  We will follow-up in about 4 weeks to discuss increase of Saxenda.  Great news is that patient has stopped smoking for the past 2 weeks!  I have course will provide any updates at our next visit where we plan to increase Saxenda.  Micki Martins, DeniaD, MPH Medication Therapy Management Pharmacist  [facility] (through 1/20/23)

## 2022-12-16 NOTE — PATIENT INSTRUCTIONS
"Recommendations from today's MTM visit:                                                    MTM (medication therapy management) is a service provided by a clinical pharmacist designed to help you get the most of out of your medicines.      1) keep current Saxenda dose at 1.2 mg daily -work on taking this daily to help with our plan to increase at her next visit.    2) refill sent for metformin, alcohol pads, pen needles, nicotine patch, nicotine gum.    3) Start testing blood sugars once daily before breakfast and keep log of these numbers.  Pharmacist will discuss at next visit.    4) Due to see your primary care provider -keep working to find a provider who may work better with your work schedule.  It is important for you to have a primary care provider to help support you in your over all wellbeing.      Follow-up: January 13, 2023 at 12:30 PM for video visit with mike Peres. Call [phone] if you need to reschedule.    It was great speaking with you today.  I value your experience and would be very thankful for your time in providing feedback in our clinic survey. In the next few days, you may receive an email or text message from [employer] with a link to a survey related to your  clinical pharmacist.\"     My Clinical Pharmacist's contact information:                                                      Please feel free to contact me with any questions or concerns you have.      Micki Martins, PharmD, MPH  Medication Therapy Management Pharmacist   [facility] (through 1/20/23)     Lauren Bloch, Veda  Medication Therapy Management Pharmacist   [facility] [place]              "

## 2022-12-23 ENCOUNTER — TELEPHONE (OUTPATIENT)
Dept: PHARMACY | Facility: CLINIC | Age: 52
End: 2022-12-23

## 2022-12-23 NOTE — TELEPHONE ENCOUNTER
Clinical Pharmacy Note    MTM pharmacist called [facility] to look into sending metformin updated Rx from PharmD visit 12/16/22 as patient not able to refill at 2 tabs Metformin ER twice daily per patient Cabby[employer] message.     They have the prescription for metformin  mg 2 tablets twice daily.  They were not able to reach patient on December 16 to verify delivery.  So patient may call pharmacy and sent by mail likely to receive early next week, go to [facility] pharmacy to  today before 4 p.m., or transfer to pharmacy close to home.    Refill not needed.  Pharmacy has 3-month supply prescription at [facility].  Patient has been contacted via ServerPilot (voicemail is full) updating of the situation with options.    Micki Martins, DeniaD, MPH  Medication Therapy Management Pharmacist   [facility] (through 1/20/23)

## 2023-01-13 ENCOUNTER — TELEPHONE (OUTPATIENT)
Dept: ENDOCRINOLOGY | Facility: CLINIC | Age: 53
End: 2023-01-13

## 2023-01-13 ENCOUNTER — VIRTUAL VISIT (OUTPATIENT)
Dept: PHARMACY | Facility: CLINIC | Age: 53
End: 2023-01-13
Payer: COMMERCIAL

## 2023-01-13 DIAGNOSIS — E66.813 CLASS 3 SEVERE OBESITY DUE TO EXCESS CALORIES WITH SERIOUS COMORBIDITY IN ADULT, UNSPECIFIED BMI (H): ICD-10-CM

## 2023-01-13 DIAGNOSIS — E66.01 CLASS 3 SEVERE OBESITY DUE TO EXCESS CALORIES WITH SERIOUS COMORBIDITY IN ADULT, UNSPECIFIED BMI (H): ICD-10-CM

## 2023-01-13 DIAGNOSIS — E78.5 HYPERLIPIDEMIA LDL GOAL <100: ICD-10-CM

## 2023-01-13 DIAGNOSIS — Z72.0 TOBACCO ABUSE: Primary | ICD-10-CM

## 2023-01-13 DIAGNOSIS — Z78.9 TAKES DIETARY SUPPLEMENTS: ICD-10-CM

## 2023-01-13 DIAGNOSIS — E11.9 TYPE 2 DIABETES MELLITUS WITHOUT COMPLICATION, UNSPECIFIED WHETHER LONG TERM INSULIN USE (H): ICD-10-CM

## 2023-01-13 PROCEDURE — 99607 MTMS BY PHARM ADDL 15 MIN: CPT | Performed by: PHARMACIST

## 2023-01-13 PROCEDURE — 99605 MTMS BY PHARM NP 15 MIN: CPT | Performed by: PHARMACIST

## 2023-01-13 RX ORDER — BLOOD SUGAR DIAGNOSTIC
STRIP MISCELLANEOUS
Qty: 100 STRIP | Refills: 5 | Status: SHIPPED | OUTPATIENT
Start: 2023-01-13

## 2023-01-13 RX ORDER — LIRAGLUTIDE 6 MG/ML
INJECTION, SOLUTION SUBCUTANEOUS
Qty: 45 ML | Refills: 1 | Status: SHIPPED | OUTPATIENT
Start: 2023-01-13 | End: 2024-01-18

## 2023-01-13 RX ORDER — LANCETS
EACH MISCELLANEOUS
Qty: 102 EACH | Refills: 5 | Status: SHIPPED | OUTPATIENT
Start: 2023-01-13

## 2023-01-13 NOTE — PROGRESS NOTES
Medication Therapy Management (MTM) Encounter    ASSESSMENT:                            Medication Adherence/Access: No issues identified    Type 2 Diabetes/Obesity: A1c not at goal less than 7% and not currently monitoring blood sugar at home.  Patient not meeting weight management goals currently.  Agreeable to slow titration of Saxenda to help start the diabetes weight management goals.  Education provided to help with tolerability.  Did not have time to discuss aspirin for primary prevention today, microalbuminuria screening due -defer to PCP to establish with new provider    Smoking cessation: Improving.  Education provided to help return to total smoking cessation.   Her 8-year-old son helps her to have motivation to remain off of cigarettes.    Hyperlipidemia: Improving.  Patient on moderate intensity statin with comorbid diabetes.  Due for lipid panel -defer to newly established PCP.    Supplements: Patient due for vitamin D labs.  Recommend follow-up and establishing with primary care provider.    PLAN:                            1) Continue slow titration of Saxenda: It is a subcutaneous injection that you inject once daily and titrate the dose slowly over time. Make sure inject the same time each day.   Week 1: If tolerating, increase to 1.2 mg once daily   Week 2: If tolerating, increase to 1.8 mg once daily   Week 3: If tolerating, increase to 2.4 mg once daily   Week 4 & on: If tolerating, increase to 3 mg once daily   *If you are having some nausea or other side effect(s) to where you are hesitant to move up to the next dose, stay at the same dose you are on for an additional week to see if side effects improves/resolves. Make sure to take this time to hydrate and ensure you are drinking at least 64 oz water per day.     2) Schedule visit with Primary Care Clinic at [facility].    3) Refill sent for diabetes testing supplies. Please test twice daily: once before any food in the morning and  2 hours after 1 meal each day.    4) Keep working to quit those last few cigarettes with your nicotine patches and gum.    Follow-up: 2/3 with Dr. Velarde.  As needed with pharmacist.      SUBJECTIVE/OBJECTIVE:                          Yancy Hoover is a 52 year old female contacted via secure video for a follow-up visit.  Today's visit is a follow-up MT visit from 12/16/2022     Reason for visit: Medication Therapy Management -weight management.    Allergies/ADRs: Reviewed in chart  Past Medical History: Reviewed in chart  Tobacco: She reports that she quit smoking about 7 weeks ago. Her smoking use included cigarettes. She has a 10.00 pack-year smoking history. She has never used smokeless tobacco.  Nicotine patch 21 mg daily  Nicotine gum 4 mg every 1-2 hours as directed  This is helped patient to reduce smoking to a few cigarettes daily.  Patient has been having stressful time recently and is proud that has not increased smoking.  Denies needing additional therapy for help quitting find a few cigarettes.    Alcohol: not currently using  Caffeine: 1 cup coffee daily    Medication Adherence/Access:  Continuing to work on daily adherence.    Type 2 Diabetes/Obesity:    Metformin XR 1000 mg twice daily with meals -now receiving this from mail order pharmacy which is helpful for adherence.  Saxenda 1.2 mg once daily         Followed up with Dr. Velarde, last seen 10/21/22 for Medical Weight Management.  Patient notes that her diabetes and weight are not well controlled at this moment because she has not been focusing on taking care of herself as she would like.  Her fiancé has been transitioned home from hospital after severe injury which requires a lot of care.  She also has children at home who she cares for.  Patient denies side effects.  Feels better on lower dose of Saxenda 1.2 mg daily -now that stable would like to increase up to 3 mg daily with slow titration.   SMBG: Patient has not been monitoring blood  sugars -found Accu-Chek meter and needs supplies to help testing.  Symptoms of low blood sugar? none  Symptoms of high blood sugar? none  Eye exam: due  Foot exam: due  Diet/Exercise: see above  Aspirin: Not taking   Statin: No   ACEi/ARB: No.   Urine Albumin: No results found for: UMALCR      Lab Results   Component Value Date    A1C 8.1 10/28/2022    A1C 7.4 01/21/2021       Weight: 280 lb - 11/9/22 in ED  Wt Readings from Last 4 Encounters:   05/20/22 274 lb (124.3 kg)   01/27/22 300 lb (136.1 kg)   12/10/21 286 lb (129.7 kg)   04/02/21 297 lb (134.7 kg)       Hyperlipidemia:   Rosuvastatin 10 mg once daily    Patient started this after last visit with Pharm.D.  Denies side effects.  The 10-year ASCVD risk score (Gabo DÍAZ, et al., 2019) is: 13%    Values used to calculate the score:      Age: 52 years      Sex: Female      Is Non- : No      Diabetic: Yes      Tobacco smoker: Yes      Systolic Blood Pressure: 143 mmHg      Is BP treated: No      HDL Cholesterol: 37 mg/dL      Total Cholesterol: 191 mg/dL  Recent Labs   Lab Test 10/28/22  1043   CHOL 191   HDL 37*   *   TRIG 89         Supplements:   Vitamin D 2000 international unit(s) daily   Multivitamin daily     No concerns.   Vitamin D Deficiency Screening Results:  No results found for: VITDT      Today's Vitals: There were no vitals taken for this visit. -Virtual visit  ----------------      I spent 18 minutes with this patient today. All changes were made via collaborative practice agreement with Dr. Velarde. A copy of the visit note was provided to the patient's provider(s).    A summary of these recommendations was sent via [employer].    Micki Martins, DeniaD, MPH  Medication Therapy Management Pharmacist   [facility] (through 1/20/23)        Telemedicine Visit Details  Type of service:  Video Conference via Civolution  Start Time: 12:42 PM  End Time: 1 PM     Medication Therapy  Recommendations  Class 3 severe obesity due to excess calories with body mass index (BMI) of 50.0 to 59.9 in adult, unspecified whether serious comorbidity present (H)    Current Medication: liraglutide - Weight Management (SAXENDA) 18 MG/3ML pen   Rationale: Dose too low - Dosage too low - Effectiveness   Recommendation: Increase Dose - Recommend slow titration   Status: Accepted per CPA         Type 2 diabetes mellitus without complication, unspecified whether long term insulin use (H)    Current Medication: liraglutide - Weight Management (SAXENDA) 18 MG/3ML pen   Rationale: Medication requires monitoring - Needs additional monitoring - Effectiveness   Recommendation: Self-Monitoring - Sent refills for diabetes testing supplies   Status: Accepted per CPA

## 2023-01-13 NOTE — TELEPHONE ENCOUNTER
"Prior Authorization Retail Medication Request      Medication/Dose: Saxenda & Diabetes Testing Supplies  ICD code (if different than what is on RX):  Same as on RX  Previously Tried and Failed:  Diet and exercise alone for at least 3 months without sustainable weight loss success. Metformin; Topiramate - unmanageable side effects  Rationale: Weight loss medication(s) are indicated due to patient having a BMI of at least 30 kg/m2, including Saxenda. Patient is currently on Victoza 1.8 mg daily for Type 2 Diabetes. Plan is if Saxenda is approved patient will transition from Victoza to Saxenda. Phentermine or other stimulant containing medication is not appropriate to prescribe at this time due to unknown blood pressure status and last blood pressure that is on file is elevated above blood pressure goals. Also due to patient's age, no recent EKG results.      Estimated body mass index is 46.31 kg/m  as calculated from the following:    Height as of 5/20/22: 5' 4.5\" (1.638 m).    Weight as of 5/20/22: 274 lb (124.3 kg).     Insurance Name:  [employer] MA  Insurance ID:  [id number]      DIABETES testing supplies - Accuchek specifically needed as patient has Accucheck meter at home.    Pharmacy Information (if different than what is on RX)  Name:  [facility]      Micki Martins, DeniaD, MPH  Medication Therapy Management Pharmacist   [facility] (through 1/20/23)      "

## 2023-01-13 NOTE — Clinical Note
Melisa Velarde  I had the opportunity to meet with this patient and she was agreeable to increase Saxenda slowly.  Had difficulties tolerating 1.8 mg dose previously.  Not feeling able to tolerate and provided education on helping with tolerability.  She is following with you on February 3.   Micki

## 2023-01-14 NOTE — PATIENT INSTRUCTIONS
"Recommendations from today's MTM visit:                                                    MTM (medication therapy management) is a service provided by a clinical pharmacist designed to help you get the most of out of your medicines.      1) Continue slow titration of Saxenda: It is a subcutaneous injection that you inject once daily and titrate the dose slowly over time. Make sure inject the same time each day.   Week 1: If tolerating, increase to 1.2 mg once daily   Week 2: If tolerating, increase to 1.8 mg once daily   Week 3: If tolerating, increase to 2.4 mg once daily   Week 4 & on: If tolerating, increase to 3 mg once daily   *If you are having some nausea or other side effect(s) to where you are hesitant to move up to the next dose, stay at the same dose you are on for an additional week to see if side effects improves/resolves. Make sure to take this time to hydrate and ensure you are drinking at least 64 oz water per day.     2) Schedule visit with Primary Care Clinic at [facility].    3) Refill sent for diabetes testing supplies. Please test twice daily: once before any food in the morning and 2 hours after 1 meal each day.    4) Keep working to quit those last few cigarettes with your nicotine patches and gum.    Follow-up: 2/3 with Dr. Velarde. Call [phone] if you need to reschedule.     It was great speaking with you today.  I value your experience and would be very thankful for your time in providing feedback in our clinic survey. In the next few days, you may receive an email or text message from [employer] with a link to a survey related to your  clinical pharmacist.\"     My Clinical Pharmacist's contact information:                                                      Please feel free to contact me with any questions or concerns you have.      Micki Martins, PharmD, MPH  Medication Therapy Management Pharmacist   [facility] (through " 1/20/23)    Lauren Bloch, PharmD  Medication Therapy Management Pharmacist   [facility] [place]

## 2023-01-20 ENCOUNTER — TELEPHONE (OUTPATIENT)
Dept: SLEEP MEDICINE | Facility: CLINIC | Age: 53
End: 2023-01-20
Payer: COMMERCIAL

## 2023-01-20 DIAGNOSIS — G47.33 OSA (OBSTRUCTIVE SLEEP APNEA): Primary | ICD-10-CM

## 2023-01-25 NOTE — TELEPHONE ENCOUNTER
Last ov: 1/5/22  Next appointment: 2/8/23    Patient requesting order for supplies prior to appointment.    Order pended for provider.   Maggie WILKINS RN  [facility]

## 2023-01-27 ENCOUNTER — DOCUMENTATION ONLY (OUTPATIENT)
Dept: OTHER | Age: 53
End: 2023-01-27

## 2023-01-31 NOTE — TELEPHONE ENCOUNTER
Patient has not used CPAP machine in almost 1 year. First machine she got from recall was defective and was sent back. She received her new replacement 1/20/23. She needs supplies to restart.     Maggie WILKINS RN  [facility]

## 2023-01-31 NOTE — TELEPHONE ENCOUNTER
Lvm on voicemail order was sent to [facility]. Phone number provided.     Maggie WILKINS RN  [facility]

## 2023-01-31 NOTE — TELEPHONE ENCOUNTER
Please assist patient with rescheduling follow up with Dr Martinez, out at least 1 month.     Maggie WILKINS RN  [facility]

## 2023-01-31 NOTE — TELEPHONE ENCOUNTER
Patient returned call. She would like to know if provider would like the appointment pushed out a month so patient can have usage data. Please advise.     Maggie WILKINS RN  [facility]

## 2023-02-08 ENCOUNTER — VIRTUAL VISIT (OUTPATIENT)
Dept: SLEEP MEDICINE | Facility: CLINIC | Age: 53
End: 2023-02-08
Payer: COMMERCIAL

## 2023-02-08 VITALS — WEIGHT: 293 LBS | BODY MASS INDEX: 50.02 KG/M2 | HEIGHT: 64 IN

## 2023-02-08 DIAGNOSIS — G47.33 OSA (OBSTRUCTIVE SLEEP APNEA): Primary | ICD-10-CM

## 2023-02-08 PROCEDURE — 99214 OFFICE O/P EST MOD 30 MIN: CPT | Mod: VID | Performed by: INTERNAL MEDICINE

## 2023-02-08 ASSESSMENT — SLEEP AND FATIGUE QUESTIONNAIRES
HOW LIKELY ARE YOU TO NOD OFF OR FALL ASLEEP WHILE SITTING AND TALKING TO SOMEONE: WOULD NEVER DOZE
HOW LIKELY ARE YOU TO NOD OFF OR FALL ASLEEP WHILE LYING DOWN TO REST IN THE AFTERNOON WHEN CIRCUMSTANCES PERMIT: HIGH CHANCE OF DOZING
HOW LIKELY ARE YOU TO NOD OFF OR FALL ASLEEP WHILE SITTING INACTIVE IN A PUBLIC PLACE: WOULD NEVER DOZE
HOW LIKELY ARE YOU TO NOD OFF OR FALL ASLEEP WHILE SITTING AND READING: SLIGHT CHANCE OF DOZING
HOW LIKELY ARE YOU TO NOD OFF OR FALL ASLEEP WHILE WATCHING TV: HIGH CHANCE OF DOZING
HOW LIKELY ARE YOU TO NOD OFF OR FALL ASLEEP WHILE SITTING QUIETLY AFTER LUNCH WITHOUT ALCOHOL: WOULD NEVER DOZE
HOW LIKELY ARE YOU TO NOD OFF OR FALL ASLEEP WHEN YOU ARE A PASSENGER IN A CAR FOR AN HOUR WITHOUT A BREAK: WOULD NEVER DOZE
HOW LIKELY ARE YOU TO NOD OFF OR FALL ASLEEP IN A CAR, WHILE STOPPED FOR A FEW MINUTES IN TRAFFIC: WOULD NEVER DOZE

## 2023-02-08 NOTE — PATIENT INSTRUCTIONS
For general sleep health questions:   http://sleepeducation.org    For tips about PAP and COVID-19:  https://www.thoracic.org/patients/patient-resources/resources/covid-19-and-home-pap-therapy.pdf    For general info about COVID-19 including vaccines:  https://Ahead.org/covid19      Continue PAP therapy every night, for all hours that you are sleeping (including naps.)  As always, try to get at least 8 hours of sleep or more each day, keep a regular sleep schedule, and avoid sleep deprivation. Avoid alcohol.  Reasons that you might need a change to your pressure therapy would be weight gain or loss, waking having inadvertently removed your PAP overnight, having previously felt refreshed by sleep with CPAP use and now waking un-refreshed, and return of daytime sleepiness. Also, the development of new medical problems  (such as heart failure, stroke, medications such as narcotics) can sometimes affect breathing at night and change your PAP therapy needs.  Please bring PAP with you if you are hospitalized.  If anticipating surgery be sure to discuss with your surgeon that you have sleep apnea and use PAP therapy.    Maintain your equipment as recommended which includes routine cleaning and replacement of supplies.      Call DME for any questions regarding supplies or maintenance.    [facility], [facility] [phone]    Do not drive on engage in potentially dangerous activities if feeling sleepy.  Please follow up in sleep clinic again in 12 months.        Tips for your PAP use-    Mask fitting tips  Mask fitting exercise:    To improve your mask seal and your mobility at night, put mask on and secure in place.  Lie down in bed with full pressure and roll to one side, adjust headgear while in that position to eliminate any leaks. Repeat process rolling to other side.     The mask seal does not have to be perfect:   CPAP machines are designed to make up for small  leaks. However, you will not tolerate leaks blowing in your eyes so you will need to adjust.   Any leak should only be near or at the bottom of the mask.  We expect your mask to leak slightly at night.    Do not over-tighten the headgear straps, tighter IS NOT better, we expect minimal leak.    First try re-positioning the mask or headgear before tightening the headgear straps.  Mask leaks are expected due to changing sleeping positions. Try pulling the mask away from your skin allowing the cushion to re-inflate will minimize the leak.  If you struggle for a good fit, try turning the CPAP off and then readjust the mask by pulling it away from your face and then turning back on the CPAP.        Humidifier tips  Humidifiers can be adjusted to increase or decrease the amount of moisture according to your comfort level. You may need to adjust this frequently at first, but then might only change it with seasonal weather changes.     Try INCREASING the humidity if:  You experience a dry, irritated nasal passage or throat.  You have a runny, drippy nose or sneezing fits after using CPAP.  You experience nasal congestion during or after CPAP use.    Try DECREASING the humidity if:  You have excessive condensation or  rain out  in the tubing or mask.  Otherwise keep the tubing warm during the night by running it underneath the blankets or pillow.      Clinic visit after initial PAP set-up   Bring your equipment with you to your 5-8 week follow up clinic visit.  We will be extracting your data from the machine if not available from the cloud based modem.        Travel  Always take your equipment with you when you travel.  If you fly with your equipment bring it on with you as a carry on.  Medical equipment does not count as a carry on.    If you travel international the machines take 110-240v.  The only adapter needed is the adapter that will fit into the receptacle (outlet).    You may also want to bring an extension cord as  many hotel rooms have limited outlets at the bedside.  Do not travel with water in your humidifier chamber.     Cleaning and Maintenance Guidelines    Equipment Frequency Cleaning Method   Mask First Day    Daily      Weekly Soak mask in hot soapy water for 30 minutes, rinse and air dry.  Wipe nasal cushion with a hot soapy (Ivory, baby shampoo) cloth and rinse.  Baby wipes may also be used.  Do not use anti-bacterial soaps,Karely  liquid soap, rubbing alcohol, bleach or ammonia.  Wash frame in hot soapy water (Ivory, baby shampoo) rinse and let air dry   Headgear Biweekly Wash in hot soapy water, rinse and air dry   Reusable Gray Filter Weekly Wash in hot soapy water, rinse, put in towel squeeze moisture out, let air dry   Disposable White Filter Check Weekly Replace when brown or gray in color; at least every 2 to 3 months   Humidifier Chamber Daily    Weekly Empty distilled water from humidifier and let air dry    Hand wash in hot soapy water, rinse and air dry   Tubing Weekly Wash in hot soapy water, rinse and let air dry   Mask, Tubing and Humidifier Chamber As needed Disinfect: Soak in 1 part distilled white vinegar to 3 parts hot water for 30 minutes, rinse well and air dry  Not the material headgear        MASK AND SUPPLY REORDERING and EQUIPMENT NEEDS through your DME and per your insurance  Reminder: Most insurance companies will allow for a new mask, headgear, tubing, and reusable gray filter every six months.  Disposable white ultra-fine filters are covered monthly.      HOME AND SAFETY INSTRUCTIONS  Do not use frayed or cracked electrical cords, multi plug adaptors, or switched receptacles  Do not immerse electrical equipment into water  Assure that electrical cords do not become a tripping hazard

## 2023-02-08 NOTE — NURSING NOTE
Will be getting flu vaccine  Is the patient currently in the state of [place]? YES    Visit mode:VIDEO    If the visit is dropped, the patient can be reconnected by: VIDEO VISIT: Text to cell phone: [phone]    Will anyone else be joining the visit? NO      How would you like to obtain your AVS? MyChart    Are changes needed to the allergy or medication list? NO    Comments or concerns related to today's visit: N/A

## 2023-02-08 NOTE — LETTER
2/8/2023         RE: Yancy Hoover  [address]  [facility] [address]        Dear Colleague,    Thank you for referring your patient, Yancy Hoover, to the [facility] [place]. Please see a copy of my visit note below.    Video-Visit Details    Type of service:  Video Visit    Video Start Time (time video started): 9:37    Video End Time (time video stopped): 9:52 AM    Originating Location (pt. Location): Other car        Distant Location (provider location):  Off-site    Mode of Communication:  Video Conference via Stealz    Chief complaint: Resume CPAP    LOV 11/6/2020    History of Present Illness: 52-year-old female with history of type 2 diabetes and obesity found to have severe sleep apnea in 2020.  She had a Respironics machine that was affected by the recall and then got replaced through the recall however the replacement broke.  She waited for a long time to get of replacement machine which she just got a couple of days ago.  She reports that she is very happy with the new machine.  The settings are comfortable.  She is no longer snoring and she is noticing a significant improvement in her sleep quality.  Previously without CPAP she was wakes up every couple of hours and is irritable irritable during the day.  Now she is waking up feeling more refreshed and was only up once a night.  She uses the humidifier, a nasal mask which she finds comfortable.  She does find that the headgear slips.  She is considering looking at a different set up in the future.    Denies other sleep concerns at this time.    She is working with Weight Management and anticipates bariatric surgery.     Martin Sleepiness Scale  Total score - Martin: 7 (2/8/2023  9:20 AM)  (Less than 10 normal)    Insomnia Severity Scale  NEYMAR Total Score: 16  (normal 0-7, mild 8-14, moderate 15-21, severe 22-28)    Past Medical History:   Diagnosis Date     NO ACTIVE PROBLEMS         Allergies   Allergen Reactions     Gadolinium Derivatives Nausea and Vomiting     Caused excessive vomiting, administered 4mg zofran       Current Outpatient Medications   Medication     alcohol swab prep pads     blood glucose (ACCU-CHEK SMARTVIEW) test strip     blood glucose calibration (ACCU-CHEK SMARTVIEW CONTROL) solution     blood glucose monitoring (ACCU-CHEK FASTCLIX) lancets     blood glucose monitoring (ACCU-CHEK FAWAD SMARTVIEW) meter device kit     childrens multivitamin w/iron (FLINTSTONES COMPLETE) 18 MG chewable tablet     insulin pen needle (32G X 6 MM) 32G X 6 MM miscellaneous     liraglutide - Weight Management (SAXENDA) 18 MG/3ML pen     metFORMIN (GLUCOPHAGE XR) 500 MG 24 hr tablet     nicotine (NICODERM CQ) 21 MG/24HR 24 hr patch     nicotine polacrilex (NICORETTE) 4 MG gum     omeprazole (PRILOSEC) 20 MG DR capsule     rosuvastatin (CRESTOR) 10 MG tablet     vitamin D3 (CHOLECALCIFEROL) 50 mcg (2000 units) tablet     ondansetron (ZOFRAN ODT) 4 MG ODT tab     No current facility-administered medications for this visit.       Social History     Socioeconomic History     Marital status: Single     Spouse name: Not on file     Number of children: Not on file     Years of education: Not on file     Highest education level: Not on file   Occupational History     Not on file   Tobacco Use     Smoking status: Former     Packs/day: 0.50     Years: 20.00     Pack years: 10.00     Types: Cigarettes     Quit date: 2022     Years since quittin.1     Smokeless tobacco: Never   Substance and Sexual Activity     Alcohol use: Not Currently     Drug use: Never     Sexual activity: Not on file   Other Topics Concern     Parent/sibling w/ CABG, MI or angioplasty before 65F 55M? Not Asked   Social History Narrative     Not on file     Social Determinants of Health     Financial Resource Strain: Not on file   Food Insecurity: Not on file   Transportation Needs: Not on file   Physical Activity: Not on  "file   Stress: Not on file   Social Connections: Not on file   Intimate Partner Violence: Not on file   Housing Stability: Not on file       No family history on file.        EXAM:  Ht 1.626 m (5' 4\")   Wt 133.4 kg (294 lb)   BMI 50.46 kg/m    GENERAL: Alert and no distress  EYES: Eyes grossly normal to inspection.  No discharge or erythema, or obvious scleral/conjunctival abnormalities.  RESP: No audible wheeze, cough, or visible cyanosis.  No visible retractions or increased work of breathing.    SKIN: Visible skin clear. No significant rash, abnormal pigmentation or lesions.  NEURO: Cranial nerves grossly intact.  Mentation and speech appropriate for age.  PSYCH: Mentation appears normal, affect normal, judgement and insight intact, normal speech and appearance well-groomed.     Initial sleep consultation July 2020    PSG 10/23/2020  Weight 290 lbs BMI 50.1  AHI 41.1, RDI 45.5, Lowest O2 Sat 59%  Time with O2 sat at or below 88% 39 minutes    Respironics PAP   Average use on days used: 5 hours  40 min  Settings: Min EPAP 5 cmH2O    Max EPAP 15 cmH2O  Pressures delivered 90/95th percentile for pressure 15 cmH2O  Average AHI 4.2  events per hour (goal less than 5)  Leak acceptable    TSH   Date Value Ref Range Status   07/30/2020 0.95 0.40 - 4.00 mU/L Final         ASSESSMENT:  52-year-old female with history of type 2 diabetes, obesity, severe obstructive sleep apnea.  She is getting good clinical benefit with the use of PAP therapy replace machine.    PLAN:  We will make a pressure change to increase the lower limit pressure from 5 up to 10 and increase the upper limit pressure from 15-17.  Patient should continue to use PAP therapy all night every night.  We will check a download in the background in about 4 weeks.  From a sleep medicine standpoint patient is okay to proceed with bariatric surgery.  She is reminded of the effects of anesthesia and narcotics on worsening sleep apnea.  She is encouraged to use " her CPAP in the perioperative period and while recovering until follow-up.  Follow-up in 1 year, earlier if needed.    SLEEP APNEA  AIRWAY MANAGEMENT        Patients should be considered for difficult airway management strategy during sedation and will need airway support whenever sleeping without intubation. Structured approach:    1. ANESTHESIAàConsider regional block or short-acting agent such as propofol or desflurane.  2.  ANALGESIAàConsider NSAID, titratable dosing of opiod without sedative to minimize sedation, dexmedetomidine for agitation.  3. OXYGENATIONàUse titratable oxygen to maintain SpO2 90-94%.  4. POSITIONàElevate head of bed 45 degrees or place in lateral position.  5.  MONITORINGàMonitor in supervised area with continuous oximetry and ECG with SpO2 alarm set at 85%. Obtain blood gases to exclude hypercapnia if suspected [SpO2<94% on room air, BMI >45, lethargy].  6. AIRWAYà    Assess for snoring, airway obstruction, or episodic desaturation.    Apply previous effective CPAP or auto-CPAP 5-15 and adjust to prevent snoring and desaturation during continuous oximetry.     Intubate if there continues to be evidence of airway obstruction, uncontrolled hypercapnia or risk of vomiting with loss of consciousness.      30 minutes spent on the date of the encounter doing chart review, history and exam, documentation and further activities per the note    Cindi Guidry M.D.  Pulmonary/Critical Care/Sleep Medicine    [facility]   Floor 1, [address]   [address] [place] [address]. [place] [address]   Appointments: [phone]    The above note was dictated using voice recognition software and may include typographical errors. Please contact the author for any clarifications.                    Again, thank you for allowing me to participate in the care of your patient.        Sincerely,        Cindi Guidry MD

## 2023-02-08 NOTE — PROGRESS NOTES
Video-Visit Details    Type of service:  Video Visit    Video Start Time (time video started): 9:37    Video End Time (time video stopped): 9:52 AM    Originating Location (pt. Location): Other car        Distant Location (provider location):  Off-site    Mode of Communication:  Video Conference via ISIS    Chief complaint: Resume CPAP    LOV 11/6/2020    History of Present Illness: 52-year-old female with history of type 2 diabetes and obesity found to have severe sleep apnea in 2020.  She had a Respironics machine that was affected by the recall and then got replaced through the recall however the replacement broke.  She waited for a long time to get of replacement machine which she just got a couple of days ago.  She reports that she is very happy with the new machine.  The settings are comfortable.  She is no longer snoring and she is noticing a significant improvement in her sleep quality.  Previously without CPAP she was wakes up every couple of hours and is irritable irritable during the day.  Now she is waking up feeling more refreshed and was only up once a night.  She uses the humidifier, a nasal mask which she finds comfortable.  She does find that the headgear slips.  She is considering looking at a different set up in the future.    Denies other sleep concerns at this time.    She is working with Weight Management and anticipates bariatric surgery.     Antimony Sleepiness Scale  Total score - Antimony: 7 (2/8/2023  9:20 AM)  (Less than 10 normal)    Insomnia Severity Scale  NEYMAR Total Score: 16  (normal 0-7, mild 8-14, moderate 15-21, severe 22-28)    Past Medical History:   Diagnosis Date     NO ACTIVE PROBLEMS        Allergies   Allergen Reactions     Gadolinium Derivatives Nausea and Vomiting     Caused excessive vomiting, administered 4mg zofran       Current Outpatient Medications   Medication     alcohol swab prep pads     blood glucose (ACCU-CHEK SMARTVIEW) test strip     blood glucose  "calibration (ACCU-CHEK SMARTVIEW CONTROL) solution     blood glucose monitoring (ACCU-CHEK FASTCLIX) lancets     blood glucose monitoring (ACCU-CHEK FAWAD SMARTVIEW) meter device kit     childrens multivitamin w/iron (FLINTSTONES COMPLETE) 18 MG chewable tablet     insulin pen needle (32G X 6 MM) 32G X 6 MM miscellaneous     liraglutide - Weight Management (SAXENDA) 18 MG/3ML pen     metFORMIN (GLUCOPHAGE XR) 500 MG 24 hr tablet     nicotine (NICODERM CQ) 21 MG/24HR 24 hr patch     nicotine polacrilex (NICORETTE) 4 MG gum     omeprazole (PRILOSEC) 20 MG DR capsule     rosuvastatin (CRESTOR) 10 MG tablet     vitamin D3 (CHOLECALCIFEROL) 50 mcg (2000 units) tablet     ondansetron (ZOFRAN ODT) 4 MG ODT tab     No current facility-administered medications for this visit.       Social History     Socioeconomic History     Marital status: Single     Spouse name: Not on file     Number of children: Not on file     Years of education: Not on file     Highest education level: Not on file   Occupational History     Not on file   Tobacco Use     Smoking status: Former     Packs/day: 0.50     Years: 20.00     Pack years: 10.00     Types: Cigarettes     Quit date: 2022     Years since quittin.1     Smokeless tobacco: Never   Substance and Sexual Activity     Alcohol use: Not Currently     Drug use: Never     Sexual activity: Not on file   Other Topics Concern     Parent/sibling w/ CABG, MI or angioplasty before 65F 55M? Not Asked   Social History Narrative     Not on file     Social Determinants of Health     Financial Resource Strain: Not on file   Food Insecurity: Not on file   Transportation Needs: Not on file   Physical Activity: Not on file   Stress: Not on file   Social Connections: Not on file   Intimate Partner Violence: Not on file   Housing Stability: Not on file       No family history on file.        EXAM:  Ht 1.626 m (5' 4\")   Wt 133.4 kg (294 lb)   BMI 50.46 kg/m    GENERAL: Alert and no distress  EYES: " Eyes grossly normal to inspection.  No discharge or erythema, or obvious scleral/conjunctival abnormalities.  RESP: No audible wheeze, cough, or visible cyanosis.  No visible retractions or increased work of breathing.    SKIN: Visible skin clear. No significant rash, abnormal pigmentation or lesions.  NEURO: Cranial nerves grossly intact.  Mentation and speech appropriate for age.  PSYCH: Mentation appears normal, affect normal, judgement and insight intact, normal speech and appearance well-groomed.     Initial sleep consultation July 2020    PSG 10/23/2020  Weight 290 lbs BMI 50.1  AHI 41.1, RDI 45.5, Lowest O2 Sat 59%  Time with O2 sat at or below 88% 39 minutes    Respironics PAP   Average use on days used: 5 hours  40 min  Settings: Min EPAP 5 cmH2O    Max EPAP 15 cmH2O  Pressures delivered 90/95th percentile for pressure 15 cmH2O  Average AHI 4.2  events per hour (goal less than 5)  Leak acceptable    TSH   Date Value Ref Range Status   07/30/2020 0.95 0.40 - 4.00 mU/L Final         ASSESSMENT:  52-year-old female with history of type 2 diabetes, obesity, severe obstructive sleep apnea.  She is getting good clinical benefit with the use of PAP therapy replace machine.    PLAN:  We will make a pressure change to increase the lower limit pressure from 5 up to 10 and increase the upper limit pressure from 15-17.  Patient should continue to use PAP therapy all night every night.  We will check a download in the background in about 4 weeks.  From a sleep medicine standpoint patient is okay to proceed with bariatric surgery.  She is reminded of the effects of anesthesia and narcotics on worsening sleep apnea.  She is encouraged to use her CPAP in the perioperative period and while recovering until follow-up.  Follow-up in 1 year, earlier if needed.    SLEEP APNEA  AIRWAY MANAGEMENT        Patients should be considered for difficult airway management strategy during sedation and will need airway support whenever  sleeping without intubation. Structured approach:    1. ANESTHESIAàConsider regional block or short-acting agent such as propofol or desflurane.  2.  ANALGESIAàConsider NSAID, titratable dosing of opiod without sedative to minimize sedation, dexmedetomidine for agitation.  3. OXYGENATIONàUse titratable oxygen to maintain SpO2 90-94%.  4. POSITIONàElevate head of bed 45 degrees or place in lateral position.  5.  MONITORINGàMonitor in supervised area with continuous oximetry and ECG with SpO2 alarm set at 85%. Obtain blood gases to exclude hypercapnia if suspected [SpO2<94% on room air, BMI >45, lethargy].  6. AIRWAYà    Assess for snoring, airway obstruction, or episodic desaturation.    Apply previous effective CPAP or auto-CPAP 5-15 and adjust to prevent snoring and desaturation during continuous oximetry.     Intubate if there continues to be evidence of airway obstruction, uncontrolled hypercapnia or risk of vomiting with loss of consciousness.      30 minutes spent on the date of the encounter doing chart review, history and exam, documentation and further activities per the note    Cindi Guidry M.D.  Pulmonary/Critical Care/Sleep Medicine    [facility]   Floor 1, [address]   [address] [place], [place] [address]e[place] [address]   Appointments: [phone]    The above note was dictated using voice recognition software and may include typographical errors. Please contact the author for any clarifications.

## 2023-02-09 ENCOUNTER — TELEPHONE (OUTPATIENT)
Dept: SLEEP MEDICINE | Facility: CLINIC | Age: 53
End: 2023-02-09
Payer: COMMERCIAL

## 2023-02-09 NOTE — TELEPHONE ENCOUNTER
CALLED PT AND LMV INFORMING THE PT THAT THE PRESSURES HAVE BEEN CHANGED ON THE CPAP DEVICE AND IF FURTHER QUESTION TO PLEASE CALL [phone]

## 2023-02-16 ENCOUNTER — DOCUMENTATION ONLY (OUTPATIENT)
Dept: SLEEP MEDICINE | Facility: CLINIC | Age: 53
End: 2023-02-16
Payer: COMMERCIAL

## 2023-02-21 ENCOUNTER — LAB REQUISITION (OUTPATIENT)
Dept: LAB | Facility: CLINIC | Age: 53
End: 2023-02-21

## 2023-02-21 ENCOUNTER — LAB REQUISITION (OUTPATIENT)
Dept: LAB | Facility: CLINIC | Age: 53
End: 2023-02-21
Payer: COMMERCIAL

## 2023-02-21 DIAGNOSIS — Z01.812 ENCOUNTER FOR PREPROCEDURAL LABORATORY EXAMINATION: ICD-10-CM

## 2023-02-21 DIAGNOSIS — Z01.818 ENCOUNTER FOR OTHER PREPROCEDURAL EXAMINATION: ICD-10-CM

## 2023-02-21 PROCEDURE — 80048 BASIC METABOLIC PNL TOTAL CA: CPT | Performed by: PHYSICIAN ASSISTANT

## 2023-02-21 PROCEDURE — U0003 INFECTIOUS AGENT DETECTION BY NUCLEIC ACID (DNA OR RNA); SEVERE ACUTE RESPIRATORY SYNDROME CORONAVIRUS 2 (SARS-COV-2) (CORONAVIRUS DISEASE [COVID-19]), AMPLIFIED PROBE TECHNIQUE, MAKING USE OF HIGH THROUGHPUT TECHNOLOGIES AS DESCRIBED BY CMS-2020-01-R: HCPCS | Mod: ORL | Performed by: PHYSICIAN ASSISTANT

## 2023-02-22 ENCOUNTER — TELEPHONE (OUTPATIENT)
Dept: ENDOCRINOLOGY | Facility: CLINIC | Age: 53
End: 2023-02-22
Payer: COMMERCIAL

## 2023-02-22 LAB
ANION GAP SERPL CALCULATED.3IONS-SCNC: 15 MMOL/L (ref 7–15)
BUN SERPL-MCNC: 10.8 MG/DL (ref 6–20)
CALCIUM SERPL-MCNC: 8.7 MG/DL (ref 8.6–10)
CHLORIDE SERPL-SCNC: 101 MMOL/L (ref 98–107)
CREAT SERPL-MCNC: 0.85 MG/DL (ref 0.51–0.95)
DEPRECATED HCO3 PLAS-SCNC: 22 MMOL/L (ref 22–29)
GFR SERPL CREATININE-BSD FRML MDRD: 82 ML/MIN/1.73M2
GLUCOSE SERPL-MCNC: 237 MG/DL (ref 70–99)
POTASSIUM SERPL-SCNC: 4.3 MMOL/L (ref 3.4–5.3)
SARS-COV-2 RNA RESP QL NAA+PROBE: NEGATIVE
SODIUM SERPL-SCNC: 138 MMOL/L (ref 136–145)

## 2023-02-22 NOTE — TELEPHONE ENCOUNTER
PA Initiation    Medication: Saxenda PA renewal - pending  Insurance Company: [employer] - Phone [phone] Fax [phone]  Pharmacy Filling the Rx: [facility] - [place], [place] - [facility] [address]  Filling Pharmacy Phone:    Filling Pharmacy Fax:    Start Date: 2/22/2023    [id number]

## 2023-02-23 ENCOUNTER — DOCUMENTATION ONLY (OUTPATIENT)
Dept: SLEEP MEDICINE | Facility: CLINIC | Age: 53
End: 2023-02-23
Payer: COMMERCIAL

## 2023-02-23 NOTE — PROGRESS NOTES
Patient returned call. She says she has been enjoying CPAP when she uses it but often she forgets to put it on. She says she falls asleep on the couch while watching TV before bed. Recommended patient set alarm clock when she sits down to watch tv and then she wakes up and goes to sleep in bed with the CPAP.     She experiences some trouble with the humidifier running out of water and having to refill it. Advised she lower the humidity one level to make the water last longer.

## 2023-02-23 NOTE — PROGRESS NOTES
STM Recheck. Called patient to check in on their progress with CPAP. Left voice message for patient to call back if needed.

## 2023-02-23 NOTE — TELEPHONE ENCOUNTER
Prior Authorization Approval    Authorization Effective Date: 1/22/2023  Authorization Expiration Date: 8/22/2023  Medication: Saxenda PA renewal - Approved  Approved Dose/Quantity: 15ml per 30 days  Reference #: [id number]   Insurance Company: [employer] - Phone [phone] Fax [phone]  Expected CoPay:       CoPay Card Available:      Foundation Assistance Needed:    Which Pharmacy is filling the prescription (Not needed for infusion/clinic administered): [facility] - [place], [place] - [address]  Pharmacy Notified: Yes  Patient Notified: Yes

## 2023-02-24 ENCOUNTER — ANESTHESIA EVENT (OUTPATIENT)
Dept: GASTROENTEROLOGY | Facility: CLINIC | Age: 53
End: 2023-02-24
Payer: COMMERCIAL

## 2023-02-24 ENCOUNTER — HOSPITAL ENCOUNTER (OUTPATIENT)
Facility: CLINIC | Age: 53
Discharge: HOME | End: 2023-02-24
Attending: INTERNAL MEDICINE | Admitting: INTERNAL MEDICINE
Payer: COMMERCIAL

## 2023-02-24 ENCOUNTER — ANESTHESIA (OUTPATIENT)
Dept: GASTROENTEROLOGY | Facility: CLINIC | Age: 53
End: 2023-02-24
Payer: COMMERCIAL

## 2023-02-24 VITALS
HEIGHT: 65 IN | DIASTOLIC BLOOD PRESSURE: 93 MMHG | HEART RATE: 90 BPM | RESPIRATION RATE: 15 BRPM | BODY MASS INDEX: 48.82 KG/M2 | SYSTOLIC BLOOD PRESSURE: 139 MMHG | WEIGHT: 293 LBS | OXYGEN SATURATION: 98 %

## 2023-02-24 LAB
GLUCOSE BLDC GLUCOMTR-MCNC: 142 MG/DL (ref 70–99)
UPPER GI ENDOSCOPY: NORMAL

## 2023-02-24 PROCEDURE — 999N000010 HC STATISTIC ANES STAT CODE-CRNA PER MINUTE: Performed by: INTERNAL MEDICINE

## 2023-02-24 PROCEDURE — 250N000011 HC RX IP 250 OP 636: Performed by: ANESTHESIOLOGY

## 2023-02-24 PROCEDURE — 88305 TISSUE EXAM BY PATHOLOGIST: CPT | Mod: TC | Performed by: INTERNAL MEDICINE

## 2023-02-24 PROCEDURE — 250N000009 HC RX 250: Performed by: ANESTHESIOLOGY

## 2023-02-24 PROCEDURE — 258N000003 HC RX IP 258 OP 636: Performed by: ANESTHESIOLOGY

## 2023-02-24 PROCEDURE — 88305 TISSUE EXAM BY PATHOLOGIST: CPT | Mod: 26 | Performed by: PATHOLOGY

## 2023-02-24 PROCEDURE — 370N000017 HC ANESTHESIA TECHNICAL FEE, PER MIN: Performed by: INTERNAL MEDICINE

## 2023-02-24 PROCEDURE — 82962 GLUCOSE BLOOD TEST: CPT

## 2023-02-24 PROCEDURE — 43239 EGD BIOPSY SINGLE/MULTIPLE: CPT | Performed by: INTERNAL MEDICINE

## 2023-02-24 RX ORDER — NALOXONE HYDROCHLORIDE 0.4 MG/ML
0.2 INJECTION, SOLUTION INTRAMUSCULAR; INTRAVENOUS; SUBCUTANEOUS
Status: CANCELLED | OUTPATIENT
Start: 2023-02-24

## 2023-02-24 RX ORDER — SODIUM CHLORIDE, SODIUM LACTATE, POTASSIUM CHLORIDE, CALCIUM CHLORIDE 600; 310; 30; 20 MG/100ML; MG/100ML; MG/100ML; MG/100ML
INJECTION, SOLUTION INTRAVENOUS CONTINUOUS PRN
Status: DISCONTINUED | OUTPATIENT
Start: 2023-02-24 | End: 2023-02-24

## 2023-02-24 RX ORDER — NALOXONE HYDROCHLORIDE 0.4 MG/ML
0.4 INJECTION, SOLUTION INTRAMUSCULAR; INTRAVENOUS; SUBCUTANEOUS
Status: CANCELLED | OUTPATIENT
Start: 2023-02-24

## 2023-02-24 RX ORDER — ONDANSETRON 4 MG/1
4 TABLET, ORALLY DISINTEGRATING ORAL EVERY 6 HOURS PRN
Status: CANCELLED | OUTPATIENT
Start: 2023-02-24

## 2023-02-24 RX ORDER — FLUMAZENIL 0.1 MG/ML
0.2 INJECTION, SOLUTION INTRAVENOUS
Status: CANCELLED | OUTPATIENT
Start: 2023-02-24 | End: 2023-02-24

## 2023-02-24 RX ORDER — PROPOFOL 10 MG/ML
INJECTION, EMULSION INTRAVENOUS PRN
Status: DISCONTINUED | OUTPATIENT
Start: 2023-02-24 | End: 2023-02-24

## 2023-02-24 RX ORDER — ONDANSETRON 2 MG/ML
4 INJECTION INTRAMUSCULAR; INTRAVENOUS EVERY 6 HOURS PRN
Status: CANCELLED | OUTPATIENT
Start: 2023-02-24

## 2023-02-24 RX ORDER — DEXMEDETOMIDINE HYDROCHLORIDE 4 UG/ML
INJECTION, SOLUTION INTRAVENOUS PRN
Status: DISCONTINUED | OUTPATIENT
Start: 2023-02-24 | End: 2023-02-24

## 2023-02-24 RX ORDER — PROPOFOL 10 MG/ML
INJECTION, EMULSION INTRAVENOUS CONTINUOUS PRN
Status: DISCONTINUED | OUTPATIENT
Start: 2023-02-24 | End: 2023-02-24

## 2023-02-24 RX ORDER — LIDOCAINE HYDROCHLORIDE 20 MG/ML
INJECTION, SOLUTION INFILTRATION; PERINEURAL PRN
Status: DISCONTINUED | OUTPATIENT
Start: 2023-02-24 | End: 2023-02-24

## 2023-02-24 RX ORDER — PROCHLORPERAZINE MALEATE 10 MG
10 TABLET ORAL EVERY 6 HOURS PRN
Status: CANCELLED | OUTPATIENT
Start: 2023-02-24

## 2023-02-24 RX ADMIN — SODIUM CHLORIDE, POTASSIUM CHLORIDE, SODIUM LACTATE AND CALCIUM CHLORIDE: 600; 310; 30; 20 INJECTION, SOLUTION INTRAVENOUS at 09:15

## 2023-02-24 RX ADMIN — PROPOFOL 125 MCG/KG/MIN: 10 INJECTION, EMULSION INTRAVENOUS at 09:16

## 2023-02-24 RX ADMIN — PROPOFOL 30 MG: 10 INJECTION, EMULSION INTRAVENOUS at 09:18

## 2023-02-24 RX ADMIN — TOPICAL ANESTHETIC 1 EACH: 200 SPRAY DENTAL; PERIODONTAL at 09:16

## 2023-02-24 RX ADMIN — PROPOFOL 10 MG: 10 INJECTION, EMULSION INTRAVENOUS at 09:24

## 2023-02-24 RX ADMIN — LIDOCAINE HYDROCHLORIDE 40 MG: 20 INJECTION, SOLUTION INFILTRATION; PERINEURAL at 09:17

## 2023-02-24 RX ADMIN — DEXMEDETOMIDINE HYDROCHLORIDE 20 MCG: 200 INJECTION INTRAVENOUS at 09:16

## 2023-02-24 ASSESSMENT — LIFESTYLE VARIABLES: TOBACCO_USE: 1

## 2023-02-24 ASSESSMENT — ACTIVITIES OF DAILY LIVING (ADL): ADLS_ACUITY_SCORE: 35

## 2023-02-24 NOTE — ANESTHESIA CARE TRANSFER NOTE
Patient: Yancy Hoover    Procedure: Procedure(s):  ESOPHAGOGASTRODUODENOSCOPY, WITH BIOPSY       Diagnosis: Abdominal pain, unspecified abdominal location [R10.9]  Diagnosis Additional Information: No value filed.    Anesthesia Type:   MAC     Note:    Oropharynx: oropharynx clear of all foreign objects  Level of Consciousness: awake  Oxygen Supplementation: room air    Independent Airway: airway patency satisfactory and stable  Dentition: dentition unchanged  Vital Signs Stable: post-procedure vital signs reviewed and stable  Report to RN Given: handoff report given  Patient transferred to: Phase II  Comments: After procedure in [facility] under monitored anesthesia care (MAC), patient exhibited spontaneous respirations, patient breathing room air with oxygen saturation maintained greater than 95%, patient brought to [facility] recovery bay for postprocedure recovery, SpO2, NiBP, and EKG monitors and alarms on and functioning, report on patient's clinical status given to recovery-trained endoscopy RN, RN questions answered.    Handoff Report: Identifed the Patient, Identified the Reponsible Provider, Reviewed the pertinent medical history, Discussed the surgical course, Reviewed Intra-OP anesthesia mangement and issues during anesthesia, Set expectations for post-procedure period and Allowed opportunity for questions and acknowledgement of understanding      Vitals:  Vitals Value Taken Time   /85    Temp     Pulse 105 02/24/23 0931   Resp 23 02/24/23 0931   SpO2 97 % 02/24/23 0931   Vitals shown include unvalidated device data.    Electronically Signed By: Christine Marie Volp Hodgkins, CRNA, APRN CRNA  February 24, 2023  9:32 AM

## 2023-02-24 NOTE — ANESTHESIA PREPROCEDURE EVALUATION
Anesthesia Pre-Procedure Evaluation    Patient: Yancy Hoover   MRN: [id number] : 1970        Procedure : Procedure(s):  ESOPHAGOGASTRODUODENOSCOPY (EGD)          Past Medical History:   Diagnosis Date     DM (diabetes mellitus), type 2 (H)      LANDON (obstructive sleep apnea) 2020      Past Surgical History:   Procedure Laterality Date     GYN SURGERY      c  section 2014     LAPAROSCOPIC CYSTECTOMY OVARIAN (BENIGN) Left 2020    Procedure: LAPAROSCOPIC ovarian cystectomy,;  Surgeon: Sofi Cuevas MD;  Location: [facility] OR      Allergies   Allergen Reactions     Gadolinium Derivatives Nausea and Vomiting     Caused excessive vomiting, administered 4mg zofran      Social History     Tobacco Use     Smoking status: Former     Packs/day: 0.50     Years: 20.00     Pack years: 10.00     Types: Cigarettes     Quit date: 2022     Years since quittin.2     Smokeless tobacco: Never   Substance Use Topics     Alcohol use: Not Currently      Wt Readings from Last 1 Encounters:   23 133.4 kg (294 lb)        Anesthesia Evaluation   Pt has had prior anesthetic.     No history of anesthetic complications       ROS/MED HX  ENT/Pulmonary:     (+) sleep apnea, tobacco use (Quit 22), Past use,     Neurologic:       Cardiovascular:     (+) -----Previous cardiac testing   Echo: Date: Results:    Stress Test: Date: Results:    ECG Reviewed: Date: 10/18/20 Results:  NSR  Cath: Date: Results:      METS/Exercise Tolerance:     Hematologic:       Musculoskeletal:       GI/Hepatic:       Renal/Genitourinary:       Endo:     (+) type II DM, Obesity (Class 4),     Psychiatric/Substance Use:       Infectious Disease:       Malignancy:       Other:               OUTSIDE LABS:  CBC:   Lab Results   Component Value Date    WBC 7.2 2022    WBC 4.2 2022    HGB 12.6 2022    HGB 12.9 2022    HCT 39.0 2022    HCT 40.5 2022     2022     2022      BMP:   Lab Results   Component Value Date     02/21/2023     11/09/2022    POTASSIUM 4.3 02/21/2023    POTASSIUM 4.2 11/09/2022    CHLORIDE 101 02/21/2023    CHLORIDE 107 11/09/2022    CO2 22 02/21/2023    CO2 30 11/09/2022    BUN 10.8 02/21/2023    BUN 9 11/09/2022    CR 0.85 02/21/2023    CR 0.86 11/09/2022     (H) 02/21/2023     (H) 11/09/2022     COAGS: No results found for: PTT, INR, FIBR  POC:   Lab Results   Component Value Date     (H) 07/30/2020    HCG Negative 06/07/2020    HCGS Negative 10/18/2020     HEPATIC:   Lab Results   Component Value Date    ALBUMIN 3.3 (L) 11/09/2022    PROTTOTAL 7.4 11/09/2022    ALT 30 11/09/2022    AST 20 11/09/2022    ALKPHOS 70 11/09/2022    BILITOTAL 0.7 11/09/2022     OTHER:   Lab Results   Component Value Date    A1C 8.1 (H) 10/28/2022    RUSSELL 8.7 02/21/2023    LIPASE 138 11/09/2022    TSH 0.95 07/30/2020    CRP 13.0 (H) 10/18/2020       Anesthesia Plan    ASA Status:  3   NPO Status:  NPO Appropriate    Anesthesia Type: MAC.     - Reason for MAC: straight local not clinically adequate              Consents    Anesthesia Plan(s) and associated risks, benefits, and realistic alternatives discussed. Questions answered and patient/representative(s) expressed understanding.    - Discussed:     - Discussed with:  Patient         Postoperative Care    Pain management: IV analgesics, Oral pain medications, Multi-modal analgesia.   PONV prophylaxis: Ondansetron (or other 5HT-3)     Comments:           H&P reviewed: Unable to attach H&P to encounter due to EHR limitations. H&P Update: appropriate H&P reviewed, patient examined. No interval changes since H&P (within 30 days).         Cruz Polo MD

## 2023-02-24 NOTE — ANESTHESIA POSTPROCEDURE EVALUATION
Patient: Yancy Hoover    Procedure: Procedure(s):  ESOPHAGOGASTRODUODENOSCOPY, WITH BIOPSY       Anesthesia Type:  MAC    Note:     Postop Pain Control: Uneventful            Sign Out: Well controlled pain   PONV: No   Neuro/Psych: Uneventful            Sign Out: Acceptable/Baseline neuro status   Airway/Respiratory: Uneventful            Sign Out: Acceptable/Baseline resp. status   CV/Hemodynamics: Uneventful            Sign Out: Acceptable CV status   Other NRE: NONE   DID A NON-ROUTINE EVENT OCCUR?            Last vitals:  Vitals Value Taken Time   /93 02/24/23 0950   Temp     Pulse 91 02/24/23 1001   Resp 35 02/24/23 1001   SpO2 97 % 02/24/23 1000   Vitals shown include unvalidated device data.    Electronically Signed By: Cruz Polo MD  February 24, 2023  11:57 AM

## 2023-02-27 LAB
PATH REPORT.COMMENTS IMP SPEC: NORMAL
PATH REPORT.COMMENTS IMP SPEC: NORMAL
PATH REPORT.FINAL DX SPEC: NORMAL
PATH REPORT.GROSS SPEC: NORMAL
PATH REPORT.MICROSCOPIC SPEC OTHER STN: NORMAL
PATH REPORT.RELEVANT HX SPEC: NORMAL
PHOTO IMAGE: NORMAL

## 2023-03-02 ENCOUNTER — DOCUMENTATION ONLY (OUTPATIENT)
Dept: SLEEP MEDICINE | Facility: CLINIC | Age: 53
End: 2023-03-02
Payer: COMMERCIAL

## 2023-03-02 NOTE — PROGRESS NOTES
[facility] Recheck. Called patient to check in on their progress with CPAP. Left voice message for patient to call back if needed. Pt has used CPAP 3 out of the last 7 nights.

## 2023-03-10 ENCOUNTER — DOCUMENTATION ONLY (OUTPATIENT)
Dept: SLEEP MEDICINE | Facility: CLINIC | Age: 53
End: 2023-03-10
Payer: COMMERCIAL

## 2023-03-26 ENCOUNTER — HEALTH MAINTENANCE LETTER (OUTPATIENT)
Age: 53
End: 2023-03-26

## 2023-04-06 ENCOUNTER — DOCUMENTATION ONLY (OUTPATIENT)
Dept: SLEEP MEDICINE | Facility: CLINIC | Age: 53
End: 2023-04-06
Payer: COMMERCIAL

## 2023-04-06 NOTE — PROGRESS NOTES
[facility] Recheck. Called patient to check in on their progress with CPAP. Left voice message requesting callback. No usage since 3/7/2023

## 2023-07-06 ENCOUNTER — TRANSFERRED RECORDS (OUTPATIENT)
Dept: MULTI SPECIALTY CLINIC | Facility: CLINIC | Age: 53
End: 2023-07-06

## 2023-07-06 LAB — HBA1C MFR BLD: 9.2 %

## 2023-08-22 ENCOUNTER — APPOINTMENT (OUTPATIENT)
Dept: GENERAL RADIOLOGY | Facility: CLINIC | Age: 53
End: 2023-08-22
Attending: EMERGENCY MEDICINE
Payer: COMMERCIAL

## 2023-08-22 ENCOUNTER — APPOINTMENT (OUTPATIENT)
Dept: CT IMAGING | Facility: CLINIC | Age: 53
End: 2023-08-22
Attending: EMERGENCY MEDICINE
Payer: COMMERCIAL

## 2023-08-22 ENCOUNTER — HOSPITAL ENCOUNTER (EMERGENCY)
Facility: CLINIC | Age: 53
Discharge: HOME | End: 2023-08-23
Attending: EMERGENCY MEDICINE | Admitting: EMERGENCY MEDICINE
Payer: COMMERCIAL

## 2023-08-22 DIAGNOSIS — R07.9 ACUTE CHEST PAIN: ICD-10-CM

## 2023-08-22 DIAGNOSIS — E11.65 TYPE 2 DIABETES MELLITUS WITH HYPERGLYCEMIA, WITHOUT LONG-TERM CURRENT USE OF INSULIN (H): ICD-10-CM

## 2023-08-22 LAB
ANION GAP SERPL CALCULATED.3IONS-SCNC: 15 MMOL/L (ref 7–15)
ATRIAL RATE - MUSE: 116 BPM
BASOPHILS # BLD AUTO: 0 10E3/UL (ref 0–0.2)
BASOPHILS NFR BLD AUTO: 1 %
BUN SERPL-MCNC: 11.2 MG/DL (ref 6–20)
CALCIUM SERPL-MCNC: 9.3 MG/DL (ref 8.6–10)
CHLORIDE SERPL-SCNC: 100 MMOL/L (ref 98–107)
CREAT SERPL-MCNC: 0.84 MG/DL (ref 0.51–0.95)
D DIMER PPP FEU-MCNC: 0.57 UG/ML FEU (ref 0–0.5)
DEPRECATED HCO3 PLAS-SCNC: 21 MMOL/L (ref 22–29)
DIASTOLIC BLOOD PRESSURE - MUSE: NORMAL MMHG
EOSINOPHIL # BLD AUTO: 0.2 10E3/UL (ref 0–0.7)
EOSINOPHIL NFR BLD AUTO: 2 %
ERYTHROCYTE [DISTWIDTH] IN BLOOD BY AUTOMATED COUNT: 14.7 % (ref 10–15)
FLUAV RNA SPEC QL NAA+PROBE: NEGATIVE
FLUBV RNA RESP QL NAA+PROBE: NEGATIVE
GFR SERPL CREATININE-BSD FRML MDRD: 83 ML/MIN/1.73M2
GLUCOSE SERPL-MCNC: 127 MG/DL (ref 70–99)
HCT VFR BLD AUTO: 41.1 % (ref 35–47)
HGB BLD-MCNC: 13.3 G/DL (ref 11.7–15.7)
HOLD SPECIMEN: NORMAL
IMM GRANULOCYTES # BLD: 0 10E3/UL
IMM GRANULOCYTES NFR BLD: 0 %
INTERPRETATION ECG - MUSE: NORMAL
LYMPHOCYTES # BLD AUTO: 3.4 10E3/UL (ref 0.8–5.3)
LYMPHOCYTES NFR BLD AUTO: 39 %
MCH RBC QN AUTO: 28.2 PG (ref 26.5–33)
MCHC RBC AUTO-ENTMCNC: 32.4 G/DL (ref 31.5–36.5)
MCV RBC AUTO: 87 FL (ref 78–100)
MONOCYTES # BLD AUTO: 0.5 10E3/UL (ref 0–1.3)
MONOCYTES NFR BLD AUTO: 6 %
NEUTROPHILS # BLD AUTO: 4.6 10E3/UL (ref 1.6–8.3)
NEUTROPHILS NFR BLD AUTO: 52 %
NRBC # BLD AUTO: 0 10E3/UL
NRBC BLD AUTO-RTO: 0 /100
P AXIS - MUSE: 71 DEGREES
PLATELET # BLD AUTO: 296 10E3/UL (ref 150–450)
POTASSIUM SERPL-SCNC: 3.8 MMOL/L (ref 3.4–5.3)
PR INTERVAL - MUSE: 154 MS
QRS DURATION - MUSE: 84 MS
QT - MUSE: 330 MS
QTC - MUSE: 458 MS
R AXIS - MUSE: 55 DEGREES
RBC # BLD AUTO: 4.71 10E6/UL (ref 3.8–5.2)
RSV RNA SPEC NAA+PROBE: NEGATIVE
SARS-COV-2 RNA RESP QL NAA+PROBE: NEGATIVE
SODIUM SERPL-SCNC: 136 MMOL/L (ref 136–145)
SYSTOLIC BLOOD PRESSURE - MUSE: NORMAL MMHG
T AXIS - MUSE: 54 DEGREES
TROPONIN T SERPL HS-MCNC: 9 NG/L
VENTRICULAR RATE- MUSE: 116 BPM
WBC # BLD AUTO: 8.7 10E3/UL (ref 4–11)

## 2023-08-22 PROCEDURE — 82310 ASSAY OF CALCIUM: CPT | Performed by: EMERGENCY MEDICINE

## 2023-08-22 PROCEDURE — 93005 ELECTROCARDIOGRAM TRACING: CPT

## 2023-08-22 PROCEDURE — 87637 SARSCOV2&INF A&B&RSV AMP PRB: CPT | Performed by: EMERGENCY MEDICINE

## 2023-08-22 PROCEDURE — 85379 FIBRIN DEGRADATION QUANT: CPT | Performed by: EMERGENCY MEDICINE

## 2023-08-22 PROCEDURE — 84484 ASSAY OF TROPONIN QUANT: CPT | Performed by: EMERGENCY MEDICINE

## 2023-08-22 PROCEDURE — 36415 COLL VENOUS BLD VENIPUNCTURE: CPT | Performed by: EMERGENCY MEDICINE

## 2023-08-22 PROCEDURE — 85025 COMPLETE CBC W/AUTO DIFF WBC: CPT | Performed by: EMERGENCY MEDICINE

## 2023-08-22 PROCEDURE — 99285 EMERGENCY DEPT VISIT HI MDM: CPT | Mod: 25

## 2023-08-22 PROCEDURE — 71046 X-RAY EXAM CHEST 2 VIEWS: CPT

## 2023-08-22 PROCEDURE — 250N000009 HC RX 250: Performed by: EMERGENCY MEDICINE

## 2023-08-22 PROCEDURE — 71275 CT ANGIOGRAPHY CHEST: CPT

## 2023-08-22 PROCEDURE — 250N000011 HC RX IP 250 OP 636: Performed by: EMERGENCY MEDICINE

## 2023-08-22 PROCEDURE — 250N000013 HC RX MED GY IP 250 OP 250 PS 637: Performed by: EMERGENCY MEDICINE

## 2023-08-22 RX ORDER — ACETAMINOPHEN 325 MG/1
650 TABLET ORAL ONCE
Status: COMPLETED | OUTPATIENT
Start: 2023-08-22 | End: 2023-08-22

## 2023-08-22 RX ORDER — IOPAMIDOL 755 MG/ML
83 INJECTION, SOLUTION INTRAVASCULAR ONCE
Status: COMPLETED | OUTPATIENT
Start: 2023-08-22 | End: 2023-08-22

## 2023-08-22 RX ADMIN — SODIUM CHLORIDE 100 ML: 9 INJECTION, SOLUTION INTRAVENOUS at 21:36

## 2023-08-22 RX ADMIN — ACETAMINOPHEN 650 MG: 325 TABLET, FILM COATED ORAL at 22:36

## 2023-08-22 RX ADMIN — IOPAMIDOL 83 ML: 755 INJECTION, SOLUTION INTRAVENOUS at 21:36

## 2023-08-22 ASSESSMENT — ACTIVITIES OF DAILY LIVING (ADL)
ADLS_ACUITY_SCORE: 35
ADLS_ACUITY_SCORE: 35

## 2023-08-23 VITALS
BODY MASS INDEX: 52.42 KG/M2 | DIASTOLIC BLOOD PRESSURE: 79 MMHG | RESPIRATION RATE: 16 BRPM | TEMPERATURE: 97.2 F | OXYGEN SATURATION: 98 % | SYSTOLIC BLOOD PRESSURE: 136 MMHG | HEART RATE: 95 BPM | WEIGHT: 293 LBS

## 2023-08-23 LAB — TROPONIN T SERPL HS-MCNC: 11 NG/L

## 2023-08-23 NOTE — ED TRIAGE NOTES
Patient here  with chest pain which started this morning. She c/o pain radiating to her back and left arm. Hx of a stroke 7/6/2023     Triage Assessment       Row Name 08/22/23 1950       Triage Assessment (Adult)    Airway WDL WDL       Respiratory WDL    Respiratory WDL WDL       Skin Circulation/Temperature WDL    Skin Circulation/Temperature WDL WDL       Cardiac WDL    Cardiac WDL WDL       Peripheral/Neurovascular WDL    Peripheral Neurovascular WDL WDL

## 2023-08-23 NOTE — ED PROVIDER NOTES
History     Chief Complaint:  Chest Pain       The history is provided by the patient.      Yancy Hoover is a 53 year old female with a history of a stroke, hyperlipidemia, obesity, and diabetes who presents with intermittent left sided chest pain radiating to her back and left shoulder that started this morning. The patient describes the pain as achy and adds that she has tingling in her left hand fingers. She notes that the pain worsens when laying down, and is unaffected by deep breathing and position change. Additionally, the patient adds that she has a sore throat, left ear pain, and she has recently acquired a cough and raspy voice. The patient denies shortness of breath, pain on palpation, leg pain/swelling, alcohol consumption, illicit drug use, ill contacts, rash, or any family history of cardiac infarctions. Alongside this, the patient states that she had a stroke on  where she lost movement in her right hand, wrist and leg. She attributed the stroke to increased stress. She adds that she takes a daily 81 mg Aspirin. Yancy smokes a few cigarettes a day, but she is trying to smoke less. No blood clots history.    Independent Historian:   None - Patient Only    Review of External Notes:   none    Medications:  Glucose  Insulin  Saxenda  Glucophage  Nicoderm  Nicorette  Prilosec  Zofran  Crestor  Topamax  Metformin  Semaglutide  Prevident  Celexa  Saxenda  Aspirin 81mg  Reglan  Exenatide  Omeprazole  Betamethasone dipropionate  Jardiance    Past Medical History:    DM type 2  LANDON  Obesity  Ovarian cyst  Caries  Temoromandibular disorder  VFD  Presbyopia  Hyperlipidemia  CVA  Preeclampsia  Wrist drop  Depression   Anxiety     Past Surgical History:    EGD   delivery  Left laparoscopic cystectomy ovarian   Oophorectomy    Physical Exam   Patient Vitals for the past 24 hrs:   BP Temp Temp src Pulse Resp SpO2 Weight   23 0000 136/79 -- -- 95 16 98 % --   23 (!) 138/106  97.2  F (36.2  C) Temporal 101 20 98 % 142.9 kg (315 lb)   08/22/23 2000 -- -- -- 112 21 -- --      Physical Exam  Nursing note and vitals reviewed.  Constitutional:  Appears well-developed and well-nourished. Slightly hoarse voice  HENT:    TMs clear.  Head:    Atraumatic.   Mouth/Throat:   Oropharynx is clear and moist. No oropharyngeal exudate.   Eyes:    Pupils are equal, round, and reactive to light.   Neck:    Normal range of motion. Neck supple.      No tracheal deviation present. No thyromegaly present.   Cardiovascular:  Normal rate, regular rhythm, no murmur   Pulmonary/Chest: Breath sounds are clear and equal without wheezes or crackles. Nontender chest wall. No respiratory distress.  Abdominal:   Soft. Bowel sounds are normal. Exhibits no distension and      no mass. There is no tenderness.      There is no rebound and no guarding.   Musculoskeletal:  Exhibits no edema.   Lymphadenopathy:  No cervical adenopathy.   Neurological:   Alert and oriented to person, place, and time.   Skin:    Skin is warm and dry. No rash noted. No pallor.     Emergency Department Course   ECG  ECG taken at 1952, ECG read at 1958  Sinus tachycardia  Cannot rule out anterior infarct, age undetermined  Abnormal ECG   Rate 116 bpm. NY interval 154 ms. QRS duration 84 ms. QT/QTc 330/458 ms. P-R-T axes 71 55 54.     Imaging:  CT Chest Pulmonary Embolism w Contrast   Final Result   IMPRESSION:   1.  There is no pulmonary embolus, aortic aneurysm or dissection. No acute abnormality.      XR Chest 2 Views   Final Result   IMPRESSION: Negative chest. Lungs are clear.            Report per radiology    Laboratory:  Labs Ordered and Resulted from Time of ED Arrival to Time of ED Departure   D DIMER QUANTITATIVE - Abnormal       Result Value    D-Dimer Quantitative 0.57 (*)    BASIC METABOLIC PANEL - Abnormal    Sodium 136      Potassium 3.8      Chloride 100      Carbon Dioxide (CO2) 21 (*)     Anion Gap 15      Urea Nitrogen 11.2       Creatinine 0.84      Calcium 9.3      Glucose 127 (*)     GFR Estimate 83     TROPONIN T, HIGH SENSITIVITY - Normal    Troponin T, High Sensitivity 9     INFLUENZA A/B, RSV, & SARS-COV2 PCR - Normal    Influenza A PCR Negative      Influenza B PCR Negative      RSV PCR Negative      SARS CoV2 PCR Negative     TROPONIN T, HIGH SENSITIVITY - Normal    Troponin T, High Sensitivity 11     CBC WITH PLATELETS AND DIFFERENTIAL    WBC Count 8.7      RBC Count 4.71      Hemoglobin 13.3      Hematocrit 41.1      MCV 87      MCH 28.2      MCHC 32.4      RDW 14.7      Platelet Count 296      % Neutrophils 52      % Lymphocytes 39      % Monocytes 6      % Eosinophils 2      % Basophils 1      % Immature Granulocytes 0      NRBCs per 100 WBC 0      Absolute Neutrophils 4.6      Absolute Lymphocytes 3.4      Absolute Monocytes 0.5      Absolute Eosinophils 0.2      Absolute Basophils 0.0      Absolute Immature Granulocytes 0.0      Absolute NRBCs 0.0        Emergency Department Course & Assessments:       Interventions:  Medications   iopamidol (ISOVUE-370) solution 83 mL (83 mLs Intravenous $Given 23)   Saline (100 mLs Intravenous $Given 23)   acetaminophen (TYLENOL) tablet 650 mg (650 mg Oral $Given 23)      Assessments:   I obtained history and examined the patient as noted above.   I rechecked the patient and explained findings.   0021 I rechecked the patient and explained findings.     Independent Interpretation (X-rays, CTs, rhythm strip):  I independently reviewed the chest X-ray and it is normal.    Social Determinants of Health affecting care:   None    Disposition:  The patient was discharged to home.     Impression & Plan      Medical Decision Makin year old female who presents with chest pain.  Patient history and records reviewed. Broad differential considered including: ACS, PE, aortic dissection, myocarditis, pericarditis, pneumothorax, pneumonia, esophageal rupture,  musculoskeletal chest wall pain, referred pain from abdomen.  Patient well appearing with non-concerning vitals.  EKG without evidence of acute ischemia or arrythmia. HS troponin is WNL and was repeated over 2 hours later and was still negative.  I feel ACS or myocarditis is unlikely.  D-dimer is mildly elevated so chest CT was performed and did not show any pulmonary embolism, pneumonia, pneumothorax or other cause of her chest pain.  I did not feel there was concern for aortic dissection.   Chest x-ray shows no evidence of pneumonia, pneumothorax, CHF, or mediastinal widening.  No other high risk factors present to suggest aortic dissection, and feel this is very unlikely at this time.  She has mildly elevated glucose in the setting of type 2 diabetes mellitus with no sign of DKA.  Abdominal examination non-tender and doubt referred pain from intra-abdominal catastrophe, pancreatitis, gallbladder pathology.  I felt she be safely discharged home and she was instructed on symptomatic treatment.  She was told to follow-up in clinic for recheck this week but to return if symptoms worsen.    Diagnosis:    ICD-10-CM    1. Acute chest pain  R07.9       2. Type 2 diabetes mellitus with hyperglycemia, without long-term current use of insulin (H)  E11.65          Scribe Disclosure:  IRonni, am serving as a scribe at 11:30 PM on 8/22/2023    I, [place], am serving as a scribe at 11:30 PM on 8/22/2023 to document services personally performed by Amada Solano MD, based on my observations and the provider's statements to me.   8/22/2023   Amada Solano MD [place], Cheri Lee, MD  08/23/23 0132

## 2023-08-26 ENCOUNTER — HEALTH MAINTENANCE LETTER (OUTPATIENT)
Age: 53
End: 2023-08-26

## 2023-11-04 ENCOUNTER — HEALTH MAINTENANCE LETTER (OUTPATIENT)
Age: 53
End: 2023-11-04

## 2023-11-16 ENCOUNTER — TELEPHONE (OUTPATIENT)
Dept: ENDOCRINOLOGY | Facility: CLINIC | Age: 53
End: 2023-11-16
Payer: COMMERCIAL

## 2023-11-20 NOTE — TELEPHONE ENCOUNTER
Central Prior Authorization Team  Phone: [phone]    PA Initiation    Medication: SAXENDA 18 MG/3ML SC SOPN  Insurance Company: [employer] - Phone [phone] Fax [phone]  Pharmacy Filling the Rx: [facility] - [place], [place] - [address]  Filling Pharmacy Phone: [phone]  Filling Pharmacy Fax:    Start Date: 11/20/2023

## 2023-11-21 NOTE — TELEPHONE ENCOUNTER
Central Prior Authorization Team  Phone: [phone]    Prior Authorization Approval    Medication: SAXENDA 18 MG/3ML SC SOPN  Authorization Effective Date: 10/21/2023  Authorization Expiration Date: 11/19/2024  Approved Dose/Quantity:   Reference #:     Insurance Company: [employer] - Phone [phone] Fax [phone]  Expected CoPay: $    CoPay Card Available:      Financial Assistance Needed:   Which Pharmacy is filling the prescription: [facility] - [place] [address]  Pharmacy Notified: yes  Patient Notified: PHARMACY WILL NOTIFY PT WHEN READY

## 2024-01-18 DIAGNOSIS — E66.01 CLASS 3 SEVERE OBESITY DUE TO EXCESS CALORIES WITH SERIOUS COMORBIDITY IN ADULT, UNSPECIFIED BMI (H): ICD-10-CM

## 2024-01-18 DIAGNOSIS — E66.813 CLASS 3 SEVERE OBESITY DUE TO EXCESS CALORIES WITH SERIOUS COMORBIDITY IN ADULT, UNSPECIFIED BMI (H): ICD-10-CM

## 2024-01-18 DIAGNOSIS — E11.9 TYPE 2 DIABETES MELLITUS WITHOUT COMPLICATION, UNSPECIFIED WHETHER LONG TERM INSULIN USE (H): ICD-10-CM

## 2024-01-21 RX ORDER — GLUCOSAMINE HCL/CHONDROITIN SU 500-400 MG
CAPSULE ORAL
Qty: 100 EACH | Refills: 5 | Status: SHIPPED | OUTPATIENT
Start: 2024-01-21

## 2024-01-21 RX ORDER — LIRAGLUTIDE 6 MG/ML
INJECTION, SOLUTION SUBCUTANEOUS
Qty: 45 ML | Refills: 1 | Status: SHIPPED | OUTPATIENT
Start: 2024-01-21 | End: 2024-04-19

## 2024-01-22 ENCOUNTER — TELEPHONE (OUTPATIENT)
Dept: ENDOCRINOLOGY | Facility: CLINIC | Age: 54
End: 2024-01-22
Payer: COMMERCIAL

## 2024-01-22 VITALS — WEIGHT: 280 LBS | BODY MASS INDEX: 46.59 KG/M2

## 2024-01-22 DIAGNOSIS — E66.01 CLASS 3 SEVERE OBESITY DUE TO EXCESS CALORIES WITH SERIOUS COMORBIDITY IN ADULT, UNSPECIFIED BMI (H): Primary | ICD-10-CM

## 2024-01-22 DIAGNOSIS — E66.813 CLASS 3 SEVERE OBESITY DUE TO EXCESS CALORIES WITH SERIOUS COMORBIDITY IN ADULT, UNSPECIFIED BMI (H): Primary | ICD-10-CM

## 2024-01-22 NOTE — TELEPHONE ENCOUNTER
Reason for Call:  Other prescription    Detailed comments: Pharmacy received Saxenda prescription. Rx was sent for introductory dose, patient is already on Saxenda. Please clarify if pt is restarting medication.    liraglutide - Weight Management (SAXENDA) 18 MG/3ML pen  [facility] - [place], [place] - [facility] [address] (Ph: [phone])       Phone Number Patient can be reached at: Other phone number:  [phone]*    Best Time: any    Can we leave a detailed message on this number? YES, caller was Edi pharmacist    Call taken on 1/22/2024 at 2:06 PM by Tran Marcelino

## 2024-01-22 NOTE — TELEPHONE ENCOUNTER
Prior Authorization Not Needed per Insurance    Medication: SAXENDA 18 MG/3ML SC SOPN  Insurance Company: [employer] - Phone [phone] Fax [phone]  Expected CoPay: $ 0  Pharmacy Filling the Rx: [facility] - [place], [place] - [address]   Pharmacy Notified: no  Patient Notified: no    No pa required Pa is good until 11/16/2024

## 2024-01-23 RX ORDER — ALCOHOL ANTISEPTIC PADS
PADS, MEDICATED (EA) TOPICAL
Refills: 5 | OUTPATIENT
Start: 2024-01-23

## 2024-01-23 NOTE — TELEPHONE ENCOUNTER
[facility] ALCOHOL SWABS 70% PADS    Disp Refills Start End KANCHAN   alcohol swab prep pads 100 each 5 1/21/2024 -- No   Sig: Use to swab area of injection/shahla as directed.   Sent to pharmacy as: Alcohol Swabs 70 % Pad   Class: E-Prescribe   Order: [id number]   E-Prescribing Status: Receipt confirmed by pharmacy (1/21/2024  7:30 PM CST)     Printout Tracking    External Result Report     Medication Administration Instructions    Use to swab area of injection/shahla as directed.     Pharmacy    [facility] - [place], [place] - [address]

## 2024-01-25 DIAGNOSIS — Z72.0 TOBACCO ABUSE: ICD-10-CM

## 2024-01-30 RX ORDER — NICOTINE 21 MG/24HR
1 PATCH, TRANSDERMAL 24 HOURS TRANSDERMAL EVERY 24 HOURS
Qty: 84 PATCH | OUTPATIENT
Start: 2024-01-30

## 2024-01-31 NOTE — TELEPHONE ENCOUNTER
nicotine polacrilex (NICORETTE) 4 MG gum         Last Written Prescription Date:  12/16/22  Last Fill Quantity: 100,   # refills: 3  Last Office Visit : 10/21/22  Future Office visit:  4/19/24    Routing refill request to provider for review/approval because:  Drug not on the ENDO / [facility], [facility] or  Health refill protocol

## 2024-01-31 NOTE — TELEPHONE ENCOUNTER
Last Office Visit : 10/21/22   Addressed in a different encounter, sent to provider for signature. Not on protocol

## 2024-02-15 NOTE — TELEPHONE ENCOUNTER
This may be a duplicate (nicorette) and I have discussed with Lauren Bloch who is asking pt to follow up with PCP for this--neither Zulma nor I are working with this pt on smoking cessation at this time, pt needs this but best to follow up with PCP given that this has not been part of our care focus.

## 2024-03-23 ENCOUNTER — HEALTH MAINTENANCE LETTER (OUTPATIENT)
Age: 54
End: 2024-03-23

## 2024-04-19 ENCOUNTER — VIRTUAL VISIT (OUTPATIENT)
Dept: ENDOCRINOLOGY | Facility: CLINIC | Age: 54
End: 2024-04-19
Payer: COMMERCIAL

## 2024-04-19 VITALS — HEIGHT: 65 IN | BODY MASS INDEX: 48.82 KG/M2 | WEIGHT: 293 LBS

## 2024-04-19 DIAGNOSIS — E66.01 CLASS 3 SEVERE OBESITY DUE TO EXCESS CALORIES WITH SERIOUS COMORBIDITY IN ADULT, UNSPECIFIED BMI (H): Primary | ICD-10-CM

## 2024-04-19 DIAGNOSIS — E11.59 TYPE 2 DIABETES MELLITUS WITH VASCULAR DISEASE (H): ICD-10-CM

## 2024-04-19 DIAGNOSIS — E66.813 CLASS 3 SEVERE OBESITY DUE TO EXCESS CALORIES WITH SERIOUS COMORBIDITY IN ADULT, UNSPECIFIED BMI (H): Primary | ICD-10-CM

## 2024-04-19 PROCEDURE — 99215 OFFICE O/P EST HI 40 MIN: CPT | Mod: 95 | Performed by: INTERNAL MEDICINE

## 2024-04-19 RX ORDER — ASPIRIN 81 MG/1
81 TABLET ORAL
COMMUNITY
Start: 2023-10-25 | End: 2024-06-04

## 2024-04-19 RX ORDER — VERAPAMIL HYDROCHLORIDE 240 MG/1
240 CAPSULE, EXTENDED RELEASE ORAL
COMMUNITY
Start: 2024-03-29

## 2024-04-19 ASSESSMENT — PAIN SCALES - GENERAL: PAINLEVEL: NO PAIN (0)

## 2024-04-19 NOTE — PROGRESS NOTES
"Virtual Visit Details    Type of service:  Video Visit     Originating Location (pt. Location): Home    Distant Location (provider location):  Off-site  Platform used for Video Visit: Radha    Joined the call at 2024, 12:43:46 pm.  Left the call at 2024, 1:04:53 pm.  You were on the call for 21 minutes 6 seconds .    Return Medical Weight Management Note     Yancy Hoover  MRN:  [id number]  :  1970  MARLON:  2024    Dear Arlin Ewing,    I had the pleasure of seeing your patient Yancy Hoover. She is a 53 year old female who I am continuing to see for treatment of obesity related to:        2020    11:30 AM   --   I have the following health issues associated with obesity Type II Diabetes       INTERVAL HISTORY:    --last seen about 1.5 yrs ago, reviewed and relevant is as follows, as extracted  Xxx  --earlier visit; reviewed and relevant history, as extracted, includes the following--     --------------------------  \"She has the following co-morbidities:  No HTN, no CVD, elevated cholesterol, DM2 newly disgnosed (had HbA1c of 7.0 by her report)        Has struggled with wt whole life, and this is pt's max wt now.     With COVID19 is working at home and sedentary otherwise  Drinking a lot soda; customer service work     Has been working with the following provider on wt loss--  [facility] (Mack Lantigua)  Pt notes she went to see a wt loss doctor and wanted wt loss pills.  He did labs and told pt she had DM.  HbA1c 7.0.  Cholesterol was a little high also--208     Pt notes she was referred to a dietitian but the dietitian is not taking new pts and pt has a lot of questions--is motivated to make health changes and halt/slow DM-related progressions/complications..      Off of work this next week so would like to have virtual visit with nutrition then if possible.     Lives next to [facility]--interested in goal of walking 30 min 5 days per wk; gradually work up " "to 10,000 steps per day (can use phone tracker)     Can stop eating after supper--made goal for this and the family has plans for eating dinner around 5p.     Will cut out the soda and switch to 0 paul options, has been having at least several 20 oz sodas (sprite) daily, did note while on topiramate that it was tasting flat and she found herself leaving unfinished sodas and opening new ones because of this change in taste---> now understands that this is likely related to topiramate and she can set a goal of decreasing/stopping the sugared soda and use this med to help her make this change.     Pt notes cravings and boredom and happens in the evening after supper; she will re-start and shift topiramate to prior to supper with up-titration schedule I provided in AVS.           Since last seen with Saxenda and she is tolerating the top dose and getting appetitive suppression with it, also good blood sugar control  --no gaps in therapy  --clothes are fitting better (has a \"get back into\" dress)  --activity is less now (schedule is not permitting)  --less frequent BMs on the Saxenda (discussed using fiber and adequate hydration, occasional stool softener)     --with food lately: (has never been a breakfast person) having a Premier shake, (shot around 12p) lunch around 1p, supper before 6p (working on MISSY with eating window between 10-6p)     LAST WEIGHT:   290 lbs 0 oz   Body mass index is 49.78 kg/m .     Initial Weight (lbs): 295 lbs    Since last seen of note is the following--  --reinitiating wt loss brriatric surgery plans--->I will reach out to Sarah Mccall to get pt back on the pathway  --had minor CVA summer 2023  --got up to 336# in the interim and is back down to around 296# with lifestyle changes  --stopped smoking  --DM control over last month--low 100s (does a meter reading once daily in the afternoon); most recent HbA1c 6.8 (Mar 2024)    Her current plans--  --walking about 3-5 times per wk (R leg/arm " "hemiparesis, with some leg recovering now), walking 30 min each time  --Mediterranean diet (not counting calories but making healthy choices)    CURRENT WEIGHT:   296 lbs 0 oz  Body mass index is 50.02 kg/m .     Initial Weight (lbs): 295 lbs  Last Visits Weight: 131.5 kg (290 lb)  Cumulative weight loss (lbs): -1  Weight Loss Percentage: -0.34%        4/2/2021    11:05 AM   Changes and Difficulties   I have made the following changes to my diet since my last visit: More e       VITALS:  Ht 1.638 m (5' 4.5\")   Wt 134.3 kg (296 lb)   BMI 50.02 kg/m      MEDICATIONS:   Current Outpatient Medications   Medication Sig Dispense Refill    aspirin 81 MG EC tablet Take 81 mg by mouth      childrens multivitamin w/iron (FLINTSTONES COMPLETE) 18 MG chewable tablet Take 1 chew tab by mouth daily      liraglutide - Weight Management (SAXENDA) 18 MG/3ML pen Start 0.6 mg daily for wk 1; wk 2-- 1.2 mg daily; wk 3-- 1.8 mg daily; wk 4-- 2.4 mg daily; wk 5 and after, as tolerated, 3 mg daily 45 mL 1    metFORMIN (GLUCOPHAGE XR) 500 MG 24 hr tablet Take 2 tablets (1,000 mg) by mouth 2 times daily (with meals) 360 tablet 3    rosuvastatin (CRESTOR) 10 MG tablet Take 1 tablet (10 mg) by mouth daily 90 tablet 3    verapamil ER (VERELAN) 240 MG 24 hr capsule Take 240 mg by mouth      vitamin D3 (CHOLECALCIFEROL) 50 mcg (2000 units) tablet Take 1 tablet by mouth daily      alcohol swab prep pads Use to swab area of injection/shahla as directed. 100 each 5    blood glucose (ACCU-CHEK SMARTVIEW) test strip Use to test blood sugar 2 times daily. 100 strip 5    blood glucose calibration (ACCU-CHEK SMARTVIEW CONTROL) solution Use to calibrate blood glucose monitor as directed. 1 Bottle 3    blood glucose monitoring (ACCU-CHEK FASTCLIX) lancets Use to test blood sugar 1-2 times daily or as directed. 102 each 5    blood glucose monitoring (ACCU-CHEK FAWAD SMARTVIEW) meter device kit Use to test blood sugar 1-2 times daily. 1 kit 0    insulin pen " needle (31G X 8 MM) 31G X 8 MM miscellaneous Use 1 pen needles daily or as directed. 100 each 0    insulin pen needle (32G X 6 MM) 32G X 6 MM miscellaneous Use 1 pen needles daily or as directed with saxenda. 100 each 1    liraglutide - Weight Management (SAXENDA) 18 MG/3ML pen Inject 3 mg Subcutaneous daily for 90 days 45 mL 0    nicotine (NICODERM CQ) 21 MG/24HR 24 hr patch Place 1 patch onto the skin every 24 hours 100 patch 1    nicotine polacrilex (NICORETTE) 4 MG gum Chew one piece every 2 hours as needed for cravings. Max 24 pieces in 24 hours. 100 each 3    omeprazole (PRILOSEC) 20 MG DR capsule take 1 capsule by mouth every day 30 minutes before breakfast      ondansetron (ZOFRAN ODT) 4 MG ODT tab Take 1 tablet (4 mg) by mouth every 8 hours as needed for nausea or vomiting (Patient not taking: Reported on 12/16/2022) 10 tablet 0           1/29/2021    11:30 AM   Weight Loss Medication History Reviewed With Patient   If you are having side effects please describe: none          ASSESSMENT/PLAN:     ## class 3 obesity  ## T2DM not treated with ins, history of CVA     Yancy is a patient with early onset morbid obesity with significant element of familial/genetic influence and with current health consequences. She does need aggressive weight loss plan due to new dx T2DM.  Yancy Hoover ate a high carb diet, a high fat diet, to obtain specific degree of fullness, and had a lot of liquid calories. She is making good changes in support of healthy wt.     Her problem is complicated by a hunger disorder and strong craving/reward pathways        PLAN:    (continues)  Decrease portion sizes  Purge house of food triggers  No meal skipping  Walking plan  Dietician visit of education  Volumetrics eating plan  Hypocaloric/low fat diet       Diabetes   Ancillary testing:  N/A.continue with her glucose monitoring  Food Plan:  Reduced calorie and Low carbohydrate.  Activity Plan:  Daily walking, can do this after  meals (such as after supper in the evenings in the summer--supper is around 5p).  Supplementary:  N/A.  Medication:  see comments below     Additionally  --she is up-titrating topiramate (50 mg daily now and has plan to increase to 75 mg, also using it for migraines)  --in review of her med options it appears that Saxenda and Victoza are the only GLP1 monotherapy agents covered by her plan right now; tirzepatide is also not covered. She has Saxenda and is uptitrating. If we run into a gap we may temporarily slide over to Victoza until we get can Saxenda supply (Victoza by itself was not efffective for wt loss)    --I will let Sharon know pt wants to connect, also planning visit with Zulma or other med mgmt pharmacy in about 6-8 wks, and me again in about 4 mo. Pt is interested in re-establishing with the bariatric surgery pathway; I am reaching out to Sarah Mccall for assistance with that    --RTC with Prachi in about 4 mo        Time: approx 41 min spent on evaluation, management, counseling, education, & motivational interviewing (video visit) coupled with previsit planning and post visit charting/follow up care same day     Sincerely,    Danial Velarde MD

## 2024-04-19 NOTE — LETTER
"2024       RE: Yancy Hoover  [address]  [place] [place] [address]     Dear Colleague,    Thank you for referring your patient, Yancy Hoover, to the [facility] WE[facility]NA[facility] [place] [facility]. Please see a copy of my visit note below.    Return Medical Weight Management Note     Yancy Hoover  MRN:  [id number]  :  1970  MARLON:  2024    Dear Arlin Ewing,    I had the pleasure of seeing your patient Yancy Hoover. She is a 53 year old female who I am continuing to see for treatment of obesity related to:        2020    11:30 AM   --   I have the following health issues associated with obesity Type II Diabetes       INTERVAL HISTORY:    --last seen about 1.5 yrs ago, reviewed and relevant is as follows, as extracted  Xxx  --earlier visit; reviewed and relevant history, as extracted, includes the following--     --------------------------  \"She has the following co-morbidities:  No HTN, no CVD, elevated cholesterol, DM2 newly disgnosed (had HbA1c of 7.0 by her report)        Has struggled with wt whole life, and this is pt's max wt now.     With COVID19 is working at home and sedentary otherwise  Drinking a lot soda; customer service work     Has been working with the following provider on wt loss--  [facility] (Mack Lantigua)  Pt notes she went to see a wt loss doctor and wanted wt loss pills.  He did labs and told pt she had DM.  HbA1c 7.0.  Cholesterol was a little high also--208     Pt notes she was referred to a dietitian but the dietitian is not taking new pts and pt has a lot of questions--is motivated to make health changes and halt/slow DM-related progressions/complications..      Off of work this next week so would like to have virtual visit with nutrition then if possible.     Lives next to [place] [address]--interested in goal of walking 30 min 5 days per wk; " "gradually work up to 10,000 steps per day (can use phone tracker)     Can stop eating after supper--made goal for this and the family has plans for eating dinner around 5p.     Will cut out the soda and switch to 0 paul options, has been having at least several 20 oz sodas (sprite) daily, did note while on topiramate that it was tasting flat and she found herself leaving unfinished sodas and opening new ones because of this change in taste---> now understands that this is likely related to topiramate and she can set a goal of decreasing/stopping the sugared soda and use this med to help her make this change.     Pt notes cravings and boredom and happens in the evening after supper; she will re-start and shift topiramate to prior to supper with up-titration schedule I provided in AVS.           Since last seen with Saxletty and she is tolerating the top dose and getting appetitive suppression with it, also good blood sugar control  --no gaps in therapy  --clothes are fitting better (has a \"get back into\" dress)  --activity is less now (schedule is not permitting)  --less frequent BMs on the Saxenda (discussed using fiber and adequate hydration, occasional stool softener)     --with food lately: (has never been a breakfast person) having a Premier shake, (shot around 12p) lunch around 1p, supper before 6p (working on MISSY with eating window between 10-6p)     LAST WEIGHT:   290 lbs 0 oz   Body mass index is 49.78 kg/m .     Initial Weight (lbs): 295 lbs    Since last seen of note is the following--  --reinitiating wt loss brriatric surgery plans--->I will reach out to Sarah Mccall to get pt back on the pathway  --had minor CVA summer 2023  --got up to 336# in the interim and is back down to around 296# with lifestyle changes  --stopped smoking  --DM control over last month--low 100s (does a meter reading once daily in the afternoon); most recent HbA1c 6.8 (Mar 2024)    Her current plans--  --walking about 3-5 times per " "wk (R leg/arm hemiparesis, with some leg recovering now), walking 30 min each time  --Mediterranean diet (not counting calories but making healthy choices)    CURRENT WEIGHT:   296 lbs 0 oz  Body mass index is 50.02 kg/m .     Initial Weight (lbs): 295 lbs  Last Visits Weight: 131.5 kg (290 lb)  Cumulative weight loss (lbs): -1  Weight Loss Percentage: -0.34%        4/2/2021    11:05 AM   Changes and Difficulties   I have made the following changes to my diet since my last visit: More e       VITALS:  Ht 1.638 m (5' 4.5\")   Wt 134.3 kg (296 lb)   BMI 50.02 kg/m      MEDICATIONS:   Current Outpatient Medications   Medication Sig Dispense Refill    aspirin 81 MG EC tablet Take 81 mg by mouth      childrens multivitamin w/iron (FLINTSTONES COMPLETE) 18 MG chewable tablet Take 1 chew tab by mouth daily      liraglutide - Weight Management (SAXENDA) 18 MG/3ML pen Start 0.6 mg daily for wk 1; wk 2-- 1.2 mg daily; wk 3-- 1.8 mg daily; wk 4-- 2.4 mg daily; wk 5 and after, as tolerated, 3 mg daily 45 mL 1    metFORMIN (GLUCOPHAGE XR) 500 MG 24 hr tablet Take 2 tablets (1,000 mg) by mouth 2 times daily (with meals) 360 tablet 3    rosuvastatin (CRESTOR) 10 MG tablet Take 1 tablet (10 mg) by mouth daily 90 tablet 3    verapamil ER (VERELAN) 240 MG 24 hr capsule Take 240 mg by mouth      vitamin D3 (CHOLECALCIFEROL) 50 mcg (2000 units) tablet Take 1 tablet by mouth daily      alcohol swab prep pads Use to swab area of injection/shahla as directed. 100 each 5    blood glucose (ACCU-CHEK SMARTVIEW) test strip Use to test blood sugar 2 times daily. 100 strip 5    blood glucose calibration (ACCU-CHEK SMARTVIEW CONTROL) solution Use to calibrate blood glucose monitor as directed. 1 Bottle 3    blood glucose monitoring (ACCU-CHEK FASTCLIX) lancets Use to test blood sugar 1-2 times daily or as directed. 102 each 5    blood glucose monitoring (ACCU-CHEK FAWAD SMARTVIEW) meter device kit Use to test blood sugar 1-2 times daily. 1 kit 0 "    insulin pen needle (31G X 8 MM) 31G X 8 MM miscellaneous Use 1 pen needles daily or as directed. 100 each 0    insulin pen needle (32G X 6 MM) 32G X 6 MM miscellaneous Use 1 pen needles daily or as directed with saxenda. 100 each 1    liraglutide - Weight Management (SAXENDA) 18 MG/3ML pen Inject 3 mg Subcutaneous daily for 90 days 45 mL 0    nicotine (NICODERM CQ) 21 MG/24HR 24 hr patch Place 1 patch onto the skin every 24 hours 100 patch 1    nicotine polacrilex (NICORETTE) 4 MG gum Chew one piece every 2 hours as needed for cravings. Max 24 pieces in 24 hours. 100 each 3    omeprazole (PRILOSEC) 20 MG DR capsule take 1 capsule by mouth every day 30 minutes before breakfast      ondansetron (ZOFRAN ODT) 4 MG ODT tab Take 1 tablet (4 mg) by mouth every 8 hours as needed for nausea or vomiting (Patient not taking: Reported on 12/16/2022) 10 tablet 0           1/29/2021    11:30 AM   Weight Loss Medication History Reviewed With Patient   If you are having side effects please describe: none          ASSESSMENT/PLAN:     ## class 3 obesity  ## T2DM not treated with ins, history of CVA     Yancy is a patient with early onset morbid obesity with significant element of familial/genetic influence and with current health consequences. She does need aggressive weight loss plan due to new dx T2DM.  Yancy Hoover ate a high carb diet, a high fat diet, to obtain specific degree of fullness, and had a lot of liquid calories. She is making good changes in support of healthy wt.     Her problem is complicated by a hunger disorder and strong craving/reward pathways        PLAN:    (continues)  Decrease portion sizes  Purge house of food triggers  No meal skipping  Walking plan  Dietician visit of education  Volumetrics eating plan  Hypocaloric/low fat diet       Diabetes   Ancillary testing:  N/A.continue with her glucose monitoring  Food Plan:  Reduced calorie and Low carbohydrate.  Activity Plan:  Daily walking, can  do this after meals (such as after supper in the evenings in the summer--supper is around 5p).  Supplementary:  N/A.  Medication:  see comments below     Additionally  --she is up-titrating topiramate (50 mg daily now and has plan to increase to 75 mg, also using it for migraines)  --in review of her med options it appears that Saxenda and Victoza are the only GLP1 monotherapy agents covered by her plan right now; tirzepatide is also not covered. She has Saxenda and is uptitrating. If we run into a gap we may temporarily slide over to Victoza until we get can Saxenda supply (Victoza by itself was not efffective for wt loss)    --I will let Sharon know pt wants to connect, also planning visit with Zulma or other med mgmt pharmacy in about 6-8 wks, and me again in about 4 mo. Pt is interested in re-establishing with the bariatric surgery pathway; I am reaching out to Sarah Mccall for assistance with that    --RTC with Prachi in about 4 mo        Time: approx 41 min spent on evaluation, management, counseling, education, & motivational interviewing (video visit) coupled with previsit planning and post visit charting/follow up care same day     Sincerely,    Danial Velarde MD      Again, thank you for allowing me to participate in the care of your patient.      Sincerely,    Danial Velarde MD

## 2024-04-19 NOTE — PATIENT INSTRUCTIONS
"Welcome to our weight management program!   We are excited to join you on your weight loss journey    Thank you for allowing us the privilege of caring for you. We hope we provided you with the excellent service you deserve.   Please let us know if there is anything else we can do for you so that we can be sure you are leaving completely satisfied with your care experience.    To ensure the quality of our services you may be receiving a patient satisfaction survey from an independent patient satisfaction monitoring company.    The greatest compliment you can give is a \"Likely to Recommend\"    You saw Dr. Velarde today.    Instructions per today's visit:   --You are escalating the topiramate and Saxenda; Dr. Velarde placed a new 3-mo Saxenda prescription for you with 3 refills  --We are planning return with Dr. Velarde again in about 4 mo  --Dr. Velarde has contacted Lauren Bloch (med mgmt pharmacist), Amira Coffey and Sarah Mccall (bariatric surgery team members) to follow up with you, and Sharon Osborn in nutrition; please use the phone number included here for appointments scheduling with Dr. Prachi Herrera, and Zulma      Interested in working with a health ?  Health coaches work with you to improve your overall health and wellbeing.  They look at the whole person, and may involve discussion of different areas of life, including, but not limited to the four pillars of health (sleep, exercise, nutrition, and stress management). Discuss with your care team if you would like to start working a health .  Health Coaching-3 Pack:    $99 for three health coaching visits    Visits may be done in person or via phone    Coaching is a partnership between the  and the client; Coaches do not prescribe or diagnose    Coaching helps inspire the client to reach his/her personal goals     For any questions/concerns contact Ebony Shelton LPN at [phone]     To schedule appointments with our team, please call " [phone]     Please call during clinic hours Monday through Friday 8:00a - 4:00p if you have questions or you can contact us via [employer] at anytime. ?    Lab results will be communicated through My Chart or letter (if My Chart not used). Please call the clinic if you have not received communication after 1 week or if you have any questions.?      Fax: [phone]    Thank you,  Medical Weight Management Team

## 2024-04-19 NOTE — NURSING NOTE
Is the patient currently in the state of [place]? YES    Visit mode:VIDEO    If the visit is dropped, the patient can be reconnected by: VIDEO VISIT: Text to cell phone:   Telephone Information:   Mobile [phone]       Will anyone else be joining the visit? NO  (If patient encounters technical issues they should call [phone] :[phone])    How would you like to obtain your AVS? MyChart    Are changes needed to the allergy or medication list? Yes Pt states taking 20mg Crestor now.      Are refills needed on medications prescribed by this physician? NO    Reason for visit: RECHECK    Edi MENDOZA

## 2024-04-22 ENCOUNTER — TELEPHONE (OUTPATIENT)
Dept: ENDOCRINOLOGY | Facility: CLINIC | Age: 54
End: 2024-04-22
Payer: COMMERCIAL

## 2024-04-22 NOTE — TELEPHONE ENCOUNTER
Patient confirmed scheduled appointment:  Date: 05/02/2024  Time: 1200 pm  Visit type: NEW ABDOUL  Provider: Jeannie Noe NP  Location: Virtual Visit  Testing/imaging: n/a  Additional notes: MARKO Summers,Sharon Osborn RD. 04/23/2024 @ 830 am

## 2024-04-22 NOTE — PROGRESS NOTES
"Video-Visit Details    Type of service:  Video Visit    Video Start Time: 8:31 AM   Video End Time: 8:50 AM    Originating Location (pt. Location): Home    Distant Location (provider location):  Offsite (providers home) [facility] [place]     Platform used for Video Visit: Well      Bariatric Nutrition Consultation Note     Reason For Visit: Nutrition Assessment     Yancy Hoover is a 53 year old presenting today for return bariatric nutrition consult. Pt is interested in laparoscopic sleeve gastrectomy with Dr. Alcaraz expected surgery in TBD.  Patient is accompanied by self.  Recommend continued monthly RD visits until surgery to help with continued weight loss and post-op diet education.        Pt referred by Jeannie Noe NP on July 16, 2020 and Dr. Velarde.  Patient with Co-morbidities of obesity including:  Type II DM yes   Renal Failure no  Sleep apnea no  Hypertension no   Dyslipidemia yes  Joint pain no  Back pain no  GERD no      Support System Reviewed With Patient 7/16/2020   Who do you have in your support network that can be available to help you for the first 2 weeks after surgery? Yes   Who can you count on for support throughout your weight loss surgery journey? Yes         ANTHROPOMETRICS:  Initial visit:  Estimated body mass index is 50.81 kg/m  as calculated from the following:    Height as of an earlier encounter on 7/16/20: 1.626 m (5' 4\").    Weight as of an earlier encounter on 7/16/20: 134.3 kg (296 lb).     Current weight:   Estimated body mass index is 50.02 kg/m  as calculated from the following:    Height as of 4/19/24: 1.638 m (5' 4.5\").    Weight as of 4/19/24: 134.3 kg (296 lb).      Required weight loss goal pre-op: -10 lbs from initial consult weight (goal weight 286 lbs or less before surgery)     Wt Readings from Last 5 Encounters:   04/19/24 134.3 kg (296 lb)   01/22/24 127 kg (280 lb)   08/22/23 142.9 kg (315 lb)   02/24/23 142 kg (313 " lb)   02/08/23 133.4 kg (294 lb)             7/16/2020   I have tried the following methods to lose weight Watching portions or calories, Exercise, Weight Watchers, Atkins type diet (low carb/high protein), Slimfast         Weight Loss Questions Reviewed With Patient 7/16/2020   How long have you been overweight? Since early childhood         SUPPLEMENT INFORMATION:  Vitamin D (h/o vitamin D deficiency), Flinstones complete with iron      MEDICATIONS FOR WEIGHT LOSS:  Topiramate and Saxenda      NUTRITION HISTORY:   No known food allergies/intolerances.  Doesn't like the taste of water. Needs to have ice cold. Eliminated red meat.   Putting food on a saucer lately to help reduce portion sizes.      Last visit with RD 3/21/22 - A lot of stress and feeling overwhelmed having to care of son's father (homecares and brining to therapies), kids are also home from school d/t schools strikes and multiple funerals over the past several months. Started some anti-anxiety medication, that is helping. Also notes, trying to take more time for herself. Plans to start to take walks on her lunch break. Continues to use portion control, however finds she is snacking a little more. She does use healthier snack choices. Struggles with taking Victoza daily, often forgetting, but is able to stay consistent with metformin.     Today - Pt reports she had a mini stroke about a year ago. Wt went up to 236 lbs. Focused on more of a Mediterranean diet post-stroke and helped to lose weight to 296 lbs. Has been stalled for past 6 months with further weight loss. Working on keeping to 3 meals daily, no snacks. And not eating past 7 pm. Challenged by sweet cravings at times.   Period where she couldn't use her right leg, has gained use of leg back. Working on walking 3-5 times weekly for 30 mins.   Practicing separation of beverages from meals.   Started Saxenda on last Tues. No side effects.      Recent Diet Recall:   Breakfast: 8am Greek yogurt  with fruit and granola  Lunch: backed/boiled chicken thigh (skinless) with salad (balsamic and feta)   Dinner: 1 cup turkey chili   Beverages: water        NUTRITION DIAGNOSIS:  Obesity r/t long history of self-monitoring deficit and excessive energy intake aeb BMI >30 kg/m2. - Improving/continues     INTERVENTION:  - Reviewed post-op diet guidelines, ways to help prepare for post-op diet guidelines pre-operatively, portion/calorie-control, mindful eating and sources of protein.   - Discussed low/zero-sugar sweet options.  - Scheduled bariatric nutrition class for May to review diet guidelines for surgery.   - Co-developed goals to work towards.   - Discussed meal planning resources  - Provided pt with list of goals RD contact information.         Patient Understanding: good  Expected Compliance: good     GOALS:  1. Practice eating slowly, taking long pauses between bites, portioning out less.   2. If sweet is needed, use a sugar-free popcicle.     The Plate Method  [email or web address]    Protein Sources for Weight Loss  [email or web address]     Carbohydrates  [email or web address]     Mindful Eating  [email or web address]     Summary of Volumetrics Eating Plan  [email or web address]     Diet Guidelines after Weight Loss Surgery  [email or web address]     Seated Exercises for Arms and Legs (can be done before or after surgery)  [email or web address]    Time spent with patient: 19 mins       Sharon Osborn RD, LD

## 2024-04-22 NOTE — TELEPHONE ENCOUNTER
Left Voicemail (1st Attempt) and Sent Mychart (1st Attempt) for the patient to call back and schedule the following:    Appointment type: RET [facility] NUTRITION  Provider: Sharon Osborn RD  Return date: Next Available  Specialty phone number: [phone]  Additional appointment(s) needed: yes  Additonal Notes:  **Clinic coordinators can you please schedule this pt for [facility] visit with any of us APPs. Looks like she saw aruna Bertrand if she wants to schedule with her again.

## 2024-04-23 ENCOUNTER — VIRTUAL VISIT (OUTPATIENT)
Dept: ENDOCRINOLOGY | Facility: CLINIC | Age: 54
End: 2024-04-23
Payer: COMMERCIAL

## 2024-04-23 VITALS — BODY MASS INDEX: 48.82 KG/M2 | WEIGHT: 293 LBS | HEIGHT: 65 IN

## 2024-04-23 DIAGNOSIS — E11.9 TYPE 2 DIABETES MELLITUS WITHOUT COMPLICATION, UNSPECIFIED WHETHER LONG TERM INSULIN USE (H): ICD-10-CM

## 2024-04-23 DIAGNOSIS — E66.01 CLASS 3 SEVERE OBESITY DUE TO EXCESS CALORIES WITH SERIOUS COMORBIDITY AND BODY MASS INDEX (BMI) OF 50.0 TO 59.9 IN ADULT (H): Primary | ICD-10-CM

## 2024-04-23 DIAGNOSIS — E66.813 CLASS 3 SEVERE OBESITY DUE TO EXCESS CALORIES WITH SERIOUS COMORBIDITY AND BODY MASS INDEX (BMI) OF 50.0 TO 59.9 IN ADULT (H): Primary | ICD-10-CM

## 2024-04-23 DIAGNOSIS — Z71.3 NUTRITIONAL COUNSELING: ICD-10-CM

## 2024-04-23 PROCEDURE — 99207 PR NO CHARGE LOS: CPT | Mod: 95 | Performed by: DIETITIAN, REGISTERED

## 2024-04-23 PROCEDURE — 97803 MED NUTRITION INDIV SUBSEQ: CPT | Mod: 95 | Performed by: DIETITIAN, REGISTERED

## 2024-04-23 ASSESSMENT — PAIN SCALES - GENERAL: PAINLEVEL: NO PAIN (0)

## 2024-04-23 NOTE — NURSING NOTE
Is the patient currently in the state of [place]? YES    Visit mode:VIDEO    If the visit is dropped, the patient can be reconnected by: VIDEO VISIT: Text to cell phone:   Telephone Information:   Mobile [phone]       Will anyone else be joining the visit? NO  (If patient encounters technical issues they should call [phone]    How would you like to obtain your AVS? MyChart    Are changes needed to the allergy or medication list? N/A    Are refills needed on medications prescribed by this physician? NO    Reason for visit: RECHDAMIAN MENDOZA

## 2024-04-23 NOTE — LETTER
"4/23/2024       RE: Yancy Hoover  [address]  [place] [address]     Dear Colleague,    Thank you for referring your patient, Yancy Hoover, to the [facility] [place] at [facility]. Please see a copy of my visit note below.    Video-Visit Details    Type of service:  Video Visit    Video Start Time: 8:31 AM   Video End Time: 8:50 AM    Originating Location (pt. Location): Home    Distant Location (provider location):  Offsite (providers home) [facility] [place]     Platform used for Video Visit: AmWell      Bariatric Nutrition Consultation Note     Reason For Visit: Nutrition Assessment     Yancy Hoover is a 53 year old presenting today for return bariatric nutrition consult. Pt is interested in laparoscopic sleeve gastrectomy with Dr. Alcaraz expected surgery in D.  Patient is accompanied by self.  Recommend continued monthly RD visits until surgery to help with continued weight loss and post-op diet education.        Pt referred by Jeannie Noe NP on July 16, 2020 and Dr. Velarde.  Patient with Co-morbidities of obesity including:  Type II DM yes   Renal Failure no  Sleep apnea no  Hypertension no   Dyslipidemia yes  Joint pain no  Back pain no  GERD no      Support System Reviewed With Patient 7/16/2020   Who do you have in your support network that can be available to help you for the first 2 weeks after surgery? Yes   Who can you count on for support throughout your weight loss surgery journey? Yes         ANTHROPOMETRICS:  Initial visit:  Estimated body mass index is 50.81 kg/m  as calculated from the following:    Height as of an earlier encounter on 7/16/20: 1.626 m (5' 4\").    Weight as of an earlier encounter on 7/16/20: 134.3 kg (296 lb).     Current weight:   Estimated body mass index is 50.02 kg/m  as calculated from the following:    Height as " "of 4/19/24: 1.638 m (5' 4.5\").    Weight as of 4/19/24: 134.3 kg (296 lb).      Required weight loss goal pre-op: -10 lbs from initial consult weight (goal weight 286 lbs or less before surgery)     Wt Readings from Last 5 Encounters:   04/19/24 134.3 kg (296 lb)   01/22/24 127 kg (280 lb)   08/22/23 142.9 kg (315 lb)   02/24/23 142 kg (313 lb)   02/08/23 133.4 kg (294 lb)             7/16/2020   I have tried the following methods to lose weight Watching portions or calories, Exercise, Weight Watchers, Atkins type diet (low carb/high protein), Slimfast         Weight Loss Questions Reviewed With Patient 7/16/2020   How long have you been overweight? Since early childhood         SUPPLEMENT INFORMATION:  Vitamin D (h/o vitamin D deficiency), Flinstones complete with iron      MEDICATIONS FOR WEIGHT LOSS:  Topiramate and Saxenda      NUTRITION HISTORY:   No known food allergies/intolerances.  Doesn't like the taste of water. Needs to have ice cold. Eliminated red meat.   Putting food on a saucer lately to help reduce portion sizes.      Last visit with RD 3/21/22 - A lot of stress and feeling overwhelmed having to care of son's father (homecares and brining to therapies), kids are also home from school d/t schools strikes and multiple funerals over the past several months. Started some anti-anxiety medication, that is helping. Also notes, trying to take more time for herself. Plans to start to take walks on her lunch break. Continues to use portion control, however finds she is snacking a little more. She does use healthier snack choices. Struggles with taking Victoza daily, often forgetting, but is able to stay consistent with metformin.     Today - Pt reports she had a mini stroke about a year ago. Wt went up to 236 lbs. Focused on more of a Mediterranean diet post-stroke and helped to lose weight to 296 lbs. Has been stalled for past 6 months with further weight loss. Working on keeping to 3 meals daily, no snacks. " And not eating past 7 pm. Challenged by sweet cravings at times.   Period where she couldn't use her right leg, has gained use of leg back. Working on walking 3-5 times weekly for 30 mins.   Practicing separation of beverages from meals.   Started Saxenda on last Tues. No side effects.      Recent Diet Recall:   Breakfast: 8am Greek yogurt with fruit and granola  Lunch: backed/boiled chicken thigh (skinless) with salad (balsamic and feta)   Dinner: 1 cup turkey chili   Beverages: water        NUTRITION DIAGNOSIS:  Obesity r/t long history of self-monitoring deficit and excessive energy intake aeb BMI >30 kg/m2. - Improving/continues     INTERVENTION:  - Reviewed post-op diet guidelines, ways to help prepare for post-op diet guidelines pre-operatively, portion/calorie-control, mindful eating and sources of protein.   - Discussed low/zero-sugar sweet options.  - Scheduled bariatric nutrition class for May to review diet guidelines for surgery.   - Co-developed goals to work towards.   - Discussed meal planning resources  - Provided pt with list of goals RD contact information.         Patient Understanding: good  Expected Compliance: good     GOALS:  1. Practice eating slowly, taking long pauses between bites, portioning out less.   2. If sweet is needed, use a sugar-free popcicle.     The Plate Method  [email or web address]    Protein Sources for Weight Loss  [email or web address]     Carbohydrates  [email or web address]     Mindful Eating  [email or web address]     Summary of Volumetrics Eating Plan  [email or web address]     Diet Guidelines after Weight Loss Surgery  [email or web address]     Seated Exercises for Arms and Legs (can be done before or after surgery)  [email or web address]    Time spent with patient: 19 mins       Sharon Osborn RD, LD

## 2024-04-23 NOTE — PATIENT INSTRUCTIONS
Juan J Haines,    Follow-up with RD on 5/10 for Weight Loss Surgery Nutrition Class with Mary Anne Corbett RD.     Thank you,    Sharon Osborn, ALISON, LD  If you would like to schedule or reschedule an appointment with the RD, please call [phone]    Nutrition Goals  1. Practice eating slowly, taking long pauses between bites, portioning out less.   2. If sweet is needed, use a sugar-free popcicle.     The Plate Method  [email or web address]    Protein Sources for Weight Loss  [email or web address]     Carbohydrates  [email or web address]     Mindful Eating  [email or web address]     Summary of Volumetrics Eating Plan  [email or web address]     Diet Guidelines after Weight Loss Surgery  [email or web address]     Seated Exercises for Arms and Legs (can be done before or after surgery)  [email or web address]        COMPREHENSIVE WEIGHT MANAGEMENT PROGRAM  VIRTUAL SUPPORT GROUPS    At [facility], our Comprehensive Weight Management program offers on-line support groups for patients who are working on weight loss and considering, preparing for, or have had weight loss surgery.     There is no cost for this opportunity.  You are invited to attend the?Virtual Support Groups?provided by any of the following locations:    [facility] via Vast Teams with Lraisa Werner RN  2.   [place] via Vast Teams with Dariusz Yang, PhD, LP  3.   [place] via Vast Teams with Darya Cuello RN  4.   [facility] via a Zoom Meeting with FRANSISCO Sams    The following Support Group information can also be found on our website:  [email or web address]      [facility] Weight Loss Surgery Support Group  The support group is a patient-lead forum that meets monthly to share experiences, encouragement and education. It is open to those who have had weight loss surgery,  "are scheduled for surgery, or are considering surgery.   WHEN: This group meets on the 3rd Wednesday of each month from 5:00PM - 6:00PM virtually using Microsoft Teams.   FACILITATOR: Led by Larisa Cuba RD, LD, RN, the program's Clinical Coordinator.   TO REGISTER: Please contact the clinic via [employer] or call the nurse line directly at [phone] to inform our staff that you would like an invite sent to you and to let us know the email you would like the invite sent to. Prior to the meeting, a link with directions on how to join the meeting will be sent to you.    2023 and 2024 Meetings   December 20  January 17  February 21  March 20  April 17  May 15  Rhonda 19      [facility] Bariatric Care Support Group?  This is open to all pre- and post- operative bariatric surgery patients as well as their support system.   WHEN: This group meets the 3rd Tuesday of each month from 6:30 PM - 8:00 PM virtually using Microsoft Teams.   FACILITATOR: Led by Dariusz Yang, Ph.D who is a Licensed Psychologist with the [facility] Comprehensive Weight Management Program.   TO REGISTER: Please send an email to Dariusz Yang, Ph.D., LP at?[email or web address]?if you would like an invitation to the group. Prior to the meeting, a link with directions on how to join the meeting will be sent to you.    2023 and 2024 Meetings  December 19 January 16: \"Medication Management and Bariatric Surgery\", Virginia Buchanan, PharmD, Pharmacy Resident at [facility]  February 20: \"A Bariatric Surgery Patient's Perspective\", NORMAN Delong, [facility], Behavioral Health Clinician at [facility]  March 19  April 16  May 21  Rhonda 18: \"Nutritional Labeling\", Dietitian speaker to be announced.  November 19: \"Holiday Eating\", Dietitian speaker to be announced.    [facility] Post-Operative " "Bariatric Surgery Support Group  This is a support group for [facility] bariatric patients (and those external to [facility]) who have had bariatric surgery and are at least 3 months post-surgery.  WHEN: This support group meets the 4th Wednesday of the month from 11:00 AM - 12:00 PM virtually using Microsoft Teams.   FACILITATOR: Led by Certified Bariatric Nurse, Darya Cuello RN.   TO REGISTER: Please send an email to Darya at [email or web address] if you would like an invitation to the group.  Prior to the meeting, a link with directions on how to join the meeting will be sent to you.    2023 and 2024 Meetings  December 27  January 24  February 28  March 27  April 24  May 22  Rhonda 26    [facility] Healthy Lifestyle Group?  This is a 60 minute virtual coaching group for those who want to lead a healthier lifestyle. Come together to set goals and overcome barriers in a supportive group environment. We will address the four pillars of health: nutrition, exercise, sleep and emotional well-being.  This group is highly recommended for those who are participating in the 24 week Healthy Lifestyle Plan and our Health Coaching sessions.  WHEN: This group meets the 1st Friday of the month, 12:30 PM - 1:30 PM online, via a zoom meeting.    FACILITATOR: Led by National Board Certified Health and , Darya Mccloud, [facility].   TO REGISTER: Please call the Call Center at [phone] to register.  You will get an appointment to attend in [facility]. Fifteen minutes prior to the meeting, complete the e-check in and you will get the link to join the meeting.    There is no charge to attend this group and space is limited.     2023 and 2024 Meetings  December 1: \"Let's Talk\" (guided discussion on our wins and challenges)  January 5: \"New Years Vision: Manifest your Best 2024!\" (guided imagery,  journaling and discussion)  February 2: \"Let's Talk\"  March 1: \"10 " "Percent Happier\" by Jose Manuel Segovia (Book Bites - a guided discussion on the nuggets of wisdom from favorite wellness books, no need to read the book but highly encouraged)  April 5: \"Let's Talk\"  May 3: \"Essentialism: The Disciplined Pursuit of Less\" by Diego Amaya (Book Bites discussion)  June 7: \"Let's Talk\"  July 5: NO MEETING, off for the 4th of July Holiday August 2: \"The Blue Zones, Secrets for Living a Longer Life\" by Jose Manuel Beverly (Book Bites discussion)                    "

## 2024-04-25 ENCOUNTER — TELEPHONE (OUTPATIENT)
Dept: ENDOCRINOLOGY | Facility: CLINIC | Age: 54
End: 2024-04-25
Payer: COMMERCIAL

## 2024-04-25 NOTE — TELEPHONE ENCOUNTER
Patient Contacted  and scheduled the following:    Appointment type: MARKO BOATENG   Provider:   Return date: 10/04/2024  Specialty phone number: [phone]  Additional appointment(s) needed: yes  Additonal Notes: Micki JOHNSTON,[facility], 05/07/2024

## 2024-05-01 ENCOUNTER — TELEPHONE (OUTPATIENT)
Dept: ENDOCRINOLOGY | Facility: CLINIC | Age: 54
End: 2024-05-01
Payer: COMMERCIAL

## 2024-05-02 ENCOUNTER — VIRTUAL VISIT (OUTPATIENT)
Dept: ENDOCRINOLOGY | Facility: CLINIC | Age: 54
End: 2024-05-02
Payer: COMMERCIAL

## 2024-05-02 VITALS — WEIGHT: 293 LBS | BODY MASS INDEX: 50.02 KG/M2 | HEIGHT: 64 IN

## 2024-05-02 DIAGNOSIS — E11.9 TYPE 2 DIABETES MELLITUS WITHOUT COMPLICATION, UNSPECIFIED WHETHER LONG TERM INSULIN USE (H): ICD-10-CM

## 2024-05-02 DIAGNOSIS — E66.01 CLASS 3 SEVERE OBESITY DUE TO EXCESS CALORIES WITH BODY MASS INDEX (BMI) OF 50.0 TO 59.9 IN ADULT, UNSPECIFIED WHETHER SERIOUS COMORBIDITY PRESENT (H): Primary | ICD-10-CM

## 2024-05-02 DIAGNOSIS — K21.00 GASTROESOPHAGEAL REFLUX DISEASE WITH ESOPHAGITIS, UNSPECIFIED WHETHER HEMORRHAGE: ICD-10-CM

## 2024-05-02 DIAGNOSIS — K44.9 HIATAL HERNIA: ICD-10-CM

## 2024-05-02 DIAGNOSIS — E66.813 CLASS 3 SEVERE OBESITY DUE TO EXCESS CALORIES WITH BODY MASS INDEX (BMI) OF 50.0 TO 59.9 IN ADULT, UNSPECIFIED WHETHER SERIOUS COMORBIDITY PRESENT (H): Primary | ICD-10-CM

## 2024-05-02 PROCEDURE — 99205 OFFICE O/P NEW HI 60 MIN: CPT | Mod: 95 | Performed by: NURSE PRACTITIONER

## 2024-05-02 ASSESSMENT — PAIN SCALES - GENERAL: PAINLEVEL: NO PAIN (0)

## 2024-05-02 NOTE — Clinical Note
KYLAH. Lissa. 4cm HH known. Tasklist done in 2020 and updated today. It doesn't look like much got done the first time around.  Please start [facility] referral Please send packet and letters  Thanks! abdomen

## 2024-05-02 NOTE — PATIENT INSTRUCTIONS
"Juan J Haines , it was nice to meet you today!  Thank you for allowing us the privilege of caring for you. We hope we provided you with the excellent service you deserve.   Please let us know if there is anything else we can do for you so that we can be sure you are completely satisfied with your care experience.    To ensure the quality of our services you may be receiving a patient satisfaction survey from an independent patient satisfaction monitoring company.    The greatest compliment you can give is a \"Likely to Recommend\"    Your visit was with Jeannie Noe NP today.    Instructions per today's visit:     Follow up  plan:  Start health coaching through [employer] insurance   Schedule nurse class   Schedule with surgeon in 2 months (Dr. Alcaraz)   Continue with dietitian   Work on weight loss 286lb day of surgery   Psych eval   Letter of support from primary care and sleep medicine   Cardiology clearance   Start omeprazole   Plan to switch to wegovy if saxenda not available   ___________________________________________________________________________  Important contact and scheduling information:  Please call our contact center at [phone] to schedule your next appointments.  For any nursing questions or concerns call Ebony Shelton LPN at [phone] or Samreen Burton RN at [phone]  Please call during clinic hours Monday through Friday 8:00a - 4:00p if you have questions or you can contact us via PicPrizest at anytime and we will reply during clinic hours.    Lab results will be communicated through My Chart or letter (if My Chart not used). Please call the clinic if you have not received communication after 1 week or if you have any questions.?  Clinic Fax: [phone]  __________________________________________________________________________    If labs were ordered today:    Please make an appointment to have them drawn at your convenience.     To schedule the Lab Appointment using Expan:  Select " "\"Schedule an Appointment\"  Select \"Lab Only\"  For \"A couple of questions\", select \"Other\"  For \"Which locations work for you?, select the location and set up the appointment    To schedule by phone call [phone] to schedule a lab only appointment at any [facility] lab.  ___________________________________________________________________________  Work with [facility] !  Virtual Sessions are Available through [facility]    To learn more, call to schedule a free, Health  Q&A appointment: [phone]     What is Health Coaching?  Do you know what you are supposed to do, but you just aren't doing it?  Then, HEALTH COACHING may help you!   Get unstuck and move forward with the support of a professionally trained NBC-HWC (National Board-Certified Health and ) who uses evidence-based approaches to help you move forward with healthy lifestyle changes in the areas of weight loss, stress management and overall well-being.    Health Coaches help you identify goals that will work best for you. Health Coaches provide support and encouragement with overcoming barriers and help you to find inspiration and motivation to lead a healthy lifestyle.    Option one:  Health Coaching 3-Pack; Three, 30-minute Health Coaching Visits, for $99  Visits are done virtually (phone or video)  This is a self pay service; we do not accept insurance for farzad coaching.    Option two:   The 24 week Plan; 11 Health Coaching Visits, and a 7 months subscription to [employer]-- on-demand fitness, nutrition and mindfulness classes, for $499 (employee discounts may be available). Participants will also meet regularly with a weight management Medical Provider and a Registered/Licensed Dietician.  This is a self-pay service; we do not accept insurance for health coaching.    To Schedule a free Health  Q&A appointment to learn more,  call " "[phone].  ____________________________________________________________________  [facility]  Healthy Lifestyle Group    Healthy Lifestyle Group  This is a 60 minute virtual coaching group for those who want to lead a healthier lifestyle. Come together to set goals and overcome barriers in a supportive group environment. We will address the four pillars of health--nutrition, exercise, sleep and emotional well-being.  This group is highly recommended for those who are participating in the 24 week Healthy Lifestyle Plan and our Health Coaching sessions.    WHEN: This group meets the first Friday of the month, 12:30 PM - 1:30 PM online, via a zoom meeting.      FACILITATOR: Led by National Board Certified Health and , Darya Mccloud [facility].    TO REGISTER: Please call the Call Center at [phone] to register. You will get an appointment to attend in [facility]. Fifteen minutes prior to the meeting, complete the e-check in and you will get the link to join the meeting.  There is no charge to attend this group and space is limited.      2023 and 2024 Meeting Topics and Dates:    November 3: Introduction to Mindfulness (Learn simple and effective mindfulness practices and how it can benefit you)    December 8: Let's Talk (guided discussion on our wins and challenges)    January 5: New Years Vision: Manifest your Best 2024! (Guided imagery,  journaling and discussion)    February 2: Let's Talk    March 1: 10 Percent Happier by Jose Manuel Segovia (Book Bites; a guided discussion on the nuggets of wisdom from favorite wellness books; no need to read the book but highly encouraged)    April 5: Let's Talk    May 3: \"Essentialism; The Disciplined Pursuit of Less by Diego Tamez (book bites discussion)    June 7: Let's Talk    July 5: NO MEETING, off for the 4th of July Holiday    August 2: The Blue Zones, Secrets for Living a Longer Life by Jose Manuel Beverly (book bites " discussion)      If you would like bariatric surgery specific support group info please let your care team know.         Thank you,   [facility] Comprehensive Weight Management Team    Bariatric Task List    Fax:  Please fax all paperwork to: [phone] -     Status:  Is patient a candidate for bariatric surgery?:  patient is a candidate for bariatric surgery -     Cleared to schedule surgeon consult?:  cleared to schedule surgeon consult - Call [phone] to schedule with Dr Alcaraz. bks   Status:  surgery evaluation in process -     Surgeon: Dr. Robbie Alcaraz -     Tentative surgery month/year: To be determined -        Insurance: Insurance:  [employer] -      Contact insurance to discuss coverage:   -       [employer]: PCP Recommendation and Medical Clearance:    -     HP Referral:  Completed - Done. bks    Advanced beneficiary notification (ABN) for Medicare patients for RD visits   and surgery:   -      Weight history:   -     Other:  Do the 5 coaching phone calls. Call Ray at [phone] to get registered. -        Patient Info: Initial Weight:  296 -     Date of Initial Weight/Height:  7/16/2020 -     Goal Weight (lbs):  286 -     Required Weight Loss:  10 -     Surgery Type:  sleeve gastrectomy -     Multidisciplinary Meeting:    -        Dietician Visits: Structured weight loss required by insurance?:  structured weight loss required -     Dietician Visit 1:  Needed - Data deleted   Dietician Visit 2:  Needed - Data deleted   Dietician Visit 3:  Needed - Data deleted   Dietician Visit 4:    - Data deleted   Dietician Visit 5:    - Data deleted   Dietician Visit 6:    - Data deleted   Dietician Visit additional:    - Data deleted   Clearance from dietician to see surgeon?:    -     Dietician Notes:    -        Psychological Evaluation: Psych eval:  Needed - Data deleted   Therapist letter of support:    -     Psychiatrist letter of support:    -     Establish care with therapist:    -    "  Complete eating disorder evaluation:    -     Letter of clearance from therapist/eating disorder program:    -     Other:    -        Lab Work: Complete Blood Count:  Needed - Data deleted   Comprehensive Metabolic Panel:  Needed - Data deleted   Vitamin D:  Needed - Data deleted   PTH:  Needed - Data deleted   Hgb A1c:  Needed - Data deleted    Lipids: Needed -      TSH ([facility], [facility], [place]): Needed -       Ferritin:   -       Folate:   -       Testosterone, Total and Free:   -     Thiamine:   -     Vitamin A: Needed -     Vitamin B12: Needed -     Zinc:   -     C-peptide:   -     H. pylori:    -     MRSA (2 swabs, minimum 48 hours apart):   -     Nicotine Testing:    - Data deleted   Recheck Vitamin D:   -     Other:    - Data deleted      Consults/ Clearance Sleep Medicine:  Needed - Data deleted   Cardiac:  Needed -     Pain:   -     Dental:    -     Endocrine:    -     Gastroenterology:    -     Vascular Medicine:    -     Hematology:    -     Medical Weight Management:   -     Physical Therapy/Exercise:    -     Nephrology:    -     Neurology:    -     Pulmonology:    -     Rheumatology:    -     Other:    -     Other:    -     Other:    -        Testing: UGI:    -     EGD:  Completed -     Sleep Study:   -     Other:   -     Other:    -        PCP: Establish care with PCP:  Completed -     Follow up with PCP:    -     PCP letter of support:  Needed - Data deleted      Health Maintenance: Colonoscopy(> 50 yrs or family hx):    -     Mammogram (> 40 yrs or family hx):    -     Pap Smear (women):   -     Other:   -     Other:    -        Stopping Smoking/ Alcohol Use/Cannabis Use: Quit tobacco use (3 months smoke free)?:    -     Quit date:    -     Quit alcohol use:   -     Quit date:   -     Other:   -     Quit date:   -        Patient Education:  Information Session:  Completed -     Attended New Consult Class?:   -     Given \"Making your decision\" handout?:    -     Given \"A Roadmap to you Weight Loss " "Surgery\" handout?:   -     Given \"Epic Care Companion\" information?:   -     Attended support group?:    -     Support plan in place?:  Completed -     Research consents signed?:    -     Avoid NSAIDS/ Alternate Plan for Pain:   -        Additional Surgery Requirements: Review Coag plan:    -     HgA1c <8:  Needed -     Inpatient pain consult:    -     Final nicotine screen:    -     Dental work complete:    -     Birth control plan:    -     Gallstone prevention plan (Actigall for 6 months postop):   -     Other:   -     Other:   -        Final Tasks:  Before surgery online preop class:  Needed -     After surgery online class:  Needed -     Nurse visit for information:  Needed -     Weight Check:   -     History and Physical:   -   Needed -     Final labs per clinic: Needed -     See MTM Pharmacist for medication review and plan for after surgery (if DM, transplant hx, greater than 10 meds): Needed -     Chest xray per clinic:   -     Electrocardiogram (ECG) per clinic:   -     Schedule postop appointments:   -     Other:   -   -        Notes: Please register for the Get Well Loop when you get an email invitation and a surgery date.   ,  The Get Well Loop will give you information via email or text messages that can help you be more successful before and after surgery.  It can also help ans,  wer any questions you may have.   -                              "

## 2024-05-02 NOTE — NURSING NOTE
Is the patient currently in the state of [place]? YES    Visit mode:VIDEO    If the visit is dropped, the patient can be reconnected by: VIDEO VISIT: Text to cell phone:   Telephone Information:   Mobile [phone]       Will anyone else be joining the visit? NO  (If patient encounters technical issues they should call [phone])    How would you like to obtain your AVS? MyChart    Are changes needed to the allergy or medication list? No, Pt stated no changes to allergies, and Pt stated no med changes    Are refills needed on medications prescribed by this physician? NO    Reason for visit: Consult (Juan Carlos)    Beth MENDOZA

## 2024-05-02 NOTE — ASSESSMENT & PLAN NOTE
Very early onset weight gain, always larger than peers. More difficulty managing weight with each of her 6 pregnancies. Restating process towards surgery. Initial consult in 2020. Had done well on saxenda for a while. Got away from taking her meds consistently and ultimately had a stroke. Wants to get better control of her health again. Just restarted saxenda 2 weeks ago- taking 0.6mg. will likely need to switch to alternative GLp1 given availability of saxnda. Will refer to MTM to help with adherence. Other comorbidities include HTN, HLD, LANDON, DMII, MAFLD on ultrasound but no labs for Fib4 calculation.     Has mostly recovered from stroke but has some residual weakness in upper extremities.     Experiencing RUQ pain. Ultrasound negative for cholelithiasis, [place] hepatobiliary scan negative. EGD showed New 4cm HH since consult. Hasn't been able to get PPI recently. Will restart now. Discussed repair of HH if needed during surgery.      Start health coaching through [employer] insurance   Schedule nurse class   Schedule with surgeon in 2 months (Dr. Alcaraz)   Continue with dietitian   Work on weight loss 286lb day of surgery   Psych eval   Letter of support from primary care and sleep medicine   Cardiology clearance   Start omeprazole   Plan to switch to wegovy if saxenda not available

## 2024-05-02 NOTE — LETTER
"2024       RE: Yancy Hoover  [address]  [facility] [address]     Dear Colleague,    Thank you for referring your patient, Yancy Hoover, to the [facility] [facility]. Please see a copy of my visit note below.    New Bariatric Surgery Consultation Note    May 2, 2024    RE: Yancy Hoover  MR#: [id number]  : 1970      Referring provider:       2020    11:30 AM   --   Who referred you Katie       Chief Complaint/Reason for visit: evaluation for possible weight loss surgery    Dear Arlin Ewing (General),    I had the pleasure of seeing your patient, Yancy Hoover, to evaluate her obesity and consider her for possible weight loss surgery.  As you know, Yancy Hoover is 53 year old.  She has a height of 5' 4.488\", a weight of 294 lbs 0 oz, and calculated Body mass index is 49.7 kg/m ..  Full intake/assessment was done to determine barriers to weight loss success and develop a treatment plan.    NBS 2020 296lb   Ongoing MWM with Dr. Velarde wasn't seen in last 1.5 years until this month- titration up on topiramate, on saxenda but other agents not covered?   2023 glenn shelley RD     Assessment & Plan   Problem List Items Addressed This Visit          Digestive    Class 3 severe obesity due to excess calories with body mass index (BMI) of 50.0 to 59.9 in adult, unspecified whether serious comorbidity present (H) - Primary     Very early onset weight gain, always larger than peers. More difficulty managing weight with each of her 6 pregnancies. Restating process towards surgery. Initial consult in . Had done well on saxenda for a while. Got away from taking her meds consistently and ultimately had a stroke. Wants to get better control of her health again. Just restarted saxenda 2 weeks ago- taking 0.6mg. will likely need to switch to " alternative GLp1 given availability of saxnda. Will refer to MTM to help with adherence. Other comorbidities include HTN, HLD, LANDON, DMII, MAFLD on ultrasound but no labs for Fib4 calculation.     Has mostly recovered from stroke but has some residual weakness in upper extremities.     Experiencing RUQ pain. Ultrasound negative for cholelithiasis, NM hepatobiliary scan negative. EGD showed New 4cm HH since consult. Hasn't been able to get PPI recently. Will restart now. Discussed repair of HH if needed during surgery.      Start health coaching through [employer] insurance   Schedule nurse class   Schedule with surgeon in 2 months (Dr. Alcaraz)   Continue with dietitian   Work on weight loss 286lb day of surgery   Psych eval   Letter of support from primary care and sleep medicine   Cardiology clearance   Start omeprazole   Plan to switch to wegovy if saxenda not available          Relevant Medications    omeprazole (PRILOSEC) 20 MG DR capsule    Other Relevant Orders    Adult Cardiology Eval  Referral    Adult Mental Health  Referral    CBC with platelets    Comprehensive metabolic panel    Ferritin    Hemoglobin A1c    Parathyroid Hormone Intact    Vitamin A    Vitamin B12    Vitamin D Deficiency    Lipid panel reflex to direct LDL Fasting    TSH with free T4 reflex    Med Therapy Management Referral       Endocrine    Type 2 diabetes mellitus without complication, unspecified whether long term insulin use (H)    Relevant Orders    Med Therapy Management Referral     Other Visit Diagnoses       Hiatal hernia        Relevant Medications    omeprazole (PRILOSEC) 20 MG DR capsule    Other Relevant Orders    Med Therapy Management Referral    Gastroesophageal reflux disease with esophagitis, unspecified whether hemorrhage        Relevant Medications    omeprazole (PRILOSEC) 20 MG DR capsule    Other Relevant Orders    Med Therapy Management Referral                 HISTORY OF PRESENT ILLNESS:       "5/1/2024    12:23 PM   Weight Loss History Reviewed with Patient   How long have you been overweight? Since early childhood   What is the most that you have ever weighed 336   What is the most weight you have lost? 100   I have tried the following methods to lose weight Watching portions or calories    Exercise    Weight Watchers    Prescription Medications   I have tried the following weight loss medications? (Check all that apply) Topamax/Topiramate    Victoza/liraglutide    Saxenda      Always considered overweight or the \"fat kid\"  Before her first child, she had been dieting and lost from 190 to 150. Gained to 200 with pregnancy and has had hard time getting below 200lb since then. Youngest child is 6 years old and since he was born, hasn't gotten below 216lb     Concerned about her health and complications related to Diabetes. Weight had gotten up to 336lb and then had \"mini stroke\" and wants to better manage health. Has recovered mostly from stroke. Was off of saxenda when  Had stroke.     Currently taking saxenda - just got restarted on this 2 weeks - 0.6mg    - no adverse side effects   - no benefits until taking 1.2 historically     CO-MORBIDITIES OF OBESITY INCLUDE:      5/1/2024    12:23 PM   --   I have the following health issues associated with obesity Type II Diabetes    High Blood Pressure    High Cholesterol    Sleep Apnea      Last A1C 7.7 2/2024    No hx blood clots   LANDON- CPAP - feels helpful   HLD       EGD 3/2023 (see notes below), known HH hernia, not able to get omeprazole filled       PAST MEDICAL HISTORY:  Past Medical History:   Diagnosis Date    Cerebral infarction (H) 7/6/2022    DM (diabetes mellitus), type 2 (H)     History of diabetes mellitus 2020    LANDON (obstructive sleep apnea) 07/2020     No ongoing follow up with neurology or cardiology     PAST SURGICAL HISTORY:  Past Surgical History:   Procedure Laterality Date    COLONOSCOPY  4/18/2023    ESOPHAGOSCOPY, GASTROSCOPY, " DUODENOSCOPY (EGD), COMBINED N/A 02/24/2023    Procedure: ESOPHAGOGASTRODUODENOSCOPY, WITH BIOPSY;  Surgeon: Jassi Thomason MD;  Location:  GI    GYN SURGERY      c  section 2014    LAPAROSCOPIC CYSTECTOMY OVARIAN (BENIGN) Left 01/23/2020    Procedure: LAPAROSCOPIC ovarian cystectomy,;  Surgeon: Sofi Cuevas MD;  Location:  OR       FAMILY HISTORY:   Family History   Problem Relation Age of Onset    Diabetes Father        SOCIAL HISTORY:       5/1/2024    12:23 PM   Social History Questions Reviewed With Patient   Which best describes your employment status (select all that apply) I am disabled   Which best describes your marital status single   Who do you have in your support network that can be available to help you for the first 2 weeks after surgery? Yea   Who can you count on for support throughout your weight loss surgery journey? My 27 year old daughter, mom and my sister       Do you have support to drive you to clinic for weight checks/clinic visits, to and from surgery and stay with you after surgery? Yes- daughter lives with her     HABITS:      5/1/2024    12:23 PM   --   How often do you drink alcohol? Never   Do you currently use any of the following Nicotine products? No   Have you ever used any of the following nicotine products? Cigarettes   If you previously used any of these products, what year did you quit? 1   Have you or are you currently using street drugs or prescription strength medication for which you do not have a prescription for? No   Do you have a history of chemical dependency (alcohol or drug abuse)? No      Quit smoking 8/2023- going okay - off nicotine now     Currently taking narcotic/opioids No  No marijuana     PSYCHOLOGICAL HISTORY:       5/1/2024    12:23 PM   Psychological History Reviewed With Patient   Have you ever attempted suicide? Never.   Have you had thoughts of suicide in the past year? No   Have you ever been hospitalized for mental illness or a  suicide attempt? Never.   Do you have a history of chronic pain? No   Have you ever been diagnosed with fibromyalgia? No   Are you currently being treated for any of the following? (select all that apply) I do not have a mental illness     Has good support system  Has stroke support group once a month     ROS:      5/1/2024    12:23 PM   --   Skin None of the above   HEENT None of these   Musculoskeletal None of the above   Cardiovascular None of the above   Pulmonary None of the above   Gastrointestinal None of the above   Genitourinary None of the above   Hematological None of the above   Neurological Migraine headaches   Female only None of the above       EATING BEHAVIORS:      5/1/2024    12:23 PM   --   Have you or anyone else thought that you had an eating disorder? No   Do you currently binge eat (eat a large amount of food in a short time)? No   Are you an emotional eater? Yes   Do you get up to eat after falling asleep? Yes   Can you afford 3 meals a day? Yes   Can you afford 50-60 dollars a month for vitamins? Yes      2 meals a day   Breakfast and dinner   Snacks between meals - fruit, veggies, baked chips - not a lot of hunger during this time     Trying to be mindful of carbs    Trying to do protein and veggies at meals     Aware of stress eating but doesn't think it happens too often- tries to find other things to do - reading, video games, driving, music, knitting (harder after stroke)     Soda- sprite regular 16oz daily   Water - tries to get 8 glasses but not always able to     EXERCISE:      5/1/2024    12:23 PM   --   How often do you exercise? 1 to 2 times per week   What is the duration of your exercise (in minutes)? 30 Minutes   What types of exercise do you do? walking   What keeps you from being more active? I have recently been sick    My ability to walk or move around is limited    Worried people will look at me       MEDICATIONS:  Current Outpatient Medications   Medication Sig Dispense  Refill    alcohol swab prep pads Use to swab area of injection/shahla as directed. 100 each 5    aspirin 81 MG EC tablet Take 81 mg by mouth      blood glucose (ACCU-CHEK SMARTVIEW) test strip Use to test blood sugar 2 times daily. 100 strip 5    blood glucose calibration (ACCU-CHEK SMARTVIEW CONTROL) solution Use to calibrate blood glucose monitor as directed. 1 Bottle 3    blood glucose monitoring (ACCU-CHEK FASTCLIX) lancets Use to test blood sugar 1-2 times daily or as directed. 102 each 5    blood glucose monitoring (ACCU-CHEK FAWAD SMARTVIEW) meter device kit Use to test blood sugar 1-2 times daily. 1 kit 0    childrens multivitamin w/iron (FLINTSTONES COMPLETE) 18 MG chewable tablet Take 1 chew tab by mouth daily      insulin pen needle (31G X 8 MM) 31G X 8 MM miscellaneous Use 1 pen needles daily or as directed. 100 each 0    insulin pen needle (32G X 6 MM) 32G X 6 MM miscellaneous Use 1 pen needles daily or as directed with saxenda. 100 each 1    liraglutide - Weight Management (SAXENDA) 18 MG/3ML pen Inject 3 mg Subcutaneous daily 45 mL 3    metFORMIN (GLUCOPHAGE XR) 500 MG 24 hr tablet Take 2 tablets (1,000 mg) by mouth 2 times daily (with meals) 360 tablet 3    omeprazole (PRILOSEC) 20 MG DR capsule Take 1 capsule (20 mg) by mouth daily 90 capsule 2    rosuvastatin (CRESTOR) 10 MG tablet Take 1 tablet (10 mg) by mouth daily 90 tablet 3    verapamil ER (VERELAN) 240 MG 24 hr capsule Take 240 mg by mouth      vitamin D3 (CHOLECALCIFEROL) 50 mcg (2000 units) tablet Take 1 tablet by mouth daily         Is patient on biologics or immunomodulators? NO     ALLERGIES:  Allergies   Allergen Reactions    Gadolinium Derivatives Nausea and Vomiting     Caused excessive vomiting, administered 4mg zofran     EGD 3/2023   Impression:               - Esophagogastric landmarks identified.                             - Normal esophagus.                             - 4 cm hiatal hernia.                             - Normal  stomach. Biopsied.                             - Normal examined duodenum.   Recommendation:           - Advance diet as tolerated.   Admission on 08/22/2023, Discharged on 08/23/2023   Component Date Value Ref Range Status    Hold Specimen 08/22/2023 JIC   Final    Hold Specimen 08/22/2023 JIC   Final    Hold Specimen 08/22/2023 JI   Final    Ventricular Rate 08/22/2023 116  BPM Final    Atrial Rate 08/22/2023 116  BPM Final    AK Interval 08/22/2023 154  ms Final    QRS Duration 08/22/2023 84  ms Final    QT 08/22/2023 330  ms Final    QTc 08/22/2023 458  ms Final    P Axis 08/22/2023 71  degrees Final    R AXIS 08/22/2023 55  degrees Final    T Axis 08/22/2023 54  degrees Final    Interpretation ECG 08/22/2023    Final                    Value:Sinus tachycardia  Cannot rule out Anterior infarct , age undetermined  Abnormal ECG  When compared with ECG of 18-OCT-2020 20:05,  Questionable change in QRS axis  Nonspecific T wave abnormality no longer evident in Lateral leads  Confirmed by GENERATED REPORT, COMPUTER ([id number]),  GEORGE LIMA ([id number]) on 8/22/2023 8:30:04 PM      D-Dimer Quantitative 08/22/2023 0.57 (H)  0.00 - 0.50 ug/mL FEU Final    Sodium 08/22/2023 136  136 - 145 mmol/L Final    Potassium 08/22/2023 3.8  3.4 - 5.3 mmol/L Final    Chloride 08/22/2023 100  98 - 107 mmol/L Final    Carbon Dioxide (CO2) 08/22/2023 21 (L)  22 - 29 mmol/L Final    Anion Gap 08/22/2023 15  7 - 15 mmol/L Final    Urea Nitrogen 08/22/2023 11.2  6.0 - 20.0 mg/dL Final    Creatinine 08/22/2023 0.84  0.51 - 0.95 mg/dL Final    Calcium 08/22/2023 9.3  8.6 - 10.0 mg/dL Final    Glucose 08/22/2023 127 (H)  70 - 99 mg/dL Final    GFR Estimate 08/22/2023 83  >60 mL/min/1.73m2 Final    Troponin T, High Sensitivity 08/22/2023 9  <=14 ng/L Final    Either a High Sensitivity Troponin T baseline (0 hours) value = 100 ng/L, or an increase in High Sensitivity Troponin T = 7 ng/L at 2 hours compared to 0 hours (2-0 hours), suggests  myocardial injury, and urgent clinical attention is required.    If the 2-0 hours increase is <7 ng/L, a High Sensitivity Troponin T result above gender-specific reference ranges warrants further evaluation.   Recommendations for further evaluation include correlation with clinical decision-making tool (e.g., HEART), a 3rd High Sensitivity Troponin T test 2 hours after the 2nd (a 20% change from baseline would represent concern), admission for observation, close PCC/cardiology follow-up, or urgent outpatient provocative testing.    WBC Count 08/22/2023 8.7  4.0 - 11.0 10e3/uL Final    RBC Count 08/22/2023 4.71  3.80 - 5.20 10e6/uL Final    Hemoglobin 08/22/2023 13.3  11.7 - 15.7 g/dL Final    Hematocrit 08/22/2023 41.1  35.0 - 47.0 % Final    MCV 08/22/2023 87  78 - 100 fL Final    MCH 08/22/2023 28.2  26.5 - 33.0 pg Final    MCHC 08/22/2023 32.4  31.5 - 36.5 g/dL Final    RDW 08/22/2023 14.7  10.0 - 15.0 % Final    Platelet Count 08/22/2023 296  150 - 450 10e3/uL Final    % Neutrophils 08/22/2023 52  % Final    % Lymphocytes 08/22/2023 39  % Final    % Monocytes 08/22/2023 6  % Final    % Eosinophils 08/22/2023 2  % Final    % Basophils 08/22/2023 1  % Final    % Immature Granulocytes 08/22/2023 0  % Final    NRBCs per 100 WBC 08/22/2023 0  <1 /100 Final    Absolute Neutrophils 08/22/2023 4.6  1.6 - 8.3 10e3/uL Final    Absolute Lymphocytes 08/22/2023 3.4  0.8 - 5.3 10e3/uL Final    Absolute Monocytes 08/22/2023 0.5  0.0 - 1.3 10e3/uL Final    Absolute Eosinophils 08/22/2023 0.2  0.0 - 0.7 10e3/uL Final    Absolute Basophils 08/22/2023 0.0  0.0 - 0.2 10e3/uL Final    Absolute Immature Granulocytes 08/22/2023 0.0  <=0.4 10e3/uL Final    Absolute NRBCs 08/22/2023 0.0  10e3/uL Final    Influenza A PCR 08/22/2023 Negative  Negative Final    Influenza B PCR 08/22/2023 Negative  Negative Final    RSV PCR 08/22/2023 Negative  Negative Final    SARS CoV2 PCR 08/22/2023 Negative  Negative Final    NEGATIVE: SARS-CoV-2  (COVID-19) RNA not detected, presumed negative.    Troponin T, High Sensitivity 08/22/2023 11  <=14 ng/L Final    Either a High Sensitivity Troponin T baseline (0 hours) value = 100 ng/L, or an increase in High Sensitivity Troponin T = 7 ng/L at 2 hours compared to 0 hours (2-0 hours), suggests myocardial injury, and urgent clinical attention is required.    If the 2-0 hours increase is <7 ng/L, a High Sensitivity Troponin T result above gender-specific reference ranges warrants further evaluation.   Recommendations for further evaluation include correlation with clinical decision-making tool (e.g., HEART), a 3rd High Sensitivity Troponin T test 2 hours after the 2nd (a 20% change from baseline would represent concern), admission for observation, close PCC/cardiology follow-up, or urgent outpatient provocative testing.       Computed FIB-4 Calculation unavailable. One or more values for this score either were not found within the given timeframe or did not fit some other criterion.    Fib-4 < 1.3: No further evaluation at this point, unless other concerns    - If the Fib-4 is >2.67  Fibroscan and elective liver clinic referral    - Intermediate Fib-4 scores: Get a Fibroscan, consider repeating this in 1-2 years.    Anti-obesity medication ROS:    HEENT  Hx of glaucoma: No    Cardiovascular  CAD:Yes  HTN:Yes    Gastrointestinal  GERD:Yes  Constipation/diarrhea/GI issues:No  Liver Dz:hepatic steatosis on ultrasound   Computed FIB-4 Calculation unavailable. One or more values for this score either were not found within the given timeframe or did not fit some other criterion.    H/O Pancreatitis:No    Psychiatric  Bipolar: No  Anxiety:No  Depression:No  History of alcohol/drug abuse: No  Hx of eating disorder:No    Endocrine  Personal or family hx of MTC or MEN2:No  Diabetes/prediabetes: Yes    Neurologic:  Hx of seizures: No  Hx of migraines: No  Memory Impairment: Yes  Chronic pain/opioids use: No      History of  kidney stones: No  Kidney disease: No  Current birth control: perimenopausal - cycel every 2-3 months        5/1/2024    12:07 PM   ABDI Score (Last Two)   ABDI Raw Score 32   Activation Score 62.6   ABDI Level 3         PHYSICAL EXAM:  Objective    Physical Exam   GENERAL: alert and no distress  EYES: Eyes grossly normal to inspection.  No discharge or erythema, or obvious scleral/conjunctival abnormalities.  RESP: No audible wheeze, cough, or visible cyanosis.    SKIN: Visible skin clear. No significant rash, abnormal pigmentation or lesions.  NEURO: Cranial nerves grossly intact.  Mentation and speech appropriate for age.  PSYCH: Appropriate affect, tone, and pace of words      In summary, Yancy Hoover has Class III obesity with a body mass index of Body mass index is 49.7 kg/m . kg/m2 and the comorbidities stated above. She completed an informational seminar and is a possible candidate for the laparoscopic gastric sleeve.  She will have to complete the following pre-requisites:    Today in the office we discussed gastric sleeve surgery. Preoperative, perioperative, and postoperative processes, management, and follow up were addressed.  Risks and benefits were outlined including the risk of death, staple line leak (1-2%), PE, DVT, ulcer, worsening GERD, N/V, stricture, hernia, wound infection, weight regain, and vitamin deficiencies. I emphasized exercise and activity along with appropriate food choice as the main foundation for weight loss with surgery providing surgical reinforcement of this.  All questions were answered.  A goal sheet and support group handout were given to the patient.      If you have not already watched our online seminar please go to [email or web address]    Weight loss requirement: 10 prior to surgery. Will have final weight check 2-3 weeks prior to surgery at anesthesia or nurse pre-op teaching visit.  286lb day of surgery   -Need current weight confirmation at primary  clinic or weight management clinic No    Bariatric labs ordered, call for a lab only appointment at any [facility] lab. To find a lab location near you, please call [phone]. Please let us know if orders need to be faxed to a non [facility] lab.    Schedule bariatric psych eval as soon as posible.  List of psychologists will be sent to you via [employer] or given to you in clinic.     Call Ray Templeton at [phone] to discuss insurance coverage for bariatric surgery.  Please check with your insurance regarding bariatric surgery coverage also. Ray can also help you with scheduling psych eval if you are having difficulties.    The following clearance letters are needed: Letters will be sent to you via [employer] or given to you in clinic  - Letter of support from primary care provider. Provider can submit through electronic medical record or fax to [phone]  -  sleep clearance, cardiology clearance, psych eval   Smoking cessation and nicotine test needed: No    Birth control after surgery discussed. Patient instructed that 2 forms of birth control required after surgery and to avoid pregnancy for at least 18 months after surgery: /N/A    NEXT VISITS: A  should reach out to you to schedule the following appointments.  If they do not reach you please call [phone] to schedule the following appointments:    -See dietitian in 1 month and monthly for 3 months    -See Dr Alcaraz in 1-2  month for bariatric surgeon visit. Discuss bariatric surgery.  Important items to discuss Known 4cm HH on EGD in 2023       Once the patient has completed the requirements in their task list and there are no further recommendations, the pt will be allowed to see the surgeon of her choice for consultation on the laparoscopic gastric sleeve surgery. Patient verbalizes understanding of the process to surgery and expectations for the postoperative period including the need for lifelong lifestyle changes, vitamin  supplementation, and laboratory monitoring.    Sincerely,     Jeannie Noe NP      60 minutes spent by me on the date of the encounter doing chart review, history and exam, documentation and further activities per the note    Virtual Visit Details    Type of service:  Video Visit   Video Start Time: 1206  Video End Time:1252    Originating Location (pt. Location): Home    Distant Location (provider location):  Off-site  Platform used for Video Visit: [employer]

## 2024-05-02 NOTE — PROGRESS NOTES
"New Bariatric Surgery Consultation Note    May 2, 2024    RE: Yancy Hoover  MR#: [id number]  : 1970      Referring provider:       2020    11:30 AM   --   Who referred you Katie       Chief Complaint/Reason for visit: evaluation for possible weight loss surgery    Dear Arlin Ewing (General),    I had the pleasure of seeing your patient, Yancy Hoover, to evaluate her obesity and consider her for possible weight loss surgery.  As you know, Yancy Hoover is 53 year old.  She has a height of 5' 4.488\", a weight of 294 lbs 0 oz, and calculated Body mass index is 49.7 kg/m ..  Full intake/assessment was done to determine barriers to weight loss success and develop a treatment plan.    NBS 2020 296lb   Ongoing MWM with Dr. Velarde wasn't seen in last 1.5 years until this month- titration up on topiramate, on saxenda but other agents not covered?   2023 glenn shelley RD     Assessment & Plan   Problem List Items Addressed This Visit        Digestive    Class 3 severe obesity due to excess calories with body mass index (BMI) of 50.0 to 59.9 in adult, unspecified whether serious comorbidity present (H) - Primary     Very early onset weight gain, always larger than peers. More difficulty managing weight with each of her 6 pregnancies. Restating process towards surgery. Initial consult in . Had done well on saxenda for a while. Got away from taking her meds consistently and ultimately had a stroke. Wants to get better control of her health again. Just restarted saxenda 2 weeks ago- taking 0.6mg. will likely need to switch to alternative GLp1 given availability of saxnda. Will refer to MTM to help with adherence. Other comorbidities include HTN, HLD, LANDON, DMII, MAFLD on ultrasound but no labs for Fib4 calculation.     Has mostly recovered from stroke but has some residual weakness in upper extremities.     Experiencing RUQ pain. Ultrasound negative for cholelithiasis, " NM hepatobiliary scan negative. EGD showed New 4cm HH since consult. Hasn't been able to get PPI recently. Will restart now. Discussed repair of HH if needed during surgery.      Start health coaching through [employer] insurance   Schedule nurse class   Schedule with surgeon in 2 months (Dr. Alcaraz)   Continue with dietitian   Work on weight loss 286lb day of surgery   Psych eval   Letter of support from primary care and sleep medicine   Cardiology clearance   Start omeprazole   Plan to switch to wegovy if saxenda not available          Relevant Medications    omeprazole (PRILOSEC) 20 MG DR capsule    Other Relevant Orders    Adult Cardiology Eval  Referral    Adult Mental Health  Referral    CBC with platelets    Comprehensive metabolic panel    Ferritin    Hemoglobin A1c    Parathyroid Hormone Intact    Vitamin A    Vitamin B12    Vitamin D Deficiency    Lipid panel reflex to direct LDL Fasting    TSH with free T4 reflex    Med Therapy Management Referral       Endocrine    Type 2 diabetes mellitus without complication, unspecified whether long term insulin use (H)    Relevant Orders    Med Therapy Management Referral   Other Visit Diagnoses     Hiatal hernia        Relevant Medications    omeprazole (PRILOSEC) 20 MG DR capsule    Other Relevant Orders    Med Therapy Management Referral    Gastroesophageal reflux disease with esophagitis, unspecified whether hemorrhage        Relevant Medications    omeprazole (PRILOSEC) 20 MG DR capsule    Other Relevant Orders    Med Therapy Management Referral               HISTORY OF PRESENT ILLNESS:      5/1/2024    12:23 PM   Weight Loss History Reviewed with Patient   How long have you been overweight? Since early childhood   What is the most that you have ever weighed 336   What is the most weight you have lost? 100   I have tried the following methods to lose weight Watching portions or calories    Exercise    Weight Watchers    Prescription  "Medications   I have tried the following weight loss medications? (Check all that apply) Topamax/Topiramate    Victoza/liraglutide    Saxenda      Always considered overweight or the \"fat kid\"  Before her first child, she had been dieting and lost from 190 to 150. Gained to 200 with pregnancy and has had hard time getting below 200lb since then. Youngest child is 6 years old and since he was born, hasn't gotten below 216lb     Concerned about her health and complications related to Diabetes. Weight had gotten up to 336lb and then had \"mini stroke\" and wants to better manage health. Has recovered mostly from stroke. Was off of saxenda when  Had stroke.     Currently taking saxenda - just got restarted on this 2 weeks - 0.6mg    - no adverse side effects   - no benefits until taking 1.2 historically     CO-MORBIDITIES OF OBESITY INCLUDE:      5/1/2024    12:23 PM   --   I have the following health issues associated with obesity Type II Diabetes    High Blood Pressure    High Cholesterol    Sleep Apnea      Last A1C 7.7 2/2024    No hx blood clots   LANDON- CPAP - feels helpful   HLD       EGD 3/2023 (see notes below), known HH hernia, not able to get omeprazole filled       PAST MEDICAL HISTORY:  Past Medical History:   Diagnosis Date     Cerebral infarction (H) 7/6/2022     DM (diabetes mellitus), type 2 (H)      History of diabetes mellitus 2020     LANDON (obstructive sleep apnea) 07/2020     No ongoing follow up with neurology or cardiology     PAST SURGICAL HISTORY:  Past Surgical History:   Procedure Laterality Date     COLONOSCOPY  4/18/2023     ESOPHAGOSCOPY, GASTROSCOPY, DUODENOSCOPY (EGD), COMBINED N/A 02/24/2023    Procedure: ESOPHAGOGASTRODUODENOSCOPY, WITH BIOPSY;  Surgeon: Jassi Thomason MD;  Location:  GI     GYN SURGERY      c  section 2014     LAPAROSCOPIC CYSTECTOMY OVARIAN (BENIGN) Left 01/23/2020    Procedure: LAPAROSCOPIC ovarian cystectomy,;  Surgeon: Sofi Cuevas MD;  Location:  OR "       FAMILY HISTORY:   Family History   Problem Relation Age of Onset     Diabetes Father        SOCIAL HISTORY:       5/1/2024    12:23 PM   Social History Questions Reviewed With Patient   Which best describes your employment status (select all that apply) I am disabled   Which best describes your marital status single   Who do you have in your support network that can be available to help you for the first 2 weeks after surgery? Yea   Who can you count on for support throughout your weight loss surgery journey? My 27 year old daughter, mom and my sister       Do you have support to drive you to clinic for weight checks/clinic visits, to and from surgery and stay with you after surgery? Yes- daughter lives with her     HABITS:      5/1/2024    12:23 PM   --   How often do you drink alcohol? Never   Do you currently use any of the following Nicotine products? No   Have you ever used any of the following nicotine products? Cigarettes   If you previously used any of these products, what year did you quit? 1   Have you or are you currently using street drugs or prescription strength medication for which you do not have a prescription for? No   Do you have a history of chemical dependency (alcohol or drug abuse)? No      Quit smoking 8/2023- going okay - off nicotine now     Currently taking narcotic/opioids No  No marijuana     PSYCHOLOGICAL HISTORY:       5/1/2024    12:23 PM   Psychological History Reviewed With Patient   Have you ever attempted suicide? Never.   Have you had thoughts of suicide in the past year? No   Have you ever been hospitalized for mental illness or a suicide attempt? Never.   Do you have a history of chronic pain? No   Have you ever been diagnosed with fibromyalgia? No   Are you currently being treated for any of the following? (select all that apply) I do not have a mental illness     Has good support system  Has stroke support group once a month     ROS:      5/1/2024    12:23 PM   --    Skin None of the above   HEENT None of these   Musculoskeletal None of the above   Cardiovascular None of the above   Pulmonary None of the above   Gastrointestinal None of the above   Genitourinary None of the above   Hematological None of the above   Neurological Migraine headaches   Female only None of the above       EATING BEHAVIORS:      5/1/2024    12:23 PM   --   Have you or anyone else thought that you had an eating disorder? No   Do you currently binge eat (eat a large amount of food in a short time)? No   Are you an emotional eater? Yes   Do you get up to eat after falling asleep? Yes   Can you afford 3 meals a day? Yes   Can you afford 50-60 dollars a month for vitamins? Yes      2 meals a day   Breakfast and dinner   Snacks between meals - fruit, veggies, baked chips - not a lot of hunger during this time     Trying to be mindful of carbs    Trying to do protein and veggies at meals     Aware of stress eating but doesn't think it happens too often- tries to find other things to do - reading, video games, driving, music, knitting (harder after stroke)     Soda- sprite regular 16oz daily   Water - tries to get 8 glasses but not always able to     EXERCISE:      5/1/2024    12:23 PM   --   How often do you exercise? 1 to 2 times per week   What is the duration of your exercise (in minutes)? 30 Minutes   What types of exercise do you do? walking   What keeps you from being more active? I have recently been sick    My ability to walk or move around is limited    Worried people will look at me       MEDICATIONS:  Current Outpatient Medications   Medication Sig Dispense Refill     alcohol swab prep pads Use to swab area of injection/shahla as directed. 100 each 5     aspirin 81 MG EC tablet Take 81 mg by mouth       blood glucose (ACCU-CHEK SMARTVIEW) test strip Use to test blood sugar 2 times daily. 100 strip 5     blood glucose calibration (ACCU-CHEK SMARTVIEW CONTROL) solution Use to calibrate blood  glucose monitor as directed. 1 Bottle 3     blood glucose monitoring (ACCU-CHEK FASTCLIX) lancets Use to test blood sugar 1-2 times daily or as directed. 102 each 5     blood glucose monitoring (ACCU-CHEK FAWAD SMARTVIEW) meter device kit Use to test blood sugar 1-2 times daily. 1 kit 0     childrens multivitamin w/iron (FLINTSTONES COMPLETE) 18 MG chewable tablet Take 1 chew tab by mouth daily       insulin pen needle (31G X 8 MM) 31G X 8 MM miscellaneous Use 1 pen needles daily or as directed. 100 each 0     insulin pen needle (32G X 6 MM) 32G X 6 MM miscellaneous Use 1 pen needles daily or as directed with saxenda. 100 each 1     liraglutide - Weight Management (SAXENDA) 18 MG/3ML pen Inject 3 mg Subcutaneous daily 45 mL 3     metFORMIN (GLUCOPHAGE XR) 500 MG 24 hr tablet Take 2 tablets (1,000 mg) by mouth 2 times daily (with meals) 360 tablet 3     omeprazole (PRILOSEC) 20 MG DR capsule Take 1 capsule (20 mg) by mouth daily 90 capsule 2     rosuvastatin (CRESTOR) 10 MG tablet Take 1 tablet (10 mg) by mouth daily 90 tablet 3     verapamil ER (VERELAN) 240 MG 24 hr capsule Take 240 mg by mouth       vitamin D3 (CHOLECALCIFEROL) 50 mcg (2000 units) tablet Take 1 tablet by mouth daily         Is patient on biologics or immunomodulators? NO     ALLERGIES:  Allergies   Allergen Reactions     Gadolinium Derivatives Nausea and Vomiting     Caused excessive vomiting, administered 4mg zofran     EGD 3/2023   Impression:               - Esophagogastric landmarks identified.                             - Normal esophagus.                             - 4 cm hiatal hernia.                             - Normal stomach. Biopsied.                             - Normal examined duodenum.   Recommendation:           - Advance diet as tolerated.   Admission on 08/22/2023, Discharged on 08/23/2023   Component Date Value Ref Range Status     Hold Specimen 08/22/2023 [facility]   Final     Hold Specimen 08/22/2023 [facility]   Final     Hold Specimen  08/22/2023 [facility]   Final     Ventricular Rate 08/22/2023 116  BPM Final     Atrial Rate 08/22/2023 116  BPM Final     AZ Interval 08/22/2023 154  ms Final     QRS Duration 08/22/2023 84  ms Final     QT 08/22/2023 330  ms Final     QTc 08/22/2023 458  ms Final     P Axis 08/22/2023 71  degrees Final     R AXIS 08/22/2023 55  degrees Final     T Axis 08/22/2023 54  degrees Final     Interpretation ECG 08/22/2023    Final                    Value:Sinus tachycardia  Cannot rule out Anterior infarct , age undetermined  Abnormal ECG  When compared with ECG of 18-OCT-2020 20:05,  Questionable change in QRS axis  Nonspecific T wave abnormality no longer evident in Lateral leads  Confirmed by GENERATED REPORT, COMPUTER ([id number]),  EGORGE LIMA ([id number]) on 8/22/2023 8:30:04 PM       D-Dimer Quantitative 08/22/2023 0.57 (H)  0.00 - 0.50 ug/mL FEU Final     Sodium 08/22/2023 136  136 - 145 mmol/L Final     Potassium 08/22/2023 3.8  3.4 - 5.3 mmol/L Final     Chloride 08/22/2023 100  98 - 107 mmol/L Final     Carbon Dioxide (CO2) 08/22/2023 21 (L)  22 - 29 mmol/L Final     Anion Gap 08/22/2023 15  7 - 15 mmol/L Final     Urea Nitrogen 08/22/2023 11.2  6.0 - 20.0 mg/dL Final     Creatinine 08/22/2023 0.84  0.51 - 0.95 mg/dL Final     Calcium 08/22/2023 9.3  8.6 - 10.0 mg/dL Final     Glucose 08/22/2023 127 (H)  70 - 99 mg/dL Final     GFR Estimate 08/22/2023 83  >60 mL/min/1.73m2 Final     Troponin T, High Sensitivity 08/22/2023 9  <=14 ng/L Final    Either a High Sensitivity Troponin T baseline (0 hours) value = 100 ng/L, or an increase in High Sensitivity Troponin T = 7 ng/L at 2 hours compared to 0 hours (2-0 hours), suggests myocardial injury, and urgent clinical attention is required.    If the 2-0 hours increase is <7 ng/L, a High Sensitivity Troponin T result above gender-specific reference ranges warrants further evaluation.   Recommendations for further evaluation include correlation with clinical decision-making tool  (e.g., HEART), a 3rd High Sensitivity Troponin T test 2 hours after the 2nd (a 20% change from baseline would represent concern), admission for observation, close PCC/cardiology follow-up, or urgent outpatient provocative testing.     WBC Count 08/22/2023 8.7  4.0 - 11.0 10e3/uL Final     RBC Count 08/22/2023 4.71  3.80 - 5.20 10e6/uL Final     Hemoglobin 08/22/2023 13.3  11.7 - 15.7 g/dL Final     Hematocrit 08/22/2023 41.1  35.0 - 47.0 % Final     MCV 08/22/2023 87  78 - 100 fL Final     MCH 08/22/2023 28.2  26.5 - 33.0 pg Final     MCHC 08/22/2023 32.4  31.5 - 36.5 g/dL Final     RDW 08/22/2023 14.7  10.0 - 15.0 % Final     Platelet Count 08/22/2023 296  150 - 450 10e3/uL Final     % Neutrophils 08/22/2023 52  % Final     % Lymphocytes 08/22/2023 39  % Final     % Monocytes 08/22/2023 6  % Final     % Eosinophils 08/22/2023 2  % Final     % Basophils 08/22/2023 1  % Final     % Immature Granulocytes 08/22/2023 0  % Final     NRBCs per 100 WBC 08/22/2023 0  <1 /100 Final     Absolute Neutrophils 08/22/2023 4.6  1.6 - 8.3 10e3/uL Final     Absolute Lymphocytes 08/22/2023 3.4  0.8 - 5.3 10e3/uL Final     Absolute Monocytes 08/22/2023 0.5  0.0 - 1.3 10e3/uL Final     Absolute Eosinophils 08/22/2023 0.2  0.0 - 0.7 10e3/uL Final     Absolute Basophils 08/22/2023 0.0  0.0 - 0.2 10e3/uL Final     Absolute Immature Granulocytes 08/22/2023 0.0  <=0.4 10e3/uL Final     Absolute NRBCs 08/22/2023 0.0  10e3/uL Final     Influenza A PCR 08/22/2023 Negative  Negative Final     Influenza B PCR 08/22/2023 Negative  Negative Final     RSV PCR 08/22/2023 Negative  Negative Final     SARS CoV2 PCR 08/22/2023 Negative  Negative Final    NEGATIVE: SARS-CoV-2 (COVID-19) RNA not detected, presumed negative.     Troponin T, High Sensitivity 08/22/2023 11  <=14 ng/L Final    Either a High Sensitivity Troponin T baseline (0 hours) value = 100 ng/L, or an increase in High Sensitivity Troponin T = 7 ng/L at 2 hours compared to 0 hours (2-0  hours), suggests myocardial injury, and urgent clinical attention is required.    If the 2-0 hours increase is <7 ng/L, a High Sensitivity Troponin T result above gender-specific reference ranges warrants further evaluation.   Recommendations for further evaluation include correlation with clinical decision-making tool (e.g., HEART), a 3rd High Sensitivity Troponin T test 2 hours after the 2nd (a 20% change from baseline would represent concern), admission for observation, close PCC/cardiology follow-up, or urgent outpatient provocative testing.       Computed FIB-4 Calculation unavailable. One or more values for this score either were not found within the given timeframe or did not fit some other criterion.    Fib-4 < 1.3: No further evaluation at this point, unless other concerns    - If the Fib-4 is >2.67  Fibroscan and elective liver clinic referral    - Intermediate Fib-4 scores: Get a Fibroscan, consider repeating this in 1-2 years.    Anti-obesity medication ROS:    HEENT  Hx of glaucoma: No    Cardiovascular  CAD:Yes  HTN:Yes    Gastrointestinal  GERD:Yes  Constipation/diarrhea/GI issues:No  Liver Dz:hepatic steatosis on ultrasound   Computed FIB-4 Calculation unavailable. One or more values for this score either were not found within the given timeframe or did not fit some other criterion.    H/O Pancreatitis:No    Psychiatric  Bipolar: No  Anxiety:No  Depression:No  History of alcohol/drug abuse: No  Hx of eating disorder:No    Endocrine  Personal or family hx of MTC or MEN2:No  Diabetes/prediabetes: Yes    Neurologic:  Hx of seizures: No  Hx of migraines: No  Memory Impairment: Yes  Chronic pain/opioids use: No      History of kidney stones: No  Kidney disease: No  Current birth control: perimenopausal - cycel every 2-3 months        5/1/2024    12:07 PM   ABDI Score (Last Two)   ABDI Raw Score 32   Activation Score 62.6   ABDI Level 3         PHYSICAL EXAM:  Objective    Physical Exam   GENERAL: alert and  no distress  EYES: Eyes grossly normal to inspection.  No discharge or erythema, or obvious scleral/conjunctival abnormalities.  RESP: No audible wheeze, cough, or visible cyanosis.    SKIN: Visible skin clear. No significant rash, abnormal pigmentation or lesions.  NEURO: Cranial nerves grossly intact.  Mentation and speech appropriate for age.  PSYCH: Appropriate affect, tone, and pace of words      In summary, Yancy Hoover has Class III obesity with a body mass index of Body mass index is 49.7 kg/m . kg/m2 and the comorbidities stated above. She completed an informational seminar and is a possible candidate for the laparoscopic gastric sleeve.  She will have to complete the following pre-requisites:    Today in the office we discussed gastric sleeve surgery. Preoperative, perioperative, and postoperative processes, management, and follow up were addressed.  Risks and benefits were outlined including the risk of death, staple line leak (1-2%), PE, DVT, ulcer, worsening GERD, N/V, stricture, hernia, wound infection, weight regain, and vitamin deficiencies. I emphasized exercise and activity along with appropriate food choice as the main foundation for weight loss with surgery providing surgical reinforcement of this.  All questions were answered.  A goal sheet and support group handout were given to the patient.      If you have not already watched our online seminar please go to [email or web address]    Weight loss requirement: 10 prior to surgery. Will have final weight check 2-3 weeks prior to surgery at anesthesia or nurse pre-op teaching visit.  286lb day of surgery   -Need current weight confirmation at primary clinic or weight management clinic No    Bariatric labs ordered, call for a lab only appointment at any [facility] lab. To find a lab location near you, please call [phone]. Please let us know if orders need to be faxed to a non [facility] lab.    Schedule  bariatric psych eval as soon as posible.  List of psychologists will be sent to you via [employer] or given to you in clinic.     Call Ray Templeton at [phone] to discuss insurance coverage for bariatric surgery.  Please check with your insurance regarding bariatric surgery coverage also. Ray can also help you with scheduling psych eval if you are having difficulties.    The following clearance letters are needed: Letters will be sent to you via [employer] or given to you in clinic  - Letter of support from primary care provider. Provider can submit through electronic medical record or fax to [phone]  -  sleep clearance, cardiology clearance, psych eval   Smoking cessation and nicotine test needed: No    Birth control after surgery discussed. Patient instructed that 2 forms of birth control required after surgery and to avoid pregnancy for at least 18 months after surgery: /N/A    NEXT VISITS: A  should reach out to you to schedule the following appointments.  If they do not reach you please call [phone] to schedule the following appointments:    -See dietitian in 1 month and monthly for 3 months    -See Dr Alcaraz in 1-2  month for bariatric surgeon visit. Discuss bariatric surgery.  Important items to discuss Known 4cm HH on EGD in 2023       Once the patient has completed the requirements in their task list and there are no further recommendations, the pt will be allowed to see the surgeon of her choice for consultation on the laparoscopic gastric sleeve surgery. Patient verbalizes understanding of the process to surgery and expectations for the postoperative period including the need for lifelong lifestyle changes, vitamin supplementation, and laboratory monitoring.    Sincerely,     Jeannie Noe NP      60 minutes spent by me on the date of the encounter doing chart review, history and exam, documentation and further activities per the note    Virtual Visit Details    Type of service:  Video Visit    Video Start Time: 1206  Video End Time:1252    Originating Location (pt. Location): Home    Distant Location (provider location):  Off-site  Platform used for Video Visit: Radha

## 2024-05-07 ENCOUNTER — VIRTUAL VISIT (OUTPATIENT)
Dept: CARDIOLOGY | Facility: CLINIC | Age: 54
End: 2024-05-07
Attending: PHARMACIST
Payer: COMMERCIAL

## 2024-05-07 VITALS — BODY MASS INDEX: 48.86 KG/M2 | WEIGHT: 289 LBS

## 2024-05-07 DIAGNOSIS — F41.1 GENERALIZED ANXIETY DISORDER: ICD-10-CM

## 2024-05-07 DIAGNOSIS — Z86.73 HISTORY OF STROKE: ICD-10-CM

## 2024-05-07 DIAGNOSIS — K21.9 GERD WITHOUT ESOPHAGITIS: ICD-10-CM

## 2024-05-07 DIAGNOSIS — G43.919 INTRACTABLE MIGRAINE WITHOUT STATUS MIGRAINOSUS, UNSPECIFIED MIGRAINE TYPE: ICD-10-CM

## 2024-05-07 DIAGNOSIS — F33.9 EPISODE OF RECURRENT MAJOR DEPRESSIVE DISORDER, UNSPECIFIED DEPRESSION EPISODE SEVERITY (H): ICD-10-CM

## 2024-05-07 DIAGNOSIS — E11.9 TYPE 2 DIABETES MELLITUS WITHOUT COMPLICATION, WITHOUT LONG-TERM CURRENT USE OF INSULIN (H): ICD-10-CM

## 2024-05-07 DIAGNOSIS — E66.01 OBESITY, CLASS III, BMI 40-49.9 (MORBID OBESITY) (H): Primary | ICD-10-CM

## 2024-05-07 RX ORDER — ROSUVASTATIN CALCIUM 20 MG/1
20 TABLET, COATED ORAL DAILY
COMMUNITY
Start: 2024-05-07

## 2024-05-07 RX ORDER — TOPIRAMATE 100 MG/1
1 TABLET, FILM COATED ORAL AT BEDTIME
COMMUNITY
Start: 2024-04-22 | End: 2024-09-16 | Stop reason: DRUGHIGH

## 2024-05-07 RX ORDER — METFORMIN HCL 500 MG
1000 TABLET, EXTENDED RELEASE 24 HR ORAL 2 TIMES DAILY WITH MEALS
Qty: 360 TABLET | Refills: 3 | Status: SHIPPED | OUTPATIENT
Start: 2024-05-07 | End: 2024-05-08

## 2024-05-07 RX ORDER — BUPROPION HYDROCHLORIDE 150 MG/1
1 TABLET ORAL EVERY MORNING
COMMUNITY
Start: 2024-02-28 | End: 2024-05-07 | Stop reason: SINTOL

## 2024-05-07 RX ORDER — POLYETHYLENE GLYCOL 3350 17 G/17G
1 POWDER, FOR SOLUTION ORAL DAILY PRN
COMMUNITY

## 2024-05-07 ASSESSMENT — PAIN SCALES - GENERAL: PAINLEVEL: NO PAIN (0)

## 2024-05-07 NOTE — PROGRESS NOTES
Medication Therapy Management (MTM) Encounter    ASSESSMENT:                            Medication Adherence/Access: See below for considerations    Obesity /DIABETES   Class III Obesity (BMI 40 or more): Due for A1C - defer to PCP. Education provided on metformin ER OK to take once daily, IR (current formulation at home must be taken twice daily -- likely why fasting blood glucose not at goal). Patient would prefer ER to take once daily for adherence - 500 mg ER tabs covered by insurance, not ER 1000 mg tabs; patient agreeable to 4x 500 mg ER tabs to improve adherence.  On statin and aspirin.     Patient would benefit from additional pharmacotherapy for weight management. Given class III obesity, recommend optimizing GLP1/GIP therapy as data to support most significant weight loss and patient has no contraindications. Additionally, semaglutide has shown more MACE benefit in addition to A1C and weight management. Patient also likely to benefit from reduction in food noise and increased satiety. Patient to continue on Saxenda for now, will meet with Medication Therapy Management to document metformin and saxenda max dose safety and efficacy - close follow up with goal to move to Ozempic for efficacy and adherence benefit.    Discussed dietary and behavioral modifications.     Stroke (7/2023): stable. Updated statin to 20 mg rosuvastatin - correct to be on high intensity statin for secondary prevention. Due for CMP, blood pressure check (elevated due to stress per PCP note at last visit) and lipid panel - defer to PCP    GERD: stable - expect weight management to help improve symptoms; saxenda not worsening. defer management to PCP.     Migraine:stable     Anxiety/depression: defer management to PCP - due for vitamin D level.    PLAN:                            Continue Saxenda 3 mg daily for now. We will discuss switching to Ozempic at our next visit to use up your current supply of Saxenda for now and see how it is  working.    To help with tolerability of Saxenda:  Eat small meals/snacks throughout the day (about every 2 hours)  Focus on getting protein in first with each meal and snack.   Drink plenty of water - goal 64 oz throughout the day  You may try Metamucil, Benefiber, or Citrucel to help feel more full (less nausea) and have softer, more consistent bowel movements.  To optimize weight management - work on incorporating resistance training/weight lifting to build muscle and improve overall metabolism of adipose tissue.    I sent an new prescription for Metformin  mg tabs so that you get all day blood glucose control with once daily dosing. You will take 4 tabs (2000 mg total) once daily with food and we should see some improvement with your fasting blood glucose with this change.    Please schedule a visit for labs and blood pressure check with Arlin Ewing (you are due for cholesterol, kidney function, and A1C).    Follow-up: 5/13 for [facility]. 6/4 with Micki Martins [facility]. 10/4 with Dr. Velarde.    SUBJECTIVE/OBJECTIVE:                          Yancy Hoover is a 53 year old female contacted via secure video for a follow-up visit.       Reason for visit: Medication Therapy Management - weight management .    Allergies/ADRs: Reviewed in chart  Past Medical History: Reviewed in chart  Tobacco: She reports that she quit smoking about 10 months ago. Her smoking use included cigarettes. She has a 10 pack-year smoking history. She has never used smokeless tobacco.  Alcohol: not currently using      Medication Adherence/Access: some issues with receiving correct meds with [facility]. Prefers [facility]. Working on getting approval for sucralfate with PCP (see below)    Obesity /DM  Class III Obesity (BMI 40 or more):   Saxenda 3 mg once daily (has about 45 day supply)  Metformin 1000 mg IR Tabs - 2 tabs once daily (bottle from  "[facility] -- does not match metformin ER prescription in [employer] system).    Patient reports no current medication side effects. Happy to see some weight management with increased dose of Saxenda (up to max dose this week).    Patient met with Jeannie Noe CNP  5/2/24 for return weight management (surgical consult):    NBS 7/2020 296lb   Ongoing MWM with Dr. Velarde wasn't seen in last 1.5 years until this month- titration up on topiramate, on saxenda but other agents not covered?   4/2023 glenn shelley RD     Blood glucose readings: 120-140mg/dl (fasting)    Nutrition/Eating Habits: saxenda helping to reduce cravings and food intake. Working to improve diet and overall wellbeing for overall health.    Exercise/Activity: recently graduated physical therapy after stroke and now working on OT - goal to continue activity as safe and able.     Medication History:  Topiramate: takes for migraine - not helpful for appetite/cravings   Ozempic: tried to get previously, denied by insurance so has been on/off liraglutide since then  Jardiance - UTI         Wt Readings from Last 4 Encounters:   05/07/24 131.1 kg (289 lb)   05/02/24 133.4 kg (294 lb)   04/23/24 134.3 kg (296 lb)   04/19/24 134.3 kg (296 lb)     Estimated body mass index is 48.86 kg/m  as calculated from the following:    Height as of 5/2/24: 1.638 m (5' 4.49\").    Weight as of this encounter: 131.1 kg (289 lb).    Lab Results   Component Value Date    A1C 7.7 (H) 2/28/24 - Care Everywhere     (H) 08/22/2023     08/22/2023    POTASSIUM 3.8 08/22/2023    RUSSELL 9.3 08/22/2023    CHLORIDE 100 08/22/2023    CO2 21 (L) 08/22/2023    BUN 11.2 08/22/2023    CR 0.84 08/22/2023    GFRESTIMATED 83 08/22/2023    CHOL 191 10/28/2022     (H) 10/28/2022    HDL 37 (L) 10/28/2022    TRIG 89 10/28/2022    TSH 0.95 07/30/2020   URINE CHEM   [facility] Cyhvztyyfs79/28/2024  Component 02/28/2024         ALB RAND URINE 19.7    CREATININE,URINE " "2.46    ALBUMIN TO CREATININE RATIO,RAND UR 8.0        Liver Function Studies -   Recent Labs   Lab Test 11/09/22  1530   PROTTOTAL 7.4   ALBUMIN 3.3*   BILITOTAL 0.7   ALKPHOS 70   AST 20   ALT 30       Stroke (7/2023):   Aspirin 81 mg once daily  Rosuvastatin 20 mg once daily (discrepancy in [place] chart - 10 mg in chart)  Verapamil  mg once daily    Patient has been rehabbing with physical therapy and OT since July after suffering \"mild stroke\". Grateful for not having significant deficits, but working toward regaining strength and motor functions. THis has made her take time from work and is motivated to focus on self care and improving own well being to prevent future risks and help overall wellbeing. Denies side effects.      Recent Labs   Lab Test 10/28/22  1043   CHOL 191   HDL 37*   *   TRIG 89      BP Readings from Last 3 Encounters:   4/22/24 132/86; heart rate 117 ([facility])    08/23/23 136/79   02/24/23 (!) 139/93   11/09/22 (!) 143/89         GERD:   Omeprazole 20 mg once daily  Sucralfate 1g tab - 4 times daily with meals - not able to fill currently     Patient reports no current symptoms. Has hernia which also impacts reflux.  Patient feels that current regimen is effective. Sucralfate most effective but currently fighting insurance for this.  Patient has not tried a trial off of therapy and is not interested in doing so.      Migraine:  Topiramate 100 mg once daily    Works well for migraine - not helpful for appetite reduction. Denies side effects. Patient aware to avoid NSAIDs after stroke.    Anxiety/depression:  Vitamin D 2000 international units once daily     Patient managing with PCP - not currently using pharmacotherapy as most recent med (bupropion) worsened symptoms. Feels mostly well managed without pharmacotherapy at this time. Vitamin D is helpful for mood.    HISTORY:  Buproption - increased anxeity.  Citalopram - not effective    Per Care Everywhere note Arlin " "[place] 4/22/24:  Recurrent major depressive episodes, moderate (HC)  Depression interfering with ability to move forward with disability applications.  At risk of eviction.   Provided info for [facility] Front Door program [phone] (screening for social service programs)  [facility]  - AMB REFERRAL TO CARE MANAGEMENT; Future     Vitamin D Deficiency Screening Results:  No results found for: \"VITDT\"      Today's Vitals: Wt 131.1 kg (289 lb)   BMI 48.86 kg/m    ----------------      I spent 34 minutes with this patient today. All changes were made via collaborative practice agreement with Jeannie Noe CNP  . A copy of the visit note was provided to the patient's provider(s).    A summary of these recommendations was sent via [employer].    Micki Martins, Pharm D., MPH    Medication Therapy Management Pharmacist   [facility]      Telemedicine Visit Details  Type of service:  Video Conference via [employer]  Start Time:  10:01 AM  End Time:  10:35 AM     Medication Therapy Recommendations  No medication therapy recommendations to display   "

## 2024-05-07 NOTE — NURSING NOTE
Is the patient currently in the state of [place]? YES    Visit mode:VIDEO    If the visit is dropped, the patient can be reconnected by: VIDEO VISIT: Text to cell phone:   Telephone Information:   Mobile [phone]       Will anyone else be joining the visit? NO  (If patient encounters technical issues they should call [phone]    How would you like to obtain your AVS? MyChart    Are changes needed to the allergy or medication list? No, Pt stated no changes to allergies, and Pt stated no med changes    Are refills needed on medications prescribed by this physician? NO    Reason for visit: JUAN MENDOZA

## 2024-05-07 NOTE — LETTER
5/7/2024      RE: Yancy Hoover  [address]  [place] [address]       Dear Colleague,    Thank you for the opportunity to participate in the care of your patient, Yancy Hoover, at the [facility] [place] at [facility]. Please see a copy of my visit note below.    Medication Therapy Management (MTM) Encounter    ASSESSMENT:                            Medication Adherence/Access: See below for considerations    Obesity /DIABETES   Class III Obesity (BMI 40 or more): Due for A1C - defer to PCP. Education provided on metformin ER OK to take once daily, IR (current formulation at home must be taken twice daily -- likely why fasting blood glucose not at goal). Patient would prefer ER to take once daily for adherence - 500 mg ER tabs covered by insurance, not ER 1000 mg tabs; patient agreeable to 4x 500 mg ER tabs to improve adherence.  On statin and aspirin.     Patient would benefit from additional pharmacotherapy for weight management. Given class III obesity, recommend optimizing GLP1/GIP therapy as data to support most significant weight loss and patient has no contraindications. Additionally, semaglutide has shown more MACE benefit in addition to A1C and weight management. Patient also likely to benefit from reduction in food noise and increased satiety. Patient to continue on Saxenda for now, will meet with Medication Therapy Management to document metformin and saxenda max dose safety and efficacy - close follow up with goal to move to Ozempic for efficacy and adherence benefit.    Discussed dietary and behavioral modifications.     Stroke (7/2023): stable. Updated statin to 20 mg rosuvastatin - correct to be on high intensity statin for secondary prevention. Due for CMP, blood pressure check (elevated due to stress per PCP note at last visit) and lipid panel - defer to PCP    GERD: stable - expect weight management to  help improve symptoms; saxenda not worsening. defer management to PCP.     Migraine:stable     Anxiety/depression: defer management to PCP - due for vitamin D level.    PLAN:                            Continue Saxenda 3 mg daily for now. We will discuss switching to Ozempic at our next visit to use up your current supply of Saxenda for now and see how it is working.    To help with tolerability of Saxenda:  Eat small meals/snacks throughout the day (about every 2 hours)  Focus on getting protein in first with each meal and snack.   Drink plenty of water - goal 64 oz throughout the day  You may try Metamucil, Benefiber, or Citrucel to help feel more full (less nausea) and have softer, more consistent bowel movements.  To optimize weight management - work on incorporating resistance training/weight lifting to build muscle and improve overall metabolism of adipose tissue.    I sent an new prescription for Metformin  mg tabs so that you get all day blood glucose control with once daily dosing. You will take 4 tabs (2000 mg total) once daily with food and we should see some improvement with your fasting blood glucose with this change.    Please schedule a visit for labs and blood pressure check with Arlin Ewing (you are due for cholesterol, kidney function, and A1C).    Follow-up: 5/13 for nutrition class with [facility]. 6/4 with Micki Martins [facility]. 10/4 with Dr. Velarde.    SUBJECTIVE/OBJECTIVE:                          Yancy Hoover is a 53 year old female contacted via secure video for a follow-up visit.       Reason for visit: Medication Therapy Management - weight management .    Allergies/ADRs: Reviewed in chart  Past Medical History: Reviewed in chart  Tobacco: She reports that she quit smoking about 10 months ago. Her smoking use included cigarettes. She has a 10 pack-year smoking history. She has never used smokeless tobacco.  Alcohol: not currently  "using      Medication Adherence/Access: some issues with receiving correct meds with [facility]. Prefers [email or web address]. Working on getting approval for sucralfate with PCP (see below)    Obesity/DM  Class III Obesity (BMI 40 or more):   Saxenda 3 mg once daily (has about 45 day supply)  Metformin 1000 mg IR Tabs - 2 tabs once daily (bottle from [employer] -- does not match metformin ER prescription in [email or web address] system).    Patient reports no current medication side effects. Happy to see some weight management with increased dose of Saxenda (up to max dose this week).    Patient met with Jeannie Noe CNP  5/2/24 for return weight management (surgical consult):    NBS 7/2020 296lb   Ongoing MWM with Dr. Velarde wasn't seen in last 1.5 years until this month- titration up on topiramate, on saxenda but other agents not covered?   4/2023 glenn shelley RD     Blood glucose readings: 120-140mg/dl (fasting)    Nutrition/Eating Habits: saxenda helping to reduce cravings and food intake. Working to improve diet and overall wellbeing for overall health.    Exercise/Activity: recently graduated physical therapy after stroke and now working on OT - goal to continue activity as safe and able.     Medication History:  Topiramate: takes for migraine - not helpful for appetite/cravings   Ozempic: tried to get previously, denied by insurance so has been on/off liraglutide since then  Jardiance - UTI         Wt Readings from Last 4 Encounters:   05/07/24 131.1 kg (289 lb)   05/02/24 133.4 kg (294 lb)   04/23/24 134.3 kg (296 lb)   04/19/24 134.3 kg (296 lb)     Estimated body mass index is 48.86 kg/m  as calculated from the following:    Height as of 5/2/24: 1.638 m (5' 4.49\").    Weight as of this encounter: 131.1 kg (289 lb).    Lab Results   Component Value Date    A1C 7.7 (H) 2/28/24 - Care Everywhere     (H) 08/22/2023     08/22/2023    POTASSIUM 3.8 08/22/2023    RUSSELL 9.3 08/22/2023    CHLORIDE 100 " "08/22/2023    CO2 21 (L) 08/22/2023    BUN 11.2 08/22/2023    CR 0.84 08/22/2023    GFRESTIMATED 83 08/22/2023    CHOL 191 10/28/2022     (H) 10/28/2022    HDL 37 (L) 10/28/2022    TRIG 89 10/28/2022    TSH 0.95 07/30/2020   URINE CHEM   [facility]  Component 02/28/2024         ALB RAND URINE 19.7    CREATININE,URINE 2.46    ALBUMIN TO CREATININE RATIO,RAND UR 8.0        Liver Function Studies -   Recent Labs   Lab Test 11/09/22  1530   PROTTOTAL 7.4   ALBUMIN 3.3*   BILITOTAL 0.7   ALKPHOS 70   AST 20   ALT 30       Stroke (7/2023):   Aspirin 81 mg once daily  Rosuvastatin 20 mg once daily (discrepancy in [place] chart - 10 mg in chart)  Verapamil  mg once daily    Patient has been rehabbing with physical therapy and OT since July after suffering \"mild stroke\". Grateful for not having significant deficits, but working toward regaining strength and motor functions. THis has made her take time from work and is motivated to focus on self care and improving own well being to prevent future risks and help overall wellbeing. Denies side effects.      Recent Labs   Lab Test 10/28/22  1043   CHOL 191   HDL 37*   *   TRIG 89      BP Readings from Last 3 Encounters:   4/22/24 132/86; heart rate 117 ([facility])    08/23/23 136/79   02/24/23 (!) 139/93   11/09/22 (!) 143/89         GERD:   Omeprazole 20 mg once daily  Sucralfate 1g tab - 4 times daily with meals - not able to fill currently     Patient reports no current symptoms. Has hernia which also impacts reflux.  Patient feels that current regimen is effective. Sucralfate most effective but currently fighting insurance for this.  Patient has not tried a trial off of therapy and is not interested in doing so.      Migraine:  Topiramate 100 mg once daily    Works well for migraine - not helpful for appetite reduction. Denies side effects. Patient aware to avoid NSAIDs after " "stroke.    Anxiety/depression:  Vitamin D 2000 international units once daily     Patient managing with PCP - not currently using pharmacotherapy as most recent med (bupropion) worsened symptoms. Feels mostly well managed without pharmacotherapy at this time. Vitamin D is helpful for mood.    HISTORY:  Buproption - increased anxeity.  Citalopram - not effective    Per Care Everywhere note Arlin Ewing 4/22/24:  Recurrent major depressive episodes, moderate (HC)  Depression interfering with ability to move forward with disability applications.  At risk of eviction.   Provided info for [facility] Front Door program [phone] (screening for social service programs)  [facility]  - AMB REFERRAL TO CARE MANAGEMENT; Future     Vitamin D Deficiency Screening Results:  No results found for: \"VITDT\"      Today's Vitals: Wt 131.1 kg (289 lb)   BMI 48.86 kg/m    ----------------      I spent 34 minutes with this patient today. All changes were made via collaborative practice agreement with Jeannie Noe CNP  . A copy of the visit note was provided to the patient's provider(s).    A summary of these recommendations was sent via [employer].    Micki Martins, Pharm D., MPH    Medication Therapy Management Pharmacist   [facility]      Telemedicine Visit Details  Type of service:  Video Conference via [facility]  Start Time:  10:01 AM  End Time:  10:35 AM     Medication Therapy Recommendations  No medication therapy recommendations to display       Please do not hesitate to contact me if you have any questions/concerns.     Sincerely,     Micki Martins [facility]  "

## 2024-05-07 NOTE — Clinical Note
Juan J Dennis! I got to work with Yancy when I covered for Zulma last year! It was so good to see her again - but sad to hear of the stroke. We are going to work on getting her over to Ozempic at next visit an push insurance for the MACE (of course liraglutide is on there too, but with superior weight loss with Ozempic, I'm going to go for that). I will let you know! Micki

## 2024-05-08 RX ORDER — METFORMIN HCL 500 MG
2000 TABLET, EXTENDED RELEASE 24 HR ORAL
Qty: 360 TABLET | Refills: 3 | Status: SHIPPED | OUTPATIENT
Start: 2024-05-08 | End: 2024-05-08

## 2024-05-08 RX ORDER — METFORMIN HCL 500 MG
2000 TABLET, EXTENDED RELEASE 24 HR ORAL
Qty: 360 TABLET | Refills: 3 | Status: SHIPPED | OUTPATIENT
Start: 2024-05-08

## 2024-05-08 NOTE — PATIENT INSTRUCTIONS
"Recommendations from MTM Pharmacist visit:                                                    MTM (medication therapy management) is a service provided by a clinical pharmacist designed to help you get the most of out of your medicines.  You may be sent a phone or email survey evaluating today's visit.  Please provide feedback you have for the service he received today if you are able.    Continue Saxenda 3 mg daily for now. We will discuss switching to Ozempic at our next visit to use up your current supply of Saxenda for now and see how it is working.    To help with tolerability of Saxenda:  Eat small meals/snacks throughout the day (about every 2 hours)  Focus on getting protein in first with each meal and snack.   Drink plenty of water - goal 64 oz throughout the day  You may try Metamucil, Benefiber, or Citrucel to help feel more full (less nausea) and have softer, more consistent bowel movements.  To optimize weight management - work on incorporating resistance training/weight lifting to build muscle and improve overall metabolism of adipose tissue.    I sent an new prescription for Metformin  mg tabs so that you get all day blood glucose control with once daily dosing. You will take 4 tabs (2000 mg total) once daily with food and we should see some improvement with your fasting blood glucose with this change.    Please schedule a visit for labs and blood pressure check with Arlin Ewing (you are due for cholesterol, kidney function, and A1C).    Follow-up: 5/13 for nutrition class with [facility]. 6/4 with Micki Martins [facility]. 10/4 with Dr. Velarde.        It was great speaking with you today.  I value your experience and would be very thankful for your time in providing feedback in our clinic survey. In the next few days, you may receive an email or text message from [employer] with a link to a survey related to your  clinical pharmacist.\"     To schedule another MTM " appointment, please call the clinic directly ([facility] Phone Number: [phone] (schedules for [facility] - providers, dietitians, health coaches) or you may call the [facility] scheduling line at [phone] or toll-free at [phone].     My Clinical Pharmacist's contact information:                                                      Please feel free to contact me with any questions or concerns you have.      Micki Martins, Pharm D., MPH    Medication Therapy Management Pharmacist   [facility]

## 2024-05-09 ENCOUNTER — TELEPHONE (OUTPATIENT)
Dept: ENDOCRINOLOGY | Facility: CLINIC | Age: 54
End: 2024-05-09
Payer: COMMERCIAL

## 2024-05-09 NOTE — TELEPHONE ENCOUNTER
Patient confirmed scheduled appointment:  Date: 05/14/2024  Time: 1000 am  Visit type: Group RN Class  Provider: Samreen Valdez RN  Location: Virtual  Testing/imaging: n/a  Additional notes:   06/27/24, Parish Phelps and Erica   08/13/2024 Jeannie Noe NP, Pre Surg

## 2024-05-10 ENCOUNTER — CARE COORDINATION (OUTPATIENT)
Dept: ENDOCRINOLOGY | Facility: CLINIC | Age: 54
End: 2024-05-10

## 2024-05-10 NOTE — PROGRESS NOTES
Bariatric Task List updated.  Bariatric information/clearance letters sent to patient via [employer].  Bariatric Task List    Fax:  Please fax all paperwork to: [phone] -     Status:  Is patient a candidate for bariatric surgery?:  patient is a candidate for bariatric surgery -     Cleared to schedule surgeon consult?:  cleared to schedule surgeon consult - Call [phone] to schedule with Dr Alcaraz. bks   Status:  surgery evaluation in process -     Surgeon: Dr. Robbie Alcaraz -     Tentative surgery month/year: To be determined -        Insurance: Insurance:  [employer] -      Contact insurance to discuss coverage:   -       Cigna: PCP Recommendation and Medical Clearance:    -     HP Referral:  Completed - Done. bks    Advanced beneficiary notification (ABN) for Medicare patients for RD visits   and surgery:   -      Weight history:   -     Other:  Do the 5 coaching phone calls. Call Ray at [phone] to get registered. -        Patient Info: Initial Weight:  296 -     Date of Initial Weight/Height:  7/16/2020 -     Goal Weight (lbs):  286 -     Required Weight Loss:  10 -     Surgery Type:  sleeve gastrectomy -     Multidisciplinary Meeting:    -        Dietician Visits: Structured weight loss required by insurance?:  structured weight loss required -     Dietician Visit 1:  Needed - Data deleted   Dietician Visit 2:  Needed - Data deleted   Dietician Visit 3:  Needed - Data deleted   Dietician Visit 4:    - Data deleted   Dietician Visit 5:    - Data deleted   Dietician Visit 6:    - Data deleted   Dietician Visit additional:  Needed - Monthly until surgery - AS   Clearance from dietician to see surgeon?:    -     Dietician Notes:    -        Psychological Evaluation: Psych eval:  Needed - List and Letter sent to pt 5/10 - AS   Therapist letter of support:    -     Psychiatrist letter of support:    -     Establish care with therapist:    -     Complete eating disorder evaluation:    -     Letter of  clearance from therapist/eating disorder program:    -     Other:    -        Lab Work: Complete Blood Count:  Needed - Ordered 5/3/24 - AS   Comprehensive Metabolic Panel:  Needed - Ordered 5/3/24 - AS   Vitamin D:  Needed - Ordered 5/3/24 - AS   PTH:  Needed - Ordered 5/3/24 - AS   Hgb A1c:  Needed - Ordered 5/3/24 - AS    Lipids: Needed - Ordered 5/3/24 - AS    TSH (UCARE, SCA, [place] MA): Needed - Ordered 5/3/24 - AS     Ferritin:   -       Folate:   -       Testosterone, Total and Free:   -     Thiamine:   -     Vitamin A: Needed - Ordered 5/3/24 - AS   Vitamin B12: Needed - Ordered 5/3/24 - AS   Zinc:   -     C-peptide:   -     H. pylori:    -     MRSA (2 swabs, minimum 48 hours apart):   -     Nicotine Testing:    - Data deleted   Recheck Vitamin D:   -     Other:    - Data deleted      Consults/ Clearance Sleep Medicine:  Needed - Letter sent to pt 5/10 - AS   Cardiac:  Needed - Letter sent to pt 5/10 - AS   Pain:   -     Dental:    -     Endocrine:    -     Gastroenterology:    -     Vascular Medicine:    -     Hematology:    -     Medical Weight Management:   -     Physical Therapy/Exercise:    -     Nephrology:    -     Neurology:    -     Pulmonology:    -     Rheumatology:    -     Other:    -     Other:    -     Other:    -        Testing: UGI:    -     EGD:  Completed -     Sleep Study:   -     Other:   -     Other:    -        PCP: Establish care with PCP:  Completed -     Follow up with PCP:    -     PCP letter of support:  Needed - Letter sent to pt 5/10 - AS      Health Maintenance: Colonoscopy(> 50 yrs or family hx):    -     Mammogram (> 40 yrs or family hx):    -     Pap Smear (women):   -     Other:   -     Other:    -        Stopping Smoking/ Alcohol Use/Cannabis Use: Quit tobacco use (3 months smoke free)?:    -     Quit date:    -     Quit alcohol use:   -     Quit date:   -     Other:   -     Quit date:   -        Patient Education:  Information Session:  Completed -     Attended New Consult  "Class?: Needed -     Given \"Making your decision\" handout?:  Yes - 5/10/24 - AS   Given \"A Roadmap to you Weight Loss Surgery\" handout?: Yes - 5/10/24 - AS   Given \"Epic Care Companion\" information?: Yes - 5/10/24 - AS   Attended support group?:  Needed -     Support plan in place?:  Completed -     Research consents signed?:    -     Avoid NSAIDS/ Alternate Plan for Pain:   -        Additional Surgery Requirements: Review Coag plan:    -     HgA1c <8:  Needed -     Inpatient pain consult:    -     Final nicotine screen:    -     Dental work complete:    -     Birth control plan:    -     Gallstone prevention plan (Actigall for 6 months postop):   -     Other:   -     Other:   -        Final Tasks:  Before surgery online preop class:  Needed -     After surgery online class:  Needed -     Nurse visit for information:  Needed -     Weight Check: Needed -     History and Physical: Pre-Assessment Clinic (PAC) -   Needed -     Final labs per clinic: Needed -     See MTM Pharmacist for medication review and plan for after surgery (if DM, transplant hx, greater than 10 meds): Needed -     Chest xray per clinic:   -     Electrocardiogram (ECG) per clinic:   -     Schedule postop appointments:   -     Other:   -   -        Notes: Please register for the Get Well Loop when you get an email invitation and a surgery date.   ,  The Get Well Loop will give you information via email or text messages that can help you be more successful before and after surgery.  It can also help ans,  wer any questions you may have.   -            "

## 2024-05-14 ENCOUNTER — VIRTUAL VISIT (OUTPATIENT)
Dept: ENDOCRINOLOGY | Facility: CLINIC | Age: 54
End: 2024-05-14
Payer: COMMERCIAL

## 2024-05-14 VITALS — BODY MASS INDEX: 47.65 KG/M2 | WEIGHT: 286 LBS | HEIGHT: 65 IN

## 2024-05-14 DIAGNOSIS — E66.01 CLASS 3 SEVERE OBESITY DUE TO EXCESS CALORIES WITH BODY MASS INDEX (BMI) OF 50.0 TO 59.9 IN ADULT, UNSPECIFIED WHETHER SERIOUS COMORBIDITY PRESENT (H): Primary | ICD-10-CM

## 2024-05-14 DIAGNOSIS — E66.813 CLASS 3 SEVERE OBESITY DUE TO EXCESS CALORIES WITH BODY MASS INDEX (BMI) OF 50.0 TO 59.9 IN ADULT, UNSPECIFIED WHETHER SERIOUS COMORBIDITY PRESENT (H): Primary | ICD-10-CM

## 2024-05-14 PROCEDURE — 99207 PR NO CHARGE NURSE ONLY: CPT | Mod: 95

## 2024-05-14 NOTE — PROGRESS NOTES
New Bariatric Patient Class    Yancyaddie Hoover attended the New Consult WLS class today.     Patient's current comorbidities include:  Patient Active Problem List   Diagnosis    LANDON (obstructive sleep apnea)    Class 3 severe obesity due to excess calories with body mass index (BMI) of 50.0 to 59.9 in adult, unspecified whether serious comorbidity present (H)    Type 2 diabetes mellitus without complication, unspecified whether long term insulin use (H)       Current Outpatient Medications   Medication Sig Dispense Refill    alcohol swab prep pads Use to swab area of injection/shahla as directed. 100 each 5    aspirin 81 MG EC tablet Take 81 mg by mouth      blood glucose (ACCU-CHEK SMARTVIEW) test strip Use to test blood sugar 2 times daily. 100 strip 5    blood glucose calibration (ACCU-CHEK SMARTVIEW CONTROL) solution Use to calibrate blood glucose monitor as directed. 1 Bottle 3    blood glucose monitoring (ACCU-CHEK FASTCLIX) lancets Use to test blood sugar 1-2 times daily or as directed. 102 each 5    blood glucose monitoring (ACCU-CHEK FAWAD SMARTVIEW) meter device kit Use to test blood sugar 1-2 times daily. 1 kit 0    childrens multivitamin w/iron (FLINTSTONES COMPLETE) 18 MG chewable tablet Take 1 chew tab by mouth daily      insulin pen needle (32G X 6 MM) 32G X 6 MM miscellaneous Use 1 pen needles daily or as directed with saxenda. 100 each 1    liraglutide - Weight Management (SAXENDA) 18 MG/3ML pen Inject 3 mg Subcutaneous daily 45 mL 3    metFORMIN (GLUCOPHAGE XR) 500 MG 24 hr tablet Take 4 tablets (2,000 mg) by mouth daily with food 360 tablet 3    omeprazole (PRILOSEC) 20 MG DR capsule Take 1 capsule (20 mg) by mouth daily 90 capsule 2    polyethylene glycol (MIRALAX) 17 GM/Dose powder Take 1 Capful by mouth daily as needed for constipation      rosuvastatin (CRESTOR) 20 MG tablet Take 1 tablet (20 mg) by mouth daily      topiramate (TOPAMAX) 100 MG tablet Take 1 tablet by mouth at bedtime       verapamil ER (VERELAN) 240 MG 24 hr capsule Take 240 mg by mouth      vitamin D3 (CHOLECALCIFEROL) 50 mcg (2000 units) tablet Take 1 tablet by mouth daily         The following items were reviewed and questions answered.  Goal weight  Labs  Psych clearance  Specialty Clearances  Task list  Preop diet and exercise changes  Postop diet requirements  Postop medication requirements  Postop exercise  Postop lifetime behavior and diet changes    Patient will continue to meet with MARIA LUISA, Dietician and Surgeon as required preoperatively.    All questions answered.  Patient instructed to contact the office for any questions and to notify office of any updates/clearances completed.

## 2024-05-14 NOTE — PATIENT INSTRUCTIONS
Yancy Hoover,    Thank you for taking time to attend the New Bariatric class.  Below are items to be mindful of as you work through the process as you wait for your surgery date.    In-Clinic Weight:  If you have not had an official in-clinic weight, please call [phone] to schedule a nurse visit for an in-clinic weight.  If you live a distance from the Clinic and Surgery Center, you can have the weight done with your PCP.  On the day of your weight, weigh at home in the same clothes you will weigh in at the clinic.  That way we can get a better correlation of the scales.    Labs: if you have not had your initial labs done yet, please schedule an appointment with a [facility] lab.  If you need to have your labs faxed to an outside lab, contact our office.    Clearances: work on getting scheduled for your Psych clearance and contact your other providers for other clearances/letters of support that are needed.  As you get your clearances, you are welcome to send a [employer] message and we will keep an eye out for the reports/letters.  Our fax is: [phone].  Electronic copies can always be uploaded into [employer] and sent as an attachment to a [employer] message.    Dietician visits: attend your dietician visits monthly.  Missed appointments can delay your surgery date.  All patients are required to attend monthly dietician visits until their surgery.    Nutrition/Intake Modification:  Decrease portion sizes.  Read labels and portion out food according to the portion on the container.    Take time to read the nutrition information for recipes.  You will be surprised where hidden carbohydrates can be.  Make sure you are getting in at least 60 grams of protein daily - roughly 20 grams per meal.  Decrease/eliminate carbs in the form of chips, crackers, pasta, rice, pancakes, waffles, bagels, and white potatoes.  Replace with healthy vegetables.  Increase water intake to at least 64 ounces each day.  Take at  least 30 minutes to eat your meal.  Be mindful of your meal when eating.  Chew your food well.  Savor the flavors and textures and be mindful of your mood.  Switch to caffeine and carbonation free beverages.    Mental Health:  Take a look at your past and current relationship with food.  Work with a therapist/psychiatrist/counselor/psychologist to discuss your mental health as it relates to food and food intake.  Attend a support group.    Activity:  Get active!  It is important to be mobile after surgery.  It helps with weight loss, healthy muscles & bones, and decreases chances of blood clots.  Start slow and have small goals.  If you currently have mobility issues, start with chair-based exercises and work your way up to standing exercises.    8. Support Group Information  Please click the following link for Support Group Information and Times: [email or web address]    9.  Your Task List:    Bariatric Task List    Fax:  Please fax all paperwork to: [phone] -     Status:  Is patient a candidate for bariatric surgery?:  patient is a candidate for bariatric surgery -     Cleared to schedule surgeon consult?:  cleared to schedule surgeon consult - Call [phone] to schedule with Dr Alcaraz. jamess   Status:  surgery evaluation in process -     Surgeon: Dr. Robbie Alcaraz -     Tentative surgery month/year: To be determined -        Insurance: Insurance:  [employer] -      Contact insurance to discuss coverage:   -       Cigna: PCP Recommendation and Medical Clearance:    -     HP Referral:  Completed - Done. bks    Advanced beneficiary notification (ABN) for Medicare patients for RD visits   and surgery:   -      Weight history:   -     Other:  Do the 5 coaching phone calls. Call Ray at [phone] to get registered. -        Patient Info: Initial Weight:  296 -     Date of Initial Weight/Height:  7/16/2020 -     Goal Weight (lbs):  286 -     Required  "Weight Loss:  10 -     Surgery Type:  sleeve gastrectomy -     Multidisciplinary Meeting:    -        Dietician Visits: Structured weight loss required by insurance?:  structured weight loss required -     Dietician Visit 1:  Needed -    Dietician Visit 2:  Needed -    Dietician Visit 3:  Needed -    Dietician Visit additional:  Needed - Monthly until surgery - AS   Clearance from dietician to see surgeon?:    -     Dietician Notes:    -        Psychological Evaluation: Psych eval:  Needed - List and Letter sent to pt 5/10 - AS      Lab Work: Complete Blood Count:  Needed - Ordered 5/3/24 - AS   Comprehensive Metabolic Panel:  Needed - Ordered 5/3/24 - AS   Vitamin D:  Needed - Ordered 5/3/24 - AS   PTH:  Needed - Ordered 5/3/24 - AS   Hgb A1c:  Needed - Ordered 5/3/24 - AS    Lipids: Needed - Ordered 5/3/24 - AS    TSH (STEPHEN, SCA, [place] MA): Needed - Ordered 5/3/24 - AS   Vitamin A: Needed - Ordered 5/3/24 - AS   Vitamin B12: Needed - Ordered 5/3/24 - AS      Consults/ Clearance Sleep Medicine:  Needed - Letter sent to pt 5/10 - AS   Cardiac:  Needed - Letter sent to pt 5/10 - AS   Other:    -        Testing: UGI:    -     EGD:  Completed -        PCP: Establish care with PCP:  Completed -     Follow up with PCP:    -     PCP letter of support:  Needed - Letter sent to pt 5/10 - AS      Patient Education:  Information Session:  Completed -     Attended New Consult Class?: Completed - 5/14/24  AS   Given \"Making your decision\" handout?:  Yes - 5/10/24 - AS   Given \"A Roadmap to you Weight Loss Surgery\" handout?: Yes - 5/10/24 - AS   Given \"Epic Care Companion\" information?: Yes - 5/10/24 - AS   Attended support group?:  Needed -     Support plan in place?:  Completed -     Research consents signed?:    -     Avoid NSAIDS/ Alternate Plan for Pain:   -        Additional Surgery Requirements: Review Coag plan:    -     HgA1c <8:  Needed -     Inpatient pain consult:    -     Final nicotine screen:    -     Dental work " complete:    -     Birth control plan:    -     Gallstone prevention plan (Actigall for 6 months postop):   -     Other:   -     Other:   -        Final Tasks:  Before surgery online preop class:  Needed -     After surgery online class:  Needed -     Nurse visit for information:  Needed -     Weight Check: Needed -     History and Physical: Pre-Assessment Clinic (PAC) -   Needed -     Final labs per clinic: Needed -     See MTM Pharmacist for medication review and plan for after surgery (if DM, transplant hx, greater than 10 meds): Needed -     Chest xray per clinic:   -     Electrocardiogram (ECG) per clinic:   -     Schedule postop appointments: Needed -     Other:   -   -        Notes: Please register for the Get Well Loop when you get an email invitation and a surgery date.   ,  The Get Well Loop will give you information via email or text messages that can help you be more successful before and after surgery.  It can also help ans,  wer any questions you may have.   -             Please feel free to reach out if you have any questions.    Sincerely,    Samreen Lau RN, BSN  RN Care Coordinator  General and Bariatric Surgery   Comprehensive Weight Management    Phone: [phone]  Fax: [phone]  [phone]

## 2024-05-16 ENCOUNTER — TELEPHONE (OUTPATIENT)
Dept: ENDOCRINOLOGY | Facility: CLINIC | Age: 54
End: 2024-05-16
Payer: COMMERCIAL

## 2024-05-16 NOTE — TELEPHONE ENCOUNTER
General Call    Contacts         Type Contact Phone/Fax    05/16/2024 10:28 AM CDT Phone (Incoming) Yancy Hoover (Self) [phone] (H)          Reason for Call:  call back    What are your questions or concerns:  pt would like a call back regarding her 5 health coaching appts with [employer]. She has tried to reach out to them for 14 days and they have not returned her calls. She is now contacting clinic to see what is recommended she do    Could we send this information to you in [employer] or would you prefer to receive a phone call?:   Patient would prefer a phone call   Okay to leave a detailed message?: Yes at Cell number on file:    Telephone Information:   Mobile [phone]

## 2024-05-16 NOTE — TELEPHONE ENCOUNTER
Spoke with patient today about the HP phone calls that need to be completed prior to bariatric surgery. HP referral submitted electronically today. Best time to receive calls is between 9:30 a.m. and 5 p.m.   Discussed how the program works (5 calls).

## 2024-06-01 ENCOUNTER — HEALTH MAINTENANCE LETTER (OUTPATIENT)
Age: 54
End: 2024-06-01

## 2024-06-03 NOTE — PROGRESS NOTES
"Video-Visit Details    Type of service:  Video Visit    Video Start Time: 8:30 AM  Video End Time: 9:02 AM    Originating Location (pt. Location): Home    Distant Location (provider location):  Offsite (providers home) [facility] [place]     Platform used for Video Visit: Well      Bariatric Nutrition Consultation Note     Reason For Visit: Nutrition Assessment     Yancy Hoover is a 53 year old presenting today for return bariatric nutrition consult. Pt is interested in laparoscopic sleeve gastrectomy with Dr. Alcaraz expected surgery in TBD.  Patient is accompanied by self. This is patients 2nd out of 3 required nutrition visits prior to surgery. Scheduled for Weight Loss Surgery Nutrition Class on 7/5/24.     Coordination note: working on setting up psych eval. Has completed 2 out of 5 HP calls.      Pt referred by Jeannie Noe NP on July 16, 2020 and Dr. Velarde.  Patient with Co-morbidities of obesity including:  Type II DM yes   Renal Failure no  Sleep apnea no  Hypertension no   Dyslipidemia yes  Joint pain no  Back pain no  GERD no      Support System Reviewed With Patient 7/16/2020   Who do you have in your support network that can be available to help you for the first 2 weeks after surgery? Yes   Who can you count on for support throughout your weight loss surgery journey? Yes         ANTHROPOMETRICS:  Initial visit:  Estimated body mass index is 50.81 kg/m  as calculated from the following:    Height as of an earlier encounter on 7/16/20: 1.626 m (5' 4\").    Weight as of an earlier encounter on 7/16/20: 134.3 kg (296 lb).     Current weight:   Estimated body mass index is 48.86 kg/m  as calculated from the following:    Height as of this encounter: 1.638 m (5' 4.49\").    Weight as of this encounter: 131.1 kg (289 lb). (-7 lbs from initial)      Required weight loss goal pre-op: -10 lbs from initial consult weight (goal weight 286 lbs or less before surgery)   "   Wt Readings from Last 5 Encounters:   06/04/24 131.1 kg (289 lb)   06/04/24 131.1 kg (289 lb)   05/14/24 129.7 kg (286 lb)   05/07/24 131.1 kg (289 lb)   05/02/24 133.4 kg (294 lb)             7/16/2020   I have tried the following methods to lose weight Watching portions or calories, Exercise, Weight Watchers, Atkins type diet (low carb/high protein), Slimfast         Weight Loss Questions Reviewed With Patient 7/16/2020   How long have you been overweight? Since early childhood         SUPPLEMENT INFORMATION:  Vitamin D (h/o vitamin D deficiency), Flinstones complete with iron      MEDICATIONS FOR WEIGHT LOSS:  Topiramate, Saxenda, metformin      NUTRITION HISTORY:   No known food allergies/intolerances.  Doesn't like the taste of water. Needs to have ice cold. Eliminated red meat.   Putting food on a saucer lately to help reduce portion sizes.      Last visit with RD 3/21/22 - A lot of stress and feeling overwhelmed having to care of son's father (homecares and brining to therapies), kids are also home from school d/t schools strikes and multiple funerals over the past several months. Started some anti-anxiety medication, that is helping. Also notes, trying to take more time for herself. Plans to start to take walks on her lunch break. Continues to use portion control, however finds she is snacking a little more. She does use healthier snack choices. Struggles with taking Victoza daily, often forgetting, but is able to stay consistent with metformin.     4/23/24 - Pt reports she had a mini stroke about a year ago. Wt went up to 336 lbs. Focused on more of a Mediterranean diet post-stroke and helped to lose weight to 296 lbs. Has been stalled for past 6 months with further weight loss. Working on keeping to 3 meals daily, no snacks. And not eating past 7 pm. Challenged by sweet cravings at times.   Period where she couldn't use her right leg, has gained use of leg back. Working on walking 3-5 times weekly for 30  mins.   Practicing separation of beverages from meals.   Started Saxenda on last Tues. No side effects.     Today - Weight stable over last month. Planning to switch to semaglutide to see if help with more weight loss.   Less sweet food cravings, but continued pop cravings. Doing a lot less from previous. May have a pop weekly.  Using Sparkling Ice as sugar-free options.      Recent Diet Recall:   Breakfast: skip   11 am-12 pm left-over meat (2 chicken legs)   Snack: handful of honey cover almonds.   Dinner: 6 pm chicken; turkey tacos   Beverages: water (96 oz/day)      Progress Towards Previous Goals:  1. Practice eating slowly, taking long pauses between bites, portioning out less. - Improving, has been doing well with this  2. If sweet is needed, use a sugar-free popcicle. - Improved in terms of sweet food cravings.     Physical Activity:  Previously was walking 5 days weekly. Only walking 1-2 days weekly recently.      NUTRITION DIAGNOSIS:  Obesity r/t long history of self-monitoring deficit and excessive energy intake aeb BMI >30 kg/m2. - Improving/continues     INTERVENTION:  - Reviewed post-op diet guidelines, ways to help prepare for post-op diet guidelines pre-operatively, portion/calorie-control, mindful eating and sources of protein.   - Discussed zero-sugar beverage options.  - Scheduled bariatric nutrition class for July to review diet guidelines for surgery.   - Co-developed goals to work towards.   - Discussed meal planning resources  - Provided pt with list of goals RD contact information.         Patient Understanding: good  Expected Compliance: good     GOALS:  1. Balanced afternoon snack - 1/4 cup nuts and sliced veggies  2. Pair protein food with high-fiber foods (whole grains, veggies, fruit)  3. Keep working on sugar-free hydration only. Reduce carbonation.   4. Morning walk three days weekly.   5. Practice diet guidelines for surgery.     Recipes for whole  grains:  [email or web address]  [email or web address]  [email or web address]    The Plate Method  [email or web address]    Protein Sources for Weight Loss  [email or web address]     Carbohydrates  [email or web address]     Mindful Eating  [email or web address]     Summary of Volumetrics Eating Plan  [email or web address]     Diet Guidelines after Weight Loss Surgery  [email or web address]     Seated Exercises for Arms and Legs (can be done before or after surgery)  [email or web address]    Time spent with patient: 32 mins       Sharon Osborn RD, LD

## 2024-06-04 ENCOUNTER — VIRTUAL VISIT (OUTPATIENT)
Dept: ENDOCRINOLOGY | Facility: CLINIC | Age: 54
End: 2024-06-04
Payer: COMMERCIAL

## 2024-06-04 ENCOUNTER — VIRTUAL VISIT (OUTPATIENT)
Dept: CARDIOLOGY | Facility: CLINIC | Age: 54
End: 2024-06-04
Attending: PHYSICIAN ASSISTANT
Payer: COMMERCIAL

## 2024-06-04 VITALS — HEIGHT: 64 IN | WEIGHT: 289 LBS | BODY MASS INDEX: 49.34 KG/M2

## 2024-06-04 VITALS — BODY MASS INDEX: 48.84 KG/M2 | WEIGHT: 289 LBS

## 2024-06-04 DIAGNOSIS — Z71.3 NUTRITIONAL COUNSELING: ICD-10-CM

## 2024-06-04 DIAGNOSIS — E66.01 OBESITY, CLASS III, BMI 40-49.9 (MORBID OBESITY) (H): ICD-10-CM

## 2024-06-04 DIAGNOSIS — E66.813 CLASS 3 SEVERE OBESITY DUE TO EXCESS CALORIES WITH SERIOUS COMORBIDITY IN ADULT, UNSPECIFIED BMI (H): ICD-10-CM

## 2024-06-04 DIAGNOSIS — E11.9 TYPE 2 DIABETES MELLITUS WITHOUT COMPLICATION, UNSPECIFIED WHETHER LONG TERM INSULIN USE (H): Primary | ICD-10-CM

## 2024-06-04 DIAGNOSIS — E66.01 CLASS 3 SEVERE OBESITY DUE TO EXCESS CALORIES WITH SERIOUS COMORBIDITY IN ADULT, UNSPECIFIED BMI (H): ICD-10-CM

## 2024-06-04 DIAGNOSIS — E11.9 TYPE 2 DIABETES MELLITUS WITHOUT COMPLICATION, WITHOUT LONG-TERM CURRENT USE OF INSULIN (H): Primary | ICD-10-CM

## 2024-06-04 PROCEDURE — 99207 PR NO CHARGE LOS: CPT | Mod: 95 | Performed by: DIETITIAN, REGISTERED

## 2024-06-04 PROCEDURE — 97803 MED NUTRITION INDIV SUBSEQ: CPT | Mod: 95 | Performed by: DIETITIAN, REGISTERED

## 2024-06-04 RX ORDER — ASPIRIN 81 MG/1
81 TABLET, CHEWABLE ORAL DAILY
COMMUNITY
Start: 2024-05-08

## 2024-06-04 ASSESSMENT — PAIN SCALES - GENERAL
PAINLEVEL: NO PAIN (0)
PAINLEVEL: NO PAIN (0)

## 2024-06-04 NOTE — Clinical Note
SUKHI sher - patient has visit with Dr. Alcaraz on 6/27 for surg consult. Switched to Ozempic for MACE benefit and overall more effective blood glucose and weight management.  I will continue to see her until surgery and support however I can - she's doing very well! Micki

## 2024-06-04 NOTE — LETTER
6/4/2024      RE: Yancy Hoover  [address]  [facility] [address]       Dear Colleague,    Thank you for the opportunity to participate in the care of your patient, Yancy Hoover, at the [facility] [place] at [facility]. Please see a copy of my visit note below.    Medication Therapy Management (MTM) Encounter    ASSESSMENT:                            Medication Adherence/Access: will send prescriptions to [facility] for improved access per patient request.      Obesity /DIABETES   Class III Obesity (BMI 40 or more): Due for A1C and CMP - order entered today.  Given historical success with Ozempic, stalling of weight management with Saxenda, and expected further secondary MACE, A1C and weight management  benefits post-stroke -recommended transitioning to Ozempic today.  On statin and aspirin.       Discussed dietary and behavioral modifications.     PLAN:                            Stop Saxenda once you get Ozempic from the [facility].     2. On the day AFTER you stop Saxenda, start Ozempic 1mg once weekly. Remember, this is a once weekly shot. So you take it on the same day once a week. So if you start it on a Saturday for example, you will take it every Saturday.    To help with tolerability and efficacy of Ozempic :  Eat small meals/snacks throughout the day (about every 2 hours)  Focus on getting protein in first with each meal and snack.   Drink plenty of water - goal 64 oz throughout the day  You may try Metamucil, Benefiber, or Citrucel to help feel more full (less nausea) and have softer, more consistent bowel movements.  To optimize weight management - work on incorporating resistance training/weight lifting to build muscle and improve overall metabolism of adipose tissue.    3. Come to [facility] to collect A1c and metabolic panel to review electrolytes  and kidney function.  You may schedule this at any [facility] lab.    Follow-up: 6/27 with Dr. Alcaraz with [facility]. 7/3 with Micki Martins [facility] for virtual visit with the [facility]         SUBJECTIVE/OBJECTIVE:                          Yancy Hoover is a 53 year old female contacted via secure video for a follow-up visit.       Reason for visit: Medication Therapy Management - GLP1/GIP Management .    Allergies/ADRs: Reviewed in chart  Past Medical History: Reviewed in chart  Tobacco: She reports that she quit smoking about a year ago. Her smoking use included cigarettes. She has a 10 pack-year smoking history. She has never used smokeless tobacco.  Alcohol: not currently using      Medication Adherence/Access: some issues with receiving correct meds with [facility]. Prefers [facility].     Weight Management  /DM  Class III Obesity (BMI 40 or more):   Saxenda 3 mg once daily   Metformin 500 mg ER Tabs - 4 tabs every morning     Patient reports no current medication side effects. Frustrated that no longer seeing weight loss with Saxenda. Blood glucose is stable, so that is promising to patient.  saxenda helping to reduce cravings and food intake - but weight loss is stalled.    Patient met with Jeannie Noe CNP  5/2/24 for return weight management (surgical consult):    [facility] 7/2020 296lb   Ongoing MWM with Dr. Velarde wasn't seen in last 1.5 years until this month- titration up on topiramate, on saxenda but other agents not covered?   4/2023 glenn shelley RD     Blood glucose readings: 120-130mg/dl (fasting)    Nutrition/Eating Habits:  Working to improve diet and overall wellbeing for overall health.  Focusing on lean meats, vegetables and fruits.  Exercise/Activity: would like to increase walking, but still somewhat fatigued as sequela of stroke.     Medication History:  Topiramate: takes for migraine - not helpful for  "appetite/cravings   Ozempic: tried to get previously, denied by insurance so has been on/off liraglutide since then  Bydureon - was on this summer and liked this for convenience, unclear why it was switched to Saxenda from patient standpoint.  Jardiance - UTI             Wt Readings from Last 4 Encounters:   06/04/24 131.1 kg (289 lb)   06/04/24 131.1 kg (289 lb)   05/14/24 129.7 kg (286 lb)   05/07/24 131.1 kg (289 lb)     Estimated body mass index is 48.84 kg/m  as calculated from the following:    Height as of 5/14/24: 1.638 m (5' 4.5\").    Weight as of this encounter: 131.1 kg (289 lb).      Lab Results   Component Value Date    A1C 8.1 (H) 10/28/2022     (H) 08/22/2023     08/22/2023    POTASSIUM 3.8 08/22/2023    RUSSELL 9.3 08/22/2023    CHLORIDE 100 08/22/2023    CO2 21 (L) 08/22/2023    BUN 11.2 08/22/2023    CR 0.84 08/22/2023    GFRESTIMATED 83 08/22/2023    CHOL 191 10/28/2022     (H) 10/28/2022    HDL 37 (L) 10/28/2022    TRIG 89 10/28/2022    TSH 0.95 07/30/2020           Today's Vitals: Wt 131.1 kg (289 lb)   BMI 48.84 kg/m    ----------------      I spent 15 minutes with this patient today. All changes were made via collaborative practice agreement with Danial Velarde. A copy of the visit note was provided to the patient's provider(s).    A summary of these recommendations was sent via [employer].    Micki Martins, Pharm D., MPH    Medication Therapy Management Pharmacist   [facility]      Telemedicine Visit Details  Type of service:  Video Conference via 2houses  Start Time:  7:37 AM  End Time:  7:52 AM     Medication Therapy Recommendations  Type 2 diabetes mellitus without complication, without long-term current use of insulin (H)    Current Medication: Semaglutide, 1 MG/DOSE, (OZEMPIC) 4 MG/3ML pen   Rationale: More effective medication available - Ineffective medication - Effectiveness   Recommendation: Change Medication - Stop " Saxenda.  Start Ozempic.   Status: Accepted per CPA          Current Medication: Semaglutide, 1 MG/DOSE, (OZEMPIC) 4 MG/3ML pen   Rationale: Medication requires monitoring - Needs additional monitoring - Effectiveness   Recommendation: Order Lab   Status: Accepted per CPA                Please do not hesitate to contact me if you have any questions/concerns.     Sincerely,     Micki Martins, [facility]

## 2024-06-04 NOTE — NURSING NOTE
Is the patient currently in the state of [place]? YES    Visit mode:VIDEO    If the visit is dropped, the patient can be reconnected by: VIDEO VISIT: Text to cell phone:   Telephone Information:   Mobile [phone]       Will anyone else be joining the visit? NO  (If patient encounters technical issues they should call [phone]    How would you like to obtain your AVS? MyChart    Are changes needed to the allergy or medication list? N/A    Are refills needed on medications prescribed by this physician? NO    Reason for visit: RECHECK ([facility]    Beth MENDOZA      
minutes on the discharge service.

## 2024-06-04 NOTE — PATIENT INSTRUCTIONS
"Recommendations from MTM Pharmacist visit:                                                    MTM (medication therapy management) is a service provided by a clinical pharmacist designed to help you get the most of out of your medicines.  You may be sent a phone or email survey evaluating today's visit.  Please provide feedback you have for the service he received today if you are able.    Stop Saxenda once you get Ozempic from the [facility].     2. On the day AFTER you stop Saxenda, start Ozempic 1mg once weekly. Remember, this is a once weekly shot. So you take it on the same day once a week. So if you start it on a Saturday for example, you will take it every Saturday.    To help with tolerability and efficacy of Ozempic :  Eat small meals/snacks throughout the day (about every 2 hours)  Focus on getting protein in first with each meal and snack.   Drink plenty of water - goal 64 oz throughout the day  You may try Metamucil, Benefiber, or Citrucel to help feel more full (less nausea) and have softer, more consistent bowel movements.  To optimize weight management - work on incorporating resistance training/weight lifting to build muscle and improve overall metabolism of adipose tissue.    3. Come to [facility] lab to collect A1c and metabolic panel to review electrolytes and kidney function.  You may schedule this at any [facility] lab.    Follow-up: 6/27 with Dr. Alcaraz with [facility]. 7/3 with Micki Martins [facility] for virtual visit with the [facility]       It was great speaking with you today.  I value your experience and would be very thankful for your time in providing feedback in our clinic survey. In the next few days, you may receive an email or text message from [employer] with a link to a survey related to your  clinical pharmacist.\"     To schedule another MTM appointment, please call the clinic directly ([facility] Phone Number: [phone] (schedules for [facility] and [facility] - providers, dietitians, health coaches) or you may call the [facility] scheduling line at [phone] or toll-free at [phone].     My Clinical Pharmacist's contact information:                                                      Please feel free to contact me with any questions or concerns you have.      Micki Martins, Pharm D., MPH    Medication Therapy Management Pharmacist   [facility]

## 2024-06-04 NOTE — NURSING NOTE
Is the patient currently in the state of [place]? YES    Visit mode:VIDEO    If the visit is dropped, the patient can be reconnected by: VIDEO VISIT: Text to cell phone:   Telephone Information:   Mobile [phone]       Will anyone else be joining the visit? NO  (If patient encounters technical issues they should call [phone]    How would you like to obtain your AVS? MyChart    Are changes needed to the allergy or medication list? No, Pt stated no changes to allergies, and Pt stated no med changes    Are refills needed on medications prescribed by this physician? NO    Reason for visit: JUAN MENDOZA

## 2024-06-04 NOTE — LETTER
"6/4/2024       RE: Yancy Hoover  [address]  [place] [place] [address]     Dear Colleague,    Thank you for referring your patient, Yancy Hoover, to the [facility] [place] at [facility]. Please see a copy of my visit note below.    Video-Visit Details    Type of service:  Video Visit    Video Start Time: 8:30 AM  Video End Time: 9:02 AM    Originating Location (pt. Location): Home    Distant Location (provider location):  Offsite (providers home) [facility] [place]     Platform used for Video Visit: AmWell      Bariatric Nutrition Consultation Note     Reason For Visit: Nutrition Assessment     Yancy Hoover is a 53 year old presenting today for return bariatric nutrition consult. Pt is interested in laparoscopic sleeve gastrectomy with Dr. Alcaraz expected surgery in D.  Patient is accompanied by self. This is patients 2nd out of 3 required nutrition visits prior to surgery. Scheduled for Weight Loss Surgery Nutrition Class on 7/5/24.     Coordination note: working on setting up psych eval. Has completed 2 out of 5 HP calls.      Pt referred by Jeannie Noe NP on July 16, 2020 and Dr. Velarde.  Patient with Co-morbidities of obesity including:  Type II DM yes   Renal Failure no  Sleep apnea no  Hypertension no   Dyslipidemia yes  Joint pain no  Back pain no  GERD no      Support System Reviewed With Patient 7/16/2020   Who do you have in your support network that can be available to help you for the first 2 weeks after surgery? Yes   Who can you count on for support throughout your weight loss surgery journey? Yes         ANTHROPOMETRICS:  Initial visit:  Estimated body mass index is 50.81 kg/m  as calculated from the following:    Height as of an earlier encounter on 7/16/20: 1.626 m (5' 4\").    Weight as of an earlier encounter on 7/16/20: 134.3 kg (296 lb).   " "  Current weight:   Estimated body mass index is 48.86 kg/m  as calculated from the following:    Height as of this encounter: 1.638 m (5' 4.49\").    Weight as of this encounter: 131.1 kg (289 lb). (-7 lbs from initial)      Required weight loss goal pre-op: -10 lbs from initial consult weight (goal weight 286 lbs or less before surgery)     Wt Readings from Last 5 Encounters:   06/04/24 131.1 kg (289 lb)   06/04/24 131.1 kg (289 lb)   05/14/24 129.7 kg (286 lb)   05/07/24 131.1 kg (289 lb)   05/02/24 133.4 kg (294 lb)             7/16/2020   I have tried the following methods to lose weight Watching portions or calories, Exercise, Weight Watchers, Atkins type diet (low carb/high protein), Slimfast         Weight Loss Questions Reviewed With Patient 7/16/2020   How long have you been overweight? Since early childhood         SUPPLEMENT INFORMATION:  Vitamin D (h/o vitamin D deficiency), Flinstones complete with iron      MEDICATIONS FOR WEIGHT LOSS:  Topiramate, Saxenda, metformin      NUTRITION HISTORY:   No known food allergies/intolerances.  Doesn't like the taste of water. Needs to have ice cold. Eliminated red meat.   Putting food on a saucer lately to help reduce portion sizes.      Last visit with RD 3/21/22 - A lot of stress and feeling overwhelmed having to care of son's father (homecares and brining to therapies), kids are also home from school d/t schools strikes and multiple funerals over the past several months. Started some anti-anxiety medication, that is helping. Also notes, trying to take more time for herself. Plans to start to take walks on her lunch break. Continues to use portion control, however finds she is snacking a little more. She does use healthier snack choices. Struggles with taking Victoza daily, often forgetting, but is able to stay consistent with metformin.     4/23/24 - Pt reports she had a mini stroke about a year ago. Wt went up to 336 lbs. Focused on more of a Mediterranean diet " post-stroke and helped to lose weight to 296 lbs. Has been stalled for past 6 months with further weight loss. Working on keeping to 3 meals daily, no snacks. And not eating past 7 pm. Challenged by sweet cravings at times.   Period where she couldn't use her right leg, has gained use of leg back. Working on walking 3-5 times weekly for 30 mins.   Practicing separation of beverages from meals.   Started Saxenda on last Tues. No side effects.     Today - Weight stable over last month. Planning to switch to semaglutide to see if help with more weight loss.   Less sweet food cravings, but continued pop cravings. Doing a lot less from previous. May have a pop weekly.  Using Sparkling Ice as sugar-free options.      Recent Diet Recall:   Breakfast: skip   11 am-12 pm left-over meat (2 chicken legs)   Snack: handful of honey cover almonds.   Dinner: 6 pm chicken; turkey tacos   Beverages: water (96 oz/day)      Progress Towards Previous Goals:  1. Practice eating slowly, taking long pauses between bites, portioning out less. - Improving, has been doing well with this  2. If sweet is needed, use a sugar-free popcicle. - Improved in terms of sweet food cravings.     Physical Activity:  Previously was walking 5 days weekly. Only walking 1-2 days weekly recently.      NUTRITION DIAGNOSIS:  Obesity r/t long history of self-monitoring deficit and excessive energy intake aeb BMI >30 kg/m2. - Improving/continues     INTERVENTION:  - Reviewed post-op diet guidelines, ways to help prepare for post-op diet guidelines pre-operatively, portion/calorie-control, mindful eating and sources of protein.   - Discussed zero-sugar beverage options.  - Scheduled bariatric nutrition class for July to review diet guidelines for surgery.   - Co-developed goals to work towards.   - Discussed meal planning resources  - Provided pt with list of goals RD contact information.         Patient Understanding: good  Expected Compliance: good      GOALS:  1. Balanced afternoon snack - 1/4 cup nuts and sliced veggies  2. Pair protein food with high-fiber foods (whole grains, veggies, fruit)  3. Keep working on sugar-free hydration only. Reduce carbonation.   4. Morning walk three days weekly.   5. Practice diet guidelines for surgery.     Recipes for whole grains:  https://www.Quu/arugula-salad-with-penne-garbanzo-beans/  https://www.Quu/lemon-asparagus-couscous-salad-with/  https://xkoto.Zhaogang/recipes    The Plate Method  [email or web address]    Protein Sources for Weight Loss  [email or web address]     Carbohydrates  [email or web address]     Mindful Eating  [email or web address]     Summary of Volumetrics Eating Plan  [email or web address]     Diet Guidelines after Weight Loss Surgery  [email or web address]     Seated Exercises for Arms and Legs (can be done before or after surgery)  [email or web address]    Time spent with patient: 32 mins       Sharon Osborn RD, LD

## 2024-06-04 NOTE — PATIENT INSTRUCTIONS
Juan J Haines,     Follow-up with RD on July 5th for Weight Loss Surgery Nutrition Class.     Thank you,    Sharon Osborn, ALISON, LD  If you would like to schedule or reschedule an appointment with the RD, please call [phone]    Nutrition Goals  1. Balanced afternoon snack - 1/4 cup nuts and sliced veggies  2. Pair protein food with high-fiber foods (whole grains, veggies, fruit)  3. Keep working on sugar-free hydration only. Reduce carbonation.   4. Morning walk three days weekly.   5. Practice diet guidelines for surgery.     Recipes for whole grains:  https://www.Palisade Systems/arugula-salad-with-penne-garbanzo-beans/  https://www.Palisade Systems/lemon-asparagus-couscous-salad-with/  https://Mesitis.Kiddie Kist/recipes    The Plate Method  [email or web address]    Protein Sources for Weight Loss  [email or web address]     Carbohydrates  [email or web address]     Mindful Eating  [email or web address]://Radio [email or web address]     Summary of Volumetrics Eating Plan  [email or web address]     Diet Guidelines after Weight Loss Surgery  [email or web address]     Seated Exercises for Arms and Legs (can be done before or after surgery)  [email or web address]://[email or web address]      COMPREHENSIVE WEIGHT MANAGEMENT PROGRAM  VIRTUAL SUPPORT GROUPS    At [facility], our Comprehensive Weight Management program offers on-line support groups for patients who are working on weight loss and considering, preparing for, or have had weight loss surgery.     There is no cost for this opportunity.  You are invited to attend the?Virtual Support Groups?provided by any of the following locations:    [facility] via [place] Teams with Larisa Werner RN  2.   [place] via [place] Teams with Dariusz Yang, PhD, LP  3.   [place] via [place] with Darya Cuello RN  4.   [facility] via a Zoom Meeting with JOSE Sams-    The following Support Group information can also be found on our  "website:  [email or web address]      [facility] Weight Loss Surgery Support Group  The support group is a patient-lead forum that meets monthly to share experiences, encouragement and education. It is open to those who have had weight loss surgery, are scheduled for surgery, or are considering surgery.   WHEN: This group meets on the 3rd Wednesday of each month from 5:00PM - 6:00PM virtually using Microsoft Teams.   FACILITATOR: Led by Larisa Cuba, ALISON, LD, RN, the program's Clinical Coordinator.   TO REGISTER: Please contact the clinic via Parenthood[facility] or call the nurse line directly at [phone] to inform our staff that you would like an invite sent to you and to let us know the email you would like the invite sent to. Prior to the meeting, a link with directions on how to join the meeting will be sent to you.    2023 and 2024 Meetings   December 20  January 17  February 21  March 20  April 17  May 15  Rhonda 19      [facility] Bariatric Care Support Group?  This is open to all pre- and post- operative bariatric surgery patients as well as their support system.   WHEN: This group meets the 3rd Tuesday of each month from 6:30 PM - 8:00 PM virtually using Microsoft Teams.   FACILITATOR: Led by Dariusz Yang, Ph.D who is a Licensed Psychologist with the [facility] Comprehensive Weight Management Program.   TO REGISTER: Please send an email to Dariusz Yang, Ph.D., LP at?[email or web address]?if you would like an invitation to the group. Prior to the meeting, a link with directions on how to join the meeting will be sent to you.    2023 and 2024 Meetings  December 19 January 16: \"Medication Management and Bariatric Surgery\", Virginia Buchanan, PharmD, Pharmacy Resident at [facility]  February 20: \"A Bariatric Surgery Patient's Perspective\", Sofi" "NORMAN Flores, LICSW, Behavioral Health Clinician at [facility]  March 19  April 16  May 21  Rhonda 18: \"Nutritional Labeling\", Dietitian speaker to be announced.  November 19: \"Holiday Eating\", Dietitian speaker to be announced.    [facility] Post-Operative Bariatric Surgery Support Group  This is a support group for [facility] bariatric patients (and those external to [facility]) who have had bariatric surgery and are at least 3 months post-surgery.  WHEN: This support group meets the 4th Wednesday of the month from 11:00 AM - 12:00 PM virtually using Microsoft Teams.   FACILITATOR: Led by Certified Bariatric Nurse, Darya Cuello RN.   TO REGISTER: Please send an email to Darya at [email or web address] if you would like an invitation to the group.  Prior to the meeting, a link with directions on how to join the meeting will be sent to you.    2023 and 2024 Meetings  December 27  January 24  February 28  March 27  April 24  May 22  Rhonda 26    [facility] Healthy Lifestyle Group?  This is a 60 minute virtual coaching group for those who want to lead a healthier lifestyle. Come together to set goals and overcome barriers in a supportive group environment. We will address the four pillars of health: nutrition, exercise, sleep and emotional well-being.  This group is highly recommended for those who are participating in the 24 week Healthy Lifestyle Plan and our Health Coaching sessions.  WHEN: This group meets the 1st Friday of the month, 12:30 PM - 1:30 PM online, via a zoom meeting.    FACILITATOR: Led by National Board Certified Health and , Darya Mccloud [facility].   TO REGISTER: Please call the Call Center at [phone] to register.  You will get an appointment to attend in [employer]. Fifteen minutes prior to the meeting, complete the e-check in and you will get the " "link to join the meeting.    There is no charge to attend this group and space is limited.     2023 and 2024 Meetings  December 1: \"Let's Talk\" (guided discussion on our wins and challenges)  January 5: \"New Years Vision: Manifest your Best 2024!\" (guided imagery,  journaling and discussion)  February 2: \"Let's Talk\"  March 1: \"10 Percent Happier\" by Jose Manuel Segovia (Book Bites - a guided discussion on the nuggets of wisdom from favorite wellness books, no need to read the book but highly encouraged)  April 5: \"Let's Talk\"  May 3: \"Essentialism: The Disciplined Pursuit of Less\" by Diego Amaya (Book Bites discussion)  June 7: \"Let's Talk\"  July 5: NO MEETING, off for the 4th of July Holiday  August 2: \"The Blue Zones, Secrets for Living a Longer Life\" by Jose Manuel Beverly (Book Bites discussion)                    "

## 2024-06-04 NOTE — PROGRESS NOTES
Medication Therapy Management (MTM) Encounter    ASSESSMENT:                            Medication Adherence/Access: will send prescriptions to [facility] for improved access per patient request.      Obesity /DIABETES   Class III Obesity (BMI 40 or more): Due for A1C and CMP - order entered today.  Given historical success with Ozempic, stalling of weight management with Saxenda, and expected further secondary MACE, A1C and weight management  benefits post-stroke -recommended transitioning to Ozempic today.  On statin and aspirin.       Discussed dietary and behavioral modifications.     PLAN:                            Stop Saxenda once you get Ozempic from the [facility].     2. On the day AFTER you stop Saxenda, start Ozempic 1mg once weekly. Remember, this is a once weekly shot. So you take it on the same day once a week. So if you start it on a Saturday for example, you will take it every Saturday.    To help with tolerability and efficacy of Ozempic :  Eat small meals/snacks throughout the day (about every 2 hours)  Focus on getting protein in first with each meal and snack.   Drink plenty of water - goal 64 oz throughout the day  You may try Metamucil, Benefiber, or Citrucel to help feel more full (less nausea) and have softer, more consistent bowel movements.  To optimize weight management - work on incorporating resistance training/weight lifting to build muscle and improve overall metabolism of adipose tissue.    3. Come to [facility] to collect A1c and metabolic panel to review electrolytes and kidney function.  You may schedule this at any [facility] lab.    Follow-up: 6/27 with Dr. Alcaraz with [facility]. 7/3 with Micki Martins [facility] for virtual visit with the [facility]         SUBJECTIVE/OBJECTIVE:                          Yancy Hoover is a 53 year old female contacted via secure  video for a follow-up visit.       Reason for visit: Medication Therapy Management - GLP1/GIP Management .    Allergies/ADRs: Reviewed in chart  Past Medical History: Reviewed in chart  Tobacco: She reports that she quit smoking about a year ago. Her smoking use included cigarettes. She has a 10 pack-year smoking history. She has never used smokeless tobacco.  Alcohol: not currently using      Medication Adherence/Access: some issues with receiving correct meds with [facility]. Prefers [facility].     Weight Management   /DM  Class III Obesity (BMI 40 or more):   Saxenda 3 mg once daily   Metformin 500 mg ER Tabs - 4 tabs every morning     Patient reports no current medication side effects. Frustrated that no longer seeing weight loss with Saxenda. Blood glucose is stable, so that is promising to patient.  saxenda helping to reduce cravings and food intake - but weight loss is stalled.    Patient met with Jeannie Noe CNP  5/2/24 for return weight management (surgical consult):    [facility] 7/2020 296lb   Ongoing MWM with Dr. Velarde wasn't seen in last 1.5 years until this month- titration up on topiramate, on saxenda but other agents not covered?   4/2023 glenn shelley RD     Blood glucose readings: 120-130mg/dl (fasting)    Nutrition/Eating Habits:  Working to improve diet and overall wellbeing for overall health.  Focusing on lean meats, vegetables and fruits.  Exercise/Activity: would like to increase walking, but still somewhat fatigued as sequela of stroke.     Medication History:  Topiramate: takes for migraine - not helpful for appetite/cravings   Ozempic: tried to get previously, denied by insurance so has been on/off liraglutide since then  Bydureon - was on this summer and liked this for convenience, unclear why it was switched to Saxenda from patient standpoint.  Jardiance - UTI             Wt Readings from Last 4 Encounters:   06/04/24 131.1 kg (289 lb)   06/04/24 131.1 kg (289 lb)  "  05/14/24 129.7 kg (286 lb)   05/07/24 131.1 kg (289 lb)     Estimated body mass index is 48.84 kg/m  as calculated from the following:    Height as of 5/14/24: 1.638 m (5' 4.5\").    Weight as of this encounter: 131.1 kg (289 lb).      Lab Results   Component Value Date    A1C 8.1 (H) 10/28/2022     (H) 08/22/2023     08/22/2023    POTASSIUM 3.8 08/22/2023    RUSSELL 9.3 08/22/2023    CHLORIDE 100 08/22/2023    CO2 21 (L) 08/22/2023    BUN 11.2 08/22/2023    CR 0.84 08/22/2023    GFRESTIMATED 83 08/22/2023    CHOL 191 10/28/2022     (H) 10/28/2022    HDL 37 (L) 10/28/2022    TRIG 89 10/28/2022    TSH 0.95 07/30/2020           Today's Vitals: Wt 131.1 kg (289 lb)   BMI 48.84 kg/m    ----------------      I spent 15 minutes with this patient today. All changes were made via collaborative practice agreement with Danial Velarde. A copy of the visit note was provided to the patient's provider(s).    A summary of these recommendations was sent via [facility].    Micki Martins, Pharm D., MPH    Medication Therapy Management Pharmacist   [facility]      Telemedicine Visit Details  Type of service:  Video Conference via [employer]  Start Time:  7:37 AM  End Time:  7:52 AM     Medication Therapy Recommendations  Type 2 diabetes mellitus without complication, without long-term current use of insulin (H)    Current Medication: Semaglutide, 1 MG/DOSE, (OZEMPIC) 4 MG/3ML pen   Rationale: More effective medication available - Ineffective medication - Effectiveness   Recommendation: Change Medication - Stop Saxenda.  Start Ozempic.   Status: Accepted per CPA          Current Medication: Semaglutide, 1 MG/DOSE, (OZEMPIC) 4 MG/3ML pen   Rationale: Medication requires monitoring - Needs additional monitoring - Effectiveness   Recommendation: Order Lab   Status: Accepted per CPA            "

## 2024-06-27 ENCOUNTER — DOCUMENTATION ONLY (OUTPATIENT)
Dept: SLEEP MEDICINE | Facility: CLINIC | Age: 54
End: 2024-06-27

## 2024-06-27 DIAGNOSIS — G47.33 OBSTRUCTIVE SLEEP APNEA (ADULT) (PEDIATRIC): Primary | ICD-10-CM

## 2024-07-03 ENCOUNTER — VIRTUAL VISIT (OUTPATIENT)
Dept: CARDIOLOGY | Facility: CLINIC | Age: 54
End: 2024-07-03
Attending: PHYSICIAN ASSISTANT
Payer: COMMERCIAL

## 2024-07-03 ENCOUNTER — HOSPITAL ENCOUNTER (EMERGENCY)
Facility: CLINIC | Age: 54
Discharge: HOME | End: 2024-07-03
Attending: PHYSICIAN ASSISTANT | Admitting: PHYSICIAN ASSISTANT
Payer: COMMERCIAL

## 2024-07-03 ENCOUNTER — APPOINTMENT (OUTPATIENT)
Dept: MRI IMAGING | Facility: CLINIC | Age: 54
End: 2024-07-03
Attending: PHYSICIAN ASSISTANT
Payer: COMMERCIAL

## 2024-07-03 VITALS
BODY MASS INDEX: 49.51 KG/M2 | RESPIRATION RATE: 16 BRPM | HEART RATE: 86 BPM | HEIGHT: 64 IN | DIASTOLIC BLOOD PRESSURE: 78 MMHG | SYSTOLIC BLOOD PRESSURE: 125 MMHG | OXYGEN SATURATION: 98 % | TEMPERATURE: 98.9 F | WEIGHT: 290 LBS

## 2024-07-03 VITALS — BODY MASS INDEX: 49.34 KG/M2 | HEIGHT: 64 IN | WEIGHT: 289 LBS

## 2024-07-03 DIAGNOSIS — R42 DIZZINESS: ICD-10-CM

## 2024-07-03 DIAGNOSIS — E66.01 CLASS 3 SEVERE OBESITY DUE TO EXCESS CALORIES WITH BODY MASS INDEX (BMI) OF 50.0 TO 59.9 IN ADULT, UNSPECIFIED WHETHER SERIOUS COMORBIDITY PRESENT (H): ICD-10-CM

## 2024-07-03 DIAGNOSIS — H53.8 BLURRED VISION: ICD-10-CM

## 2024-07-03 DIAGNOSIS — E11.9 TYPE 2 DIABETES MELLITUS WITHOUT COMPLICATION, WITHOUT LONG-TERM CURRENT USE OF INSULIN (H): Primary | ICD-10-CM

## 2024-07-03 DIAGNOSIS — Z86.73 HISTORY OF STROKE: ICD-10-CM

## 2024-07-03 DIAGNOSIS — R20.2 PARESTHESIAS: ICD-10-CM

## 2024-07-03 DIAGNOSIS — E66.813 CLASS 3 SEVERE OBESITY DUE TO EXCESS CALORIES WITH BODY MASS INDEX (BMI) OF 50.0 TO 59.9 IN ADULT, UNSPECIFIED WHETHER SERIOUS COMORBIDITY PRESENT (H): ICD-10-CM

## 2024-07-03 DIAGNOSIS — R51.9 HEADACHE: ICD-10-CM

## 2024-07-03 LAB
ALBUMIN SERPL BCG-MCNC: 4.3 G/DL (ref 3.5–5.2)
ALP SERPL-CCNC: 73 U/L (ref 40–150)
ALT SERPL W P-5'-P-CCNC: 41 U/L (ref 0–50)
ANION GAP SERPL CALCULATED.3IONS-SCNC: 11 MMOL/L (ref 7–15)
AST SERPL W P-5'-P-CCNC: 41 U/L (ref 0–45)
ATRIAL RATE - MUSE: 112 BPM
BASOPHILS # BLD AUTO: 0 10E3/UL (ref 0–0.2)
BASOPHILS NFR BLD AUTO: 0 %
BILIRUB SERPL-MCNC: 0.2 MG/DL
BUN SERPL-MCNC: 11.3 MG/DL (ref 6–20)
CALCIUM SERPL-MCNC: 9.3 MG/DL (ref 8.6–10)
CHLORIDE SERPL-SCNC: 102 MMOL/L (ref 98–107)
CREAT SERPL-MCNC: 0.89 MG/DL (ref 0.51–0.95)
CRP SERPL-MCNC: 10.85 MG/L
DEPRECATED HCO3 PLAS-SCNC: 24 MMOL/L (ref 22–29)
DIASTOLIC BLOOD PRESSURE - MUSE: NORMAL MMHG
EGFRCR SERPLBLD CKD-EPI 2021: 78 ML/MIN/1.73M2
EOSINOPHIL # BLD AUTO: 0.1 10E3/UL (ref 0–0.7)
EOSINOPHIL NFR BLD AUTO: 1 %
ERYTHROCYTE [DISTWIDTH] IN BLOOD BY AUTOMATED COUNT: 13.5 % (ref 10–15)
ERYTHROCYTE [SEDIMENTATION RATE] IN BLOOD BY WESTERGREN METHOD: 30 MM/HR (ref 0–30)
GLUCOSE SERPL-MCNC: 131 MG/DL (ref 70–99)
HCT VFR BLD AUTO: 41.2 % (ref 35–47)
HGB BLD-MCNC: 13.4 G/DL (ref 11.7–15.7)
HOLD SPECIMEN: NORMAL
IMM GRANULOCYTES # BLD: 0 10E3/UL
IMM GRANULOCYTES NFR BLD: 0 %
INTERPRETATION ECG - MUSE: NORMAL
LYMPHOCYTES # BLD AUTO: 2.5 10E3/UL (ref 0.8–5.3)
LYMPHOCYTES NFR BLD AUTO: 38 %
MCH RBC QN AUTO: 28.3 PG (ref 26.5–33)
MCHC RBC AUTO-ENTMCNC: 32.5 G/DL (ref 31.5–36.5)
MCV RBC AUTO: 87 FL (ref 78–100)
MONOCYTES # BLD AUTO: 0.4 10E3/UL (ref 0–1.3)
MONOCYTES NFR BLD AUTO: 6 %
NEUTROPHILS # BLD AUTO: 3.6 10E3/UL (ref 1.6–8.3)
NEUTROPHILS NFR BLD AUTO: 54 %
NRBC # BLD AUTO: 0 10E3/UL
NRBC BLD AUTO-RTO: 0 /100
P AXIS - MUSE: 69 DEGREES
PLATELET # BLD AUTO: 281 10E3/UL (ref 150–450)
POTASSIUM SERPL-SCNC: 4 MMOL/L (ref 3.4–5.3)
PR INTERVAL - MUSE: 166 MS
PROT SERPL-MCNC: 8.1 G/DL (ref 6.4–8.3)
QRS DURATION - MUSE: 76 MS
QT - MUSE: 334 MS
QTC - MUSE: 455 MS
R AXIS - MUSE: -6 DEGREES
RBC # BLD AUTO: 4.74 10E6/UL (ref 3.8–5.2)
SODIUM SERPL-SCNC: 137 MMOL/L (ref 135–145)
SYSTOLIC BLOOD PRESSURE - MUSE: NORMAL MMHG
T AXIS - MUSE: 56 DEGREES
TROPONIN T SERPL HS-MCNC: 7 NG/L
TSH SERPL DL<=0.005 MIU/L-ACNC: 1.41 UIU/ML (ref 0.3–4.2)
VENTRICULAR RATE- MUSE: 112 BPM
WBC # BLD AUTO: 6.6 10E3/UL (ref 4–11)

## 2024-07-03 PROCEDURE — 86140 C-REACTIVE PROTEIN: CPT | Performed by: PHYSICIAN ASSISTANT

## 2024-07-03 PROCEDURE — 93005 ELECTROCARDIOGRAM TRACING: CPT

## 2024-07-03 PROCEDURE — 85025 COMPLETE CBC W/AUTO DIFF WBC: CPT | Performed by: PHYSICIAN ASSISTANT

## 2024-07-03 PROCEDURE — 70547 MR ANGIOGRAPHY NECK W/O DYE: CPT

## 2024-07-03 PROCEDURE — 84443 ASSAY THYROID STIM HORMONE: CPT | Performed by: PHYSICIAN ASSISTANT

## 2024-07-03 PROCEDURE — 70544 MR ANGIOGRAPHY HEAD W/O DYE: CPT

## 2024-07-03 PROCEDURE — 96360 HYDRATION IV INFUSION INIT: CPT

## 2024-07-03 PROCEDURE — 80053 COMPREHEN METABOLIC PANEL: CPT | Performed by: PHYSICIAN ASSISTANT

## 2024-07-03 PROCEDURE — 84484 ASSAY OF TROPONIN QUANT: CPT | Performed by: PHYSICIAN ASSISTANT

## 2024-07-03 PROCEDURE — 99285 EMERGENCY DEPT VISIT HI MDM: CPT | Mod: 25

## 2024-07-03 PROCEDURE — 36415 COLL VENOUS BLD VENIPUNCTURE: CPT | Performed by: PHYSICIAN ASSISTANT

## 2024-07-03 PROCEDURE — 258N000003 HC RX IP 258 OP 636: Performed by: PHYSICIAN ASSISTANT

## 2024-07-03 PROCEDURE — 70551 MRI BRAIN STEM W/O DYE: CPT

## 2024-07-03 PROCEDURE — 85652 RBC SED RATE AUTOMATED: CPT | Performed by: PHYSICIAN ASSISTANT

## 2024-07-03 RX ADMIN — SODIUM CHLORIDE 1000 ML: 9 INJECTION, SOLUTION INTRAVENOUS at 15:40

## 2024-07-03 ASSESSMENT — ACTIVITIES OF DAILY LIVING (ADL)
ADLS_ACUITY_SCORE: 35

## 2024-07-03 ASSESSMENT — PAIN SCALES - GENERAL: PAINLEVEL: NO PAIN (0)

## 2024-07-03 ASSESSMENT — COLUMBIA-SUICIDE SEVERITY RATING SCALE - C-SSRS
2. HAVE YOU ACTUALLY HAD ANY THOUGHTS OF KILLING YOURSELF IN THE PAST MONTH?: NO
6. HAVE YOU EVER DONE ANYTHING, STARTED TO DO ANYTHING, OR PREPARED TO DO ANYTHING TO END YOUR LIFE?: NO
1. IN THE PAST MONTH, HAVE YOU WISHED YOU WERE DEAD OR WISHED YOU COULD GO TO SLEEP AND NOT WAKE UP?: NO

## 2024-07-03 NOTE — NURSING NOTE
Current patient location: home    Is the patient currently in the state of [place]? YES    Visit mode:VIDEO    If the visit is dropped, the patient can be reconnected by: VIDEO VISIT: Text to cell phone:   Telephone Information:   Mobile [phone]       Will anyone else be joining the visit? NO  (If patient encounters technical issues they should call [phone])    How would you like to obtain your AVS? MyChart    Are changes needed to the allergy or medication list? Pt stated no changes to allergies and Pt stated no med changes    Are refills needed on medications prescribed by this physician? NO    Reason for visit: RECHECK    Wt other than 24 hrs:    Pain more than one location:  no  [place] HERNANDEZF

## 2024-07-03 NOTE — ED PROVIDER NOTES
Emergency Department Note      History of Present Illness     Chief Complaint   Stroke Symptoms      HPI   Yancy Hoover is a 53 year old female with a history of type 2 diabetes, hypertension, and prior CVA who presents for evaluation of multiple symptoms.  The patient states that for the last week she has been having a headache mostly on the left side of her head and face in the temporal region.  She also reports bilateral blurred vision during this time but denies any visual field cuts or double vision.  The blurred vision comes and goes.  She has also had a little bit of right-sided paresthesias in her arm and leg but can still feel them she did have a prior stroke that affected the side.  No new weakness in the arms or legs.  No word finding difficulty or slurred speech.  She denies fever neck stiffness vomiting or recent head injuries.  No one else around her is having similar symptoms.  She occasionally feels a little tightness in her chest but no chest pain currently and denies shortness of breath.  Her symptoms are worse in the morning and when she first stands up.  She does note that her blood pressure medication verapamil was increased just before the onset of symptoms and her medication/primary team thinks this may be the cause of her symptoms.  She denies abdominal pain dysuria or fever.  She is able to ambulate without difficulty or feeling like she is going to fall over.  She does check her blood sugars and notes they have been good 120s to 140s.  Does not regularly check her blood pressure at home.  She does have a history of headaches. No leg swelling or thromboembolism hx.    Independent Historian   None    Review of External Notes   I reviewed REX Martins note from [facility] heart clinic in [place] from 7/3/2024.  I note that it was suspected there that her symptoms were due to orthostatic hypotension secondary to vascular improvement with weight loss from GLP-1.  There was also  "some thought that her verapamil dose may be too high.    Past Medical History     Medical History and Problem List   Cerebral infarction  Type 2 diabetes   LANDON    Medications   Asa  Glucophage  Prilosec  Crestor  Ozempic  Topamax  Verelan    Surgical History   Colonoscopy  EGD    Laparoscopic ovarian cystectomy    Physical Exam     Patient Vitals for the past 24 hrs:   BP Temp Temp src Pulse Resp SpO2 Height Weight   24 1900 125/78 -- -- 86 -- -- -- --   24 1848 119/87 -- -- 91 -- -- -- --   24 1813 -- -- -- -- -- 98 % -- --   24 1803 119/84 -- -- 91 -- 99 % -- --   24 1736 -- -- -- -- -- 97 % -- --   24 1720 -- -- -- -- -- 98 % -- --   24 1657 -- -- -- -- -- 96 % -- --   24 1652 -- -- -- 97 -- 97 % -- --   24 1629 -- -- -- -- -- 97 % -- --   24 1619 -- -- -- -- -- 95 % -- --   24 1455 (!) 140/88 98.9  F (37.2  C) Oral (!) 135 16 99 % 1.626 m (5' 4\") 131.5 kg (290 lb)     Physical Exam  General: Awake, alert, non-toxic.  Head:  Scalp is NC/AT  Eyes:  Conjunctiva normal, PERRL  ENT:  The external nose and ears are normal.   Neck:  Normal range of motion without rigidity.  CV:  Mildly tachycardic but regular    No pathologic murmur, rubs, or gallops.  Resp:  Breath sounds are clear bilaterally    Non-labored, no retractions or accessory muscle use  Abdomen: Abdomen is soft, no distension, no tenderness, no masses,  MS:  No lower extremity edema/swelling. No midline cervical, thoracic, or lumbar tenderness.  Extremities without joint swelling or redness.  Right wrist in brace (baseline from prior stroke).  Skin:  Warm and dry, No rash or lesions noted.  Neuro:  Alert and oriented. GCS 15 5/5 strength BL to all joints of upper and lower extremities w/exception of 4/5 strength to right wrist/hand (baseline per pt).  Normal and symmetric sensation to touch BL in arms, legs, and face.  CNII-XII intact.  Normal finger to nose testing BL. Gait " normal.  Psych: Awake. Alert. Normal affect. Appropriate interactions.      Diagnostics     Lab Results   Labs Ordered and Resulted from Time of ED Arrival to Time of ED Departure   COMPREHENSIVE METABOLIC PANEL - Abnormal       Result Value    Sodium 137      Potassium 4.0      Carbon Dioxide (CO2) 24      Anion Gap 11      Urea Nitrogen 11.3      Creatinine 0.89      GFR Estimate 78      Calcium 9.3      Chloride 102      Glucose 131 (*)     Alkaline Phosphatase 73      AST 41      ALT 41      Protein Total 8.1      Albumin 4.3      Bilirubin Total 0.2     CRP INFLAMMATION - Abnormal    CRP Inflammation 10.85 (*)    TROPONIN T, HIGH SENSITIVITY - Normal    Troponin T, High Sensitivity 7     TSH WITH FREE T4 REFLEX - Normal    TSH 1.41     ERYTHROCYTE SEDIMENTATION RATE AUTO - Normal    Erythrocyte Sedimentation Rate 30     CBC WITH PLATELETS AND DIFFERENTIAL    WBC Count 6.6      RBC Count 4.74      Hemoglobin 13.4      Hematocrit 41.2      MCV 87      MCH 28.3      MCHC 32.5      RDW 13.5      Platelet Count 281      % Neutrophils 54      % Lymphocytes 38      % Monocytes 6      % Eosinophils 1      % Basophils 0      % Immature Granulocytes 0      NRBCs per 100 WBC 0      Absolute Neutrophils 3.6      Absolute Lymphocytes 2.5      Absolute Monocytes 0.4      Absolute Eosinophils 0.1      Absolute Basophils 0.0      Absolute Immature Granulocytes 0.0      Absolute NRBCs 0.0         Imaging   MR Brain w/o Contrast   Final Result   IMPRESSION:   HEAD MRI:    1.  Normal head MRI.      HEAD MRA:    1.  Normal MRA [place].      NECK MRA:   1.  Normal neck CTA.         MRA Brain ([place]) wo Contrast   Final Result   IMPRESSION:   HEAD MRI:    1.  Normal head MRI.      HEAD MRA:    1.  Normal MRA [place].      NECK MRA:   1.  Normal neck CTA.         MRA Neck (Carot[place]) wo Contrast   Final Result   IMPRESSION:   HEAD MRI:    1.  Normal head MRI.      HEAD MRA:    1.  Normal MRA [place].      NECK MRA:   1.  Normal neck CTA.             EKG   ECG taken at 1515, ECG read at 1520  Sinus tachycardia. Possible left atrial enlargement. Low voltage QRS.Cannot rule out anterior infarct, age undetermined. Abnormal ECG.  No significant change as compared to prior, dated 8/22/23.  Rate 112 bpm. WY interval 166 ms. QRS duration 76 ms. QT/QTc 334/455 ms. P-R-T axes 69 -6  56.    Independent Interpretation   None    ED Course      Medications Administered   Medications   sodium chloride 0.9% BOLUS 1,000 mL (0 mLs Intravenous Stopped 7/3/24 1654)       Procedures   Procedures     Discussion of Management   None    ED Course   ED Course as of 07/03/24 2243   Wed Jul 03, 2024   1500 I obtained history and examined the patient as noted above.    Patient able to ambulate well without difficulty.    Optional/Additional Documentation  None    Medical Decision Making / Diagnosis     CMS Diagnoses: None    MIPS       None    [facility]   Yancy Hoover is a 53 year old female with a history of type II DM, hypertension, and prior CVA who presents for evaluation of headache, dizziness, intermittent bilateral blurred vision and some paresthesias in the extremities.  Broad differential considered.  On exam the patient is well-appearing with unremarkable vitals.  Her neurologic exam is normal.  Given her history and symptoms we did obtain MRI/MRA of the head and neck which showed no evidence of stroke mass bleed or acute vascular disease.  She has no fever or neck stiffness or evidence of CNS infection or indication for LP.  I considered temporal arteritis however the patient's symptoms would be quite atypical for this.  Additionally she is on the younger end of the age range.  Inflammatory markers were sent and reassuringly her ESR was normal, her CRP was very slightly elevated though within the normal range based on the patient's age.  I think this is extremely unlikely and would not start steroids at this time.   The eyes themselves appear normal there is nothing to suggest orbital/preseptal cellulitis, cavernous sinus thrombosis, or angle-closure glaucoma.  No evidence of increased ICP no vomiting or tinnitus do not suspect cerebral venous thrombosis or pseudotumor cerebri.    I agree with patient's primary team assessment that her symptoms are likely due to transient episodes of hypotension upon standing and waking up in the morning and her blood pressure medication verapamil may need to be adjusted in light of her weight loss and GLP-1 use recently.  This is likely potentially aggravating some of her prior stroke symptoms.  She does have a history of headaches as well and that may be the cause of that.  The patient was initially tachycardic however that resolved without intervention, and her EKG shows sinus rhythm without ischemia or arrhythmia her troponin is normal she has no ongoing chest pain or shortness of breath and I do not suspect ACS PE dissection myocarditis tamponade etc.  Glucose electrolytes and basic labs are unremarkable.  She was able to ambulate well here and I think she is safe for close follow-up with her primary team.  She will discuss her medication regimen with her doctor and monitor her sugars and pressure at home will return for new worsening or changing symptoms.    Disposition   The patient was discharged.     Diagnosis     ICD-10-CM    1. Headache  R51.9       2. Dizziness  R42       3. Blurred vision  H53.8     bilateral, intermittent      4. Paresthesias  R20.2            Discharge Medications   Discharge Medication List as of 7/3/2024 10:00 PM            Scribe Disclosure:  I, Sunitha Jake, am serving as a scribe at 3:11 PM on 7/3/2024 to document services personally performed by Jaspreet Causey, * based on my observations and the provider's statements to me.        Jaspreet Causey PA-C  07/03/24 [id number]

## 2024-07-03 NOTE — PATIENT INSTRUCTIONS
"Recommendations from MTM Pharmacist visit:                                                    MTM (medication therapy management) is a service provided by a clinical pharmacist designed to help you get the most of out of your medicines.  You may be sent a phone or email survey evaluating today's visit.  Please provide feedback you have for the service he received today if you are able.      Increase Ozempic to 2mg weekly after completing 4 weeks Ozempic 1mg weekly.    To help with tolerability and effectiveness of Ozempic :  Eat small meals/snacks throughout the day (about every 2 hours)  Focus on getting protein in first with each meal and snack.   Drink plenty of water - goal 64 oz throughout the day  You may try Metamucil, Benefiber, or Citrucel to help feel more full (less nausea) and have softer, more consistent bowel movements.  To optimize weight management - work on incorporating resistance training/weight lifting to build muscle and improve overall metabolism of adipose tissue.    Go to [facility] for labs as soon as able (before visit with Surgeon Dr. Alcaraz 7/18 ). To help may     Follow up with DR. Arlin Ewing as planned to discuss your dizziness and lightheadedness. It may be from your blood pressure meds are too high, but it is important to rule out any relation to stroke history. If you have any changes in speech, headache, weakness, facial droop - do not wait for your visit with Dr. Arlin Ewing, call 911.    Follow-up: with bariatric team as planned for your bariatric team surgical consults. 9/16 at 7:30 am with Micki Martins, [facility]         It was great speaking with you today.  I value your experience and would be very thankful for your time in providing feedback in our clinic survey. In the next few days, you may receive an email or text message from [employer] with a link to a survey related to your  clinical pharmacist.\"     To schedule another MTM appointment, please call the " clinic directly ([facility] Phone Number: [phone] (schedules for [facility] and [facility] - providers, dietitians, health coaches) or you may call the [facility] scheduling line at [phone] or toll-free at [phone].     My Clinical Pharmacist's contact information:                                                      Please feel free to contact me with any questions or concerns you have.      Micki Martins, Pharm D., MPH    Medication Therapy Management Pharmacist   [facility]

## 2024-07-03 NOTE — ED TRIAGE NOTES
Pt has had a headache and blurred vison for the that past week   Pt has hx of stroke   Pt has toes tingling in her right leg since yesterday

## 2024-07-03 NOTE — PROGRESS NOTES
"Video-Visit Details    Type of service:  Video Visit    Video Start Time:  11:05 am    Video End Time:  11:44 am    Originating Location (pt. Location): Home    Distant Location (provider location):  Offsite (providers home)     Platform used for Video Visit: Other: Zoom    New Bariatric Nutrition Class Note    Reason For Visit: Nutrition Class     Yancy Hoover is a 53 year old presenting today for virtual bariatric nutrition class and consultation.  Pt is interested in weight loss surgery.     Pt referred by Jeannie Noe NP on July 16, 2020     Pt has seen Sharon Osborn RD x2 in April and June 2024.     CO-MORBIDITIES OF OBESITY INCLUDE:        5/1/2024    12:23 PM   --   I have the following health issues associated with obesity Type II Diabetes    High Blood Pressure    High Cholesterol    Sleep Apnea       SUPPORT:      5/1/2024    12:23 PM   Support System Reviewed With Patient   Who do you have in your support network that can be available to help you for the first 2 weeks after surgery? Yea   Who can you count on for support throughout your weight loss surgery journey? My 27 year old daughter, mom and my sister       ANTHROPOMETRICS:  Estimated body mass index is 49.61 kg/m  as calculated from the following:    Height as of 7/3/24: 1.626 m (5' 4\").    Weight as of 7/3/24: 131.1 kg (289 lb).        5/1/2024    12:23 PM   --   I have tried the following methods to lose weight Watching portions or calories    Exercise    Weight Watchers    Prescription Medications           5/1/2024    12:23 PM   Weight Loss Questions Reviewed With Patient   How long have you been overweight? Since early childhood         NUTRITION HISTORY:    Patient attended virtual W[facility] nutrition class today via Zoom.         5/1/2024    12:23 PM   Recall Diet Questions Reviewed With Patient   Describe what you typically consume for breakfast (typical or most recent) Cereeal, eggs   Describe what you typically consume for lunch " (typical or most recent) Dont eat lunch   Describe what you typically consume for supper (typical or most recent) Chicken or bead, greens, corn bread   How many ounces of water, or other low calorie drinks, do you drink daily (8 oz=1 glass)? 16 oz   How many ounces of caffeine (coffee, tea, pop) do you drink daily (8 oz=1 glass)? 24 oz   How many ounces of carbonated (pop, beer, sparkling water) drinks do you drinky daily (8 oz=1 glass)? 0 oz   How many ounces of juice, pop, sweet tea, sports drinks, protein drinks, other sweetened drinks, do you drink daily (8 oz=1 glass)? 0 oz   How many ounces of milk do you drink daily (8 oz=1 glass) 0 oz   How often do you drink alcohol? Never           5/1/2024    12:23 PM   Eating Habits   What foods do you crave? Soda           5/1/2024    12:23 PM   Dining Out History Reviewed With Patient   How often do you dine out? Rarely.   Where do you dine out? (select all that apply) fast food chains   What types of food do you order when you dine out? Burelisabeth and friwily           5/1/2024    12:23 PM   Physical Activity Reviewed With Patient   How often do you exercise? 1 to 2 times per week   What is the duration of your exercise (in minutes)? 30 Minutes   What types of exercise do you do? walking   What keeps you from being more active? I have recently been sick    My ability to walk or move around is limited    Worried people will look at me       EXERCISE:        5/1/2024    12:23 PM   --   How often do you exercise? 1 to 2 times per week   What is the duration of your exercise (in minutes)? 30 Minutes   What types of exercise do you do? walking   What keeps you from being more active? I have recently been sick    My ability to walk or move around is limited    Worried people will look at me       NUTRITION DIAGNOSIS:  Obesity r/t long history of positive energy balance aeb BMI >30 kg/m2.    INTERVENTION:  Patient attended [facility] Nutrition Class today.    Intervention  Provided/Education Provided on post-op diet guidelines, vitamins/minerals essential post-operatively, GI anatomy of bariatric surgeries, ways to help prepare for post-op diet guidelines pre-operatively, portion/calorie-control, mindful eating and sources of protein. Provided pt with list of goals and handouts below via [employer]. Presentation slides provided via email.          5/1/2024    12:23 PM   Questions Reviewed With Patient   How ready are you to make changes regarding your weight? Number 1 = Not ready at all to make changes up to 10 = very ready. 10   How confident are you that you can change? 1 = Not confident that you will be successful making changes up to 10 = very confident. 10       Expected Engagement: good    GOALS:  Relating To Eating:  - Eat slowly (20-30 minutes per meal), chewing foods well (25 chews per bite/applesauce consistency)  - Focus on eating smaller portion sizes at meals and snacks    Relating to beverages:  - Reduce caffeine/carbonation/calorie containing beverages  - Separate fluids from meals by 30 minutes before, during, and after eating  - Drink 48-64 ounces of fluid per day. Small, frequent sips between meals.    Relating to activity:  Increase activity as able      Protein Sources for Weight Loss  [email or web address]     Carbohydrates  [email or web address]     Mindful Eating  [email or web address]     Post-op Diet Handouts:  Diet Guidelines after Weight-loss Surgery  [email or web address]     Your Stage 1 Diet: Clear Liquids  [email or web address]     Your Stage 2 Diet: Low-fat Full Liquids  [email or web address]     Your Stage 3 Diet: Pureed Foods  [email or web address]     Pureed Recipes  [email or web address]    Your Stage 4 Diet: Soft Foods  [email or web address]    Your Stage 5 Diet: Regular Foods  [email or web address]    Supplements after Sleeve Gastrectomy, Gastric Bypass or Single  Anastomosis Duodenal Switch  [email or web address]    Keeping Track of Fluids  [email or web address]      Follow up: Monday, August 5th at 10:00 am with Sharon Osborn    Time spent with patient: 39 minutes  ANKUSH Schneider, RD, LD  Clinic #: [phone]

## 2024-07-03 NOTE — PROGRESS NOTES
Medication Therapy Management (MTM) Encounter    ASSESSMENT:                            Medication Adherence/Access: will send prescriptions to [facility] for improved access per patient request.      Weight management/Diabetes : Due for A1C and CMP - patient reminded to hydrate to help with ease of blood draw and to collect before upcoming visits with bariatric team.  Given patient tolerating Ozempic well and not seeing improvement in satiety or appetite management, and expected further secondary MACE, A1C and weight management  benefits post-stroke -recommended optimizing Ozempic today.  On statin and aspirin.     Discussed dietary and behavioral modifications.     Stroke (7/2023): suspect symptoms of orthostatic hypotension which may be secondary to vascular improvement and weight loss with GLP1 (do not believe side effect directly related to Ozempic). Given cardiac history, agree with plan to assess with PCP today to ensure these are not related to stroke history - encouraged patient to discuss reduction in blood pressure meds to help with these symptoms as blood pressure may be lower secondary to lifestyle changes and med optimization (PCP outside [facility] system, unable to directly share Medication Therapy Management note today). Defer to PCP.    PLAN:                            Increase Ozempic to 2mg weekly after completing 4 weeks Ozempic 1mg weekly.    To help with tolerability and effectiveness of Ozempic :  Eat small meals/snacks throughout the day (about every 2 hours)  Focus on getting protein in first with each meal and snack.   Drink plenty of water - goal 64 oz throughout the day  You may try Metamucil, Benefiber, or Citrucel to help feel more full (less nausea) and have softer, more consistent bowel movements.  To optimize weight management - work on incorporating resistance training/weight lifting to build muscle and improve overall metabolism of adipose tissue.    Go  to [facility] for labs as soon as able (before visit with Surgeon Dr. Alcaraz 7/18 ). To help may     Follow up with DR. Arlin Ewing as planned to discuss your dizziness and lightheadedness. As we discussed, It may be from your blood pressure med (verapamil) dose may be too high, but it is important to rule out any relation to stroke history. If you have any changes in speech, headache, weakness, facial droop - do not wait for your visit with Dr. Arlin Ewing, call 911.    Follow-up: with bariatric team as planned for your bariatric team surgical consults. 9/16 at 7:30 am with Micki Martins [facility]     SUBJECTIVE/OBJECTIVE:                          Yancy Hoover is a 53 year old female seen for a follow-up visit.       Reason for visit: Medication Therapy Management - GLP1/GIP Management .    Allergies/ADRs: Reviewed in chart  Past Medical History: Reviewed in chart  Tobacco: She reports that she quit smoking about a year ago. Her smoking use included cigarettes. She has a 10 pack-year smoking history. She has never used smokeless tobacco.  Alcohol: not currently using      Medication Adherence/Access: some issues with receiving correct meds with [facility]. Prefers [facility].       Weight Management /Diabetes   Class III Obesity (BMI 40 or more):   Ozempic 1mg once daily x 3 weeks  Metformin 500 mg ER Tabs - 4 tabs every morning     Last met with Jeannie Noe CNP  5/2/24 for return weight management (surgical consult).   Last visit with Yohana Osborn, dietician 6/4/24.    Patient reports no current medication side effects. Slightly frustrated that Ozempic not having more significant reduction in food noise than Saxenda. Not weighing self at home, unclear if seeing more weight loss than Saxenda. Wonders if there is a way to see more weight management? Goal to meet weight loss goals for bariatric surger (Goal 286 lbs). Patient has not gotten updated labs yet - has some needle  "phobia.    Blood glucose readings: 120-130mg/dl (fasting)    Nutrition/Eating Habits:  Working to improve diet and overall wellbeing for overall health.  Focusing on lean meats, vegetables and fruits.  Exercise/Activity: goal to increase activity - has not been able to      Medication History:  Topiramate: takes for migraine - not helpful for appetite/cravings   Ozempic: tried to get previously, denied by insurance so has been on/off liraglutide since then  Bydureon - was on this summer and liked this for convenience, unclear why it was switched to Saxenda from patient standpoint.  Jardiance - UTI  Bupropion - (for depression previously) not effective for appetite/reward suppression and not needed for mental health at this time.    Per visit with Jeannie Noe 5/2/24  NBS 7/2020 296lb   Ongoing MWM with Dr. Velarde wasn't seen in last 1.5 years until this month- titration up on topiramate, on saxenda but other agents not covered?   4/2023 glenn shelley RD     Starting weight (lbs): 296  Today's weight (lbs): 289 (not weight at home)  Change in weight (lbs/percent): -7 lbs/2.4%         Wt Readings from Last 4 Encounters:   07/03/24 131.1 kg (289 lb)   06/04/24 131.1 kg (289 lb)   06/04/24 131.1 kg (289 lb)   05/14/24 129.7 kg (286 lb)     Estimated body mass index is 49.61 kg/m  as calculated from the following:    Height as of this encounter: 1.626 m (5' 4\").    Weight as of this encounter: 131.1 kg (289 lb).      Stroke (7/2023):   Aspirin 81 mg once daily  Rosuvastatin 20 mg once daily   Verapamil  mg once daily    Patient has been rehabbing with physical therapy and OT since July after suffering mild stroke. Having some syncope that has been ongoing for past several weeks (since before switching from Saxenda to  Ozempic) - has been noting more lightheadedness and notes more often with change in positions from lying to sitting to standing. Has visit with PCP to assess today as symptoms of " "dizziness/lightheadedness have been worsening. No report of headache, changes in speech or memory, no weakness or facial droop.    Recent Labs   Lab Test 10/28/22  1043   CHOL 191   HDL 37*   *   TRIG 89      BP Readings from Last 3 Encounters:   4/22/24 132/86; heart rate 117 ([facility])    08/23/23 136/79   02/24/23 (!) 139/93   11/09/22 (!) 143/89       Today's Vitals: Ht 1.626 m (5' 4\")   Wt 131.1 kg (289 lb)   BMI 49.61 kg/m    ----------------      I spent 12 minutes with this patient today. All changes were made via collaborative practice agreement with Jeannie Noe. A copy of the visit note was provided to the patient's provider(s).    A summary of these recommendations was sent via [employer].    Micki Martins, Pharm D., MPH    Medication Therapy Management Pharmacist   [facility]      Telemedicine Visit Details  Type of service:  Video Conference via [employer]  Start Time:  7:30 AM  End Time:  7:42 AM     Medication Therapy Recommendations  History of stroke    Current Medication: verapamil ER (VERELAN) 240 MG 24 hr capsule   Rationale: Dose too high - Dosage too high - Safety   Recommendation: Decrease Dose   Status: Accepted per Provider         Type 2 diabetes mellitus without complication, without long-term current use of insulin (H)    Current Medication: Semaglutide, 1 MG/DOSE, (OZEMPIC) 4 MG/3ML pen (Discontinued)   Rationale: Dose too low - Dosage too low - Effectiveness   Recommendation: Increase Dose   Status: Accepted per CPA            "

## 2024-07-03 NOTE — LETTER
7/3/2024      RE: Yancy Hoover  [address]  [place] [address]       Dear Colleague,    Thank you for the opportunity to participate in the care of your patient, Yancy Hoover, at the [facility] [place] [facility]. Please see a copy of my visit note below.    Medication Therapy Management (MTM) Encounter    ASSESSMENT:                            Medication Adherence/Access: will send prescriptions to [facility] for improved access per patient request.      Weight management/Diabetes : Due for A1C and CMP - patient reminded to hydrate to help with ease of blood draw and to collect before upcoming visits with bariatric team.  Given patient tolerating Ozempic well and not seeing improvement in satiety or appetite management, and expected further secondary MACE, A1C and weight management  benefits post-stroke -recommended optimizing Ozempic today.  On statin and aspirin.     Discussed dietary and behavioral modifications.     Stroke (7/2023): suspect symptoms of orthostatic hypotension which may be secondary to vascular improvement and weight loss with GLP1 (do not believe side effect directly related to Ozempic). Given cardiac history, agree with plan to assess with PCP today to ensure these are not related to stroke history - encouraged patient to discuss reduction in blood pressure meds to help with these symptoms as blood pressure may be lower secondary to lifestyle changes and med optimization (PCP outside [facility] system, unable to directly share Medication Therapy Management note today). Defer to PCP.    PLAN:                            Increase Ozempic to 2mg weekly after completing 4 weeks Ozempic 1mg weekly.    To help with tolerability and effectiveness of Ozempic :  Eat small meals/snacks throughout the day (about every 2 hours)  Focus on getting protein in first with each meal and  snack.   Drink plenty of water - goal 64 oz throughout the day  You may try Metamucil, Benefiber, or Citrucel to help feel more full (less nausea) and have softer, more consistent bowel movements.  To optimize weight management - work on incorporating resistance training/weight lifting to build muscle and improve overall metabolism of adipose tissue.    Go to [facility] for labs as soon as able (before visit with Surgeon Dr. Alcaraz 7/18 ). To help may     Follow up with DR. Arlin Ewing as planned to discuss your dizziness and lightheadedness. As we discussed, It may be from your blood pressure med (verapamil) dose may be too high, but it is important to rule out any relation to stroke history. If you have any changes in speech, headache, weakness, facial droop - do not wait for your visit with Dr. Arlin Ewing, call 911.    Follow-up: with bariatric team as planned for your bariatric team surgical consults. 9/16 at 7:30 am with Micki Martins [facility]     SUBJECTIVE/OBJECTIVE:                          Yancy Hoover is a 53 year old female seen for a follow-up visit.       Reason for visit: Medication Therapy Management - GLP1/GIP Management .    Allergies/ADRs: Reviewed in chart  Past Medical History: Reviewed in chart  Tobacco: She reports that she quit smoking about a year ago. Her smoking use included cigarettes. She has a 10 pack-year smoking history. She has never used smokeless tobacco.  Alcohol: not currently using      Medication Adherence/Access: some issues with receiving correct meds with [facility]. Prefers [facility].       Weight Management/Diabetes   Class III Obesity (BMI 40 or more):   Ozempic 1mg once daily x 3 weeks  Metformin 500 mg ER Tabs - 4 tabs every morning     Last met with Jeannie Noe CNP  5/2/24 for return weight management (surgical consult).   Last visit with Yohana Osborn, dietician 6/4/24.    Patient reports no current medication side effects.  "Slightly frustrated that Ozempic not having more significant reduction in food noise than Saxenda. Not weighing self at home, unclear if seeing more weight loss than Saxenda. Wonders if there is a way to see more weight management? Goal to meet weight loss goals for bariatric surger (Goal 286 lbs). Patient has not gotten updated labs yet - has some needle phobia.    Blood glucose readings: 120-130mg/dl (fasting)    Nutrition/Eating Habits:  Working to improve diet and overall wellbeing for overall health.  Focusing on lean meats, vegetables and fruits.  Exercise/Activity: goal to increase activity - has not been able to      Medication History:  Topiramate: takes for migraine - not helpful for appetite/cravings   Ozempic: tried to get previously, denied by insurance so has been on/off liraglutide since then  Bydureon - was on this summer and liked this for convenience, unclear why it was switched to Saxenda from patient standpoint.  Jardiance - UTI  Bupropion - (for depression previously) not effective for appetite/reward suppression and not needed for mental health at this time.    Per visit with Jeannie Noe 5/2/24  NBS 7/2020 296lb   Ongoing MWM with Dr. Velarde wasn't seen in last 1.5 years until this month- titration up on topiramate, on saxenda but other agents not covered?   4/2023 gelnn shelley RD     Starting weight (lbs): 296  Today's weight (lbs): 289 (not weight at home)  Change in weight (lbs/percent): -7 lbs/2.4%         Wt Readings from Last 4 Encounters:   07/03/24 131.1 kg (289 lb)   06/04/24 131.1 kg (289 lb)   06/04/24 131.1 kg (289 lb)   05/14/24 129.7 kg (286 lb)     Estimated body mass index is 49.61 kg/m  as calculated from the following:    Height as of this encounter: 1.626 m (5' 4\").    Weight as of this encounter: 131.1 kg (289 lb).      Stroke (7/2023):   Aspirin 81 mg once daily  Rosuvastatin 20 mg once daily   Verapamil  mg once daily    Patient has been rehabbing with physical " "therapy and OT since July after suffering mild stroke. Having some syncope that has been ongoing for past several weeks (since before switching from Saxenda to  Ozempic) - has been noting more lightheadedness and notes more often with change in positions from lying to sitting to standing. Has visit with PCP to assess today as symptoms of dizziness/lightheadedness have been worsening. No report of headache, changes in speech or memory, no weakness or facial droop.    Recent Labs   Lab Test 10/28/22  1043   CHOL 191   HDL 37*   *   TRIG 89      BP Readings from Last 3 Encounters:   4/22/24 132/86; heart rate 117 ([facility])    08/23/23 136/79   02/24/23 (!) 139/93   11/09/22 (!) 143/89       Today's Vitals: Ht 1.626 m (5' 4\")   Wt 131.1 kg (289 lb)   BMI 49.61 kg/m    ----------------      I spent 12 minutes with this patient today. All changes were made via collaborative practice agreement with Jeannie Noe. A copy of the visit note was provided to the patient's provider(s).    A summary of these recommendations was sent via [employer].    Micki Martins, Pharm D., MPH    Medication Therapy Management Pharmacist   [facility]      Telemedicine Visit Details  Type of service:  Video Conference via [employer]  Start Time:  7:30 AM  End Time:  7:42 AM     Medication Therapy Recommendations  History of stroke    Current Medication: verapamil ER (VERELAN) 240 MG 24 hr capsule   Rationale: Dose too high - Dosage too high - Safety   Recommendation: Decrease Dose   Status: Accepted per Provider         Type 2 diabetes mellitus without complication, without long-term current use of insulin (H)    Current Medication: Semaglutide, 1 MG/DOSE, (OZEMPIC) 4 MG/3ML pen (Discontinued)   Rationale: Dose too low - Dosage too low - Effectiveness   Recommendation: Increase Dose   Status: Accepted per CPA            "

## 2024-07-03 NOTE — Clinical Note
Juan J Tillmana wasn't feeling much improvement in appetite or weight management with Ozempic 1mg (switched from Saxenda 3mg), so we increased to Ozempic 2mg starting next week (after 4 weeks on Ozempic 1mg).  Of note, she is having symptoms of orthostatic hypotension that she is follow up with pcp today (history of stroke) -- I believe likely cause is cv changes secondary to some of these lifestyle improvements, but with complex history, encouraged patient to follow up to assess with PCP today as she had planned.  Meeds with Dr. Lissa angel for surical consult, then both of you in Aug. Me again in Sept.  I will keep you posted on what I learn! Micki

## 2024-07-04 NOTE — DISCHARGE INSTRUCTIONS
I suspect your symptoms may be in part due to orthostatic hypotension (or transient drops in your blood pressure).  This is likely driven by your recent increase in your medication.  You should call your primary to discuss this further.    Discharge Instructions  Headache    You were seen today for a headache. Headaches may be caused by many different things such as muscle tension, sinus inflammation, anxiety and stress, having too little sleep, too much alcohol, some medical conditions or injury. You may have a migraine, which is caused by changes in the blood vessels in your head.  At this time your provider does not find that your headache is a sign of anything dangerous or life-threatening.  However, sometimes the signs of serious illness do not show up right away.      Generally, every Emergency Department visit should have a follow-up clinic visit with either a primary or a specialty clinic/provider. Please follow-up as instructed by your emergency provider today.    Return to the Emergency Department if:  You get a new fever of 100.4 F or higher.  Your headache gets much worse.  You get a stiff neck with your headache.  You get a new headache that is significantly different or worse than headaches you have had before.  You are vomiting (throwing up) and cannot keep food or water down.  You have blurry or double vision or other problems with your eyes.  You have a new weakness on one side of your body.  You have difficulty with balance which is new.  You or your family thinks you are confused.  You have a seizure.    What can I do to help myself?  Pain medications - You may take a pain medication such as Tylenol  (acetaminophen), Advil , Motrin  (ibuprofen) or Aleve  (naproxen).  Take a pain reliever as soon as you notice symptoms.  Starting medications as soon as you start to have symptoms may lessen the amount of pain you have.  Relaxing in a quiet, dark room may help.  Get enough sleep and eat meals  regularly.  You may need to watch for certain foods or other things which may trigger your headaches.  Keeping a journal of your headaches and possible triggers may help you and your primary provider to identify things which you should avoid which may be causing your headaches.  If you were given a prescription for medicine here today, be sure to read all of the information (including the package insert) that comes with your prescription.  This will include important information about the medicine, its side effects, and any warnings that you need to know about.  The pharmacist who fills the prescription can provide more information and answer questions you may have about the medicine.  If you have questions or concerns that the pharmacist cannot address, please call or return to the Emergency Department.   Remember that you can always come back to the Emergency Department if you are not able to see your regular provider in the amount of time listed above, if you get any new symptoms, or if there is anything that worries you.  Discharge Instructions  Dizziness (Lightheaded)  Today you were seen for dizziness.  Dizziness can be caused by many things and it can be very difficult to determine the cause of dizziness.  At this time, your provider has found no signs that your dizziness is due to a serious or life-threatening condition. However, sometimes there is a serious problem that does not show up right away, and it is important for you to follow up with your regular provider as instructed.  Generally, every Emergency Department visit should have a follow-up clinic visit with either a primary or a specialty clinic/provider. Please follow-up as instructed by your emergency provider today.      Return to the Emergency Department if:    You pass out (fainting or falling out), especially during exercise.    You develop chest pain, chest pressure or difficulty breathing.  Your feel an irregular heartbeat.  You have  excessive vaginal bleeding, or blood in your stool or vomit (throw up).  You have a high fever.  Your symptoms get worse or more frequent.    If when you begin to feel dizzy or lightheaded, it is important to sit down or lay down immediately to prevent injury from falling.  If you were given a prescription for medicine here today, be sure to read all of the information (including the package insert) that comes with your prescription.  This will include important information about the medicine, its side effects, and any warnings that you need to know about.  The pharmacist who fills the prescription can provide more information and answer questions you may have about the medicine.  If you have questions or concerns that the pharmacist cannot address, please call or return to the Emergency Department.   Remember that you can always come back to the Emergency Department if you are not able to see your regular provider in the amount of time listed above, if you get any new symptoms, or if there is anything that worries you.

## 2024-07-05 ENCOUNTER — VIRTUAL VISIT (OUTPATIENT)
Dept: ENDOCRINOLOGY | Facility: CLINIC | Age: 54
End: 2024-07-05
Payer: COMMERCIAL

## 2024-07-05 VITALS — WEIGHT: 289 LBS | HEIGHT: 64 IN | BODY MASS INDEX: 49.34 KG/M2

## 2024-07-05 DIAGNOSIS — E66.813 CLASS 3 SEVERE OBESITY DUE TO EXCESS CALORIES WITH SERIOUS COMORBIDITY AND BODY MASS INDEX (BMI) OF 45.0 TO 49.9 IN ADULT (H): ICD-10-CM

## 2024-07-05 DIAGNOSIS — Z71.3 NUTRITIONAL COUNSELING: Primary | ICD-10-CM

## 2024-07-05 DIAGNOSIS — E11.9 TYPE 2 DIABETES MELLITUS WITHOUT COMPLICATION, UNSPECIFIED WHETHER LONG TERM INSULIN USE (H): ICD-10-CM

## 2024-07-05 DIAGNOSIS — E66.01 CLASS 3 SEVERE OBESITY DUE TO EXCESS CALORIES WITH SERIOUS COMORBIDITY AND BODY MASS INDEX (BMI) OF 45.0 TO 49.9 IN ADULT (H): ICD-10-CM

## 2024-07-05 PROCEDURE — 99207 PR NO CHARGE LOS: CPT | Mod: 95

## 2024-07-05 PROCEDURE — 97804 MEDICAL NUTRITION GROUP: CPT | Mod: 95

## 2024-07-05 NOTE — NURSING NOTE
Is the patient currently in the state of [place]? YES    Visit mode:VIDEO    If the visit is dropped, the patient can be reconnected by: VIDEO VISIT: Send to e-mail at: [email or web address]    Will anyone else be joining the visit? NO  (If patient encounters technical issues they should call [phone]    How would you like to obtain your AVS? MyChart    Are changes needed to the allergy or medication list? No    Are refills needed on medications prescribed by this physician? NO    Reason for visit: Nurse Visit    Radha MENDOZA

## 2024-07-05 NOTE — PATIENT INSTRUCTIONS
Juan J Haines,    Please follow-up with dietitian on Monday, August 5th at 10:00 am with Sharon Osborn.    Thank you,    Sophia Doshi, MPS, RD, LD  If you need to schedule or reschedule, please call [phone].    Nutrition Goals:  Relating To Eating:  - Eat slowly (20-30 minutes per meal), chewing foods well (25 chews per bite/applesauce consistency)  - Focus on eating smaller portion sizes at meals and snacks    Relating to beverages:  - Reduce caffeine/carbonation/calorie containing beverages  - Separate fluids from meals by 30 minutes before, during, and after eating  - Drink 48-64 ounces of fluid per day. Small, frequent sips between meals.    Relating to activity:  Increase activity as able      Protein Sources for Weight Loss  [email or web address]     Carbohydrates  [email or web address]     Mindful Eating  [email or web address]     Post-op Diet Handouts:  Diet Guidelines after Weight-loss Surgery  [email or web address]     Your Stage 1 Diet: Clear Liquids  [email or web address]     Your Stage 2 Diet: Low-fat Full Liquids  [email or web address]     Your Stage 3 Diet: Pureed Foods  [email or web address]     Pureed Recipes  [email or web address]    Your Stage 4 Diet: Soft Foods  [email or web address]    Your Stage 5 Diet: Regular Foods  [email or web address]    Supplements after Sleeve Gastrectomy, Gastric Bypass or Single Anastomosis Duodenal Switch  [email or web address]    Keeping Track of Fluids  [email or web address]

## 2024-07-18 ENCOUNTER — OFFICE VISIT (OUTPATIENT)
Dept: ENDOCRINOLOGY | Facility: CLINIC | Age: 54
End: 2024-07-18
Payer: COMMERCIAL

## 2024-07-18 VITALS
WEIGHT: 292 LBS | OXYGEN SATURATION: 99 % | SYSTOLIC BLOOD PRESSURE: 143 MMHG | BODY MASS INDEX: 49.85 KG/M2 | HEIGHT: 64 IN | DIASTOLIC BLOOD PRESSURE: 91 MMHG | HEART RATE: 102 BPM

## 2024-07-18 DIAGNOSIS — E66.01 CLASS 3 SEVERE OBESITY DUE TO EXCESS CALORIES WITH BODY MASS INDEX (BMI) OF 50.0 TO 59.9 IN ADULT, UNSPECIFIED WHETHER SERIOUS COMORBIDITY PRESENT (H): Primary | ICD-10-CM

## 2024-07-18 DIAGNOSIS — E66.813 CLASS 3 SEVERE OBESITY DUE TO EXCESS CALORIES WITH BODY MASS INDEX (BMI) OF 50.0 TO 59.9 IN ADULT, UNSPECIFIED WHETHER SERIOUS COMORBIDITY PRESENT (H): Primary | ICD-10-CM

## 2024-07-18 PROCEDURE — 99214 OFFICE O/P EST MOD 30 MIN: CPT | Performed by: SURGERY

## 2024-07-18 ASSESSMENT — PAIN SCALES - GENERAL: PAINLEVEL: NO PAIN (0)

## 2024-07-18 NOTE — LETTER
"7/18/2024       RE: Yancy Hoover  [address]  [facility] [address]     Dear Colleague,    Thank you for referring your patient, Yancy Hoover, to the [facility] [place] at [facility]. Please see a copy of my visit note below.    Dear Dr. Ewing,       I had the pleasure of meeting with your patient Yancy Hoover in our weight loss surgery office.  This patient is a 53 year old female who has been undergoing our thorough preoperative screening process in anticipation of potential bariatric surgery.    At initial evaluation we recorded Yancy Hoover's height of 5' 4.488\", a weight of 294 lbs 0 oz, and calculated Body mass index is 49.7 kg/m  .  The patient has been unsuccessful with other methods of permanent weight loss and suffers from multiple weight related medical conditions.  Due to lack of success in achieving weight loss through other methods, she is interested in undergoing bariatric surgery.    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    Was a smoker.    Typical CVD RF include T2DM, HTN, HLD; has LANDON as well.    PREVIOUS WEIGHT LOSS ATTEMPTS:      5/1/2024    12:23 PM   --   I have tried the following methods to lose weight Watching portions or calories    Exercise    Weight Watchers    Prescription Medications       CO-MORBIDITIES OF OBESITY INCLUDE:      5/1/2024    12:23 PM   --   I have the following health issues associated with obesity Type II Diabetes    High Blood Pressure    High Cholesterol    Sleep Apnea       VITALS:  BP (!) 143/91 (BP Location: Left arm, Patient Position: Sitting, Cuff Size: Adult Regular)   Pulse 102   Ht 1.626 m (5' 4\")   Wt 132.5 kg (292 lb)   SpO2 99%   BMI 50.12 kg/m      PE:  GENERAL: Alert and oriented x3. NAD  HEENT exam: Sclerae not icteric. Hearing good. Head normocephalic and " atraumatic.   CARDIOVASCULAR: No JVD  RESPIRATORY: Breathing unlabored  GASTROINTESTINAL: Obese  LOWER EXTREMITIES: no deformities  MUSCULOSKELETAL: Normal gait, Moves all 4 extremities equal and strong  NEUROLOGIC: no gross defect  SKIN: warm and dry to touch     In summary, she has undergone an appropriate medical evaluation, dietitian evaluation, as well as psychologic screening. The patient appears to be an appropriate candidate for bariatric surgery.    In the office today, I discussed the laparoscopic gastric sleeve surgery.  Risks, benefits and anticipated outcomes were outlined including the risk of death, staple line leak (1-2%), PE, DVT, ulcer, worsening GERD, N/V, stricture, hernia, wound infection, weight regain, and vitamin deficiencies. This patient has a good chance of sustaining permanent weight loss due to this procedure.  This should also allow improvement if not resolution of his/her weight related medical conditions.    At present we are going to present your patient's file for prior authorization to insurance.  Pending prior authorization, I anticipate a surgical date in the near future.  We will keep you updated on any progress.  If you have questions regarding care please feel free to contact me.  Total time spent in the clinic was 30 minutes with greater than 50% in face-to-face consultation.        Sincerely,    Robbie Alcaraz MD    Please route or send letter to:  Primary Care Provider (PCP) and Referring Provider

## 2024-07-18 NOTE — NURSING NOTE
"(   Chief Complaint   Patient presents with    New Patient     Meet and greet    )    ( Weight: 132.5 kg (292 lb) )  ( Height: 162.6 cm (5' 4\") )  ( BMI (Calculated): 50.12 )  (   )  (   )  (   )  (   )  (   )  (   )    ( BP: (!) 143/91 )  (   )  (   )  (   )  ( Pulse: 102 )  (   )  ( SpO2: 99 % )    (   Patient Active Problem List   Diagnosis    LANDON (obstructive sleep apnea)    Class 3 severe obesity due to excess calories with body mass index (BMI) of 50.0 to 59.9 in adult, unspecified whether serious comorbidity present (H)    Type 2 diabetes mellitus without complication, unspecified whether long term insulin use (H)    )  (   Current Outpatient Medications   Medication Sig Dispense Refill    alcohol swab prep pads Use to swab area of injection/shahla as directed. 100 each 5    aspirin (ASA) 81 MG chewable tablet Take 81 mg by mouth daily      blood glucose (ACCU-CHEK SMARTVIEW) test strip Use to test blood sugar 2 times daily. 100 strip 5    blood glucose calibration (ACCU-CHEK SMARTVIEW CONTROL) solution Use to calibrate blood glucose monitor as directed. 1 Bottle 3    blood glucose monitoring (ACCU-CHEK FASTCLIX) lancets Use to test blood sugar 1-2 times daily or as directed. 102 each 5    blood glucose monitoring (ACCU-CHEK FAWAD SMARTVIEW) meter device kit Use to test blood sugar 1-2 times daily. 1 kit 0    childrens multivitamin w/iron (FLINTSTONES COMPLETE) 18 MG chewable tablet Take 1 chew tab by mouth daily      metFORMIN (GLUCOPHAGE XR) 500 MG 24 hr tablet Take 4 tablets (2,000 mg) by mouth daily with food 360 tablet 3    omeprazole (PRILOSEC) 20 MG DR capsule Take 1 capsule (20 mg) by mouth daily 90 capsule 2    polyethylene glycol (MIRALAX) 17 GM/Dose powder Take 1 Capful by mouth daily as needed for constipation      rosuvastatin (CRESTOR) 20 MG tablet Take 1 tablet (20 mg) by mouth daily      Semaglutide, 2 MG/DOSE, (OZEMPIC) 8 MG/3ML pen Inject 2 mg Subcutaneous every 7 days 3 mL 2    topiramate " (TOPAMAX) 100 MG tablet Take 1 tablet by mouth at bedtime      verapamil ER (VERELAN) 240 MG 24 hr capsule Take 240 mg by mouth      vitamin D3 (CHOLECALCIFEROL) 50 mcg (2000 units) tablet Take 1 tablet by mouth daily      )  ( Diabetes Eval:    )    ( Pain Eval:  No Pain (0) )    ( Wound Eval:       )    (   History   Smoking Status    Former    Types: Cigarettes   Smokeless Tobacco    Never    )    ( Signed By:  Eris Borrero, EMT; July 18, 2024; 7:29 AM )

## 2024-07-18 NOTE — PROGRESS NOTES
"Dear Dr. Ewing,       I had the pleasure of meeting with your patient Yancy Hoover in our weight loss surgery office.  This patient is a 53 year old female who has been undergoing our thorough preoperative screening process in anticipation of potential bariatric surgery.    At initial evaluation we recorded Yancy Hoover's height of 5' 4.488\", a weight of 294 lbs 0 oz, and calculated Body mass index is 49.7 kg/m  .  The patient has been unsuccessful with other methods of permanent weight loss and suffers from multiple weight related medical conditions.  Due to lack of success in achieving weight loss through other methods, she is interested in undergoing bariatric surgery.    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    Was a smoker.    Typical CVD RF include T2DM, HTN, HLD; has LANDON as well.    PREVIOUS WEIGHT LOSS ATTEMPTS:      5/1/2024    12:23 PM   --   I have tried the following methods to lose weight Watching portions or calories    Exercise    Weight Watchers    Prescription Medications       CO-MORBIDITIES OF OBESITY INCLUDE:      5/1/2024    12:23 PM   --   I have the following health issues associated with obesity Type II Diabetes    High Blood Pressure    High Cholesterol    Sleep Apnea       VITALS:  BP (!) 143/91 (BP Location: Left arm, Patient Position: Sitting, Cuff Size: Adult Regular)   Pulse 102   Ht 1.626 m (5' 4\")   Wt 132.5 kg (292 lb)   SpO2 99%   BMI 50.12 kg/m      PE:  GENERAL: Alert and oriented x3. NAD  HEENT exam: Sclerae not icteric. Hearing good. Head normocephalic and atraumatic.   CARDIOVASCULAR: No JVD  RESPIRATORY: Breathing unlabored  GASTROINTESTINAL: Obese  LOWER EXTREMITIES: no deformities  MUSCULOSKELETAL: Normal gait, Moves all 4 extremities equal and strong  NEUROLOGIC: no gross defect  SKIN: warm and dry to touch     In summary, she has undergone an appropriate medical evaluation, dietitian " evaluation, as well as psychologic screening. The patient appears to be an appropriate candidate for bariatric surgery.    In the office today, I discussed the laparoscopic gastric sleeve surgery.  Risks, benefits and anticipated outcomes were outlined including the risk of death, staple line leak (1-2%), PE, DVT, ulcer, worsening GERD, N/V, stricture, hernia, wound infection, weight regain, and vitamin deficiencies. This patient has a good chance of sustaining permanent weight loss due to this procedure.  This should also allow improvement if not resolution of his/her weight related medical conditions.    At present we are going to present your patient's file for prior authorization to insurance.  Pending prior authorization, I anticipate a surgical date in the near future.  We will keep you updated on any progress.  If you have questions regarding care please feel free to contact me.  Total time spent in the clinic was 30 minutes with greater than 50% in face-to-face consultation.        Sincerely,    Robbie Alcaraz MD    Please route or send letter to:  Primary Care Provider (PCP) and Referring Provider

## 2024-08-05 ENCOUNTER — VIRTUAL VISIT (OUTPATIENT)
Dept: ENDOCRINOLOGY | Facility: CLINIC | Age: 54
End: 2024-08-05
Payer: COMMERCIAL

## 2024-08-05 VITALS — HEIGHT: 64 IN | BODY MASS INDEX: 49.34 KG/M2 | WEIGHT: 289 LBS

## 2024-08-05 DIAGNOSIS — Z71.3 NUTRITIONAL COUNSELING: Primary | ICD-10-CM

## 2024-08-05 DIAGNOSIS — E11.9 TYPE 2 DIABETES MELLITUS WITHOUT COMPLICATION, UNSPECIFIED WHETHER LONG TERM INSULIN USE (H): ICD-10-CM

## 2024-08-05 DIAGNOSIS — E66.01 CLASS 3 SEVERE OBESITY DUE TO EXCESS CALORIES WITH SERIOUS COMORBIDITY AND BODY MASS INDEX (BMI) OF 45.0 TO 49.9 IN ADULT (H): ICD-10-CM

## 2024-08-05 DIAGNOSIS — E66.813 CLASS 3 SEVERE OBESITY DUE TO EXCESS CALORIES WITH SERIOUS COMORBIDITY AND BODY MASS INDEX (BMI) OF 45.0 TO 49.9 IN ADULT (H): ICD-10-CM

## 2024-08-05 PROCEDURE — 97803 MED NUTRITION INDIV SUBSEQ: CPT | Mod: 95 | Performed by: DIETITIAN, REGISTERED

## 2024-08-05 PROCEDURE — 99207 PR NO CHARGE LOS: CPT | Mod: 95 | Performed by: DIETITIAN, REGISTERED

## 2024-08-05 RX ORDER — MECLIZINE HYDROCHLORIDE 25 MG/1
25 TABLET ORAL
COMMUNITY
Start: 2024-07-18

## 2024-08-05 ASSESSMENT — PAIN SCALES - GENERAL: PAINLEVEL: NO PAIN (0)

## 2024-08-05 NOTE — PROGRESS NOTES
"Video-Visit Details    Type of service:  Video Visit    Video Start Time: 10:02 AM  Video End Time: 10:37 AM    Originating Location (pt. Location): Home    Distant Location (provider location):  Offsite (providers home) [facility] [place]     Platform used for Video Visit: [facility]      Bariatric Nutrition Consultation Note     Reason For Visit: Nutrition Assessment     Yancy Hoover is a 53 year old presenting today for return bariatric nutrition consult. Pt is interested in laparoscopic sleeve gastrectomy with Dr. Alcaraz expected surgery in TBD.  Patient is accompanied by self. This is patients 4th out of 3 required nutrition visits prior to surgery. Completed Weight Loss Surgery Nutrition Class on 7/5/24.     Coordination note: working on setting up psych eval. Has completed 4 out of 5 HP calls.      Pt referred by Jeannie Noe NP on July 16, 2020 and Dr. Velarde.  Patient with Co-morbidities of obesity including:  Type II DM yes   Renal Failure no  Sleep apnea no  Hypertension no   Dyslipidemia yes  Joint pain no  Back pain no  GERD no      Support System Reviewed With Patient 7/16/2020   Who do you have in your support network that can be available to help you for the first 2 weeks after surgery? Yes   Who can you count on for support throughout your weight loss surgery journey? Yes         ANTHROPOMETRICS:  Initial visit:  Estimated body mass index is 50.81 kg/m  as calculated from the following:    Height as of an earlier encounter on 7/16/20: 1.626 m (5' 4\").    Weight as of an earlier encounter on 7/16/20: 134.3 kg (296 lb).     Current weight:   Estimated body mass index is 49.58 kg/m  as calculated from the following:    Height as of this encounter: 1.626 m (5' 4.02\").    Weight as of this encounter: 131.1 kg (289 lb). (-7 lbs from initial)      Required weight loss goal pre-op: -10 lbs from initial consult weight (goal weight 286 lbs or less before surgery)   "   Wt Readings from Last 5 Encounters:   08/05/24 131.1 kg (289 lb)   07/18/24 132.5 kg (292 lb)   07/05/24 131.1 kg (289 lb)   07/03/24 131.5 kg (290 lb)   07/03/24 131.1 kg (289 lb)             7/16/2020   I have tried the following methods to lose weight Watching portions or calories, Exercise, Weight Watchers, Atkins type diet (low carb/high protein), Slimfast         Weight Loss Questions Reviewed With Patient 7/16/2020   How long have you been overweight? Since early childhood         SUPPLEMENT INFORMATION:  Vitamin D (h/o vitamin D deficiency), Flinstones complete with iron      MEDICATIONS FOR WEIGHT LOSS:  Topiramate, Ozempic, metformin      NUTRITION HISTORY:   No known food allergies/intolerances. Eliminated red meat.      4/23/24 - Pt reports she had a mini stroke about a year ago. Wt went up to 336 lbs. Focused on more of a Mediterranean diet post-stroke and helped to lose weight to 296 lbs. Has been stalled for past 6 months with further weight loss. Working on keeping to 3 meals daily, no snacks. And not eating past 7 pm. Challenged by sweet cravings at times.   Period where she couldn't use her right leg, has gained use of leg back. Working on walking 3-5 times weekly for 30 mins.   Practicing separation of beverages from meals.   Started Saxenda on last Tues. No side effects.     6/4/24 - Weight stable over last month. Planning to switch to semaglutide to see if help with more weight loss.   Less sweet food cravings, but continued pop cravings. Doing a lot less from previous. May have a pop weekly.  Using Sparkling Ice as sugar-free options.     7/5/24 - Completed Weight Loss Surgery Nutrition class     Today - Pt reports she started Ozempic, but is experiencing significant diarrhea. On 2 mg currently. Was supposed to take injection on Saturday but decided not to.  PharmD suggested dropping down to 1 mg, reviewed with pt today. Has follow-up with bariatric NP on 8/13.      Recent Diet Recall:    Breakfast: Premier protein shake with strawberries or jeannine   11 am-12 pm salad with balsamic, feta cheese/eggs, and skinless chicken thigh  Dinner: 6 pm grilled meat and veggies   Beverages: water (96 oz/day)      Progress Towards Previous Goals:  1. Balanced afternoon snack - 1/4 cup nuts and sliced veggies - Not needing snacks anymore, just eating meals. Feeling fed between meals. If hungry she may have nuts or berries or protein shake  2. Pair protein food with high-fiber foods (whole grains, veggies, fruit) - Met, continues   3. Keep working on sugar-free hydration only. Reduce carbonation. - Met, continues   4. Morning walk three days weekly. - Not met lately, was having significant dizziness from low BP and vertigo from ear problems. Reduced BP meds and started on vertigo meds. Took a walk yesterday and felt ok.   5. Practice diet guidelines for surgery. - Continues to work on  beverages, hardest change for her.     Physical Activity:  Previously was walking 5 days weekly. Only walking 1-2 days weekly recently.      NUTRITION DIAGNOSIS:  Obesity r/t long history of self-monitoring deficit and excessive energy intake aeb BMI >30 kg/m2. - Improving/continues     INTERVENTION:  - Reviewed post-op diet guidelines, ways to help prepare for post-op diet guidelines pre-operatively, portion/calorie-control, mindful eating and sources of protein.   - Praised pt on progress she has made  - Co-developed goals to work towards.   - Provided pt with list of goals RD contact information.         Patient Understanding: good  Expected Compliance: good     GOALS:  1. Pair protein food with high-fiber foods (whole grains, veggies, fruit)  2. Keep working on sugar-free hydration only. Eliminate carbonation.   3. Morning walk as able  4. Practice diet guidelines for surgery.   5. Complete labs (fasting) - PTH, vitamin D, vitamin A, B12, ferritin, lipids     Recipes for whole  grains:  [email or web address]  [email or web address]/  [email or web address].[email or web address]/[email or web address]    The Plate Method  [email or web address]    Protein Sources for Weight Loss  [email or web address]     Carbohydrates  [email or web address]     Mindful Eating  [email or web address]     Summary of Volumetrics Eating Plan  [email or web address]     Diet Guidelines after Weight Loss Surgery  [email or web address]     Seated Exercises for Arms and Legs (can be done before or after surgery)  [email or web address]    Time spent with patient: 35 mins       Sharon Osborn RD, LD

## 2024-08-05 NOTE — LETTER
"8/5/2024       RE: Yancy Hoover  [address]  [facility] [address]     Dear Colleague,    Thank you for referring your patient, Yancy Hoover, to the [facility] [place] at [facility]. Please see a copy of my visit note below.    Video-Visit Details    Type of service:  Video Visit    Video Start Time: 10:02 AM  Video End Time: 10:37 AM    Originating Location (pt. Location): Home    Distant Location (provider location):  Offsite (providers home) [facility] [place]     Platform used for Video Visit: AmWell      Bariatric Nutrition Consultation Note     Reason For Visit: Nutrition Assessment     Yancy Hoover is a 53 year old presenting today for return bariatric nutrition consult. Pt is interested in laparoscopic sleeve gastrectomy with Dr. Alcaraz expected surgery in D.  Patient is accompanied by self. This is patients 4th out of 3 required nutrition visits prior to surgery. Completed Weight Loss Surgery Nutrition Class on 7/5/24.     Coordination note: working on setting up psych eval. Has completed 4 out of 5 HP calls.      Pt referred by Jeannie Noe NP on July 16, 2020 and Dr. Velarde.  Patient with Co-morbidities of obesity including:  Type II DM yes   Renal Failure no  Sleep apnea no  Hypertension no   Dyslipidemia yes  Joint pain no  Back pain no  GERD no      Support System Reviewed With Patient 7/16/2020   Who do you have in your support network that can be available to help you for the first 2 weeks after surgery? Yes   Who can you count on for support throughout your weight loss surgery journey? Yes         ANTHROPOMETRICS:  Initial visit:  Estimated body mass index is 50.81 kg/m  as calculated from the following:    Height as of an earlier encounter on 7/16/20: 1.626 m (5' 4\").    Weight as of an earlier encounter on 7/16/20: 134.3 kg (296 lb).   " "  Current weight:   Estimated body mass index is 49.58 kg/m  as calculated from the following:    Height as of this encounter: 1.626 m (5' 4.02\").    Weight as of this encounter: 131.1 kg (289 lb). (-7 lbs from initial)      Required weight loss goal pre-op: -10 lbs from initial consult weight (goal weight 286 lbs or less before surgery)     Wt Readings from Last 5 Encounters:   08/05/24 131.1 kg (289 lb)   07/18/24 132.5 kg (292 lb)   07/05/24 131.1 kg (289 lb)   07/03/24 131.5 kg (290 lb)   07/03/24 131.1 kg (289 lb)             7/16/2020   I have tried the following methods to lose weight Watching portions or calories, Exercise, Weight Watchers, Atkins type diet (low carb/high protein), Slimfast         Weight Loss Questions Reviewed With Patient 7/16/2020   How long have you been overweight? Since early childhood         SUPPLEMENT INFORMATION:  Vitamin D (h/o vitamin D deficiency), Flinstones complete with iron      MEDICATIONS FOR WEIGHT LOSS:  Topiramate, Ozempic, metformin      NUTRITION HISTORY:   No known food allergies/intolerances. Eliminated red meat.      4/23/24 - Pt reports she had a mini stroke about a year ago. Wt went up to 336 lbs. Focused on more of a Mediterranean diet post-stroke and helped to lose weight to 296 lbs. Has been stalled for past 6 months with further weight loss. Working on keeping to 3 meals daily, no snacks. And not eating past 7 pm. Challenged by sweet cravings at times.   Period where she couldn't use her right leg, has gained use of leg back. Working on walking 3-5 times weekly for 30 mins.   Practicing separation of beverages from meals.   Started Saxenda on last Tues. No side effects.     6/4/24 - Weight stable over last month. Planning to switch to semaglutide to see if help with more weight loss.   Less sweet food cravings, but continued pop cravings. Doing a lot less from previous. May have a pop weekly.  Using Sparkling Ice as sugar-free options.     7/5/24 - Completed " Weight Loss Surgery Nutrition class     Today - Pt reports she started Ozempic, but is experiencing significant diarrhea. On 2 mg currently. Was supposed to take injection on Saturday but decided not to.  PharmD suggested dropping down to 1 mg, reviewed with pt today. Has follow-up with bariatric NP on 8/13.      Recent Diet Recall:   Breakfast: Premier protein shake with strawberries or jeannine   11 am-12 pm salad with balsamic, feta cheese/eggs, and skinless chicken thigh  Dinner: 6 pm grilled meat and veggies   Beverages: water (96 oz/day)      Progress Towards Previous Goals:  1. Balanced afternoon snack - 1/4 cup nuts and sliced veggies - Not needing snacks anymore, just eating meals. Feeling fed between meals. If hungry she may have nuts or berries or protein shake  2. Pair protein food with high-fiber foods (whole grains, veggies, fruit) - Met, continues   3. Keep working on sugar-free hydration only. Reduce carbonation. - Met, continues   4. Morning walk three days weekly. - Not met lately, was having significant dizziness from low BP and vertigo from ear problems. Reduced BP meds and started on vertigo meds. Took a walk yesterday and felt ok.   5. Practice diet guidelines for surgery. - Continues to work on  beverages, hardest change for her.     Physical Activity:  Previously was walking 5 days weekly. Only walking 1-2 days weekly recently.      NUTRITION DIAGNOSIS:  Obesity r/t long history of self-monitoring deficit and excessive energy intake aeb BMI >30 kg/m2. - Improving/continues     INTERVENTION:  - Reviewed post-op diet guidelines, ways to help prepare for post-op diet guidelines pre-operatively, portion/calorie-control, mindful eating and sources of protein.   - Praised pt on progress she has made  - Co-developed goals to work towards.   - Provided pt with list of goals RD contact information.         Patient Understanding: good  Expected Compliance: good     GOALS:  1. Pair protein food  with high-fiber foods (whole grains, veggies, fruit)  2. Keep working on sugar-free hydration only. Eliminate carbonation.   3. Morning walk as able  4. Practice diet guidelines for surgery.   5. Complete labs (fasting) - PTH, vitamin D, vitamin A, B12, ferritin, lipids     Recipes for whole grains:  https://www.[employer]/arugula-salad-with-penne-garbanzo-beans/  https://www.[employer] Div/lemon-asparagus-couscous-salad-with/  https://[employer].New.net/recipes    The Plate Method  [email or web address]://[email or web address]    Protein Sources for Weight Loss  http://[email or web address]     Carbohydrates  http://[email or web address].com/[email or web address]     Mindful Eating  http://[employer]/967968.pdf     Summary of Volumetrics Eating Plan  http://[email or web address]/[email or web address]     Diet Guidelines after Weight Loss Surgery  http://[email or web address]     Seated Exercises for Arms and Legs (can be done before or after surgery)  http://www.[email or web address]/180548.pdf    Time spent with patient: 35 mins       Sharon Osborn RD, LD        Again, thank you for allowing me to participate in the care of your patient.      Sincerely,    Sharon Osborn RD

## 2024-08-05 NOTE — PATIENT INSTRUCTIONS
Juan J Haines,    Follow-up with RD on 9/19    Thank you,    Sharon Osborn, RD, LD  If you would like to schedule or reschedule an appointment with the RD, please call [phone]    Nutrition Goals  1. Pair protein food with high-fiber foods (whole grains, veggies, fruit)  2. Keep working on sugar-free hydration only. Eliminate carbonation.   3. Morning walk as able  4. Practice diet guidelines for surgery.   5. Complete labs (fasting) - PTH, vitamin D, vitamin A, B12, ferritin, lipids     Recipes for whole grains:  https://www.[employer]/arugula-salad-with-penne-garbanzo-beans/  https://www.[employer]/lemon-asparagus-couscous-salad-with/  https://Isentropic.Chatosity/recipes    The Plate Method  https://[email or web address]/501279.[email or web address]    Protein Sources for Weight Loss  http://[email or web address].com/946908.pdf     Carbohydrates  http://[email or web address]/284009.[email or web address]     Mindful Eating  http://[employer]/552749.pdf     Summary of Volumetrics Eating Plan  http://[email or web address]/658906.[email or web address]     Diet Guidelines after Weight Loss Surgery  http://[email or web address]/187549.[email or web address]     Seated Exercises for Arms and Legs (can be done before or after surgery)  http://www.[email or web address]/302865.pdf        COMPREHENSIVE WEIGHT MANAGEMENT PROGRAM  VIRTUAL SUPPORT GROUPS    At [facility], our Comprehensive Weight Management program offers on-line support groups for patients who are working on weight loss and considering, preparing for, or have had weight loss surgery.     There is no cost for this opportunity.  You are invited to attend the?Virtual Support Groups?provided by any of the following locations:    [facility] via AktiV[employer] Teams with Larisa Werner RN  2.   [place] via [employer] Teams with Dariusz Yang, PhD, LP  3.   [place] via [employer] Teams with Darya Cuello RN  4.   [facility] via a Zoom Meeting with JOSE Sams-    The following Support Group information can also be found on our  "website:  [email or web address]      [facility] Weight Loss Surgery Support Group  The support group is a patient-lead forum that meets monthly to share experiences, encouragement and education. It is open to those who have had weight loss surgery, are scheduled for surgery, or are considering surgery.   WHEN: This group meets on the 3rd Wednesday of each month from 5:00PM - 6:00PM virtually using Microsoft Teams.   FACILITATOR: Led by Larisa Cuba, ALISON, LD, RN, the program's Clinical Coordinator.   TO REGISTER: Please contact the clinic via G[employer]vine Talk or call the nurse line directly at [phone] to inform our staff that you would like an invite sent to you and to let us know the email you would like the invite sent to. Prior to the meeting, a link with directions on how to join the meeting will be sent to you.    2023 and 2024 Meetings   December 20  January 17  February 21  March 20  April 17  May 15  Rhonda 19      [facility] Bariatric Care Support Group?  This is open to all pre- and post- operative bariatric surgery patients as well as their support system.   WHEN: This group meets the 3rd Tuesday of each month from 6:30 PM - 8:00 PM virtually using Microsoft Teams.   FACILITATOR: Led by Dariusz Yang, Ph.D who is a Licensed Psychologist with the [facility] Comprehensive Weight Management Program.   TO REGISTER: Please send an email to Dariusz Yang, Ph.D., LP at?[email or web address]?if you would like an invitation to the group. Prior to the meeting, a link with directions on how to join the meeting will be sent to you.    2023 and 2024 Meetings  December 19 January 16: \"Medication Management and Bariatric Surgery\", Virginia Buchanan, PharmD, Pharmacy Resident at [facility]  February 20: \"A Bariatric Surgery Patient's Perspective\", Sofi" "NORMAN Flores, LICSW, Behavioral Health Clinician at [facility]  March 19  April 16  May 21  Rhonda 18: \"Nutritional Labeling\", Dietitian speaker to be announced.  November 19: \"Holiday Eating\", Dietitian speaker to be announced.    [facility] Post-Operative Bariatric Surgery Support Group  This is a support group for [facility] bariatric patients (and those external to [facility]) who have had bariatric surgery and are at least 3 months post-surgery.  WHEN: This support group meets the 4th Wednesday of the month from 11:00 AM - 12:00 PM virtually using Microsoft Teams.   FACILITATOR: Led by Certified Bariatric Nurse, Darya Cuello RN.   TO REGISTER: Please send an email to Darya at [email or web address] if you would like an invitation to the group.  Prior to the meeting, a link with directions on how to join the meeting will be sent to you.    2023 and 2024 Meetings  December 27  January 24  February 28  March 27  April 24  May 22  Rhonda 26    [facility] Healthy Lifestyle Group?  This is a 60 minute virtual coaching group for those who want to lead a healthier lifestyle. Come together to set goals and overcome barriers in a supportive group environment. We will address the four pillars of health: nutrition, exercise, sleep and emotional well-being.  This group is highly recommended for those who are participating in the 24 week Healthy Lifestyle Plan and our Health Coaching sessions.  WHEN: This group meets the 1st Friday of the month, 12:30 PM - 1:30 PM online, via a zoom meeting.    FACILITATOR: Led by National Board Certified Health and , Darya Mccloud [facility].   TO REGISTER: Please call the Call Center at [phone] to register.  You will get an appointment to attend in OpenCounter. Fifteen minutes prior to the meeting, complete the e-check in and you will get the " "link to join the meeting.    There is no charge to attend this group and space is limited.     2023 and 2024 Meetings  December 1: \"Let's Talk\" (guided discussion on our wins and challenges)  January 5: \"New Years Vision: Manifest your Best 2024!\" (guided imagery,  journaling and discussion)  February 2: \"Let's Talk\"  March 1: \"10 Percent Happier\" by Jose Manuel Segovia (Book Bites - a guided discussion on the nuggets of wisdom from favorite wellness books, no need to read the book but highly encouraged)  April 5: \"Let's Talk\"  May 3: \"Essentialism: The Disciplined Pursuit of Less\" by Diego Amaya (Book Bites discussion)  June 7: \"Let's Talk\"  July 5: NO MEETING, off for the 4th of July Holiday  August 2: \"The Blue Zones, Secrets for Living a Longer Life\" by Jose Manuel Beverly (Book Bites discussion)                    "

## 2024-08-10 ENCOUNTER — HEALTH MAINTENANCE LETTER (OUTPATIENT)
Age: 54
End: 2024-08-10

## 2024-08-13 ENCOUNTER — OFFICE VISIT (OUTPATIENT)
Dept: ENDOCRINOLOGY | Facility: CLINIC | Age: 54
End: 2024-08-13
Payer: COMMERCIAL

## 2024-08-13 VITALS
WEIGHT: 289.6 LBS | HEART RATE: 86 BPM | OXYGEN SATURATION: 100 % | SYSTOLIC BLOOD PRESSURE: 127 MMHG | BODY MASS INDEX: 48.25 KG/M2 | DIASTOLIC BLOOD PRESSURE: 81 MMHG | HEIGHT: 65 IN

## 2024-08-13 DIAGNOSIS — E11.9 TYPE 2 DIABETES MELLITUS WITHOUT COMPLICATION, UNSPECIFIED WHETHER LONG TERM INSULIN USE (H): Primary | ICD-10-CM

## 2024-08-13 DIAGNOSIS — E66.01 CLASS 3 SEVERE OBESITY DUE TO EXCESS CALORIES WITH BODY MASS INDEX (BMI) OF 50.0 TO 59.9 IN ADULT, UNSPECIFIED WHETHER SERIOUS COMORBIDITY PRESENT (H): ICD-10-CM

## 2024-08-13 DIAGNOSIS — E66.813 CLASS 3 SEVERE OBESITY DUE TO EXCESS CALORIES WITH BODY MASS INDEX (BMI) OF 50.0 TO 59.9 IN ADULT, UNSPECIFIED WHETHER SERIOUS COMORBIDITY PRESENT (H): ICD-10-CM

## 2024-08-13 PROCEDURE — G2211 COMPLEX E/M VISIT ADD ON: HCPCS | Performed by: NURSE PRACTITIONER

## 2024-08-13 PROCEDURE — 99214 OFFICE O/P EST MOD 30 MIN: CPT | Performed by: NURSE PRACTITIONER

## 2024-08-13 ASSESSMENT — PAIN SCALES - GENERAL: PAINLEVEL: NO PAIN (0)

## 2024-08-13 NOTE — Clinical Note
Heads up. Yancy really wants to switch to bydureon. She had skipped last dose of ozempic because of side effects. She feels she had GI symptoms from early doses and never really had appetite support even at high doses - when compared to what she remembers when taking bydureon. We discussed the reasons we were using semaglutide and she was reluctantly willing to restart at lower doses but was preferring to try bydureon. I sent in bydureon - I told her I'd pass along :)

## 2024-08-13 NOTE — Clinical Note
2024       RE: Yancy Hoover  [address]  [facility] [address]     Dear Colleague,    Thank you for referring your patient, Yancy Hoover, to the [facility]NA[facility] [place] at [facility]. Please see a copy of my visit note below.      Pre-Bariatric Surgery Note    Arlin Ewing    Date: 2024     RE: Yancy Hoover    MR#: [id number]   : 1970   Date of Visit: Aug 13, 2024    REASON FOR VISIT: Preoperative evaluation for possible weight loss surgery    Dear Arlin Crook,    I had the pleasure of seeing your patient, Yancy Hoover, in my preoperative bariatric clinic.    As you know, she has morbid obesity and is considering weight loss surgery to treat obesity in association with her medical conditions of obesity.  Her consult weight was 294.  She has lost 5 pounds since her consult weight. She has not met her required pre-surgery weight. Please refer to initial consult note from date 2024 for patient's weight history and co-morbidities.    NBS 2024 49.7   RUQ   Stopped [employer] partner phone calls- last one scheduled for tomorrow   Psych eval scheduled for October   PCP follow up end      Assessment & Plan  Problem List Items Addressed This Visit    None       Reviewed tasklist with patient ***    Most recent weights:  Wt Readings from Last 4 Encounters:   24 131.4 kg (289 lb 9.6 oz)   24 131.1 kg (289 lb)   24 132.5 kg (292 lb)   24 131.1 kg (289 lb)         ROS    Past Medical History:   Diagnosis Date    Cerebral infarction (H) 2022    DM (diabetes mellitus), type 2 (H)     History of diabetes mellitus     LANDON (obstructive sleep apnea) 2020       Past Surgical History:   Procedure Laterality Date    COLONOSCOPY  2023    ESOPHAGOSCOPY, GASTROSCOPY, DUODENOSCOPY (EGD), COMBINED N/A 2023    Procedure:  ESOPHAGOGASTRODUODENOSCOPY, WITH BIOPSY;  Surgeon: Jassi Thomason MD;  Location:  GI    GYN SURGERY      c  section 2014    LAPAROSCOPIC CYSTECTOMY OVARIAN (BENIGN) Left 01/23/2020    Procedure: LAPAROSCOPIC ovarian cystectomy,;  Surgeon: Sofi Cuevas MD;  Location:  OR       Current Outpatient Medications   Medication Sig Dispense Refill    alcohol swab prep pads Use to swab area of injection/shahla as directed. 100 each 5    aspirin (ASA) 81 MG chewable tablet Take 81 mg by mouth daily      blood glucose (ACCU-CHEK SMARTVIEW) test strip Use to test blood sugar 2 times daily. 100 strip 5    blood glucose calibration (ACCU-CHEK SMARTVIEW CONTROL) solution Use to calibrate blood glucose monitor as directed. 1 Bottle 3    blood glucose monitoring (ACCU-CHEK FASTCLIX) lancets Use to test blood sugar 1-2 times daily or as directed. 102 each 5    blood glucose monitoring (ACCU-CHEK FAWAD SMARTVIEW) meter device kit Use to test blood sugar 1-2 times daily. 1 kit 0    childrens multivitamin w/iron (FLINTSTONES COMPLETE) 18 MG chewable tablet Take 1 chew tab by mouth daily      meclizine (ANTIVERT) 25 MG tablet Take 25 mg by mouth      metFORMIN (GLUCOPHAGE XR) 500 MG 24 hr tablet Take 4 tablets (2,000 mg) by mouth daily with food 360 tablet 3    omeprazole (PRILOSEC) 20 MG DR capsule Take 1 capsule (20 mg) by mouth daily 90 capsule 2    polyethylene glycol (MIRALAX) 17 GM/Dose powder Take 1 Capful by mouth daily as needed for constipation      rosuvastatin (CRESTOR) 20 MG tablet Take 1 tablet (20 mg) by mouth daily      topiramate (TOPAMAX) 100 MG tablet Take 1 tablet by mouth at bedtime      verapamil ER (VERELAN) 240 MG 24 hr capsule Take 240 mg by mouth      vitamin D3 (CHOLECALCIFEROL) 50 mcg (2000 units) tablet Take 1 tablet by mouth daily      Semaglutide, 2 MG/DOSE, (OZEMPIC) 8 MG/3ML pen Inject 2 mg Subcutaneous every 7 days (Patient not taking: Reported on 8/13/2024) 3 mL 2     No current  "facility-administered medications for this visit.       Allergies   Allergen Reactions    Codeine Nausea, GI Disturbance and Nausea and Vomiting    Gadolinium Derivatives Nausea and Vomiting     Caused excessive vomiting, administered 4mg zofran       {Diagnostic Test Results (Optional):618610}    PHYSICAL EXAM:  Objective   /81 (BP Location: Left arm, Patient Position: Chair, Cuff Size: Adult Regular)   Pulse 86   Ht 1.651 m (5' 5\")   Wt 131.4 kg (289 lb 9.6 oz)   SpO2 100%   BMI 48.19 kg/m    /81 (BP Location: Left arm, Patient Position: Chair, Cuff Size: Adult Regular)   Pulse 86   Ht 1.651 m (5' 5\")   Wt 131.4 kg (289 lb 9.6 oz)   SpO2 100%   BMI 48.19 kg/m    Body mass index is 48.19 kg/m .  Physical Exam   {Exam List (Optional):369011}    { SURGERY-WLS RISKS:904723069}    I emphasized exercise and activity behavior along with appropriate food choice as the main foundation for weight loss with surgery providing surgical reinforcement of the appropriate behavior set.        Review of general surgery weight loss process    1. Complete preoperative requirements, including weight loss.  Final weight check to confirm MANDATORY weight loss requirement must be documented on a clinic scale.    2. Discuss prior authorization with .    3. History and physical evaluation by PCP of PAC clinic within 30 days of surgery date, preoperative class, and weight check (weigh-in visit) to be scheduled by patient.  Pre-anesthesia clinic for risk evaluation to be scheduled by anesthesia clinic.    4. We cannot guarantee that patient will qualify for surgery unless all preoperative requirements are met, prior authorization from primary insurance company is granted, and insurance changes do not occur.    5. It is possible for patients to regain all weight after weight loss surgery unless they follow guidelines prescribed by our bariatric center.    6. All patients with gastrointestinal " complaints after weight loss surgery must have complaints conveyed to the bariatric team for appropriate treatment.    7. Vitamin deficiencies may develop post-bariatric surgery and annual laboratory testing should be performed.    8. Persistent nausea/vomiting after bariatric surgery entails risk of thiamine deficiency and should be treated early.  Vitamin B12 deficiency may develop, especially after gastric bypass surgery and must be recognized.        If you have any questions about our plans please don't hesitate to contact me.    Sincerely,    Jeannie Noe NP      {2021 E&M time:621624}         Again, thank you for allowing me to participate in the care of your patient.      Sincerely,    Jeannie Noe NP

## 2024-08-13 NOTE — LETTER
2024      TO: Yancy Hoover  [address]  [facility] [address]         Dear Primary Care Provider:    Re: Yancy Hoover  : 1970    Thank you for coordinating with us to evaluate your patient for possible bariatric surgery.  It is important to us that the primary care provider is involved at all stages of this process.    Please provide the following letter and labs prior to the patient's next visit in our clinic.    A letter of support including:  A statement supporting the patient's decision to have weight loss surgery.  The length of time your patient has been morbidly obese and length of time attempting to lose  weight.  Your patient's history of weight loss attempts including diets, medications, exercise and lifestyle interventions.  Summary of your patient's medical and surgical history.  Current problem list.  A statement indicating patient is up to date with their age and gender appropriate screenings.  Two years of dates and weights (two weights per year is sufficient) or a flowsheet of dates and weights.  Medication list  If patient meets criteria and clearances for surgery, we will submit to the insurance company for insurance approval and coordinate the needed appointments with our department.   If you have any questions, feel free to contact us via our Access Center at [phone].  Please fax the evaluation to the number below.     Sincerely,      Your Weight Loss Team  [facility]  Ph: [phone] (to schedule or leave a message)  Fax: [phone], Attn: Jeannie Noe CNP

## 2024-08-13 NOTE — PROGRESS NOTES
Pre-Bariatric Surgery Note    Arlin Ewing    Date: 2024     RE: Yancy Hoover    MR#: [id number]   : 1970   Date of Visit: Aug 13, 2024    REASON FOR VISIT: Preoperative evaluation for possible weight loss surgery    Dear Arlin Crook,    I had the pleasure of seeing your patient, Yancy Hoover, in my preoperative bariatric clinic.    As you know, she has morbid obesity and is considering weight loss surgery to treat obesity in association with her medical conditions of obesity.  Her consult weight was 294.  She has lost 5 pounds since her consult weight. She has not met her required pre-surgery weight. Please refer to initial consult note from date 2024 for patient's weight history and co-morbidities.    NBS 2024 49.7   RUQ - more manageable recently   Stopped ozempic - diarrhea, upset stomach, - even at lower doses     [facility] phone calls- last one scheduled for tomorrow   Psych eval scheduled for October   PCP follow up end of August   Cardiology consult scheduled 2024     Assessment & Plan   Problem List Items Addressed This Visit          Digestive    Class 3 severe obesity due to excess calories with body mass index (BMI) of 50.0 to 59.9 in adult, unspecified whether serious comorbidity present (H)     3lb from goal weight. Would like to schedule surgery before November but psych eval scheduled in October. Reprinted resource sheet to see if she can get seen sooner. Also working on sleep consult, wearing CPAP so she thinks she'll get clearance soon. Letter provided for her PCP.     Not tolerating ozempic. Lots of abdominal pain and loose stools. Reports this existed even at lower doses. She isn't noticing appetite support even at 2mg dose. She is hoping to try bydureon while continuing the surgery process. She previously took bydureon after her stroke for glucose control and had really positive effect on blood sugar and appetite with losing 30lb pretty  quickly. We had been using semaglutide given the studies showing secondary prevention of MACE. Agree to try bydureon.     Can call to see if psych eval can be done sooner   Finish sleep consult   Finish cardiology consult   Finish [facility] calls          Relevant Medications    exenatide ER (BYDUREON BCISE) 2 MG/0.85ML auto-injector       Endocrine    Type 2 diabetes mellitus without complication, unspecified whether long term insulin use (H) - Primary    Relevant Medications    exenatide ER (BYDUREON BCISE) 2 MG/0.85ML auto-injector        Reviewed tasklist with patient     Most recent weights:  Wt Readings from Last 4 Encounters:   08/13/24 131.4 kg (289 lb 9.6 oz)   08/05/24 131.1 kg (289 lb)   07/18/24 132.5 kg (292 lb)   07/05/24 131.1 kg (289 lb)         ROS    Past Medical History:   Diagnosis Date    Cerebral infarction (H) 7/6/2022    DM (diabetes mellitus), type 2 (H)     History of diabetes mellitus 2020    LANDON (obstructive sleep apnea) 07/2020       Past Surgical History:   Procedure Laterality Date    COLONOSCOPY  4/18/2023    ESOPHAGOSCOPY, GASTROSCOPY, DUODENOSCOPY (EGD), COMBINED N/A 02/24/2023    Procedure: ESOPHAGOGASTRODUODENOSCOPY, WITH BIOPSY;  Surgeon: Jassi Thomason MD;  Location:  GI    GYN SURGERY      c  section 2014    LAPAROSCOPIC CYSTECTOMY OVARIAN (BENIGN) Left 01/23/2020    Procedure: LAPAROSCOPIC ovarian cystectomy,;  Surgeon: Sofi Cuevas MD;  Location:  OR       Current Outpatient Medications   Medication Sig Dispense Refill    alcohol swab prep pads Use to swab area of injection/shahla as directed. 100 each 5    aspirin (ASA) 81 MG chewable tablet Take 81 mg by mouth daily      blood glucose (ACCU-CHEK SMARTVIEW) test strip Use to test blood sugar 2 times daily. 100 strip 5    blood glucose calibration (ACCU-CHEK SMARTVIEW CONTROL) solution Use to calibrate blood glucose monitor as directed. 1 Bottle 3    blood glucose monitoring (ACCU-CHEK FASTCLIX)  lancets Use to test blood sugar 1-2 times daily or as directed. 102 each 5    blood glucose monitoring (ACCU-CHEK FAWAD SMARTVIEW) meter device kit Use to test blood sugar 1-2 times daily. 1 kit 0    childrens multivitamin w/iron (FLINTSTONES COMPLETE) 18 MG chewable tablet Take 1 chew tab by mouth daily      exenatide ER (BYDUREON BCISE) 2 MG/0.85ML auto-injector Inject 2 mg subcutaneously every 7 days Inject 2 mg subcutaneously once weekly. 3.4 mL 2    meclizine (ANTIVERT) 25 MG tablet Take 25 mg by mouth      metFORMIN (GLUCOPHAGE XR) 500 MG 24 hr tablet Take 4 tablets (2,000 mg) by mouth daily with food 360 tablet 3    omeprazole (PRILOSEC) 20 MG DR capsule Take 1 capsule (20 mg) by mouth daily 90 capsule 2    polyethylene glycol (MIRALAX) 17 GM/Dose powder Take 1 Capful by mouth daily as needed for constipation      rosuvastatin (CRESTOR) 20 MG tablet Take 1 tablet (20 mg) by mouth daily      topiramate (TOPAMAX) 100 MG tablet Take 1 tablet by mouth at bedtime      verapamil ER (VERELAN) 240 MG 24 hr capsule Take 240 mg by mouth      vitamin D3 (CHOLECALCIFEROL) 50 mcg (2000 units) tablet Take 1 tablet by mouth daily      Semaglutide, 2 MG/DOSE, (OZEMPIC) 8 MG/3ML pen Inject 2 mg Subcutaneous every 7 days (Patient not taking: Reported on 8/13/2024) 3 mL 2     No current facility-administered medications for this visit.       Allergies   Allergen Reactions    Codeine Nausea, GI Disturbance and Nausea and Vomiting    Gadolinium Derivatives Nausea and Vomiting     Caused excessive vomiting, administered 4mg zofran       Admission on 07/03/2024, Discharged on 07/03/2024   Component Date Value Ref Range Status    Ventricular Rate 07/03/2024 112  BPM Final    Atrial Rate 07/03/2024 112  BPM Final    TX Interval 07/03/2024 166  ms Final    QRS Duration 07/03/2024 76  ms Final    QT 07/03/2024 334  ms Final    QTc 07/03/2024 455  ms Final    P Axis 07/03/2024 69  degrees Final    R AXIS 07/03/2024 -6  degrees Final    T  Blanch 07/03/2024 56  degrees Final    Interpretation ECG 07/03/2024    Final                    Value:Sinus tachycardia  Possible Left atrial enlargement  Low voltage QRS  Cannot rule out Anterior infarct (cited on or before 22-AUG-2023)  Abnormal ECG  When compared with ECG of 22-AUG-2023 19:52,  Questionable change in QRS axis  Confirmed by GENERATED REPORT, COMPUTER ([id number]),  GEORGE LIMA ([id number]) on 7/3/2024 3:21:16 PM      Sodium 07/03/2024 137  135 - 145 mmol/L Final    Potassium 07/03/2024 4.0  3.4 - 5.3 mmol/L Final    Carbon Dioxide (CO2) 07/03/2024 24  22 - 29 mmol/L Final    Anion Gap 07/03/2024 11  7 - 15 mmol/L Final    Urea Nitrogen 07/03/2024 11.3  6.0 - 20.0 mg/dL Final    Creatinine 07/03/2024 0.89  0.51 - 0.95 mg/dL Final    GFR Estimate 07/03/2024 78  >60 mL/min/1.73m2 Final    eGFR calculated using 2021 CKD-EPI equation.    Calcium 07/03/2024 9.3  8.6 - 10.0 mg/dL Final    Chloride 07/03/2024 102  98 - 107 mmol/L Final    Glucose 07/03/2024 131 (H)  70 - 99 mg/dL Final    Alkaline Phosphatase 07/03/2024 73  40 - 150 U/L Final    AST 07/03/2024 41  0 - 45 U/L Final    Reference intervals for this test were updated on 6/12/2023 to more accurately reflect our healthy population. There may be differences in the flagging of prior results with similar values performed with this method. Interpretation of those prior results can be made in the context of the updated reference intervals.    ALT 07/03/2024 41  0 - 50 U/L Final    Reference intervals for this test were updated on 6/12/2023 to more accurately reflect our healthy population. There may be differences in the flagging of prior results with similar values performed with this method. Interpretation of those prior results can be made in the context of the updated reference intervals.      Protein Total 07/03/2024 8.1  6.4 - 8.3 g/dL Final    Albumin 07/03/2024 4.3  3.5 - 5.2 g/dL Final    Bilirubin Total 07/03/2024 0.2  <=1.2 mg/dL Final     Troponin T, High Sensitivity 07/03/2024 7  <=14 ng/L Final    Either a High Sensitivity Troponin T baseline (0 hours) value = 100 ng/L, or an increase in High Sensitivity Troponin T = 7 ng/L at 2 hours compared to 0 hours (2-0 hours), suggests myocardial injury, and urgent clinical attention is required.    If the 2-0 hours increase is <7 ng/L, a High Sensitivity Troponin T result above gender-specific reference ranges warrants further evaluation.   Recommendations for further evaluation include correlation with clinical decision-making tool (e.g., HEART), a 3rd High Sensitivity Troponin T test 2 hours after the 2nd (a 20% change from baseline would represent concern), admission for observation, close PCC/cardiology follow-up, or urgent outpatient provocative testing.    TSH 07/03/2024 1.41  0.30 - 4.20 uIU/mL Final    CRP Inflammation 07/03/2024 10.85 (H)  <5.00 mg/L Final    Erythrocyte Sedimentation Rate 07/03/2024 30  0 - 30 mm/hr Final    WBC Count 07/03/2024 6.6  4.0 - 11.0 10e3/uL Final    RBC Count 07/03/2024 4.74  3.80 - 5.20 10e6/uL Final    Hemoglobin 07/03/2024 13.4  11.7 - 15.7 g/dL Final    Hematocrit 07/03/2024 41.2  35.0 - 47.0 % Final    MCV 07/03/2024 87  78 - 100 fL Final    MCH 07/03/2024 28.3  26.5 - 33.0 pg Final    MCHC 07/03/2024 32.5  31.5 - 36.5 g/dL Final    RDW 07/03/2024 13.5  10.0 - 15.0 % Final    Platelet Count 07/03/2024 281  150 - 450 10e3/uL Final    % Neutrophils 07/03/2024 54  % Final    % Lymphocytes 07/03/2024 38  % Final    % Monocytes 07/03/2024 6  % Final    % Eosinophils 07/03/2024 1  % Final    % Basophils 07/03/2024 0  % Final    % Immature Granulocytes 07/03/2024 0  % Final    NRBCs per 100 WBC 07/03/2024 0  <1 /100 Final    Absolute Neutrophils 07/03/2024 3.6  1.6 - 8.3 10e3/uL Final    Absolute Lymphocytes 07/03/2024 2.5  0.8 - 5.3 10e3/uL Final    Absolute Monocytes 07/03/2024 0.4  0.0 - 1.3 10e3/uL Final    Absolute Eosinophils 07/03/2024 0.1  0.0 - 0.7 10e3/uL Final  "   Absolute Basophils 07/03/2024 0.0  0.0 - 0.2 10e3/uL Final    Absolute Immature Granulocytes 07/03/2024 0.0  <=0.4 10e3/uL Final    Absolute NRBCs 07/03/2024 0.0  10e3/uL Final    Hold Specimen 07/03/2024 [facility]   Final       PHYSICAL EXAM:  Objective    /81 (BP Location: Left arm, Patient Position: Chair, Cuff Size: Adult Regular)   Pulse 86   Ht 1.651 m (5' 5\")   Wt 131.4 kg (289 lb 9.6 oz)   SpO2 100%   BMI 48.19 kg/m    /81 (BP Location: Left arm, Patient Position: Chair, Cuff Size: Adult Regular)   Pulse 86   Ht 1.651 m (5' 5\")   Wt 131.4 kg (289 lb 9.6 oz)   SpO2 100%   BMI 48.19 kg/m    Body mass index is 48.19 kg/m .  Physical Exam   GENERAL: alert and no distress  EYES: Eyes grossly normal to inspection.  No discharge or erythema, or obvious scleral/conjunctival abnormalities.  RESP: No audible wheeze, cough, or visible cyanosis.    SKIN: Visible skin clear. No significant rash, abnormal pigmentation or lesions.  NEURO: Cranial nerves grossly intact.  Mentation and speech appropriate for age.  PSYCH: Appropriate affect, tone, and pace of words      Sleeve Gastrectomy: Risks and Side Effects    The complications or risks of surgery include but are not limited to: death, heart attack, infection in the surgical site (wound infection), abdomen (abscess), bladder (urinary tract infection), lungs (pneumonia), clots in legs (deep vein thrombosis) or lungs (pulmonary emboli),  injury to the bowels or other organs, bowel obstruction, hernia at the incision and gastrointestional bleeding.    More specific risks related to vertical sleeve gastrectomy were detailed at the bariatric informational seminar and include the following: leak at the vertical sleeve staple line, leak at the anastomoses,  nausea, vomiting, and dehydration for several months,  adhesions causing bowel obstruction, rapid weight loss causing a higher rate of gallstone formation during the first 6 months after surgery, decreased " absorption of vitamins and protein because of the reduced stomach size, weight regain if inappropriate food intake occurs, stricture, injury to other organs, hernia,  and ulcers.       Side effects of bariatric surgery include but are not limited to: abdominal pain, cramping, bloating, constipation, nausea, vomiting, diarrhea, difficulty swallowing,  dehydration, hair loss, excess skin, protein, iron and vitamin deficiencies, heartburn, transfer of addictions, increased anxiety and worsening depression.       I emphasized exercise and activity behavior along with appropriate food choice as the main foundation for weight loss with surgery providing surgical reinforcement of the appropriate behavior set.        Review of general surgery weight loss process    1. Complete preoperative requirements, including weight loss.  Final weight check to confirm MANDATORY weight loss requirement must be documented on a clinic scale.    2. Discuss prior authorization with .    3. History and physical evaluation by PCP of PAC clinic within 30 days of surgery date, preoperative class, and weight check (weigh-in visit) to be scheduled by patient.  Pre-anesthesia clinic for risk evaluation to be scheduled by anesthesia clinic.    4. We cannot guarantee that patient will qualify for surgery unless all preoperative requirements are met, prior authorization from primary insurance company is granted, and insurance changes do not occur.    5. It is possible for patients to regain all weight after weight loss surgery unless they follow guidelines prescribed by our bariatric center.    6. All patients with gastrointestinal complaints after weight loss surgery must have complaints conveyed to the bariatric team for appropriate treatment.    7. Vitamin deficiencies may develop post-bariatric surgery and annual laboratory testing should be performed.    8. Persistent nausea/vomiting after bariatric surgery entails risk of  thiamine deficiency and should be treated early.  Vitamin B12 deficiency may develop, especially after gastric bypass surgery and must be recognized.        If you have any questions about our plans please don't hesitate to contact me.    Sincerely,    Jeannie Noe NP      30 minutes spent by me on the date of the encounter doing chart review, history and exam, documentation and further activities per the note  The longitudinal plan of care for the diagnosis(es)/condition(s) as documented were addressed during this visit. Due to the added complexity in care, I will continue to support Yancy in the subsequent management and with ongoing continuity of care.

## 2024-08-13 NOTE — NURSING NOTE
"Chief Complaint   Patient presents with    Follow Up     Pre-surg        Vitals:    08/13/24 1258   BP: 127/81   BP Location: Left arm   Patient Position: Chair   Cuff Size: Adult Regular   Pulse: 86   SpO2: 100%   Weight: 131.4 kg (289 lb 9.6 oz)   Height: 1.651 m (5' 5\")       Body mass index is 48.19 kg/m .                            "

## 2024-08-13 NOTE — PATIENT INSTRUCTIONS
Can call to see if psych eval can be done sooner   Finish sleep consult   Finish cardiology consult   Finish [facility] calls

## 2024-08-13 NOTE — PROGRESS NOTES
Dear Primary Care Provider,     Re: Yancy Hoover  : 1970    Thank you for coordinating with us to evaluate your patient for possible bariatric surgery.  It is important to us that the primary care provider is involved at all stages of this process.     Please FAX the following to [phone]:    1.  A letter or clinic note with a statement that addresses whether you support or do not support your patient having weight loss surgery and your willingness to continue as their primary care provider after surgery.   2. Problem list.  3. Documentation of weights from the past 5 years (or years you do have available).  4. Labs within the last 6 months (we will check A1C, lipids, CBC, CMP, PTH, vitamin D, vitamin B12 and vitamin A if they have not been done within the last 6 months).    If patient meets criteria and clearances for surgery, we will submit to the insurance company for insurance approval and coordinate the needed appointments with our department. Their pre-op history and physical will be done with the anesthesia team at the Pre Assessment Clinic.    If you have any questions, feel free to contact us via our Access Center at [phone].  Please fax the evaluation to the number below.     Sincerely,      Your Weight Loss Team  [facility]  Ph: [phone] (to schedule or leave a message)  Fax: [phone], Attn: ***

## 2024-08-14 NOTE — ASSESSMENT & PLAN NOTE
3lb from goal weight. Would like to schedule surgery before November but psych eval scheduled in October. Reprinted resource sheet to see if she can get seen sooner. Also working on sleep consult, wearing CPAP so she thinks she'll get clearance soon. Letter provided for her PCP.     Not tolerating ozempic. Lots of abdominal pain and loose stools. Reports this existed even at lower doses. She isn't noticing appetite support even at 2mg dose. She is hoping to try bydureon while continuing the surgery process. She previously took bydureon after her stroke for glucose control and had really positive effect on blood sugar and appetite with losing 30lb pretty quickly. We had been using semaglutide given the studies showing secondary prevention of MACE. Agree to try bydureon.     Can call to see if psych eval can be done sooner   Finish sleep consult   Finish cardiology consult   Finish [facility] calls

## 2024-08-15 ENCOUNTER — CARE COORDINATION (OUTPATIENT)
Dept: ENDOCRINOLOGY | Facility: CLINIC | Age: 54
End: 2024-08-15
Payer: COMMERCIAL

## 2024-08-15 ENCOUNTER — DOCUMENTATION ONLY (OUTPATIENT)
Dept: ENDOCRINOLOGY | Facility: CLINIC | Age: 54
End: 2024-08-15
Payer: COMMERCIAL

## 2024-08-15 DIAGNOSIS — Z71.89 ENCOUNTER FOR PSYCHOLOGICAL ASSESSMENT PRIOR TO BARIATRIC SURGERY: ICD-10-CM

## 2024-08-15 DIAGNOSIS — E66.01 CLASS 3 SEVERE OBESITY DUE TO EXCESS CALORIES WITH BODY MASS INDEX (BMI) OF 50.0 TO 59.9 IN ADULT, UNSPECIFIED WHETHER SERIOUS COMORBIDITY PRESENT (H): Primary | ICD-10-CM

## 2024-08-15 DIAGNOSIS — E66.01 MORBID OBESITY (H): Primary | ICD-10-CM

## 2024-08-15 DIAGNOSIS — E66.813 CLASS 3 SEVERE OBESITY DUE TO EXCESS CALORIES WITH BODY MASS INDEX (BMI) OF 50.0 TO 59.9 IN ADULT, UNSPECIFIED WHETHER SERIOUS COMORBIDITY PRESENT (H): Primary | ICD-10-CM

## 2024-08-19 ENCOUNTER — DOCUMENTATION ONLY (OUTPATIENT)
Dept: SLEEP MEDICINE | Facility: CLINIC | Age: 54
End: 2024-08-19
Payer: COMMERCIAL

## 2024-08-19 NOTE — PROGRESS NOTES
STM Recheck: Spoke with patient explained she needs to meet CPAP compliance in order for her Provider to sign clearance letter for qualify for surgery.

## 2024-08-20 ENCOUNTER — TELEPHONE (OUTPATIENT)
Dept: ENDOCRINOLOGY | Facility: CLINIC | Age: 54
End: 2024-08-20
Payer: COMMERCIAL

## 2024-08-20 NOTE — TELEPHONE ENCOUNTER
Left Voicemail (1st Attempt) for the patient to call back and schedule the following:    Appointment type: Return Weight Mgmt  Provider: Jeannie Noe  Return date: November Specialty phone number: [phone]

## 2024-08-23 ENCOUNTER — DOCUMENTATION ONLY (OUTPATIENT)
Dept: SLEEP MEDICINE | Facility: CLINIC | Age: 54
End: 2024-08-23
Payer: COMMERCIAL

## 2024-08-23 NOTE — PROGRESS NOTES
STM Recheck:  Patient called requesting a letter to approve her upcoming surgery.  Will send her Provider a message.

## 2024-08-23 NOTE — Clinical Note
Patient called looking to an approval letter for her upcoming bariatric surgery. She has met CPAP compliance. Thanks Chip

## 2024-08-25 ENCOUNTER — NURSE TRIAGE (OUTPATIENT)
Dept: NURSING | Facility: CLINIC | Age: 54
End: 2024-08-25
Payer: COMMERCIAL

## 2024-08-28 ENCOUNTER — CARE COORDINATION (OUTPATIENT)
Dept: ENDOCRINOLOGY | Facility: CLINIC | Age: 54
End: 2024-08-28
Payer: COMMERCIAL

## 2024-08-28 NOTE — PROGRESS NOTES
Tasklist updated and sent to patient via [employer].    Bariatric Task List  Fax:  Please fax all paperwork to: [phone] -     Status:  Is patient a candidate for bariatric surgery?:  patient is a candidate for bariatric surgery -     Cleared to schedule surgeon consult?:  cleared to schedule surgeon consult - 7/18/24 appt. bks   Status:  surgery evaluation in process -     Surgeon: Dr. Robbie Alcaraz -     Tentative surgery month/year: To be determined -        Insurance: Insurance:  [employer] -      Contact insurance to discuss coverage:   -       [employer]: PCP Recommendation and Medical Clearance:    -     HP Referral:  Completed - Done. bks   Other:  Do the 5 coaching phone calls. Call Ray at [phone] to get registered. -        Patient Info: Initial Weight:  296 -     Date of Initial Weight/Height:  7/16/2020 -     Goal Weight (lbs):  286 -     Required Weight Loss:  10 -     Surgery Type:  sleeve gastrectomy -        Dietician Visits: Structured weight loss required by insurance?:  structured weight loss required -     Dietician Visit 1:  Completed - 4/23/24 KB   Dietician Visit 2:  Completed - 6/4/24 KB   Dietician Visit 3:  Completed - 7/5/27 WLS nutrition class scheduled   Dietician Visit 4:  Completed - 8/5/24 KB   Dietician Visit 5:    -    Dietician Visit 6:    -    Dietician Visit additional:  Needed - Monthly until surgery for weight loss and postop diet teaching - AS      Psychological Evaluation: Psych eval:  Needed - List and Letter sent to pt 5/10 - AS      Lab Work: Complete Blood Count:  Completed - Ordered 5/3/24 - AS; done 7/3/24 bks   Comprehensive Metabolic Panel:  Completed - Ordered 5/3/24 - AS; done 7/3/24. bks   Vitamin D:  Needed - Ordered 5/3/24 - AS   PTH:  Needed - Ordered 5/3/24 - AS   Hgb A1c:  Needed - Ordered 5/3/24 - AS    Lipids: Needed - Ordered 5/3/24 - AS    TSH (UCARE, [facility], [place] MA): Needed - Ordered 5/3/24 - AS   Vitamin A: Needed - Ordered 5/3/24 - AS   Vitamin B12:  "Needed - Ordered 5/3/24 - AS      Consults/ Clearance Sleep Medicine:  Completed - Letter sent to pt 5/10 - AS;8/27/245 Dr Brea campbell in Epic. bk   Cardiac:  Needed - Letter sent to pt 5/10 - AS;  9/18/24 Zaira lucero      Testing: EGD:  Completed -        PCP: Establish care with PCP:  Completed -     Follow up with PCP:    -     PCP letter of support:  Needed - Letter sent to pt 5/10 - AS      Patient Education:  Information Session:  Completed -     Attended New Consult Class?: Completed - 5/14/24  AS   Given \"Making your decision\" handout?:  Yes - 5/10/24 - AS   Given \"A Roadmap to you Weight Loss Surgery\" handout?: Yes - 5/10/24 - AS   Given \"Epic Care Companion\" information?: Yes - 5/10/24 - AS   Attended support group?:  Needed -     Support plan in place?:  Completed -        Additional Surgery Requirements: HgA1c <8:  Needed -        Final Tasks:  Before surgery online preop class:  Needed -     Nurse visit for information:  Needed -     Weight Check: Needed -     History and Physical: Pre-Assessment Clinic (PAC) -   Needed -     Final labs per clinic: Needed - Labs ordered at PAC visit. bks   See MTM Pharmacist for medication review and plan for after surgery (if DM, transplant hx, greater than 10 meds): Needed -     Schedule postop appointments: Needed -     Other:   -   -        Notes: Please register for the Weight Loss Surgery Care Companion Pathway through the [employer] mobile gilbert or via web browser. You register by answering \"yes\" to the following question, \"Do you agree to take part in this free, online program?\"  Information from this Pathway can help you be more successful before surgery and for up to one year after surgery.  This Pathway through [employer] can also answer questions you may have about your surgery.   The Pathway will start when you get your Tasklist after your initial consultation or when your surgery is scheduled.              "

## 2024-08-28 NOTE — LETTER
"  RE: Yancy Hoover                                                  2024  [address]  [facility] [address]  : 1970     Dear Primary Care Provider:    Thank you for coordinating with us to evaluate your patient for possible bariatric surgery. It is important to us that the primary care provider is aware of their patients' desire to have weight loss surgery and is supportive of the patient having surgery. If the patient meets criteria and clearances for surgery, we will submit to the insurance company for insurance approval and coordinate the needed appointments with our department.  The patient will also meet with our anesthesia team prior to surgery for a pre-op history and physical.  If you have any questions, feel free to contact us via our [employer] at [phone].  The letter of support can be faxed to the number below or routed via [facility] [employer] to our team.      Sample letter:    \"Dear Weight Loss Surgery Clinic,    I am the primary care provider for (patient name) and I support (him/her/they) having weight loss surgery.    Sincerely,    (provider name)\"      Sincerely,    Comprehensive Weight Management Team                                                           [facility]                          [facility]                                                                                                       Phone: [phone]                                                                                          Fax: [phone], Attn:  EVERETTE Collier            "

## 2024-09-06 ENCOUNTER — TRANSFERRED RECORDS (OUTPATIENT)
Dept: HEALTH INFORMATION MANAGEMENT | Facility: CLINIC | Age: 54
End: 2024-09-06
Payer: COMMERCIAL

## 2024-09-06 LAB — PHQ9 SCORE: 0

## 2024-09-16 ENCOUNTER — VIRTUAL VISIT (OUTPATIENT)
Dept: CARDIOLOGY | Facility: CLINIC | Age: 54
End: 2024-09-16
Attending: PHYSICIAN ASSISTANT
Payer: COMMERCIAL

## 2024-09-16 VITALS — WEIGHT: 280 LBS | HEIGHT: 65 IN | BODY MASS INDEX: 46.65 KG/M2

## 2024-09-16 DIAGNOSIS — E66.01 CLASS 3 SEVERE OBESITY DUE TO EXCESS CALORIES WITH BODY MASS INDEX (BMI) OF 50.0 TO 59.9 IN ADULT, UNSPECIFIED WHETHER SERIOUS COMORBIDITY PRESENT (H): ICD-10-CM

## 2024-09-16 DIAGNOSIS — R42 VERTIGO: ICD-10-CM

## 2024-09-16 DIAGNOSIS — Z86.73 HISTORY OF STROKE: ICD-10-CM

## 2024-09-16 DIAGNOSIS — E66.813 CLASS 3 SEVERE OBESITY DUE TO EXCESS CALORIES WITH BODY MASS INDEX (BMI) OF 50.0 TO 59.9 IN ADULT, UNSPECIFIED WHETHER SERIOUS COMORBIDITY PRESENT (H): ICD-10-CM

## 2024-09-16 DIAGNOSIS — E11.9 TYPE 2 DIABETES MELLITUS WITHOUT COMPLICATION, WITHOUT LONG-TERM CURRENT USE OF INSULIN (H): Primary | ICD-10-CM

## 2024-09-16 RX ORDER — TOPIRAMATE 25 MG/1
25 TABLET, FILM COATED ORAL DAILY
COMMUNITY
Start: 2024-07-08

## 2024-09-16 ASSESSMENT — PAIN SCALES - GENERAL: PAINLEVEL: NO PAIN (0)

## 2024-09-16 NOTE — NURSING NOTE
Current patient location: [address] [place] [id number]    Is the patient currently in the state of [place]? YES    Visit mode:VIDEO    If the visit is dropped, the patient can be reconnected by: VIDEO VISIT: Text to cell phone:   Telephone Information:   Mobile [phone]       Will anyone else be joining the visit? NO  (If patient encounters technical issues they should call [phone]    How would you like to obtain your AVS? MyChart    Are changes needed to the allergy or medication list? No    Are refills needed on medications prescribed by this physician? NO    Rooming Documentation:  Questionnaire(s) not pre-assigned      Reason for visit: Medication Therapy Management    Edi MENDOZA

## 2024-09-16 NOTE — PATIENT INSTRUCTIONS
"Recommendations from MTM Pharmacist visit:                                                    MTM (medication therapy management) is a service provided by a clinical pharmacist designed to help you get the most of out of your medicines.  You may be sent a phone or email survey evaluating today's visit.  Please provide feedback you have for the service he received today if you are able.      Stop Bydureon.    Restart Ozempic 0.5mg x 4 weeks.  If tolerating 0.5mg Ozempic well, may increase to Ozempic 1mg weekly on week 5.  If you continue to tolerate Ozempic 1mg, you may increase to Ozempic 2mg weekly on week 10. You were on this dose previously, so we expect you to tolerate well.    3. Continue to monitor blood pressure and blood glucose with these changes.      Follow-up: 11/18 with Micki Martins [facility]; sooner if you get a surgery date as we will meet to review your meds before surgery as well as after! Keep up the great work!        It was great speaking with you today.  I value your experience and would be very thankful for your time in providing feedback in our clinic survey. In the next few days, you may receive an email or text message from [facility] [employer] with a link to a survey related to your  clinical pharmacist.\"     To schedule another MTM appointment, please call the clinic directly ([facility] Phone Number: [phone] (schedules for [facility] and [facility] - providers, dietitians, health coaches) or you may call the MTM scheduling line at [phone] or toll-free at [phone].     My Clinical Pharmacist's contact information:                                                      Please feel free to contact me with any questions or concerns you have.      Micki Martins, Pharm D., MPH    Medication Therapy Management Pharmacist   [facility]          Meal Replacement Shake Options:   *Protein Shake Criteria: no " more than 210 Calories, at least 20 grams of protein, and less than 10 grams of sugar   Premier Protein (160 Calories, 30 g protein)  Slim Fast Advanced Nutrition (180 Calories, 20 g protein)  Muscle Milk, lactose-free, 17 oz bottle (210 Calories, 30 g protein)  Integrated Supplements, no artificial sugars (110 Calories, 20 g protein)  Boost/Ensure Max (160 calories, 30 gm protein)   Fairlife Protein Shakes (160-230 calories, 26-42 gm protein)  [facility] Protein Powder (180 calories, 30 gm protein)   Orgain Protein Shakes (130-160 calories, 20-26 gm protein)     Meal Replacement Bar Options:  Quest Protein Bars (190 Calories, 20 g protein)  Built Bar (170 Calories, 15-20 g protein)  One Protein Bar (210 calories, 20 g protein)  Murdock Signature Protein Bar ([employer]) (190 Calories, 21 g protein)  Pure Protein Bars (180 Calories, 21 g protein)    Low Calorie Frozen Meal:  Healthy Choice Power Bowls  Lean Cuisine  Smart Ones  Jaspal Funk      ---------------------------------------------------------------  Tips to Increase the Protein in Your Diet  You may need more protein in your diet to help you heal from an illness, surgery or wound. Extra protein can also help you gain weight. Here are some ideas for adding high-protein foods to your meals.  Meat and fish  Add chopped cooked meat to vegetables, salads, casseroles, soups, sauces and biscuit dough.  Use in omelets, soufflés, quiches, sandwich fillings and chicken or turkey stuffing.  Wrap in pie crust or biscuit dough to make a turnover.  Add to stuffed baked potatoes.  Make a dip with diced meat or flaked fish mixed with sour cream and spices.  Chopped, hard-cooked eggs  Add to salads.  Use for snacks and sandwich filling.  High-protein milk  To make high-protein milk, mix 1 quart whole milk with 1 cup powdered milk.  Add to cream soups, mashed potatoes, scrambled eggs, cereals and dried eggnog mix.  Use as an ingredient in puddings, custards, hot  chocolate, milk shakes and pancakes.  Powdered milk  If you don't have any high-protein milk on hand, you can use powdered milk. Add 3 tablespoons to:  gravies, sauces, cream soups, mayonnaise  casseroles, meat patties, meatloaf, tuna salad, deviled ham  scalloped or mashed potatoes, creamed spinach  scrambled eggs, egg salad  cereals  yogurt, milk drinks, ice cream, frozen desserts, puddings, custards.  Add 4 to 6 tablespoons powdered milk to make:  cream sauces  breads, muffins, pancakes, waffles, cookies, cakes  cream pies, frostings, cake fillings  fruit cobblers, bread or rice pudding, gelatin desserts.  For high-protein eggnog, add 3 to 6 tablespoons powdered milk to prepared eggnog.  Hard or soft cheese  Melt on sandwiches, breads, tortillas, hamburgers, hot dogs, other meats, vegetables, eggs and pies.  Grate into soups, chili, sauces, casseroles, vegetables, potatoes, rice, noodles or meatloaf.  Eat with toast or crackers, or melt for edna dip.  Cottage cheese or ricotta cheese  Mix with or scoop on top of fruits and vegetables.  Add to casseroles, lasagna, spaghetti, noodles and egg dishes (omelets, scrambled eggs, soufflés).  Use in gelatin, pudding-type desserts, cheesecake and pancake batter.  Use to stuff crepes, pasta shells or manicotti.  Fruit yogurt  Blend with fruits for a fruit smoothie.  Use as a dip for fruits and vegetables.  Scoop on top of pancakes or waffles.  Tofu  Blend silken tofu with fruits and juices for a smoothie.  Add chunks of firm tofu to soups and stews, or crumble into meatloaf.  Blend dried onion soup mix into soft or silken tofu for dip.  Use pureed silken tofu for part of the mayonnaise, sour cream, cream cheese or ricotta cheese called for in recipes.  Beans  Use cooked beans or peas in soups, casseroles, pasta, tacos and burritos.  Nuts and seeds  Note: For children under 3, discuss with the child's care team.  Use in casseroles, breads, muffins, pancakes, cookies and  waffles.  Sprinkle on fruits, cereals, ice cream, yogurt, vegetables and salads.  Mix with raisins, dried fruits and chocolate chips for a snack.  Nut butters  Note: For children under 3, discuss with the child's care team.  Spread on sandwiches, toast, muffins, crackers, waffles, pancakes and fruit slices.  Use as a dip for raw vegetables.  Blend with milk drinks, or swirl through ice cream, yogurt or hot cereal.  Nutrition supplements (nutrition bars, drinks and powders)  Add powders to milk drinks and desserts.  Mix with ice cream, milk and fruit for a high-protein milk shake.    For informational purposes only. Not to replace the advice of your health care provider. Clinically reviewed by Yanira Pandya RD, RAJI, and the Clinical Nutrition Service Line. Copyright   2005 [facility]. All rights reserved. [place] 278694 - REV 04/24.      -----------------------------------------------------------------------------------------------------------------  Learning About High-Protein Foods  What foods are high in protein?     The foods you eat contain nutrients, such as vitamins and minerals. Protein is a nutrient. Your body needs the right amount to stay healthy and work as it should. You can use the list below to help you make choices about which foods to eat.  Here are some examples of foods that are high in protein.  Dairy and dairy alternatives  Cheese  Milk  Soy milk  Yogurt (especially Greek)  Meat  Beef  Chicken  Ham  Lamb  Lunch meat  Pork  Sausage  Turkey  Other protein foods  Beans (black, garbanzo, kidney, lima)  Eggs  Hummus  Lentils  Nuts  Peanut butter and other nut butters  Peas  Soybeans  Tofu  Veggie or soy shereen (Check the nutrition label for the amount of protein in each serving.)  Seafood  Anchovies  Cod  Crab  Halibut  [place]  Sardines  Shrimp  [place]ia  [place]  Tuna  Protein supplements  Bars (Check the nutrition label for the amount of protein in each  "serving.)  Drinks  Powders  Work with your doctor to find out how much of this nutrient you need. Depending on your health, you may need more or less of it in your diet.  Where can you learn more?  Go to https://www.[employer].net/patiented  Enter P335 in the search box to learn more about \"Learning About High-Protein Foods.\"  Current as of: September 20, 2023               Content Version: 14.0    3041-6868 [employer].   Care instructions adapted under license by your healthcare professional. If you have questions about a medical condition or this instruction, always ask your healthcare professional. [employer], [employer] disclaims any warranty or liability for your use of this information.         "

## 2024-09-16 NOTE — PROGRESS NOTES
Medication Therapy Management (MTM) Encounter    ASSESSMENT:                            Medication Adherence/Access: No issues identified    Weight management/diabetes: Patient would benefit from continuing pharmacotherapy for weight management. Given better effectiveness, MACE benefit (may stay on semaglutide even after bariatric surg for this), class III obesity, and working toward goal of weight management ahead of bariatric surgery, recommend optimizing GLP1/GIP therapy - switch back to Ozempic  as data to support most significant weight loss and patient has no contraindications. Patient also realizing benefit from reduction in food noise and increased satiety. Education provided on continued dietary and behavioral modifications.       Approximate Comparative doses of GLP1s and GLP1/GIPs (DM=Diabetes indication, WL=Weight loss indication)     Liraglutide   (Victoza- DM)   (Saxenda-WL) Daily 0.6mg 1.2mg 1.8mg  2.4   mg 3 mg             Exenatide   (Bydreon-DM) Weekly     2mg                 Dulaglutide   (Trulicity-DM) Weekly   0.75mg 1.5mg   3 mg  4.5 mg          Oral Semaglutide  (Rybelsus -DM) Daily 3mg 7mg 14mg                 Semaglutide  (Ozempic- DM) Weekly   0.25mg 0.5mg    1mg   2mg        Semaglutide  (Compounded) Weekly  0.25mg 0.5mg   1mg 1.5mg 2mg 2.5mg     Semaglutide   (Wegovy-WL) Weekly   0.25mg 0.5mg    1mg 1.7mg  2.4mg       Tirzeptatide  (Mounjaro-DM)  (Zepbound-WL) Weekly     2.5mg       5 mg 7.5mg 10 mg 12.5 15 mg       Recommend when transitioning from one GLP-1 agonist to another, to consider step down by 1 dose when transitioning to decrease risk of gastrointestinal side effects.         Stroke (7/2023): stable - education provided to continue to evaluate blood pressure after restarting Ozempic given history of blood pressure lowering secondary to lifestyle modification improvements with Ozempic.    Vertigo: improving - agree with patient - suspect symptoms of vertigo were conflated with  intolerability of Ozempic. Patient acknowleges with her experience as well - vertigo improving and Ozempic superior to bydureon for weight management and diabetes management in addition to other benefits (see above)    PLAN:                            Stop Bydureon.    Restart Ozempic 0.5mg x 4 weeks.  If tolerating 0.5mg Ozempic well, may increase to Ozempic 1mg weekly on week 5.  If you continue to tolerate Ozempic 1mg, you may increase to Ozempic 2mg weekly on week 10. You were on this dose previously, so we expect you to tolerate well.    3. Continue to monitor blood pressure and blood glucose with these changes.      Follow-up: 11/18 with Micki Martins [facility]; sooner if you get a surgery date as we will meet to review your meds before surgery as well as after! Keep up the great work!    SUBJECTIVE/OBJECTIVE:                          Yancy Hoover is a 54 year old female seen for a follow-up visit.       Reason for visit: Medication Therapy Management GLP1/GIP Management .    Allergies/ADRs: Reviewed in chart  Past Medical History: Reviewed in chart  Tobacco: She reports that she quit smoking about 14 months ago. Her smoking use included cigarettes. She has a 10 pack-year smoking history. She has never used smokeless tobacco.  Alcohol: not currently using    Medication Adherence/Access: no issues reported    Weight Management /Diabetes   Bydureon 2 mg weekly  Metformin 500 mg ER Tabs - 4 tabs every morning     Last met with Jeannie Noe CNP  8/13/24 for return weight management (surgical consult).   Last visit with Yohana Osborn dietician 8/5/24.    Patient reports some worsening nausea with Bydureon and does not feel Bydureon as effective as Ozempic (patient had requested Bydureon to replace Ozempic at last visit visit given some frustration on slow weight loss  and concern for side effects with Ozempic and had noted better results and tolerability in the past with Bydureon). Notes worsening appetite and blood  "glucose readings since switch from Ozempic to bydureon - worsening side effects. Patient now believes side effects were from untreated vertigo (see below) and that overall health including blood pressure was better with Ozempic - would like to go back to Ozempic.  Goal to meet weight loss goals for bariatric surger (Goal 286 lbs). Patient has not gotten updated labs yet - has some needle phobia; will get closer to surgery after date set.    Blood glucose readings: 120-130mg/dl (fasting); notes blood glucose at goal with Bydureon, but were closer to 100mg/dl with Ozempic     Nutrition/Eating Habits:  Working to improve diet and overall wellbeing for overall health.  Focusing on lean meats, vegetables and fruits.  Exercise/Activity: goal to increase activity - has not been able to      Medication History:  Topiramate: takes for migraine - not helpful for appetite/cravings   Ozempic: more effective - had some concerns re: side effects, now believes not related.  Bydureon - worked well when on previously in 2023, less effective retrial 2024  Jardiance - UTI  Bupropion - (for depression previously) not effective for appetite/reward suppression and not needed for mental health at this time.    Per visit with Jeannie Noe 5/2/24  NBS 7/2020 296lb   Ongoing MWM with Dr. Velarde wasn't seen in last 1.5 years until this month- titration up on topiramate, on saxenda but other agents not covered?   4/2023 glenn shelley RD     Starting weight (lbs): 296       Current weight today: 280 lbs 0 oz  Cumulative Weight Loss: -16 lb, -5.4% from baseline    Wt Readings from Last 4 Encounters:   09/16/24 127 kg (280 lb)   08/13/24 131.4 kg (289 lb 9.6 oz)   08/05/24 131.1 kg (289 lb)   07/18/24 132.5 kg (292 lb)     Estimated body mass index is 47.32 kg/m  as calculated from the following:    Height as of this encounter: 1.638 m (5' 4.5\").    Weight as of this encounter: 127 kg (280 lb).        Stroke (7/2023):   Aspirin 81 mg once " "daily  Rosuvastatin 20 mg once daily   Verapamil  mg once daily    Patient working with cardiology for approval for bariatric surgery. Somewhat frustrated with change to Bydureon that seeing blood pressure elevations; had been down to 120mg ER verapamil with Ozempic, now back up to 240mg ER Verapamil. Would like to restart Ozempic from a cardiac perspective as well (patient endorses understanding that careteam would like to prioritize semaglutide per MACE benefit as well).    Recent Labs   Lab Test 10/28/22  1043   CHOL 191   HDL 37*   *   TRIG 89     BP Readings from Last 3 Encounters:   08/13/24 127/81   07/18/24 (!) 143/91   07/03/24 125/78         Vertigo:  Meclizine 25 mg once daily as needed    Patient has been worked up for secondary stroke symptoms as had some lightheadedness over the summer. Negative for neuro/cardiac cause, found to have vertigo. Meclizine works well for treatment and has helped overall with nausea and lightheadeness patient believed previously to have been related to Ozempic -worsened gi symptoms with bydureon, so would like to go back to Ozempic (see above) now that vertigo better managed.      Today's Vitals: Ht 1.638 m (5' 4.5\")   Wt 127 kg (280 lb)   BMI 47.32 kg/m    ----------------      I spent 20 minutes with this patient today. All changes were made via collaborative practice agreement with Jeannie Noe. A copy of the visit note was provided to the patient's provider(s).    A summary of these recommendations was sent via [employer].    Micki Martins, Pharm D., MPH    Medication Therapy Management Pharmacist   [facility]      Telemedicine Visit Details  Type of service:  Video Conference via [employer]  Start Time:  7:35 AM  End Time:  7:55 AM     Medication Therapy Recommendations  Type 2 diabetes mellitus without complication, without long-term current use of insulin (H)    Current Medication: exenatide ER (BYDUREON BCISE) 2 " MG/0.85ML auto-injector (Discontinued)   Rationale: More effective medication available - Ineffective medication - Effectiveness   Recommendation: Change Medication   Status: Accepted per CPA

## 2024-09-16 NOTE — LETTER
9/16/2024      RE: Yancy Hoover  [address]  [facility] [address]       Dear Colleague,    Thank you for the opportunity to participate in the care of your patient, Yancy Hoover, at the [facility]. Please see a copy of my visit note below.    Medication Therapy Management (MTM) Encounter    ASSESSMENT:                            Medication Adherence/Access: No issues identified    Weight management/diabetes: Patient would benefit from continuing pharmacotherapy for weight management. Given better effectiveness, MACE benefit (may stay on semaglutide even after bariatric surg for this), class III obesity, and working toward goal of weight management ahead of bariatric surgery, recommend optimizing GLP1/GIP therapy - switch back to Ozempic  as data to support most significant weight loss and patient has no contraindications. Patient also realizing benefit from reduction in food noise and increased satiety. Education provided on continued dietary and behavioral modifications.       Approximate Comparative doses of GLP1s and GLP1/GIPs (DM=Diabetes indication, WL=Weight loss indication)     Liraglutide   (Victoza- DM)   (Saxenda-WL) Daily 0.6mg 1.2mg 1.8mg  2.4   mg 3 mg             Exenatide   (Bydreon-DM) Weekly     2mg                 Dulaglutide   (Trulicity-DM) Weekly   0.75mg 1.5mg   3 mg  4.5 mg          Oral Semaglutide  (Rybelsus -DM) Daily 3mg 7mg 14mg                 Semaglutide  (Ozempic- DM) Weekly   0.25mg 0.5mg    1mg   2mg        Semaglutide  (Compounded) Weekly  0.25mg 0.5mg   1mg 1.5mg 2mg 2.5mg     Semaglutide   (Wegovy-WL) Weekly   0.25mg 0.5mg    1mg 1.7mg  2.4mg       Tirzeptatide  (Mounjaro-DM)  (Zepbound-WL) Weekly     2.5mg       5 mg 7.5mg 10 mg 12.5 15 mg       Recommend when transitioning from one GLP-1 agonist to another, to consider step down by 1 dose when transitioning to  decrease risk of gastrointestinal side effects.         Stroke (7/2023): stable - education provided to continue to evaluate blood pressure after restarting Ozempic given history of blood pressure lowering secondary to lifestyle modification improvements with Ozempic.    Vertigo: improving - agree with patient - suspect symptoms of vertigo were conflated with intolerability of Ozempic. Patient acknowleges with her experience as well - vertigo improving and Ozempic superior to bydureon for weight management and diabetes management in addition to other benefits (see above)    PLAN:                            Stop Bydureon.    Restart Ozempic 0.5mg x 4 weeks.  If tolerating 0.5mg Ozempic well, may increase to Ozempic 1mg weekly on week 5.  If you continue to tolerate Ozempic 1mg, you may increase to Ozempic 2mg weekly on week 10. You were on this dose previously, so we expect you to tolerate well.    3. Continue to monitor blood pressure and blood glucose with these changes.      Follow-up: 11/18 with Micki Martins [facility]; sooner if you get a surgery date as we will meet to review your meds before surgery as well as after! Keep up the great work!    SUBJECTIVE/OBJECTIVE:                          Yancy Hoover is a 54 year old female seen for a follow-up visit.       Reason for visit: Medication Therapy Management GLP1/GIP Management .    Allergies/ADRs: Reviewed in chart  Past Medical History: Reviewed in chart  Tobacco: She reports that she quit smoking about 14 months ago. Her smoking use included cigarettes. She has a 10 pack-year smoking history. She has never used smokeless tobacco.  Alcohol: not currently using    Medication Adherence/Access: no issues reported    Weight Management/Diabetes   Bydureon 2 mg weekly  Metformin 500 mg ER Tabs - 4 tabs every morning     Last met with Jeannie Noe CNP  8/13/24 for return weight management (surgical consult).   Last visit with Yohana Osborn, dietician 8/5/24.    Patient  reports some worsening nausea with Bydureon and does not feel Bydureon as effective as Ozempic (patient had requested Bydureon to replace Ozempic at last visit visit given some frustration on slow weight loss  and concern for side effects with Ozempic and had noted better results and tolerability in the past with Bydureon). Notes worsening appetite and blood glucose readings since switch from Ozempic to bydureon - worsening side effects. Patient now believes side effects were from untreated vertigo (see below) and that overall health including blood pressure was better with Ozempic - would like to go back to Ozempic.  Goal to meet weight loss goals for bariatric surger (Goal 286 lbs). Patient has not gotten updated labs yet - has some needle phobia; will get closer to surgery after date set.    Blood glucose readings: 120-130mg/dl (fasting); notes blood glucose at goal with Bydureon, but were closer to 100mg/dl with Ozempic     Nutrition/Eating Habits:  Working to improve diet and overall wellbeing for overall health.  Focusing on lean meats, vegetables and fruits.  Exercise/Activity: goal to increase activity - has not been able to      Medication History:  Topiramate: takes for migraine - not helpful for appetite/cravings   Ozempic: more effective - had some concerns re: side effects, now believes not related.  Bydureon - worked well when on previously in 2023, less effective retrial 2024  Jardiance - UTI  Bupropion - (for depression previously) not effective for appetite/reward suppression and not needed for mental health at this time.    Per visit with Jeannie Noe 5/2/24  NBS 7/2020 296lb   Ongoing MWM with Dr. Velarde wasn't seen in last 1.5 years until this month- titration up on topiramate, on saxenda but other agents not covered?   4/2023 [address]     Starting weight (lbs): 296       Current weight today: 280 lbs 0 oz  Cumulative Weight Loss: -16 lb, -5.4% from baseline    Wt Readings from Last 4  "Encounters:   09/16/24 127 kg (280 lb)   08/13/24 131.4 kg (289 lb 9.6 oz)   08/05/24 131.1 kg (289 lb)   07/18/24 132.5 kg (292 lb)     Estimated body mass index is 47.32 kg/m  as calculated from the following:    Height as of this encounter: 1.638 m (5' 4.5\").    Weight as of this encounter: 127 kg (280 lb).        Stroke (7/2023):   Aspirin 81 mg once daily  Rosuvastatin 20 mg once daily   Verapamil  mg once daily    Patient working with cardiology for approval for bariatric surgery. Somewhat frustrated with change to Bydureon that seeing blood pressure elevations; had been down to 120mg ER verapamil with Ozempic, now back up to 240mg ER Verapamil. Would like to restart Ozempic from a cardiac perspective as well (patient endorses understanding that careteam would like to prioritize semaglutide per MACE benefit as well).    Recent Labs   Lab Test 10/28/22  1043   CHOL 191   HDL 37*   *   TRIG 89     BP Readings from Last 3 Encounters:   08/13/24 127/81   07/18/24 (!) 143/91   07/03/24 125/78         Vertigo:  Meclizine 25 mg once daily as needed    Patient has been worked up for secondary stroke symptoms as had some lightheadedness over the summer. Negative for neuro/cardiac cause, found to have vertigo. Meclizine works well for treatment and has helped overall with nausea and lightheadeness patient believed previously to have been related to Ozempic -worsened gi symptoms with bydureon, so would like to go back to Ozempic (see above) now that vertigo better managed.      Today's Vitals: Ht 1.638 m (5' 4.5\")   Wt 127 kg (280 lb)   BMI 47.32 kg/m    ----------------      I spent 20 minutes with this patient today. All changes were made via collaborative practice agreement with Jeannie Noe. A copy of the visit note was provided to the patient's provider(s).    A summary of these recommendations was sent via [employer].    Micki Martins, Pharm D., MPH    Medication Therapy Management Pharmacist   [facility] " [facility]      Telemedicine Visit Details  Type of service:  Video Conference via [employer]  Start Time:  7:35 AM  End Time:  7:55 AM     Medication Therapy Recommendations  Type 2 diabetes mellitus without complication, without long-term current use of insulin (H)    Current Medication: exenatide ER (BYDUREON BCISE) 2 MG/0.85ML auto-injector (Discontinued)   Rationale: More effective medication available - Ineffective medication - Effectiveness   Recommendation: Change Medication   Status: Accepted per CPA                Please do not hesitate to contact me if you have any questions/concerns.     Sincerely,     Micki Martins, [facility]

## 2024-09-16 NOTE — Clinical Note
Yancy today noted that she now understands why the team didn't want to try Bydureon vs. Ozempic as her appetite and blood glucose readings are worse with Bydureon (as we warned :)) and was happy to report what may have been mistaken for side effects from Ozempic, were prob symptoms of vertigo which she is now treating and feeling better! She is very happy to return to Ozempic ahead of bariatric surgery to continue her momentum. We discussed she may be a candidate to continue semaglutide in the future for MACE benefit after surgery, but we will slowly assess that in the future and patient to focus on one thing at a time).  Working on requirements to meet goal for bariatric surgery; hopeful to be scheduled soon as FYI to the team.  Micki

## 2024-09-17 NOTE — PROGRESS NOTES
[facility] - [facility]   Cardiology Clinic Note      HPI:   Ms. Yancy Hoover is a pleasant 54 year old female with medical history pertinent for DM2, TIA, HTN, HLD, and LANDON. She presents to cardiology clinic for cardiac clearance for bariatric surgery.    Yancy has been following with the weight loss clinic and is undergoing workup for bariatric surgery. Her cardiac risk factor include smoking history (10 pack years), HTN, HLD, and DM2.   Today in clinic, she denies chest pain, palpitations, dizziness, syncope, or lower extremity edema.       PAST MEDICAL HISTORY:  Past Medical History:   Diagnosis Date    Cerebral infarction (H) 2022    DM (diabetes mellitus), type 2 (H)     History of diabetes mellitus     LANDON (obstructive sleep apnea) 2020       FAMILY HISTORY:  Family History   Problem Relation Age of Onset    Diabetes Father        SOCIAL HISTORY:  Social History     Socioeconomic History    Marital status: Single   Tobacco Use    Smoking status: Former     Current packs/day: 0.00     Average packs/day: 0.5 packs/day for 20.0 years (10.0 ttl pk-yrs)     Types: Cigarettes     Quit date: 2023     Years since quittin.2    Smokeless tobacco: Never    Tobacco comments:     Quit as of 25   Vaping Use    Vaping status: Never Used   Substance and Sexual Activity    Alcohol use: Not Currently    Drug use: Never    Sexual activity: Not Currently     Partners: Male     Birth control/protection: None     Social Determinants of Health     Financial Resource Strain: Low Risk  (2024)    Received from [employer]    Financial Resource Strain     Difficulty of Paying Living Expenses: 3   Food Insecurity: No Food Insecurity (2024)    Received from [employer]    Food Insecurity     Worried About Running Out of Food in the Last Year: 1   Transportation Needs: No Transportation Needs (2024)    Received from [employer]  [facility]    Transportation Needs     Lack of Transportation (Medical): 1   Social Connections: Socially Integrated (7/18/2024)    Received from [facility]    Social Connections     Frequency of Communication with Friends and Family: 0   Housing Stability: Low Risk  (7/18/2024)    Received from [facility]    Housing Stability     Unable to Pay for Housing in the Last Year: 1       CURRENT MEDICATIONS:  Current Outpatient Medications   Medication Sig Dispense Refill    alcohol swab prep pads Use to swab area of injection/shahla as directed. 100 each 5    aspirin (ASA) 81 MG chewable tablet Take 81 mg by mouth daily      blood glucose (ACCU-CHEK SMARTVIEW) test strip Use to test blood sugar 2 times daily. 100 strip 5    blood glucose calibration (ACCU-CHEK SMARTVIEW CONTROL) solution Use to calibrate blood glucose monitor as directed. 1 Bottle 3    blood glucose monitoring (ACCU-CHEK FASTCLIX) lancets Use to test blood sugar 1-2 times daily or as directed. 102 each 5    blood glucose monitoring (ACCU-CHEK FAWAD SMARTVIEW) meter device kit Use to test blood sugar 1-2 times daily. 1 kit 0    childrens multivitamin w/iron (FLINTSTONES COMPLETE) 18 MG chewable tablet Take 1 chew tab by mouth daily      meclizine (ANTIVERT) 25 MG tablet Take 25 mg by mouth      metFORMIN (GLUCOPHAGE XR) 500 MG 24 hr tablet Take 4 tablets (2,000 mg) by mouth daily with food 360 tablet 3    omeprazole (PRILOSEC) 20 MG DR capsule Take 1 capsule (20 mg) by mouth daily 90 capsule 2    polyethylene glycol (MIRALAX) 17 GM/Dose powder Take 1 Capful by mouth daily as needed for constipation      rosuvastatin (CRESTOR) 20 MG tablet Take 1 tablet (20 mg) by mouth daily      semaglutide (OZEMPIC) 2 MG/3ML pen Inject 0.5 mg subcutaneously every 7 days. 3 mL 1    Semaglutide, 1 MG/DOSE, (OZEMPIC) 4 MG/3ML pen Inject 1 mg subcutaneously every 7 days. Start after 4  weeks of 0.5 mg Ozempic , if tolerating 3 mL 1    Semaglutide, 2 MG/DOSE, (OZEMPIC) 8 MG/3ML pen Inject 2 mg subcutaneously every 7 days. Start after 4 weeks of 1 mg Ozempic, if tolerating 9 mL 3    Semaglutide, 2 MG/DOSE, (OZEMPIC) 8 MG/3ML pen Inject 2 mg Subcutaneous every 7 days 3 mL 2    topiramate (TOPAMAX) 25 MG tablet Take 25 mg by mouth daily.      verapamil ER (VERELAN) 240 MG 24 hr capsule Take 240 mg by mouth      vitamin D3 (CHOLECALCIFEROL) 50 mcg (2000 units) tablet Take 1 tablet by mouth daily       No current facility-administered medications for this visit.       ROS:   Refer to HPI    EXAM:  /77 (BP Location: Right arm, Patient Position: Chair, Cuff Size: Adult Regular)   Pulse 98   Wt 133.9 kg (295 lb 1.6 oz)   SpO2 99%   BMI 49.87 kg/m    GENERAL: Appears comfortable, in no acute distress.   HEENT: Eye symmetrical, no discharge or icterus bilaterally. Mucous membranes moist and without lesions.  CV: RRR, +S1S2, no murmur, rub, or gallop.   RESPIRATORY: Respirations regular, even, and unlabored. Lungs CTA throughout.   GI: Soft and non distended with normoactive bowel sounds present in all quadrants. No tenderness, rebound, guarding.   EXTREMITIES: no peripheral edema. 2+ bilateral pedal pulses.   NEUROLOGIC: Alert and oriented x 3. No focal deficits.   MUSCULOSKELETAL: No joint swelling or tenderness.   SKIN: No jaundice. No rashes or lesions.     Labs, reviewed with patient in clinic today:  CBC RESULTS:  Lab Results   Component Value Date    WBC 6.6 07/03/2024    WBC 7.8 10/18/2020    RBC 4.74 07/03/2024    RBC 4.52 10/18/2020    HGB 13.4 07/03/2024    HGB 12.9 10/18/2020    HCT 41.2 07/03/2024    HCT 39.9 10/18/2020    MCV 87 07/03/2024    MCV 88 10/18/2020    MCH 28.3 07/03/2024    MCH 28.5 10/18/2020    MCHC 32.5 07/03/2024    MCHC 32.3 10/18/2020    RDW 13.5 07/03/2024    RDW 14.6 10/18/2020     07/03/2024     10/18/2020       CMP RESULTS:  Lab Results   Component  "Value Date     07/03/2024     10/18/2020    POTASSIUM 4.0 07/03/2024    POTASSIUM 4.2 11/09/2022    POTASSIUM 4.0 01/21/2021    CHLORIDE 102 07/03/2024    CHLORIDE 107 11/09/2022    CHLORIDE 103 10/18/2020    CO2 24 07/03/2024    CO2 30 11/09/2022    CO2 28 10/18/2020    ANIONGAP 11 07/03/2024    ANIONGAP 5 11/09/2022    ANIONGAP 3 10/18/2020     (H) 07/03/2024     (H) 02/24/2023     (H) 11/09/2022     (H) 01/21/2021    BUN 11.3 07/03/2024    BUN 9 11/09/2022    BUN 11 10/18/2020    CR 0.89 07/03/2024    CR 0.88 01/21/2021    GFRESTIMATED 78 07/03/2024    GFRESTIMATED >60 01/21/2021    GFRESTBLACK 83 10/18/2020    RUSSELL 9.3 07/03/2024    RUSSELL 8.5 10/18/2020    BILITOTAL 0.2 07/03/2024    BILITOTAL 0.3 10/18/2020    ALBUMIN 4.3 07/03/2024    ALBUMIN 3.3 (L) 11/09/2022    ALBUMIN 3.8 10/18/2020    ALKPHOS 73 07/03/2024    ALKPHOS 73 10/18/2020    ALT 41 07/03/2024    ALT 18 01/21/2021    AST 41 07/03/2024    AST 13 01/21/2021        INR RESULTS:  No results found for: \"INR\"    No results found for: \"MAG\"  No results found for: \"NTBNPI\"  No results found for: \"NTBNP\"    LIPIDS:  Recent Labs   Lab Test 10/28/22  1043   CHOL 191   HDL 37*   *   TRIG 89         EKG: NSR, HR 90    Assessment and Plan:   Ms. Hoover is a 54 year old female with a PMH of DM2, TIA, HTN, HLD, and LANDON.    # Preoperative evaluation   RCRI = 2 points (intraperitoneal, history of TIA)  - nuclear stress test with premedication for contrast allergy    # HTN  - verapamil 240mg daily     # HLD  Most recent  (unclear if fasting)  - rosuvastatin 20mg daily  - repeat fasting lipid panel       # DM2  Most recent A1c   Lab Results   Component Value Date    A1C 8.1 10/28/2022    A1C 7.4 01/21/2021   - managed by PCP/RPH      Follow up:  as needed   Chart review time today: 5 minutes  Visit time today: 15 minutes  Total time spent today: 20 minutes      Zaira Perera CNP  General Cardiology "   09/18/24

## 2024-09-18 ENCOUNTER — OFFICE VISIT (OUTPATIENT)
Dept: CARDIOLOGY | Facility: CLINIC | Age: 54
End: 2024-09-18
Attending: CASE MANAGER/CARE COORDINATOR
Payer: COMMERCIAL

## 2024-09-18 VITALS
BODY MASS INDEX: 49.87 KG/M2 | WEIGHT: 293 LBS | SYSTOLIC BLOOD PRESSURE: 125 MMHG | DIASTOLIC BLOOD PRESSURE: 77 MMHG | HEART RATE: 98 BPM | OXYGEN SATURATION: 99 %

## 2024-09-18 DIAGNOSIS — E78.5 HYPERLIPIDEMIA LDL GOAL <100: ICD-10-CM

## 2024-09-18 DIAGNOSIS — I10 BENIGN ESSENTIAL HYPERTENSION: ICD-10-CM

## 2024-09-18 DIAGNOSIS — Z01.818 PRE-OP EVALUATION: Primary | ICD-10-CM

## 2024-09-18 DIAGNOSIS — E66.01 CLASS 3 SEVERE OBESITY DUE TO EXCESS CALORIES WITH BODY MASS INDEX (BMI) OF 50.0 TO 59.9 IN ADULT, UNSPECIFIED WHETHER SERIOUS COMORBIDITY PRESENT (H): ICD-10-CM

## 2024-09-18 DIAGNOSIS — E66.813 CLASS 3 SEVERE OBESITY DUE TO EXCESS CALORIES WITH BODY MASS INDEX (BMI) OF 50.0 TO 59.9 IN ADULT, UNSPECIFIED WHETHER SERIOUS COMORBIDITY PRESENT (H): ICD-10-CM

## 2024-09-18 PROCEDURE — 99202 OFFICE O/P NEW SF 15 MIN: CPT | Performed by: CASE MANAGER/CARE COORDINATOR

## 2024-09-18 PROCEDURE — G0463 HOSPITAL OUTPT CLINIC VISIT: HCPCS | Performed by: CASE MANAGER/CARE COORDINATOR

## 2024-09-18 PROCEDURE — 93005 ELECTROCARDIOGRAM TRACING: CPT

## 2024-09-18 RX ORDER — DIPHENHYDRAMINE HCL 25 MG
25 TABLET ORAL SEE ADMIN INSTRUCTIONS
Qty: 1 TABLET | Refills: 0 | Status: SHIPPED | OUTPATIENT
Start: 2024-09-18

## 2024-09-18 RX ORDER — METHYLPREDNISOLONE 32 MG/1
32 TABLET ORAL DAILY
Qty: 2 TABLET | Refills: 0 | Status: SHIPPED | OUTPATIENT
Start: 2024-09-18

## 2024-09-18 ASSESSMENT — PAIN SCALES - GENERAL: PAINLEVEL: NO PAIN (0)

## 2024-09-18 NOTE — NURSING NOTE
Chief Complaint   Patient presents with    New Patient     9/18/2024 visit with Zaira Perera APRN CNP for NEW CARDIOLOGY - Pre-Op Clearance - bariatric surgery (looking at November for surgery date). Appt per Pt. No relevant records outside [facility]       Vitals were taken and medications reconciled.    [place], EMT  10:16 AM

## 2024-09-18 NOTE — LETTER
2024      RE: Yancy Hoover  [address]  [facility] [address]       Dear Colleague,    Thank you for the opportunity to participate in the care of your patient, Yancy Hoover, at the [facility] HEART [facility]INIC [place] at [facility]. Please see a copy of my visit note below.      [facility] Cardiology - [facility]   Cardiology Clinic Note      HPI:   Ms. Yancy Hoover is a pleasant 54 year old female with medical history pertinent for DM2, TIA, HTN, HLD, and LANDON. She presents to cardiology clinic for cardiac clearance for bariatric surgery.    Yancy has been following with the weight loss clinic and is undergoing workup for bariatric surgery. Her cardiac risk factor include smoking history (10 pack years), HTN, HLD, and DM2.   Today in clinic, she denies chest pain, palpitations, dizziness, syncope, or lower extremity edema.       PAST MEDICAL HISTORY:  Past Medical History:   Diagnosis Date     Cerebral infarction (H) 2022     DM (diabetes mellitus), type 2 (H)      History of diabetes mellitus      LANDON (obstructive sleep apnea) 2020       FAMILY HISTORY:  Family History   Problem Relation Age of Onset     Diabetes Father        SOCIAL HISTORY:  Social History     Socioeconomic History     Marital status: Single   Tobacco Use     Smoking status: Former     Current packs/day: 0.00     Average packs/day: 0.5 packs/day for 20.0 years (10.0 ttl pk-yrs)     Types: Cigarettes     Quit date: 2023     Years since quittin.2     Smokeless tobacco: Never     Tobacco comments:     Quit as of 25   Vaping Use     Vaping status: Never Used   Substance and Sexual Activity     Alcohol use: Not Currently     Drug use: Never     Sexual activity: Not Currently     Partners: Male     Birth control/protection: None     Social Determinants of Health     Financial Resource Strain: Low Risk  (2024)    Received from [facility]  [facility]    Financial Resource Strain      Difficulty of Paying Living Expenses: 3   Food Insecurity: No Food Insecurity (7/18/2024)    Received from [facility]    Food Insecurity      Worried About Running Out of Food in the Last Year: 1   Transportation Needs: No Transportation Needs (7/18/2024)    Received from [facility]    Transportation Needs      Lack of Transportation (Medical): 1   Social Connections: Socially Integrated (7/18/2024)    Received from [facility]    Social Connections      Frequency of Communication with Friends and Family: 0   Housing Stability: Low Risk  (7/18/2024)    Received from [facility]    Housing Stability      Unable to Pay for Housing in the Last Year: 1       CURRENT MEDICATIONS:  Current Outpatient Medications   Medication Sig Dispense Refill     alcohol swab prep pads Use to swab area of injection/shahla as directed. 100 each 5     aspirin (ASA) 81 MG chewable tablet Take 81 mg by mouth daily       blood glucose (ACCU-CHEK SMARTVIEW) test strip Use to test blood sugar 2 times daily. 100 strip 5     blood glucose calibration (ACCU-CHEK SMARTVIEW CONTROL) solution Use to calibrate blood glucose monitor as directed. 1 Bottle 3     blood glucose monitoring (ACCU-CHEK FASTCLIX) lancets Use to test blood sugar 1-2 times daily or as directed. 102 each 5     blood glucose monitoring (ACCU-CHEK FAWAD SMARTVIEW) meter device kit Use to test blood sugar 1-2 times daily. 1 kit 0     childrens multivitamin w/iron (FLINTSTONES COMPLETE) 18 MG chewable tablet Take 1 chew tab by mouth daily       meclizine (ANTIVERT) 25 MG tablet Take 25 mg by mouth       metFORMIN (GLUCOPHAGE XR) 500 MG 24 hr tablet Take 4 tablets (2,000 mg) by mouth daily with food 360 tablet 3     omeprazole (PRILOSEC) 20 MG DR capsule Take 1 capsule (20 mg) by mouth daily 90  capsule 2     polyethylene glycol (MIRALAX) 17 GM/Dose powder Take 1 Capful by mouth daily as needed for constipation       rosuvastatin (CRESTOR) 20 MG tablet Take 1 tablet (20 mg) by mouth daily       semaglutide (OZEMPIC) 2 MG/3ML pen Inject 0.5 mg subcutaneously every 7 days. 3 mL 1     Semaglutide, 1 MG/DOSE, (OZEMPIC) 4 MG/3ML pen Inject 1 mg subcutaneously every 7 days. Start after 4 weeks of 0.5 mg Ozempic , if tolerating 3 mL 1     Semaglutide, 2 MG/DOSE, (OZEMPIC) 8 MG/3ML pen Inject 2 mg subcutaneously every 7 days. Start after 4 weeks of 1 mg Ozempic, if tolerating 9 mL 3     Semaglutide, 2 MG/DOSE, (OZEMPIC) 8 MG/3ML pen Inject 2 mg Subcutaneous every 7 days 3 mL 2     topiramate (TOPAMAX) 25 MG tablet Take 25 mg by mouth daily.       verapamil ER (VERELAN) 240 MG 24 hr capsule Take 240 mg by mouth       vitamin D3 (CHOLECALCIFEROL) 50 mcg (2000 units) tablet Take 1 tablet by mouth daily       No current facility-administered medications for this visit.       ROS:   Refer to HPI    EXAM:  /77 (BP Location: Right arm, Patient Position: Chair, Cuff Size: Adult Regular)   Pulse 98   Wt 133.9 kg (295 lb 1.6 oz)   SpO2 99%   BMI 49.87 kg/m    GENERAL: Appears comfortable, in no acute distress.   HEENT: Eye symmetrical, no discharge or icterus bilaterally. Mucous membranes moist and without lesions.  CV: RRR, +S1S2, no murmur, rub, or gallop.   RESPIRATORY: Respirations regular, even, and unlabored. Lungs CTA throughout.   GI: Soft and non distended with normoactive bowel sounds present in all quadrants. No tenderness, rebound, guarding.   EXTREMITIES: no peripheral edema. 2+ bilateral pedal pulses.   NEUROLOGIC: Alert and oriented x 3. No focal deficits.   MUSCULOSKELETAL: No joint swelling or tenderness.   SKIN: No jaundice. No rashes or lesions.     Labs, reviewed with patient in clinic today:  CBC RESULTS:  Lab Results   Component Value Date    WBC 6.6 07/03/2024    WBC 7.8 10/18/2020    RBC  "4.74 07/03/2024    RBC 4.52 10/18/2020    HGB 13.4 07/03/2024    HGB 12.9 10/18/2020    HCT 41.2 07/03/2024    HCT 39.9 10/18/2020    MCV 87 07/03/2024    MCV 88 10/18/2020    MCH 28.3 07/03/2024    MCH 28.5 10/18/2020    MCHC 32.5 07/03/2024    MCHC 32.3 10/18/2020    RDW 13.5 07/03/2024    RDW 14.6 10/18/2020     07/03/2024     10/18/2020       CMP RESULTS:  Lab Results   Component Value Date     07/03/2024     10/18/2020    POTASSIUM 4.0 07/03/2024    POTASSIUM 4.2 11/09/2022    POTASSIUM 4.0 01/21/2021    CHLORIDE 102 07/03/2024    CHLORIDE 107 11/09/2022    CHLORIDE 103 10/18/2020    CO2 24 07/03/2024    CO2 30 11/09/2022    CO2 28 10/18/2020    ANIONGAP 11 07/03/2024    ANIONGAP 5 11/09/2022    ANIONGAP 3 10/18/2020     (H) 07/03/2024     (H) 02/24/2023     (H) 11/09/2022     (H) 01/21/2021    BUN 11.3 07/03/2024    BUN 9 11/09/2022    BUN 11 10/18/2020    CR 0.89 07/03/2024    CR 0.88 01/21/2021    GFRESTIMATED 78 07/03/2024    GFRESTIMATED >60 01/21/2021    GFRESTBLACK 83 10/18/2020    RUSSELL 9.3 07/03/2024    RUSSELL 8.5 10/18/2020    BILITOTAL 0.2 07/03/2024    BILITOTAL 0.3 10/18/2020    ALBUMIN 4.3 07/03/2024    ALBUMIN 3.3 (L) 11/09/2022    ALBUMIN 3.8 10/18/2020    ALKPHOS 73 07/03/2024    ALKPHOS 73 10/18/2020    ALT 41 07/03/2024    ALT 18 01/21/2021    AST 41 07/03/2024    AST 13 01/21/2021        INR RESULTS:  No results found for: \"INR\"    No results found for: \"MAG\"  No results found for: \"NTBNPI\"  No results found for: \"NTBNP\"    LIPIDS:  Recent Labs   Lab Test 10/28/22  1043   CHOL 191   HDL 37*   *   TRIG 89         EKG: NSR, HR 90    Assessment and Plan:   Ms. Hoover is a 54 year old female with a PMH of DM2, TIA, HTN, HLD, and LANDON.    # Preoperative evaluation   RCRI = 2 points (intraperitoneal, history of TIA)  - nuclear stress test with premedication for contrast allergy    # HTN  - verapamil 240mg daily     # HLD  Most recent  " (unclear if fasting)  - rosuvastatin 20mg daily  - repeat fasting lipid panel       # DM2  Most recent A1c   Lab Results   Component Value Date    A1C 8.1 10/28/2022    A1C 7.4 01/21/2021   - managed by PCP/RPH      Follow up:  as needed   Chart review time today: 5 minutes  Visit time today: 15 minutes  Total time spent today: 20 minutes      Zaira Perera CNP  General Cardiology   09/18/24            Please do not hesitate to contact me if you have any questions/concerns.     Sincerely,     LYDIA CLANCY CNP

## 2024-09-18 NOTE — PATIENT INSTRUCTIONS
You were seen today in the Cardiovascular Clinic at the [facility] by:       LYDIA MONTGOMERY CNP    Your visit summary and instructions are as follows:    Schedule fasting cholesterol labs at your convenience  We will call you to schedule stress test       Return to cardiology clinic as needed     Thank you for your visit today!     Please MyChart message me or call my nurse if you have any questions or concerns.      During Business Hours:  [phone], option # 1 (University) then option # 4 (medical questions) and ask to speak with my nurse.     After hours, weekends or holidays:   [phone], Option #4  Ask to speak to the On-Call Cardiologist. Inform them you are a cardiology patient at the [place].

## 2024-09-19 LAB
ATRIAL RATE - MUSE: 90 BPM
DIASTOLIC BLOOD PRESSURE - MUSE: NORMAL MMHG
INTERPRETATION ECG - MUSE: NORMAL
P AXIS - MUSE: 60 DEGREES
PR INTERVAL - MUSE: 164 MS
QRS DURATION - MUSE: 90 MS
QT - MUSE: 366 MS
QTC - MUSE: 447 MS
R AXIS - MUSE: -7 DEGREES
SYSTOLIC BLOOD PRESSURE - MUSE: NORMAL MMHG
T AXIS - MUSE: 14 DEGREES
VENTRICULAR RATE- MUSE: 90 BPM

## 2024-09-20 ENCOUNTER — HOSPITAL ENCOUNTER (OUTPATIENT)
Dept: NUCLEAR MEDICINE | Facility: CLINIC | Age: 54
Setting detail: NUCLEAR MEDICINE
Discharge: HOME | End: 2024-09-20
Attending: CASE MANAGER/CARE COORDINATOR
Payer: COMMERCIAL

## 2024-09-20 ENCOUNTER — HOSPITAL ENCOUNTER (OUTPATIENT)
Dept: CARDIOLOGY | Facility: CLINIC | Age: 54
Setting detail: NUCLEAR MEDICINE
Discharge: HOME | End: 2024-09-20
Attending: CASE MANAGER/CARE COORDINATOR
Payer: COMMERCIAL

## 2024-09-20 DIAGNOSIS — Z01.818 PRE-OP EVALUATION: ICD-10-CM

## 2024-09-20 DIAGNOSIS — E66.01 CLASS 3 SEVERE OBESITY DUE TO EXCESS CALORIES WITH BODY MASS INDEX (BMI) OF 50.0 TO 59.9 IN ADULT, UNSPECIFIED WHETHER SERIOUS COMORBIDITY PRESENT (H): ICD-10-CM

## 2024-09-20 DIAGNOSIS — E78.5 HYPERLIPIDEMIA LDL GOAL <100: ICD-10-CM

## 2024-09-20 DIAGNOSIS — E66.813 CLASS 3 SEVERE OBESITY DUE TO EXCESS CALORIES WITH BODY MASS INDEX (BMI) OF 50.0 TO 59.9 IN ADULT, UNSPECIFIED WHETHER SERIOUS COMORBIDITY PRESENT (H): ICD-10-CM

## 2024-09-20 LAB
CV STRESS MAX HR HE: 122
RATE PRESSURE PRODUCT: NORMAL
STRESS ECHO BASELINE DIASTOLIC HE: 83
STRESS ECHO BASELINE HR: 97 BPM
STRESS ECHO BASELINE SYSTOLIC BP: 114
STRESS ECHO CALCULATED PERCENT HR: 73 %
STRESS ECHO LAST STRESS DIASTOLIC BP: 79
STRESS ECHO LAST STRESS SYSTOLIC BP: 114
STRESS ECHO TARGET HR: 167

## 2024-09-20 PROCEDURE — 93018 CV STRESS TEST I&R ONLY: CPT | Performed by: INTERNAL MEDICINE

## 2024-09-20 PROCEDURE — 93016 CV STRESS TEST SUPVJ ONLY: CPT | Performed by: INTERNAL MEDICINE

## 2024-09-20 PROCEDURE — 250N000011 HC RX IP 250 OP 636: Performed by: INTERNAL MEDICINE

## 2024-09-20 PROCEDURE — 78452 HT MUSCLE IMAGE SPECT MULT: CPT

## 2024-09-20 PROCEDURE — A9502 TC99M TETROFOSMIN: HCPCS | Performed by: CASE MANAGER/CARE COORDINATOR

## 2024-09-20 PROCEDURE — 93017 CV STRESS TEST TRACING ONLY: CPT

## 2024-09-20 PROCEDURE — 343N000001 HC RX 343: Performed by: CASE MANAGER/CARE COORDINATOR

## 2024-09-20 PROCEDURE — 78452 HT MUSCLE IMAGE SPECT MULT: CPT | Mod: 26 | Performed by: RADIOLOGY

## 2024-09-20 RX ORDER — LIDOCAINE 40 MG/G
CREAM TOPICAL
Status: DISCONTINUED | OUTPATIENT
Start: 2024-09-20 | End: 2024-09-21 | Stop reason: HOSPADM

## 2024-09-20 RX ORDER — DIPHENHYDRAMINE HYDROCHLORIDE 50 MG/ML
25-50 INJECTION INTRAMUSCULAR; INTRAVENOUS
Status: DISCONTINUED | OUTPATIENT
Start: 2024-09-20 | End: 2024-09-21 | Stop reason: HOSPADM

## 2024-09-20 RX ORDER — ALBUTEROL SULFATE 90 UG/1
2 AEROSOL, METERED RESPIRATORY (INHALATION) EVERY 5 MIN PRN
Status: DISCONTINUED | OUTPATIENT
Start: 2024-09-20 | End: 2024-09-21 | Stop reason: HOSPADM

## 2024-09-20 RX ORDER — AMINOPHYLLINE 25 MG/ML
50-100 INJECTION, SOLUTION INTRAVENOUS
Status: DISCONTINUED | OUTPATIENT
Start: 2024-09-20 | End: 2024-09-21 | Stop reason: HOSPADM

## 2024-09-20 RX ORDER — METHYLPREDNISOLONE SODIUM SUCCINATE 125 MG/2ML
125 INJECTION, POWDER, LYOPHILIZED, FOR SOLUTION INTRAMUSCULAR; INTRAVENOUS
Status: DISCONTINUED | OUTPATIENT
Start: 2024-09-20 | End: 2024-09-21 | Stop reason: HOSPADM

## 2024-09-20 RX ORDER — REGADENOSON 0.08 MG/ML
0.4 INJECTION, SOLUTION INTRAVENOUS ONCE
Status: COMPLETED | OUTPATIENT
Start: 2024-09-20 | End: 2024-09-20

## 2024-09-20 RX ORDER — DIPHENHYDRAMINE HCL 25 MG
25 CAPSULE ORAL
Status: DISCONTINUED | OUTPATIENT
Start: 2024-09-20 | End: 2024-09-21 | Stop reason: HOSPADM

## 2024-09-20 RX ADMIN — REGADENOSON 0.4 MG: 0.4 INJECTION INTRAVENOUS at 13:23

## 2024-09-20 RX ADMIN — TETROFOSMIN 41 MILLICURIE: 1.38 INJECTION, POWDER, LYOPHILIZED, FOR SOLUTION INTRAVENOUS at 13:25

## 2024-09-20 RX ADMIN — TETROFOSMIN 10.2 MILLICURIE: 1.38 INJECTION, POWDER, LYOPHILIZED, FOR SOLUTION INTRAVENOUS at 12:23

## 2024-09-23 NOTE — PROGRESS NOTES
"Video-Visit Details    Type of service:  Video Visit    Video Start Time: 1:00 PM   Video End Time: 1:28 PM    Originating Location (pt. Location): Home    Distant Location (provider location):  Offsite (providers home) [facility] [place]     Platform used for Video Visit: Radha      Bariatric Nutrition Consultation Note     Reason For Visit: Nutrition Assessment     Yancy Hoover is a 54 year old presenting today for return bariatric nutrition consult and nutrition education regarding clear and low-fat full liquid diet for post-bariatric surgery.  Pt is interested in laparoscopic sleeve gastrectomy with Dr. Alcaraz expected surgery in TBD.  Patient is accompanied by self. This is patients 5th out of 3 required nutrition visits prior to surgery. Completed Weight Loss Surgery Nutrition Class on 7/5/24.     Coordination note: working on setting up psych eval. Has completed 5 HP calls.      Pt referred by Jeannie Noe NP on July 16, 2020 and Dr. Velarde.  Patient with Co-morbidities of obesity including:  Type II DM yes   Renal Failure no  Sleep apnea no  Hypertension no   Dyslipidemia yes  Joint pain no  Back pain no  GERD no      Support System Reviewed With Patient 7/16/2020   Who do you have in your support network that can be available to help you for the first 2 weeks after surgery? Yes   Who can you count on for support throughout your weight loss surgery journey? Yes         ANTHROPOMETRICS:  Initial visit:  Estimated body mass index is 50.81 kg/m  as calculated from the following:    Height as of an earlier encounter on 7/16/20: 1.626 m (5' 4\").    Weight as of an earlier encounter on 7/16/20: 134.3 kg (296 lb).     Current weight:   Estimated body mass index is 47.68 kg/m  as calculated from the following:    Height as of this encounter: 1.638 m (5' 4.49\").    Weight as of this encounter: 127.9 kg (282 lb). (-7 lbs from last RD visit, -14 lbs from initial visit) - " Has surpassed wt loss goal for surgery.      Required weight loss goal pre-op: -10 lbs from initial consult weight (goal weight 286 lbs or less before surgery)     Wt Readings from Last 5 Encounters:   09/24/24 127.9 kg (282 lb)   09/18/24 133.9 kg (295 lb 1.6 oz)   09/16/24 127 kg (280 lb)   08/13/24 131.4 kg (289 lb 9.6 oz)   08/05/24 131.1 kg (289 lb)             7/16/2020   I have tried the following methods to lose weight Watching portions or calories, Exercise, Weight Watchers, Atkins type diet (low carb/high protein), Slimfast         Weight Loss Questions Reviewed With Patient 7/16/2020   How long have you been overweight? Since early childhood         SUPPLEMENT INFORMATION:  Vitamin D (h/o vitamin D deficiency), Flinstones complete with iron      MEDICATIONS FOR WEIGHT LOSS:  Topiramate, Ozempic, metformin      NUTRITION HISTORY:   No known food allergies/intolerances. Eliminated red meat.      4/23/24 - Pt reports she had a mini stroke about a year ago. Wt went up to 336 lbs. Focused on more of a Mediterranean diet post-stroke and helped to lose weight to 296 lbs. Has been stalled for past 6 months with further weight loss. Working on keeping to 3 meals daily, no snacks. And not eating past 7 pm. Challenged by sweet cravings at times.   Period where she couldn't use her right leg, has gained use of leg back. Working on walking 3-5 times weekly for 30 mins.   Practicing separation of beverages from meals.   Started Saxenda on last Tues. No side effects.     6/4/24 - Weight stable over last month. Planning to switch to semaglutide to see if help with more weight loss.   Less sweet food cravings, but continued pop cravings. Doing a lot less from previous. May have a pop weekly.  Using Sparkling Ice as sugar-free options.     7/5/24 - Completed Weight Loss Surgery Nutrition class     7/5/24 labs - Hgba1c 7.4     8/5/24 - Pt reports she started Ozempic, but is experiencing significant diarrhea. On 2 mg  "currently. Was supposed to take injection on Saturday but decided not to.  PharmD suggested dropping down to 1 mg, reviewed with pt today. Has follow-up with bariatric NP on 8/13.     Today - Continues to work on sipping slowly, pacing hydration out and  out from meal intake. Eliminated pork. She is working to further eliminate meat. Will continue, eggs, seafood and dairy.      Recent Diet Recall:   Breakfast: boiled egg; protein shake   Lunch: 11 am-12 pm protein shake (blended with fruit and veggies - strawberry, kale, pineapple)  Dinner: 6 pm grilled meat and veggies   Snack: a lot less, but if needed she will do a small portion/individual protein pack - cheese/turkey or hummus and crackers.   Beverages: water (96 oz/day)      Progress Towards Previous Goals:  1. Pair protein food with high-fiber foods (whole grains, veggies, fruit) - Met, continues  2. Keep working on sugar-free hydration only. Eliminate carbonation. - Met, continues   3. Morning walk as able - Met, continues and has added in weighted hula hoop, has gone from 5 mins to 20 mins daily.   4. Practice diet guidelines for surgery. - Met, continues   5. Complete labs (fasting) - PTH, vitamin D, vitamin A, B12, ferritin, lipids - Not met yet. Planning to complete today or next Friday.    Physical Activity:  Walking daily. Started \"Infiity\" weighted hula hoop daily. Has worked up from 5 min to 20 mins daily.      NUTRITION DIAGNOSIS:  Obesity r/t long history of self-monitoring deficit and excessive energy intake aeb BMI >30 kg/m2. - Improving/continues     Food- and Nutrition-related knowledge deficit r/t lack of prior exposure to information AEB pt scheduled for upcoming bariatric surgery and pt interest in diet education/review    INTERVENTION:  Intervention Provided/Education Provided/Reviewed previous goals and encouraged patient to continue goals prior to surgery.     Provided instruction on bariatric clear and low-fat full liquid " diets.    Provided the following handouts: Diet Guidelines for Bariatric Surgery, Your Stage 1-5 Diet, Keeping Track of Your Fluids, list of recommended vitamin/mineral supplementation after sleeve gastrectomy surgery and RD contact information.          Patient Understanding: good  Expected Compliance: good     GOALS:  1. Eat three meals daily. Pair protein food with high-fiber foods (whole grains, veggies, fruit)  2. Avoid drinking with meals and 30 mins before and after meal. Take small, frequent sips throughout your day.   3. Keep up the great work with exercise!  4. Complete labs (fasting) - PTH, vitamin D, vitamin A, B12, ferritin, lipids     Protein Supplements for After Surgery:   Unflavored protein powder: Genepro, Isopure (25-30 gm protein per 1 scoop); Vital Proteins collagen powder (1 tbsp = 5 gm pro)  You may also use flavored protein powder if you prefer.   Protein shots: [email or web address]://[email or web address]/[email or web address]/protein-[email or web address]  Pre-made protein shakes (no more than 210 Calories, at least 20 grams of protein, and less than 10 grams of sugar):   Premier Protein (160 Calories, 30 g protein)  Boost/Ensure Max (160 calories, 30 gm protein)   Soicos Core Power (170 calories, 26 gm protein)  Aldi's Elevation Protein Shake (160 calories, 30 gm protein)   Equate Protein Shake (160 calories, 30 gm protein)  Slim Fast Advanced Nutrition (180 Calories, 20 g protein)  Muscle Milk, lactose-free, 17 oz bottle (210 Calories, 30 g protein)  Glucerna Protein Smart (150 paul, 30 gm protein)  Clear Protein Drinks:  BiPro  Premier Protein clear  [id number]  [employer] Protein 15 Concentrate  Bone broth  Beneprotein protein powder mixed with 4-6 oz of fluid  [email or web address]  Gatorade Zero with Protein   Vital Proteins collagen powder mixed with clear fluid    Post-op Diet Handouts:  Diet Guidelines after Weight-loss Surgery  [email or web address]     Your Stage 1 Diet: Clear Liquids  [email or web address]     Your  Stage 2 Diet: Low-fat Full Liquids  [email or web address]     Your Stage 3 Diet: Pureed Foods  [email or web address]     Pureed Pleasures  [email or web address]    Your Stage 4 Diet: Soft Foods  [email or web address]    Your Stage 5 Diet: Regular Foods  [email or web address]    Supplements after Sleeve Gastrectomy, Gastric Bypass or Single Anastomosis Duodenal Switch  [email or web address]    Keeping Track of Fluids  [email or web address]      Time spent with patient: 28 mins       Sharon Osborn RD, LD

## 2024-09-24 ENCOUNTER — VIRTUAL VISIT (OUTPATIENT)
Dept: ENDOCRINOLOGY | Facility: CLINIC | Age: 54
End: 2024-09-24
Payer: COMMERCIAL

## 2024-09-24 ENCOUNTER — CARE COORDINATION (OUTPATIENT)
Dept: ENDOCRINOLOGY | Facility: CLINIC | Age: 54
End: 2024-09-24

## 2024-09-24 VITALS — WEIGHT: 282 LBS | HEIGHT: 64 IN | BODY MASS INDEX: 48.14 KG/M2

## 2024-09-24 DIAGNOSIS — E66.813 CLASS 3 SEVERE OBESITY DUE TO EXCESS CALORIES WITH BODY MASS INDEX (BMI) OF 50.0 TO 59.9 IN ADULT, UNSPECIFIED WHETHER SERIOUS COMORBIDITY PRESENT (H): Primary | ICD-10-CM

## 2024-09-24 DIAGNOSIS — E11.9 TYPE 2 DIABETES MELLITUS WITHOUT COMPLICATION, UNSPECIFIED WHETHER LONG TERM INSULIN USE (H): ICD-10-CM

## 2024-09-24 DIAGNOSIS — E66.01 CLASS 3 SEVERE OBESITY DUE TO EXCESS CALORIES WITH BODY MASS INDEX (BMI) OF 50.0 TO 59.9 IN ADULT, UNSPECIFIED WHETHER SERIOUS COMORBIDITY PRESENT (H): Primary | ICD-10-CM

## 2024-09-24 DIAGNOSIS — Z71.3 NUTRITIONAL COUNSELING: ICD-10-CM

## 2024-09-24 PROCEDURE — 99207 PR NO CHARGE LOS: CPT | Mod: 95 | Performed by: DIETITIAN, REGISTERED

## 2024-09-24 PROCEDURE — 97803 MED NUTRITION INDIV SUBSEQ: CPT | Mod: 95 | Performed by: DIETITIAN, REGISTERED

## 2024-09-24 ASSESSMENT — PAIN SCALES - GENERAL: PAINLEVEL: NO PAIN (0)

## 2024-09-24 NOTE — PROGRESS NOTES
Tasklist updated and sent to patient via I AM AT.    Bariatric Task List  Fax:  Please fax all paperwork to: [phone] -     Status:  Is patient a candidate for bariatric surgery?:  patient is a candidate for bariatric surgery -     Cleared to schedule surgeon consult?:  cleared to schedule surgeon consult - 7/18/24 appt. bks   Status:  surgery evaluation in process -     Surgeon: Dr. Robbie Alcaraz -     Tentative surgery month/year: To be determined -        Insurance: Insurance:  Via -      Referral:  Completed - Done. bks   Other:  Do the 5 coaching phone calls. Call Ray at [phone] to get registered. -  Done.      Patient Info: Initial Weight:  296 -     Date of Initial Weight/Height:  7/16/2020 -     Goal Weight (lbs):  286 -     Required Weight Loss:  10 -     Surgery Type:  sleeve gastrectomy -        Dietician Visits: Structured weight loss required by insurance?:  structured weight loss required -     Dietician Visit 1:  Completed - 4/23/24 KB   Dietician Visit 2:  Completed - 6/4/24 KB   Dietician Visit 3:  Completed - 7/5/27 WLS nutrition class scheduled   Dietician Visit 4:  Completed - 8/5/24 KB   Dietician Visit 5:  Completed - 9/24/24 appt. bks   Dietician Visit 6:  Needed - 10/21/24 appt. bks   Dietician Visit additional:  Needed - Monthly until surgery - AS      Psychological Evaluation: Psych eval:  Needed - List and Letter sent to pt 5/10 - AS; Scheduled with Dr Contreras 10/22/24, 10/31/24. bks      Lab Work: Complete Blood Count:  Completed - Ordered 5/3/24 - AS; done 7/3/24 bks   Comprehensive Metabolic Panel:  Completed - Ordered 5/3/24 - AS; done 7/3/24. bks   Vitamin D:  Needed - Ordered 5/3/24 - AS   PTH:  Needed - Ordered 5/3/24 - AS   Hgb A1c:  Completed - Ordered 5/3/24 - AS; 7/8/24 - 7.4    Lipids: Needed - Ordered 5/3/24 - AS    TSH (UCARE, SCA, [place] MA): Completed - Ordered 5/3/24 - AS; Done 7/3/24. bks   Vitamin A: Needed - Ordered 5/3/24 - AS   Vitamin B12: Needed -  "Ordered 5/3/24 - AS      Consults/ [facility] Sleep Medicine:  Completed - Letter sent to pt 5/10 - AS;8/27/245 Dr Brea campbell in Epic. bk   Cardiac:  Completed - Letter sent to pt 5/10 - AS;  9/18/24 Zaira hodge.-cleared. see 9/20/24 note. bks      Testing: EGD:  Completed -        PCP: Establish care with PCP:  Completed -     PCP letter of support:  Completed - Letter sent to pt 5/10 - AS; 8/30/24 Arlin Ewing MD via Fax on 9/4/24, 9/24/24 Attached to [employer] message from Korey lucero      Patient Education:  Information Session:  Completed -     Attended New Consult Class?: Completed - 5/14/24  AS   Given \"Making your decision\" handout?:  Yes - 5/10/24 - AS   Given \"A Roadmap to you Weight Loss Surgery\" handout?: Yes - 5/10/24 - AS   Given \"Epic Care Companion\" information?: Yes - 5/10/24 - AS   Attended support group?:  Needed -     Support plan in place?:  Completed -        Additional Surgery Requirements: HgA1c <8:  Needed -        Final Tasks:  Before surgery online preop class:  Needed -     After surgery online class:    -     Nurse visit for information:  Needed -     Weight Check: Needed -     History and Physical: Pre-Assessment Clinic (PAC) -   Needed -     Final labs per clinic: Needed - Labs ordered at PAC visit. bks   See MTM Pharmacist for medication review and plan for after surgery (if DM, transplant hx, greater than 10 meds): Needed -     Schedule postop appointments: Needed -     Other:   -   -        Notes: Please register for the Weight Loss Surgery Care Companion Pathway through the [employer] mobile gilbert or via web browser. You register by answering \"yes\" to the following question, \"Do you agree to take part in this free, online program?\"  Information from this Pathway can help you be more successful before surgery and for up to one year after surgery.  This Pathway through [employer] can also answer questions you may have about your surgery.   The Pathway will start when you get " your Tasklist after your initial consultation or when your surgery is scheduled.

## 2024-09-24 NOTE — NURSING NOTE
Current patient location: [address] [place], [place] [address]E [facility] [address]    Is the patient currently in the state of [place]? YES    Visit mode:VIDEO    If the visit is dropped, the patient can be reconnected by: VIDEO VISIT: Text to cell phone:   Telephone Information:   Mobile [phone]       Will anyone else be joining the visit? NO  (If patient encounters technical issues they should call [phone]    How would you like to obtain your AVS? MyChart    Are changes needed to the allergy or medication list? Pt stated no changes to allergies and Pt stated no med changes    Are refills needed on medications prescribed by this physician? NO    Reason for visit: RECHECK    Caitlin MENDOZA

## 2024-09-24 NOTE — PATIENT INSTRUCTIONS
Juan J Haines,     Follow-up with RD on 10/21    Thank you,    Sharon Osborn, RD, LD  If you would like to schedule or reschedule an appointment with the RD, please call [phone]    Nutrition Goals  1. Eat three meals daily. Pair protein food with high-fiber foods (whole grains, veggies, fruit)  2. Avoid drinking with meals and 30 mins before and after meal. Take small, frequent sips throughout your day.   3. Keep up the great work with exercise!  4. Complete labs (fasting) - PTH, vitamin D, vitamin A, B12, ferritin, lipids     Protein Supplements for After Surgery:   Unflavored protein powder: Genepro, Isopure (25-30 gm protein per 1 scoop); Vital Proteins collagen powder (1 tbsp = 5 gm pro)  You may also use flavored protein powder if you prefer.   Protein shots: https://store.LABOMAR/collections/protein-shots  Pre-made protein shakes (no more than 210 Calories, at least 20 grams of protein, and less than 10 grams of sugar):   Premier Protein (160 Calories, 30 g protein)  Boost/Ensure Max (160 calories, 30 gm protein)   Plerts Core Power (170 calories, 26 gm protein)  Aldi's Elevation Protein Shake (160 calories, 30 gm protein)   Equate Protein Shake (160 calories, 30 gm protein)  Slim Fast Advanced Nutrition (180 Calories, 20 g protein)  Muscle Milk, lactose-free, 17 oz bottle (210 Calories, 30 g protein)  Glucerna Protein Smart (150 paul, 30 gm protein)  Clear Protein Drinks:  BiPro  Premier Protein clear  [id number]  M Health Protein 15 Concentrate  Bone broth  Beneprotein protein powder mixed with 4-6 oz of fluid  [id number]  Gatorade Zero with Protein   Vital Proteins collagen powder mixed with clear fluid    Post-op Diet Handouts:  Diet Guidelines after Weight-loss Surgery  [email or web address]     Your Stage 1 Diet: Clear Liquids  [email or web address]     Your Stage 2 Diet: Low-fat Full Liquids  [email or web address]     Your Stage 3 Diet: Pureed  Foods  [email or web address]     Pureed Pleasures  [email or web address]    Your Stage 4 Diet: Soft Foods  [email or web address]    Your Stage 5 Diet: Regular Foods  [email or web address]    Supplements after Sleeve Gastrectomy, Gastric Bypass or Single Anastomosis Duodenal Switch  [email or web address]    Keeping Track of Fluids  [email or web address]      COMPREHENSIVE WEIGHT MANAGEMENT PROGRAM  VIRTUAL SUPPORT GROUPS    At [facility], our Comprehensive Weight Management program offers on-line support groups for patients who are working on weight loss and considering, preparing for, or have had weight loss surgery.     There is no cost for this opportunity.  You are invited to attend the?Virtual Support Groups?provided by any of the following locations:    [facility] via Microsoft Teams with Larisa Werner RN  2.   [place] via Thinker Thing with Dariusz Yang, PhD, LP  3.   [place] via Thinker Thing with Darya Cuello RN  4.   [facility] via a Zoom Meeting with FRANSISCO Sams    The following Support Group information can also be found on our website:  [email or web address]      [facility] Weight Loss Surgery Support Group  The support group is a patient-lead forum that meets monthly to share experiences, encouragement and education. It is open to those who have had weight loss surgery, are scheduled for surgery, or are considering surgery.   WHEN: This group meets on the 3rd Wednesday of each month from 5:00PM - 6:00PM virtually using Microsoft Teams.   FACILITATOR: Led by Larisa Cuba, ALISON, LD, RN, the program's Clinical Coordinator.   TO REGISTER: Please contact the clinic via CSID or call the nurse line directly at [phone] to inform our staff that you would like an invite sent to you and to let us know the email you would like  "the invite sent to. Prior to the meeting, a link with directions on how to join the meeting will be sent to you.    2023 and 2024 Meetings   December 20  January 17  February 21  March 20  April 17  May 15  Rhonda 19      [facility] Bariatric Care Support Group?  This is open to all pre- and post- operative bariatric surgery patients as well as their support system.   WHEN: This group meets the 3rd Tuesday of each month from 6:30 PM - 8:00 PM virtually using Microsoft Teams.   FACILITATOR: Led by Dariusz Yang, Ph.D who is a Licensed Psychologist with the [facility] Comprehensive Weight Management Program.   TO REGISTER: Please send an email to Dariusz Yang, Ph.D., LP at?[email or web address]?if you would like an invitation to the group. Prior to the meeting, a link with directions on how to join the meeting will be sent to you.    2023 and 2024 Meetings  December 19 January 16: \"Medication Management and Bariatric Surgery\", Virginia Buchanan, PharmD, Pharmacy Resident at [facility]  February 20: \"A Bariatric Surgery Patient's Perspective\", NORMAN Delong, [facility], Behavioral Health Clinician at [facility]  March 19  April 16  May 21  Rhonda 18: \"Nutritional Labeling\", Dietitian speaker to be announced.  November 19: \"Holiday Eating\", Dietitian speaker to be announced.    [facility] Post-Operative Bariatric Surgery Support Group  This is a support group for [facility] bariatric patients (and those external to [facility]) who have had bariatric surgery and are at least 3 months post-surgery.  WHEN: This support group meets the 4th Wednesday of the month from 11:00 AM - 12:00 PM virtually using Microsoft Teams.   FACILITATOR: Led by Certified Bariatric Nurse, Darya Cuello RN.   TO REGISTER: Please send an email to Darya at " [email or web address] if you would like an invitation to the group.  Prior to the meeting, a link with directions on how to join the meeting will be sent to you.    2023 and 2024 Meetings  December 27  January 24  February 28  March 27  April 24  May 22  Rhonda 26

## 2024-09-24 NOTE — LETTER
9/24/2024       RE: Yancy Hoover  [address]  [place] [address]     Dear Colleague,    Thank you for referring your patient, Yancy Hoover, to the [facility] [place] at [facility]. Please see a copy of my visit note below.    Video-Visit Details    Type of service:  Video Visit    Video Start Time: 1:00 PM   Video End Time: 1:28 PM    Originating Location (pt. Location): Home    Distant Location (provider location):  Offsite (providers home) [facility] [place]     Platform used for Video Visit: Social Reality      Bariatric Nutrition Consultation Note     Reason For Visit: Nutrition Assessment     Yancy Hoover is a 54 year old presenting today for return bariatric nutrition consult and nutrition education regarding clear and low-fat full liquid diet for post-bariatric surgery.  Pt is interested in laparoscopic sleeve gastrectomy with Dr. Alcaraz expected surgery in D.  Patient is accompanied by self. This is patients 5th out of 3 required nutrition visits prior to surgery. Completed Weight Loss Surgery Nutrition Class on 7/5/24.     Coordination note: working on setting up psych eval. Has completed 5 HP calls.      Pt referred by Jeannie Noe NP on July 16, 2020 and Dr. Velarde.  Patient with Co-morbidities of obesity including:  Type II DM yes   Renal Failure no  Sleep apnea no  Hypertension no   Dyslipidemia yes  Joint pain no  Back pain no  GERD no      Support System Reviewed With Patient 7/16/2020   Who do you have in your support network that can be available to help you for the first 2 weeks after surgery? Yes   Who can you count on for support throughout your weight loss surgery journey? Yes         ANTHROPOMETRICS:  Initial visit:  Estimated body mass index is 50.81 kg/m  as calculated from the following:    Height as of an earlier encounter on 7/16/20:  "1.626 m (5' 4\").    Weight as of an earlier encounter on 7/16/20: 134.3 kg (296 lb).     Current weight:   Estimated body mass index is 47.68 kg/m  as calculated from the following:    Height as of this encounter: 1.638 m (5' 4.49\").    Weight as of this encounter: 127.9 kg (282 lb). (-7 lbs from last RD visit, -14 lbs from initial visit) - Has surpassed wt loss goal for surgery.      Required weight loss goal pre-op: -10 lbs from initial consult weight (goal weight 286 lbs or less before surgery)     Wt Readings from Last 5 Encounters:   09/24/24 127.9 kg (282 lb)   09/18/24 133.9 kg (295 lb 1.6 oz)   09/16/24 127 kg (280 lb)   08/13/24 131.4 kg (289 lb 9.6 oz)   08/05/24 131.1 kg (289 lb)             7/16/2020   I have tried the following methods to lose weight Watching portions or calories, Exercise, Weight Watchers, Atkins type diet (low carb/high protein), Slimfast         Weight Loss Questions Reviewed With Patient 7/16/2020   How long have you been overweight? Since early childhood         SUPPLEMENT INFORMATION:  Vitamin D (h/o vitamin D deficiency), Flinstones complete with iron      MEDICATIONS FOR WEIGHT LOSS:  Topiramate, Ozempic, metformin      NUTRITION HISTORY:   No known food allergies/intolerances. Eliminated red meat.      4/23/24 - Pt reports she had a mini stroke about a year ago. Wt went up to 336 lbs. Focused on more of a Mediterranean diet post-stroke and helped to lose weight to 296 lbs. Has been stalled for past 6 months with further weight loss. Working on keeping to 3 meals daily, no snacks. And not eating past 7 pm. Challenged by sweet cravings at times.   Period where she couldn't use her right leg, has gained use of leg back. Working on walking 3-5 times weekly for 30 mins.   Practicing separation of beverages from meals.   Started Saxenda on last Tues. No side effects.     6/4/24 - Weight stable over last month. Planning to switch to semaglutide to see if help with more weight loss. " "  Less sweet food cravings, but continued pop cravings. Doing a lot less from previous. May have a pop weekly.  Using Sparkling Ice as sugar-free options.     7/5/24 - Completed Weight Loss Surgery Nutrition class     7/5/24 labs - Hgba1c 7.4     8/5/24 - Pt reports she started Ozempic, but is experiencing significant diarrhea. On 2 mg currently. Was supposed to take injection on Saturday but decided not to.  PharmD suggested dropping down to 1 mg, reviewed with pt today. Has follow-up with bariatric NP on 8/13.     Today - Continues to work on sipping slowly, pacing hydration out and  out from meal intake. Eliminated pork. She is working to further eliminate meat. Will continue, eggs, seafood and dairy.      Recent Diet Recall:   Breakfast: boiled egg; protein shake   Lunch: 11 am-12 pm protein shake (blended with fruit and veggies - strawberry, kale, pineapple)  Dinner: 6 pm grilled meat and veggies   Snack: a lot less, but if needed she will do a small portion/individual protein pack - cheese/turkey or hummus and crackers.   Beverages: water (96 oz/day)      Progress Towards Previous Goals:  1. Pair protein food with high-fiber foods (whole grains, veggies, fruit) - Met, continues  2. Keep working on sugar-free hydration only. Eliminate carbonation. - Met, continues   3. Morning walk as able - Met, continues and has added in weighted hula hoop, has gone from 5 mins to 20 mins daily.   4. Practice diet guidelines for surgery. - Met, continues   5. Complete labs (fasting) - PTH, vitamin D, vitamin A, B12, ferritin, lipids - Not met yet. Planning to complete today or next Friday.    Physical Activity:  Walking daily. Started \"Infiity\" weighted hula hoop daily. Has worked up from 5 min to 20 mins daily.      NUTRITION DIAGNOSIS:  Obesity r/t long history of self-monitoring deficit and excessive energy intake aeb BMI >30 kg/m2. - Improving/continues     Food- and Nutrition-related knowledge deficit r/t lack " of prior exposure to information AEB pt scheduled for upcoming bariatric surgery and pt interest in diet education/review    INTERVENTION:  Intervention Provided/Education Provided/Reviewed previous goals and encouraged patient to continue goals prior to surgery.     Provided instruction on bariatric clear and low-fat full liquid diets.    Provided the following handouts: Diet Guidelines for Bariatric Surgery, Your Stage 1-5 Diet, Keeping Track of Your Fluids, list of recommended vitamin/mineral supplementation after sleeve gastrectomy surgery and RD contact information.          Patient Understanding: good  Expected Compliance: good     GOALS:  1. Eat three meals daily. Pair protein food with high-fiber foods (whole grains, veggies, fruit)  2. Avoid drinking with meals and 30 mins before and after meal. Take small, frequent sips throughout your day.   3. Keep up the great work with exercise!  4. Complete labs (fasting) - PTH, vitamin D, vitamin A, B12, ferritin, lipids     Protein Supplements for After Surgery:   Unflavored protein powder: Genepro, Isopure (25-30 gm protein per 1 scoop); Vital Proteins collagen powder (1 tbsp = 5 gm pro)  You may also use flavored protein powder if you prefer.   Protein shots: https://store.bariatricpal.com/collections/protein-shots  Pre-made protein shakes (no more than 210 Calories, at least 20 grams of protein, and less than 10 grams of sugar):   Premier Protein (160 Calories, 30 g protein)  Boost/Ensure Max (160 calories, 30 gm protein)   [employer] Core Power (170 calories, 26 gm protein)  Aldi's Elevation Protein Shake (160 calories, 30 gm protein)   Equate Protein Shake (160 calories, 30 gm protein)  Slim Fast Advanced Nutrition (180 Calories, 20 g protein)  Muscle Milk, lactose-free, 17 oz bottle (210 Calories, 30 g protein)  Glucerna Protein Smart (150 paul, 30 gm protein)  Clear Protein Drinks:  BiPro  Premier Protein clear  [id number]  M Health Protein 15 Concentrate  Bone  broth  Beneprotein protein powder mixed with 4-6 oz of fluid  [id number]  Gatorade Zero with Protein   Vital Proteins collagen powder mixed with clear fluid    Post-op Diet Handouts:  Diet Guidelines after Weight-loss Surgery  [email or web address]     Your Stage 1 Diet: Clear Liquids  [email or web address]     Your Stage 2 Diet: Low-fat Full Liquids  [email or web address]     Your Stage 3 Diet: Pureed Foods  [email or web address]     Pureed Pleasures  [email or web address]    Your Stage 4 Diet: Soft Foods  [email or web address]    Your Stage 5 Diet: Regular Foods  [email or web address]    Supplements after Sleeve Gastrectomy, Gastric Bypass or Single Anastomosis Duodenal Switch  [email or web address]    Keeping Track of Fluids  [email or web address]      Time spent with patient: 28 mins       Sharon Osborn RD, LD        Again, thank you for allowing me to participate in the care of your patient.      Sincerely,    Sharon Osborn RD

## 2024-09-25 ENCOUNTER — PREP FOR PROCEDURE (OUTPATIENT)
Dept: ENDOCRINOLOGY | Facility: CLINIC | Age: 54
End: 2024-09-25
Payer: COMMERCIAL

## 2024-09-25 DIAGNOSIS — E66.01 MORBID OBESITY (H): Primary | ICD-10-CM

## 2024-09-25 RX ORDER — ONDANSETRON 2 MG/ML
4 INJECTION INTRAMUSCULAR; INTRAVENOUS
OUTPATIENT
Start: 2024-09-25

## 2024-09-25 RX ORDER — ENOXAPARIN SODIUM 100 MG/ML
40 INJECTION SUBCUTANEOUS
OUTPATIENT
Start: 2024-09-25

## 2024-09-25 RX ORDER — CEFAZOLIN SODIUM IN 0.9 % NACL 3 G/100 ML
3 INTRAVENOUS SOLUTION, PIGGYBACK (ML) INTRAVENOUS
OUTPATIENT
Start: 2024-09-25

## 2024-09-25 RX ORDER — ACETAMINOPHEN 325 MG/1
975 TABLET ORAL ONCE
OUTPATIENT
Start: 2024-09-25 | End: 2024-09-25

## 2024-09-25 RX ORDER — CEFAZOLIN SODIUM IN 0.9 % NACL 3 G/100 ML
3 INTRAVENOUS SOLUTION, PIGGYBACK (ML) INTRAVENOUS SEE ADMIN INSTRUCTIONS
OUTPATIENT
Start: 2024-09-25

## 2024-09-26 ENCOUNTER — TELEPHONE (OUTPATIENT)
Dept: ENDOCRINOLOGY | Facility: CLINIC | Age: 54
End: 2024-09-26
Payer: COMMERCIAL

## 2024-09-26 NOTE — TELEPHONE ENCOUNTER
Patient confirmed scheduled appointment:  Date: 12/3/24  Time: 10:30 am  Visit type: POST-OP GLOBAL 90 DAYS  Provider: Pia Pandya  Location: virtual  Testing/imaging: n/a  Additional notes: n/a

## 2024-09-26 NOTE — TELEPHONE ENCOUNTER
Patient confirmed scheduled appointment:  Date: 11/5/24  Time: 1:30 pm  Visit type: RET ABDOUL Nutrition  Provider: Mary Anne Harrison  Location: virtual  Testing/imaging: n/a  Additional notes: n/a

## 2024-09-26 NOTE — TELEPHONE ENCOUNTER
Patient confirmed scheduled appointment:  Date: 11/5/24  Time: 9:30 am  Visit type: POST-OP GLOBAL 90 DAYS  Provider: Jeannie Noe  Location: [facility] - 4th floor  Testing/imaging: n/a  Additional notes: n/a

## 2024-09-26 NOTE — TELEPHONE ENCOUNTER
Patient confirmed scheduled appointments:    Date: 12/3/24  Time: 9:30 am  Visit type: RET ABDOUL Nutrition  Provider: Sharon Osborn  Location: virtual    Date: 1/30/25  Time: 9:30 am  Visit type: RET ABDOUL Nutrition  Provider: Sharon Osborn  Location: virtual

## 2024-09-26 NOTE — TELEPHONE ENCOUNTER
Patient confirmed scheduled appointment:  Date: 1/29/25  Time: 10 am  Visit type: RET ABDOUL  Provider: Sophia Barahona  Location: virtual  Testing/imaging: n/a  Additional notes: n/a

## 2024-10-01 NOTE — TELEPHONE ENCOUNTER
FUTURE VISIT INFORMATION      SURGERY INFORMATION:  Date: 10/29/24  Location: uu or  Surgeon:  Robbie Alcaraz MD   Anesthesia Type:  general  Procedure: GASTRECTOMY, SLEEVE, LAPAROSCOPIC HERNIORRHAPHY, HIATAL, LAPAROSCOPIC     RECORDS REQUESTED FROM:       Primary Care Provider: Arlin Ewing MD  - [facility]    Pertinent Medical History: LANDON    Most recent EKG+ Tracin24    Most recent ECHO: 23- [facility]    Most recent Cardiac Stress Test: 24    Most recent Sleep Study:   10/23/20

## 2024-10-04 ENCOUNTER — VIRTUAL VISIT (OUTPATIENT)
Dept: ENDOCRINOLOGY | Facility: CLINIC | Age: 54
End: 2024-10-04
Payer: COMMERCIAL

## 2024-10-04 VITALS — BODY MASS INDEX: 48.14 KG/M2 | WEIGHT: 282 LBS | HEIGHT: 64 IN

## 2024-10-04 DIAGNOSIS — E66.813 CLASS 3 SEVERE OBESITY DUE TO EXCESS CALORIES WITH SERIOUS COMORBIDITY AND BODY MASS INDEX (BMI) OF 45.0 TO 49.9 IN ADULT (H): Primary | ICD-10-CM

## 2024-10-04 DIAGNOSIS — E11.9 TYPE 2 DIABETES MELLITUS WITHOUT COMPLICATION, WITHOUT LONG-TERM CURRENT USE OF INSULIN (H): ICD-10-CM

## 2024-10-04 DIAGNOSIS — E66.01 CLASS 3 SEVERE OBESITY DUE TO EXCESS CALORIES WITH SERIOUS COMORBIDITY AND BODY MASS INDEX (BMI) OF 45.0 TO 49.9 IN ADULT (H): Primary | ICD-10-CM

## 2024-10-04 PROCEDURE — 99213 OFFICE O/P EST LOW 20 MIN: CPT | Mod: 95 | Performed by: INTERNAL MEDICINE

## 2024-10-04 ASSESSMENT — PAIN SCALES - GENERAL: PAINLEVEL: NO PAIN (0)

## 2024-10-04 NOTE — LETTER
"10/4/2024       RE: Yancy Hoover  [address]  [facility] [address]     Dear Colleague,    Thank you for referring your patient, Yancy Hoover, to the [facility]. Please see a copy of my visit note below.    Virtual Visit Details    Type of service:  Video Visit       Originating Location (pt. Location): Home    Distant Location (provider location):  Off-site  Platform used for Video Visit: Radha    Joined the call at 10/4/2024, 10:48:56 am.  Left the call at 10/4/2024, 11:02:02 am.  You were on the call for 13 minutes 5 seconds .    Return Medical Weight Management Note     Yancy Hoover  MRN:  [id number]  :  1970  MARLON:  10/4/2024    Dear Arlin Ewing,    I had the pleasure of seeing your patient Yancy Hoover. She is a 54 year old female who I am continuing to see for treatment of obesity related to:        2024    12:23 PM   --   I have the following health issues associated with obesity Type II Diabetes    High Blood Pressure    High Cholesterol    Sleep Apnea       INTERVAL HISTORY:      Visit with Teodoro Noe NP about 1 mo ago, and shani Osborn RD a wk or 2 ago in prep for upcoming surgery; from those visits of note is the following--  ---------------------------  [NP]  \"3lb from goal weight. Would like to schedule surgery before November but psych eval scheduled in October. Reprinted resource sheet to see if she can get seen sooner. Also working on sleep consult, wearing CPAP so she thinks she'll get clearance soon. Letter provided for her PCP.      Not tolerating ozempic. Lots of abdominal pain and loose stools. Reports this existed even at lower doses. She isn't noticing appetite support even at 2mg dose. She is hoping to try bydureon while continuing the surgery process. She previously took bydureon after her stroke for glucose " "control and had really positive effect on blood sugar and appetite with losing 30lb pretty quickly. We had been using semaglutide given the studies showing secondary prevention of MACE. Agree to try bydureon.      Can call to see if psych eval can be done sooner   Finish sleep consult   Finish cardiology consult   Finish health partners calls       [Dietitian]  Progress Towards Previous Goals:  1. Pair protein food with high-fiber foods (whole grains, veggies, fruit) - Met, continues  2. Keep working on sugar-free hydration only. Eliminate carbonation. - Met, continues   3. Morning walk as able - Met, continues and has added in weighted hula hoop, has gone from 5 mins to 20 mins daily.   4. Practice diet guidelines for surgery. - Met, continues   5. Complete labs (fasting) - PTH, vitamin D, vitamin A, B12, ferritin, lipids - Not met yet. Planning to complete today or next Friday.     Physical Activity:  Walking daily. Started \"Infiity\" weighted hula hoop daily. Has worked up from 5 min to 20 mins daily.\"  ---------------------------    Of note recent is the following--  --she is excited/motivated for the upcoming surgery  --walking 3-4 times per wk  --sugars 90s-120s  --286# was wt loss goal; Oct 29th-surgery schedule data  --comfortable with idea of taking the supplements post surgery; thinking she will prefer te B12 shot option for replacing that  --regularly taking her Ozempic shot (2 mg dose now for 2 wks; doing OK without side effects)    CURRENT WEIGHT:   282 lbs 0 oz  Body mass index is 47.68 kg/m .     Initial Weight (lbs): 295 lbs  Last Visits Weight: 127.9 kg (282 lb)  Cumulative weight loss (lbs): 13  Weight Loss Percentage: 4.41%        10/4/2024    10:28 AM   Changes and Difficulties   I have made the following changes to my diet since my last visit: None   With regards to my diet, I am still struggling with: None   I have made the following changes to my activity/exercise since my last visit: None " "  With regards to my activity/exercise, I am still struggling with: None       VITALS:  Ht 1.638 m (5' 4.49\")   Wt 127.9 kg (282 lb)   BMI 47.68 kg/m      MEDICATIONS:   Current Outpatient Medications   Medication Sig Dispense Refill     alcohol swab prep pads Use to swab area of injection/shahla as directed. 100 each 5     aspirin (ASA) 81 MG chewable tablet Take 81 mg by mouth daily       blood glucose (ACCU-CHEK SMARTVIEW) test strip Use to test blood sugar 2 times daily. 100 strip 5     blood glucose calibration (ACCU-CHEK SMARTVIEW CONTROL) solution Use to calibrate blood glucose monitor as directed. 1 Bottle 3     blood glucose monitoring (ACCU-CHEK FASTCLIX) lancets Use to test blood sugar 1-2 times daily or as directed. 102 each 5     blood glucose monitoring (ACCU-CHEK FAWAD SMARTVIEW) meter device kit Use to test blood sugar 1-2 times daily. 1 kit 0     childrens multivitamin w/iron (FLINTSTONES COMPLETE) 18 MG chewable tablet Take 1 chew tab by mouth daily       diphenhydrAMINE (BENADRYL) 25 MG tablet Take 1 tablet (25 mg) by mouth See Admin Instructions. Take 25 mg of benadryl one hour before test 1 tablet 0     meclizine (ANTIVERT) 25 MG tablet Take 25 mg by mouth       metFORMIN (GLUCOPHAGE XR) 500 MG 24 hr tablet Take 4 tablets (2,000 mg) by mouth daily with food 360 tablet 3     methylPREDNISolone (MEDROL) 32 MG tablet Take 1 tablet (32 mg) by mouth daily. Patient to take 32 mg by mouth the evening before the testing. Patient should take 32 mg by mouth the morning of the procedure. 2 tablet 0     omeprazole (PRILOSEC) 20 MG DR capsule Take 1 capsule (20 mg) by mouth daily 90 capsule 2     polyethylene glycol (MIRALAX) 17 GM/Dose powder Take 1 Capful by mouth daily as needed for constipation       rosuvastatin (CRESTOR) 20 MG tablet Take 1 tablet (20 mg) by mouth daily       semaglutide (OZEMPIC) 2 MG/3ML pen Inject 0.5 mg subcutaneously every 7 days. 3 mL 1     topiramate (TOPAMAX) 25 MG tablet Take " 25 mg by mouth daily.       verapamil ER (VERELAN) 240 MG 24 hr capsule Take 240 mg by mouth       vitamin D3 (CHOLECALCIFEROL) 50 mcg (2000 units) tablet Take 1 tablet by mouth daily       Semaglutide, 1 MG/DOSE, (OZEMPIC) 4 MG/3ML pen Inject 1 mg subcutaneously every 7 days. Start after 4 weeks of 0.5 mg Ozempic , if tolerating 3 mL 1     Semaglutide, 2 MG/DOSE, (OZEMPIC) 8 MG/3ML pen Inject 2 mg subcutaneously every 7 days. Start after 4 weeks of 1 mg Ozempic, if tolerating 9 mL 3     Semaglutide, 2 MG/DOSE, (OZEMPIC) 8 MG/3ML pen Inject 2 mg Subcutaneous every 7 days 3 mL 2           10/4/2024    10:28 AM   Weight Loss Medication History Reviewed With Patient   Which weight loss medications are you currently taking on a regular basis? Ozempic   Are you having any side effects from the weight loss medication that we have prescribed you? No       ASSESSMENT:     Class 3 severe obesity due to excess calories in adult, serious comorbidity present (DM2)    Plans--  --surgery is scheduled for Oct 29; pt's last Ozempic dose preop will be one wk prior to surgery, post op restart as per surgery team depending on pt's post op course  --she can RTC with me 3 mo post op; will have interim follow up with surgery and nutrition/dietitian        Time: 24 min spent on evaluation, management, counseling, education, & motivational interviewing (video) combined with previsit prep and post visit follow up care/charting same day    Sincerely,    Danial Velarde MD

## 2024-10-04 NOTE — PATIENT INSTRUCTIONS
"Welcome to our weight management program!   We are excited to join you on your weight loss journey    Thank you for allowing us the privilege of caring for you. We hope we provided you with the excellent service you deserve.   Please let us know if there is anything else we can do for you so that we can be sure you are leaving completely satisfied with your care experience.    To ensure the quality of our services you may be receiving a patient satisfaction survey from an independent patient satisfaction monitoring company.    The greatest compliment you can give is a \"Likely to Recommend\"    You saw Dr. Velarde today.    Instructions per today's visit:   --surgery is scheduled for Oct 29; your last Ozempic dose preop will be one wk prior to surgery, post op restart as per surgery team depending on your post op course  --we are planning follow up with Dr. Velarde again 3 mo post op; you will have interim follow up with surgery and nutrition/dietitian    Interested in working with a health ?  Health coaches work with you to improve your overall health and wellbeing.  They look at the whole person, and may involve discussion of different areas of life, including, but not limited to the four pillars of health (sleep, exercise, nutrition, and stress management). Discuss with your care team if you would like to start working a health .  Health Coaching-3 Pack:    $99 for three health coaching visits    Visits may be done in person or via phone    Coaching is a partnership between the  and the client; Coaches do not prescribe or diagnose    Coaching helps inspire the client to reach his/her personal goals     For any questions/concerns contact Ebony Shelton LPN at [phone]     To schedule appointments with our team, please call [phone]     Please call during clinic hours Monday through Friday 8:00a - 4:00p if you have questions or you can contact us via [employer] at anytime. ?    Lab results will be " communicated through My Chart or letter (if My Chart not used). Please call the clinic if you have not received communication after 1 week or if you have any questions.?      Fax: [phone]    Thank you,  Medical Weight Management Team

## 2024-10-04 NOTE — PROGRESS NOTES
"Virtual Visit Details    Type of service:  Video Visit       Originating Location (pt. Location): Home    Distant Location (provider location):  Off-site  Platform used for Video Visit: Radha    Joined the call at 10/4/2024, 10:48:56 am.  Left the call at 10/4/2024, 11:02:02 am.  You were on the call for 13 minutes 5 seconds .    Return Medical Weight Management Note     Yancy Hoover  MRN:  [id number]  :  1970  MARLON:  10/4/2024    Dear Arlin Ewing,    I had the pleasure of seeing your patient Yacny Hoover. She is a 54 year old female who I am continuing to see for treatment of obesity related to:        2024    12:23 PM   --   I have the following health issues associated with obesity Type II Diabetes    High Blood Pressure    High Cholesterol    Sleep Apnea       INTERVAL HISTORY:      Visit with Teodoro Noe NP about 1 mo ago, and rhett Osborn RD a wk or 2 ago in prep for upcoming surgery; from those visits of note is the following--  ---------------------------  [NP]  \"3lb from goal weight. Would like to schedule surgery before November but psych eval scheduled in October. Reprinted resource sheet to see if she can get seen sooner. Also working on sleep consult, wearing CPAP so she thinks she'll get clearance soon. Letter provided for her PCP.      Not tolerating ozempic. Lots of abdominal pain and loose stools. Reports this existed even at lower doses. She isn't noticing appetite support even at 2mg dose. She is hoping to try bydureon while continuing the surgery process. She previously took bydureon after her stroke for glucose control and had really positive effect on blood sugar and appetite with losing 30lb pretty quickly. We had been using semaglutide given the studies showing secondary prevention of MACE. Agree to try bydureon.      Can call to see if psych eval can be done sooner   Finish sleep consult   Finish cardiology consult   Finish health partners calls " "      [Dietitian]  Progress Towards Previous Goals:  1. Pair protein food with high-fiber foods (whole grains, veggies, fruit) - Met, continues  2. Keep working on sugar-free hydration only. Eliminate carbonation. - Met, continues   3. Morning walk as able - Met, continues and has added in weighted hula hoop, has gone from 5 mins to 20 mins daily.   4. Practice diet guidelines for surgery. - Met, continues   5. Complete labs (fasting) - PTH, vitamin D, vitamin A, B12, ferritin, lipids - Not met yet. Planning to complete today or next Friday.     Physical Activity:  Walking daily. Started \"Infiity\" weighted hula hoop daily. Has worked up from 5 min to 20 mins daily.\"  ---------------------------    Of note recent is the following--  --she is excited/motivated for the upcoming surgery  --walking 3-4 times per wk  --sugars 90s-120s  --286# was wt loss goal; Oct 29th-surgery schedule data  --comfortable with idea of taking the supplements post surgery; thinking she will prefer te B12 shot option for replacing that  --regularly taking her Ozempic shot (2 mg dose now for 2 wks; doing OK without side effects)    CURRENT WEIGHT:   282 lbs 0 oz  Body mass index is 47.68 kg/m .     Initial Weight (lbs): 295 lbs  Last Visits Weight: 127.9 kg (282 lb)  Cumulative weight loss (lbs): 13  Weight Loss Percentage: 4.41%        10/4/2024    10:28 AM   Changes and Difficulties   I have made the following changes to my diet since my last visit: None   With regards to my diet, I am still struggling with: None   I have made the following changes to my activity/exercise since my last visit: None   With regards to my activity/exercise, I am still struggling with: None       VITALS:  Ht 1.638 m (5' 4.49\")   Wt 127.9 kg (282 lb)   BMI 47.68 kg/m      MEDICATIONS:   Current Outpatient Medications   Medication Sig Dispense Refill    alcohol swab prep pads Use to swab area of injection/shahla as directed. 100 each 5    aspirin (ASA) 81 MG " chewable tablet Take 81 mg by mouth daily      blood glucose (ACCU-CHEK SMARTVIEW) test strip Use to test blood sugar 2 times daily. 100 strip 5    blood glucose calibration (ACCU-CHEK SMARTVIEW CONTROL) solution Use to calibrate blood glucose monitor as directed. 1 Bottle 3    blood glucose monitoring (ACCU-CHEK FASTCLIX) lancets Use to test blood sugar 1-2 times daily or as directed. 102 each 5    blood glucose monitoring (ACCU-CHEK FAWAD SMARTVIEW) meter device kit Use to test blood sugar 1-2 times daily. 1 kit 0    childrens multivitamin w/iron (FLINTSTONES COMPLETE) 18 MG chewable tablet Take 1 chew tab by mouth daily      diphenhydrAMINE (BENADRYL) 25 MG tablet Take 1 tablet (25 mg) by mouth See Admin Instructions. Take 25 mg of benadryl one hour before test 1 tablet 0    meclizine (ANTIVERT) 25 MG tablet Take 25 mg by mouth      metFORMIN (GLUCOPHAGE XR) 500 MG 24 hr tablet Take 4 tablets (2,000 mg) by mouth daily with food 360 tablet 3    methylPREDNISolone (MEDROL) 32 MG tablet Take 1 tablet (32 mg) by mouth daily. Patient to take 32 mg by mouth the evening before the testing. Patient should take 32 mg by mouth the morning of the procedure. 2 tablet 0    omeprazole (PRILOSEC) 20 MG DR capsule Take 1 capsule (20 mg) by mouth daily 90 capsule 2    polyethylene glycol (MIRALAX) 17 GM/Dose powder Take 1 Capful by mouth daily as needed for constipation      rosuvastatin (CRESTOR) 20 MG tablet Take 1 tablet (20 mg) by mouth daily      semaglutide (OZEMPIC) 2 MG/3ML pen Inject 0.5 mg subcutaneously every 7 days. 3 mL 1    topiramate (TOPAMAX) 25 MG tablet Take 25 mg by mouth daily.      verapamil ER (VERELAN) 240 MG 24 hr capsule Take 240 mg by mouth      vitamin D3 (CHOLECALCIFEROL) 50 mcg (2000 units) tablet Take 1 tablet by mouth daily      Semaglutide, 1 MG/DOSE, (OZEMPIC) 4 MG/3ML pen Inject 1 mg subcutaneously every 7 days. Start after 4 weeks of 0.5 mg Ozempic , if tolerating 3 mL 1    Semaglutide, 2  MG/DOSE, (OZEMPIC) 8 MG/3ML pen Inject 2 mg subcutaneously every 7 days. Start after 4 weeks of 1 mg Ozempic, if tolerating 9 mL 3    Semaglutide, 2 MG/DOSE, (OZEMPIC) 8 MG/3ML pen Inject 2 mg Subcutaneous every 7 days 3 mL 2           10/4/2024    10:28 AM   Weight Loss Medication History Reviewed With Patient   Which weight loss medications are you currently taking on a regular basis? Ozempic   Are you having any side effects from the weight loss medication that we have prescribed you? No       ASSESSMENT:     Class 3 severe obesity due to excess calories in adult, serious comorbidity present (DM2)    Plans--  --surgery is scheduled for Oct 29; pt's last Ozempic dose preop will be one wk prior to surgery, post op restart as per surgery team depending on pt's post op course  --she can RTC with me 3 mo post op; will have interim follow up with surgery and nutrition/dietitian        Time: 24 min spent on evaluation, management, counseling, education, & motivational interviewing (video) combined with previsit prep and post visit follow up care/charting same day    Sincerely,    Danial Velarde MD

## 2024-10-04 NOTE — NURSING NOTE
Current patient location: [address] [place] [id number]    Is the patient currently in the state of [place]? YES    Visit mode:VIDEO    If the visit is dropped, the patient can be reconnected by: VIDEO VISIT: Text to cell phone:   Telephone Information:   Mobile [phone]       Will anyone else be joining the visit? NO  (If patient encounters technical issues they should call [phone]    Are changes needed to the allergy or medication list? No, Pt stated no changes to allergies, and Pt stated no med changes    Are refills needed on medications prescribed by this physician? NO    Rooming Documentation:  Questionnaire(s) completed    Reason for visit: RECHECK (KILO)    Beth MENDOZA

## 2024-10-07 ENCOUNTER — PRE VISIT (OUTPATIENT)
Dept: SURGERY | Facility: CLINIC | Age: 54
End: 2024-10-07

## 2024-10-09 ENCOUNTER — TELEPHONE (OUTPATIENT)
Dept: ENDOCRINOLOGY | Facility: CLINIC | Age: 54
End: 2024-10-09

## 2024-10-09 ENCOUNTER — LAB (OUTPATIENT)
Dept: LAB | Facility: CLINIC | Age: 54
End: 2024-10-09
Payer: COMMERCIAL

## 2024-10-09 ENCOUNTER — OFFICE VISIT (OUTPATIENT)
Dept: SURGERY | Facility: CLINIC | Age: 54
End: 2024-10-09
Payer: COMMERCIAL

## 2024-10-09 ENCOUNTER — ANESTHESIA EVENT (OUTPATIENT)
Dept: SURGERY | Facility: CLINIC | Age: 54
DRG: 621 | End: 2024-10-09
Payer: COMMERCIAL

## 2024-10-09 ENCOUNTER — PRE VISIT (OUTPATIENT)
Dept: SURGERY | Facility: CLINIC | Age: 54
End: 2024-10-09

## 2024-10-09 VITALS
BODY MASS INDEX: 48.49 KG/M2 | OXYGEN SATURATION: 99 % | SYSTOLIC BLOOD PRESSURE: 116 MMHG | TEMPERATURE: 98.2 F | HEIGHT: 64 IN | DIASTOLIC BLOOD PRESSURE: 82 MMHG | RESPIRATION RATE: 16 BRPM | WEIGHT: 284 LBS | HEART RATE: 107 BPM

## 2024-10-09 DIAGNOSIS — E78.5 HYPERLIPIDEMIA LDL GOAL <100: ICD-10-CM

## 2024-10-09 DIAGNOSIS — Z01.818 PREOP EXAMINATION: Primary | ICD-10-CM

## 2024-10-09 DIAGNOSIS — E66.01 CLASS 3 SEVERE OBESITY DUE TO EXCESS CALORIES WITH BODY MASS INDEX (BMI) OF 50.0 TO 59.9 IN ADULT, UNSPECIFIED WHETHER SERIOUS COMORBIDITY PRESENT (H): ICD-10-CM

## 2024-10-09 DIAGNOSIS — E66.813 CLASS 3 SEVERE OBESITY DUE TO EXCESS CALORIES WITH BODY MASS INDEX (BMI) OF 50.0 TO 59.9 IN ADULT, UNSPECIFIED WHETHER SERIOUS COMORBIDITY PRESENT (H): ICD-10-CM

## 2024-10-09 DIAGNOSIS — E11.9 TYPE 2 DIABETES MELLITUS WITHOUT COMPLICATION, WITHOUT LONG-TERM CURRENT USE OF INSULIN (H): ICD-10-CM

## 2024-10-09 DIAGNOSIS — E66.01 MORBID OBESITY (H): ICD-10-CM

## 2024-10-09 LAB
ALBUMIN SERPL BCG-MCNC: 4.2 G/DL (ref 3.5–5.2)
ALP SERPL-CCNC: 67 U/L (ref 40–150)
ALT SERPL W P-5'-P-CCNC: 22 U/L (ref 0–50)
ANION GAP SERPL CALCULATED.3IONS-SCNC: 11 MMOL/L (ref 7–15)
AST SERPL W P-5'-P-CCNC: 30 U/L (ref 0–45)
BILIRUB SERPL-MCNC: 0.2 MG/DL
BUN SERPL-MCNC: 9.6 MG/DL (ref 6–20)
CALCIUM SERPL-MCNC: 9.6 MG/DL (ref 8.8–10.4)
CHLORIDE SERPL-SCNC: 101 MMOL/L (ref 98–107)
CHOLEST SERPL-MCNC: 112 MG/DL
CREAT SERPL-MCNC: 0.76 MG/DL (ref 0.51–0.95)
EGFRCR SERPLBLD CKD-EPI 2021: >90 ML/MIN/1.73M2
ERYTHROCYTE [DISTWIDTH] IN BLOOD BY AUTOMATED COUNT: 14.1 % (ref 10–15)
EST. AVERAGE GLUCOSE BLD GHB EST-MCNC: 143 MG/DL
FASTING STATUS PATIENT QL REPORTED: YES
FASTING STATUS PATIENT QL REPORTED: YES
FERRITIN SERPL-MCNC: 85 NG/ML (ref 11–328)
GLUCOSE SERPL-MCNC: 100 MG/DL (ref 70–99)
HBA1C MFR BLD: 6.6 %
HCO3 SERPL-SCNC: 24 MMOL/L (ref 22–29)
HCT VFR BLD AUTO: 40 % (ref 35–47)
HDLC SERPL-MCNC: 39 MG/DL
HGB BLD-MCNC: 12.6 G/DL (ref 11.7–15.7)
LDLC SERPL CALC-MCNC: 58 MG/DL
MCH RBC QN AUTO: 27.5 PG (ref 26.5–33)
MCHC RBC AUTO-ENTMCNC: 31.5 G/DL (ref 31.5–36.5)
MCV RBC AUTO: 87 FL (ref 78–100)
NONHDLC SERPL-MCNC: 73 MG/DL
PLATELET # BLD AUTO: 283 10E3/UL (ref 150–450)
POTASSIUM SERPL-SCNC: 4.1 MMOL/L (ref 3.4–5.3)
PROT SERPL-MCNC: 7.9 G/DL (ref 6.4–8.3)
PTH-INTACT SERPL-MCNC: 26 PG/ML (ref 15–65)
RBC # BLD AUTO: 4.59 10E6/UL (ref 3.8–5.2)
SODIUM SERPL-SCNC: 136 MMOL/L (ref 135–145)
TRIGL SERPL-MCNC: 76 MG/DL
TSH SERPL DL<=0.005 MIU/L-ACNC: 1.21 UIU/ML (ref 0.3–4.2)
VIT B12 SERPL-MCNC: 406 PG/ML (ref 232–1245)
VIT D+METAB SERPL-MCNC: 56 NG/ML (ref 20–50)
WBC # BLD AUTO: 7.2 10E3/UL (ref 4–11)

## 2024-10-09 PROCEDURE — 99203 OFFICE O/P NEW LOW 30 MIN: CPT | Performed by: CLINICAL NURSE SPECIALIST

## 2024-10-09 PROCEDURE — 83970 ASSAY OF PARATHORMONE: CPT | Performed by: PATHOLOGY

## 2024-10-09 PROCEDURE — 84590 ASSAY OF VITAMIN A: CPT | Mod: 90 | Performed by: PATHOLOGY

## 2024-10-09 PROCEDURE — 82728 ASSAY OF FERRITIN: CPT | Performed by: PATHOLOGY

## 2024-10-09 PROCEDURE — 80061 LIPID PANEL: CPT | Performed by: PATHOLOGY

## 2024-10-09 PROCEDURE — 82607 VITAMIN B-12: CPT | Performed by: NURSE PRACTITIONER

## 2024-10-09 PROCEDURE — 82306 VITAMIN D 25 HYDROXY: CPT | Performed by: NURSE PRACTITIONER

## 2024-10-09 PROCEDURE — 83036 HEMOGLOBIN GLYCOSYLATED A1C: CPT | Performed by: NURSE PRACTITIONER

## 2024-10-09 PROCEDURE — 84443 ASSAY THYROID STIM HORMONE: CPT | Performed by: PATHOLOGY

## 2024-10-09 PROCEDURE — 80053 COMPREHEN METABOLIC PANEL: CPT | Performed by: PATHOLOGY

## 2024-10-09 PROCEDURE — 99000 SPECIMEN HANDLING OFFICE-LAB: CPT | Performed by: PATHOLOGY

## 2024-10-09 PROCEDURE — 36415 COLL VENOUS BLD VENIPUNCTURE: CPT | Performed by: PATHOLOGY

## 2024-10-09 PROCEDURE — 85027 COMPLETE CBC AUTOMATED: CPT | Performed by: PATHOLOGY

## 2024-10-09 RX ORDER — APREPITANT 40 MG/1
40 CAPSULE ORAL ONCE
Status: CANCELLED | OUTPATIENT
Start: 2024-10-09 | End: 2024-10-09

## 2024-10-09 RX ORDER — LIDOCAINE 40 MG/G
CREAM TOPICAL
Status: CANCELLED | OUTPATIENT
Start: 2024-10-09

## 2024-10-09 ASSESSMENT — PAIN SCALES - GENERAL: PAINLEVEL: MODERATE PAIN (4)

## 2024-10-09 ASSESSMENT — ENCOUNTER SYMPTOMS: DYSRHYTHMIAS: 0

## 2024-10-09 ASSESSMENT — LIFESTYLE VARIABLES: TOBACCO_USE: 1

## 2024-10-09 NOTE — TELEPHONE ENCOUNTER
Received the prior authorization from  for a sleeve gastrectomy scheduled 10/29/24 with Dr Alcaraz.   Auth ref # [id number] effective 10/07/24 - 10/06/25 per letter dated October 8, 2024.

## 2024-10-09 NOTE — PATIENT INSTRUCTIONS
Preparing for Your Surgery      Name:  Yancy Hoover   MRN:  [id number]   :  1970   Today's Date:  10/9/2024       Arriving for surgery:  Surgery date:  10/29/24  Arrival time:  9:00 am  Surgery time: 11:00 am    Please come to:     Please come to:       [facility]    [address] [place]  [address]     The [facility] [place] Patient/Visitor Ramp is at [address]. Patients and visitors who self-park will receive the reduced hospital parking rate. If the Patient /Visitor Ramp is full, please follow the signs to the [employer] car park located at the main hospital entrance.       parking is available (24 hours/ 7 days a week)      Discounted parking pass options are available for patients and visitors. They can be purchased at the [employer] desk at the main hospital entrance.     -    Stop at the security desk and they will direct surgery patients to the Surgery Check in and Family [facility]. [phone]        - If you need directions, a wheelchair or an escort please stop at the Information/security desk in the lobby.     What can I eat or drink?  -  Upon waking on 10/28/24 you will have only clear liquids for the entire day - see below for examples of clear liquids. You may drink liberally until 12:00 am (midnight). You may then have sips of water from 12:00 am until 7:00 am on 10/29/24. Nothing by mouth after 7:00 am.    Examples of clear liquids:  Water  Clear broth  Juices (apple, white grape, white cranberry  and cider) without pulp  Noncarbonated, powder based beverages  (lemonade and Stefan-Aid)  Coffee or tea (without milk or cream)  Gatorade    -  No Alcohol or cannabis products for at least 24 hours before surgery.     Which medicines can I take?    Hold Aspirin for 7 days before surgery.   Hold Multivitamins for 7 days before surgery.  Hold Supplements for 7 days  before surgery.  Hold Ibuprofen (Advil, Motrin) for 1 day(s) before surgery--unless otherwise directed by surgeon.  Hold Naproxen (Aleve) for 4 days before surgery.  Hold Semaglutide (Ozempic) for at least 7 days before surgery. Do not take your dose on 10/23/24.    -  DO NOT take these medications the day of surgery:  Metformin (Glucophage)  Polyethylene glycol (Miralax)  Vitamin D    -  PLEASE TAKE these medications per your usual routine:  Meclizine if needed  Omeprazole (Prilosec)  Rosuvastatin (Crestor)  Topiramate (Topamax)  Verapamil    How do I prepare myself?  - Please take 2 showers (one the night prior to surgery and one the morning of surgery) using Scrubcare or Hibiclens soap.    Use this soap only from the neck to your toes. Do not use in genital area.     Leave the soap on your skin for one minute--then rinse thoroughly.      You may use your own shampoo and conditioner. No other hair products.     Sleep in clean sheets and wear clean clothes.   - Please remove all jewelry and body piercings.  - No lotions, deodorants or fragrance.  - No makeup or fingernail polish.   - Bring your ID and insurance card.    -If you use a CPAP machine, please bring the CPAP machine, tubing, and mask to hospital.    -If you have a Deep Brain Stimulator, Spinal Cord Stimulator, or any Neuro Stimulator device---you must bring the remote control to the hospital.      ALL PATIENTS GOING HOME THE SAME DAY OF SURGERY ARE REQUIRED TO HAVE A RESPONSIBLE ADULT TO DRIVE AND BE IN ATTENDANCE WITH THEM FOR 24 HOURS FOLLOWING SURGERY.    Covid testing policy as of 12/06/2022  Your surgeon will notify and schedule you for a COVID test if one is needed before surgery--please direct any questions or COVID symptoms to your surgeon      Questions or Concerns:    - For any questions regarding the day of surgery or your hospital stay, please contact the Pre Admission Nursing Office at [phone].       - If you have health changes between  today and your surgery, please call your surgeon.       - For questions after surgery, please call your surgeons office.           Current Visitor Guidelines    You may have 2 visitors in the pre op area.    Visiting hours: 8 a.m. to 8:30 p.m.    Patients confirmed or suspected to have symptoms of COVID 19 or flu:     No visitors allowed for adult patients.   Children (under age 18) can have 1 named visitor.     People who are sick or showing symptoms of COVID 19 or flu:    Are not allowed to visit patients--we can only make exceptions in special situations.       Please follow these guidelines for your visit:          Please maintain social distance          Masking is optional--however at times you may be asked to wear a mask for the safety of yourself and others     Clean your hands with alcohol hand . Do this when you arrive at and leave the building and patient room,    And again after you touch your mask or anything in the room.     Go directly to and from the room you are visiting.     Stay in the patient s room during your visit. Limit going to other places in the hospital as much as possible     Leave bags and jackets at home or in the car.     For everyone s health, please don t come and go during your visit. That includes for smoking   during your visit.
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
## 2024-10-11 ENCOUNTER — VIRTUAL VISIT (OUTPATIENT)
Dept: ENDOCRINOLOGY | Facility: CLINIC | Age: 54
End: 2024-10-11
Payer: COMMERCIAL

## 2024-10-11 VITALS — WEIGHT: 282 LBS | HEIGHT: 64 IN | BODY MASS INDEX: 48.14 KG/M2

## 2024-10-11 DIAGNOSIS — Z91.89 AT HIGH RISK FOR POSTOPERATIVE COMPLICATIONS: ICD-10-CM

## 2024-10-11 DIAGNOSIS — E66.01 CLASS 3 SEVERE OBESITY DUE TO EXCESS CALORIES WITH SERIOUS COMORBIDITY AND BODY MASS INDEX (BMI) OF 45.0 TO 49.9 IN ADULT (H): Primary | ICD-10-CM

## 2024-10-11 DIAGNOSIS — E66.813 CLASS 3 SEVERE OBESITY DUE TO EXCESS CALORIES WITH SERIOUS COMORBIDITY AND BODY MASS INDEX (BMI) OF 45.0 TO 49.9 IN ADULT (H): Primary | ICD-10-CM

## 2024-10-11 LAB
ANNOTATION COMMENT IMP: NORMAL
RETINYL PALMITATE SERPL-MCNC: <0.02 MG/L
VIT A SERPL-MCNC: 0.41 MG/L

## 2024-10-11 PROCEDURE — 99207 PR NO CHARGE NURSE ONLY: CPT | Mod: 95

## 2024-10-11 RX ORDER — AMOXICILLIN 250 MG
2 CAPSULE ORAL DAILY PRN
Qty: 30 TABLET | Refills: 1 | Status: SHIPPED | OUTPATIENT
Start: 2024-10-11

## 2024-10-11 RX ORDER — ONDANSETRON 4 MG/1
4 TABLET, ORALLY DISINTEGRATING ORAL EVERY 6 HOURS PRN
Qty: 15 TABLET | Refills: 0 | Status: SHIPPED | OUTPATIENT
Start: 2024-10-11

## 2024-10-11 RX ORDER — HYOSCYAMINE SULFATE 0.125 MG
0.12 TABLET ORAL EVERY 4 HOURS PRN
Qty: 30 TABLET | Refills: 1 | Status: SHIPPED | OUTPATIENT
Start: 2024-10-11

## 2024-10-11 ASSESSMENT — PAIN SCALES - GENERAL: PAINLEVEL: NO PAIN (0)

## 2024-10-11 NOTE — LETTER
10/11/2024       RE: Yancy Hoover  [address]  [facility] [address]     Dear Colleague,    Thank you for referring your patient, Yancy Hoover, to the [facility] [place] at [facility]. Please see a copy of my visit note below.    PREOP NURSE VISIT     This patient is having laparoscopic gastric sleeve with Dr. Jose Manuel Alcaraz.    The following handouts were reviewed with the patient:  Before Your Surgery, Patient Checklist, Weight Loss Surgery Pre-operative Class, Preop Recommendations Quick Reference Guide, History and Physical, Medications to Avoid, Shower or Bathing Before Surgery, Bowel Preparation, Powerful Choices, and [place] [facility] Directive.  Questions were addressed and understanding of content was verbalized.  Contact information was provided.    WEIGHT CHECK:  Patient goal weight: 286    Weight today: 284    Surgery Date and Time: 10/29/24 at 9:00 AM   Call [phone] Ray Templeton to confirm surgery date.     PAC Visit: 10/9/24  Call [phone] to schedule your pre-operative history and physical with our PAC clinic.    MTM: 10/17/24    PROBLEM LIST:  Patient Active Problem List   Diagnosis     LANDON (obstructive sleep apnea)     Class 3 severe obesity due to excess calories with body mass index (BMI) of 50.0 to 59.9 in adult, unspecified whether serious comorbidity present (H)     Type 2 diabetes mellitus without complication, unspecified whether long term insulin use (H)         MEDICAL CONDITIONS REVIEW:  Diabetic? Yes.   Diabetics who are on medications instructed to monitor blood sugar levels closely after surgery and contact prescribing provider if levels run low as they may need their medications adjusted.    On high blood pressure medications? No.   Patients on high blood pressure medications instructed to monitor blood pressure levels closely after surgery and contact  prescribing provider if pressure are running low as they may need their medications adjusted.    CURRENT MEDICATIONS:   Current Outpatient Medications   Medication Sig Dispense Refill     alcohol swab prep pads Use to swab area of injection/shahla as directed. 100 each 5     aspirin (ASA) 81 MG chewable tablet Take 81 mg by mouth every morning.       blood glucose (ACCU-CHEK SMARTVIEW) test strip Use to test blood sugar 2 times daily. 100 strip 5     blood glucose calibration (ACCU-CHEK SMARTVIEW CONTROL) solution Use to calibrate blood glucose monitor as directed. 1 Bottle 3     blood glucose monitoring (ACCU-CHEK FASTCLIX) lancets Use to test blood sugar 1-2 times daily or as directed. 102 each 5     blood glucose monitoring (ACCU-CHEK FAWAD SMARTVIEW) meter device kit Use to test blood sugar 1-2 times daily. 1 kit 0     childrens multivitamin w/iron (FLINTSTONES COMPLETE) 18 MG chewable tablet Take 1 chew tab by mouth every morning.       meclizine (ANTIVERT) 25 MG tablet Take 25 mg by mouth       metFORMIN (GLUCOPHAGE XR) 500 MG 24 hr tablet Take 4 tablets (2,000 mg) by mouth daily with food (Patient taking differently: Take 2,000 mg by mouth daily (with breakfast).) 360 tablet 3     omeprazole (PRILOSEC) 20 MG DR capsule Take 1 capsule (20 mg) by mouth daily (Patient taking differently: Take 20 mg by mouth every morning.) 90 capsule 2     polyethylene glycol (MIRALAX) 17 GM/Dose powder Take 1 Capful by mouth daily as needed for constipation       rosuvastatin (CRESTOR) 20 MG tablet Take 20 mg by mouth every morning.       Semaglutide, 2 MG/DOSE, (OZEMPIC) 8 MG/3ML pen Inject 2 mg subcutaneously every 7 days. Start after 4 weeks of 1 mg Ozempic, if tolerating (Patient taking differently: Inject 2 mg subcutaneously every 7 days. Start after 4 weeks of 1 mg Ozempic, if tolerating  WEDNESDAY'S) 9 mL 3     topiramate (TOPAMAX) 25 MG tablet Take 25 mg by mouth every morning.       verapamil ER (VERELAN) 240 MG 24 hr  capsule Take 240 mg by mouth at bedtime.       vitamin D3 (CHOLECALCIFEROL) 50 mcg (2000 units) tablet Take 1 tablet by mouth every morning.         Patient currently taking opioid/narcotic pain medications? No.    CURRENT ALLERGIES:     Allergies   Allergen Reactions     Codeine Nausea, GI Disturbance and Nausea and Vomiting     Gadolinium Derivatives Nausea and Vomiting     Caused excessive vomiting, administered 4mg zofran       POSTOP MEDICATIONS:  The following postop medications were sent to the patient's pharmacy.  Patient was instructed to  medications before surgery and to have them at home for discharge.    HYSCYAMINE (LEVSIN), ODANSETRON (ZOFRAN), and SENNA (SENOKOT)    LOGISITICS:  Patient lives greater than 3 hours from hospital?  No.  If patient lives greater than 3 hours away, is patient planning on staying in the area after surgery?  No.   Patients instructed to take breaks each hour on car ride home.  To be out of vehicle, stretch and walk for at least 10 minutes.  To do ankle pump exercises in car.      SUPPORT SYSTEM  Adult Daughters and mom will be helping patient with postop care.    PAPERWORK  FMLA/Time OFF:  Patient does not work.    Bariatric Task List    Fax:  Please fax all paperwork to: [phone] -     Status:  Is patient a candidate for bariatric surgery?:  patient is a candidate for bariatric surgery -     Cleared to schedule surgeon consult?:  cleared to schedule surgeon consult - 7/18/24 appt. jamess   Status:  surgery evaluation in process -     Surgeon: Dr. Robbie Alcaraz -     Tentative surgery month/year: To be determined -        Insurance: Insurance:  [employer] -      Contact insurance to discuss coverage:   -       C[employer]a: PCP Recommendation and Medical Clearance:    -     HP Referral:  Completed - Done. bks    Advanced beneficiary notification (ABN) for Medicare patients for RD visits   and surgery:   -      Weight history:   -     Other:  Do the 5 coaching phone  calls. Call Ray at [phone] to get registered. -        Patient Info: Initial Weight:  296 -     Date of Initial Weight/Height:  7/16/2020 -     Goal Weight (lbs):  286 -     Required Weight Loss:  10 -     Surgery Type:  sleeve gastrectomy -     Multidisciplinary Meeting:    -        Dietician Visits: Structured weight loss required by insurance?:  structured weight loss required -     Dietician Visit 1:  Completed - 4/23/24 KB   Dietician Visit 2:  Completed - 6/4/24 KB   Dietician Visit 3:  Completed - 7/5/27 WLS nutrition class scheduled   Dietician Visit 4:  Completed - 8/5/24 KB   Dietician Visit 5:  Completed - 9/24/24 appt. bks   Dietician Visit 6:  Needed - 10/21/24 appt. [facility]   Dietician Visit additional:  Needed - Monthly until surgery - AS   Clearance from dietician to see surgeon?:    -     Dietician Notes:    -        Psychological Evaluation: Psych eval:  Completed - List and Letter sent to pt 5/10 - AS; Scheduled with Dr Contreras 10/22/24, 10/31/24. [facility]   Therapist letter of support:    -     Psychiatrist letter of support:    -     Establish care with therapist:    -     Complete eating disorder evaluation:    -     Letter of clearance from therapist/eating disorder program:    -     Other:    -        Lab Work: Complete Blood Count:  Completed - Ordered 5/3/24 - AS; done 7/3/24 s   Comprehensive Metabolic Panel:  Completed - Ordered 5/3/24 - AS; done 7/3/24. [facility]   Vitamin D:  Completed - Ordered 5/3/24 - AS   PTH:  Completed - Ordered 5/3/24 - AS   Hgb A1c:  Completed - Ordered 5/3/24 - AS; 7/8/24 - 7.4    Lipids: Completed - Ordered 5/3/24 - AS    TSH (UCARE, SCA, [place] MA): Completed - Ordered 5/3/24 - AS; Done 7/3/24. [facility]     Ferritin:   -       Folate:   -       Testosterone, Total and Free:   -     Thiamine:   -     Vitamin A: Completed - Ordered 5/3/24 - AS   Vitamin B12: Completed - Ordered 5/3/24 - AS   Zinc:   -     C-peptide:   -     H. pylori:    -     MRSA (2 swabs, minimum 48 hours  "apart):   -     Nicotine Testing:    - Data deleted   Recheck Vitamin D:   -     Other:    - Data deleted      Consults/ Clearance Sleep Medicine:  Completed - Letter sent to pt 5/10 - AS;8/27/245 Dr Guidry ltr in [facility]. bk   Cardiac:  Completed - Letter sent to pt 5/10 - AS;  9/18/24 Zaira huntt.-cleared. see 9/20/24 note. bks   Pain:   -     Dental:    -     Endocrine:    -     Gastroenterology:    -     Vascular Medicine:    -     Hematology:    -     Medical Weight Management:   -     Physical Therapy/Exercise:    -     Nephrology:    -     Neurology:    -     Pulmonology:    -     Rheumatology:    -     Other:    -     Other:    -     Other:    -        Testing: UGI:    -     EGD:  Completed -     Sleep Study:   -     Other:   -     Other:    -        PCP: Establish care with PCP:  Completed -     Follow up with PCP:    -     PCP letter of support:  Completed - Letter sent to pt 5/10 - AS; 8/30/24 Arlin Ewing MD via Fax on 9/4/24      Health Maintenance: Colonoscopy(> 50 yrs or family hx):    -     Mammogram (> 40 yrs or family hx):    -     Pap Smear (women):   -     Other:   -     Other:    -        Stopping Smoking/ Alcohol Use/Cannabis Use: Quit tobacco use (3 months smoke free)?:    -     Quit date:    -     Quit alcohol use:   -     Quit date:   -     Other:   -     Quit date:   -        Patient Education:  Information Session:  Completed -     Attended New Consult Class?: Completed - 5/14/24  AS   Given \"Making your decision\" handout?:  Yes - 5/10/24 - AS   Given \"A Roadmap to you Weight Loss Surgery\" handout?: Yes - 5/10/24 - AS   Given \"[facility] Care Companion\" information?: Yes - 5/10/24 - AS   Attended support group?:  Completed -     Support plan in place?:  Completed -     Research consents signed?:    -     Avoid NSAIDS/ Alternate Plan for Pain:   -        Additional Surgery Requirements: Review Coag plan:    -     HgA1c <8:  Needed -     Inpatient pain consult:    -     Final " "nicotine screen:    -     Dental work complete:    -     Birth control plan:    -     Gallstone prevention plan (Actigall for 6 months postop):   -     Other:   -     Other:   -        Final Tasks:  Before surgery online preop class:  Completed -     After surgery online class:    -     Nurse visit for information:  Completed -     Weight Check: Completed - Weight: 286 lb   History and Physical: Pre-Assessment Clinic (PAC) -   Completed - 10/9/24 - AS   Final labs per clinic: Completed - Labs ordered at PAC visit. bks   See MTM Pharmacist for medication review and plan for after surgery (if DM, transplant hx, greater than 10 meds): Needed - 10/17/24 - AS   Chest xray per clinic:   -     Electrocardiogram (ECG) per clinic:   -     Schedule postop appointments: Completed -     Other:   -   -        Notes: Please register for the Weight Loss Surgery Care Companion Pathway through the [employer] mobile gilbert or via web browser. You register by answering \"yes\" to the following question, \"Do you agree to take part in this free, online program?\"  Information from this Pathway can help you be more successful before surgery and for up to one year after surgery.  This Pathway through [employer] can also answer questions you may have about your surgery.   The Pathway will start when you get your Tasklist after your initial consultation or when your surgery is scheduled.       -               Again, thank you for allowing me to participate in the care of your patient.      Sincerely,    Samreen Lau RN    "

## 2024-10-11 NOTE — NURSING NOTE
Current patient location: [address] [place] [id number]    Is the patient currently in the state of [place]? YES    Visit mode:VIDEO    If the visit is dropped, the patient can be reconnected by: VIDEO VISIT: Text to cell phone:   Telephone Information:   Mobile [phone]       Will anyone else be joining the visit? NO  (If patient encounters technical issues they should call [phone]    Are changes needed to the allergy or medication list? No    Are refills needed on medications prescribed by this physician? NO    Rooming Documentation:  Questionnaire(s) not pre-assigned    Reason for visit: Nurse Visit    Ny MENDOZA

## 2024-10-11 NOTE — PROGRESS NOTES
PREOP NURSE VISIT     This patient is having laparoscopic gastric sleeve with Dr. Jose Manuel Alcaraz.    The following handouts were reviewed with the patient:  Before Your Surgery, Patient Checklist, Weight Loss Surgery Pre-operative Class, Preop Recommendations Quick Reference Guide, History and Physical, Medications to Avoid, Shower or Bathing Before Surgery, Bowel Preparation, Powerful Choices, and [place] [facility] Directive.  Questions were addressed and understanding of content was verbalized.  Contact information was provided.    WEIGHT CHECK:  Patient goal weight: 286    Weight today: 284    Surgery Date and Time: 10/29/24 at 9:00 AM   Call [phone] Ray Templeton to confirm surgery date.     PAC Visit: 10/9/24  Call [phone] to schedule your pre-operative history and physical with our PAC clinic.    MTM: 10/17/24    PROBLEM LIST:  Patient Active Problem List   Diagnosis    LANDON (obstructive sleep apnea)    Class 3 severe obesity due to excess calories with body mass index (BMI) of 50.0 to 59.9 in adult, unspecified whether serious comorbidity present (H)    Type 2 diabetes mellitus without complication, unspecified whether long term insulin use (H)         MEDICAL CONDITIONS REVIEW:  Diabetic? Yes.   Diabetics who are on medications instructed to monitor blood sugar levels closely after surgery and contact prescribing provider if levels run low as they may need their medications adjusted.    On high blood pressure medications? No.   Patients on high blood pressure medications instructed to monitor blood pressure levels closely after surgery and contact prescribing provider if pressure are running low as they may need their medications adjusted.    CURRENT MEDICATIONS:   Current Outpatient Medications   Medication Sig Dispense Refill    alcohol swab prep pads Use to swab area of injection/shahla as directed. 100 each 5    aspirin (ASA) 81 MG chewable tablet Take 81 mg by mouth every morning.      blood  glucose (ACCU-CHEK SMARTVIEW) test strip Use to test blood sugar 2 times daily. 100 strip 5    blood glucose calibration (ACCU-CHEK SMARTVIEW CONTROL) solution Use to calibrate blood glucose monitor as directed. 1 Bottle 3    blood glucose monitoring (ACCU-CHEK FASTCLIX) lancets Use to test blood sugar 1-2 times daily or as directed. 102 each 5    blood glucose monitoring (ACCU-CHEK FAWAD SMARTVIEW) meter device kit Use to test blood sugar 1-2 times daily. 1 kit 0    childrens multivitamin w/iron (FLINTSTONES COMPLETE) 18 MG chewable tablet Take 1 chew tab by mouth every morning.      meclizine (ANTIVERT) 25 MG tablet Take 25 mg by mouth      metFORMIN (GLUCOPHAGE XR) 500 MG 24 hr tablet Take 4 tablets (2,000 mg) by mouth daily with food (Patient taking differently: Take 2,000 mg by mouth daily (with breakfast).) 360 tablet 3    omeprazole (PRILOSEC) 20 MG DR capsule Take 1 capsule (20 mg) by mouth daily (Patient taking differently: Take 20 mg by mouth every morning.) 90 capsule 2    polyethylene glycol (MIRALAX) 17 GM/Dose powder Take 1 Capful by mouth daily as needed for constipation      rosuvastatin (CRESTOR) 20 MG tablet Take 20 mg by mouth every morning.      Semaglutide, 2 MG/DOSE, (OZEMPIC) 8 MG/3ML pen Inject 2 mg subcutaneously every 7 days. Start after 4 weeks of 1 mg Ozempic, if tolerating (Patient taking differently: Inject 2 mg subcutaneously every 7 days. Start after 4 weeks of 1 mg Ozempic, if tolerating  WEDNESDAY'S) 9 mL 3    topiramate (TOPAMAX) 25 MG tablet Take 25 mg by mouth every morning.      verapamil ER (VERELAN) 240 MG 24 hr capsule Take 240 mg by mouth at bedtime.      vitamin D3 (CHOLECALCIFEROL) 50 mcg (2000 units) tablet Take 1 tablet by mouth every morning.         Patient currently taking opioid/narcotic pain medications? No.    CURRENT ALLERGIES:     Allergies   Allergen Reactions    Codeine Nausea, GI Disturbance and Nausea and Vomiting    Gadolinium Derivatives Nausea and Vomiting      Caused excessive vomiting, administered 4mg zofran       POSTOP MEDICATIONS:  The following postop medications were sent to the patient's pharmacy.  Patient was instructed to  medications before surgery and to have them at home for discharge.    HYSCYAMINE (LEVSIN), ODANSETRON (ZOFRAN), and SENNA (SENOKOT)    LOGISITICS:  Patient lives greater than 3 hours from hospital?  No.  If patient lives greater than 3 hours away, is patient planning on staying in the area after surgery?  No.   Patients instructed to take breaks each hour on car ride home.  To be out of vehicle, stretch and walk for at least 10 minutes.  To do ankle pump exercises in car.      SUPPORT SYSTEM  Adult Daughters and mom will be helping patient with postop care.    PAPERWORK  FMLA/Time OFF:  Patient does not work.    Bariatric Task List    Fax:  Please fax all paperwork to: [phone] -     Status:  Is patient a candidate for bariatric surgery?:  patient is a candidate for bariatric surgery -     Cleared to schedule surgeon consult?:  cleared to schedule surgeon consult - 7/18/24 appt. nic   Status:  surgery evaluation in process -     Surgeon: Dr. Robbie Alcaraz -     Tentative surgery month/year: To be determined -        Insurance: Insurance:  [employer] -      Contact insurance to discuss coverage:   -       Cigna: PCP Recommendation and Medical Clearance:    -     HP Referral:  Completed - Done. bks    Advanced beneficiary notification (ABN) for Medicare patients for RD visits   and surgery:   -      Weight history:   -     Other:  Do the 5 coaching phone calls. Call Ray at [phone] to get registered. -        Patient Info: Initial Weight:  296 -     Date of Initial Weight/Height:  7/16/2020 -     Goal Weight (lbs):  286 -     Required Weight Loss:  10 -     Surgery Type:  sleeve gastrectomy -     Multidisciplinary Meeting:    -        Dietician Visits: Structured weight loss required by insurance?:  structured weight  loss required -     Dietician Visit 1:  Completed - 4/23/24 KB   Dietician Visit 2:  Completed - 6/4/24 KB   Dietician Visit 3:  Completed - 7/5/27 WLS nutrition class scheduled   Dietician Visit 4:  Completed - 8/5/24 KB   Dietician Visit 5:  Completed - 9/24/24 appt. bks   Dietician Visit 6:  Needed - 10/21/24 appt. bks   Dietician Visit additional:  Needed - Monthly until surgery - AS   Clearance from dietician to see surgeon?:    -     Dietician Notes:    -        Psychological Evaluation: Psych eval:  Completed - List and Letter sent to pt 5/10 - AS; Scheduled with Dr Contreras 10/22/24, 10/31/24. bks   Therapist letter of support:    -     Psychiatrist letter of support:    -     Establish care with therapist:    -     Complete eating disorder evaluation:    -     Letter of clearance from therapist/eating disorder program:    -     Other:    -        Lab Work: Complete Blood Count:  Completed - Ordered 5/3/24 - AS; done 7/3/24 bks   Comprehensive Metabolic Panel:  Completed - Ordered 5/3/24 - AS; done 7/3/24. bks   Vitamin D:  Completed - Ordered 5/3/24 - AS   PTH:  Completed - Ordered 5/3/24 - AS   Hgb A1c:  Completed - Ordered 5/3/24 - AS; 7/8/24 - 7.4    Lipids: Completed - Ordered 5/3/24 - AS    TSH (UCARE, SCA, [place] MA): Completed - Ordered 5/3/24 - AS; Done 7/3/24. bks     Ferritin:   -       Folate:   -       Testosterone, Total and Free:   -     Thiamine:   -     Vitamin A: Completed - Ordered 5/3/24 - AS   Vitamin B12: Completed - Ordered 5/3/24 - AS   Zinc:   -     C-peptide:   -     H. pylori:    -     MRSA (2 swabs, minimum 48 hours apart):   -     Nicotine Testing:    - Data deleted   Recheck Vitamin D:   -     Other:    - Data deleted      Consults/ Clearance Sleep Medicine:  Completed - Letter sent to pt 5/10 - AS;8/27/245 Dr Guidry ltr in [facility]. bk   Cardiac:  Completed - Letter sent to pt 5/10 - AS;  9/18/24 Zaira Perera appt.-cleared. see 9/20/24 note. bks   Pain:   -     Dental:    -    "  Endocrine:    -     Gastroenterology:    -     Vascular Medicine:    -     Hematology:    -     Medical Weight Management:   -     Physical Therapy/Exercise:    -     Nephrology:    -     Neurology:    -     Pulmonology:    -     Rheumatology:    -     Other:    -     Other:    -     Other:    -        Testing: UGI:    -     EGD:  Completed -     Sleep Study:   -     Other:   -     Other:    -        PCP: Establish care with PCP:  Completed -     Follow up with PCP:    -     PCP letter of support:  Completed - Letter sent to pt 5/10 - AS; 8/30/24 Arlin Ewing MD via Fax on 9/4/24      Health Maintenance: Colonoscopy(> 50 yrs or family hx):    -     Mammogram (> 40 yrs or family hx):    -     Pap Smear (women):   -     Other:   -     Other:    -        Stopping Smoking/ Alcohol Use/Cannabis Use: Quit tobacco use (3 months smoke free)?:    -     Quit date:    -     Quit alcohol use:   -     Quit date:   -     Other:   -     Quit date:   -        Patient Education:  Information Session:  Completed -     Attended New Consult Class?: Completed - 5/14/24  AS   Given \"Making your decision\" handout?:  Yes - 5/10/24 - AS   Given \"A Roadmap to you Weight Loss Surgery\" handout?: Yes - 5/10/24 - AS   Given \"[facility] Care Companion\" information?: Yes - 5/10/24 - AS   Attended support group?:  Completed -     Support plan in place?:  Completed -     Research consents signed?:    -     Avoid NSAIDS/ Alternate Plan for Pain:   -        Additional Surgery Requirements: Review Coag plan:    -     HgA1c <8:  Needed -     Inpatient pain consult:    -     Final nicotine screen:    -     Dental work complete:    -     Birth control plan:    -     Gallstone prevention plan (Actigall for 6 months postop):   -     Other:   -     Other:   -        Final Tasks:  Before surgery online preop class:  Completed -     After surgery online class:    -     Nurse visit for information:  Completed -     Weight Check: Completed - Weight: 286 lb " "  History and Physical: Pre-Assessment Clinic (PAC) -   Completed - 10/9/24 - AS   Final labs per clinic: Completed - Labs ordered at PAC visit. bks   See MTM Pharmacist for medication review and plan for after surgery (if DM, transplant hx, greater than 10 meds): Needed - 10/17/24 - AS   Chest xray per clinic:   -     Electrocardiogram (ECG) per clinic:   -     Schedule postop appointments: Completed -     Other:   -   -        Notes: Please register for the Weight Loss Surgery Care Companion Pathway through the [employer] mobile gilbert or via web browser. You register by answering \"yes\" to the following question, \"Do you agree to take part in this free, online program?\"  Information from this Pathway can help you be more successful before surgery and for up to one year after surgery.  This Pathway through [employer] can also answer questions you may have about your surgery.   The Pathway will start when you get your Tasklist after your initial consultation or when your surgery is scheduled.       -             "

## 2024-10-11 NOTE — PATIENT INSTRUCTIONS
Yancy Hoover,    Below is a recap of our conversation regarding your upcoming laparoscopic gastric sleeve. You are currently scheduled with Robbie Alcaraz MD on 10/29/24 at 11:00 AM with an arrival time of 9:00 am.  You should receive a call from the hospital surgery department 1-2 days before surgery confirming your surgery time and arrival time.  If your surgery time changes, your arrival time will change also.  You should arrive at the hospital 2 hours prior to your surgery time.      The location for your surgery is: [address] [place] [id number].      [employer] parking is available at the hospital.  There is also a parking ramp located around the corner from the hospital on [facility].    SURGERY CANCELLATION  If something occurs in which you need to cancel your surgery, please contact Ray at [phone], as soon as possible.      SPECIAL DIET INSTRUCTIONS    Follow current diet until day before surgery.    BEFORE SURGERY  *You will receive further information regarding which medications to take and those to stop when you have your Pre-Assessment Clinic (PAC) appointment.    MEDICATIONS TO STOP BEFORE SURGERY  ASPIRIN - stop 7 days before surgery.  BLOOD THINNERS - Need a bridging plan with your prescribing provider.  NSAIDS - stop 7 days before surgery and do not restart after surgery.  CHRONIC NARCOTIC PAIN MEDICATION - need a plan with pain provider and surgeon.  HERBAL SUPPLEMENTS - Stop 7 days before surgery.  VITAMINS - Stop 7 days before surgery.    ANTIOBESITY MEDICATIONS TO STOP BEFORE SURGERY  TOPIRAMATE - take the day of surgery in the morning.  NALTREXONE - Stop 4 days before surgery. Do not restart.  CONTRAVE - Taper with 1 tablet twice a day for 1 week, then stop 4 days before surgery.  Do not restart.  QSYMIA (7.5 mg/46 mg) - Stop 7 days before surgery.  If on a larger dose, will need a plan for tapering.  Do not restart.  PHENTERMINE - Stop 7 days before surgery.  Do  not restart.  GLP-1 (Victoza, Saxenda)  - Last dose the day before surgery.  Do not restart.  GLP-1 (Ozempic, Wegovy, Trulicity, Mounjaro) - Last dose no later than 1 week before surgery.  Do not restart.  METFORMIN - Last dose the day before surgery.  Do not restart.    DAY BEFORE YOUR SURGERY  Starting in the morning, follow a clear liquid diet.  Stay away from red liquids and liquids with pulp.    Your Stage 1 Diet: Clear Liquids   Ensure you are well hydrated all day!  Strive for at least 64 ounces.  Nothing after midnight except water!  You may have sips of water up to 3 hours before your surgery.   Take your medications as directed from the PAC clinic.  You may have approved medications up to 3 hours before your surgery time.    SHOWER  You will take a shower the night before surgery and a shower the morning of surgery.  Follow instructions for pre-op shower using the required soap (4% CHG,    Hibiclens, Exidine, chlorhexidine).  Let the soap sit on your skin for a minute and then rinse.  After your evening shower, dry off with a clean towel, put on clean pajamas and get into a bed with clean sheets.  After your morning shower, dry off with a clean towel and put on clean comfortable clothes.  The soap can be very drying.  Do not put any deodorant, lotion or powder on after your shower.    TRANSPORTATION & POSTOP CARE  You will need a  to take you to the hospital the day of surgery.  You will need a  to pick you up from the hospital the day of discharge (usually the afternoon/evening the day after surgery).  You will need someone to stay with you for the first couple of days after surgery.  If you live greater than an hour from the hospital, you will need to stop every 50-60 minutes to get out of the car and walk around.         PRESCRIPTIONS FROM YOUR PHARMACY:   Please plan to pick these up before surgery and have them at home for when you are discharged. Do not start taking them until  after surgery. Do not bring them to the hospital with you!    Prilosec (omeprazole) OR Alternative:   This medication is for control of stomach acid.  You will take this medication daily for the first three months after surgery.  TO TAKE: Open the capsule and put the beads in applesauce.  Take every morning.  If you are currently on a medication for GERD or Reflux, you will just continue that medication.    Levsin (hyoscyamine) or Alternative:   This medication is to help control spasms/cramping in the stomach and intestines.    Take one tablet every 4 hours as needed for cramping.    It may be helpful to take in the morning before taking any other medications.    Zofran (ondansetron) or Alternative:    This medication is for nausea.    To Take: Place one tablet on the tongue and let it dissolve.  You can take this medication every 6 hours, as needed.    Senna:   This medication is a stool softener:  Take as directed to help avoid constipation.  It is important to take this medication if you are taking a narcotic pain medicine as the pain medication can be constipating.    If you experience diarrhea, please stop taking the Senna.      IN THE HOSPITAL  Get up. You should be up walking at least 3 times (or more) each day while in the hospital.  Trips to the bathroom are great but they are not a long enough distance.  Ask for an abdominal binder for extra abdominal support.  Wear your abdominal as needed, for comfort.  Use your incentive spirometer.  Deep breathing will help refill the lower parts of your lungs, help clear anesthesia gases and help prevent blood clots.  Sip through your fluids!  Frequent sips - about 1 tsp every couple of minutes.  Drinking more than that at a time can contribute to pain and cramping. Fluids are to be room temperature or warmer.   No iced fluids!  No straws!  Ask for some medicine cups to take home to help you measure your fluids.  Remember to take your abdominal binder and incentive  spirometer home with you when you are discharged!!      AFTER SURGERY    MEDICATIONS  Depending on the size and number of medications you take, you may need to space/change the time you take your medications, so you do not overfill your stomach.  Make sure you follow-up with your primary provider to make medication changes needed.  DIABETES: If you have diabetes, monitor your blood sugars more frequently after surgery.  Your blood sugars may normalize quickly.  Please contact your prescribing provider if you need your medication adjusted.  HIGH BLOOD PRESSURE: If you have high blood pressure, monitor your blood pressure after surgery.  Your blood pressure may normalize quickly.  Please contact your prescribing provider if you need your medication adjusted.  Take your postop medications as prescribed!  FIRST 4 WEEKS POSTOP - Medications should be liquid, chewable, crushed or pills smaller than 1/4 inch.    AFTER 4 WEEKS - Take small pills or cut up larger pills to be smaller than 1/2 inch.  Do not crush or open medications that are long acting or extended relief. Check with your prescribing provider to see if there is a different form or option.  You can start a chewable Multivitamin when you get home.  Do not start any additional vitamins until you meet with your provider and dietician to receive further instructions.       PAIN CONTROL  Your pain medication prescription at discharge is a different dosage and timing than what you were taking in the hospital!  Follow the new directions.  Take your pain medicine as needed, as directed.  Start with the minimal amount prescribed and supplement with Tylenol or Extra Strength Tylenol.  Take the minimal amount as directed for severe pain. The pain medicine can cause constipation.  Do not drive while taking narcotic pain medication.    For mild to moderate pain, you can take Tylenol or Extra Strength Tylenol per the bottle instructions.  DO NOT TAKE NSAIDS: IBUPROFEN,  ASPIRIN OR NAPROXEN.  Change positions frequently.  Walking around for 10-15 minutes once an hour will also help.   ICE: Use an ice pack on the incisions for the first 24 - 48 hours:  Use a barrier between the ice pack and the skin.  Do not leave the ice on longer than 20 minutes at each time.    HEAT: You may also try a heating pad on your abdomen to help soothe cramping.  Use a barrier between the heating pad and your skin.  Do not leave on longer than 20 minutes at each time.  Wear your abdominal binder as needed.  You will need someone to stay with you for the first 1-2 days after surgery, especially if you are taking prescription pain medicine.  Please share the information regarding fluid intake and activity with your caregiver so they can help support you in your efforts.  No driving until you have been off narcotic pain medications for at least 24 hours.    POSTOP DIET  *It is imperative that you follow the diet guidelines after surgery.  Eating solid foods before you are supposed to can result in a blockage or damage to the stomach incision.    For Dr. Alcaraz Patients:  You will be following the Stage 2 (Full Liquid Diet) upon discharge.  Your diet is to consist of clear and full liquids for the first 13 days after surgery.  Your Stage 1 Diet: Clear Liquids   Your Stage 2 Diet: Low-fat Full Liquids    Stage 3 (Pureed) Diet Start Date: 11/12/24  Your Stage 3 Diet: Pureed Foods    Stage 4 (Soft Foods) Diet Start Date: 11/26/24  Your Stage 4 Diet: Soft Foods  Stage 5 (Regular) Diet Start Date: 12/24/24   Your Stage 5 Diet: Regular Foods    HYDRATION  Your goal is 48 to 64 ounces each day (4-6 ounces each hour).  This amount will help prevent dehydration.    It is important to measure and keep a tally sheet of your intake for the first month after surgery.  Keep your tally sheet next to you and denis each time you drink one ounce of fluid.  You should track your fluids for the first month after surgery and if  you are ill.  All fluids count in your fluid intake!  Keeping Track of Your Fluids  Keep a water bottle or thermal bottle by your bed.  Drink a little before going to sleep, if you wake in the middle of the night, and in the morning before getting out of bed.  Keep an eye on your urine.  It is a good indicator of your hydration status.  Your urine should be clear yellow or clear light yellow.      BOWELS/CONSTIPATION   Movement is important to keep your bowels working.  Get up and walk at least each hour while you are awake.  It is not unusual to not have a bowel movement for a few days after surgery.  You should be passing gas each day.   Keep taking the SENNA daily until you have had a regular bowel movement and are off the narcotic pain medication.   If you haven't had a bowel movement by the 4th day after surgery, take one dose of Miralax (according to package directions).  Repeat dose if no results.  If you still don't have any results, use a Fleet Enema.  If still no results, call your provider's office.   It is normal to have a little blood in your first couple of bowel movements.  If the blood continues, please contact our office.  If you are having gas pains and bloating, you can try Gas-X.    BREATHING EXERCISES  Use your incentive spirometer each hour while awake.  Sitting up or standing, take 5 - 10 slow, deep breaths each time, focusing on expanding your lungs.  This should be done for the first couple of weeks after surgery.  These exercises will help eliminate gases from your system (anesthesia and surgical).      ACTIVITY & EXERCISE  Get up and walk around each hour while you are awake - at least 10 minutes.  Walking will help with circulation, bowel regulation and will help you feel better.  Do not lift anything greater than 10-15 lb for the first 4 weeks.  The only exercise you can start right after surgery is WALKING.  Walking is good for the gut, the circulation and your breathing!   Seated  Exercises for Arms and Legs (can be done before or after surgery) [email or web address]  Exercise Guidelines after Weight Loss Surgery (1st 4-6 weeks): [email or web address]  Exercises after Weight Loss Surgery (strengthening, when no weightlifting restrictions - after 4-6 weeks) :  [email or web address]       WOUND CARE  Incisions: You will have steri-strips over your incision after surgery. They will fall off over the first 7-10 days after surgery.  If the steri strips fall off before your incisions are healed, replace them with a bandaid to pull the incision edges together.   Bruising: You may have bruising around your wounds. This is normal. It will go away on its own. The skin around your incisions may be a little red. This is normal, too.   Showering: you may shower the day you get home unless your surgeon tells you differently.  Wash gently around incisions with warm soapy water, rinse well, and gently pat dry.    DO NOT wear tight clothing that rubs against your incisions while they heal.   DO NOT soak in a bathtub, swimming pool, or hot tub until your incisions are healed completely, usually around 7-14 days. No tub baths or swimming until all incisions are healed.    MYCHART CARE COMPANION  Track your fluid intake!  Report total fluids, not just water intake.  Pay attention to Reminders to set up follow up appointments.  You will receive Notifications of frequently asked questions.  Please use AFIN[employer] for any concerns regarding your health.    FOLLOW-UP CALL  You will receive a post-op phone call two to three days after surgery to check in and see how you are doing (If your surgery is on a Friday, your phone call will be on the Monday following your surgery).    You can always send us a message via AFINOS if you have any questions or concerns.      CALL YOUR PROVIDER IF:      You have more redness, pain, warmth, swelling, or bleeding around your incision.     The wound  is larger or deeper or looks dark or dried out.     The drainage from your incision does not decrease in 3 to 5 days or increases.     The drainage becomes thick, tan or yellow and has a bad smell (pus).     Your temperature is above 100 F (37.7 C) for more than 4 hours.     You have pain that your pain medicine is not helping.     You have chest and/or belly pain.     You have trouble breathing.     You have a cough that does not go away.     You cannot drink or eat.     You have a fast heartbeat.     You are dizzy or lightheaded.     You are not passing gas and have not had a bowel movement after following instructions above.     Your stools are loose, or you have diarrhea.     You are vomiting after eating.     **If you feel you need to be seen in the emergency room, please go to the [facility] where you had your surgery.      Please let us know if you have any additional questions.    Sincerely,    Samreen Lau RN, BSN  RN Care Coordinator  Bariatric Surgery and Comprehensive Weight Management  Phone: [phone]

## 2024-10-17 ENCOUNTER — VIRTUAL VISIT (OUTPATIENT)
Dept: PHARMACY | Facility: CLINIC | Age: 54
End: 2024-10-17
Attending: INTERNAL MEDICINE
Payer: COMMERCIAL

## 2024-10-17 VITALS — BODY MASS INDEX: 47.8 KG/M2 | WEIGHT: 280 LBS | HEIGHT: 64 IN

## 2024-10-17 DIAGNOSIS — E11.9 TYPE 2 DIABETES MELLITUS WITHOUT COMPLICATION, UNSPECIFIED WHETHER LONG TERM INSULIN USE (H): ICD-10-CM

## 2024-10-17 DIAGNOSIS — G43.719 INTRACTABLE CHRONIC MIGRAINE WITHOUT AURA AND WITHOUT STATUS MIGRAINOSUS: ICD-10-CM

## 2024-10-17 DIAGNOSIS — K21.9 GERD WITHOUT ESOPHAGITIS: ICD-10-CM

## 2024-10-17 DIAGNOSIS — Z86.73 HISTORY OF STROKE: ICD-10-CM

## 2024-10-17 DIAGNOSIS — E66.813 CLASS 3 SEVERE OBESITY DUE TO EXCESS CALORIES WITH BODY MASS INDEX (BMI) OF 50.0 TO 59.9 IN ADULT, UNSPECIFIED WHETHER SERIOUS COMORBIDITY PRESENT (H): Primary | ICD-10-CM

## 2024-10-17 DIAGNOSIS — R42 VERTIGO: ICD-10-CM

## 2024-10-17 DIAGNOSIS — E66.01 CLASS 3 SEVERE OBESITY DUE TO EXCESS CALORIES WITH BODY MASS INDEX (BMI) OF 50.0 TO 59.9 IN ADULT, UNSPECIFIED WHETHER SERIOUS COMORBIDITY PRESENT (H): Primary | ICD-10-CM

## 2024-10-17 RX ORDER — VERAPAMIL HYDROCHLORIDE 120 MG/1
240 CAPSULE, EXTENDED RELEASE ORAL DAILY
COMMUNITY
Start: 2024-09-27

## 2024-10-17 ASSESSMENT — PAIN SCALES - GENERAL: PAINLEVEL: NO PAIN (0)

## 2024-10-17 NOTE — Clinical Note
Yancy is VERY ready for surgery she notes - anxious, but ready. I've followed her for awhile too (I was on her team when I covered for Zulma when she was out on mat leave!) and I can tell she is too.  Of note today re: meds post-op, verapamil will need to be open and sprinkled on applesauce or pudding.   She wondered when to restart Ozempic for secondary MACE benefit? Given history of some intolerability of GLP1/GIP agonists through this weight loss journey (and not great data found in my search), I thought we may assess this as she goes with us when gi symptoms stable and nutrition at goal (likely after several months).  Thoughts from the team on what you've seen done with GLP1/GIP agonists in these cases for MACE benefit? Happy to discuss with her when I see her 11/18 with input from the team!  Micki

## 2024-10-17 NOTE — PROGRESS NOTES
"Medication Therapy Management (MTM) Encounter    ASSESSMENT:                            Medication Adherence/Access: See below for considerations.    Weight Management /Diabetes: stop metformin after sleeve gastrectomy given expect sleeve gastrectomy to improve diabetes/blood glucose and insulin sensitivity - will restart Ozempic for MACE benefit (see below). Will continue topiramate for migraine prophylaxis.  Plan to restart supplements approx 1 week post-op with registered dietician.  Assessed and discussed potential administration changes of medications and potential holding/adjustment of medications post sleeve gastrectomy.  An alternative administration plan was formulated for medications that were greater than approximately ~6-8 mm for the first 3 months post Sleeve Gastrectomy.   Medication sizes were identified utilizing the medication's NDC # or drug identifier. Otherwise if medication size was not able to be identified, medications were compared to that of \"size of m&m\" for easy comparison for patient.  Medications found to be larger than specified below (plan below in plan section):   Open verapamil capsules and sprinkle on teaspoon of applesauce or pudding  Open omeprazole capsules and sprinkle on teaspoon of applesauce or pudding  Stop metformin post-op  Evidence regarding medication plan for medications post-bariatric surgery is largely centralized around Byron-en-Y gastric bypass per guidelines recommendations. Guideline recommendations are less known for medication adjustments for sleeve gastrectomy specifically.  As the sleeve gastrectomy includes solely removing a portion of stomach and does not impact small intestinal tract (compared to other bariatric surgeries like the Byron-en-Y Gastric Bypass), medication adjustments may vary from guidelines and follow study data/expert recommendations/center protocol. Therefore, extended release or sustained release medications may be continued post-operatively " if the benefit was found to outweigh the risk.   Discussed no NSAIDs post op, can use acetaminophen post-operatively   Per post-bariatric protocol, patient to hold all supplements 1 week before surgery. Supplements that are larger than allowable post op, should be held for at least 3 months post op. Post-bariatric vitamin regimen start will be discussed at 1 week post op dietitian follow up appointment.   Multivitamin and Calcium supplementation should be in chewable formulation           History of Stroke (7/2023): resume aspirin and rosuvastatin once cleared after surgery. Per package insert for verapamil ER caps - plan to opening caps and sprinkle on apple sauce or pudding for 3 months post-op given >8mm diameter. Plan to restart Ozempic once gi symptoms/nausea stable after surgery. Recommend re-titrate from 0.25 mg to 0.5mg (first clinical dose for stroke prevention) and maintain if able to tolerate. Given history of intolerability of GLP1/GIP agonists, may consider holding Ozempic for several months once close to beth weight to help promote healthy weight loss and prevent malnutrition. Will consider with weight management team.    GERD: stable - will continue (opening caps and sprinkle on apple sauce or pudding) for at least 3 months post-op; may consider taper off if symptoms improve with weight loss and surgical site heals well.    Migraine: stable - may resume topiramate  after surgery for migraine prevention.    Vertigo:stable     PLAN:                            Follow plan discussed at previous visit: stopping supplements, Ozempic and aspirin 1 week before surgery. Others you may take up until day of surgery.    For 3 months after surgery, Verapamil capsules should be opened and contents sprinkled on 1 tsp of applesauce or sugar free pudding and swallowed without chewing or crushing capsule contents.    For 3 months after surgery, Omeprazole  capsules should be opened and contents sprinkled on 1 tsp of  applesauce or sugar free pudding and swallowed without chewing or crushing capsule contents.    You will permanently stop metformin after surgery.     Weight management team will discuss when it is safe to restart Ozempic. We will start at a low dose when it is deemed safe, so DO NOT refill 2mg dose at this time.    No NSAIDs (Non-Steroidal Anti-Inflammatory Drugs) after Sleeve Gastrectomy - meaning no ibuprofen, naproxen, Aleve, Advil, Motrin post-operatively - these medications can negatively impact the gut lining. Can use acetaminophen post op. Do not exceed 4000 mg/24 hour period.     -Oral dissolve powder Tylenol (acetaminophen) packets are available for use at retail stores or amazon - each packet contains 500 mg acetaminophen. Can use this formulation for the first 3 months post op. Otherwise can cut acetaminophen tablets in half and swallow both halves for dose.      Follow-up: 11/18 with Micki Martins [facility] in addition to other post-op follow up with weight management team.    SUBJECTIVE/OBJECTIVE:                          Yancy Hoover is a 54 year old female seen for a follow-up visit.       Reason for visit: Medication Therapy Management - pre-sleeve gastrectomy medication assessment.    Allergies/ADRs: Reviewed in chart  Past Medical History: Reviewed in chart  Tobacco: She reports that she quit smoking about 15 months ago. Her smoking use included cigarettes. She has a 10 pack-year smoking history. She has never used smokeless tobacco.  Alcohol: not currently using    Medication Adherence/Access: no issues reported    Weight Management /Diabetes   Ozempic 2 mg weekly  Metformin 500 mg ER Tabs - 4 tabs every morning >8mm diameter  Topiramate 25 mg twice daily (for migraine) <8mm diameter    Supplements  Flintstones chewable multivitamin - 2 tabs daily- chewable  Vitamin d 2000 international units once daily <8mm diameter      Has met all criteria for sleeve gastrectomy - scheduled 10/29/24    Has all  "post-op meds (hyoscyamine, ondansetorn, senna, miralax and understands to stop supplements, aspirin, and Ozempic 1 week before surgery (10/22 last doses); all others will take day of surgery.    Patient denies side effects - happy to have met goal for sleeve gastrectomy  and that overall health including blood pressure was better with Ozempic.     Blood glucose readings:  100mg/dl with Ozempic + metformin    Nutrition/Eating Habits:  Working to improve diet and overall wellbeing for overall health.  Focusing on lean meats, vegetables and fruits.  Exercise/Activity: goal to increase activity - will be helpful after surgery as well, acknowledges.      Medication History:  Topiramate: takes for migraine - not helpful for appetite/cravings   Ozempic: more effective - had some concerns re: side effects, now believes not related. Patient denies personal or family history of MEN Type2, MTC, Pancreatitis.   Bydureon - worked well when on previously in 2023, less effective retrial 2024  Jardiance - UTI  Bupropion - (for depression previously) not effective for appetite/reward suppression and not needed for mental health at this time.    Per visit with Jeannie Noe 5/2/24  NBS 7/2020 296lb   Ongoing MWM with Dr. Velarde wasn't seen in last 1.5 years until this month- titration up on topiramate, on saxenda but other agents not covered?   4/2023 glenn shelley RD     Starting weight (lbs): 296  Goal weight for sleeve gastrectomy: 286 lbs         Current weight today: 280 lbs 0 oz  Cumulative Weight Loss: -16 lb, -5.4% from baseline    Wt Readings from Last 4 Encounters:   10/17/24 280 lb (127 kg)   10/11/24 282 lb (127.9 kg)   10/09/24 284 lb (128.8 kg)   10/04/24 282 lb (127.9 kg)     Estimated body mass index is 48.06 kg/m  as calculated from the following:    Height as of this encounter: 5' 4\" (1.626 m).    Weight as of this encounter: 280 lb (127 kg).      History of Stroke (7/2023):   Aspirin 81 mg once daily <8mm " "diameter  Rosuvastatin 20 mg once daily  <8mm diameter  Verapamil  mg caps - 2 caps once daily >8mm diameter  Ozempic 2mg weekly    Patient understands above meds are for secondary prevention of stroke. Wonders when to restart Ozempic after surgery as understands it's for stroke prevention?    GERD:   Omeprazole 20 mg once daily >8mm diameter    Patient reports no current symptoms.   Patient feels that current regimen is effective.     Migraine:  Topiramate 25 mg twice daily <8mm diameter    Managed by Arlin Ewing MD     Works well. Denies side effects.     Vertigo:  Meclizine 25 mg as needed for dizziness.<8mm diameter    This has resolved. Has not needed recently.    Today's Vitals: Ht 5' 4\" (1.626 m)   Wt 280 lb (127 kg)   LMP 10/08/2024   BMI 48.06 kg/m      BP Readings from Last 3 Encounters:   10/09/24 116/82   09/18/24 125/77   08/13/24 127/81       ----------------      I spent 20 minutes with this patient today. All changes were made via collaborative practice agreement with Jeannie Noe. A copy of the visit note was provided to the patient's provider(s).    A summary of these recommendations was sent via [employer].    Micki Martins, Pharm D., MPH    Medication Therapy Management Pharmacist   [facility]      Telemedicine Visit Details  The patient's medications can be safely assessed via a telemedicine encounter.  Type of service:  Video Conference via [employer]  Originating Location (pt. Location): Home    Distant Location (provider location):  Off-site  Start Time:  1:33 PM  End Time:  1:53 PM     Medication Therapy Recommendations  History of stroke    Current Medication: verapamil ER (VERELAN) 120 MG 24 hr capsule   Rationale: Cannot swallow/administer medication - Adherence - Adherence   Recommendation: Provide Education   Status: Patient Agreed - Adherence/Education         Type 2 diabetes mellitus without complication, unspecified whether long term " insulin use (H)    Current Medication: metFORMIN (GLUCOPHAGE XR) 500 MG 24 hr tablet   Rationale: Nonmedication therapy more appropriate - Unnecessary medication therapy - Indication   Recommendation: Discontinue Medication   Status: Accepted per CPA

## 2024-10-17 NOTE — NURSING NOTE
Current patient location: [address] [place] [id number]    Is the patient currently in the state of [place]? YES    Visit mode:VIDEO    If the visit is dropped, the patient can be reconnected by: VIDEO VISIT: Text to cell phone:   Telephone Information:   Mobile [phone]       Will anyone else be joining the visit? NO  (If patient encounters technical issues they should call [phone]    Are changes needed to the allergy or medication list? No    Are refills needed on medications prescribed by this physician? NO    Rooming Documentation:  Questionnaire(s) not pre-assigned    Reason for visit: Medication Therapy Management    Edi MENDOZA

## 2024-10-18 NOTE — PROGRESS NOTES
"Video-Visit Details    Type of service:  Video Visit    Video Start Time: 10:35 AM   Video End Time: 10:54 AM     Originating Location (pt. Location): Home    Distant Location (provider location):  Offsite (providers home) [facility] [place]     Platform used for Video Visit: Radha      Bariatric Nutrition Consultation Note     Reason For Visit: Nutrition Assessment     Yancy Hoover is a 54 year old presenting today for return bariatric nutrition consult and nutrition education regarding clear and low-fat full liquid diet for post-bariatric surgery.  Pt is interested in laparoscopic sleeve gastrectomy with Dr. Alcaraz expected surgery on 10/29/24.  Patient is accompanied by self. This is patients 6th out of 3 required nutrition visits prior to surgery. Completed Weight Loss Surgery Nutrition Class on 7/5/24.      Pt referred by Jeannie Noe NP on July 16, 2020 and Dr. Velarde.  Patient with Co-morbidities of obesity including:  Type II DM yes   Renal Failure no  Sleep apnea no  Hypertension no   Dyslipidemia yes  Joint pain no  Back pain no  GERD no      Support System Reviewed With Patient 7/16/2020   Who do you have in your support network that can be available to help you for the first 2 weeks after surgery? Yes   Who can you count on for support throughout your weight loss surgery journey? Yes         ANTHROPOMETRICS:  Initial visit:  Estimated body mass index is 50.81 kg/m  as calculated from the following:    Height as of an earlier encounter on 7/16/20: 1.626 m (5' 4\").    Weight as of an earlier encounter on 7/16/20: 134.3 kg (296 lb).     Current weight:   Estimated body mass index is 47.55 kg/m  as calculated from the following:    Height as of this encounter: 1.626 m (5' 4\").    Weight as of this encounter: 125.6 kg (277 lb). (-5 lbs from last RD visit, -19 lbs from initial visit) - Has surpassed wt loss goal for surgery.      Required weight loss goal pre-op: " -10 lbs from initial consult weight (goal weight 286 lbs or less before surgery)     Wt Readings from Last 5 Encounters:   10/21/24 125.6 kg (277 lb)   10/17/24 127 kg (280 lb)   10/11/24 127.9 kg (282 lb)   10/09/24 128.8 kg (284 lb)   10/04/24 127.9 kg (282 lb)             7/16/2020   I have tried the following methods to lose weight Watching portions or calories, Exercise, Weight Watchers, Atkins type diet (low carb/high protein), Slimfast         Weight Loss Questions Reviewed With Patient 7/16/2020   How long have you been overweight? Since early childhood         SUPPLEMENT INFORMATION:  Estefanía complete with iron      MEDICATIONS FOR WEIGHT LOSS:  Topiramate, Ozempic, metformin      NUTRITION HISTORY:   No known food allergies/intolerances. Eliminated red meat.      4/23/24 - Pt reports she had a mini stroke about a year ago. Wt went up to 336 lbs. Focused on more of a Mediterranean diet post-stroke and helped to lose weight to 296 lbs. Has been stalled for past 6 months with further weight loss. Working on keeping to 3 meals daily, no snacks. And not eating past 7 pm. Challenged by sweet cravings at times.   Period where she couldn't use her right leg, has gained use of leg back. Working on walking 3-5 times weekly for 30 mins.   Practicing separation of beverages from meals.   Started Saxenda on last Tues. No side effects.     6/4/24 - Weight stable over last month. Planning to switch to semaglutide to see if help with more weight loss.   Less sweet food cravings, but continued pop cravings. Doing a lot less from previous. May have a pop weekly.  Using Sparkling Ice as sugar-free options.     7/5/24 - Completed Weight Loss Surgery Nutrition class     7/5/24 labs - Hgba1c 7.4     8/5/24 - Pt reports she started Ozempic, but is experiencing significant diarrhea. On 2 mg currently. Was supposed to take injection on Saturday but decided not to.  PharmD suggested dropping down to 1 mg, reviewed with pt today.  "Has follow-up with bariatric NP on 8/13.     9/24/24 - Continues to work on sipping slowly, pacing hydration out and  out from meal intake. Eliminated pork. She is working to further eliminate meat. Will continue, eggs, seafood and dairy.     Today - Has stocked up on Premier Protein shakes for after surgery. She reports 2 older daughters and mom will be coming to help her post-op.       Recent Diet Recall:   Breakfast: boiled egg; protein shake   Lunch: 11 am-12 pm protein shake (blended with fruit and veggies - strawberry, kale, pineapple)  Dinner: 6 pm grilled meat and veggies   Snack: a lot less, but if needed she will do a small portion/individual protein pack - cheese/turkey or hummus and crackers.   Beverages: water (96 oz/day)        Physical Activity:  Walking daily. Started \"Infiity\" weighted hula hoop daily. Has worked up from 5 min to 20 mins daily.      NUTRITION DIAGNOSIS:  Obesity r/t long history of self-monitoring deficit and excessive energy intake aeb BMI >30 kg/m2. - Improving/continues     Food- and Nutrition-related knowledge deficit r/t lack of prior exposure to information AEB pt scheduled for upcoming bariatric surgery and pt interest in diet education/review    INTERVENTION:  Intervention Provided/Education Provided/Reviewed previous goals and encouraged patient to continue goals prior to surgery.     Provided instruction on bariatric clear and low-fat full liquid diets. Answered pt questions regarding post-op diet transition.     Provided the following handouts: Diet Guidelines for Bariatric Surgery, Your Stage 1-5 Diet, Keeping Track of Your Fluids, list of recommended vitamin/mineral supplementation after sleeve gastrectomy surgery and RD contact information.        Patient Understanding: good  Expected Compliance: good     Goals after surgery:   1. Follow the bariatric post-op diet advancement schedule (see below)  2. Sip on 48-64 oz (or greater) fluids daily, recording intake " to help stay on-track.  - Drink at least 1-2 oz of fluid every 15-30 min.  3. Stop vitamins/minerals 1 week before surgery. We will discuss starting a chewable multivitamin at the one week post-op visit.   4. Work towards consuming 60 gm protein daily.     Post-op Diet Advancement Schedule:  Clear Liquid Diet (stage 1): START 10/28  Low-Fat Full Liquid Diet (stage 2): START 10/30  Pureed Diet (stage 3): START 11/12  Soft Diet (stage 4): START 11/26  Regular Diet (stage 5): STARTS 12/24     Post-op Diet Handouts:  Diet Guidelines after Weight-loss Surgery  [email or web address]     Your Stage 1 Diet: Clear Liquids  [email or web address]     Your Stage 2 Diet: Low-fat Full Liquids  [email or web address]     Your Stage 3 Diet: Pureed Foods  [email or web address]     Pureed Pleasures  [email or web address]    Your Stage 4 Diet: Soft Foods  [email or web address]    Your Stage 5 Diet: Regular Foods  [email or web address]    Supplements after Sleeve Gastrectomy, Gastric Bypass or Single Anastomosis Duodenal Switch  [email or web address]    Keeping Track of Fluids  [email or web address]      Time spent with patient: 19 mins       Sharon Osborn RD, LD

## 2024-10-18 NOTE — PATIENT INSTRUCTIONS
Recommendations from MTM Pharmacist visit:                                                    MTM (medication therapy management) is a service provided by a clinical pharmacist designed to help you get the most of out of your medicines.  You may be sent a phone or email survey evaluating today's visit.  Please provide feedback you have for the service he received today if you are able.      Follow plan discussed at previous visit: stopping supplements, Ozempic and aspirin 1 week before surgery. Others you may take up until day of surgery.    For 3 months after surgery, Verapamil capsules should be opened and contents sprinkled on 1 tsp of applesauce or sugar free pudding and swallowed without chewing or crushing capsule contents.    For 3 months after surgery, Omeprazole  capsules should be opened and contents sprinkled on 1 tsp of applesauce or sugar free pudding and swallowed without chewing or crushing capsule contents.    You will permanently stop metformin after surgery.     Weight management team will discuss when it is safe to restart Ozempic. We will start at a low dose when it is deemed safe, so DO NOT refill 2mg dose at this time.    No NSAIDs (Non-Steroidal Anti-Inflammatory Drugs) after Sleeve Gastrectomy - meaning no ibuprofen, naproxen, Aleve, Advil, Motrin post-operatively - these medications can negatively impact the gut lining. Can use acetaminophen post op. Do not exceed 4000 mg/24 hour period.     -Oral dissolve powder Tylenol (acetaminophen) packets are available for use at retail stores or amazon - each packet contains 500 mg acetaminophen. Can use this formulation for the first 3 months post op. Otherwise can cut acetaminophen tablets in half and swallow both halves for dose.      Follow-up: 11/18 with Micki Martins [facility] in addition to other post-op follow up with weight management team.        It was great speaking with you today.  I value your experience and would be very thankful for your  "time in providing feedback in our clinic survey. In the next few days, you may receive an email or text message from [facility] with a link to a survey related to your  clinical pharmacist.\"     To schedule another MTM appointment, please call the clinic directly ([facility] Phone Number: [phone] (schedules for [facility] and [facility] - providers, dietitians, health coaches) or you may call the MTM scheduling line at [phone] or toll-free at [phone].     My Clinical Pharmacist's contact information:                                                      Please feel free to contact me with any questions or concerns you have.      Micki Martins, Pharm D., MPH    Medication Therapy Management Pharmacist   [facility]          "

## 2024-10-21 ENCOUNTER — VIRTUAL VISIT (OUTPATIENT)
Dept: ENDOCRINOLOGY | Facility: CLINIC | Age: 54
End: 2024-10-21
Payer: COMMERCIAL

## 2024-10-21 VITALS — HEIGHT: 64 IN | BODY MASS INDEX: 47.29 KG/M2 | WEIGHT: 277 LBS

## 2024-10-21 DIAGNOSIS — E11.9 TYPE 2 DIABETES MELLITUS WITHOUT COMPLICATION, WITHOUT LONG-TERM CURRENT USE OF INSULIN (H): ICD-10-CM

## 2024-10-21 DIAGNOSIS — Z71.3 NUTRITIONAL COUNSELING: ICD-10-CM

## 2024-10-21 DIAGNOSIS — E66.01 CLASS 3 SEVERE OBESITY DUE TO EXCESS CALORIES WITH SERIOUS COMORBIDITY AND BODY MASS INDEX (BMI) OF 45.0 TO 49.9 IN ADULT (H): Primary | ICD-10-CM

## 2024-10-21 DIAGNOSIS — E66.813 CLASS 3 SEVERE OBESITY DUE TO EXCESS CALORIES WITH SERIOUS COMORBIDITY AND BODY MASS INDEX (BMI) OF 45.0 TO 49.9 IN ADULT (H): Primary | ICD-10-CM

## 2024-10-21 PROCEDURE — 99207 PR NO CHARGE LOS: CPT | Mod: 95 | Performed by: DIETITIAN, REGISTERED

## 2024-10-21 PROCEDURE — 97803 MED NUTRITION INDIV SUBSEQ: CPT | Mod: 95 | Performed by: DIETITIAN, REGISTERED

## 2024-10-21 ASSESSMENT — PAIN SCALES - GENERAL: PAINLEVEL: NO PAIN (0)

## 2024-10-21 NOTE — NURSING NOTE
Current patient location: [address] [place] [id number]    Is the patient currently in the state of [place]? YES    Visit mode:VIDEO    If the visit is dropped, the patient can be reconnected by: VIDEO VISIT: Text to cell phone:   Telephone Information:   Mobile [phone]       Will anyone else be joining the visit? NO  (If patient encounters technical issues they should call [phone]    Are changes needed to the allergy or medication list? N/A    Are refills needed on medications prescribed by this physician? NO    Rooming Documentation:  Questionnaire(s) not pre-assigned    Reason for visit: RECHECK    Edi MENDOZA

## 2024-10-21 NOTE — LETTER
"10/21/2024       RE: Yancy Hoover  [address]  [facility] [address]     Dear Colleague,    Thank you for referring your patient, Yancy Hoover, to the [facility] [place] at [facility]. Please see a copy of my visit note below.    Video-Visit Details    Type of service:  Video Visit    Video Start Time: 10:35 AM   Video End Time: 10:54 AM     Originating Location (pt. Location): Home    Distant Location (provider location):  Offsite (providers home) [facility] [place]     Platform used for Video Visit: AmWell      Bariatric Nutrition Consultation Note     Reason For Visit: Nutrition Assessment     Yancy Hoover is a 54 year old presenting today for return bariatric nutrition consult and nutrition education regarding clear and low-fat full liquid diet for post-bariatric surgery.  Pt is interested in laparoscopic sleeve gastrectomy with Dr. Alcaraz expected surgery on 10/29/24.  Patient is accompanied by self. This is patients 6th out of 3 required nutrition visits prior to surgery. Completed Weight Loss Surgery Nutrition Class on 7/5/24.      Pt referred by Jeannie Noe NP on July 16, 2020 and Dr. Velarde.  Patient with Co-morbidities of obesity including:  Type II DM yes   Renal Failure no  Sleep apnea no  Hypertension no   Dyslipidemia yes  Joint pain no  Back pain no  GERD no      Support System Reviewed With Patient 7/16/2020   Who do you have in your support network that can be available to help you for the first 2 weeks after surgery? Yes   Who can you count on for support throughout your weight loss surgery journey? Yes         ANTHROPOMETRICS:  Initial visit:  Estimated body mass index is 50.81 kg/m  as calculated from the following:    Height as of an earlier encounter on 7/16/20: 1.626 m (5' 4\").    Weight as of an earlier encounter on 7/16/20: 134.3 kg " "(296 lb).     Current weight:   Estimated body mass index is 47.55 kg/m  as calculated from the following:    Height as of this encounter: 1.626 m (5' 4\").    Weight as of this encounter: 125.6 kg (277 lb). (-5 lbs from last RD visit, -19 lbs from initial visit) - Has surpassed wt loss goal for surgery.      Required weight loss goal pre-op: -10 lbs from initial consult weight (goal weight 286 lbs or less before surgery)     Wt Readings from Last 5 Encounters:   10/21/24 125.6 kg (277 lb)   10/17/24 127 kg (280 lb)   10/11/24 127.9 kg (282 lb)   10/09/24 128.8 kg (284 lb)   10/04/24 127.9 kg (282 lb)             7/16/2020   I have tried the following methods to lose weight Watching portions or calories, Exercise, Weight Watchers, Atkins type diet (low carb/high protein), Slimfast         Weight Loss Questions Reviewed With Patient 7/16/2020   How long have you been overweight? Since early childhood         SUPPLEMENT INFORMATION:  Estefanía complete with iron      MEDICATIONS FOR WEIGHT LOSS:  Topiramate, Ozempic, metformin      NUTRITION HISTORY:   No known food allergies/intolerances. Eliminated red meat.      4/23/24 - Pt reports she had a mini stroke about a year ago. Wt went up to 336 lbs. Focused on more of a Mediterranean diet post-stroke and helped to lose weight to 296 lbs. Has been stalled for past 6 months with further weight loss. Working on keeping to 3 meals daily, no snacks. And not eating past 7 pm. Challenged by sweet cravings at times.   Period where she couldn't use her right leg, has gained use of leg back. Working on walking 3-5 times weekly for 30 mins.   Practicing separation of beverages from meals.   Started Saxenda on last Tues. No side effects.     6/4/24 - Weight stable over last month. Planning to switch to semaglutide to see if help with more weight loss.   Less sweet food cravings, but continued pop cravings. Doing a lot less from previous. May have a pop weekly.  Using Sparkling Ice " "as sugar-free options.     7/5/24 - Completed Weight Loss Surgery Nutrition class     7/5/24 labs - Hgba1c 7.4     8/5/24 - Pt reports she started Ozempic, but is experiencing significant diarrhea. On 2 mg currently. Was supposed to take injection on Saturday but decided not to.  PharmD suggested dropping down to 1 mg, reviewed with pt today. Has follow-up with bariatric NP on 8/13.     9/24/24 - Continues to work on sipping slowly, pacing hydration out and  out from meal intake. Eliminated pork. She is working to further eliminate meat. Will continue, eggs, seafood and dairy.     Today - Has stocked up on Premier Protein shakes for after surgery. She reports 2 older daughters and mom will be coming to help her post-op.       Recent Diet Recall:   Breakfast: boiled egg; protein shake   Lunch: 11 am-12 pm protein shake (blended with fruit and veggies - strawberry, kale, pineapple)  Dinner: 6 pm grilled meat and veggies   Snack: a lot less, but if needed she will do a small portion/individual protein pack - cheese/turkey or hummus and crackers.   Beverages: water (96 oz/day)        Physical Activity:  Walking daily. Started \"Infiity\" weighted hula hoop daily. Has worked up from 5 min to 20 mins daily.      NUTRITION DIAGNOSIS:  Obesity r/t long history of self-monitoring deficit and excessive energy intake aeb BMI >30 kg/m2. - Improving/continues     Food- and Nutrition-related knowledge deficit r/t lack of prior exposure to information AEB pt scheduled for upcoming bariatric surgery and pt interest in diet education/review    INTERVENTION:  Intervention Provided/Education Provided/Reviewed previous goals and encouraged patient to continue goals prior to surgery.     Provided instruction on bariatric clear and low-fat full liquid diets. Answered pt questions regarding post-op diet transition.     Provided the following handouts: Diet Guidelines for Bariatric Surgery, Your Stage 1-5 Diet, Keeping Track of " Your Fluids, list of recommended vitamin/mineral supplementation after sleeve gastrectomy surgery and RD contact information.        Patient Understanding: good  Expected Compliance: good     Goals after surgery:   1. Follow the bariatric post-op diet advancement schedule (see below)  2. Sip on 48-64 oz (or greater) fluids daily, recording intake to help stay on-track.  - Drink at least 1-2 oz of fluid every 15-30 min.  3. Stop vitamins/minerals 1 week before surgery. We will discuss starting a chewable multivitamin at the one week post-op visit.   4. Work towards consuming 60 gm protein daily.     Post-op Diet Advancement Schedule:  Clear Liquid Diet (stage 1): START 10/28  Low-Fat Full Liquid Diet (stage 2): START 10/30  Pureed Diet (stage 3): START 11/12  Soft Diet (stage 4): START 11/26  Regular Diet (stage 5): STARTS 12/24     Post-op Diet Handouts:  Diet Guidelines after Weight-loss Surgery  [email or web address]     Your Stage 1 Diet: Clear Liquids  [email or web address]     Your Stage 2 Diet: Low-fat Full Liquids  [email or web address]     Your Stage 3 Diet: Pureed Foods  [email or web address]     Pureed Pleasures  [email or web address]    Your Stage 4 Diet: Soft Foods  [email or web address]    Your Stage 5 Diet: Regular Foods  [email or web address]    Supplements after Sleeve Gastrectomy, Gastric Bypass or Single Anastomosis Duodenal Switch  [email or web address]    Keeping Track of Fluids  [email or web address]      Time spent with patient: 19 mins       Sharon Osborn RD, LD        Again, thank you for allowing me to participate in the care of your patient.      Sincerely,    Sharon Osborn RD

## 2024-10-21 NOTE — PATIENT INSTRUCTIONS
Juan J Haines,    Follow-up with dietitian on 11/5    Thank you,    Sharon Osborn, ALISON, LD  If you would like to schedule or reschedule an appointment with the RD, please call [phone]    Nutrition Goals  Goals after surgery:   1. Follow the bariatric post-op diet advancement schedule (see below)  2. Sip on 48-64 oz (or greater) fluids daily, recording intake to help stay on-track.  - Drink at least 1-2 oz of fluid every 15-30 min.  3. Stop vitamins/minerals 1 week before surgery. We will discuss starting a chewable multivitamin at the one week post-op visit.   4. Work towards consuming 60 gm protein daily.     Post-op Diet Advancement Schedule:  Clear Liquid Diet (stage 1): START 10/28  Low-Fat Full Liquid Diet (stage 2): START 10/30  Pureed Diet (stage 3): START 11/12  Soft Diet (stage 4): START 11/26  Regular Diet (stage 5): STARTS 12/24     Post-op Diet Handouts:  Diet Guidelines after Weight-loss Surgery  [email or web address]     Your Stage 1 Diet: Clear Liquids  [email or web address]     Your Stage 2 Diet: Low-fat Full Liquids  [email or web address]     Your Stage 3 Diet: Pureed Foods  [email or web address]     Pureed Pleasures  [email or web address]    Your Stage 4 Diet: Soft Foods  [email or web address]    Your Stage 5 Diet: Regular Foods  [email or web address]    Supplements after Sleeve Gastrectomy, Gastric Bypass or Single Anastomosis Duodenal Switch  [email or web address]    Keeping Track of Fluids  [email or web address]          COMPREHENSIVE WEIGHT MANAGEMENT PROGRAM  VIRTUAL SUPPORT GROUPS    At [facility], our Comprehensive Weight Management program offers on-line support groups for patients who are working on weight loss and considering, preparing for, or have had weight loss surgery.     There is no cost for this opportunity.  You are invited to attend the?Virtual Support Groups?provided by any of the following  "locations:    [facility] via [employer] Teams with Larisa Werner RD, RN  2.   [place] via [employer] with Dariusz Yang, PhD, LP  3.   [place] via [employer] with Darya Cuello, EVERETTE    The following Support Group information can also be found on our website:  [email or web address]      [facility] WEIGHT LOSS SURGERY SUPPORT GROUP  The support group is a patient-lead forum that meets monthly to share experiences, encouragement and education. It is open to those who have had weight loss surgery, are scheduled for surgery, or are considering surgery.   WHEN: 3rd Wednesday of each month from 5:00PM - 6:00PM using Microsoft Teams.   FACILITATOR: Led by Larisa Cuba RD, LD, RN, the program's Clinical Coordinator.   TO REGISTER: Please contact the clinic via [employer] or call the nurse line directly at [phone] to inform our staff that you would like an invite sent to you and to let us know the email you would like the invite sent to. Prior to the meeting, a link with directions on how to join the meeting will be sent to you.    2024 Meetings    September 18: Madison Lee, PhD, Outpatient Clinical Therapist, \"Pro Tips for Habit Change\".  October 16:  Let's Talk  This will be a time of group support.??   November 20:  Let's Talk  about How to Navigate the Upcoming Holiday Season.  December 18:  Let's Talk  and Celebrate the past year.       [facility] BARIATRIC CARE SUPPORT GROUP  This is open to all pre- and post- operative bariatric surgery patients as well as their support system.   WHEN: 3rd Tuesday of each month from 6:30 PM - 8:00 PM using Microsoft Teams.   FACILITATOR: Led by Dariusz Yang, Ph.D who is a Licensed Psychologist with the [facility] Comprehensive Weight Management Program.   TO REGISTER: Please send an email to Dariusz Yang, Ph.D., " "LP [email or web address]?if you would like an invitation to the group. Prior to the meeting, a link with directions on how to join the meeting will be sent to you.    2024 Meetings September 17: IN PERSON MEETING. [facility], [facility] [place], [place], [address], 1st floor Conference Room.  October 15: Date changed to OCTOBER 8th (2nd Tuesday).   November 19: \"Holiday Eating\", Krissy Lozano RD, LD.  December 17: \"Hospital Stay and Compliance\", Darya Cuello RN, CBN.      [facility] POST-OPERATIVE BARIATRIC SURGERY SUPPORT GROUP  This is a support group for [facility] bariatric patients (and those external to [facility]) who have had bariatric surgery and are at least 3 months post-surgery.  WHEN: 4th Thursday of the month from 11:00 AM - 12:00 PM using Microsoft Teams.   FACILITATOR: Led by Certified Bariatric Nurse, Darya Cuello RN, CBN.   TO REGISTER: Please send an email to Darya at [email or web address] if you would like an invitation to the group.  Prior to the meeting, a link with directions on how to join the meeting will be sent to you.    2024 Meetings September 26 October 24 November 28: No meeting  December 26                             "

## 2024-10-29 ENCOUNTER — ANESTHESIA (OUTPATIENT)
Dept: SURGERY | Facility: CLINIC | Age: 54
DRG: 621 | End: 2024-10-29
Payer: COMMERCIAL

## 2024-10-29 ENCOUNTER — HOSPITAL ENCOUNTER (INPATIENT)
Facility: CLINIC | Age: 54
LOS: 1 days | Discharge: HOME | DRG: 621 | End: 2024-10-30
Attending: SURGERY | Admitting: SURGERY
Payer: COMMERCIAL

## 2024-10-29 DIAGNOSIS — E66.01 MORBID OBESITY (H): ICD-10-CM

## 2024-10-29 DIAGNOSIS — Z98.84 S/P LAPAROSCOPIC SLEEVE GASTRECTOMY: Primary | ICD-10-CM

## 2024-10-29 LAB
GLUCOSE BLDC GLUCOMTR-MCNC: 108 MG/DL (ref 70–99)
GLUCOSE BLDC GLUCOMTR-MCNC: 149 MG/DL (ref 70–99)
GLUCOSE BLDC GLUCOMTR-MCNC: 89 MG/DL (ref 70–99)

## 2024-10-29 PROCEDURE — 250N000009 HC RX 250: Performed by: STUDENT IN AN ORGANIZED HEALTH CARE EDUCATION/TRAINING PROGRAM

## 2024-10-29 PROCEDURE — 88305 TISSUE EXAM BY PATHOLOGIST: CPT | Mod: TC | Performed by: SURGERY

## 2024-10-29 PROCEDURE — 250N000011 HC RX IP 250 OP 636: Performed by: SURGERY

## 2024-10-29 PROCEDURE — 250N000011 HC RX IP 250 OP 636: Performed by: STUDENT IN AN ORGANIZED HEALTH CARE EDUCATION/TRAINING PROGRAM

## 2024-10-29 PROCEDURE — 250N000013 HC RX MED GY IP 250 OP 250 PS 637: Performed by: SURGERY

## 2024-10-29 PROCEDURE — 360N000077 HC SURGERY LEVEL 4, PER MIN: Performed by: SURGERY

## 2024-10-29 PROCEDURE — 250N000025 HC SEVOFLURANE, PER MIN: Performed by: SURGERY

## 2024-10-29 PROCEDURE — 999N000141 HC STATISTIC PRE-PROCEDURE NURSING ASSESSMENT: Performed by: SURGERY

## 2024-10-29 PROCEDURE — 710N000010 HC RECOVERY PHASE 1, LEVEL 2, PER MIN: Performed by: SURGERY

## 2024-10-29 PROCEDURE — 258N000003 HC RX IP 258 OP 636: Performed by: NURSE PRACTITIONER

## 2024-10-29 PROCEDURE — 250N000013 HC RX MED GY IP 250 OP 250 PS 637

## 2024-10-29 PROCEDURE — 0DB64Z3 EXCISION OF STOMACH, PERCUTANEOUS ENDOSCOPIC APPROACH, VERTICAL: ICD-10-PCS | Performed by: SURGERY

## 2024-10-29 PROCEDURE — 258N000003 HC RX IP 258 OP 636: Performed by: STUDENT IN AN ORGANIZED HEALTH CARE EDUCATION/TRAINING PROGRAM

## 2024-10-29 PROCEDURE — 43775 LAP SLEEVE GASTRECTOMY: CPT | Performed by: SURGERY

## 2024-10-29 PROCEDURE — 370N000017 HC ANESTHESIA TECHNICAL FEE, PER MIN: Performed by: SURGERY

## 2024-10-29 PROCEDURE — 258N000001 HC RX 258: Performed by: SURGERY

## 2024-10-29 PROCEDURE — 250N000011 HC RX IP 250 OP 636: Performed by: CLINICAL NURSE SPECIALIST

## 2024-10-29 PROCEDURE — 250N000013 HC RX MED GY IP 250 OP 250 PS 637: Performed by: NURSE PRACTITIONER

## 2024-10-29 PROCEDURE — 250N000011 HC RX IP 250 OP 636: Performed by: INTERNAL MEDICINE

## 2024-10-29 PROCEDURE — 120N000002 HC R&B MED SURG/OB UMMC

## 2024-10-29 PROCEDURE — 88305 TISSUE EXAM BY PATHOLOGIST: CPT | Mod: 26 | Performed by: PATHOLOGY

## 2024-10-29 PROCEDURE — 250N000009 HC RX 250: Performed by: NURSE PRACTITIONER

## 2024-10-29 PROCEDURE — 272N000001 HC OR GENERAL SUPPLY STERILE: Performed by: SURGERY

## 2024-10-29 RX ORDER — LIDOCAINE 40 MG/G
CREAM TOPICAL
Status: DISCONTINUED | OUTPATIENT
Start: 2024-10-29 | End: 2024-10-30 | Stop reason: HOSPADM

## 2024-10-29 RX ORDER — ONDANSETRON 2 MG/ML
4 INJECTION INTRAMUSCULAR; INTRAVENOUS
Status: DISCONTINUED | OUTPATIENT
Start: 2024-10-29 | End: 2024-10-29 | Stop reason: HOSPADM

## 2024-10-29 RX ORDER — APREPITANT 40 MG/1
40 CAPSULE ORAL ONCE
Status: COMPLETED | OUTPATIENT
Start: 2024-10-29 | End: 2024-10-29

## 2024-10-29 RX ORDER — ENALAPRILAT 1.25 MG/ML
1.25 INJECTION INTRAVENOUS EVERY 6 HOURS PRN
Status: DISCONTINUED | OUTPATIENT
Start: 2024-10-29 | End: 2024-10-30 | Stop reason: HOSPADM

## 2024-10-29 RX ORDER — DIPHENHYDRAMINE HYDROCHLORIDE 50 MG/ML
25 INJECTION INTRAMUSCULAR; INTRAVENOUS EVERY 6 HOURS PRN
Status: DISCONTINUED | OUTPATIENT
Start: 2024-10-29 | End: 2024-10-30 | Stop reason: HOSPADM

## 2024-10-29 RX ORDER — DEXAMETHASONE SODIUM PHOSPHATE 4 MG/ML
4 INJECTION, SOLUTION INTRA-ARTICULAR; INTRALESIONAL; INTRAMUSCULAR; INTRAVENOUS; SOFT TISSUE
Status: DISCONTINUED | OUTPATIENT
Start: 2024-10-29 | End: 2024-10-29

## 2024-10-29 RX ORDER — ACETAMINOPHEN 325 MG/1
650 TABLET ORAL EVERY 4 HOURS PRN
Status: DISCONTINUED | OUTPATIENT
Start: 2024-10-29 | End: 2024-10-30 | Stop reason: HOSPADM

## 2024-10-29 RX ORDER — OXYCODONE HYDROCHLORIDE 10 MG/1
10 TABLET ORAL
Status: DISCONTINUED | OUTPATIENT
Start: 2024-10-29 | End: 2024-10-30 | Stop reason: HOSPADM

## 2024-10-29 RX ORDER — PROPOFOL 10 MG/ML
INJECTION, EMULSION INTRAVENOUS CONTINUOUS PRN
Status: DISCONTINUED | OUTPATIENT
Start: 2024-10-29 | End: 2024-10-29

## 2024-10-29 RX ORDER — LIDOCAINE HYDROCHLORIDE 20 MG/ML
INJECTION, SOLUTION INFILTRATION; PERINEURAL PRN
Status: DISCONTINUED | OUTPATIENT
Start: 2024-10-29 | End: 2024-10-29

## 2024-10-29 RX ORDER — OXYCODONE HYDROCHLORIDE 5 MG/1
5 TABLET ORAL
Status: DISCONTINUED | OUTPATIENT
Start: 2024-10-29 | End: 2024-10-30 | Stop reason: HOSPADM

## 2024-10-29 RX ORDER — PROCHLORPERAZINE 25 MG
25 SUPPOSITORY, RECTAL RECTAL EVERY 12 HOURS PRN
Status: DISCONTINUED | OUTPATIENT
Start: 2024-10-29 | End: 2024-10-30 | Stop reason: HOSPADM

## 2024-10-29 RX ORDER — SODIUM CHLORIDE, SODIUM LACTATE, POTASSIUM CHLORIDE, CALCIUM CHLORIDE 600; 310; 30; 20 MG/100ML; MG/100ML; MG/100ML; MG/100ML
INJECTION, SOLUTION INTRAVENOUS CONTINUOUS
Status: DISCONTINUED | OUTPATIENT
Start: 2024-10-29 | End: 2024-10-30

## 2024-10-29 RX ORDER — SODIUM CHLORIDE, SODIUM LACTATE, POTASSIUM CHLORIDE, CALCIUM CHLORIDE 600; 310; 30; 20 MG/100ML; MG/100ML; MG/100ML; MG/100ML
INJECTION, SOLUTION INTRAVENOUS CONTINUOUS PRN
Status: DISCONTINUED | OUTPATIENT
Start: 2024-10-29 | End: 2024-10-29

## 2024-10-29 RX ORDER — OXYCODONE HYDROCHLORIDE 5 MG/1
5 TABLET ORAL
Status: DISCONTINUED | OUTPATIENT
Start: 2024-10-29 | End: 2024-10-29

## 2024-10-29 RX ORDER — NALOXONE HYDROCHLORIDE 0.4 MG/ML
0.1 INJECTION, SOLUTION INTRAMUSCULAR; INTRAVENOUS; SUBCUTANEOUS
Status: DISCONTINUED | OUTPATIENT
Start: 2024-10-29 | End: 2024-10-29

## 2024-10-29 RX ORDER — DIPHENHYDRAMINE HCL 25 MG
25 CAPSULE ORAL EVERY 6 HOURS PRN
Status: DISCONTINUED | OUTPATIENT
Start: 2024-10-29 | End: 2024-10-30 | Stop reason: HOSPADM

## 2024-10-29 RX ORDER — ONDANSETRON 2 MG/ML
4 INJECTION INTRAMUSCULAR; INTRAVENOUS EVERY 30 MIN PRN
Status: DISCONTINUED | OUTPATIENT
Start: 2024-10-29 | End: 2024-10-29

## 2024-10-29 RX ORDER — CEFAZOLIN SODIUM/WATER 3 G/30 ML
3 SYRINGE (ML) INTRAVENOUS
Status: COMPLETED | OUTPATIENT
Start: 2024-10-29 | End: 2024-10-29

## 2024-10-29 RX ORDER — ONDANSETRON 2 MG/ML
4 INJECTION INTRAMUSCULAR; INTRAVENOUS EVERY 6 HOURS PRN
Status: DISCONTINUED | OUTPATIENT
Start: 2024-10-29 | End: 2024-10-30 | Stop reason: HOSPADM

## 2024-10-29 RX ORDER — FENTANYL CITRATE 50 UG/ML
50 INJECTION, SOLUTION INTRAMUSCULAR; INTRAVENOUS EVERY 5 MIN PRN
Status: DISCONTINUED | OUTPATIENT
Start: 2024-10-29 | End: 2024-10-29

## 2024-10-29 RX ORDER — HYDROMORPHONE HCL IN WATER/PF 6 MG/30 ML
0.2 PATIENT CONTROLLED ANALGESIA SYRINGE INTRAVENOUS EVERY 5 MIN PRN
Status: DISCONTINUED | OUTPATIENT
Start: 2024-10-29 | End: 2024-10-29

## 2024-10-29 RX ORDER — NALOXONE HYDROCHLORIDE 0.4 MG/ML
0.2 INJECTION, SOLUTION INTRAMUSCULAR; INTRAVENOUS; SUBCUTANEOUS
Status: DISCONTINUED | OUTPATIENT
Start: 2024-10-29 | End: 2024-10-30 | Stop reason: HOSPADM

## 2024-10-29 RX ORDER — ONDANSETRON 4 MG/1
4 TABLET, ORALLY DISINTEGRATING ORAL EVERY 30 MIN PRN
Status: DISCONTINUED | OUTPATIENT
Start: 2024-10-29 | End: 2024-10-29

## 2024-10-29 RX ORDER — FENTANYL CITRATE 50 UG/ML
INJECTION, SOLUTION INTRAMUSCULAR; INTRAVENOUS PRN
Status: DISCONTINUED | OUTPATIENT
Start: 2024-10-29 | End: 2024-10-29

## 2024-10-29 RX ORDER — SODIUM CHLORIDE, SODIUM LACTATE, POTASSIUM CHLORIDE, CALCIUM CHLORIDE 600; 310; 30; 20 MG/100ML; MG/100ML; MG/100ML; MG/100ML
INJECTION, SOLUTION INTRAVENOUS CONTINUOUS
Status: DISCONTINUED | OUTPATIENT
Start: 2024-10-29 | End: 2024-10-29

## 2024-10-29 RX ORDER — ENOXAPARIN SODIUM 100 MG/ML
40 INJECTION SUBCUTANEOUS
Status: COMPLETED | OUTPATIENT
Start: 2024-10-29 | End: 2024-10-29

## 2024-10-29 RX ORDER — PROCHLORPERAZINE MALEATE 5 MG/1
10 TABLET ORAL EVERY 6 HOURS PRN
Status: DISCONTINUED | OUTPATIENT
Start: 2024-10-29 | End: 2024-10-30 | Stop reason: HOSPADM

## 2024-10-29 RX ORDER — OXYCODONE HYDROCHLORIDE 10 MG/1
10 TABLET ORAL
Status: DISCONTINUED | OUTPATIENT
Start: 2024-10-29 | End: 2024-10-29

## 2024-10-29 RX ORDER — ACETAMINOPHEN 325 MG/1
975 TABLET ORAL ONCE
Status: COMPLETED | OUTPATIENT
Start: 2024-10-29 | End: 2024-10-29

## 2024-10-29 RX ORDER — FENTANYL CITRATE 50 UG/ML
25 INJECTION, SOLUTION INTRAMUSCULAR; INTRAVENOUS EVERY 5 MIN PRN
Status: DISCONTINUED | OUTPATIENT
Start: 2024-10-29 | End: 2024-10-29

## 2024-10-29 RX ORDER — DEXAMETHASONE SODIUM PHOSPHATE 4 MG/ML
INJECTION, SOLUTION INTRA-ARTICULAR; INTRALESIONAL; INTRAMUSCULAR; INTRAVENOUS; SOFT TISSUE PRN
Status: DISCONTINUED | OUTPATIENT
Start: 2024-10-29 | End: 2024-10-29

## 2024-10-29 RX ORDER — KETOROLAC TROMETHAMINE 30 MG/ML
15 INJECTION, SOLUTION INTRAMUSCULAR; INTRAVENOUS EVERY 6 HOURS PRN
Status: DISCONTINUED | OUTPATIENT
Start: 2024-10-29 | End: 2024-10-30 | Stop reason: HOSPADM

## 2024-10-29 RX ORDER — ONDANSETRON 2 MG/ML
INJECTION INTRAMUSCULAR; INTRAVENOUS PRN
Status: DISCONTINUED | OUTPATIENT
Start: 2024-10-29 | End: 2024-10-29

## 2024-10-29 RX ORDER — VERAPAMIL HYDROCHLORIDE 80 MG/1
80 TABLET ORAL DAILY
Status: DISCONTINUED | OUTPATIENT
Start: 2024-10-29 | End: 2024-10-30 | Stop reason: HOSPADM

## 2024-10-29 RX ORDER — LIDOCAINE 40 MG/G
CREAM TOPICAL
Status: DISCONTINUED | OUTPATIENT
Start: 2024-10-29 | End: 2024-10-29 | Stop reason: HOSPADM

## 2024-10-29 RX ORDER — PROPOFOL 10 MG/ML
INJECTION, EMULSION INTRAVENOUS PRN
Status: DISCONTINUED | OUTPATIENT
Start: 2024-10-29 | End: 2024-10-29

## 2024-10-29 RX ORDER — HYDROMORPHONE HCL IN WATER/PF 6 MG/30 ML
0.2 PATIENT CONTROLLED ANALGESIA SYRINGE INTRAVENOUS
Status: DISCONTINUED | OUTPATIENT
Start: 2024-10-29 | End: 2024-10-30 | Stop reason: HOSPADM

## 2024-10-29 RX ORDER — ROSUVASTATIN CALCIUM 20 MG/1
20 TABLET, COATED ORAL EVERY MORNING
Status: DISCONTINUED | OUTPATIENT
Start: 2024-10-30 | End: 2024-10-30 | Stop reason: HOSPADM

## 2024-10-29 RX ORDER — SCOLOPAMINE TRANSDERMAL SYSTEM 1 MG/1
1 PATCH, EXTENDED RELEASE TRANSDERMAL
Status: DISCONTINUED | OUTPATIENT
Start: 2024-10-29 | End: 2024-10-30 | Stop reason: HOSPADM

## 2024-10-29 RX ORDER — HYDROMORPHONE HCL IN WATER/PF 6 MG/30 ML
0.4 PATIENT CONTROLLED ANALGESIA SYRINGE INTRAVENOUS EVERY 5 MIN PRN
Status: DISCONTINUED | OUTPATIENT
Start: 2024-10-29 | End: 2024-10-29

## 2024-10-29 RX ORDER — NALOXONE HYDROCHLORIDE 0.4 MG/ML
0.4 INJECTION, SOLUTION INTRAMUSCULAR; INTRAVENOUS; SUBCUTANEOUS
Status: DISCONTINUED | OUTPATIENT
Start: 2024-10-29 | End: 2024-10-30 | Stop reason: HOSPADM

## 2024-10-29 RX ORDER — TOPIRAMATE 25 MG/1
25 TABLET, FILM COATED ORAL EVERY MORNING
Status: DISCONTINUED | OUTPATIENT
Start: 2024-10-30 | End: 2024-10-30 | Stop reason: HOSPADM

## 2024-10-29 RX ORDER — ONDANSETRON 4 MG/1
4 TABLET, ORALLY DISINTEGRATING ORAL EVERY 6 HOURS PRN
Status: DISCONTINUED | OUTPATIENT
Start: 2024-10-29 | End: 2024-10-30 | Stop reason: HOSPADM

## 2024-10-29 RX ORDER — AMOXICILLIN 250 MG
2 CAPSULE ORAL 2 TIMES DAILY
Status: DISCONTINUED | OUTPATIENT
Start: 2024-10-29 | End: 2024-10-30 | Stop reason: HOSPADM

## 2024-10-29 RX ORDER — CEFAZOLIN SODIUM/WATER 3 G/30 ML
3 SYRINGE (ML) INTRAVENOUS SEE ADMIN INSTRUCTIONS
Status: DISCONTINUED | OUTPATIENT
Start: 2024-10-29 | End: 2024-10-29 | Stop reason: HOSPADM

## 2024-10-29 RX ORDER — ENOXAPARIN SODIUM 100 MG/ML
40 INJECTION SUBCUTANEOUS EVERY 12 HOURS
Status: DISCONTINUED | OUTPATIENT
Start: 2024-10-30 | End: 2024-10-30 | Stop reason: HOSPADM

## 2024-10-29 RX ADMIN — Medication 1 LOZENGE: at 14:02

## 2024-10-29 RX ADMIN — FENTANYL CITRATE 25 MCG: 50 INJECTION, SOLUTION INTRAMUSCULAR; INTRAVENOUS at 11:08

## 2024-10-29 RX ADMIN — PHENYLEPHRINE HYDROCHLORIDE 200 MCG: 10 INJECTION INTRAVENOUS at 10:11

## 2024-10-29 RX ADMIN — HYDROMORPHONE HYDROCHLORIDE 0.2 MG: 0.2 INJECTION, SOLUTION INTRAMUSCULAR; INTRAVENOUS; SUBCUTANEOUS at 14:10

## 2024-10-29 RX ADMIN — MIDAZOLAM 2 MG: 1 INJECTION INTRAMUSCULAR; INTRAVENOUS at 09:46

## 2024-10-29 RX ADMIN — SODIUM CHLORIDE, POTASSIUM CHLORIDE, SODIUM LACTATE AND CALCIUM CHLORIDE: 600; 310; 30; 20 INJECTION, SOLUTION INTRAVENOUS at 09:46

## 2024-10-29 RX ADMIN — VERAPAMIL HYDROCHLORIDE 80 MG: 80 TABLET ORAL at 21:36

## 2024-10-29 RX ADMIN — Medication 3 G: at 09:55

## 2024-10-29 RX ADMIN — SODIUM CHLORIDE, POTASSIUM CHLORIDE, SODIUM LACTATE AND CALCIUM CHLORIDE: 600; 310; 30; 20 INJECTION, SOLUTION INTRAVENOUS at 13:45

## 2024-10-29 RX ADMIN — LIDOCAINE HYDROCHLORIDE 100 MG: 20 INJECTION, SOLUTION INFILTRATION; PERINEURAL at 09:54

## 2024-10-29 RX ADMIN — HYDROMORPHONE HYDROCHLORIDE 0.2 MG: 0.2 INJECTION, SOLUTION INTRAMUSCULAR; INTRAVENOUS; SUBCUTANEOUS at 14:43

## 2024-10-29 RX ADMIN — PHENYLEPHRINE HYDROCHLORIDE 200 MCG: 10 INJECTION INTRAVENOUS at 10:13

## 2024-10-29 RX ADMIN — PROPOFOL 200 MG: 10 INJECTION, EMULSION INTRAVENOUS at 09:54

## 2024-10-29 RX ADMIN — OXYCODONE HYDROCHLORIDE 10 MG: 10 TABLET ORAL at 20:34

## 2024-10-29 RX ADMIN — SCOPOLAMINE 1 PATCH: 1.5 PATCH, EXTENDED RELEASE TRANSDERMAL at 21:36

## 2024-10-29 RX ADMIN — APREPITANT 40 MG: 40 CAPSULE ORAL at 08:27

## 2024-10-29 RX ADMIN — HYDROMORPHONE HYDROCHLORIDE 0.2 MG: 0.2 INJECTION, SOLUTION INTRAMUSCULAR; INTRAVENOUS; SUBCUTANEOUS at 14:29

## 2024-10-29 RX ADMIN — FENTANYL CITRATE 25 MCG: 50 INJECTION, SOLUTION INTRAMUSCULAR; INTRAVENOUS at 11:25

## 2024-10-29 RX ADMIN — Medication 300 MG: at 10:32

## 2024-10-29 RX ADMIN — FENTANYL CITRATE 100 MCG: 50 INJECTION INTRAMUSCULAR; INTRAVENOUS at 09:54

## 2024-10-29 RX ADMIN — HYDROMORPHONE HYDROCHLORIDE 0.5 MG: 1 INJECTION, SOLUTION INTRAMUSCULAR; INTRAVENOUS; SUBCUTANEOUS at 10:23

## 2024-10-29 RX ADMIN — PHENYLEPHRINE HYDROCHLORIDE 30 MCG: 10 INJECTION INTRAVENOUS at 10:00

## 2024-10-29 RX ADMIN — ONDANSETRON 4 MG: 2 INJECTION INTRAMUSCULAR; INTRAVENOUS at 10:30

## 2024-10-29 RX ADMIN — PROPOFOL 50 MG: 10 INJECTION, EMULSION INTRAVENOUS at 10:00

## 2024-10-29 RX ADMIN — FENTANYL CITRATE 25 MCG: 50 INJECTION, SOLUTION INTRAMUSCULAR; INTRAVENOUS at 12:22

## 2024-10-29 RX ADMIN — Medication 1 LOZENGE: at 16:07

## 2024-10-29 RX ADMIN — Medication 50 MG: at 09:55

## 2024-10-29 RX ADMIN — OXYCODONE HYDROCHLORIDE 5 MG: 5 TABLET ORAL at 16:17

## 2024-10-29 RX ADMIN — ENOXAPARIN SODIUM 40 MG: 40 INJECTION SUBCUTANEOUS at 08:28

## 2024-10-29 RX ADMIN — PROPOFOL 30 MCG/KG/MIN: 10 INJECTION, EMULSION INTRAVENOUS at 10:00

## 2024-10-29 RX ADMIN — DEXAMETHASONE SODIUM PHOSPHATE 6 MG: 4 INJECTION, SOLUTION INTRA-ARTICULAR; INTRALESIONAL; INTRAMUSCULAR; INTRAVENOUS; SOFT TISSUE at 10:05

## 2024-10-29 RX ADMIN — HYDROMORPHONE HYDROCHLORIDE 0.2 MG: 0.2 INJECTION, SOLUTION INTRAMUSCULAR; INTRAVENOUS; SUBCUTANEOUS at 13:39

## 2024-10-29 RX ADMIN — SENNOSIDES AND DOCUSATE SODIUM 2 TABLET: 8.6; 5 TABLET ORAL at 21:36

## 2024-10-29 RX ADMIN — ONDANSETRON 4 MG: 2 INJECTION INTRAMUSCULAR; INTRAVENOUS at 14:28

## 2024-10-29 RX ADMIN — PHENYLEPHRINE HYDROCHLORIDE 100 MCG: 10 INJECTION INTRAVENOUS at 10:24

## 2024-10-29 RX ADMIN — FAMOTIDINE 20 MG: 10 INJECTION, SOLUTION INTRAVENOUS at 09:42

## 2024-10-29 RX ADMIN — ACETAMINOPHEN 975 MG: 325 TABLET, FILM COATED ORAL at 08:28

## 2024-10-29 ASSESSMENT — ACTIVITIES OF DAILY LIVING (ADL)
ADLS_ACUITY_SCORE: 0

## 2024-10-29 ASSESSMENT — LIFESTYLE VARIABLES: TOBACCO_USE: 0

## 2024-10-29 ASSESSMENT — ENCOUNTER SYMPTOMS: SEIZURES: 0

## 2024-10-29 ASSESSMENT — COPD QUESTIONNAIRES: COPD: 0

## 2024-10-29 NOTE — BRIEF OP NOTE
[facility]    Brief Operative Note    Pre-operative diagnosis: Morbid obesity (H) [E66.01]  Post-operative diagnosis Same as pre-operative diagnosis    Procedure: GASTRECTOMY, SLEEVE, LAPAROSCOPIC, N/A - Abdomen    Surgeon: Surgeons and Role:     * Robbie Alcaraz MD - Primary  Anesthesia: General   Estimated Blood Loss: 10cc    Drains: None  Specimens:   ID Type Source Tests Collected by Time Destination   1 : Partial gastrectomy Tissue Stomach, Greater Curvature SURGICAL PATHOLOGY EXAM Robbie Alcaraz MD 10/29/2024 10:22 AM      Findings:   None.  Complications: None.  Implants: * No implants in log *      Jeannie Noe CNP  [facility] [place]

## 2024-10-29 NOTE — ANESTHESIA CARE TRANSFER NOTE
Patient: Yancy Hoover    Procedure: Procedure(s):  GASTRECTOMY, SLEEVE, LAPAROSCOPIC       Diagnosis: Morbid obesity (H) [E66.01]  Diagnosis Additional Information: No value filed.    Anesthesia Type:   General     Note:    Oropharynx: oropharynx clear of all foreign objects and spontaneously breathing  Level of Consciousness: awake  Oxygen Supplementation: nasal cannula    Independent Airway: airway patency satisfactory and stable  Dentition: dentition unchanged  Vital Signs Stable: post-procedure vital signs reviewed and stable  Report to RN Given: handoff report given  Patient transferred to: PACU    Handoff Report: Identifed the Patient, Identified the Reponsible Provider, Reviewed the pertinent medical history, Discussed the surgical course, Reviewed Intra-OP anesthesia mangement and issues during anesthesia, Set expectations for post-procedure period and Allowed opportunity for questions and acknowledgement of understanding      Vitals:  Vitals Value Taken Time   /75 10/29/24 1045   Temp     Pulse 89 10/29/24 1047   Resp 17 10/29/24 1047   SpO2 98 % 10/29/24 1047   Vitals shown include unfiled device data.    Electronically Signed By: LYDIA Knox CRNA  October 29, 2024  10:48 AM

## 2024-10-29 NOTE — ANESTHESIA POSTPROCEDURE EVALUATION
Patient: Yancy Hoover    Procedure: Procedure(s):  GASTRECTOMY, SLEEVE, LAPAROSCOPIC       Anesthesia Type:  General    Note:  Disposition: Outpatient   Postop Pain Control: Uneventful            Sign Out: Well controlled pain   PONV: No   Neuro/Psych: Uneventful            Sign Out: Acceptable/Baseline neuro status   Airway/Respiratory: Uneventful            Sign Out: Acceptable/Baseline resp. status   CV/Hemodynamics: Uneventful            Sign Out: Acceptable CV status; No obvious hypovolemia; No obvious fluid overload   Other NRE: NONE   DID A NON-ROUTINE EVENT OCCUR? No           Last vitals:  Vitals Value Taken Time   /73 10/29/24 1300   Temp 36.8  C (98.2  F) 10/29/24 1045   Pulse 91 10/29/24 1311   Resp 13 10/29/24 1311   SpO2 97 % 10/29/24 1311   Vitals shown include unfiled device data.    Electronically Signed By: Anatoly Barragan MD  October 29, 2024  1:11 PM

## 2024-10-29 NOTE — ANESTHESIA PROCEDURE NOTES
Airway       Patient location during procedure: OR       Procedure Start/Stop Times: 10/29/2024 9:59 AM  Staff -        Resident/Fellow: Izabela Dinh       Performed By: SRNA  Consent for Airway        Urgency: elective  Indications and Patient Condition       Indications for airway management: dereck-procedural       Induction type:inhalational       Mask difficulty assessment: 2 - vent by mask + OA or adjuvant +/- NMBA    Final Airway Details       Final airway type: endotracheal airway       Successful airway: ETT - single  Endotracheal Airway Details        ETT size (mm): 7.0       Cuffed: yes       Cuff volume (mL): 8       Successful intubation technique: video laryngoscopy       VL Blade Size: MAC 3       Grade View of Cords: 1       Adjucts: stylet       Position: Right       Measured from: lips       Secured at (cm): 22    Post intubation assessment        Placement verified by: capnometry, equal breath sounds and chest rise        Number of attempts at approach: 2       Ease of procedure: easy       Dentition: Intact and Unchanged    Medication(s) Administered   Medication Administration Time: 10/29/2024 9:59 AM    Additional Comments       1st attempt with DL w/ wolf 2. Stiff neck and unable to get cords into view.  2nd attempt with Glidescope.

## 2024-10-29 NOTE — OP NOTE
[facility]    Operative Note    Pre-operative diagnosis: Morbid obesity (H) [E66.01]   Post-operative diagnosis * No post-op diagnosis entered *   Procedure: Procedure(s):  GASTRECTOMY, SLEEVE, LAPAROSCOPIC  possible HERNIORRHAPHY, HIATAL, LAPAROSCOPIC   Surgeon: Surgeons and Role:     * Robbie Alcaraz MD - Primary   Assistant Surgeon                        Anesthesia: The assistance of Jeannie Noe NP was required in this case due to advanced laparoscopy technique; she assisted by performing port assistance and providing exposure to patient bedside while I was dissecting.    I attest that no qualified resident or fellow was available to assist for this surgery due adequate training and skills to assist with this technically challenging case and instrumentation; as a consequence, I attest that Jeannie performed these needed skills.    General    Estimated blood loss: 10cc   Drains: None   Specimens: ID Type Source Tests Collected by Time Destination   1 : Partial gastrectomy Tissue Stomach, Greater Curvature SURGICAL PATHOLOGY EXAM Robbie Alcaraz MD 10/29/2024 10:22 AM       Findings: Normal liver; no hiatal hernia repair visible to repair.   Complications: None   Implants: None         BOUGIE SIZE: 40 FR  DISTANCE FROM PYLORUS: 10 CM  STAPLE LINE REINFORCEMENT: NO  STAPLE LINE OVERSEW: NO  COMORBIDITIES:   Past Medical History:   Diagnosis Date    Cerebral infarction (H) 7/2023    TIA    DM (diabetes mellitus), type 2 (H)     History of diabetes mellitus 2020    Migraine     Obesity     LANDON (obstructive sleep apnea) 07/2020       INDICATIONS FOR PROCEDURE  Yancy Hoover is a 54 year old female who is morbidly obese.  Numerous weight loss attempts without surgery have been without success.     After understanding the risks and benefits of proceeding with a laparoscopic vertical sleeve gastrectomy, she agreed to an operation as outlined by  "me.    I reviewed the risks of surgery with Yancy Hoover.    These include, but are not limited to, death, myocardial infarction, pneumonia, urinary tract infection, deep venous thrombosis with or without pulmonary embolus, abdominal infection from bowel injury or abscess, bowel obstruction, wound infection, and bleeding; furthermore, there is risk that the intended approach of the surgery (for example, laparoscopic) would need to be changed to open (laparotomy) if laparoscopy does not continue to be safe  Finally, there is also potential that the intended procedure will NOT provide benefit.    More specific risks related to vertical sleeve gastrectomy were detailed at the bariatric informational seminar and include the followin.) leak at the vertical sleeve staple line, 2.) stricture in the sleeve, 3.) nausea, vomiting, and dehydration for several months, 4.) adhesions causing bowel obstruction, 5.) rapid weight loss causing a higher rate of gallstone formation during the first 6 months after surgery, 6.) decreased absorption of vitamins because of the reduced stomach size, 7.) weight regain if inappropriate food intake occurs.    The BMI that we are treating this patient for was measured at the initial consultation visit in our bariatric program and it was: 49.7 kg/m2 (as calculated just below).      The initial consult height, weight, and BMI are as follows:    Height: 5' 4.5\"    Our weight loss surgery program requires weight loss prior to bariatric surgery and currently the height, weight, and BMI are as follows:    Height: 163.8 cm (5' 4.5\"), Weight: 128 kg (282 lb 3 oz), and currently the Body mass index is 47.69 kg/m .    Due to the patient's comorbidity conditions of T2DM, HTN, LANDON, and HLD, in association with elevated body mass index, bariatric surgery has been recommended and is being performed today.    Moreover, as the surgeon performing this procedure, I certify that the following are true in " regards to this patient at or prior to the day of surgery:    1. The patient's body mass index (BMI) is or has been greater than or equal to 35 kg/m2.  2. The patient has at least one co-morbidity related to obesity (as outlined above).  3. The patient has been previously unsuccessful with medical treatment for obesity.  Please note that some of this information has been documented as part of a comprehensive pre-bariatric surgery process in the outpatient clinic and is NOT immediately available in the inpatient encounter for this bariatric operation.      OPERATIVE PROCEDURE:     Yancy Hoover was brought to the operating room and prepared in routine fashion. Under the benefits of general anesthesia, a left upper quadrant Veress needle was inserted and pneumoperitoneum was established using carbon dioxide gas to a maximum pressure of 15 mmHg. A total of five ports were placed into the abdomen.     A liver retractor was placed through the rightmost port and this provided a view of the upper stomach. The operation was started by dividing the short gastric vessels off the greater curvature of the stomach. This dissection was carried up to the angle of His, and ligasure dissector was used for hemostasis.     A bougie (size noted above) was passed into the stomach and I used 2 blue loads and 2 white loads of the [employer] flex linear cutter stapler device to create a vertical sleeve gastrectomy with the bougie as a template. The bougie was removed.    A transabdominal properitoneal anesthetic block was performed using 30 ml 0.5% bupivicaine diluted with 90 ml saline (120 mL total); this was injected using 22gauge spinal needle into the properitoneal planes around the ports and laterally along the anterior axillary line under direct optical guidance. Some of the mixture was used to inject local anesthetic at the skin of each incision as well.    The sleeve gastrectomy specimen (partial gastrectomy) was now  removed from the abdomen through the 15 mm port.    Hemostasis was secured, and the liver retractor was removed.    All ports were then removed from the abdomen.    All needle and sponge counts were correct x2 at the end of the operation, and I was present for all critical components of the procedure.     Skin incisions were closed using skin staples, and sterile dressings were placed.     Robbie Alcaraz MD  Surgery  [phone] (hospital )  [phone] (clinic nurses)

## 2024-10-29 NOTE — OR NURSING
1142: messages ELIZABETH Noe for post-op orders  1149: Paged Dr. Alcaraz for post op orders  1320: orders entered.

## 2024-10-29 NOTE — ANESTHESIA PREPROCEDURE EVALUATION
Anesthesia Pre-Procedure Evaluation    Patient: Yancy Hoover   MRN: [id number] : 1970        Procedure : Procedure(s):  GASTRECTOMY, SLEEVE, LAPAROSCOPIC  possible HERNIORRHAPHY, HIATAL, LAPAROSCOPIC          Past Medical History:   Diagnosis Date    Cerebral infarction (H) 2023    TIA    DM (diabetes mellitus), type 2 (H)     History of diabetes mellitus     Migraine     Obesity     LANDON (obstructive sleep apnea) 2020      Past Surgical History:   Procedure Laterality Date    COLONOSCOPY  2023    ESOPHAGOSCOPY, GASTROSCOPY, DUODENOSCOPY (EGD), COMBINED N/A 2023    Procedure: ESOPHAGOGASTRODUODENOSCOPY, WITH BIOPSY;  Surgeon: Jassi Thomason MD;  Location:  GI    GYN SURGERY      c  section     LAPAROSCOPIC CYSTECTOMY OVARIAN (BENIGN) Left 2020    Procedure: LAPAROSCOPIC ovarian cystectomy,;  Surgeon: Sofi Cuevas MD;  Location:  OR      Allergies   Allergen Reactions    Codeine Nausea, GI Disturbance and Nausea and Vomiting    Gadolinium Derivatives Nausea and Vomiting     Caused excessive vomiting, administered 4mg zofran      Social History     Tobacco Use    Smoking status: Former     Current packs/day: 0.00     Average packs/day: 0.5 packs/day for 20.0 years (10.0 ttl pk-yrs)     Types: Cigarettes     Quit date: 2023     Years since quittin.3    Smokeless tobacco: Never    Tobacco comments:     Quit as of 25   Substance Use Topics    Alcohol use: Not Currently      Wt Readings from Last 1 Encounters:   10/29/24 128 kg (282 lb 3 oz)        Anesthesia Evaluation            ROS/MED HX  ENT/Pulmonary:     (+) sleep apnea, uses CPAP,                                   (-) tobacco use, asthma and COPD   Neurologic:     (+)          CVA,                   (-) no seizures   Cardiovascular:     (+) Dyslipidemia hypertension- -   -  - -                                      METS/Exercise Tolerance:     Hematologic:    (-) history of blood clots  "and anemia   Musculoskeletal:       GI/Hepatic:    (-) liver disease   Renal/Genitourinary:  - neg Renal ROS     Endo:     (+)  type II DM,   Not using insulin,          Obesity,       Psychiatric/Substance Use:    (-) alcohol abuse history and chronic opioid use history   Infectious Disease:       Malignancy:    (-) malignancy   Other:            Physical Exam    Airway        Mallampati: III   TM distance: > 3 FB   Neck ROM: full     Respiratory Devices and Support         Dental       (+) Minor Abnormalities - some fillings, tiny chips      Cardiovascular          Rhythm and rate: regular and normal     Pulmonary           breath sounds clear to auscultation           OUTSIDE LABS:  CBC:   Lab Results   Component Value Date    WBC 7.2 10/09/2024    WBC 6.6 07/03/2024    HGB 12.6 10/09/2024    HGB 13.4 07/03/2024    HCT 40.0 10/09/2024    HCT 41.2 07/03/2024     10/09/2024     07/03/2024     BMP:   Lab Results   Component Value Date     10/09/2024     07/03/2024    POTASSIUM 4.1 10/09/2024    POTASSIUM 4.0 07/03/2024    CHLORIDE 101 10/09/2024    CHLORIDE 102 07/03/2024    CO2 24 10/09/2024    CO2 24 07/03/2024    BUN 9.6 10/09/2024    BUN 11.3 07/03/2024    CR 0.76 10/09/2024    CR 0.89 07/03/2024    GLC 89 10/29/2024     (H) 10/09/2024     COAGS: No results found for: \"PTT\", \"INR\", \"FIBR\"  POC:   Lab Results   Component Value Date     (H) 07/30/2020    HCG Negative 06/07/2020    HCGS Negative 10/18/2020     HEPATIC:   Lab Results   Component Value Date    ALBUMIN 4.2 10/09/2024    PROTTOTAL 7.9 10/09/2024    ALT 22 10/09/2024    AST 30 10/09/2024    ALKPHOS 67 10/09/2024    BILITOTAL 0.2 10/09/2024     OTHER:   Lab Results   Component Value Date    A1C 6.6 (H) 10/09/2024    RUSSELL 9.6 10/09/2024    LIPASE 138 11/09/2022    TSH 1.21 10/09/2024    CRP 13.0 (H) 10/18/2020    SED 30 07/03/2024       Anesthesia Plan    ASA Status:  3    - Procedure: Procedure only, no anesthetic " "delivered   NPO Status:  NPO Appropriate    Anesthesia Type: General.     - Airway: ETT   Induction: Intravenous, Propofol.   Maintenance: Balanced.        Consents    Anesthesia Plan(s) and associated risks, benefits, and realistic alternatives discussed. Questions answered and patient/representative(s) expressed understanding.     - Discussed: Risks, Benefits and Alternatives for BOTH SEDATION and the PROCEDURE were discussed     - Discussed with:  Patient      - Extended Intubation/Ventilatory Support Discussed: No.      - Patient is DNR/DNI Status: No     Use of blood products discussed: Yes.     - Discussed with: Patient.     Postoperative Care    Pain management: Oral pain medications, IV analgesics.   PONV prophylaxis: Ondansetron (or other 5HT-3), Dexamethasone or Solumedrol     Comments:               Anatoly Barragan MD    I have reviewed the pertinent notes and labs in the chart from the past 30 days and (re)examined the patient.  Any updates or changes from those notes are reflected in this note.             # Drug Induced Platelet Defect: home medication list includes an antiplatelet medication          # DMII: A1C = 6.6 % (Ref range: <5.7 %) within past 6 months    # Severe Obesity: Estimated body mass index is 47.69 kg/m  as calculated from the following:    Height as of this encounter: 1.638 m (5' 4.5\").    Weight as of this encounter: 128 kg (282 lb 3 oz).             "

## 2024-10-29 NOTE — PLAN OF CARE
"Goal Outcome Evaluation:      Plan of Care Reviewed With: patient    Overall Patient Progress: no changeOverall Patient Progress: no change     /66   Pulse 95   Temp 98.8  F (37.1  C) (Oral)   Resp 16   Ht 1.638 m (5' 4.5\")   Wt 128 kg (282 lb 3 oz)   LMP 07/10/2024 (Approximate)   SpO2 96%   BMI 47.69 kg/m      Admitted/transferred from: PACU at 1800.   2 RN full   skin assessment completed by Sanjuana Reed RN and Dana TELLO RN.  Skin assessment finding: issues found 5 Lap sites, dressing is CDI.     Interventions/actions: skin interventions monitored.      Bedside Emergency Equipment Present:  Suction Regulator: Yes  Suction Canister: Yes  Tubing between Regulator and Canister: Yes  O2 Regulator with Tree: Yes  Ambu Bag: Yes    States pain is mild for now.      "

## 2024-10-30 VITALS
RESPIRATION RATE: 18 BRPM | SYSTOLIC BLOOD PRESSURE: 124 MMHG | HEIGHT: 65 IN | TEMPERATURE: 98.7 F | OXYGEN SATURATION: 100 % | WEIGHT: 282.19 LBS | DIASTOLIC BLOOD PRESSURE: 76 MMHG | HEART RATE: 86 BPM | BODY MASS INDEX: 47.02 KG/M2

## 2024-10-30 LAB
GLUCOSE BLDC GLUCOMTR-MCNC: 127 MG/DL (ref 70–99)
HOLD SPECIMEN: NORMAL
PLATELET # BLD AUTO: 265 10E3/UL (ref 150–450)

## 2024-10-30 PROCEDURE — 36415 COLL VENOUS BLD VENIPUNCTURE: CPT | Performed by: SURGERY

## 2024-10-30 PROCEDURE — 250N000013 HC RX MED GY IP 250 OP 250 PS 637: Performed by: NURSE PRACTITIONER

## 2024-10-30 PROCEDURE — 85049 AUTOMATED PLATELET COUNT: CPT | Performed by: SURGERY

## 2024-10-30 PROCEDURE — 250N000011 HC RX IP 250 OP 636: Performed by: NURSE PRACTITIONER

## 2024-10-30 RX ORDER — SIMETHICONE 80 MG
160 TABLET,CHEWABLE ORAL EVERY 6 HOURS PRN
Status: DISCONTINUED | OUTPATIENT
Start: 2024-10-30 | End: 2024-10-30 | Stop reason: HOSPADM

## 2024-10-30 RX ORDER — PEDI MULTIVIT NO.25/FOLIC ACID 300 MCG
1 TABLET,CHEWABLE ORAL EVERY MORNING
COMMUNITY
Start: 2024-10-30

## 2024-10-30 RX ORDER — ACETAMINOPHEN 325 MG/1
650 TABLET ORAL EVERY 4 HOURS PRN
COMMUNITY
Start: 2024-10-30

## 2024-10-30 RX ORDER — METHOCARBAMOL 500 MG/1
500 TABLET, FILM COATED ORAL 4 TIMES DAILY
Status: DISCONTINUED | OUTPATIENT
Start: 2024-10-30 | End: 2024-10-30 | Stop reason: HOSPADM

## 2024-10-30 RX ORDER — SIMETHICONE 80 MG
160 TABLET,CHEWABLE ORAL EVERY 6 HOURS PRN
Qty: 30 TABLET | Refills: 0 | Status: SHIPPED | OUTPATIENT
Start: 2024-10-30

## 2024-10-30 RX ORDER — SCOLOPAMINE TRANSDERMAL SYSTEM 1 MG/1
1 PATCH, EXTENDED RELEASE TRANSDERMAL
COMMUNITY
Start: 2024-11-01

## 2024-10-30 RX ORDER — CHOLECALCIFEROL (VITAMIN D3) 50 MCG
1 TABLET ORAL EVERY MORNING
COMMUNITY
Start: 2024-10-30

## 2024-10-30 RX ORDER — OXYCODONE HYDROCHLORIDE 5 MG/1
5 TABLET ORAL EVERY 6 HOURS PRN
Qty: 12 TABLET | Refills: 0 | Status: SHIPPED | OUTPATIENT
Start: 2024-10-30

## 2024-10-30 RX ADMIN — SENNOSIDES AND DOCUSATE SODIUM 2 TABLET: 8.6; 5 TABLET ORAL at 10:37

## 2024-10-30 RX ADMIN — TOPIRAMATE 25 MG: 25 TABLET, FILM COATED ORAL at 10:37

## 2024-10-30 RX ADMIN — HYOSCYAMINE SULFATE 125 MCG: 0.12 TABLET SUBLINGUAL at 06:46

## 2024-10-30 RX ADMIN — OXYCODONE HYDROCHLORIDE 10 MG: 10 TABLET ORAL at 00:41

## 2024-10-30 RX ADMIN — METHOCARBAMOL 500 MG: 500 TABLET ORAL at 10:37

## 2024-10-30 RX ADMIN — SIMETHICONE 160 MG: 80 TABLET, CHEWABLE ORAL at 10:37

## 2024-10-30 RX ADMIN — OXYCODONE HYDROCHLORIDE 5 MG: 5 TABLET ORAL at 04:26

## 2024-10-30 RX ADMIN — ENOXAPARIN SODIUM 40 MG: 40 INJECTION SUBCUTANEOUS at 08:49

## 2024-10-30 RX ADMIN — ROSUVASTATIN CALCIUM 20 MG: 20 TABLET, FILM COATED ORAL at 08:49

## 2024-10-30 RX ADMIN — OMEPRAZOLE 20 MG: 20 CAPSULE, DELAYED RELEASE ORAL at 08:49

## 2024-10-30 RX ADMIN — OXYCODONE HYDROCHLORIDE 5 MG: 5 TABLET ORAL at 08:49

## 2024-10-30 RX ADMIN — VERAPAMIL HYDROCHLORIDE 80 MG: 80 TABLET ORAL at 10:37

## 2024-10-30 ASSESSMENT — ACTIVITIES OF DAILY LIVING (ADL)
ADLS_ACUITY_SCORE: 0

## 2024-10-30 NOTE — DISCHARGE INSTRUCTIONS
After Bariatric Surgery Discharge Instructions  [facility] Weight [facility]     Note: Ask your nurse to order your medications from the pharmacy. Be sure you have your medications with you when you leave.  Diet on discharge: bariatric full liquid.    How much fluid should I drink?  Strive for 48-64 ounces daily.  Carry a water bottle with you without a straw or sports top. Drink from it often.  Keep track of how much fluid you drink in a day.  Remember:  -Do not use straws, chew gum or suck on hard candies. They may cause painful gas.    -Sip, don't slurp when you drink.    -Practice small sips using a medicine cup for the first week postop.   -No ice or cold drinks. This could cause gas or spasms.   -No coffee, soda pop or drinks with caffeine. These may cause stomach pain.   -No alcohol. It is bad for your liver and will cause stomach pain.    How often should I do my deep breathing and coughing?  Use your incentive spirometer (small plastic breathing device) every hour while awake after you get home. Using the incentive spirometer helps you deep breath. Continuing to cough and deep breath will help prevent fevers and pneumonia.   If you do not have a fever after one week, you can stop using the incentive spirometer and discard it.     You can continue to take deep breaths without the incentive spirometer every one to two hours while awake for the month after surgery.    What kinds of activity can I do?  Get plenty of rest the first few days after surgery and try to balance rest and activity. You will need some time to recover - you may be more tired than you realize at first. You'll feel better and heal faster if you take good care of yourself.  For 4 weeks after surgery (Please review restrictions at your one week visit, they could change based on how well you are doing):  -Don't lift more than 20 pounds.   -Take 4-5 short walks every day.  -Don't jog, run, or do belly exercises.  Don't  swim, bathe or use a hot tub until your cuts are healed (scabs are gone).  You may shower  Don't plan to fly or take a road trip within the first 1 to 3 months after surgery.  You could get a blood clot in your legs. If you must travel, get up and move around every hour for at least 5 minutes before continuing on your journey.  Your care team can help you decide when it's safe for you to travel.     What can I do for pain control?  You had major belly surgery that involves all layers of your belly muscles. Pain is expected, even for some as far out as 6-8 weeks after surgery. Moving, sneezing, coughing, and breathing will cause discomfort because these activities use your belly muscles.   Please see your after visit summary medication review for what pain medication will be continued, discontinued and newly started for you.    You can take opioid pain medicine if prescribed and if needed. Try to wean off from it as soon as you feel comfortable.   Do not drive while you are taking opioid pain medicine. This is dangerous.  You can take acetaminophen (Tylenol) between your prescribed pain medicine on a scheduled basis OR take it scheduled every 6 hours (check with your care team for specifics).  -Acetaminophen formulation options:    -Liquid   -Caplet (Cut caplet in half before taking)   -Do not take more than 3000 mg Tylenol in a 24 hour period.  -You may also take Tylenol for pain in place of the opioid pain medicine (check with your care team for specifics).   You can apply ice or heat to the affected area(s). Just remember to wrap the ice in something and limit icing sessions to 20 minutes. Excessive icing can irritate the skin or cause skin damage.  You can apply heat with a hot, wet towel or heating pad. Just like cold therapy, limit heat application to 20 minutes. Never sleep with a heating pad on. It could cause severe burns to your skin.  Wear your binder to support your belly muscles if you have one.  Take  this off a little more each day and try to be off completely by 2 weeks after surgery. If you don't need your binder for comfort or support, you don't need to wear it.   You may not be able to sleep in a comfortable position for a few weeks after surgery. This is normal. You may be more comfortable sleeping in a recliner or propped up with 3 pillows for the first couple of weeks after surgery.  Do not take NSAID's (Non-Steroidal Anti-Inflammatory Drugs) (examples: ibuprofen, Motrin, Advil, Aleve, and Naproxen), aspirin, or use pain patches with NSAID's. They will increase your risk of bleeding or getting an ulcer.    Please call the clinic for any of the following pain concerns, we would like to talk to you:  -pain that does not improve with rest  -pain that gets worse and worse  -pain that is not controlled by your pain medicine  -a sudden severe increase in pain    What medications will I need to take after surgery?  You may be discharged with the following types of medications: omeprazole 20 mg once daily for heartburn symptom prevention, zofran as needed for nausea and a medication called hyoscyamine (levsin) as needed for cramping.Please see your after visit summary medication review for what will be continued, discontinued and newly started.  It is important to reduce the amount of acid in your new stomach for a couple of months after surgery while it is still healing. We will prescribe an acid reducer or antacid. Take it as directed. This will help prevent ulcers, heartburn and acid reflux.  If you took an acid reducer before you had surgery, your care team will let you know what acid reducer you will take after surgery.   It is okay to swallow any medications smaller than   inch in size.   -If it is larger than   inch, it may need to be cut, crushed, or in a liquid form. Check with your care team about which way is most appropriate for you and your medications.    What should I know about my incisions  (cuts)?  Your incisions are covered with white steri strips or butterfly tape and have band aids or gauze over the top. If you have gauze or band aids, they can come off in the hospital.  Leave your steri strips on until they fall off on their own. If the steri strips don't fall off after 1 to 2 weeks, you can take them off. If they fall off earlier, replace them with clean band aids trying to avoid touching the incision itself.   If you have gauze covered by a clear dressing, remove 2-3 days after surgery or as directed by your care team.  You may shower in the hospital after surgery and can get your incision coverings wet.  Do not submerge in water (e.g. No baths, swimming pools, hot tubs) until your care team tells you it is okay and your incisions are completely healed.    Call the clinic if you have any of these signs or symptoms of infection:  -Redness around the site.  -Drainage that smells bad.  -Drainage that is thick yellow or green.  -An increase in pain around the incision site  -An increase in swelling around the incision site  -Heat or warmth around incision site   -Fever of 101.5  F (38.3  C) or higher when taken under the tongue.    -Chills    Will my urine or bowel movements change?   Your first bowel movements (stools) will likely be liquid. You may also notice old blood or a darker color (black or maroon color) in your bowel movements.  This is not unusual and usually goes away after the first week, if not sooner. You may not have a bowel movement for a week.   If you have not had a bowel movement for at least three days after your surgery date and are passing gas, you can use over the counter stool softeners.  Please stop taking the stool softeners and laxatives if your stools are loose.  Increasing fluids and activity as well as getting off narcotics will help prevent constipation.    Call the clinic if:   -You have stomach pain.   -You continue to have constipation.  -You have excessive  "bloating after walking and passing gas.    How can I prevent dehydration if I feel nauseated (sick to my stomach) and vomit (throw up)?   Vomiting is not normal after surgery. If you continue to have nausea and vomiting, call the clinic.   Nausea can be a sign of dehydration. That is why it is very important to track your fluids.  Do not nap more than one hour during the day. Set a timer to wake yourself up, if needed. Too much sleep will keep you from drinking enough fluid during the day and lead to dehydration.  No outside activity in hot, humid weather until you can drink 48 to 64 ounces of fluid in 24 hours. If you sweat a lot, your body may lose too much water.  Try to take a 1 ounce sip of water (one medicine cup) every 15 minutes.  Set a timer to remind yourself.    Call the clinic if you have any of these signs or symptoms of dehydration:  -Dark colored urine  -Urinating (pass water) less than 2-3 times per day  -Lack of energy  -Nausea  -Dizziness  -Headache    Call the clinic ANY TIME at [phone] if:  -Your pain medicine is not working.  -You have a fever >= 101.5 F.  -You have belly or left shoulder pain that gets worse and worse.  -You have a swollen leg with redness, warmth, or pain behind the knee or calf.  -You have chest pain   -You feel very short of breath.  -You have a sudden severe increase in heart rate.  -You have vomiting that gets worse and worse.  -You have constant nausea (feeling sick to your stomach) that does not go away with medication.  -You have trouble swallowing.  -You have an increasing feeling that \"something is not right\".  -You have hiccups that do not stop.  -You have any questions or concerns.    AFTER HOURS QUESTIONS OR CONCERNS: Call [phone] and ask to speak with surgery resident if you are having troubles in the evenings, at night, or on weekends. Please call if you experience increasing abdominal pain, nausea, vomiting, increasing drainage from your wounds, " chills, or fever >101.5    If you have to go to the Emergency Room, we prefer you go to the hospital that did your surgery. Please let them know that you had bariatric surgery and to notify your surgeon.    When should I go back to the clinic?  Follow up with your care team in 1-2 weeks.   If this appointment was not already made, please call: [phone]    Appointments located at [facility] clinic:  [facility] ([facility])    [address]  [place] [address]

## 2024-10-30 NOTE — PLAN OF CARE
Goal Outcome Evaluation:      Plan of Care Reviewed With: patient    Overall Patient Progress: no changeOverall Patient Progress: no change     Temp: 98.6  F (37  C) Temp src: Oral BP: 128/78 Pulse: 86   Resp: 16 SpO2: 98 % O2 Device: BiPAP/CPAP     Neuro: Alert and Oriented  x4, let needs known  Cardiac: WNL; denies cardiac chest pain  Respiratory:LS clear and dim on room air during day; using home CPAP to sleep overnight  GI/: AUOP; No BM since 10-28; BS audible in all quadrants; patient passing gas overnight  Diet/Appetite: CLD; patient has had about 120 ml of water; denies nausea  Skin:5 lap sites CDI  LDA: R PIV SL  Activity: Up SBA  Pain: gave 10 mg ox x1; then 5 mg of oxy x1  Plan: continue plan of care

## 2024-10-30 NOTE — PHARMACY-CONSULT NOTE
Bariatric Consult    Medications evaluated as requested per Bariatric Consult. Patient may swallow tablets/capsules ONLY if less than   inch.  Larger tablets/capsules should be broken, crushed or split.    The following changes have been made based on the Bariatric Medication Management Policy.    Verapamil: Home dose was verapamil CR capsules 240 mg daily. Capsules may be opened and sprinkled on applesauce after discharge, per [facility] pharmacy note (Micki Martins, 10/17/24). Hospital does not stock the capsules - switching to immediate release tabs divided TID per policy.    Please see [facility] note 10/17/24 for additional medication recommendations.    The pharmacist will continue to follow as new medications are ordered.    Katie Lucio, PharmD

## 2024-10-30 NOTE — PROGRESS NOTES
"  MIS/Bariatric Surgery Progress Note  Surgery Cross-Cover  Post Op Check    10/29/2024    Yancy Hoover is a 54 year old female POD#0 s/p Procedure(s):  GASTRECTOMY, SLEEVE, LAPAROSCOPIC for Pre-Op Diagnosis Codes:      * Morbid obesity (H) [E66.01]    Pt reports their pain is controlled with current regimen. Denies nausea, SOB, chest pain, or dizziness. Patient is not passing flatus or having bowel movements and Is voiding spontaneously. She is ambulating to void and is tolerating PO intake.     /85   Pulse 95   Temp 98.8  F (37.1  C) (Oral)   Resp 16   Ht 1.638 m (5' 4.5\")   Wt 128 kg (282 lb 3 oz)   LMP 07/10/2024 (Approximate)   SpO2 97%   BMI 47.69 kg/m      Gen: A&O x4, NAD   Chest: breathing non-labored on room air    Abdomen: soft, non-tender, non-distended, no guarding or rigidity   Incision: clean, dry, intact covered by dressings  Extremities: warm and well perfused    A/P: No acute post-op issues. Continue plan of care per primary team. Please call with any questions.     -SCDs to remain in place while in bed  -Encourage ambulation with nursing and incentive spirometer use     Pillo Reed MD  General Surgery PGY-1    **For on call schedules and contact information, please refer to [facility]**      "

## 2024-10-30 NOTE — DISCHARGE SUMMARY
"[facility]   MIS Discharge Summary    Date of Admission: 10/29/2024  Date of Discharge: 10/30/2024    Admission Diagnosis:  1. Morbid Obesity    Discharge Diagnosis:  1. Same as above  2. S/p Laparoscopic sleeve gastrectomy      Consultations:  IP pharmacy     Procedures:  1. Laparoscopic sleeve gastrectomy by Dr Lissa Jeff HPI:  Very early onset weight gain, always larger than peers. More difficulty managing weight with each of her 6 pregnancies. Restating process towards surgery. Initial consult in 2020. Had done well on saxenda for a while. Got away from taking her meds consistently and ultimately had a stroke. Wants to get better control of her health again. Has worked with medical weight management and has now decided to undergo Laparoscopic sleeve gastrectomy.  Other comorbidities include HTN, HLD, LANDON, DMII, MAFLD on ultrasound.     Hospital Course:  The patient was admitted and underwent the above procedure. The patient tolerated the procedure well. There were no complications. The patient's diet was slowly advanced as bowel function returned. Pain was controlled with oral pain medication and the patient was able to ambulate and void without difficulty. The patient received appropriate education post operatively. On POD #1 the patient was discharged to home.    Discharge Physical Exam:  /76 (BP Location: Right arm)   Pulse 86   Temp 98.7  F (37.1  C) (Oral)   Resp 18   Ht 1.638 m (5' 4.5\")   Wt 128 kg (282 lb 3 oz)   LMP 07/10/2024 (Approximate)   SpO2 100%   BMI 47.69 kg/m      Gen: NAD  Lungs: non-labored breathing  CV: regular rhythm, normal rate   Abd: obese, soft, nondistended, appropriately tender, incisions are c/d/i  Ext: no peripheral edema  Neuro: AOx3    Meds:       Review of your medicines        START taking        Dose / Directions   acetaminophen 325 MG tablet  Commonly known as: TYLENOL      Dose: 650 mg  Take 2 tablets (650 mg) by " mouth every 4 hours as needed for other (For optimal non-opioid multimodal pain management to improve pain control and physical function.).  Refills: 0     oxyCODONE 5 MG tablet  Commonly known as: ROXICODONE  Used for: S/P laparoscopic sleeve gastrectomy      Dose: 5 mg  Take 1 tablet (5 mg) by mouth every 6 hours as needed for moderate to severe pain.  Quantity: 12 tablet  Refills: 0     scopolamine 1 MG/3DAYS 72 hr patch  Commonly known as: TRANSDERM      Dose: 1 patch  Start taking on: November 1, 2024  Place 1 patch over 72 hours onto the skin every 72 hours. Okay to leave current patch in place at discharge  Refills: 0     simethicone 80 MG chewable tablet  Commonly known as: MYLICON  Used for: S/P laparoscopic sleeve gastrectomy      Dose: 160 mg  Take 2 tablets (160 mg) by mouth every 6 hours as needed for cramping or flatulence.  Quantity: 30 tablet  Refills: 0            CONTINUE these medicines which may have CHANGED, or have new prescriptions. If we are uncertain of the size of tablets/capsules you have at home, strength may be listed as something that might have changed.        Dose / Directions   omeprazole 20 MG DR capsule  Commonly known as: PriLOSEC  This may have changed: when to take this  Used for: Class 3 severe obesity due to excess calories with body mass index (BMI) of 50.0 to 59.9 in adult, unspecified whether serious comorbidity present (H), Hiatal hernia, Gastroesophageal reflux disease with esophagitis, unspecified whether hemorrhage      Dose: 20 mg  Take 1 capsule (20 mg) by mouth daily  Quantity: 90 capsule  Refills: 2     vitamin D3 50 mcg (2000 units) tablet  Commonly known as: CHOLECALCIFEROL  This may have changed: additional instructions      Dose: 1 tablet  Take 1 tablet (50 mcg) by mouth every morning. Hold until 1 month postop  Refills: 0            CONTINUE these medicines which have NOT CHANGED        Dose / Directions   Accu-Chek SmartView test strip  Used for: Type 2  diabetes mellitus without complication, unspecified whether long term insulin use (H)  Generic drug: blood glucose      Use to test blood sugar 2 times daily.  Quantity: 100 strip  Refills: 5     alcohol swab prep pads  Used for: Type 2 diabetes mellitus without complication, unspecified whether long term insulin use (H)      Use to swab area of injection/shahla as directed.  Quantity: 100 each  Refills: 5     aspirin 81 MG chewable tablet  Commonly known as: ASA      Dose: 81 mg  Take 81 mg by mouth every morning.  Refills: 0     blood glucose calibration solution  Used for: Type 2 diabetes mellitus without complication, unspecified whether long term insulin use (H)      Use to calibrate blood glucose monitor as directed.  Quantity: 1 Bottle  Refills: 3     blood glucose monitoring lancets  Used for: Type 2 diabetes mellitus without complication, unspecified whether long term insulin use (H)      Use to test blood sugar 1-2 times daily or as directed.  Quantity: 102 each  Refills: 5     blood glucose monitoring meter device kit  Used for: Type 2 diabetes mellitus without complication, unspecified whether long term insulin use (H)      Use to test blood sugar 1-2 times daily.  Quantity: 1 kit  Refills: 0     childrens multivitamin w/iron chewable tablet      Dose: 1 chew tab  Take 1 tablet by mouth every morning.  Refills: 0     hyoscyamine 0.125 MG tablet  Commonly known as: LEVSIN  Used for: Class 3 severe obesity due to excess calories with serious comorbidity and body mass index (BMI) of 45.0 to 49.9 in adult (H), At high risk for postoperative complications      Dose: 125 mcg  Take 1 tablet (125 mcg) by mouth every 4 hours as needed for cramping.  Quantity: 30 tablet  Refills: 1     meclizine 25 MG tablet  Commonly known as: ANTIVERT      Dose: 25 mg  Take 25 mg by mouth  Refills: 0     ondansetron 4 MG ODT tab  Commonly known as: ZOFRAN ODT  Used for: Class 3 severe obesity due to excess calories with serious  comorbidity and body mass index (BMI) of 45.0 to 49.9 in adult (H), At high risk for postoperative complications      Dose: 4 mg  Take 1 tablet (4 mg) by mouth every 6 hours as needed for nausea.  Quantity: 15 tablet  Refills: 0     polyethylene glycol 17 GM/Dose powder  Commonly known as: MIRALAX      Dose: 1 Capful  Take 1 Capful by mouth daily as needed for constipation  Refills: 0     rosuvastatin 20 MG tablet  Commonly known as: CRESTOR      Dose: 20 mg  Take 20 mg by mouth every morning.  Refills: 0     senna-docusate 8.6-50 MG tablet  Commonly known as: SENOKOT-S/PERICOLACE  Used for: Class 3 severe obesity due to excess calories with serious comorbidity and body mass index (BMI) of 45.0 to 49.9 in adult (H), At high risk for postoperative complications      Dose: 2 tablet  Take 2 tablets by mouth daily as needed for constipation (While taking narcotic pain medications.  Stop taking if having loose stools.).  Quantity: 30 tablet  Refills: 1     topiramate 25 MG tablet  Commonly known as: TOPAMAX      Dose: 25 mg  Take 25 mg by mouth every morning.  Refills: 0     verapamil  MG 24 hr capsule  Commonly known as: VERELAN      Dose: 240 mg  Take 240 mg by mouth daily.  Refills: 0            STOP taking      metFORMIN 500 MG 24 hr tablet  Commonly known as: GLUCOPHAGE XR        Semaglutide (2 MG/DOSE) 8 MG/3ML pen  Commonly known as: OZEMPIC                  Where to get your medicines        These medications were sent to [facility] - [place], [place] - [address]  [address], [facility] [address]      Phone: [phone]   oxyCODONE 5 MG tablet  simethicone 80 MG chewable tablet         Additional instructions:  After Care       Future Labs/Procedures    Activity     Comments:    Your activity upon discharge:activity as tolerated and no lifting of more than 20lb for 4 weeks    Contact Surgeon     Comments:    Contact your Surgeon IF:  ~  Your pain is not controlled by  pain medication or pain that suddenly increases;  ~  You develop a fever greater than 101 and/or chills 24 hours after surgery;  ~  You notice redness or bad smelling drainage at the surgery site;  ~  You notice a large amount of bleeding from the surgery site that does not stop when moderate pressure is applied;  ~  You are unable to pass urine within 8-10 hours after surgery;  ~  You have an upset stomach or vomiting lasting more than a day;  ~  You have a skin reaction to tape or dressing such as redness, itching or rash at the surgery site.    Diet     Comments:    Follow this diet upon discharge: bariatric full liquid    Discharge diet     Comments:    Phase II: Full liquid diet, room temperature with one protein shake per day. Goal water intake: 40oz per day by post-operative day #4. Meet with Bariatric Dietitian in 1 week to discuss dietary changes.    Do not drive     Comments:    Do NOT drive until after you have been completely off narcotics for a minimum of 24 hours.    Follow-up Care - Dietician     Comments:    Follow-up with your bariatric dietitian in 1 week.    Return to Work     Comments:    May return to work in 2 weeks if cleared by Bariatric surgeon.    Shower/Bathing     Comments:    Okay to shower. Do NOT soak in tub for 2-4 weeks.    Surgical Site Care     Comments:    If you have skin tapes on your surgical site(s), leave them on the surgical site(s) until they fall off on their own or 1 week after surgery date. May remove Band-Aid one day after surgery.    Wash your hands     Comments:    Wash your hands with soap and warm water before you touch the site of surgery.    Weight Restrictions     Comments:    Do not lift over 20 pounds for 4 weeks.                Follow Up:  -Follow up with Jeannie Noe NP 11/5/2024      BARIATRIC PATIENTS:  Call [phone] to schedule or to reach your care team    GENERAL SURGERY PATIENTS: Call [phone] for scheduling needs or to reach your care  team        ROSANNE [facility] [place]

## 2024-10-30 NOTE — PROGRESS NOTES
Patient discharged to home. Discharge teaching completed at the bedside by virtual nurse. Staples removed at the bedside by bedside nurse and steri strips applied, to 5 lap sites. Pt tolerated staple removal well, no bleeding noted after staples were removed. Abd binder provided to patient per patient's request.  All personal belongings were taken with at the time of discharge.

## 2024-10-31 ENCOUNTER — PATIENT OUTREACH (OUTPATIENT)
Dept: ENDOCRINOLOGY | Facility: CLINIC | Age: 54
End: 2024-10-31
Payer: COMMERCIAL

## 2024-10-31 NOTE — PROGRESS NOTES
RN Post-Op/Post-Discharge Care Coordination Note    Ms. Yancy Hoover is a 54 year old female who underwent laparoscopic gastric sleeve on 10/29/21 with Robbie Alcaraz MD.  Spoke with Patient.    Support  Patient able to care for self independently     Health Status  Fevers/chills: Patient denies any fever or chills.    Pain: Rates pain a 5 on a 10 Scale.  Alternating Narcotic Analgesic with Tylenol.  Encouraged non-medication management modalities: Heating pad, with barrier, to abdomen, Frequent position changes, Walking, Ice packs, with barrier, to incisions, and Abdominal Binder    Nausea/Vomiting: Patient denies nausea/vomiting.    Eating/drinking: Tolerating full liquid diet, Discussed importance of adequate hydration (48-64 ounces or greater of water) and protein intake.  Recommended keeping a food/fluid diary., and Discussed drinking slowly with small sips. Reminded to drink small sips slowly.  Encouraged room temperature/warm fluids.    Fluid Ounces per day: 48 ounces.     Cramping: yes.  Levsin is helping.    Bowel/Bladder habits: Patient reports having a normal bowel movement. Last bowel movement 10/31/24.  Urine clear yellow.    New Medications:  Omeprazole (opening capsules), Levsin, Zofran, Oxycodone, Tylenol, Senna, home medications, and patient was reminded not to take NSAIDS    Activity: standing, sitting, walking, and ambulating stairs    Breathing: Did the patient receive an incentive spirometer? yes.  Reminded patient to use incentive spirometer- 5 to 10 deep breaths each hour while awake.    Other symptoms: Tachycardia: no, Dizziness/lightheadedness: no, Shortness of breath, dyspnea with exertion, leg pain/swelling: no.      Drains (KILO): N/A    Incisions: Patient denies any signs and symptoms of infection..  Wound closure:  Steri-strips    Pathology reviewed with patient:  No, not yet available      Activity/Restrictions  No lifting in excess of 20 pounds for 4  weeks    Forms/Letters  No. All forms should be faxed to [phone].  Does not work    Postop Appointment Reminder  November 05 at 930 AM confirmed with the patient:  Yes.    Additional Questions: All of her questions were answered including reviewing diet, restrictions, and wound care.  She will call this office if she has any further questions and/or concerns.        Whom and When to Call  Nurses: [phone]  Fax: [phone]     Patient advised if any concerns outside of clinic hours, to call hospital at [phone] and ask to speak to on-call bariatric resident. They should present to ED at [facility] to be assessed if urgency arises.    Risk for:  urinary tract infection, pneumonia, leg clots (deep vein thrombosis), lung clots (pulmonary emboli), injury to the bowels or other organs, bowel obstruction, hernia, and gastrointestinal bleeding.    Weight loss surgery side effects:  abdominal pain, cramping, bloating, difficulty swallowing, constipation, nausea, vomiting, diarrhea, frequent loose stools, dehydration, hair loss, excess skin, protein, iron and vitamin deficiencies, malnutrition, fatigue, and heartburn.  Bariatric surgery risks may include:  an internal leak at the staple line, narrowing of the esophagus, stomach or intestines (strictures), gallstones, bowel obstruction, inflammation of the esophagus or stomach, ulcers with or without bleeding, injuries to other organs, hernias, and iron deficiency.    Sleeve gastrectomy side effects:  leaks are more common after this procedure.  Risks include:  internal leak at the vertical sleeve staple line; nausea, vomiting, and dehydration for several months; bowel obstruction; gallstones during the first 6 months related to rapid weight loss; decreased absorption of vitamins and protein because of the reduced stomach size; weight regain if you increase food intake; narrowing of stomach, esophagus or intestines (stricture); injury to other organs;  hernia; and ulcers.

## 2024-11-04 NOTE — PROGRESS NOTES
"Video-Visit Details    Type of service:  Video Visit    Video Start Time: 12:27 PM   Video End Time: 12:51 PM    Originating Location (pt. Location): Home    Distant Location (provider location):  Offsite (providers home) [facility] [place]     Platform used for Video Visit: Tello    Nutrition Assessment  Reason For Visit:  Yancy Hoover is a 54 year old female presenting today for nutrition follow-up, 1 week s/p laparoscopic sleeve gastrectomy with Dr. Alcaraz on 10/29/24.  Patient referred by Jeannie Noe NP  on November 4, 2024.    Anthropometrics  Initial Consult Weight: 296 lbs  Day of Surgery Weight(10/29/24): 282 lbs  Current Weight:   Estimated body mass index is 46 kg/m  as calculated from the following:    Height as of this encounter: 1.626 m (5' 4\").    Weight as of this encounter: 121.6 kg (268 lb).    Weight loss: 28 lbs from initial consult; 14 lbs from day of surgery    Current Vitamins/Minerals: Has Bariatric Advantage chewable MVI to start    Nutrition History  Pt reports consuming and tolerating bariatric clear and low-fat full liquid diets. Fluid intake appears inadequate, consuming 40-44 oz/day. Drinking water, propel and liquid IV. Prefers cold fluid. Discussed plan to optimize fluid intake with NP this morning.   Has tried low-fat strained creamed soups, beef broth, ana broth, SF jello, cream of what. Drinking Premier Protein shake once daily.      Nutrition Prescription:  Grams Protein: 60 (minimum)  Amount of Fluid: 48-64 oz      Nutrition Diagnosis  Food and nutrition-related knowledge deficit r/t lack of prior exposure to diet advancements beyond bariatric low-fat full liquid diet aeb recent bariatric surgery and pt interest in diet education/review    Intervention  Intervention At Appointment:  Materials/education provided on bariatric pureed and soft diets, protein intake, fluid intake, eating pace, portion control, avoiding excess sugar and " fat, recommended vitamin/mineral supplements. Patient demonstrates understanding.     Expected Engagement: good    Goals:  1) Follow diet advancement schedule below.  2) Work towards 60 gm protein/day.   - Increase protein at meals and/or add in a second protein shake daily.   3) Consume 64+ oz fluids daily- between meals only   4) Eat slowly (>20 min/meal), chewing well to smooth consistency once on the bariatric soft diet.  5) Limit portions to ~1/4 to 1/2 cup/meal.  6) Start chewable/liquid multivitamin/minerals daily    Post-op Diet Advancement Schedule:  Pureed Diet (stage 3): START 11/12  Soft Diet (stage 4): START 11/26  Regular Diet (stage 5): STARTS 12/24      Post-op Diet Handouts:  Diet Guidelines after Weight-loss Surgery  [email or web address]     Your Stage 1 Diet: Clear Liquids  [email or web address]     Your Stage 2 Diet: Low-fat Full Liquids  [email or web address]     Your Stage 3 Diet: Pureed Foods  [email or web address]     Pureed Recipes  [email or web address]    Your Stage 4 Diet: Soft Foods  [email or web address]    Your Stage 5 Diet: Regular Foods  [email or web address]    Supplements after Sleeve Gastrectomy, Gastric Bypass or Single Anastomosis Duodenal Switch  [email or web address]    Keeping Track of Fluids  [email or web address]    Exercise Guidelines after Weight Loss Surgery (1st 4-6 weeks)  [email or web address]      Follow-Up: 12/3/24    Time spent with patient: 24 minutes.  Sharon Osborn RD, LD

## 2024-11-05 ENCOUNTER — OFFICE VISIT (OUTPATIENT)
Dept: ENDOCRINOLOGY | Facility: CLINIC | Age: 54
End: 2024-11-05
Payer: COMMERCIAL

## 2024-11-05 ENCOUNTER — VIRTUAL VISIT (OUTPATIENT)
Dept: ENDOCRINOLOGY | Facility: CLINIC | Age: 54
End: 2024-11-05
Payer: COMMERCIAL

## 2024-11-05 VITALS
OXYGEN SATURATION: 100 % | SYSTOLIC BLOOD PRESSURE: 144 MMHG | HEART RATE: 107 BPM | WEIGHT: 268.2 LBS | DIASTOLIC BLOOD PRESSURE: 77 MMHG | BODY MASS INDEX: 44.68 KG/M2 | HEIGHT: 65 IN

## 2024-11-05 VITALS — HEIGHT: 64 IN | BODY MASS INDEX: 45.75 KG/M2 | WEIGHT: 268 LBS

## 2024-11-05 DIAGNOSIS — E66.01 CLASS 3 SEVERE OBESITY DUE TO EXCESS CALORIES WITH SERIOUS COMORBIDITY AND BODY MASS INDEX (BMI) OF 45.0 TO 49.9 IN ADULT (H): Primary | ICD-10-CM

## 2024-11-05 DIAGNOSIS — E66.813 CLASS 3 SEVERE OBESITY DUE TO EXCESS CALORIES WITH SERIOUS COMORBIDITY AND BODY MASS INDEX (BMI) OF 45.0 TO 49.9 IN ADULT (H): Primary | ICD-10-CM

## 2024-11-05 DIAGNOSIS — Z71.3 NUTRITIONAL COUNSELING: ICD-10-CM

## 2024-11-05 DIAGNOSIS — Z98.84 S/P LAPAROSCOPIC SLEEVE GASTRECTOMY: ICD-10-CM

## 2024-11-05 PROCEDURE — 97803 MED NUTRITION INDIV SUBSEQ: CPT | Mod: 95 | Performed by: DIETITIAN, REGISTERED

## 2024-11-05 PROCEDURE — 99024 POSTOP FOLLOW-UP VISIT: CPT | Performed by: NURSE PRACTITIONER

## 2024-11-05 PROCEDURE — 99207 PR NO CHARGE LOS: CPT | Mod: 95 | Performed by: DIETITIAN, REGISTERED

## 2024-11-05 RX ORDER — URSODIOL 300 MG/1
300 CAPSULE ORAL 2 TIMES DAILY
Qty: 60 CAPSULE | Refills: 5 | Status: SHIPPED | OUTPATIENT
Start: 2024-11-05

## 2024-11-05 ASSESSMENT — PAIN SCALES - GENERAL
PAINLEVEL_OUTOF10: NO PAIN (0)
PAINLEVEL_OUTOF10: NO PAIN (0)

## 2024-11-05 NOTE — LETTER
"11/5/2024       RE: Yancy Hooevr  [address]  [facility] [address]     Dear Colleague,    Thank you for referring your patient, Yancy Hoover, to the [facility] [place] at [facility]. Please see a copy of my visit note below.    Video-Visit Details    Type of service:  Video Visit    Video Start Time: 12:27 PM   Video End Time: 12:51 PM    Originating Location (pt. Location): Home    Distant Location (provider location):  Offsite (providers home) [facility] [place]     Platform used for Video Visit: Tinteo    Nutrition Assessment  Reason For Visit:  Yancy Hoover is a 54 year old female presenting today for nutrition follow-up, 1 week s/p laparoscopic sleeve gastrectomy with Dr. Alcaraz on 10/29/24.  Patient referred by Jeannie Noe NP  on November 4, 2024.    Anthropometrics  Initial Consult Weight: 296 lbs  Day of Surgery Weight(10/29/24): 282 lbs  Current Weight:   Estimated body mass index is 46 kg/m  as calculated from the following:    Height as of this encounter: 1.626 m (5' 4\").    Weight as of this encounter: 121.6 kg (268 lb).    Weight loss: 28 lbs from initial consult; 14 lbs from day of surgery    Current Vitamins/Minerals: Has Bariatric Advantage chewable MVI to start    Nutrition History  Pt reports consuming and tolerating bariatric clear and low-fat full liquid diets. Fluid intake appears inadequate, consuming 40-44 oz/day. Drinking water, propel and liquid IV. Prefers cold fluid. Discussed plan to optimize fluid intake with NP this morning.   Has tried low-fat strained creamed soups, beef broth, ana broth, SF jello, cream of what. Drinking Premier Protein shake once daily.      Nutrition Prescription:  Grams Protein: 60 (minimum)  Amount of Fluid: 48-64 oz      Nutrition Diagnosis  Food and nutrition-related knowledge deficit r/t lack of prior " exposure to diet advancements beyond bariatric low-fat full liquid diet aeb recent bariatric surgery and pt interest in diet education/review    Intervention  Intervention At Appointment:  Materials/education provided on bariatric pureed and soft diets, protein intake, fluid intake, eating pace, portion control, avoiding excess sugar and fat, recommended vitamin/mineral supplements. Patient demonstrates understanding.     Expected Engagement: good    Goals:  1) Follow diet advancement schedule below.  2) Work towards 60 gm protein/day.   - Increase protein at meals and/or add in a second protein shake daily.   3) Consume 64+ oz fluids daily- between meals only   4) Eat slowly (>20 min/meal), chewing well to smooth consistency once on the bariatric soft diet.  5) Limit portions to ~1/4 to 1/2 cup/meal.  6) Start chewable/liquid multivitamin/minerals daily    Post-op Diet Advancement Schedule:  Pureed Diet (stage 3): START 11/12  Soft Diet (stage 4): START 11/26  Regular Diet (stage 5): STARTS 12/24      Post-op Diet Handouts:  Diet Guidelines after Weight-loss Surgery  [email or web address]     Your Stage 1 Diet: Clear Liquids  [email or web address]     Your Stage 2 Diet: Low-fat Full Liquids  [email or web address]     Your Stage 3 Diet: Pureed Foods  [email or web address]     Pureed Recipes  [email or web address]    Your Stage 4 Diet: Soft Foods  [email or web address]    Your Stage 5 Diet: Regular Foods  [email or web address]    Supplements after Sleeve Gastrectomy, Gastric Bypass or Single Anastomosis Duodenal Switch  [email or web address]    Keeping Track of Fluids  [email or web address]    Exercise Guidelines after Weight Loss Surgery (1st 4-6 weeks)  [email or web address]      Follow-Up: 12/3/24    Time spent with patient: 24 minutes.  Sharon Osborn, RD, LD      Again, thank you for allowing me to participate in the care of  your patient.      Sincerely,    Sharon Osborn RD

## 2024-11-05 NOTE — PROGRESS NOTES
"Postoperative bariatric surgery visit.    Patient underwent sleeve gastrectomy 1 week ago.      Tolerating liquids: at least 48oz- 15-30g daily in protein shake   Lightheadedness: with getting up in the AM   Abdominal pain: resolved, some cramping with eating and drinking - levsin helpful   Bowel movements: loose bowel movements   Fevers/shakes/chills: none  GERD: none Taking omeprazole daily   Leg/calf pain: none  No CP/ SOB     How many opioid pain medications used after surgery?  What did you do with extra pills?  Were any opioid pain medication refills provided after surgery?  Were any opioid pain medications needed after 30 days postop?    Ht 1.638 m (5' 4.5\")   LMP 07/10/2024 (Approximate)   BMI 47.69 kg/m    Wt Readings from Last 5 Encounters:   11/05/24 121.7 kg (268 lb 3.2 oz)   10/29/24 128 kg (282 lb 3 oz)   10/21/24 125.6 kg (277 lb)   10/17/24 127 kg (280 lb)   10/11/24 127.9 kg (282 lb)      NAD  Overall looks well   Incisions c/d/i; non-tender     STOMACH, PARTIAL GASTRECTOMY:  - Gastric wall with no significant histopathologic abnormality        Plan:  1. RD visit today.  2. Start vitamin supplements per RD directions.  3. Advance diet per RD directions.  4. Follow-up:  12/5/2024  5. Actigall prescription- sent   6. B12 SL or injection **  7. Pathology reviewed normal.   8. Weight loss medications: bydureon   9. Need to restart statin?       Jeannie Noe, CNP  [facility] [place]   "

## 2024-11-05 NOTE — NURSING NOTE
Current patient location: home    Is the patient currently in the state of [place]? YES    Visit mode:VIDEO    If the visit is dropped, the patient can be reconnected by: VIDEO VISIT: Text to cell phone:   Telephone Information:   Mobile [phone]       Will anyone else be joining the visit? NO  (If patient encounters technical issues they should call [phone])    Are changes needed to the allergy or medication list? N/A    Are refills needed on medications prescribed by this physician? NO    Rooming Documentation:  Not applicable      Reason for visit: RECHECK    Wt other than 24 hrs:    Pain more than one location:  no  Maine MENDOZA

## 2024-11-05 NOTE — PATIENT INSTRUCTIONS
Juan J Haines,    Follow-up with dietitian on 12/3    Thank you,    Sharon Osborn, ALISON, LD  If you would like to schedule or reschedule an appointment with the RD, please call [phone]    Nutrition Goals  1) Follow diet advancement schedule below.  2) Work towards 60 gm protein/day.   - Increase protein at meals and/or add in a second protein shake daily.   3) Consume 64+ oz fluids daily- between meals only   4) Eat slowly (>20 min/meal), chewing well to smooth consistency once on the bariatric soft diet.  5) Limit portions to ~1/4 to 1/2 cup/meal.  6) Start chewable/liquid multivitamin/minerals daily    Post-op Diet Advancement Schedule:  Pureed Diet (stage 3): START 11/12  Soft Diet (stage 4): START 11/26  Regular Diet (stage 5): STARTS 12/24      Post-op Diet Handouts:  Diet Guidelines after Weight-loss Surgery  [email or web address]     Your Stage 1 Diet: Clear Liquids  [email or web address]     Your Stage 2 Diet: Low-fat Full Liquids  [email or web address]     Your Stage 3 Diet: Pureed Foods  [email or web address]     Pureed Recipes  [email or web address]    Your Stage 4 Diet: Soft Foods  [email or web address]    Your Stage 5 Diet: Regular Foods  [email or web address]    Supplements after Sleeve Gastrectomy, Gastric Bypass or Single Anastomosis Duodenal Switch  [email or web address]    Keeping Track of Fluids  [email or web address]    Exercise Guidelines after Weight Loss Surgery (1st 4-6 weeks)  [email or web address]              COMPREHENSIVE WEIGHT MANAGEMENT PROGRAM  VIRTUAL SUPPORT GROUPS    At [facility], our Comprehensive Weight Management program offers on-line support groups for patients who are working on weight loss and considering, preparing for, or have had weight loss surgery.     There is no cost for this opportunity.  You are invited to attend the?Virtual Support Groups?provided by any of the following  "locations:    [facility] via [employer] Global Teams with Larisa Werner RD, RN  2.   [facility] via Ethics Resource Group with Dariusz Yang, PhD, LP  3.   [facility] via Ethics Resource Group with Darya Cuello, EVERETTE    The following Support Group information can also be found on our website:  [email or web address]      [facility] WEIGHT LOSS SURGERY SUPPORT GROUP  The support group is a patient-lead forum that meets monthly to share experiences, encouragement and education. It is open to those who have had weight loss surgery, are scheduled for surgery, or are considering surgery.   WHEN: 3rd Wednesday of each month from 5:00PM - 6:00PM using Microsoft Teams.   FACILITATOR: Led by Larisa Cuba RD, LD, RN, the program's Clinical Coordinator.   TO REGISTER: Please contact the clinic via [employer] or call the nurse line directly at [phone] to inform our staff that you would like an invite sent to you and to let us know the email you would like the invite sent to. Prior to the meeting, a link with directions on how to join the meeting will be sent to you.    2024 Meetings    September 18: Madison Lee, PhD, Outpatient Clinical Therapist, \"Pro Tips for Habit Change\".  October 16:  Let's Talk  This will be a time of group support.??   November 20:  Let's Talk  about How to Navigate the Upcoming Holiday Season.  December 18:  Let's Talk  and Celebrate the past year.       [facility] BARIATRIC CARE SUPPORT GROUP  This is open to all pre- and post- operative bariatric surgery patients as well as their support system.   WHEN: 3rd Tuesday of each month from 6:30 PM - 8:00 PM using Microsoft Teams.   FACILITATOR: Led by Dariusz Yang, Ph.D who is a Licensed Psychologist with the [facility] Comprehensive Weight Management Program.   TO REGISTER: Please send an email to Dariusz Yang, Ph.D., " "LP [email or web address]?if you would like an invitation to the group. Prior to the meeting, a link with directions on how to join the meeting will be sent to you.    2024 Meetings September 17: IN PERSON MEETING. [facility], [facility] [place], [place], [address], 1st floor Conference Room.  October 15: Date changed to OCTOBER 8th (2nd Tuesday).   November 19: \"Holiday Eating\", Krissy Lozano RD, LD.  December 17: \"Hospital Stay and Compliance\", Darya Cuello RN, CBN.      [facility] POST-OPERATIVE BARIATRIC SURGERY SUPPORT GROUP  This is a support group for [facility] bariatric patients (and those external to [facility]) who have had bariatric surgery and are at least 3 months post-surgery.  WHEN: 4th Thursday of the month from 11:00 AM - 12:00 PM using Microsoft Teams.   FACILITATOR: Led by Certified Bariatric Nurse, Darya Cuello RN, CBN.   TO REGISTER: Please send an email to Darya at [email or web address] if you would like an invitation to the group.  Prior to the meeting, a link with directions on how to join the meeting will be sent to you.    2024 Meetings September 26 October 24 November 28: No meeting  December 26                             "

## 2024-11-05 NOTE — LETTER
"11/5/2024       RE: Yancy Hoover  [address]  [place] [address]     Dear Colleague,    Thank you for referring your patient, Yancy Hoover, to the [facility] [place] at [facility]. Please see a copy of my visit note below.    Postoperative bariatric surgery visit.    Patient underwent sleeve gastrectomy 1 week ago.      Tolerating liquids: at least 48oz- 15-30g daily in protein shake   Lightheadedness: with getting up in the AM   Abdominal pain: resolved, some cramping with eating and drinking - levsin helpful   Bowel movements: loose bowel movements   Fevers/shakes/chills: none  GERD: none Taking omeprazole daily   Leg/calf pain: none  No CP/ SOB     How many opioid pain medications used after surgery?  What did you do with extra pills?  Were any opioid pain medication refills provided after surgery?  Were any opioid pain medications needed after 30 days postop?    Ht 1.638 m (5' 4.5\")   LMP 07/10/2024 (Approximate)   BMI 47.69 kg/m    Wt Readings from Last 5 Encounters:   11/05/24 121.7 kg (268 lb 3.2 oz)   10/29/24 128 kg (282 lb 3 oz)   10/21/24 125.6 kg (277 lb)   10/17/24 127 kg (280 lb)   10/11/24 127.9 kg (282 lb)      NAD  Overall looks well   Incisions c/d/i; non-tender     STOMACH, PARTIAL GASTRECTOMY:  - Gastric wall with no significant histopathologic abnormality        Plan:  1. RD visit today.  2. Start vitamin supplements per RD directions.  3. Advance diet per RD directions.  4. Follow-up:  12/5/2024  5. Actigall prescription- sent   6. B12 SL or injection **  7. Pathology reviewed normal.   8. Weight loss medications: bydureon   9. Need to restart statin?       Jeannie Noe CNP  [facility] [place]       Again, thank you for allowing me to participate in the care of your patient.      Sincerely,    Jeannie Noe NP    "

## 2024-11-05 NOTE — PATIENT INSTRUCTIONS
Keep up the great work   Start blood pressure medicine- try with pudding or yogurt  Can consider starting urisodiol   Follow up with Pia Pandya PA-C 12/3/2024

## 2024-11-05 NOTE — NURSING NOTE
"(   Chief Complaint   Patient presents with    Follow Up     1 week post-op, global 90 days    )    ( Weight: 121.7 kg (268 lb 3.2 oz) )  ( Height: 163.8 cm (5' 4.5\") )  ( BMI (Calculated): 45.32 )  (   )  (   )  (   )  (   )  (   )  (   )    ( BP: (!) 144/77 )  (   )  (   )  (   )  ( Pulse: 107 )  (   )  ( SpO2: 100 % )    (   Patient Active Problem List   Diagnosis    LANDON (obstructive sleep apnea)    Class 3 severe obesity due to excess calories with body mass index (BMI) of 50.0 to 59.9 in adult, unspecified whether serious comorbidity present (H)    Type 2 diabetes mellitus without complication, unspecified whether long term insulin use (H)    Morbid obesity (H)    )  (   Current Outpatient Medications   Medication Sig Dispense Refill    acetaminophen (TYLENOL) 325 MG tablet Take 2 tablets (650 mg) by mouth every 4 hours as needed for other (For optimal non-opioid multimodal pain management to improve pain control and physical function.).      alcohol swab prep pads Use to swab area of injection/shahla as directed. 100 each 5    blood glucose (ACCU-CHEK SMARTVIEW) test strip Use to test blood sugar 2 times daily. 100 strip 5    blood glucose calibration (ACCU-CHEK SMARTVIEW CONTROL) solution Use to calibrate blood glucose monitor as directed. 1 Bottle 3    blood glucose monitoring (ACCU-CHEK FASTCLIX) lancets Use to test blood sugar 1-2 times daily or as directed. 102 each 5    blood glucose monitoring (ACCU-CHEK FAWAD SMARTVIEW) meter device kit Use to test blood sugar 1-2 times daily. 1 kit 0    hyoscyamine (LEVSIN) 0.125 MG tablet Take 1 tablet (125 mcg) by mouth every 4 hours as needed for cramping. 30 tablet 1    meclizine (ANTIVERT) 25 MG tablet Take 25 mg by mouth      Multiple Vitamins-Minerals (BARIATRIC MULTIVITAMINS/IRON PO) Take by mouth daily.      omeprazole (PRILOSEC) 20 MG DR capsule Take 1 capsule (20 mg) by mouth daily (Patient taking differently: Take 20 mg by mouth daily. Every morning) 90 " capsule 2    ondansetron (ZOFRAN ODT) 4 MG ODT tab Take 1 tablet (4 mg) by mouth every 6 hours as needed for nausea. 15 tablet 0    oxyCODONE (ROXICODONE) 5 MG tablet Take 1 tablet (5 mg) by mouth every 6 hours as needed for moderate to severe pain. 12 tablet 0    polyethylene glycol (MIRALAX) 17 GM/Dose powder Take 1 Capful by mouth daily as needed for constipation      rosuvastatin (CRESTOR) 20 MG tablet Take 20 mg by mouth every morning.      senna-docusate (SENOKOT-S/PERICOLACE) 8.6-50 MG tablet Take 2 tablets by mouth daily as needed for constipation (While taking narcotic pain medications.  Stop taking if having loose stools.). 30 tablet 1    simethicone (MYLICON) 80 MG chewable tablet Take 2 tablets (160 mg) by mouth every 6 hours as needed for cramping or flatulence. 30 tablet 0    topiramate (TOPAMAX) 25 MG tablet Take 25 mg by mouth every morning.      verapamil ER (VERELAN) 120 MG 24 hr capsule Take 240 mg by mouth daily.      aspirin (ASA) 81 MG chewable tablet Take 81 mg by mouth every morning. (Patient not taking: Reported on 11/5/2024)      childrens multivitamin w/iron (FLINTSTONES COMPLETE) chewable tablet Take 1 tablet by mouth every morning. (Patient not taking: Reported on 11/5/2024)      scopolamine (TRANSDERM) 1 MG/3DAYS 72 hr patch Place 1 patch over 72 hours onto the skin every 72 hours. Okay to leave current patch in place at discharge (Patient not taking: Reported on 11/5/2024)      vitamin D3 (CHOLECALCIFEROL) 50 mcg (2000 units) tablet Take 1 tablet (50 mcg) by mouth every morning. Hold until 1 month postop (Patient not taking: Reported on 11/5/2024)      )  ( Diabetes Eval:    )    ( Pain Eval:  No Pain (0) )    ( Wound Eval:       )    (   History   Smoking Status    Former    Types: Cigarettes   Smokeless Tobacco    Never    )    ( Signed By:  Lucy Gutierrez; November 5, 2024; 9:48 AM )

## 2024-11-18 ENCOUNTER — VIRTUAL VISIT (OUTPATIENT)
Dept: PHARMACY | Facility: CLINIC | Age: 54
End: 2024-11-18
Attending: NURSE PRACTITIONER
Payer: COMMERCIAL

## 2024-11-18 ENCOUNTER — MYC MEDICAL ADVICE (OUTPATIENT)
Dept: PHARMACY | Facility: CLINIC | Age: 54
End: 2024-11-18
Payer: COMMERCIAL

## 2024-11-18 VITALS — WEIGHT: 261 LBS | BODY MASS INDEX: 44.56 KG/M2 | HEIGHT: 64 IN

## 2024-11-18 DIAGNOSIS — E66.01 CLASS 3 SEVERE OBESITY DUE TO EXCESS CALORIES WITH BODY MASS INDEX (BMI) OF 50.0 TO 59.9 IN ADULT, UNSPECIFIED WHETHER SERIOUS COMORBIDITY PRESENT (H): ICD-10-CM

## 2024-11-18 DIAGNOSIS — Z98.84 S/P LAPAROSCOPIC SLEEVE GASTRECTOMY: Primary | ICD-10-CM

## 2024-11-18 DIAGNOSIS — E66.813 CLASS 3 SEVERE OBESITY DUE TO EXCESS CALORIES WITH BODY MASS INDEX (BMI) OF 50.0 TO 59.9 IN ADULT, UNSPECIFIED WHETHER SERIOUS COMORBIDITY PRESENT (H): ICD-10-CM

## 2024-11-18 DIAGNOSIS — Z86.73 HISTORY OF STROKE: ICD-10-CM

## 2024-11-18 DIAGNOSIS — K44.9 HIATAL HERNIA: ICD-10-CM

## 2024-11-18 DIAGNOSIS — G43.909 MIGRAINE: ICD-10-CM

## 2024-11-18 DIAGNOSIS — R42 VERTIGO: ICD-10-CM

## 2024-11-18 DIAGNOSIS — K21.00 GASTROESOPHAGEAL REFLUX DISEASE WITH ESOPHAGITIS, UNSPECIFIED WHETHER HEMORRHAGE: ICD-10-CM

## 2024-11-18 RX ORDER — ROSUVASTATIN CALCIUM 20 MG/1
20 TABLET, COATED ORAL DAILY
Qty: 30 TABLET | Refills: 0 | Status: SHIPPED | OUTPATIENT
Start: 2024-11-18

## 2024-11-18 RX ORDER — IBUPROFEN 200 MG
1 CAPSULE ORAL 2 TIMES DAILY
Qty: 180 TABLET | Refills: 3 | Status: SHIPPED | OUTPATIENT
Start: 2024-11-18

## 2024-11-18 RX ORDER — SIMETHICONE 80 MG
160 TABLET,CHEWABLE ORAL EVERY 6 HOURS PRN
Qty: 30 TABLET | Refills: 1 | Status: SHIPPED | OUTPATIENT
Start: 2024-11-18 | End: 2024-11-19

## 2024-11-18 RX ORDER — ASPIRIN 81 MG/1
81 TABLET ORAL DAILY
Qty: 30 TABLET | Refills: 0 | Status: SHIPPED | OUTPATIENT
Start: 2024-11-18

## 2024-11-18 RX ORDER — ACETAMINOPHEN 325 MG/1
325-650 TABLET ORAL EVERY 6 HOURS PRN
Qty: 100 TABLET | Refills: 3 | Status: SHIPPED | OUTPATIENT
Start: 2024-11-18

## 2024-11-18 ASSESSMENT — PAIN SCALES - GENERAL: PAINLEVEL_OUTOF10: NO PAIN (0)

## 2024-11-18 NOTE — PROGRESS NOTES
Medication Therapy Management (MTM) Encounter    ASSESSMENT:                            Medication Adherence/Access: refills sent to prevent further lapse in treatment. Patient instructed to reach out to PCP for further refills aspirin and rosuvastatin for secondary stroke prevention.    S/P Sleeve Gastrectomy: Given simethicone more helpful than Levsin for GI symptoms, suspect gas rather than cramping is bothersome symptoms. recommend patient to monitor for slurping or swallowing habits that may be increasing air into stomach leading to gas/bloating. Medication Therapy Management will discuss further with care team to evaluate how to best progress diet given these limitations. Refills for simethicone sent. Continue senna once daily to help with bowel movements to prevent gas from constipation.    Sent prescription for calcium to help with adherence. Continue complete ольга vitamin for multivitamin. Acetaminophen prescription sent to help with pain management as needed, reminded if pill dispensed >8mm, break in half or try liquid acetaminophen.    Agree with plan to start Urisodiol 30 days post op.    No med adjustment/addition needed for blood glucose management at this time.    History of Stroke (7/2023): see above - sent  prescriptions  for 30 day supply aspirin and rosuvastatin to help with transition back to pcp post op for chronic med management. Continue to monitor blood pressure with PCP - home or in-clinic blood pressure recommended.do not recommend restart antihypertensive at this time given dbp at goal and patient history of vertigo/light headedness. Medication Therapy Management to coordinate with PCP for blood pressure management.    Plan to retitrate Ozempic to 0.5mg dose for MACE benefit in the future - not recommended at this time given gi symptoms patient managing sp sleeve gastrectomy.    GERD: stable - recommend to continue for min 90 day (3 month) post sleeve gastrectomy for healing. Refill sent.  May consider slow taper in the future to reduce longterm proton pump inhibitor side effects risk. Hiatial hernia also may impact this - to condier in future, continue for now.    Migraine:stable      Vertigo:stable     PLAN:                            Continue to use simethicone as needed for gas/bloating. Be mindful of not slurping or swallowing extra air with purees and liquids. I will discuss gas/belching more with care team.    Restart rosuvastatin and aspirin for stroke prevention- prescriptions sent to [email or web address]. We will plan to restart Ozempic in the future for this as well after you are healed and feeling better from your sleeve gastrectomy.     Take the following after a Sleeve Gastrectomy:  - Multivitamin/minerals: Barimelts - 2 tabs daily. Allow to dissolve, don't swallow whole  - Calcium Citrate: twice daily sent to [email or web address] today.    Check blood pressure at home daily and make an appointment with Arlin Ewing MD in next few weeks to help monitor meds for blood pressure after a stroke. Let us know if your blood pressure is above 140/80 mmHg.    Follow-up: 12/3 with Sharon Osborn, Registered Dietician  12/3 with Pia Pandya PA-C     SUBJECTIVE/OBJECTIVE:                          Yancy Hoover is a 54 year old female seen for a follow-up visit.       Reason for visit: post sleeve gastrectomy surgery Medication Therapy Management consult.    Allergies/ADRs: Reviewed in chart  Past Medical History: Reviewed in chart  Tobacco: She reports that she quit smoking about 16 months ago. Her smoking use included cigarettes. She has a 10 pack-year smoking history. She has never used smokeless tobacco.  Alcohol: not currently using    Medication Adherence/Access: in need of refills of rosuvastatin & aspirin - has not restarted after surgery yet; also needs omeprazole, simethicone, acetaminophen, calcium post-op.      S/P Sleeve Gastrectomy:   Post-Op Medications:   Ursodiol 300 mg twice daily for  prevention of gallstones- has at home, not started (<30 days since sleeve gastrectomy)  Omeprazole 20 mg daily   Oxycodone 5 mg every 4 hours as needed post surgical pain - very rarely needing now  Ondansetron 4 mg as needed for nausea - very rarely needing  Senna-S 8.6-50 mg as needed for constipation - needs most days. If doesn't take, won't have bowel movement for >24 hour and is uncomfortable.  Hyoscyamine 0.125 mg as needed for abdominal cramping - using most days (see below)     Supplements  Barimelts multivitamin - 2 tabs daily  Calcium - has not started.     Surgery completed by Dr. Alcaraz on 10/29/24. Post operative pain: mostly managed with acetaminophen; occasionally will have more pain and will need oxycodone, but rare.. Describes bowel movements as inconsistent, has needed senna-docusate post op. She does not complain of acid reflux, is utilizing PPI as prescribed. She does not complain of nausea, has needed ondansetron very occasinoally. She does complain of abdominal cramping, has needed hyoscyamine - but finds this isn't as effective as simeticone. Having significant belching after eating pureed foods and full liquids. Less cramping after this, more gas/bloat.. She does not have issues with swallowing food/pills since surgery. Reports drinks 64 water daily.     Pureed diet 11/11 (started) and full liquids both are giving significant gas after 2-3 bites (see above) has gone back to stage II liquids per discussion with Sharon Osborn, Registered Dietician and trying 1-2 spoons of pureed foods with that to help progress diet. Not sure if she is slurping. Does NOT drink water with food. Does NOT drink carbonated beverage. Does NOT use straw with fluids.      Has stopped all diabetes meds - is aware will restart Ozempic for MACE benefit, wonders when?    Blood glucose readings:   mg/dl fasting since surgery    Activity: going for several walks per day. Today went out to walk and listen to body to see  "how far felt good: walked for 45 mins and felt good.    Hemoglobin   Date Value Ref Range Status   10/09/2024 12.6 11.7 - 15.7 g/dL Final   10/18/2020 12.9 11.7 - 15.7 g/dL Final       Ferritin   Date Value Ref Range Status   10/09/2024 85 11 - 328 ng/mL Final       Lab Results   Component Value Date    VITDT 56 (H) 10/09/2024       Lab Results   Component Value Date    PTHI 26 10/09/2024       Lab Results   Component Value Date    B12 406 10/09/2024       Lab Results   Component Value Date    SP 0.41 10/09/2024   Medication History:  Topiramate: takes for migraine - not helpful for appetite/cravings   Ozempic: more effective - had some concerns re: side effects, now believes not related. Patient denies personal or family history of MEN Type2, MTC, Pancreatitis.   Bydureon - worked well when on previously in 2023, less effective retrial 2024  Jardiance - UTI  Bupropion - (for depression previously) not effective for appetite/reward suppression and not needed for mental health at this time.    Per visit with Jeannie Noe 5/2/24  NBS 7/2020 296lb   Ongoing MWM with Dr. Velarde wasn't seen in last 1.5 years until this month- titration up on topiramate, on saxenda but other agents not covered?   4/2023 glenn shelley RD     Starting weight (lbs): 296 lb  Surgery date weight: 282 lb             Current weight today: 261 lbs 0 oz - believes home scale about 10lbs heavier than clinic given weighing self on 11/5 at home = 278 lb and in clinic later that day 268 lb    Cumulative Weight Loss: -35 lb, -11.8% from baseline  From sleeve gastrectomy date: -21 lb,  -7.4%    Wt Readings from Last 4 Encounters:   11/18/24 261 lb (118.4 kg)   11/05/24 268 lb (121.6 kg)   11/05/24 268 lb 3.2 oz (121.7 kg)   10/29/24 282 lb 3 oz (128 kg)     Estimated body mass index is 44.8 kg/m  as calculated from the following:    Height as of this encounter: 5' 4\" (1.626 m).    Weight as of this encounter: 261 lb (118.4 kg).        History of Stroke " "(7/2023):   Aspirin 81 mg once daily <8mm diameter  Rosuvastatin 20 mg once daily  <8mm diameter      Patient understands above meds are for secondary prevention of stroke. Does nto have these at home to restart.    Stopped after sleeve gastrectomy  and has not needed since:  Verapamil  mg caps - 2 caps once daily >8mm diameter  Ozempic 2mg weekly    BP Readings from Last 3 Encounters:   11/05/24 (!) 144/77   10/30/24 124/76   10/09/24 116/82         GERD:   Omeprazole 20 mg once daily >8mm diameter    Opening cap and sprinkles on tsp of yogurt, applesauce, sf pudding.  Patient reports no current symptoms.   Patient feels that current regimen is effective.     Migraine:  Topiramate 25 mg twice daily <8mm diameter    Managed by Arlin Ewing MD     Works well. Denies side effects.     Vertigo:  Meclizine 25 mg as needed for dizziness.<8mm diameter    This has resolved. Has not needed recently. One day of some lightheadedness/dizziness since sleeve gastrectomy - patient relates this to not meeting water goal. Since meeting this goal, no vertigo.    Today's Vitals: Ht 5' 4\" (1.626 m)   Wt 261 lb (118.4 kg)   LMP 07/10/2024 (Approximate)   BMI 44.80 kg/m    ----------------  Post Discharge Medication Reconciliation Status: discharge medications reconciled and changed, per note/orders.    I spent 30 minutes with this patient today. All changes were made via collaborative practice agreement with Jeannie Noe. A copy of the visit note was provided to the patient's provider(s).    A summary of these recommendations was sent via Sequence.    Micki Martins, Pharm D., MPH    Medication Therapy Management Pharmacist   [facility]      Telemedicine Visit Details  The patient's medications can be safely assessed via a telemedicine encounter.  Type of service:  Video Conference via [employer]  Originating Location (pt. Location): Home    Distant Location (provider location):  " Off-site  Start Time:  7:35 AM  End Time:  8:05 AM     Medication Therapy Recommendations  History of stroke   1 Current Medication: rosuvastatin (CRESTOR) 20 MG tablet   Current Medication Sig: Take 1 tablet (20 mg) by mouth daily.   Rationale: Medication product not available - Adherence - Adherence   Recommendation: Provide Adherence Intervention   Status: Accepted per CPA   Identified Date: 11/18/2024 Completed Date: 11/18/2024         S/P laparoscopic sleeve gastrectomy   1 Current Medication: simethicone (MYLICON) 80 MG chewable tablet   Current Medication Sig: Take 2 tablets (160 mg) by mouth every 6 hours as needed for cramping or flatulence.   Rationale: Does not understand instructions - Adherence - Adherence   Recommendation: Provide Education   Status: Patient Agreed - Adherence/Education   Identified Date: 11/18/2024 Completed Date: 11/18/2024

## 2024-11-18 NOTE — PATIENT INSTRUCTIONS
Recommendations from MTM Pharmacist visit:                                                    MTM (medication therapy management) is a service provided by a clinical pharmacist designed to help you get the most of out of your medicines.  You may be sent a phone or email survey evaluating today's visit.  Please provide feedback you have for the service he received today if you are able.      Continue to use simethicone as needed for gas/bloating. Be mindful of not slurping or swallowing extra air with purees and liquids. I will discuss gas/belching more with care team.    Restart rosuvastatin and aspirin for stroke prevention- prescriptions sent to [facility]. We will plan to restart Ozempic in the future for this as well after you are healed and feeling better from your sleeve gastrectomy.     Take the following after a Sleeve Gastrectomy:  - Multivitamin/minerals: Barimelts - 2 tabs daily. Allow to dissolve, don't swallow whole  - Calcium Citrate: twice daily sent to [facility] today.    Check blood pressure at home daily and make an appointment with Arlin Ewing MD in next few weeks to help monitor meds for blood pressure after a stroke. Let us know if your blood pressure is above 140/80 mmHg.      To prepare to take your blood pressure:  Do not consume any caffeinated beverages (tea, coffee, soda), do not smoke, do not exercise for 30 minutes before measuring your blood pressure.  Sit quietly for at least 5 minutes before starting to measure your blood pressure.    To measure your blood pressure:  Sit with both feet flat on the floor. Do not stand, do not lie down.  Place the cuff on your arm above your elbow so that your elbow can bend comfortably.  Pull the cuff tight enough to stay in place and allow for your fingers to fit between your arm and the cuff.  Position the cuff so that the cord lies down the inside of your arm.  Do not talk while you are using the machine.  Press the START button. It will  "squeeze your arm and then release slowly.   When you see the numbers on the screen, you are done.   Write the numbers down and the time of day so that you can track what they are.    Follow-up: 12/3 with Sharon Osborn, Registered Dietician  12/3 with Pia Pandya PA-C   1/15 with Micki Martins, PharmD         It was great speaking with you today.  I value your experience and would be very thankful for your time in providing feedback in our clinic survey. In the next few days, you may receive an email or text message from [facility] with a link to a survey related to your  clinical pharmacist.\"     To schedule another MTM appointment, please call the clinic directly ([facility] Phone Number: [phone] (schedules for [facility] and [facility] - providers, dietitians, health coaches) or you may call the MTM scheduling line at [phone] or toll-free at [phone].     My Clinical Pharmacist's contact information:                                                      Please feel free to contact me with any questions or concerns you have.      Micki Martins, Pharm D., MPH    Medication Therapy Management Pharmacist   [facility]               "

## 2024-11-18 NOTE — Clinical Note
Yancy is doing mostly well after sleeve gastrectomy 10/29 - but notes some troublesome gas after eating 2+ bites of pureed foods and even full liquids (Sharon I think she's been talking to you about this already). Levsin not helpful, simethicone is so I think gas, not cramps.  I asked her to monitor HOW she takes those in - like slurp?  Does NOT drink water with food. Does NOT drink carbonated beverage. Does NOT use straw with fluids.  So she has gone back to liquids and only eating 1-2 bites of purees. Thoughts from the team on this? She is seeing Sharon and JESSICA on 12/3 so maybe ok to just monitor and she is trying to work on incorporating more purees as she can between now and then.  I send an prescription to restart aspirin and rosuva given she had a stroke in July. Told her we will likely restart Ozempic, but not the time at the moment given continued gas/bloat.  Of note, she htinks home scale apurva bout 10lbs heavier than clinic.  jessenia

## 2024-11-18 NOTE — NURSING NOTE
Current patient location: home    Is the patient currently in the state of [place]? YES    Visit mode:VIDEO    If the visit is dropped, the patient can be reconnected by: VIDEO VISIT: Text to cell phone:   Telephone Information:   Mobile [phone]       Will anyone else be joining the visit? NO  (If patient encounters technical issues they should call [phone] :[phone])    Are changes needed to the allergy or medication list? Pt stated no changes to allergies and Pt stated no med changes    Are refills needed on medications prescribed by this physician? NO    Rooming Documentation:  Not applicable      Reason for visit: Medication Therapy Management    Wt other than 24 hrs:    Pain more than one location:  no  Maine MENDOZA

## 2024-11-19 RX ORDER — SIMETHICONE 125 MG
125 TABLET,CHEWABLE ORAL 4 TIMES DAILY PRN
Qty: 120 TABLET | Refills: 1 | Status: SHIPPED | OUTPATIENT
Start: 2024-11-19

## 2024-12-02 NOTE — PROGRESS NOTES
"Video-Visit Details    Type of service:  Video Visit    Video Start Time: 9:27 AM    Video End Time: 9:59 AM    Originating Location (pt. Location): Home    Distant Location (provider location):  Offsite (providers home) [facility] [place]     Platform used for Video Visit: Radha    Nutrition Reassessment  Reason For Visit:  Yancy Hoover is a 54 year old female presenting today for nutrition follow-up, 1 month s/p laparoscopic sleeve gastrectomy with Dr. Alcaraz on 10/29/24.  Patient referred by Jeannie Noe NP  on November 4, 2024.    Anthropometrics  Initial Consult Weight: 296 lbs  Day of Surgery Weight(10/29/24): 282 lbs  Current Weight:   Estimated body mass index is 43.08 kg/m  as calculated from the following:    Height as of this encounter: 1.626 m (5' 4\").    Weight as of this encounter: 113.9 kg (251 lb).  Weight loss: 45 lbs from initial consult; 31 lbs from day of surgery    Current Vitamins/Minerals: MVI/minerals BID (Juan Carlos Melt).       Nutrition History:  Difficulty tolerating pureed diet at first. Pt sent [employer] Githart msg 11/14 - \"I have been on the purée diet for 4 days now and it s not going really well. I can take 1 or 2 bites of whatever I purée, I don t deviate from the list of foods, I automatically get stomach cramps after the first 2 bites. I m using a baby spoon and eating slow.\"     Pt states today she is not tolerating pureed meat and beans feeling really gassy, some pain. Taking Gas-x with meals, which is helping. Can tolerate fruits (peaches, pears), veggies (mashed potato, avocado), eggs, hummus, cottage cheese.   Protein: 75 gm per day - Drinking 2.5 Premier protein shakes daily.   Fluids: 70-80 oz/d - Water (48 oz), may add sugar-free liquid IV. Protein shakes (30 oz).    Recent Diet Recall:  Breakfast: mashed boiled egg.   Lunch: 1/4 c avocado and hummus; cottage cheese with fruit   Dinner: low-fat tomato basil soup     Physical Activity: " walking daily for 15 mins, doing infinity hoop for 10 mins TID.     Progress with Previous Goals:  1) Follow bariatric low-fat full liquid diet through day 13 post-op, then to progress to pureed diet x 2 weeks.  If tolerating, may advance on day 29 post-op to bariatric soft diet. Improving, is doing better with purees slowly   2) Work towards 60 gm protein/day. Met, continues   3) Consume 48-64+ oz fluids daily- between meals. Met, continues    4) Eat slowly (>20 min/meal), chewing well to smooth consistency once on the bariatric soft diet. Met, continues   5) Limit portions to ~1/2 cup/meal. Met, continues   6) Start chewable/liquid multivitamin/minerals daily  Met, continues     Nutrition Prescription:  Grams Protein: 60 (minimum)   Amount of Fluid: 48-64 oz    Nutrition Diagnosis  Previous: Food and nutrition-related knowledge deficit r/t lack of prior exposure to diet advancements beyond bariatric low-fat full liquid diet aeb recent bariatric surgery and pt interest in diet education/review     Current: Food and nutrition-related knowledge deficit r/t lack of prior exposure to diet advancements beyond bariatric pureed diet aeb recent bariatric surgery and pt interest in diet education/review     Intervention  Materials/Education provided on bariatric soft and regular consistency diets, protein intake, fluid intake, eating pace, chewing foods well, portion control, sugar/fat intake, recommended vitamin/mineral supplements. Patient demonstrates understanding.       Expected Engagement: good    Goals:  1) Follow soft diet for 4 weeks, then to progress to bariatric regular diet (12/24).    - Avoid meat and beans for now. Continue to incorporate pureed/soft foods you tolerate at each meal period.   2) Consume 60+ grams of protein/day.  3) Sip on 48-64+ oz of fluids/day- between meals only.  4) Eat slowly (>20 min/meal), chewing foods well (to applesauce-like consistency).  5) Limit portions to ~1/2 cup/meal until 3  months post op  6) Schedule 3 month provider/RD appointments  7) Get 3 month labs done before next appointments   8) Take vitamins/minerals as recommended    - Currently calcium needs are being met with Premier shakes. Calcium chews ordered to use if you reduce protein shake.    Protein Supplements:   Unflavored protein powder: Genepro, Isopure (25-30 gm protein per 1 scoop); Vital Proteins collagen powder (1 tbsp = 5 gm pro)  You may also use flavored protein powder if you prefer.   Protein shots: https://store.AxesNetwork/collections/protein-shots  Pre-made protein shakes (no more than 210 Calories, at least 20 grams of protein, and less than 10 grams of sugar):   Premier Protein (160 Calories, 30 g protein)  Boost/Ensure Max (160 calories, 30 gm protein)   Fairlife Core Power (170 calories, 26 gm protein)  Aldi's Elevation Protein Shake (160 calories, 30 gm protein)   Equate Protein Shake (160 calories, 30 gm protein)  Slim Fast Advanced Nutrition (180 Calories, 20 g protein)  Muscle Milk, lactose-free, 17 oz bottle (210 Calories, 30 g protein)  Glucerna Protein Smart (150 paul, 30 gm protein)  Clear Protein Drinks:  BiPro  Premier Protein clear  [id number]  M Health Protein 15 Concentrate  Bone broth  Beneprotein protein powder mixed with 4-6 oz of fluid  [id number]  Gatorade Zero with Protein   Vital Proteins collagen powder mixed with clear fluid      Post-op Diet Handouts:  Diet Guidelines after Weight-loss Surgery  [email or web address]     Your Stage 4 Diet: Soft Foods  [email or web address]    Your Stage 5 Diet: Regular Foods  [email or web address]    Supplements after Sleeve Gastrectomy, Gastric Bypass or Single Anastomosis Duodenal Switch  [email or web address]://[email or web address]    Keeping Track of Fluids  [email or web address]    Exercises after Weight Loss Surgery (strengthening, when no weight lifting restrictions)  [email or web address]      At Home Exercise  Resources  ACE Fitness Library - Exercises by Muscle Group  https://www.Aloompa.org/resources/everyone/exercise-library/    Channels with Exercise Modifications:  Coach Diaz (strengthening) https://www.Zappedyube.com/@Rocky Castañeda (strengthening): https://www.Zappedyube.com/channel/WHZOJ0-XXIOy82GL-t3MZdiR  Yoga with Zelinda: https://www.Zappedyube.com/@yogawithzelinda  Ety0Ycog (strengthening for any age, functional strength training, exercise for seniors): https://www.Zappedyube.com/@nth1tsyp  Improved Health (low-impact, chair exercise, exercise for seniors): https://www.Zappedyube.com/@ImprovedHealth  Seated core exercise: https://www.deskwolf/blog/10-minute-chair-core-workout/    Size-Inclusive Fitness Routines:   Standing (low-impact) Exercise:   https://www.Zappedyube.com/watch?v=gC_L9qAHVJ8  https://www.Zappedyube.com/watch?v=4YbVRZj9YDQ      Yoga  https://www.Zappedyube.com/watch?v=VdIX8auOH_M&t=36s  https://www.Zappedyube.com/watch?v=zUnjJdJitPw    Pilates  https://www.Zappedyube.com/watch?v=tCIjuwDO4Fv    Full Body Strengthening:  https://www.Zappedyube.com/watch?v=9HtXZ1e-zc8   https://www.Zappedyube.com/watch?v=B0b79cGffkM  https://www.Zappedyube.com/watch?v=gC_L9qAHVJ8    Handouts Relating to Exercise :    1.     Learning About Being Physically Active     2.      Muscle Conditionin Exercises    3.     Resistance Training with Free Weights: 3 Exercises    4.      Resistance Training with Surgical Tubin Exercises    5.     Stretchin Exercises    6.     Seated Exercises for Arms and Legs: 11 Exercises            Follow-Up: 3 months post op/prn    Time spent with patient: 32 minutes.  Sharon Osborn RD LD

## 2024-12-03 ENCOUNTER — VIRTUAL VISIT (OUTPATIENT)
Dept: ENDOCRINOLOGY | Facility: CLINIC | Age: 54
End: 2024-12-03
Payer: COMMERCIAL

## 2024-12-03 VITALS — WEIGHT: 251 LBS | HEIGHT: 64 IN | BODY MASS INDEX: 42.85 KG/M2

## 2024-12-03 VITALS — HEIGHT: 64 IN | BODY MASS INDEX: 42.85 KG/M2 | WEIGHT: 251 LBS

## 2024-12-03 DIAGNOSIS — E66.813 CLASS 3 SEVERE OBESITY DUE TO EXCESS CALORIES WITH BODY MASS INDEX (BMI) OF 50.0 TO 59.9 IN ADULT, UNSPECIFIED WHETHER SERIOUS COMORBIDITY PRESENT (H): ICD-10-CM

## 2024-12-03 DIAGNOSIS — E11.9 TYPE 2 DIABETES MELLITUS WITHOUT COMPLICATION, WITHOUT LONG-TERM CURRENT USE OF INSULIN (H): ICD-10-CM

## 2024-12-03 DIAGNOSIS — K21.00 GASTROESOPHAGEAL REFLUX DISEASE WITH ESOPHAGITIS, UNSPECIFIED WHETHER HEMORRHAGE: ICD-10-CM

## 2024-12-03 DIAGNOSIS — K44.9 HIATAL HERNIA: ICD-10-CM

## 2024-12-03 DIAGNOSIS — Z98.84 S/P LAPAROSCOPIC SLEEVE GASTRECTOMY: ICD-10-CM

## 2024-12-03 DIAGNOSIS — Z98.84 S/P LAPAROSCOPIC SLEEVE GASTRECTOMY: Primary | ICD-10-CM

## 2024-12-03 DIAGNOSIS — E66.9 OBESITY: ICD-10-CM

## 2024-12-03 DIAGNOSIS — K59.00 CONSTIPATION, UNSPECIFIED CONSTIPATION TYPE: Primary | ICD-10-CM

## 2024-12-03 DIAGNOSIS — Z71.3 NUTRITIONAL COUNSELING: ICD-10-CM

## 2024-12-03 DIAGNOSIS — E66.01 CLASS 3 SEVERE OBESITY DUE TO EXCESS CALORIES WITH BODY MASS INDEX (BMI) OF 50.0 TO 59.9 IN ADULT, UNSPECIFIED WHETHER SERIOUS COMORBIDITY PRESENT (H): ICD-10-CM

## 2024-12-03 PROCEDURE — 97803 MED NUTRITION INDIV SUBSEQ: CPT | Mod: 95 | Performed by: DIETITIAN, REGISTERED

## 2024-12-03 PROCEDURE — 99207 PR NO CHARGE LOS: CPT | Mod: 95 | Performed by: DIETITIAN, REGISTERED

## 2024-12-03 PROCEDURE — 99024 POSTOP FOLLOW-UP VISIT: CPT | Mod: 95

## 2024-12-03 RX ORDER — POLYETHYLENE GLYCOL 3350 17 G/17G
1 POWDER, FOR SOLUTION ORAL DAILY
Qty: 578 G | Refills: 4 | Status: SHIPPED | OUTPATIENT
Start: 2024-12-03

## 2024-12-03 ASSESSMENT — PAIN SCALES - GENERAL
PAINLEVEL_OUTOF10: NO PAIN (0)
PAINLEVEL_OUTOF10: NO PAIN (0)

## 2024-12-03 NOTE — LETTER
"12/3/2024       RE: Yancy Hoover  [address]  [place] [address]     Dear Colleague,    Thank you for referring your patient, Yancy Hoover, to the [facility] [place] at [facility]. Please see a copy of my visit note below.    Video-Visit Details    Type of service:  Video Visit    Video Start Time: 9:27 AM    Video End Time: 9:59 AM    Originating Location (pt. Location): Home    Distant Location (provider location):  Offsite (providers home) [facility] [place]     Platform used for Video Visit: [employer]    Nutrition Reassessment  Reason For Visit:  Yancy Hoover is a 54 year old female presenting today for nutrition follow-up, 1 month s/p laparoscopic sleeve gastrectomy with Dr. Alcaraz on 10/29/24.  Patient referred by Jeannie Noe NP  on November 4, 2024.    Anthropometrics  Initial Consult Weight: 296 lbs  Day of Surgery Weight(10/29/24): 282 lbs  Current Weight:   Estimated body mass index is 43.08 kg/m  as calculated from the following:    Height as of this encounter: 1.626 m (5' 4\").    Weight as of this encounter: 113.9 kg (251 lb).  Weight loss: 45 lbs from initial consult; 31 lbs from day of surgery    Current Vitamins/Minerals: MVI/minerals BID (Juan Carlos Melt).       Nutrition History:  Difficulty tolerating pureed diet at first. Pt sent Let msg 11/14 - \"I have been on the purée diet for 4 days now and it s not going really well. I can take 1 or 2 bites of whatever I purée, I don t deviate from the list of foods, I automatically get stomach cramps after the first 2 bites. I m using a baby spoon and eating slow.\"     Pt states today she is not tolerating pureed meat and beans feeling really gassy, some pain. Taking Gas-x with meals, which is helping. Can tolerate fruits (peaches, pears), veggies (mashed potato, avocado), eggs, hummus, cottage cheese. "   Protein: 75 gm per day - Drinking 2.5 Premier protein shakes daily.   Fluids: 70-80 oz/d - Water (48 oz), may add sugar-free liquid IV. Protein shakes (30 oz).    Recent Diet Recall:  Breakfast: mashed boiled egg.   Lunch: 1/4 c avocado and hummus; cottage cheese with fruit   Dinner: low-fat tomato basil soup     Physical Activity: walking daily for 15 mins, doing infinity hoop for 10 mins TID.     Progress with Previous Goals:  1) Follow bariatric low-fat full liquid diet through day 13 post-op, then to progress to pureed diet x 2 weeks.  If tolerating, may advance on day 29 post-op to bariatric soft diet. Improving, is doing better with purees slowly   2) Work towards 60 gm protein/day. Met, continues   3) Consume 48-64+ oz fluids daily- between meals. Met, continues    4) Eat slowly (>20 min/meal), chewing well to smooth consistency once on the bariatric soft diet. Met, continues   5) Limit portions to ~1/2 cup/meal. Met, continues   6) Start chewable/liquid multivitamin/minerals daily  Met, continues     Nutrition Prescription:  Grams Protein: 60 (minimum)   Amount of Fluid: 48-64 oz    Nutrition Diagnosis  Previous: Food and nutrition-related knowledge deficit r/t lack of prior exposure to diet advancements beyond bariatric low-fat full liquid diet aeb recent bariatric surgery and pt interest in diet education/review     Current: Food and nutrition-related knowledge deficit r/t lack of prior exposure to diet advancements beyond bariatric pureed diet aeb recent bariatric surgery and pt interest in diet education/review     Intervention  Materials/Education provided on bariatric soft and regular consistency diets, protein intake, fluid intake, eating pace, chewing foods well, portion control, sugar/fat intake, recommended vitamin/mineral supplements. Patient demonstrates understanding.       Expected Engagement: good    Goals:  1) Follow soft diet for 4 weeks, then to progress to bariatric regular diet (12/24).     - Avoid meat and beans for now. Continue to incorporate pureed/soft foods you tolerate at each meal period.   2) Consume 60+ grams of protein/day.  3) Sip on 48-64+ oz of fluids/day- between meals only.  4) Eat slowly (>20 min/meal), chewing foods well (to applesauce-like consistency).  5) Limit portions to ~1/2 cup/meal until 3 months post op  6) Schedule 3 month provider/RD appointments  7) Get 3 month labs done before next appointments   8) Take vitamins/minerals as recommended    - Currently calcium needs are being met with Premier shakes. Calcium chews ordered to use if you reduce protein shake.    Protein Supplements:   Unflavored protein powder: Genepro, Isopure (25-30 gm protein per 1 scoop); Vital Proteins collagen powder (1 tbsp = 5 gm pro)  You may also use flavored protein powder if you prefer.   Protein shots: https://store.Tripvisto/collections/protein-shots  Pre-made protein shakes (no more than 210 Calories, at least 20 grams of protein, and less than 10 grams of sugar):   Premier Protein (160 Calories, 30 g protein)  Boost/Ensure Max (160 calories, 30 gm protein)   Fairlife Core Power (170 calories, 26 gm protein)  Aldi's Elevation Protein Shake (160 calories, 30 gm protein)   Equate Protein Shake (160 calories, 30 gm protein)  Slim Fast Advanced Nutrition (180 Calories, 20 g protein)  Muscle Milk, lactose-free, 17 oz bottle (210 Calories, 30 g protein)  Glucerna Protein Smart (150 paul, 30 gm protein)  Clear Protein Drinks:  BiPro  Premier Protein clear  [id number]  M Health Protein 15 Concentrate  Bone broth  Beneprotein protein powder mixed with 4-6 oz of fluid  [id number]  Gatorade Zero with Protein   Vital Proteins collagen powder mixed with clear fluid      Post-op Diet Handouts:  Diet Guidelines after Weight-loss Surgery  [email or web address]     Your Stage 4 Diet: Soft Foods  [email or web address]    Your Stage 5 Diet: Regular  Foods  [email or web address]    Supplements after Sleeve Gastrectomy, Gastric Bypass or Single Anastomosis Duodenal Switch  [email or web address]://[email or web address]/[email or web address]    Keeping Track of Fluids  [email or web address]    Exercises after Weight Loss Surgery (strengthening, when no weight lifting restrictions)  [email or web address]      At Home Exercise Resources  [employer] Library - Exercises by Muscle Group  https://www.Globitel.org/resources/everyone/exercise-library/    Channels with Exercise Modifications:  Coach Diaz (strengthening) https://www.UCampusube.com/@Rocky  HASfit (strengthening): https://www.UCampusube.Dealo/channel/UJFXG4-EQBTm23EF-u4KQyoW  Yoga with Zelinda: https://www.UCampusube.com/@yogawithzelinda  Soo4Ojzo (strengthening for any age, functional strength training, exercise for seniors): https://www.Harry's.com/@nef6yddr  Improved Health (low-impact, chair exercise, exercise for seniors): https://www.UCampusube.com/@ImprovedHealth  Seated core exercise: https://www.Augmate.Dealo/blog/10-minute-chair-core-workout/    Size-Inclusive Fitness Routines:   Standing (low-impact) Exercise:   https://www.UCampusube.com/watch?v=gC_L9qAHVJ8  https://www.UCampusube.com/watch?v=6YgAHCy2YAE      Yoga  https://www.UCampusube.com/watch?v=VdIX8auOH_M&t=36s  https://www.UCampusube.com/watch?v=zUnjJdJitPw    Pilates  https://www.UCampusube.com/watch?v=hYCjuqXE1Vx    Full Body Strengthening:  https://www.UCampusube.com/watch?v=5CqCZ9t-wb4   https://www.UCampusube.com/watch?v=G7s24xWugfH  https://www.Harry's.com/watch?v=gC_L9qAHVJ8    Handouts Relating to Exercise :    1.     Learning About Being Physically Active     2.      Muscle Conditionin Exercises    3.     Resistance Training with Free Weights: 3 Exercises    4.      Resistance Training with Surgical Tubin Exercises    5.     Stretchin Exercises    6.     Seated Exercises for Arms and Legs: 11 Exercises            Follow-Up: 3 months post  op/prn    Time spent with patient: 32 minutes.  Sharon Osborn RD LD      Again, thank you for allowing me to participate in the care of your patient.      Sincerely,    Sharon Osborn RD

## 2024-12-03 NOTE — PATIENT INSTRUCTIONS
Juan J Haines,    Follow-up with dietitian on 1/30    Thank you,    Sharon Osborn, RD, LD  If you would like to schedule or reschedule an appointment with the RD, please call [phone]    Nutrition Goals  1) Follow soft diet for 4 weeks, then to progress to bariatric regular diet (12/24).    - Avoid meat and beans for now. Continue to incorporate pureed/soft foods you tolerate at each meal period.   2) Consume 60+ grams of protein/day.  3) Sip on 48-64+ oz of fluids/day- between meals only.  4) Eat slowly (>20 min/meal), chewing foods well (to applesauce-like consistency).  5) Limit portions to ~1/2 cup/meal until 3 months post op  6) Schedule 3 month provider/RD appointments  7) Get 3 month labs done before next appointments   8) Take vitamins/minerals as recommended    - Currently calcium needs are being met with Premier shakes. Calcium chews ordered to use if you reduce protein shake.    Protein Supplements:   Unflavored protein powder: Genepro, Isopure (25-30 gm protein per 1 scoop); Vital Proteins collagen powder (1 tbsp = 5 gm pro)  You may also use flavored protein powder if you prefer.   Protein shots: https://store.bariatricParinGenix.com/collections/protein-shots  Pre-made protein shakes (no more than 210 Calories, at least 20 grams of protein, and less than 10 grams of sugar):   Premier Protein (160 Calories, 30 g protein)  Boost/Ensure Max (160 calories, 30 gm protein)   Fairlife Core Power (170 calories, 26 gm protein)  Aldi's Elevation Protein Shake (160 calories, 30 gm protein)   Equate Protein Shake (160 calories, 30 gm protein)  Slim Fast Advanced Nutrition (180 Calories, 20 g protein)  Muscle Milk, lactose-free, 17 oz bottle (210 Calories, 30 g protein)  Glucerna Protein Smart (150 paul, 30 gm protein)  Clear Protein Drinks:  BiPro  Premier Protein clear  Lyeuuwe5J  M Health Protein 15 Concentrate  Bone broth  Beneprotein protein powder mixed with 4-6 oz of fluid  [id number]  Gatorade Zero with Protein    Vital Proteins collagen powder mixed with clear fluid      Post-op Diet Handouts:  Diet Guidelines after Weight-loss Surgery  [email or web address]     Your Stage 4 Diet: Soft Foods  [email or web address]    Your Stage 5 Diet: Regular Foods  [email or web address]    Supplements after Sleeve Gastrectomy, Gastric Bypass or Single Anastomosis Duodenal Switch  [email or web address]    Keeping Track of Fluids  [email or web address]    Exercises after Weight Loss Surgery (strengthening, when no weight lifting restrictions)  [email or web address]      At Home Exercise Resources  New Health Sciences Library - Exercises by Muscle Group  https://www.[employer].org/resources/everyone/exercise-library/    Channels with Exercise Modifications:  Coach Diaz (strengthening) https://www.Black Card Mediaube.com/@Rocky  HASfit (strengthening): https://www.Black Card Mediaube.Epoch/channel/PZJTZ3-HZQWj07HI-m7LDekX  Yoga with Zelinda: https://www.Black Card Mediaube.com/@yogawithzelinda  Azi4Uoqe (strengthening for any age, functional strength training, exercise for seniors): https://www.Black Card Mediaube.com/@tjw1mvue  Improved Health (low-impact, chair exercise, exercise for seniors): https://www.Black Card Mediaube.com/@ImprovedHealth  Seated core exercise: https://www.Forus Health.Epoch/blog/10-minute-chair-core-workout/    Size-Inclusive Fitness Routines:   Standing (low-impact) Exercise:   https://www.Black Card Mediaube.com/watch?v=gC_L9qAHVJ8  https://www.Black Card Mediaube.com/watch?v=9YdYAId1ULA      Yoga  https://www.Black Card Mediaube.com/watch?v=VdIX8auOH_M&t=36s  https://www.Black Card Mediaube.com/watch?v=zUnjJdJitPw    Pilates  https://www.Black Card Mediaube.com/watch?v=pKPdfzAT6Jh    Full Body Strengthening:  https://www.youSequoia Pharmaceuticalsube.com/watch?v=3HqLT6v-my7   https://www.Black Card Mediaube.com/watch?v=P9n01cEtysO  https://www.Black Card Mediaube.com/watch?v=gC_L9qAHVJ8    Handouts Relating to Exercise :    1.     Learning About Being Physically Active     2.      Muscle Conditionin Exercises    3.      "Resistance Training with Free Weights: 3 Exercises    4.      Resistance Training with Surgical Tubin Exercises    5.     Stretchin Exercises    6.     Seated Exercises for Arms and Legs: 11 Exercises             COMPREHENSIVE WEIGHT MANAGEMENT PROGRAM  VIRTUAL SUPPORT GROUPS    At [facility], our Comprehensive Weight Management program offers on-line support groups for patients who are working on weight loss and considering, preparing for, or have had weight loss surgery.     There is no cost for this opportunity.  You are invited to attend the?Virtual Support Groups?provided by any of the following locations:    [facility] via Microsoft Teams with Larisa Werner RD, RN  2.   [place] via PeopLease with Dariusz Yang, PhD, LP  3.   [place] via PeopLease with Darya Cuello RN    The following Support Group information can also be found on our website:  https://www.[facility].org/treatments/weight-loss-and-weight-loss-surgery-support-groups      [facility] WEIGHT LOSS SURGERY SUPPORT GROUP  The support group is a patient-lead forum that meets monthly to share experiences, encouragement and education. It is open to those who have had weight loss surgery, are scheduled for surgery, or are considering surgery.   WHEN:  of each month from 5:00PM - 6:00PM using Microsoft Teams.   FACILITATOR: Led by Larisa Cuba RD, LD, RN, the program's Clinical Coordinator.   TO REGISTER: Please contact the clinic via PlayDo or call the nurse line directly at [phone] to inform our staff that you would like an invite sent to you and to let us know the email you would like the invite sent to. Prior to the meeting, a link with directions on how to join the meeting will be sent to you.     Meetings    : Madison Lee, PhD, Outpatient Clinical Therapist, \"Pro Tips for Habit Change\".  :  Let's Talk  This will be a time of group support.?? " "  November 20:  Let's Talk  about How to Navigate the Upcoming Holiday Season.  December 18:  Let's Talk  and Celebrate the past year.       [facility] BARIATRIC CARE SUPPORT GROUP  This is open to all pre- and post- operative bariatric surgery patients as well as their support system.   WHEN: 3rd Tuesday of each month from 6:30 PM - 8:00 PM using Microsoft Teams.   FACILITATOR: Led by Dariusz Yang, Ph.D who is a Licensed Psychologist with the [facility] Comprehensive Weight Management Program.   TO REGISTER: Please send an email to Dariusz Yang, Ph.D.,  [email or web address]?if you would like an invitation to the group. Prior to the meeting, a link with directions on how to join the meeting will be sent to you.    2024 Meetings September 17: IN PERSON MEETING. [facility], [address] [place] [address], [id number], 1st floor Conference Room.  October 15: Date changed to OCTOBER 8th (2nd Tuesday).   November 19: \"Holiday Eating\", Krissy Lozano, ALISON, LD.  December 17: \"Hospital Stay and Compliance\", Darya Cuello RN, CBN.      [facility] POST-OPERATIVE BARIATRIC SURGERY SUPPORT GROUP  This is a support group for [facility] bariatric patients (and those external to [facility]) who have had bariatric surgery and are at least 3 months post-surgery.  WHEN: 4th Thursday of the month from 11:00 AM - 12:00 PM using Microsoft Teams.   FACILITATOR: Led by Certified Bariatric Nurse, Darya Cuello RN, CBN.   TO REGISTER: Please send an email to Darya at [email or web address] if you would like an invitation to the group.  Prior to the meeting, a link with directions on how to join the meeting will be sent to you.    2024 Meetings September 26 October 24 November 28: No meeting  December 26                             "

## 2024-12-03 NOTE — NURSING NOTE
Current patient location: home     Is the patient currently in the state of [place]? YES    Visit mode:VIDEO    If the visit is dropped, the patient can be reconnected by: VIDEO VISIT: Text to cell phone:   Telephone Information:   Mobile [phone]       Will anyone else be joining the visit? NO  (If patient encounters technical issues they should call [phone])    Are changes needed to the allergy or medication list? N/A    Are refills needed on medications prescribed by this physician? NO    Rooming Documentation:  Not applicable      Reason for visit: RECHECK    Wt other than 24 hrs:    Pain more than one location:  no  Maine MENDOZA

## 2024-12-03 NOTE — PROGRESS NOTES
"Virtual Visit Details    Type of service:  Video Visit   Video Start Time: {video visit start/end time for provider to select:341662}  Video End Time:{video visit start/end time for provider to select:100325}    Originating Location (pt. Location): {video visit patient location:094714::\"Home\"}  {PROVIDER LOCATION On-site should be selected for visits conducted from your clinic location or adjoining [facility] hospital, academic office, or other nearby [facility] building. Off-site should be selected for all other provider locations, including home:882955}  Distant Location (provider location):  {virtual location provider:070394}  Platform used for Video Visit: {Virtual Visit Platforms:703821::\"AmWell\"}    Postoperative bariatric surgery visit.    Patient underwent sleeve gastrectomy 5 weeks ago, on 10/29/24 with Dr. Alcaraz.      Tolerating liquids: Has been having a harder time with pureed foods. If has a \"heavy\" food will have abdominal pain, gas, and burping. Does not happen with every meal. Taking Gas-x 3xdaily after meals with relief. Was advised by RD today to take it before meals. Portion sizes around < 1/4 cup. Eating slowly. Chewing thoroughly. Has been having some pill dysphagia 2-3xweek - typically happens if takes after meals Only has food dysphagia when doesn't wait 30min after drinking before eating. Got confused on the dates on food stages, will be starting soft food diet this week. 60g+ protein daily. 48-64oz of fluids daily. No nausea, vomiting. Not taking any zofran  Lightheadedness: No  Abdominal pain: Will have a tightening feeling on LUQ, will last for around 30min. Will improved after BM. Has stomach cramping every 2 days. Cannot find a trigger. Continues to take Levsin PRN with relief..   Bowel movements: Having a BM every 2 days. Stools are hard. Has to push/strain. No blood in stool or melena. Taking Senna every 2 days with some relief - will typically end up in diarrhea. Had constipation prior to " "surgery and was taking miralax.   Fevers/shakes/chills: No  GERD: Has some regurg symptoms after eating  Taking omeprazole in the morning.   Leg/calf pain: No  Chest pain/SOB: No, no palpation   Walking: was walking daily before it got cold. Has started infinity hoop doing it for 10min 3xdaily.   Work: goes back to work after the holidays. No noted needed    Type II diabetes - not on any medications currently. Possibly will be restarting ozempic in Jan with hx of stroke. Checking BS at home - fasting .      HTN - not taking any medications. BP at home - 120/80.     How many opioid pain medications used after surgery? 1 What did you do with extra pills? home  Were any opioid pain medication refills provided after surgery? No  Were any opioid pain medications needed after 30 days postop? N/A    Ht 1.626 m (5' 4\")   Wt 113.9 kg (251 lb)   LMP 07/10/2024 (Approximate)   BMI 43.08 kg/m    GENERAL: alert and no distress  EYES: Eyes grossly normal to inspection.  No discharge or erythema, or obvious scleral/conjunctival abnormalities.  RESP: No audible wheeze, cough, or visible cyanosis.    SKIN: Visible skin clear. No significant rash, abnormal pigmentation or lesions.  NEURO: Cranial nerves grossly intact.  Mentation and speech appropriate for age.  PSYCH: Appropriate affect, tone, and pace of words    Incisions are fully healed. No scabs.       Wt Readings from Last 5 Encounters:   12/03/24 113.9 kg (251 lb)   12/03/24 113.9 kg (251 lb)   11/18/24 118.4 kg (261 lb)   11/05/24 121.6 kg (268 lb)   11/05/24 121.7 kg (268 lb 3.2 oz)           Plan:  1. RD visit today.       - Start vitamin supplements per RD directions.      - Advance diet per RD directions.  2. Follow-up: Sophia Barahona PA-C on 1/29/2024  3. Actigall prescription - hold until no longer having any symptoms   4. B12 SL or injection - within MV   5. Pathology reviewed - No concerns   6. Weight loss medications ***  7. Need to restart statin - N/A "   8. Discussed restrictions of no lifting, pushing pulling >20lbs till 4weeks post op.  9. Discussed no water submersion till incisions are completely healed.  10. Constipation - start Miralax 1 cap daily. Prescription sent. Can take Senna as needed.   11. GERD/Gas - increase omeprazole 40mg daily. Take gas-x prior to each meal. Levsin as needed   12. Hold actigall   13. HTN - coninute to moniotr   14. Type II d - continue to monitor     AFTER HOURS QUESTIONS OR CONCERNS: Call [phone] and ask to speak with surgery resident if you are having troubles in the evenings, at night, or on weekends     Pia Pandya PA-C

## 2024-12-03 NOTE — NURSING NOTE
Current patient location: home     Is the patient currently in the state of [place]? YES    Visit mode:VIDEO    If the visit is dropped, the patient can be reconnected by: VIDEO VISIT: Text to cell phone:   Telephone Information:   Mobile [phone]       Will anyone else be joining the visit? NO  (If patient encounters technical issues they should call [phone])    Are changes needed to the allergy or medication list? Pt stated no changes to allergies and Pt stated no med changes    Are refills needed on medications prescribed by this physician? NO    Rooming Documentation:  Questionnaire(s) completed      Reason for visit: RECHECK    Wt other than 24 hrs:    Pain more than one location:  no  Maine MENDOZA

## 2024-12-03 NOTE — LETTER
"12/3/2024       RE: Yancy Hoover  [address]  [facility] [address]     Dear Colleague,    Thank you for referring your patient, Yancy Hoover, to the [facility]NAG[facility] [place] at [facility]. Please see a copy of my visit note below.    Virtual Visit Details    Type of service:  Video Visit   Video Start Time:  10:30AM  Video End Time: 11:07AM    Originating Location (pt. Location): Home    Distant Location (provider location):  Off-site  Platform used for Video Visit: [facility]    Postoperative bariatric surgery visit.    Patient underwent sleeve gastrectomy 5 weeks ago, on 10/29/24 with Dr. Alcaraz.      Tolerating liquids: Has been having a harder time with pureed foods. If has a \"heavy\" food will have abdominal pain, gas, and burping. Does not happen with every meal. Taking Gas-x 3xdaily after meals with relief. Was advised by RD today to take it before meals. Portion sizes around < 1/4 cup. Eating slowly. Chewing thoroughly. Has been having some pill dysphagia 2-3xweek - typically happens if takes after meals. Only has food dysphagia when doesn't wait 30min after drinking before eating. Got confused on the dates on food stages, will be starting soft food diet this week. 60g+ protein daily. 48-64oz of fluids daily. No nausea, vomiting. Not taking any zofran  Lightheadedness: No  Abdominal pain: Will have a tightening feeling on LUQ, will last for around 30min. Will improved after BM. Has stomach cramping every 2 days. Cannot find a trigger. Continues to take Levsin PRN with relief..   Bowel movements: Having a BM every 2 days. Stools are hard. Has to push/strain. No blood in stool or melena. Taking Senna every 2 days with some relief - will typically end up in diarrhea. Had constipation prior to surgery and was taking miralax.   Fevers/shakes/chills: No  GERD: Has some regurg symptoms after eating  Taking " "omeprazole in the morning.   Leg/calf pain: No  Chest pain/SOB: No, no palpation   Walking: was walking daily before it got cold. Has started infinity hoop doing it for 10min 3xdaily.   Work: goes back to work after the holidays. No noted needed    Type II diabetes - not on any medications currently. Possibly will be restarting ozempic in Jan with hx of stroke. Checking BS at home - fasting .      HTN - not taking any medications. BP at home - 120/80.     How many opioid pain medications used after surgery? 1 What did you do with extra pills? home  Were any opioid pain medication refills provided after surgery? No  Were any opioid pain medications needed after 30 days postop? N/A    Ht 1.626 m (5' 4\")   Wt 113.9 kg (251 lb)   LMP 07/10/2024 (Approximate)   BMI 43.08 kg/m    GENERAL: alert and no distress  EYES: Eyes grossly normal to inspection.  No discharge or erythema, or obvious scleral/conjunctival abnormalities.  RESP: No audible wheeze, cough, or visible cyanosis.    SKIN: Visible skin clear. No significant rash, abnormal pigmentation or lesions.  NEURO: Cranial nerves grossly intact.  Mentation and speech appropriate for age.  PSYCH: Appropriate affect, tone, and pace of words    Incisions are fully healed. No scabs.       Wt Readings from Last 5 Encounters:   12/03/24 113.9 kg (251 lb)   12/03/24 113.9 kg (251 lb)   11/18/24 118.4 kg (261 lb)   11/05/24 121.6 kg (268 lb)   11/05/24 121.7 kg (268 lb 3.2 oz)           Plan:  1. RD visit today.       - Start vitamin supplements per RD directions.      - Advance diet per RD directions.  2. Follow-up: Sophia Barahona PA-C on 1/29/2024  3. Actigall prescription - hold until no longer having any symptoms   4. B12 SL or injection - within MV   5. Pathology reviewed - No concerns   6. Weight loss medications - on ozempic prior to surgery for Type II diabetes  7. Need to restart statin - N/A   8. No lifting restrictions  9. Can submerge in water  10. " Constipation - start Miralax 1 cap daily. Prescription sent. Can take Senna as needed.   11. Abdominal pain/GERD/Gas - increase omeprazole 40mg daily. Take gas-x prior to each meal. Continue levsin as needed. Can consider EGD if symptoms do not improve.   12. Hold actigall due to pill size   13. HTN - continue to monitor blood pressure. Reach out if blood pressure is >140/90 or <100/60  14. Type II diabetes - continue to monitor blood sugar daily   15. 3 month post op labs ordered     AFTER HOURS QUESTIONS OR CONCERNS: Call [phone] and ask to speak with surgery resident if you are having troubles in the evenings, at night, or on weekends     Pia Pandya PA-C       Again, thank you for allowing me to participate in the care of your patient.      Sincerely,    Pia Pandya PA-C

## 2024-12-03 NOTE — Clinical Note
Hi,   I saw Yancy for her 1 month post op visit. Overall it sounds like she is doing better. She will be starting soft foods per protocol.   She is having gas, burping, and regurg after eating. She will take gas-x prior to eating and I increased her Omeprazole to 40mg in the morning. I also wonder if she will do better going into soft food. I do think her GERD is playing a roll in her symptoms as well - so hoping the dose increase helps with this. If she continues to have these symptoms, could consider an EGD.   Pia Pandya PA-C Comprehensive Weight Management

## 2024-12-08 NOTE — PATIENT INSTRUCTIONS
Visit Plan:     1. RD visit today.       - Start vitamin supplements per RD directions.      - Advance diet per RD directions.  2. Follow-up: Sophia Barahona PA-C on 1/29/2024  3. Actigall prescription - hold until no longer having any symptoms   4. B12 SL or injection - within MV   5. Pathology reviewed - No concerns   6. Weight loss medications - on ozempic prior to surgery for Type II diabetes  7. Need to restart statin - N/A   8. No lifting restrictions  9. Can submerge in water  10. Constipation - start Miralax 1 cap daily. Prescription sent. Can take Senna as needed.   11. Abdominal pain/GERD/Gas - increase omeprazole 40mg daily. Take gas-x prior to each meal. Continue levsin as needed. Can consider EGD if symptoms do not improve.   12. Hold actigall due to pill size   13. HTN - continue to monitor blood pressure. Reach out if blood pressure is >140/90 or <100/60  14. Type II diabetes - continue to monitor blood sugar daily   15. 3 month post op labs ordered     AFTER HOURS QUESTIONS OR CONCERNS: Call [phone] and ask to speak with surgery resident if you are having troubles in the evenings, at night, or on weekends

## 2024-12-10 ENCOUNTER — MYC MEDICAL ADVICE (OUTPATIENT)
Dept: PHARMACY | Facility: CLINIC | Age: 54
End: 2024-12-10
Payer: COMMERCIAL

## 2024-12-10 DIAGNOSIS — Z98.84 S/P LAPAROSCOPIC SLEEVE GASTRECTOMY: ICD-10-CM

## 2024-12-10 RX ORDER — SIMETHICONE 125 MG
125 TABLET,CHEWABLE ORAL 4 TIMES DAILY PRN
Qty: 120 TABLET | Refills: 1 | Status: SHIPPED | OUTPATIENT
Start: 2024-12-10

## 2025-01-07 ENCOUNTER — TELEPHONE (OUTPATIENT)
Dept: ENDOCRINOLOGY | Facility: CLINIC | Age: 55
End: 2025-01-07
Payer: COMMERCIAL

## 2025-01-07 NOTE — TELEPHONE ENCOUNTER
Patient confirmed scheduled appointment:     Date: 2/3/25  Time: 9:30 AM  Visit type: Return Bariatric Nutrition  Visit mode: Virtual Visit  Provider:  Mary Anne Harrison  Location: [facility]    Additional Notes: Rescheduled from 1/30/25 with ELIZABETH Osborn

## 2025-01-15 ENCOUNTER — VIRTUAL VISIT (OUTPATIENT)
Dept: PHARMACY | Facility: CLINIC | Age: 55
End: 2025-01-15
Attending: NURSE PRACTITIONER
Payer: COMMERCIAL

## 2025-01-15 VITALS — BODY MASS INDEX: 40.34 KG/M2 | WEIGHT: 235 LBS

## 2025-01-15 DIAGNOSIS — Z98.84 S/P LAPAROSCOPIC SLEEVE GASTRECTOMY: ICD-10-CM

## 2025-01-15 RX ORDER — SIMETHICONE 125 MG
125 TABLET,CHEWABLE ORAL 4 TIMES DAILY PRN
Qty: 120 TABLET | Refills: 1 | Status: SHIPPED | OUTPATIENT
Start: 2025-01-15

## 2025-01-15 ASSESSMENT — PAIN SCALES - GENERAL: PAINLEVEL_OUTOF10: NO PAIN (0)

## 2025-01-15 NOTE — NURSING NOTE
Is the patient currently in the state of [place]? YES    Location: home    Visit mode:VIDEO    If the visit is dropped, the patient can be reconnected by: VIDEO VISIT: Text to cell phone:   Telephone Information:   Mobile [phone]    and VIDEO VISIT: Send to e-mail at: [email or web address]    Will anyone else be joining the visit? NO  (If patient encounters technical issues they should call [phone])    Are changes needed to the allergy or medication list? No    Are refills needed on medications prescribed by this physician? NO    Reason for visit: Medication Therapy Management      Radha Foote, Virtual Visit Facilitator    QNR Status: NA

## 2025-01-15 NOTE — PROGRESS NOTES
"Virtual Visit Details    Type of service:  Video Visit   Video Start Time: {video visit start/end time for provider to select:530786}  Video End Time:{video visit start/end time for provider to select:748915}    Originating Location (pt. Location): {video visit patient location:821928::\"Home\"}  {PROVIDER LOCATION On-site should be selected for visits conducted from your clinic location or adjoining [facility] hospital, academic office, or other nearby [facility] building. Off-site should be selected for all other provider locations, including home:459118}  Distant Location (provider location):  {virtual location provider:988532}  Platform used for Video Visit: {Virtual Visit Platforms:450638::\"Habbo\"}  "

## 2025-01-15 NOTE — PROGRESS NOTES
Medication Therapy Management (MTM) Encounter    ASSESSMENT:                            Medication Adherence/Access: See below for considerations.    Weight management/diabetes:    Patient congratulated on >20% total body weight loss and lifestyle modifications. A1C at goal <7% and fasting blood glucose at goal without pharmacotherapy. Will restart Ozempic when GI symptoms after sleeve gastrectomy improve (about 6 mons or more based on improvements) for MACE benefit secondary to stroke.    Ok to continue simethicone with foods for now as continues to advance diet. Continue proton pump inhibitor for now too given continuing breakthrough GERD. Hyoscyamine not as effective as simethicone for patient , but may consider retrial of this if symptoms not well managed with simethicone in the future.    Education provided on lifestyle modifications to help meet goals and importance of protein and water intake - do not over-exert with activity as continuing to heal.  Vitamin regimen reviewed and adjusted today given some changes due to affordability and confusion - not meeting s/p sleeve gastrectomy requirements at this time.          PLAN:                            Continue to work on getting your water and protein goals (see list below from Sharon Osborn, Registered Dietician).     Micki Martins, PharmD sent refill of simethicone. Keep an eye on if you feel this is less effective and you could retry hyoscyamine from your discharge meds if you feel like there is cramping in addition to the gas you are experiencing.     Update your vitamins:    Take the following after a Sleeve Gastrectomy:  - Multivitamin/minerals: increase your Tab-A-Gil to 2 tabs daily.  Ideally want one that provides:   5,000 - 10,000 international units vitamin A daily   800 mg oral folate daily  8 - 22 mg zinc and 1 - 2 mg copper daily  12 mg Thiamin    - Iron: 45-60 mg elemental (18-36 mg if low risk) - we expect you to meet this with diet and with 2 of  your multivitamin Tab-A-Gil tabs daily.    - Calcium Citrate containing vitamin D:  increase your current dose of 250 mg tabs to 2 tabs three times daily. See below for options for your next bottle:         - Must be a chewable calcium citrate until post-op 3 months     - Options for calcium citrate: Juan Jose calcium citrate chewable, bariatric advantage calcium citrate chewable, Celebrate vitamins calcium citrate chewable, Bariatric Fusion calcium citrate chewable    - Vitamin D - at least 3,000 international units/day between all supplements; your multivitamin has 800 international units (20 mcg - if you take 2 multivitamin tabs daily; you will need to find/add an additional vitamin d supplement at 2000 international units (50 mcg) tab in addition to this.    - Vitamin B12: sublingual form of at least 500 mcg daily or injection of 1000 mcg monthly     Goals with Sharon Osborn, Registered Dietician;  1) Follow soft diet for 4 weeks, then to progress to bariatric regular diet (12/24).               - Avoid meat and beans for now. Continue to incorporate pureed/soft foods you tolerate at each meal period.   2) Consume 60+ grams of protein/day.  3) Sip on 48-64+ oz of fluids/day- between meals only.  4) Eat slowly (>20 min/meal), chewing foods well (to applesauce-like consistency).  5) Limit portions to ~1/2 cup/meal until 3 months post op  6) Schedule 3 month provider/RD appointments  7) Get 3 month labs done before next appointments   8) Take vitamins/minerals as recommended       Follow-up:   Appointments in Next Year      Feb 03, 2025 9:30 AM  (Arrive by 9:15 AM)  Return Bariatric Nutrition Visit with Mary Anne Harrison RD  [facility] [place] ([facility] ) [phone]     Feb 05, 2025 1:00 PM  (Arrive by 12:45 PM)  Return Weight Management Visit with Jeannie Noe NP  [facility] [place] ([facility] ) [phone]     Mar 12, 2025 8:00 AM  Pharmacist Visit with Micki Martins RPH  [facility] MTM ([facility] ) [phone]            SUBJECTIVE/OBJECTIVE:                          Yancy Hoover is a 54 year old female seen for a follow-up visit.       Reason for visit: Medication Therapy Management - s/p sleeve gastrectomy follow up, 90 day (3 month)  .    Allergies/ADRs: Reviewed in chart  Past Medical History: Reviewed in chart  Tobacco: She reports that she quit smoking about 18 months ago. Her smoking use included cigarettes. She has a 10 pack-year smoking history. She has never used smokeless tobacco.  Alcohol: not currently using    Medication Adherence/Access: no issues reported.    Weight Management /Diabetes   Post-Op Medications:   Omeprazole 20 mg daily   Ondansetron 4 mg as needed for nausea  Senna-S 8.6-50 mg as needed for constipation   Hyoscyamine 0.125 mg as needed for abdominal cramping -rare, simethicone more effective  Simethicone 125 mg - 4 times daily as needed for gas.    Supplements  Tabavite with iron from behind pharmacy counter purchased for cash $3/100 quantity, better cost for patient - can't afford Barimelts:  Per tab of tabavite:   Calcium: none   Vitamin d: 10 mcg   Iron: 15 MG   Vitamin B12 1 MCG  Calcium -  250 mg tabs - takes 1 tab twice daily; also getting in protein shakes most days   Vitamin d - has not started, not sure what to get  Vitamin B12 - unknown strength     Sleeve gastrectomy  completed by Dr. Alcaraz on 10/29/24.   Bariatric Surgery Follow-up with Pia Pandya PA-C 12/3/24  Registered dietician visit 12/3/24 with Sharon Osborn Registered Dietician     Patient feels wonderful overall she notes- more energy and losing weight. Noted returning menses which was a concern, but understands this can be a transition with hormones changing with body changing.    Diet progression per registered  dietician visit 12/3/24 has been slowly progressing, working on chewing foods 25x to applesauce consistency.   Beans, protein shakes helping to get protein goals, meat very hard to tolerate - more GERD and gas. Simethicone most effective. Proton pump inhibitor helpful for heart burn mostly. Working to get more water to meet min 48 oz most days, feels tired and buzzing in head when not meeting this goal.    Goals with Sharon Osborn, Registered Dietician;  1) Follow soft diet for 4 weeks, then to progress to bariatric regular diet (12/24).               - Avoid meat and beans for now. Continue to incorporate pureed/soft foods you tolerate at each meal period.   2) Consume 60+ grams of protein/day.  3) Sip on 48-64+ oz of fluids/day- between meals only.  4) Eat slowly (>20 min/meal), chewing foods well (to applesauce-like consistency).  5) Limit portions to ~1/2 cup/meal until 3 months post op  6) Schedule 3 month provider/RD appointments  7) Get 3 month labs done before next appointments   8) Take vitamins/minerals as recommended       Has stopped all diabetes meds - is aware will restart Ozempic for MACE benefit after 6month (important to improve GI impacts first).    Blood glucose readings:   mg/dl fasting since surgery    Activity: much more energy - has been working out with nephew. Working to find balance of pushing self and not over-doing activity, especially with abdominal exercises - acknowledges still healing.     Hemoglobin   Date Value Ref Range Status   10/09/2024 12.6 11.7 - 15.7 g/dL Final   10/18/2020 12.9 11.7 - 15.7 g/dL Final       Ferritin   Date Value Ref Range Status   10/09/2024 85 11 - 328 ng/mL Final       Lab Results   Component Value Date    VITDT 56 (H) 10/09/2024       Lab Results   Component Value Date    PTHI 26 10/09/2024       Lab Results   Component Value Date    B12 406 10/09/2024       Lab Results   Component Value Date    SP 0.41 10/09/2024       Medication  "History:  Topiramate: takes for migraine - not helpful for appetite/cravings   Ozempic: more effective - had some concerns re: side effects, now believes not related. Patient denies personal or family history of MEN Type2, MTC, Pancreatitis.   Bydureon - worked well when on previously in 2023, less effective retrial 2024  Jardiance - UTI  Bupropion - (for depression previously) not effective for appetite/reward suppression and not needed for mental health at this time.    Per visit with Jeannie Noe 5/2/24  NBS 7/2020 296lb   Ongoing MWM with Dr. Velarde wasn't seen in last 1.5 years until this month- titration up on topiramate, on saxenda but other agents not covered?   4/2023 glenn shelley RD     Starting weight (lbs): 296 lb  Surgery date weight: 282 lb                 Current weight today: 235 lbs 0 oz  Cumulative Weight Loss: -61 lb, -20.6% from baseline    Wt Readings from Last 4 Encounters:   01/15/25 235 lb (106.6 kg)   12/03/24 251 lb (113.9 kg)   12/03/24 251 lb (113.9 kg)   11/18/24 261 lb (118.4 kg)     Estimated body mass index is 40.34 kg/m  as calculated from the following:    Height as of 12/3/24: 5' 4\" (1.626 m).    Weight as of this encounter: 235 lb (106.6 kg).        Today's Vitals: Wt 235 lb (106.6 kg)   BMI 40.34 kg/m    ----------------      I spent 28 minutes with this patient today. All changes were made via collaborative practice agreement with Jeannie Noe.     A summary of these recommendations was sent via [employer].    Micki Martins, Pharm D., MPH    Medication Therapy Management Pharmacist   [facility]      Telemedicine Visit Details  The patient's medications can be safely assessed via a telemedicine encounter.  Type of service:  Video Conference via [employer]  Originating Location (pt. Location): Home    Distant Location (provider location):  Off-site  Start Time: 8:05 AM  End Time:  8:33 AM     Medication Therapy Recommendations  S/P laparoscopic " sleeve gastrectomy   1 Current Medication: Multiple Vitamins-Minerals (BARIATRIC MULTIVITAMINS/IRON PO)   Current Medication Sig: Take by mouth daily.   Rationale: Does not understand instructions - Adherence - Adherence   Recommendation: Provide Education   Status: Accepted per CPA   Identified Date: 1/15/2025 Completed Date: 1/15/2025         2 Current Medication: simethicone (MYLICON) 125 MG chewable tablet   Current Medication Sig: Take 1 tablet (125 mg) by mouth 4 times daily as needed for intestinal gas. May take 1/2 - 1 tab prn   Rationale: Synergistic therapy - Needs additional medication therapy - Indication   Recommendation: Start Medication   Status: Patient Agreed - Adherence/Education   Identified Date: 1/15/2025 Completed Date: 1/15/2025

## 2025-01-15 NOTE — PATIENT INSTRUCTIONS
Recommendations from MTM Pharmacist visit:                                                    MTM (medication therapy management) is a service provided by a clinical pharmacist designed to help you get the most of out of your medicines.  You may be sent a phone or email survey evaluating today's visit.  Please provide feedback you have for the service he received today if you are able.      Continue to work on getting your water and protein goals (see list below from Sharon Osborn, Registered Dietician).     Micki Martins, PharmD sent refill of simethicone. Keep an eye on if you feel this is less effective and you could retry hyoscyamine from your discharge meds if you feel like there is cramping in addition to the gas you are experiencing.     Update your vitamins:    Take the following after a Sleeve Gastrectomy:  - Multivitamin/minerals: increase your Tab-A-Gil to 2 tabs daily.  Ideally want one that provides:   5,000 - 10,000 international units vitamin A daily   800 mg oral folate daily  8 - 22 mg zinc and 1 - 2 mg copper daily  12 mg Thiamin    - Iron: 45-60 mg elemental (18-36 mg if low risk) - we expect you to meet this with diet and with 2 of your multivitamin Tab-A-Gil tabs daily.    - Calcium Citrate containing vitamin D:  increase your current dose of 250 mg tabs to 2 tabs three times daily. See below for options for your next bottle:         - Must be a chewable calcium citrate until post-op 3 months     - Options for calcium citrate: Juan Jose calcium citrate chewable, bariatric advantage calcium citrate chewable, Celebrate vitamins calcium citrate chewable, Bariatric Fusion calcium citrate chewable    - Vitamin D - at least 3,000 international units/day between all supplements; your multivitamin has 800 international units (20 mcg - if you take 2 multivitamin tabs daily; you will need to find/add an additional vitamin d supplement at 2000 international units (50 mcg) tab in addition to this.    - Vitamin B12:  "sublingual form of at least 500 mcg daily or injection of 1000 mcg monthly     Goals with Sharon Osborn, Registered Dietician;  1) Follow soft diet for 4 weeks, then to progress to bariatric regular diet (12/24).               - Avoid meat and beans for now. Continue to incorporate pureed/soft foods you tolerate at each meal period.   2) Consume 60+ grams of protein/day.  3) Sip on 48-64+ oz of fluids/day- between meals only.  4) Eat slowly (>20 min/meal), chewing foods well (to applesauce-like consistency).  5) Limit portions to ~1/2 cup/meal until 3 months post op  6) Schedule 3 month provider/RD appointments  7) Get 3 month labs done before next appointments   8) Take vitamins/minerals as recommended       Follow-up:   Appointments in Next Year      Feb 03, 2025 9:30 AM  (Arrive by 9:15 AM)  Return Bariatric Nutrition Visit with Mary Anne Harrison RD  [facility] [place] ([facility] [place] ) [phone]     Feb 05, 2025 1:00 PM  (Arrive by 12:45 PM)  Return Weight Management Visit with Jeannie Noe NP  [facility] [place] ([facility] [place] ) [phone]     Mar 12, 2025 8:00 AM  Pharmacist Visit with Micki Martins RPH  [facility] ([facility] [place] ) [phone]                It was great speaking with you today.  I value your experience and would be very thankful for your time in providing feedback in our clinic survey. In the next few days, you may receive an email or text message from [email or web address] with a link to a survey related to your  clinical pharmacist.\"     To schedule another MTM appointment, please call the clinic directly ([facility] Phone Number: [phone] (schedules for [facility] and [facility] - providers, dietitians, health coaches) or you may call the MTM scheduling line at " [phone] or toll-free at [phone].     My Clinical Pharmacist's contact information:                                                      Please feel free to contact me with any questions or concerns you have.      Micki Martins, Pharm D., MPH    Medication Therapy Management Pharmacist   [facility]          Meal Replacement Shake Options:   *Protein Shake Criteria: no more than 210 Calories, at least 20 grams of protein, and less than 10 grams of sugar   Premier Protein (160 Calories, 30 g protein)  Slim Fast Advanced Nutrition (180 Calories, 20 g protein)  Muscle Milk, lactose-free, 17 oz bottle (210 Calories, 30 g protein)  Integrated Supplements, no artificial sugars (110 Calories, 20 g protein)  Boost/Ensure Max (160 calories, 30 gm protein)   Fairlife Protein Shakes (160-230 calories, 26-42 gm protein)  Aldi's Elevation Protein Powder (180 calories, 30 gm protein)   Orgain Protein Shakes (130-160 calories, 20-26 gm protein)     Meal Replacement Bar Options:  Quest Protein Bars (190 Calories, 20 g protein)  Built Bar (170 Calories, 15-20 g protein)  One Protein Bar (210 calories, 20 g protein)  [place] Signature Protein Bar ([employer]) (190 Calories, 21 g protein)  Pure Protein Bars (180 Calories, 21 g protein)    Low Calorie Frozen Meal:  Healthy Choice Power Bowls  Lean Cuisine  Smart Ones  Jaspal Funk      ---------------------------------------------------------------  Tips to Increase the Protein in Your Diet  You may need more protein in your diet to help you heal from an illness, surgery or wound. Extra protein can also help you gain weight. Here are some ideas for adding high-protein foods to your meals.  Meat and fish  Add chopped cooked meat to vegetables, salads, casseroles, soups, sauces and biscuit dough.  Use in omelets, soufflés, quiches, sandwich fillings and chicken or turkey stuffing.  Wrap in pie crust or biscuit dough to make a  turnover.  Add to stuffed baked potatoes.  Make a dip with diced meat or flaked fish mixed with sour cream and spices.  Chopped, hard-cooked eggs  Add to salads.  Use for snacks and sandwich filling.  High-protein milk  To make high-protein milk, mix 1 quart whole milk with 1 cup powdered milk.  Add to cream soups, mashed potatoes, scrambled eggs, cereals and dried eggnog mix.  Use as an ingredient in puddings, custards, hot chocolate, milk shakes and pancakes.  Powdered milk  If you don't have any high-protein milk on hand, you can use powdered milk. Add 3 tablespoons to:  gravies, sauces, cream soups, mayonnaise  casseroles, meat patties, meatloaf, tuna salad, deviled ham  scalloped or mashed potatoes, creamed spinach  scrambled eggs, egg salad  cereals  yogurt, milk drinks, ice cream, frozen desserts, puddings, custards.  Add 4 to 6 tablespoons powdered milk to make:  cream sauces  breads, muffins, pancakes, waffles, cookies, cakes  cream pies, frostings, cake fillings  fruit cobblers, bread or rice pudding, gelatin desserts.  For high-protein eggnog, add 3 to 6 tablespoons powdered milk to prepared eggnog.  Hard or soft cheese  Melt on sandwiches, breads, tortillas, hamburgers, hot dogs, other meats, vegetables, eggs and pies.  Grate into soups, chili, sauces, casseroles, vegetables, potatoes, rice, noodles or meatloaf.  Eat with toast or crackers, or melt for edna dip.  Cottage cheese or ricotta cheese  Mix with or scoop on top of fruits and vegetables.  Add to casseroles, lasagna, spaghetti, noodles and egg dishes (omelets, scrambled eggs, soufflés).  Use in gelatin, pudding-type desserts, cheesecake and pancake batter.  Use to stuff crepes, pasta shells or manicotti.  Fruit yogurt  Blend with fruits for a fruit smoothie.  Use as a dip for fruits and vegetables.  Scoop on top of pancakes or waffles.  Tofu  Blend silken tofu with fruits and juices for a smoothie.  Add chunks of firm tofu to soups and stews, or  crumble into meatloaf.  Blend dried onion soup mix into soft or silken tofu for dip.  Use pureed silken tofu for part of the mayonnaise, sour cream, cream cheese or ricotta cheese called for in recipes.  Beans  Use cooked beans or peas in soups, casseroles, pasta, tacos and burritos.  Nuts and seeds  Note: For children under 3, discuss with the child's care team.  Use in casseroles, breads, muffins, pancakes, cookies and waffles.  Sprinkle on fruits, cereals, ice cream, yogurt, vegetables and salads.  Mix with raisins, dried fruits and chocolate chips for a snack.  Nut butters  Note: For children under 3, discuss with the child's care team.  Spread on sandwiches, toast, muffins, crackers, waffles, pancakes and fruit slices.  Use as a dip for raw vegetables.  Blend with milk drinks, or swirl through ice cream, yogurt or hot cereal.  Nutrition supplements (nutrition bars, drinks and powders)  Add powders to milk drinks and desserts.  Mix with ice cream, milk and fruit for a high-protein milk shake.    For informational purposes only. Not to replace the advice of your health care provider. Clinically reviewed by Yanira Pandya RD, RAJI, and the Clinical Nutrition Service Line. Copyright   2005 [facility]. All rights reserved. [employer] 020618 - REV 04/24.      -----------------------------------------------------------------------------------------------------------------  Learning About High-Protein Foods  What foods are high in protein?     The foods you eat contain nutrients, such as vitamins and minerals. Protein is a nutrient. Your body needs the right amount to stay healthy and work as it should. You can use the list below to help you make choices about which foods to eat.  Here are some examples of foods that are high in protein.  Dairy and dairy alternatives  Cheese  Milk  Soy milk  Yogurt (especially Greek)  Meat  Beef  Chicken  Ham  Lamb  Lunch meat  Pork  Sausage  [place]  Other protein  "foods  Beans (black, garbanzo, kidney, lima)  Eggs  Hummus  Lentils  Nuts  Peanut butter and other nut butters  Peas  Soybeans  Tofu  Veggie or soy shereen (Check the nutrition label for the amount of protein in each serving.)  Seafood  Anchovies  Cod  Crab  Halibut  [place]  Sardines  Shrimp  Tilapia  [place]  Tuna  Protein supplements  Bars (Check the nutrition label for the amount of protein in each serving.)  Drinks  Powders  Work with your doctor to find out how much of this nutrient you need. Depending on your health, you may need more or less of it in your diet.  Where can you learn more?  Go to https://www.[employer].net/patiented  Enter P335 in the search box to learn more about \"Learning About High-Protein Foods.\"  Current as of: September 20, 2023               Content Version: 14.0    4076-8828 [employer].   Care instructions adapted under license by your healthcare professional. If you have questions about a medical condition or this instruction, always ask your healthcare professional. Healthwise, [employer] disclaims any warranty or liability for your use of this information.         "

## 2025-01-15 NOTE — Clinical Note
"Juan J Haines overall reports feeling \"wonderful\" after sleeve gastrectomy in October.  She is still having significant gas/belch as she progresses diet. Takes simethicone and mostly effective with each meal. I recommended on severe days she try hyoscyamine to see if helpful too (simethicone had been more effective previously) and to keep working on water and advancing diet.   She and I are very open to ideas with this! I've not seen someone have such continued gas/bloat, but of course I'm newer to this, so v v open to what you both think! I can pass on to her now if anything urgent seems like could try for a few weeks before seeing you?  Seeing you both early February. Me again in March.   Let me know thoughts! Leonie "

## 2025-01-25 ENCOUNTER — HEALTH MAINTENANCE LETTER (OUTPATIENT)
Age: 55
End: 2025-01-25

## 2025-01-28 NOTE — PROGRESS NOTES
"Virtual Visit Details    Type of service:  Video Visit   Video Start Time: 10:06 AM  Video End Time: 10:31am    Originating Location (pt. Location): Home    Distant Location (provider location):  Off-site  Platform used for Video Visit: [facility] Bariatric Surgery Note    RE: Yancy Hoover  MR#: [id number]  : 1970  VISIT DATE: 2025      Dear Arlin Ewing,    I had the pleasure of seeing your patient, Yancy Hoover, in my post-bariatric surgery assessment clinic.    Assessment & Plan   Problem List Items Addressed This Visit       Class 3 severe obesity due to excess calories with body mass index (BMI) of 50.0 to 59.9 in adult, unspecified whether serious comorbidity present (H)    Relevant Medications    omeprazole (PRILOSEC) 20 MG DR capsule     Other Visit Diagnoses       Hiatal hernia        Relevant Medications    omeprazole (PRILOSEC) 20 MG DR capsule    Gastroesophageal reflux disease with esophagitis, unspecified whether hemorrhage        Relevant Medications    omeprazole (PRILOSEC) 20 MG DR capsule               28 minutes spent by me on the date of the encounter doing chart review, history and exam, documentation and further activities per the note    CHIEF COMPLAINT: Post-bariatric surgery follow-up. 3 months s/p sleeve gastrectomy 10/29/24 with Dr. Alcaraz     HISTORY OF PRESENT ILLNESS:      2025     7:34 AM   Questions Regarding Prior Weight Loss Surgery Reviewed With Patient   I had the following weight loss procedure Sleeve Gastrectomy   What year was your surgery?    How has your weight changed since your last visit? I have lost weight   Do you currently have any of the following Sleep Apnea    Heartburn, acid reflux, or GERD (acid reflux disease)?   Do you have any concerns today? None         Plan  Continue to monitor food types that are feeling \"stuck\" in your throat or stomach, write these down. If these symptoms are worsening or not improving, " "you may benefit from an endoscopy in the future to evaluate for strictures - reach out to clinic if you are not seeing improvement OR seeing worsening in your symptoms   Continue to sip water throughout the day- can set alarm on your phone to remember to sip.  Continue miralax daily for constipation    Goals we discussed today:   Keep up excellent regular exercise   Labs ordered at previous visit, discussed getting these done   Will reschedule Jeannie Noe appointment (currently scheduled to be in 1 week from today) to 3 months   Dietician appointment scheduled 2/3/25   Keep up the excellent work!       Interval History:   Sleeve gastrectomy with Dr. Alcaraz 10/29/24     Today in visit- 3 months post op, new to me, 1/29/25  52lbs weight loss since surgery   Overall reports things are going well, though suspects she is struggling with getting enough hydration, on days she doesn't get enough water will feel \"shaky\" the next morning when she wakes up.     Some discomfort with swallowing meat, notably chicken, despite chewing very thoroughly- feels the food is getting stuck between her throat and her stomach. This resolves with a small sip of water. Discussed continuing to monitor these symptoms, chewing thoroughly, reaching out to clinic if symptoms are worsening or not improving after another month as Yancy may benefit from EGD evaluation of this.       Still taking levsin occasionally, maybe once a week, for cramping symptoms.   Taking gas x a few times daily to help managing burping symptoms.   Taking actigall daily     Tracking blood sugars- reports levels are consistently .     Wt Readings from Last 5 Encounters:   01/29/25 104.3 kg (230 lb)   01/15/25 106.6 kg (235 lb)   12/03/24 113.9 kg (251 lb)   12/03/24 113.9 kg (251 lb)   11/18/24 118.4 kg (261 lb)       Anti-obesity medication history:     Current:   Topiramate- taking intermittently lately for migraines, maybe 1-2 times a week- discussed the " "importance of taking this regularly as it's designed for daily use     Past/failed/contraindicated:   Ozempic- prior to surgery       Recent diet changes: RD visit scheduled 2/3/25. Portion sizes are 1/4 cup in size.     -Protein - getting 60g daily - protein shakes, meat, beans   -Water - sometimes struggles to get enough water in due to satiety, maybe 3 days a week estimates she's getting 32-40oz just water or liquid IV (also getting protein shakes, she's not counting these), other days she's getting 64oz. Notices she feels a lot better when she's getting 64oz.     Recent exercise/activity changes: going to gym or doing yoga 3-4 times a week. Walking on treadmill, stairmaster, stationary bike- followed by light weight lifting.     Vitamins/Labs: Labs ordered at previous visit, discussed getting these done. Currently taking calcium, vitamin d, vitamin b12. Taking tab-a-gardenia, 2 pills once daily.       GERD symptoms: relatively well managed with omeprazole 20mg BID- if she forgets to take it will notice symptoms, or eats before taking meds in the morning.     Nausea : denies     Vomiting : denies     Constipation: Bms are every 2 days, using senna if not having bm for 2+ days. Using miralax every day.     Dysphagia:  sometimes some discomfort with eating meat- feels like it's getting stuck \"between her chest and stomach\" despite chewing really thoroughly to an apple sauce consistency. Denies ever regurgitating this. This resolves with a sip of water after eating.       Weight History:      1/28/2025     7:34 AM   --   What is your highest lifetime weight? 331   What is your lowest weight since surgery? (In pounds) 230     Initial Weight (lbs): 295 lbs  Weight: 104.3 kg (230 lb)  Last Visits Weight: 106.6 kg (235 lb)  Cumulative weight loss (lbs): 65  Weight Loss Percentage: 22.03%        1/28/2025     7:34 AM   Questions Regarding Co-Morbidities and Health Concerns Reviewed With Patient   Pre-diabetes Gone away "   Diabetes II Gone away   High Blood Pressure Gone Away   High cholesterol Improved   Heartburn/Reflux Improved   Sleep apnea Improved   PCOS Never   Back pain Never   Joint pain Never   Lower leg swelling Gone away           1/28/2025     7:34 AM   Eating Habits   How many meals do you eat per day? 3   Do you snack between meals? No   How much food are you eating at each meal? Less than 1/2 cup   Are you able to separate your meals and liquids by at least 30 minutes? Yes   Are you able to avoid liquid calories? Yes           1/28/2025     7:34 AM   Exercise Questions Reviewed With Patient   How often do you exercise? 3 to 4 times per week   What is the duration of your exercise (in minutes)? 60+ Minutes   What types of exercise do you do? walking    gym membership    weightlifting   What keeps you from being more active? I am as active as I can possbily be       Social History:      1/28/2025     7:34 AM   --   Are you smoking? No   Are you drinking alcohol? No       Medications:  Current Outpatient Medications   Medication Sig Dispense Refill    omeprazole (PRILOSEC) 20 MG DR capsule Take 2 capsules (40 mg) by mouth daily. 60 capsule 2    acetaminophen (TYLENOL) 325 MG tablet Take 1-2 tablets (325-650 mg) by mouth every 6 hours as needed for mild pain. 100 tablet 3    alcohol swab prep pads Use to swab area of injection/shahla as directed. 100 each 5    aspirin 81 MG EC tablet Take 1 tablet (81 mg) by mouth daily. 30 tablet 0    blood glucose (ACCU-CHEK SMARTVIEW) test strip Use to test blood sugar 2 times daily. 100 strip 5    blood glucose calibration (ACCU-CHEK SMARTVIEW CONTROL) solution Use to calibrate blood glucose monitor as directed. 1 Bottle 3    blood glucose monitoring (ACCU-CHEK FASTCLIX) lancets Use to test blood sugar 1-2 times daily or as directed. 102 each 5    blood glucose monitoring (ACCU-CHEK FAWAD SMARTVIEW) meter device kit Use to test blood sugar 1-2 times daily. 1 kit 0    calcium citrate  (CITRACAL) 950 (200 Ca) MG tablet Take 1 tablet (950 mg) by mouth 2 times daily. 180 tablet 3    Calcium Citrate-Vitamin D (CALCIUM CITRATE CHEWY BITE) 500-12.5 MG-MCG CHEW Take 1 chew tab by mouth 2 times daily. 180 tablet 3    hyoscyamine (LEVSIN) 0.125 MG tablet Take 1 tablet (125 mcg) by mouth every 4 hours as needed for cramping. 30 tablet 1    meclizine (ANTIVERT) 25 MG tablet Take 25 mg by mouth      Multiple Vitamins-Minerals (BARIATRIC MULTIVITAMINS/IRON PO) Take by mouth daily.      polyethylene glycol (MIRALAX) 17 GM/Dose powder Take 17 g (1 Capful) by mouth daily. 578 g 4    polyethylene glycol (MIRALAX) 17 GM/Dose powder Take 1 Capful by mouth daily as needed for constipation.      rosuvastatin (CRESTOR) 20 MG tablet Take 1 tablet (20 mg) by mouth daily. 30 tablet 0    senna-docusate (SENOKOT-S/PERICOLACE) 8.6-50 MG tablet Take 2 tablets by mouth daily as needed for constipation (While taking narcotic pain medications.  Stop taking if having loose stools.). 30 tablet 1    simethicone (MYLICON) 125 MG chewable tablet Take 1 tablet (125 mg) by mouth 4 times daily as needed for intestinal gas. May take 1/2 - 1 tab prn 120 tablet 1    topiramate (TOPAMAX) 25 MG tablet Take 25 mg by mouth 2 times daily.      ursodiol (ACTIGALL) 300 MG capsule Take 1 capsule (300 mg) by mouth 2 times daily. 60 capsule 5    verapamil ER (VERELAN) 120 MG 24 hr capsule Take 240 mg by mouth daily.       No current facility-administered medications for this visit.         1/28/2025     7:34 AM   --   Do you avoid NSAIDs such as (Ibuprofen, Aleve, Naproxen, Advil)? Yes       ROS:  GI:       1/28/2025     7:34 AM   --   Vomiting No   Diarrhea No   Constipation Yes   Swallowing trouble No   Abdominal pain No   Heartburn Yes     Skin:       1/28/2025     7:34 AM   BAR RBS ROS - SKIN   Rash in skin folds No     Psych:       1/28/2025     7:34 AM   --   Depression No   Anxiety No     Female Only:       1/28/2025     7:34 AM   BAR RBS  "ROS -    Female only Irregular menstrual cycles   Stress urinary incontinence No             5/1/2024    12:07 PM   ABDI Score (Last Two)   ABDI Raw Score 32   Activation Score 62.6   ABDI Level 3         PHYSICAL EXAM:  Objective    Ht 1.626 m (5' 4.02\")   Wt 104.3 kg (230 lb)   BMI 39.46 kg/m    Vitals - Patient Reported  Pain Score: No Pain (0)      Vitals:  No vitals were obtained today due to virtual visit.    Physical Exam   GENERAL: alert and no distress  EYES: Eyes grossly normal to inspection.  No discharge or erythema, or obvious scleral/conjunctival abnormalities.  RESP: No audible wheeze, cough, or visible cyanosis.    SKIN: Visible skin clear. No significant rash, abnormal pigmentation or lesions.  NEURO: Cranial nerves grossly intact.  Mentation and speech appropriate for age.  PSYCH: Appropriate affect, tone, and pace of words        Sincerely,    Sophia Barahona PA-C    The longitudinal plan of care for the diagnosis(es)/condition(s) as documented were addressed during this visit. Due to the added complexity in care, I will continue to support Yancy in the subsequent management and with ongoing continuity of care.   "

## 2025-01-29 ENCOUNTER — VIRTUAL VISIT (OUTPATIENT)
Dept: ENDOCRINOLOGY | Facility: CLINIC | Age: 55
End: 2025-01-29
Payer: COMMERCIAL

## 2025-01-29 VITALS — HEIGHT: 64 IN | WEIGHT: 230 LBS | BODY MASS INDEX: 39.27 KG/M2

## 2025-01-29 DIAGNOSIS — K44.9 HIATAL HERNIA: ICD-10-CM

## 2025-01-29 DIAGNOSIS — K21.00 GASTROESOPHAGEAL REFLUX DISEASE WITH ESOPHAGITIS, UNSPECIFIED WHETHER HEMORRHAGE: ICD-10-CM

## 2025-01-29 DIAGNOSIS — E66.813 CLASS 3 SEVERE OBESITY DUE TO EXCESS CALORIES WITH BODY MASS INDEX (BMI) OF 50.0 TO 59.9 IN ADULT, UNSPECIFIED WHETHER SERIOUS COMORBIDITY PRESENT (H): ICD-10-CM

## 2025-01-29 DIAGNOSIS — E66.01 CLASS 3 SEVERE OBESITY DUE TO EXCESS CALORIES WITH BODY MASS INDEX (BMI) OF 50.0 TO 59.9 IN ADULT, UNSPECIFIED WHETHER SERIOUS COMORBIDITY PRESENT (H): ICD-10-CM

## 2025-01-29 ASSESSMENT — PAIN SCALES - GENERAL: PAINLEVEL_OUTOF10: NO PAIN (0)

## 2025-01-29 NOTE — PATIENT INSTRUCTIONS
"Thank you for allowing us the privilege of caring for you. We hope we provided you with the excellent service you deserve.   Please let us know if there is anything else we can do for you so that we can be sure you are completely satisfied with your care experience.    To ensure the quality of our services you may be receiving a patient satisfaction survey from an independent patient satisfaction monitoring company.    The greatest compliment you can give is a \"Likely to Recommend\"    Your visit was with Sophia Barahona PA-C today.    Instructions per today's visit:     Juan J Hoover, it was great to visit with you today.  Here is a review of our visit.  If our clinic scheduler is not able to reach you please call [phone] to schedule your next appointments.    Plan  Continue to monitor food types that are feeling \"stuck\" in your throat or stomach, write these down. If these symptoms are worsening or not improving, you may benefit from an endoscopy in the future to evaluate for strictures - reach out to clinic if you are not seeing improvement OR seeing worsening in your symptoms   Continue to sip water throughout the day- can set alarm on your phone to remember to sip.  Continue miralax daily for constipation    Goals we discussed today:   Keep up excellent regular exercise   Labs ordered at previous visit, discussed getting these done   Will reschedule Jeannie Noe appointment (currently scheduled to be in 1 week) to 3 months   Dietician appointment scheduled 2/3/25   Keep up the excellent work!       Information about Video Visits with [employer]: video visit information  _________________________________________________________________________________________________________________________________________________________  If you are asked by your clinic team to have your blood pressure checked:  [facility] do offer several locations for blood pressure checks. Please follow the " below link to schedule an appointment. Scheduling an appointment at the pharmacy for a blood pressure check is now preferred.    Appointment Plus ([email or web address])  _________________________________________________________________________________________________________________________________________________________  Important contact and scheduling information:  Please call our contact center at [phone] to schedule your next appointments.  To find a lab location near you, please call [phone].  For any nursing questions or concerns call Ebony Shelton LPN at [phone] or Samreen Burton RN at [phone]  Please call during clinic hours Monday through Friday 8:00a - 4:00p if you have questions or you can contact us via [employer] at anytime and we will reply during clinic hours.    Lab results will be communicated through My Chart or letter (if My Chart not used). Please call the clinic if you have not received communication after 1 week or if you have any questions.?  Clinic Fax: [phone]    _Interested in working with a health ?  Health coaches work with you to improve your overall health and wellbeing.  They look at the whole person, and may involve discussion of different areas of life, including, but not limited to the four pillars of health (sleep, exercise, nutrition, and stress management). Discuss with your care team if you would like to start working a health .  Health Coaching-3 Pack: Schedule by calling [phone]    $99 for three health coaching visits    Visits may be done in person or via phone    Coaching is a partnership between the  and the client; Coaches do not prescribe or diagnose    Coaching helps inspire the client to reach his/her personal goals   _________________________________________________________________________________________________________________________________________________________  __________  [facility] Get Moving  Program  Our team of physical therapists is trained to help you understand and take control of your condition. They will perform a thorough evaluation to determine your ability for activity and develop a customized plan to fit your goals and physical ability.  Scheduling: Unsure if the Get Moving program is right for you? Discuss the program with your medical provider or diabetes educator. You can also call us at [phone] to ask questions or schedule an appointment.   [facility] Get Moving Program  ____________________________________________________________________________________________________________________________________________________________________________        Thank you,   [facility] Comprehensive Weight Management Team

## 2025-01-29 NOTE — NURSING NOTE
Current patient location: [address] [place] [id number]    Is the patient currently in the state of [place]? YES    Visit mode: VIDEO    If the visit is dropped, the patient can be reconnected by:VIDEO VISIT: Text to cell phone:   Telephone Information:   Mobile [phone]       Will anyone else be joining the visit? NO  (If patient encounters technical issues they should call [phone]    Are changes needed to the allergy or medication list? No    Are refills needed on medications prescribed by this physician? NO    Rooming Documentation:  Questionnaire(s) completed    Reason for visit: RECHECK    Ny MENDOZA

## 2025-01-29 NOTE — LETTER
2025       RE: Yancy Hoover  [address]  [facility] [address]     Dear Colleague,    Thank you for referring your patient, Yancy Hoover, to the [facility] WEIGHT MANAGEMENT CLINIC [place] at [facility]. Please see a copy of my visit note below.    Virtual Visit Details    Type of service:  Video Visit   Video Start Time: 10:06 AM  Video End Time: 10:31am    Originating Location (pt. Location): Home    Distant Location (provider location):  Off-site  Platform used for Video Visit: [facility] Bariatric Surgery Note    RE: Yancy Hoover  MR#: [id number]  : 1970  VISIT DATE: 2025      Dear Arlin Ewing,    I had the pleasure of seeing your patient, Yancy Hoover, in my post-bariatric surgery assessment clinic.    Assessment & Plan  Problem List Items Addressed This Visit       Class 3 severe obesity due to excess calories with body mass index (BMI) of 50.0 to 59.9 in adult, unspecified whether serious comorbidity present (H)    Relevant Medications    omeprazole (PRILOSEC) 20 MG DR capsule     Other Visit Diagnoses       Hiatal hernia        Relevant Medications    omeprazole (PRILOSEC) 20 MG DR capsule    Gastroesophageal reflux disease with esophagitis, unspecified whether hemorrhage        Relevant Medications    omeprazole (PRILOSEC) 20 MG DR capsule               28 minutes spent by me on the date of the encounter doing chart review, history and exam, documentation and further activities per the note    CHIEF COMPLAINT: Post-bariatric surgery follow-up. 3 months s/p sleeve gastrectomy 10/29/24 with Dr. Alcaraz     HISTORY OF PRESENT ILLNESS:      2025     7:34 AM   Questions Regarding Prior Weight Loss Surgery Reviewed With Patient   I had the following weight loss procedure Sleeve Gastrectomy   What year was your surgery?    How has your weight changed since your last  "visit? I have lost weight   Do you currently have any of the following Sleep Apnea    Heartburn, acid reflux, or GERD (acid reflux disease)?   Do you have any concerns today? None         Plan  Continue to monitor food types that are feeling \"stuck\" in your throat or stomach, write these down. If these symptoms are worsening or not improving, you may benefit from an endoscopy in the future to evaluate for strictures - reach out to clinic if you are not seeing improvement OR seeing worsening in your symptoms   Continue to sip water throughout the day- can set alarm on your phone to remember to sip.  Continue miralax daily for constipation    Goals we discussed today:   Keep up excellent regular exercise   Labs ordered at previous visit, discussed getting these done   Will reschedule Jeannie Santanah appointment (currently scheduled to be in 1 week from today) to 3 months   Dietician appointment scheduled 2/3/25   Keep up the excellent work!       Interval History:   Sleeve gastrectomy with Dr. Alcaraz 10/29/24     Today in visit- 3 months post op, new to me, 1/29/25  52lbs weight loss since surgery   Overall reports things are going well, though suspects she is struggling with getting enough hydration, on days she doesn't get enough water will feel \"shaky\" the next morning when she wakes up.     Some discomfort with swallowing meat, notably chicken, despite chewing very thoroughly- feels the food is getting stuck between her throat and her stomach. This resolves with a small sip of water. Discussed continuing to monitor these symptoms, chewing thoroughly, reaching out to clinic if symptoms are worsening or not improving after another month as Yancy may benefit from EGD evaluation of this.       Still taking levsin occasionally, maybe once a week, for cramping symptoms.   Taking gas x a few times daily to help managing burping symptoms.   Taking actigall daily     Tracking blood sugars- reports levels are consistently " ".     Wt Readings from Last 5 Encounters:   01/29/25 104.3 kg (230 lb)   01/15/25 106.6 kg (235 lb)   12/03/24 113.9 kg (251 lb)   12/03/24 113.9 kg (251 lb)   11/18/24 118.4 kg (261 lb)       Anti-obesity medication history:     Current:   Topiramate- taking intermittently lately for migraines, maybe 1-2 times a week- discussed the importance of taking this regularly as it's designed for daily use     Past/failed/contraindicated:   Ozempic- prior to surgery       Recent diet changes: RD visit scheduled 2/3/25. Portion sizes are 1/4 cup in size.     -Protein - getting 60g daily - protein shakes, meat, beans   -Water - sometimes struggles to get enough water in due to satiety, maybe 3 days a week estimates she's getting 32-40oz just water or liquid IV (also getting protein shakes, she's not counting these), other days she's getting 64oz. Notices she feels a lot better when she's getting 64oz.     Recent exercise/activity changes: going to gym or doing yoga 3-4 times a week. Walking on treadmill, stairmaster, stationary bike- followed by light weight lifting.     Vitamins/Labs: Labs ordered at previous visit, discussed getting these done. Currently taking calcium, vitamin d, vitamin b12. Taking tab-a-gardenia, 2 pills once daily.       GERD symptoms: relatively well managed with omeprazole 20mg BID- if she forgets to take it will notice symptoms, or eats before taking meds in the morning.     Nausea : denies     Vomiting : denies     Constipation: Bms are every 2 days, using senna if not having bm for 2+ days. Using miralax every day.     Dysphagia:  sometimes some discomfort with eating meat- feels like it's getting stuck \"between her chest and stomach\" despite chewing really thoroughly to an apple sauce consistency. Denies ever regurgitating this. This resolves with a sip of water after eating.       Weight History:      1/28/2025     7:34 AM   --   What is your highest lifetime weight? 331   What is your lowest " weight since surgery? (In pounds) 230     Initial Weight (lbs): 295 lbs  Weight: 104.3 kg (230 lb)  Last Visits Weight: 106.6 kg (235 lb)  Cumulative weight loss (lbs): 65  Weight Loss Percentage: 22.03%        1/28/2025     7:34 AM   Questions Regarding Co-Morbidities and Health Concerns Reviewed With Patient   Pre-diabetes Gone away   Diabetes II Gone away   High Blood Pressure Gone Away   High cholesterol Improved   Heartburn/Reflux Improved   Sleep apnea Improved   PCOS Never   Back pain Never   Joint pain Never   Lower leg swelling Gone away           1/28/2025     7:34 AM   Eating Habits   How many meals do you eat per day? 3   Do you snack between meals? No   How much food are you eating at each meal? Less than 1/2 cup   Are you able to separate your meals and liquids by at least 30 minutes? Yes   Are you able to avoid liquid calories? Yes           1/28/2025     7:34 AM   Exercise Questions Reviewed With Patient   How often do you exercise? 3 to 4 times per week   What is the duration of your exercise (in minutes)? 60+ Minutes   What types of exercise do you do? walking    gym membership    weightlifting   What keeps you from being more active? I am as active as I can possbily be       Social History:      1/28/2025     7:34 AM   --   Are you smoking? No   Are you drinking alcohol? No       Medications:  Current Outpatient Medications   Medication Sig Dispense Refill     omeprazole (PRILOSEC) 20 MG DR capsule Take 2 capsules (40 mg) by mouth daily. 60 capsule 2     acetaminophen (TYLENOL) 325 MG tablet Take 1-2 tablets (325-650 mg) by mouth every 6 hours as needed for mild pain. 100 tablet 3     alcohol swab prep pads Use to swab area of injection/shahla as directed. 100 each 5     aspirin 81 MG EC tablet Take 1 tablet (81 mg) by mouth daily. 30 tablet 0     blood glucose (ACCU-CHEK SMARTVIEW) test strip Use to test blood sugar 2 times daily. 100 strip 5     blood glucose calibration (ACCU-CHEK SMARTVIEW  CONTROL) solution Use to calibrate blood glucose monitor as directed. 1 Bottle 3     blood glucose monitoring (ACCU-CHEK FASTCLIX) lancets Use to test blood sugar 1-2 times daily or as directed. 102 each 5     blood glucose monitoring (ACCU-CHEK FAWAD SMARTVIEW) meter device kit Use to test blood sugar 1-2 times daily. 1 kit 0     calcium citrate (CITRACAL) 950 (200 Ca) MG tablet Take 1 tablet (950 mg) by mouth 2 times daily. 180 tablet 3     Calcium Citrate-Vitamin D (CALCIUM CITRATE CHEWY BITE) 500-12.5 MG-MCG CHEW Take 1 chew tab by mouth 2 times daily. 180 tablet 3     hyoscyamine (LEVSIN) 0.125 MG tablet Take 1 tablet (125 mcg) by mouth every 4 hours as needed for cramping. 30 tablet 1     meclizine (ANTIVERT) 25 MG tablet Take 25 mg by mouth       Multiple Vitamins-Minerals (BARIATRIC MULTIVITAMINS/IRON PO) Take by mouth daily.       polyethylene glycol (MIRALAX) 17 GM/Dose powder Take 17 g (1 Capful) by mouth daily. 578 g 4     polyethylene glycol (MIRALAX) 17 GM/Dose powder Take 1 Capful by mouth daily as needed for constipation.       rosuvastatin (CRESTOR) 20 MG tablet Take 1 tablet (20 mg) by mouth daily. 30 tablet 0     senna-docusate (SENOKOT-S/PERICOLACE) 8.6-50 MG tablet Take 2 tablets by mouth daily as needed for constipation (While taking narcotic pain medications.  Stop taking if having loose stools.). 30 tablet 1     simethicone (MYLICON) 125 MG chewable tablet Take 1 tablet (125 mg) by mouth 4 times daily as needed for intestinal gas. May take 1/2 - 1 tab prn 120 tablet 1     topiramate (TOPAMAX) 25 MG tablet Take 25 mg by mouth 2 times daily.       ursodiol (ACTIGALL) 300 MG capsule Take 1 capsule (300 mg) by mouth 2 times daily. 60 capsule 5     verapamil ER (VERELAN) 120 MG 24 hr capsule Take 240 mg by mouth daily.       No current facility-administered medications for this visit.         1/28/2025     7:34 AM   --   Do you avoid NSAIDs such as (Ibuprofen, Aleve, Naproxen, Advil)? Yes  "      ROS:  GI:       1/28/2025     7:34 AM   --   Vomiting No   Diarrhea No   Constipation Yes   Swallowing trouble No   Abdominal pain No   Heartburn Yes     Skin:       1/28/2025     7:34 AM   BAR RBS ROS - SKIN   Rash in skin folds No     Psych:       1/28/2025     7:34 AM   --   Depression No   Anxiety No     Female Only:       1/28/2025     7:34 AM   BAR RBS ROS -    Female only Irregular menstrual cycles   Stress urinary incontinence No             5/1/2024    12:07 PM   ABDI Score (Last Two)   ABDI Raw Score 32   Activation Score 62.6   ABDI Level 3         PHYSICAL EXAM:  Objective   Ht 1.626 m (5' 4.02\")   Wt 104.3 kg (230 lb)   BMI 39.46 kg/m    Vitals - Patient Reported  Pain Score: No Pain (0)      Vitals:  No vitals were obtained today due to virtual visit.    Physical Exam   GENERAL: alert and no distress  EYES: Eyes grossly normal to inspection.  No discharge or erythema, or obvious scleral/conjunctival abnormalities.  RESP: No audible wheeze, cough, or visible cyanosis.    SKIN: Visible skin clear. No significant rash, abnormal pigmentation or lesions.  NEURO: Cranial nerves grossly intact.  Mentation and speech appropriate for age.  PSYCH: Appropriate affect, tone, and pace of words        Sincerely,    Sophia Barahona PA-C    The longitudinal plan of care for the diagnosis(es)/condition(s) as documented were addressed during this visit. Due to the added complexity in care, I will continue to support Yancy in the subsequent management and with ongoing continuity of care.       Again, thank you for allowing me to participate in the care of your patient.      Sincerely,    Sophia Barahona PA-C    "

## 2025-02-05 ENCOUNTER — VIRTUAL VISIT (OUTPATIENT)
Dept: ENDOCRINOLOGY | Facility: CLINIC | Age: 55
End: 2025-02-05
Attending: NURSE PRACTITIONER
Payer: COMMERCIAL

## 2025-02-05 VITALS — HEIGHT: 64 IN | WEIGHT: 230 LBS | BODY MASS INDEX: 39.27 KG/M2

## 2025-02-05 DIAGNOSIS — G47.33 OSA (OBSTRUCTIVE SLEEP APNEA): ICD-10-CM

## 2025-02-05 DIAGNOSIS — E66.01 CLASS 3 SEVERE OBESITY DUE TO EXCESS CALORIES WITH BODY MASS INDEX (BMI) OF 50.0 TO 59.9 IN ADULT, UNSPECIFIED WHETHER SERIOUS COMORBIDITY PRESENT (H): ICD-10-CM

## 2025-02-05 DIAGNOSIS — E66.813 CLASS 3 SEVERE OBESITY DUE TO EXCESS CALORIES WITH SERIOUS COMORBIDITY AND BODY MASS INDEX (BMI) OF 45.0 TO 49.9 IN ADULT (H): Primary | ICD-10-CM

## 2025-02-05 DIAGNOSIS — E11.9 TYPE 2 DIABETES MELLITUS WITHOUT COMPLICATION, WITHOUT LONG-TERM CURRENT USE OF INSULIN (H): ICD-10-CM

## 2025-02-05 DIAGNOSIS — E66.813 CLASS 3 SEVERE OBESITY DUE TO EXCESS CALORIES WITH BODY MASS INDEX (BMI) OF 50.0 TO 59.9 IN ADULT, UNSPECIFIED WHETHER SERIOUS COMORBIDITY PRESENT (H): ICD-10-CM

## 2025-02-05 DIAGNOSIS — Z98.84 S/P LAPAROSCOPIC SLEEVE GASTRECTOMY: ICD-10-CM

## 2025-02-05 DIAGNOSIS — E11.9 TYPE 2 DIABETES MELLITUS WITHOUT COMPLICATION, UNSPECIFIED WHETHER LONG TERM INSULIN USE (H): ICD-10-CM

## 2025-02-05 DIAGNOSIS — E66.01 CLASS 3 SEVERE OBESITY DUE TO EXCESS CALORIES WITH SERIOUS COMORBIDITY AND BODY MASS INDEX (BMI) OF 45.0 TO 49.9 IN ADULT (H): Primary | ICD-10-CM

## 2025-02-05 ASSESSMENT — PAIN SCALES - GENERAL: PAINLEVEL_OUTOF10: NO PAIN (0)

## 2025-02-05 NOTE — LETTER
Problem: Pain  Goal: Verbalizes/displays adequate comfort level or baseline comfort level  Outcome: Progressing     Problem: Safety - Adult  Goal: Free from fall injury  Outcome: Progressing     Problem: Risk for Elopement  Goal: Patient will not exit the unit/facility without proper excort  Outcome: Progressing     Problem: Chronic Conditions and Co-morbidities  Goal: Patient's chronic conditions and co-morbidity symptoms are monitored and maintained or improved  Outcome: Progressing     Problem: Metabolic/Fluid and Electrolytes - Adult  Goal: Electrolytes maintained within normal limits  Outcome: Progressing  Goal: Glucose maintained within prescribed range  Outcome: Progressing      2025       RE: Yancy Hoover  [address]  [facility] [address]     Dear Colleague,    Thank you for referring your patient, Yancy Hoover, to the [facility] WEIGHT MANAGEMENT CLINIC [place] at [facility]. Please see a copy of my visit note below.    Return Bariatric Surgery Note    RE: Yancy Hoover  MR#: [id number]  : 1970  VISIT DATE: 2025      Dear Arlin Ewing,    I had the pleasure of seeing your patient, Yancy Hoover, in my post-bariatric surgery assessment clinic.    Assessment & Plan  Problem List Items Addressed This Visit          Respiratory    LANDON (obstructive sleep apnea)    Relevant Medications    semaglutide (OZEMPIC) 2 MG/3ML pen       Digestive    Class 3 severe obesity due to excess calories with serious comorbidity and body mass index (BMI) of 45.0 to 49.9 in adult (H) - Primary     3 months postop. Doing well over all. Reflux well controlled. Doing well with slowing down at meals, sometimes forgets to wait to drink until 30 min after meal. Discussed strategies to manage this. Getting a little focused on the scale and weighing daily which is causing her some distress. Discussed reducing frequency of weight checks to help with this. Weight loss looks good. Plan is to restart ozempic for MACE risk reduction 3 months postop. Will start that now. Would need to consider holding if not able to get adequate PO intake.     Try putting water/ beverage across the room to help avoid drinking with meals   Continue omeprazole   Great work going slowly at meals   Keep to weighing once weekly   Restart ozemipc - 0.25mg x 4 weeks then 0.5mg weekly   Update labs          Relevant Medications    semaglutide (OZEMPIC) 2 MG/3ML pen       Endocrine    Type 2 diabetes mellitus without complication, without long-term current use of insulin (H)    Relevant Medications    semaglutide (OZEMPIC) 2  MG/3ML pen       Other    S/P laparoscopic sleeve gastrectomy    Relevant Medications    semaglutide (OZEMPIC) 2 MG/3ML pen          20 minutes spent by me on the date of the encounter doing chart review, history and exam, documentation and further activities per the note    The longitudinal plan of care for the diagnosis(es)/condition(s) as documented were addressed during this visit. Due to the added complexity in care, I will continue to support Yancy in the subsequent management and with ongoing continuity of care.  CHIEF COMPLAINT: Post-bariatric surgery follow-up. High weight in life 331lb.  S/p Laparoscopic sleeve gastrectomy  with Dr. Alcaraz 11/5/2024    HISTORY OF PRESENT ILLNESS:      1/28/2025     7:34 AM   Questions Regarding Prior Weight Loss Surgery Reviewed With Patient   I had the following weight loss procedure Sleeve Gastrectomy   What year was your surgery? 2024   How has your weight changed since your last visit? I have lost weight   Do you currently have any of the following Sleep Apnea    Heartburn, acid reflux, or GERD (acid reflux disease)?   Do you have any concerns today? None     Family has had stomach bug for 3 weeks- she got sick today     Anti-obesity medications:     Current:   Topiramate- migraines    Recent diet changes:   Eating 1/4 cup food with each meal - too full to eat more   Practicing eating really slowly     -Protein- adequate   -Water- still struggles with this- gets full     Recent exercise/activity changes:   Increasing exercise  Cardio- step or bike  Weight training too   Going with nephew     Recent stressors:   No weight loss in 2 weeks - feeling nervous about this     Recent sleep changes: great , energy good     Vitamins/Labs:   Calcium, multi, b12, vit d     GERD symptoms:  No reflux  Chicken sometimes causes heart burn   Takes omeprazole daily   Belching/ flatus has stopped   No abdominal pain   Constipation has resolved     Weight History:      1/28/2025     7:34  AM   --   What is your highest lifetime weight? 331   What is your lowest weight since surgery? (In pounds) 230     High weight in life 331lb   Initial Weight (lbs): 295 lbs  Weight: 104.3 kg (230 lb)  Last Visits Weight: 113.9 kg (251 lb)  Cumulative weight loss (lbs): 65  Weight Loss Percentage: 22.03%        1/28/2025     7:34 AM   Questions Regarding Co-Morbidities and Health Concerns Reviewed With Patient   Pre-diabetes Gone away   Diabetes II Gone away   High Blood Pressure Gone Away   High cholesterol Improved   Heartburn/Reflux Improved   Sleep apnea Improved   PCOS Never   Back pain Never   Joint pain Never   Lower leg swelling Gone away           1/28/2025     7:34 AM   Eating Habits   How many meals do you eat per day? 3   Do you snack between meals? No   How much food are you eating at each meal? Less than 1/2 cup   Are you able to separate your meals and liquids by at least 30 minutes? Yes   Are you able to avoid liquid calories? Yes           1/28/2025     7:34 AM   Exercise Questions Reviewed With Patient   How often do you exercise? 3 to 4 times per week   What is the duration of your exercise (in minutes)? 60+ Minutes   What types of exercise do you do? walking    gym membership    weightlifting   What keeps you from being more active? I am as active as I can possbily be       Social History:      1/28/2025     7:34 AM   --   Are you smoking? No   Are you drinking alcohol? No       Medications:  Current Outpatient Medications   Medication Sig Dispense Refill     semaglutide (OZEMPIC) 2 MG/3ML pen Inject 0.25 mg subcutaneously every 7 days. 3 mL 2     acetaminophen (TYLENOL) 325 MG tablet Take 1-2 tablets (325-650 mg) by mouth every 6 hours as needed for mild pain. 100 tablet 3     alcohol swab prep pads Use to swab area of injection/shahla as directed. 100 each 5     aspirin 81 MG EC tablet Take 1 tablet (81 mg) by mouth daily. 30 tablet 0     blood glucose (ACCU-CHEK SMARTVIEW) test strip Use to  test blood sugar 2 times daily. 100 strip 5     blood glucose calibration (ACCU-CHEK SMARTVIEW CONTROL) solution Use to calibrate blood glucose monitor as directed. 1 Bottle 3     blood glucose monitoring (ACCU-CHEK FASTCLIX) lancets Use to test blood sugar 1-2 times daily or as directed. 102 each 5     blood glucose monitoring (ACCU-CHEK FAWAD SMARTVIEW) meter device kit Use to test blood sugar 1-2 times daily. 1 kit 0     calcium citrate (CITRACAL) 950 (200 Ca) MG tablet Take 1 tablet (950 mg) by mouth 2 times daily. 180 tablet 3     Calcium Citrate-Vitamin D (CALCIUM CITRATE CHEWY BITE) 500-12.5 MG-MCG CHEW Take 1 chew tab by mouth 2 times daily. 180 tablet 3     hyoscyamine (LEVSIN) 0.125 MG tablet Take 1 tablet (125 mcg) by mouth every 4 hours as needed for cramping. 30 tablet 1     meclizine (ANTIVERT) 25 MG tablet Take 25 mg by mouth       Multiple Vitamins-Minerals (BARIATRIC MULTIVITAMINS/IRON PO) Take by mouth daily.       omeprazole (PRILOSEC) 20 MG DR capsule Take 2 capsules (40 mg) by mouth daily. 60 capsule 2     polyethylene glycol (MIRALAX) 17 GM/Dose powder Take 17 g (1 Capful) by mouth daily. 578 g 4     rosuvastatin (CRESTOR) 20 MG tablet Take 1 tablet (20 mg) by mouth daily. 30 tablet 0     simethicone (MYLICON) 125 MG chewable tablet Take 1 tablet (125 mg) by mouth 4 times daily as needed for intestinal gas. May take 1/2 - 1 tab prn 120 tablet 1     topiramate (TOPAMAX) 25 MG tablet Take 25 mg by mouth 2 times daily.       ursodiol (ACTIGALL) 300 MG capsule Take 1 capsule (300 mg) by mouth 2 times daily. 60 capsule 5     verapamil ER (VERELAN) 120 MG 24 hr capsule Take 240 mg by mouth daily.       No current facility-administered medications for this visit.         1/28/2025     7:34 AM   --   Do you avoid NSAIDs such as (Ibuprofen, Aleve, Naproxen, Advil)? Yes       ROS:  GI:       1/28/2025     7:34 AM   --   Vomiting No   Diarrhea No   Constipation Yes   Swallowing trouble No   Abdominal pain  "No   Heartburn Yes     Skin:       1/28/2025     7:34 AM   BAR RBS ROS - SKIN   Rash in skin folds No     Psych:       1/28/2025     7:34 AM   --   Depression No   Anxiety No     Female Only:       1/28/2025     7:34 AM   BAR RBS ROS -    Female only Irregular menstrual cycles   Stress urinary incontinence No       Admission on 10/29/2024, Discharged on 10/30/2024   Component Date Value Ref Range Status     GLUCOSE BY METER POCT 10/29/2024 89  70 - 99 mg/dL Final     Case Report 10/29/2024    Final                    Value:Surgical Pathology Report                         Case: [id number]                                  Authorizing Provider:  Robbie Alcaraz MD   Collected:           10/29/2024 10:22 AM          Ordering Location:      MAIN OR                 Received:            10/29/2024 10:51 AM          Pathologist:           Carter Garza MD                                                             Specimen:    Stomach, Greater Curvature, Partial gastrectomy                                             Final Diagnosis 10/29/2024    Final                    Value:STOMACH, PARTIAL GASTRECTOMY:  - Gastric wall with no significant histopathologic abnormality          Clinical Information 10/29/2024    Final                    Value:Morbid obesity (H) [ICD-10-CM]       Gross Description 10/29/2024    Final                    Value:A(1). Stomach, Greater Curvature, Partial gastrectomy:  The specimen is received fresh with proper patient identification labeled \"partial gastrectomy\".  The specimen consists of a 12.2 x 4.0 x 3.6 cm partial gastrectomy with a stapled resection margin along the long edge.  There is a 2.0 cm in length defect along the staple line.  Attached yellow-tan fat up to 1.9 cm, no lymph nodes are identified.  The serosa is pink-tan and predominantly smooth.  On opening, the gastric mucosa is pink-tan and focally congested.  No masses, polyps or areas of ulceration are grossly " "identified.  The stomach wall averages 0.3 cm in thickness.  Representative tissue is submitted in cassette A1.       Microscopic Description 10/29/2024    Final                    Value:Microscopic examination has been performed.      I have personally reviewed all specimens and or slides, including the listed special stains, and used them with my medical judgement to determine the final diagnosis.         Performing Labs 10/29/2024    Final                    Value:The technical component of this testing was completed at [facility].    Stain controls for all stains resulted within this report have been reviewed and show appropriate reactivity.        GLUCOSE BY METER POCT 10/29/2024 108 (H)  70 - 99 mg/dL Final     GLUCOSE BY METER POCT 10/29/2024 149 (H)  70 - 99 mg/dL Final     Platelet Count 10/30/2024 265  150 - 450 10e3/uL Final     GLUCOSE BY METER POCT 10/30/2024 127 (H)  70 - 99 mg/dL Final     Hold Specimen 10/30/2024 JIC   Final             5/1/2024    12:07 PM   ABDI Score (Last Two)   ABDI Raw Score 32   Activation Score 62.6   ABDI Level 3         PHYSICAL EXAM:  Objective   Ht 1.626 m (5' 4.02\")   Wt 104.3 kg (230 lb)   BMI 39.46 kg/m    Vitals - Patient Reported  Pain Score: No Pain (0)        Physical Exam   GENERAL: alert and no distress  EYES: Eyes grossly normal to inspection.  No discharge or erythema, or obvious scleral/conjunctival abnormalities.  RESP: No audible wheeze, cough, or visible cyanosis.    SKIN: Visible skin clear. No significant rash, abnormal pigmentation or lesions.  NEURO: Cranial nerves grossly intact.  Mentation and speech appropriate for age.  PSYCH: Appropriate affect, tone, and pace of words        Sincerely,    Jeannie Noe NP      Virtual Visit Details    Type of service:  Video Visit   Video Start Time:  1310  Video End Time:1:31 PM    Originating Location (pt. Location): Home    Distant Location (provider " location):  On-site  Platform used for Video Visit: AmWell      Again, thank you for allowing me to participate in the care of your patient.      Sincerely,    Jeannie Noe NP

## 2025-02-05 NOTE — NURSING NOTE
Is the patient currently in the state of [place]? YES    Location: home    Visit mode:VIDEO    If the visit is dropped, the patient can be reconnected by: VIDEO VISIT: Text to cell phone:   Telephone Information:   Mobile [phone]    and VIDEO VISIT: Send to e-mail at: [email or web address].    Will anyone else be joining the visit? NO  (If patient encounters technical issues they should call [phone]    Are changes needed to the allergy or medication list? No    Are refills needed on medications prescribed by this physician? NO    Reason for visit: JUAN Foote, Virtual Visit Facilitator    QNR Status: completed

## 2025-02-05 NOTE — PATIENT INSTRUCTIONS
Try putting water/ beverage across the room to help avoid drinking with meals   Continue omeprazole   Great work going slowly at meals   Keep to weighing once weekly   Restart ozemipc - 0.25mg x 4 weeks then 0.5mg weekly   Update labs

## 2025-02-05 NOTE — PROGRESS NOTES
Return Bariatric Surgery Note    RE: Yancy Hoover  MR#: [id number]  : 1970  VISIT DATE: 2025      Dear Arlin Ewing,    I had the pleasure of seeing your patient, Yancy Hoover, in my post-bariatric surgery assessment clinic.    Assessment & Plan   Problem List Items Addressed This Visit          Respiratory    LANDON (obstructive sleep apnea)    Relevant Medications    semaglutide (OZEMPIC) 2 MG/3ML pen       Digestive    Class 3 severe obesity due to excess calories with serious comorbidity and body mass index (BMI) of 45.0 to 49.9 in adult (H) - Primary     3 months postop. Doing well over all. Reflux well controlled. Doing well with slowing down at meals, sometimes forgets to wait to drink until 30 min after meal. Discussed strategies to manage this. Getting a little focused on the scale and weighing daily which is causing her some distress. Discussed reducing frequency of weight checks to help with this. Weight loss looks good. Plan is to restart ozempic for MACE risk reduction 3 months postop. Will start that now. Would need to consider holding if not able to get adequate PO intake.     Try putting water/ beverage across the room to help avoid drinking with meals   Continue omeprazole   Great work going slowly at meals   Keep to weighing once weekly   Restart ozemipc - 0.25mg x 4 weeks then 0.5mg weekly   Update labs          Relevant Medications    semaglutide (OZEMPIC) 2 MG/3ML pen       Endocrine    Type 2 diabetes mellitus without complication, without long-term current use of insulin (H)    Relevant Medications    semaglutide (OZEMPIC) 2 MG/3ML pen       Other    S/P laparoscopic sleeve gastrectomy    Relevant Medications    semaglutide (OZEMPIC) 2 MG/3ML pen          20 minutes spent by me on the date of the encounter doing chart review, history and exam, documentation and further activities per the note    The longitudinal plan of care for the diagnosis(es)/condition(s) as  documented were addressed during this visit. Due to the added complexity in care, I will continue to support Yancy in the subsequent management and with ongoing continuity of care.  CHIEF COMPLAINT: Post-bariatric surgery follow-up. High weight in life 331lb.  S/p Laparoscopic sleeve gastrectomy  with Dr. Alcaraz 11/5/2024    HISTORY OF PRESENT ILLNESS:      1/28/2025     7:34 AM   Questions Regarding Prior Weight Loss Surgery Reviewed With Patient   I had the following weight loss procedure Sleeve Gastrectomy   What year was your surgery? 2024   How has your weight changed since your last visit? I have lost weight   Do you currently have any of the following Sleep Apnea    Heartburn, acid reflux, or GERD (acid reflux disease)?   Do you have any concerns today? None     Family has had stomach bug for 3 weeks- she got sick today     Anti-obesity medications:     Current:   Topiramate- migraines    Recent diet changes:   Eating 1/4 cup food with each meal - too full to eat more   Practicing eating really slowly     -Protein- adequate   -Water- still struggles with this- gets full     Recent exercise/activity changes:   Increasing exercise  Cardio- step or bike  Weight training too   Going with nephew     Recent stressors:   No weight loss in 2 weeks - feeling nervous about this     Recent sleep changes: great , energy good     Vitamins/Labs:   Calcium, multi, b12, vit d     GERD symptoms:  No reflux  Chicken sometimes causes heart burn   Takes omeprazole daily   Belching/ flatus has stopped   No abdominal pain   Constipation has resolved     Weight History:      1/28/2025     7:34 AM   --   What is your highest lifetime weight? 331   What is your lowest weight since surgery? (In pounds) 230     High weight in life 331lb   Initial Weight (lbs): 295 lbs  Weight: 104.3 kg (230 lb)  Last Visits Weight: 113.9 kg (251 lb)  Cumulative weight loss (lbs): 65  Weight Loss Percentage: 22.03%        1/28/2025     7:34 AM    Questions Regarding Co-Morbidities and Health Concerns Reviewed With Patient   Pre-diabetes Gone away   Diabetes II Gone away   High Blood Pressure Gone Away   High cholesterol Improved   Heartburn/Reflux Improved   Sleep apnea Improved   PCOS Never   Back pain Never   Joint pain Never   Lower leg swelling Gone away           1/28/2025     7:34 AM   Eating Habits   How many meals do you eat per day? 3   Do you snack between meals? No   How much food are you eating at each meal? Less than 1/2 cup   Are you able to separate your meals and liquids by at least 30 minutes? Yes   Are you able to avoid liquid calories? Yes           1/28/2025     7:34 AM   Exercise Questions Reviewed With Patient   How often do you exercise? 3 to 4 times per week   What is the duration of your exercise (in minutes)? 60+ Minutes   What types of exercise do you do? walking    gym membership    weightlifting   What keeps you from being more active? I am as active as I can possbily be       Social History:      1/28/2025     7:34 AM   --   Are you smoking? No   Are you drinking alcohol? No       Medications:  Current Outpatient Medications   Medication Sig Dispense Refill    semaglutide (OZEMPIC) 2 MG/3ML pen Inject 0.25 mg subcutaneously every 7 days. 3 mL 2    acetaminophen (TYLENOL) 325 MG tablet Take 1-2 tablets (325-650 mg) by mouth every 6 hours as needed for mild pain. 100 tablet 3    alcohol swab prep pads Use to swab area of injection/shahla as directed. 100 each 5    aspirin 81 MG EC tablet Take 1 tablet (81 mg) by mouth daily. 30 tablet 0    blood glucose (ACCU-CHEK SMARTVIEW) test strip Use to test blood sugar 2 times daily. 100 strip 5    blood glucose calibration (ACCU-CHEK SMARTVIEW CONTROL) solution Use to calibrate blood glucose monitor as directed. 1 Bottle 3    blood glucose monitoring (ACCU-CHEK FASTCLIX) lancets Use to test blood sugar 1-2 times daily or as directed. 102 each 5    blood glucose monitoring (ACCU-CHEK FAWAD  SMARTVIEW) meter device kit Use to test blood sugar 1-2 times daily. 1 kit 0    calcium citrate (CITRACAL) 950 (200 Ca) MG tablet Take 1 tablet (950 mg) by mouth 2 times daily. 180 tablet 3    Calcium Citrate-Vitamin D (CALCIUM CITRATE CHEWY BITE) 500-12.5 MG-MCG CHEW Take 1 chew tab by mouth 2 times daily. 180 tablet 3    hyoscyamine (LEVSIN) 0.125 MG tablet Take 1 tablet (125 mcg) by mouth every 4 hours as needed for cramping. 30 tablet 1    meclizine (ANTIVERT) 25 MG tablet Take 25 mg by mouth      Multiple Vitamins-Minerals (BARIATRIC MULTIVITAMINS/IRON PO) Take by mouth daily.      omeprazole (PRILOSEC) 20 MG DR capsule Take 2 capsules (40 mg) by mouth daily. 60 capsule 2    polyethylene glycol (MIRALAX) 17 GM/Dose powder Take 17 g (1 Capful) by mouth daily. 578 g 4    rosuvastatin (CRESTOR) 20 MG tablet Take 1 tablet (20 mg) by mouth daily. 30 tablet 0    simethicone (MYLICON) 125 MG chewable tablet Take 1 tablet (125 mg) by mouth 4 times daily as needed for intestinal gas. May take 1/2 - 1 tab prn 120 tablet 1    topiramate (TOPAMAX) 25 MG tablet Take 25 mg by mouth 2 times daily.      ursodiol (ACTIGALL) 300 MG capsule Take 1 capsule (300 mg) by mouth 2 times daily. 60 capsule 5    verapamil ER (VERELAN) 120 MG 24 hr capsule Take 240 mg by mouth daily.       No current facility-administered medications for this visit.         1/28/2025     7:34 AM   --   Do you avoid NSAIDs such as (Ibuprofen, Aleve, Naproxen, Advil)? Yes       ROS:  GI:       1/28/2025     7:34 AM   --   Vomiting No   Diarrhea No   Constipation Yes   Swallowing trouble No   Abdominal pain No   Heartburn Yes     Skin:       1/28/2025     7:34 AM   BAR RBS ROS - SKIN   Rash in skin folds No     Psych:       1/28/2025     7:34 AM   --   Depression No   Anxiety No     Female Only:       1/28/2025     7:34 AM   BAR RBS ROS -    Female only Irregular menstrual cycles   Stress urinary incontinence No       Admission on 10/29/2024, Discharged on  "10/30/2024   Component Date Value Ref Range Status    GLUCOSE BY METER POCT 10/29/2024 89  70 - 99 mg/dL Final    Case Report 10/29/2024    Final                    Value:Surgical Pathology Report                         Case: [id number]                                  Authorizing Provider:  Robbie Alcaraz MD   Collected:           10/29/2024 10:22 AM          Ordering Location:      MAIN OR                 Received:            10/29/2024 10:51 AM          Pathologist:           Carter Garza MD                                                             Specimen:    Stomach, Greater Curvature, Partial gastrectomy                                            Final Diagnosis 10/29/2024    Final                    Value:STOMACH, PARTIAL GASTRECTOMY:  - Gastric wall with no significant histopathologic abnormality         Clinical Information 10/29/2024    Final                    Value:Morbid obesity (H) [ICD-10-CM]      Gross Description 10/29/2024    Final                    Value:A(1). Stomach, Greater Curvature, Partial gastrectomy:  The specimen is received fresh with proper patient identification labeled \"partial gastrectomy\".  The specimen consists of a 12.2 x 4.0 x 3.6 cm partial gastrectomy with a stapled resection margin along the long edge.  There is a 2.0 cm in length defect along the staple line.  Attached yellow-tan fat up to 1.9 cm, no lymph nodes are identified.  The serosa is pink-tan and predominantly smooth.  On opening, the gastric mucosa is pink-tan and focally congested.  No masses, polyps or areas of ulceration are grossly identified.  The stomach wall averages 0.3 cm in thickness.  Representative tissue is submitted in cassette A1.      Microscopic Description 10/29/2024    Final                    Value:Microscopic examination has been performed.      I have personally reviewed all specimens and or slides, including the listed special stains, and used them with my medical judgement to " "determine the final diagnosis.        Performing Labs 10/29/2024    Final                    Value:The technical component of this testing was completed at [facility].    Stain controls for all stains resulted within this report have been reviewed and show appropriate reactivity.       GLUCOSE BY METER POCT 10/29/2024 108 (H)  70 - 99 mg/dL Final    GLUCOSE BY METER POCT 10/29/2024 149 (H)  70 - 99 mg/dL Final    Platelet Count 10/30/2024 265  150 - 450 10e3/uL Final    GLUCOSE BY METER POCT 10/30/2024 127 (H)  70 - 99 mg/dL Final    Hold Specimen 10/30/2024 JIC   Final             5/1/2024    12:07 PM   ABDI Score (Last Two)   ABDI Raw Score 32   Activation Score 62.6   ABDI Level 3         PHYSICAL EXAM:  Objective    Ht 1.626 m (5' 4.02\")   Wt 104.3 kg (230 lb)   BMI 39.46 kg/m    Vitals - Patient Reported  Pain Score: No Pain (0)        Physical Exam   GENERAL: alert and no distress  EYES: Eyes grossly normal to inspection.  No discharge or erythema, or obvious scleral/conjunctival abnormalities.  RESP: No audible wheeze, cough, or visible cyanosis.    SKIN: Visible skin clear. No significant rash, abnormal pigmentation or lesions.  NEURO: Cranial nerves grossly intact.  Mentation and speech appropriate for age.  PSYCH: Appropriate affect, tone, and pace of words        Sincerely,    Jeannie Noe NP      Virtual Visit Details    Type of service:  Video Visit   Video Start Time:  1310  Video End Time:1:31 PM    Originating Location (pt. Location): Home    Distant Location (provider location):  On-site  Platform used for Video Visit: Radha"

## 2025-02-06 PROBLEM — Z98.84 S/P LAPAROSCOPIC SLEEVE GASTRECTOMY: Status: ACTIVE | Noted: 2025-02-06

## 2025-02-06 PROBLEM — E66.01 CLASS 3 SEVERE OBESITY DUE TO EXCESS CALORIES WITH SERIOUS COMORBIDITY AND BODY MASS INDEX (BMI) OF 45.0 TO 49.9 IN ADULT (H): Status: ACTIVE | Noted: 2020-11-06

## 2025-02-06 PROBLEM — E11.9 TYPE 2 DIABETES MELLITUS WITHOUT COMPLICATION, WITHOUT LONG-TERM CURRENT USE OF INSULIN (H): Status: ACTIVE | Noted: 2020-11-06

## 2025-02-06 PROBLEM — E66.813 CLASS 3 SEVERE OBESITY DUE TO EXCESS CALORIES WITH SERIOUS COMORBIDITY AND BODY MASS INDEX (BMI) OF 45.0 TO 49.9 IN ADULT (H): Status: ACTIVE | Noted: 2020-11-06

## 2025-02-06 NOTE — ASSESSMENT & PLAN NOTE
3 months postop. Doing well over all. Reflux well controlled. Doing well with slowing down at meals, sometimes forgets to wait to drink until 30 min after meal. Discussed strategies to manage this. Getting a little focused on the scale and weighing daily which is causing her some distress. Discussed reducing frequency of weight checks to help with this. Weight loss looks good. Plan is to restart ozempic for MACE risk reduction 3 months postop. Will start that now. Would need to consider holding if not able to get adequate PO intake.     Try putting water/ beverage across the room to help avoid drinking with meals   Continue omeprazole   Great work going slowly at meals   Keep to weighing once weekly   Restart ozemipc - 0.25mg x 4 weeks then 0.5mg weekly   Update labs

## 2025-03-10 NOTE — PROGRESS NOTES
"Video-Visit Details    Type of service:  Video Visit    Video Start Time: 10:01 am   Video End Time: 10:18 am    Originating Location (pt. Location): Home    Distant Location (provider location):  Offsite (providers home) [facility] [place]     Platform used for Video Visit: Radha    Nutrition Reassessment  Reason For Visit:  Yancy Hoover is a 54 year old female presenting today for nutrition follow-up, s/p laparoscopic sleeve gastrectomy with Dr. Alcaraz on 10/29/24.      Patient referred by Jeannie Noe NP  on November 4, 2024.    Anthropometrics  Initial Consult Weight: 296 lbs  Day of Surgery Weight(10/29/24): 282 lbs    Estimated body mass index is 36.67 kg/m  as calculated from the following:    Height as of this encounter: 1.638 m (5' 4.5\").    Weight as of this encounter: 98.4 kg (217 lb).    Current weight: 217 lbs    Weight loss: -79 lbs (26.7%) from initial consult; -65 lbs from day of surgery    Weight loss medications:  Ozempic restarted 2/5/25 for MACE risk reduction per Jeannie Noe    Topiramate - migraines     Current Vitamins/Minerals:   Tabavite - with 15 mg iron  B12 500 mcg/day  Vit D 125 mcg/day    Saw Micki Martins, Pharm D, yesterday. Education provided on calcium citrate. Pt accidentally was taking Vitamin C instead.    Nutrition History:  1 month post op:  Difficulty tolerating pureed diet at first. [facility] - \"I have been on the purée diet for 4 days now and it s not going really well. I can take 1 or 2 bites of whatever I purée, I don t deviate from the list of foods, I automatically get stomach cramps after the first 2 bites. I m using a baby spoon and eating slow.\"     Pt states today she is not tolerating pureed meat and beans feeling really gassy, some pain. Taking Gas-x with meals, which is helping. Can tolerate fruits (peaches, pears), veggies (mashed potato, avocado), eggs, hummus, cottage cheese.   Protein: 75 gm per day - " Drinking 2.5 Premier protein shakes daily.   Fluids: 70-80 oz/d - Water (48 oz), may add sugar-free liquid IV. Protein shakes (30 oz).    Recent Diet Recall:  Breakfast: mashed boiled egg.   Lunch: 1/4 c avocado and hummus; cottage cheese with fruit   Dinner: low-fat tomato basil soup     PA: walking daily for 15 mins, doing infinity hoop for 10 mins TID.     March 2025:  Doing well overall. Saw Micki Martins, Pharm D, yesterday.   No longer having gas with meals.     Had some weight stalls and was discouraged    Meeting protein goals.  Doing protein shakes, powders, grilled chicken, beans, yogurt, cottage cheese occ, and shrimp. Has been able to get some fruits/vegetables in as well.    Portions: a little over 1/4 cup, less if meat protein    Hydration: getting 52-60 oz/day. Does two protein shakes/day (11 oz each). Not huge on plain water - likes flavor or cold. Adding zero sugar liquid IV to water 1x/day. Also doing zero sugar crystal light.    PA: working out with nephew     Progress with Previous Goals:  1) Follow soft diet for 4 weeks, then to progress to bariatric regular diet (12/24).  - Met  2) Consume 60+ grams of protein/day. - Met  3) Sip on 48-64+ oz of fluids/day- between meals only. - Met  4) Eat slowly (>20 min/meal), chewing foods well (to applesauce-like consistency). - Met  5) Limit portions to ~1/2 cup/meal until 3 months post op - Met  6) Schedule 3 month provider/RD appointments - Met  7) Get 3 month labs done before next appointments - Orders in, still needs to get labs done  8) Take vitamins/minerals as recommended  - Met    Nutrition Prescription:  Grams Protein: 60 (minimum)   Amount of Fluid: 48-64 oz    Nutrition Diagnosis  Previous: Food and nutrition-related knowledge deficit r/t lack of prior exposure to diet advancements beyond bariatric low-fat full liquid diet aeb recent bariatric surgery and pt interest in diet education/review     Current: Food and nutrition-related knowledge deficit  r/t lack of prior exposure to diet advancements beyond bariatric pureed diet aeb recent bariatric surgery and pt interest in diet education/review     Intervention  Materials/Education provided on bariatric soft and regular consistency diets, protein intake, fluid intake, eating pace, chewing foods well, portion control, sugar/fat intake, recommended vitamin/mineral supplements. Patient demonstrates understanding.     Expected Engagement: good    Goals:  1) Follow bariatric regular diet   2) Consume 60+ grams of protein/day.  3) Sip on 48-64+ oz of fluids/day- between meals only.  4) Eat slowly (>20 min/meal), chewing foods well (to applesauce-like consistency).  5) Limit portions to ~3/4 cup/meal until 6 months post op  6) Schedule 6 month provider/RD appointments  7) Take vitamins/minerals as recommended   8) Due for 3 month post op labs     Protein Supplements:   Unflavored protein powder: Genepro, Isopure (25-30 gm protein per 1 scoop); Vital Proteins collagen powder (1 tbsp = 5 gm pro)  You may also use flavored protein powder if you prefer.   Protein shots: https://store.bariatricpal.com/collections/protein-shots  Pre-made protein shakes (no more than 210 Calories, at least 20 grams of protein, and less than 10 grams of sugar):   Premier Protein (160 Calories, 30 g protein)  Boost/Ensure Max (160 calories, 30 gm protein)   Fairlife Core Power (170 calories, 26 gm protein)  Aldi's Elevation Protein Shake (160 calories, 30 gm protein)   Equate Protein Shake (160 calories, 30 gm protein)  Slim Fast Advanced Nutrition (180 Calories, 20 g protein)  Muscle Milk, lactose-free, 17 oz bottle (210 Calories, 30 g protein)  Glucerna Protein Smart (150 paul, 30 gm protein)  Clear Protein Drinks:  BiPro  Premier Protein clear  [id number]  M Health Protein 15 Concentrate  Bone broth  Beneprotein protein powder mixed with 4-6 oz of fluid  [id number]  Gatorade Zero with Protein   Vital Proteins collagen powder mixed with clear  fluid      Post-op Diet Handouts:  Diet Guidelines after Weight-loss Surgery  [email or web address]     Your Stage 4 Diet: Soft Foods  [email or web address]    Your Stage 5 Diet: Regular Foods  [email or web address]    Supplements after Sleeve Gastrectomy, Gastric Bypass or Single Anastomosis Duodenal Switch  [email or web address]    Keeping Track of Fluids  [email or web address]    Exercises after Weight Loss Surgery (strengthening, when no weight lifting restrictions)  [email or web address]      At Home Exercise Resources  [employer] Library - Exercises by Muscle Group  https://www.[employer].org/resources/everyone/exercise-library/    Channels with Exercise Modifications:  Coach Diaz (strengthening) https://www.Billawayube.com/@Rocky  HASfit (strengthening): https://www.[employer]illawayube.[employer]/channel/MBFEP3-PSFLh28DN-p7PYrcP  Yoga with Zelinda: https://www.[employer]illawayube.com/@yogawithzelinda  Lbf4Yosv (strengthening for any age, functional strength training, exercise for seniors): https://www.Billawayube.com/@ben3opmq  Improved Health (low-impact, chair exercise, exercise for seniors): https://www.Billawayube.com/@ImprovedHealth  Seated core exercise: https://www.[employer]/blog/10-minute-chair-core-workout/    Size-Inclusive Fitness Routines:   Standing (low-impact) Exercise:   https://www.[employer]illawayube.com/watch?v=gC_L9qAHVJ8  https://www.Billawayube.com/watch?v=0YkGAMe9HQQ      Yoga  https://www.Billawayube.com/watch?v=VdIX8auOH_M&t=36s  https://www.[employer]illa[employer]ube.com/watch?v=zUnjJdJitPw    Pilates  https://www.[employer]illawayube.com/watch?v=gXTsenMF8Dn    Full Body Strengthening:  https://www.youtube.com/watch?v=8HpJL4o-qd1   https://www.youtube.com/watch?v=U7b32aOxsdW  https://www.youBentonville International Groupube.com/watch?v=gC_L9qAHVJ8    Handouts Relating to Exercise :    1.     Learning About Being Physically Active     2.      Muscle Conditionin Exercises    3.     Resistance Training with Free Weights: 3  Exercises    4.      Resistance Training with Surgical Tubin Exercises    5.     Stretchin Exercises    6.     Seated Exercises for Arms and Legs: 11 Exercises            Follow-Up:    at 11:30 am    Time spent with patient: 17 minutes.  ROBERTH Dye, RD, LD  Clinic #: [phone]

## 2025-03-12 ENCOUNTER — VIRTUAL VISIT (OUTPATIENT)
Dept: PHARMACY | Facility: CLINIC | Age: 55
End: 2025-03-12
Attending: NURSE PRACTITIONER
Payer: COMMERCIAL

## 2025-03-12 VITALS — HEIGHT: 65 IN | BODY MASS INDEX: 36.15 KG/M2 | WEIGHT: 217 LBS

## 2025-03-12 DIAGNOSIS — Z98.84 S/P LAPAROSCOPIC SLEEVE GASTRECTOMY: ICD-10-CM

## 2025-03-12 DIAGNOSIS — Z86.73 HISTORY OF STROKE: Primary | ICD-10-CM

## 2025-03-12 DIAGNOSIS — E11.9 TYPE 2 DIABETES MELLITUS WITHOUT COMPLICATION, WITHOUT LONG-TERM CURRENT USE OF INSULIN (H): ICD-10-CM

## 2025-03-12 ASSESSMENT — PAIN SCALES - GENERAL: PAINLEVEL_OUTOF10: NO PAIN (0)

## 2025-03-12 NOTE — PATIENT INSTRUCTIONS
Recommendations from MTM Pharmacist visit:                                                    MTM (medication therapy management) is a service provided by a clinical pharmacist designed to help you get the most of out of your medicines.  You may be sent a phone or email survey evaluating today's visit.  Please provide feedback you have for the service he received today if you are able.    Stop Vitamin C.    Find a Calcium CItrate supplement and start taking daily (depending on what you can find in the pharmacy): 500 mg 3 times daily or 600 mg 2 times daily     - Separate the calcium from your multivitamin or iron by at least 2 hours.     - Must be a chewable calcium citrate until post-op 3 months     - Options for calcium citrate: Juan Jose calcium citrate chewable, bariatric advantage calcium citrate chewable, Celebrate vitamins calcium citrate chewable, Bariatric Fusion calcium citrate chewable    You may increase Ozempic to 0.5mg after 4 weeks on 0.25 mg. If not able to meet nutritional goals or experience side effects, decrease back to  0.25 mg weekly    Follow-up:   Appointments in Next Year      Mar 13, 2025 2:30 PM  (Arrive by 2:15 PM)  Return Bariatric Nutrition Visit with Mary Anne Harrison RD  [facility] [place] ([facility] ) [phone]     May 27, 2025 8:00 AM  Pharmacist Visit with Micki Martins RPH  [facility] ([facility] ) [phone]     Jul 11, 2025 1:30 PM  (Arrive by 1:15 PM)  Return Bariatric Visit with Jeannie Noe NP  [facility] [place] ([facility] ) [phone]                It was great speaking with you today.  I value your experience and would be very thankful for your time in providing feedback in our clinic survey. In the next few days, you may receive an email or text message from [employer] with  "a link to a survey related to your  clinical pharmacist.\"     To schedule another MT appointment, please call the clinic directly ([facility] Phone Number: [phone] (schedules for [facility] and [facility] - providers, dietitians, health coaches) or you may call the MTM scheduling line at [phone] or toll-free at [phone].     My Clinical Pharmacist's contact information:                                                      Please feel free to contact me with any questions or concerns you have.      Micki Martins, Pharm D., MPH    Medication Therapy Management Pharmacist   [facility]          Meal Replacement Shake Options:   *Protein Shake Criteria: no more than 210 Calories, at least 20 grams of protein, and less than 10 grams of sugar   Premier Protein (160 Calories, 30 g protein)  Slim Fast Advanced Nutrition (180 Calories, 20 g protein)  Muscle Milk, lactose-free, 17 oz bottle (210 Calories, 30 g protein)  Integrated Supplements, no artificial sugars (110 Calories, 20 g protein)  Boost/Ensure Max (160 calories, 30 gm protein)   Fairlife Protein Shakes (160-230 calories, 26-42 gm protein)  Aldi's Elevation Protein Powder (180 calories, 30 gm protein)   Orgain Protein Shakes (130-160 calories, 20-26 gm protein)     Meal Replacement Bar Options:  Quest Protein Bars (190 Calories, 20 g protein)  Built Bar (170 Calories, 15-20 g protein)  One Protein Bar (210 calories, 20 g protein)  Randolph Signature Protein Bar ([employer]co) (190 Calories, 21 g protein)  Pure Protein Bars (180 Calories, 21 g protein)    Low Calorie Frozen Meal:  Healthy Choice Power Bowls  Lean Cuisine  Smart Ones  Jaspal Funk      ---------------------------------------------------------------  Tips to Increase the Protein in Your Diet  You may need more protein in your diet to help you heal from an illness, surgery or wound. Extra protein " can also help you gain weight. Here are some ideas for adding high-protein foods to your meals.  Meat and fish  Add chopped cooked meat to vegetables, salads, casseroles, soups, sauces and biscuit dough.  Use in omelets, soufflés, quiches, sandwich fillings and chicken or turkey stuffing.  Wrap in pie crust or biscuit dough to make a turnover.  Add to stuffed baked potatoes.  Make a dip with diced meat or flaked fish mixed with sour cream and spices.  Chopped, hard-cooked eggs  Add to salads.  Use for snacks and sandwich filling.  High-protein milk  To make high-protein milk, mix 1 quart whole milk with 1 cup powdered milk.  Add to cream soups, mashed potatoes, scrambled eggs, cereals and dried eggnog mix.  Use as an ingredient in puddings, custards, hot chocolate, milk shakes and pancakes.  Powdered milk  If you don't have any high-protein milk on hand, you can use powdered milk. Add 3 tablespoons to:  gravies, sauces, cream soups, mayonnaise  casseroles, meat patties, meatloaf, tuna salad, deviled ham  scalloped or mashed potatoes, creamed spinach  scrambled eggs, egg salad  cereals  yogurt, milk drinks, ice cream, frozen desserts, puddings, custards.  Add 4 to 6 tablespoons powdered milk to make:  cream sauces  breads, muffins, pancakes, waffles, cookies, cakes  cream pies, frostings, cake fillings  fruit cobblers, bread or rice pudding, gelatin desserts.  For high-protein eggnog, add 3 to 6 tablespoons powdered milk to prepared eggnog.  Hard or soft cheese  Melt on sandwiches, breads, tortillas, hamburgers, hot dogs, other meats, vegetables, eggs and pies.  Grate into soups, chili, sauces, casseroles, vegetables, potatoes, rice, noodles or meatloaf.  Eat with toast or crackers, or melt for edna dip.  Cottage cheese or ricotta cheese  Mix with or scoop on top of fruits and vegetables.  Add to casseroles, lasagna, spaghetti, noodles and egg dishes (omelets, scrambled eggs, soufflés).  Use in gelatin, pudding-type  desserts, cheesecake and pancake batter.  Use to stuff crepes, pasta shells or manicotti.  Fruit yogurt  Blend with fruits for a fruit smoothie.  Use as a dip for fruits and vegetables.  Scoop on top of pancakes or waffles.  Tofu  Blend silken tofu with fruits and juices for a smoothie.  Add chunks of firm tofu to soups and stews, or crumble into meatloaf.  Blend dried onion soup mix into soft or silken tofu for dip.  Use pureed silken tofu for part of the mayonnaise, sour cream, cream cheese or ricotta cheese called for in recipes.  Beans  Use cooked beans or peas in soups, casseroles, pasta, tacos and burritos.  Nuts and seeds  Note: For children under 3, discuss with the child's care team.  Use in casseroles, breads, muffins, pancakes, cookies and waffles.  Sprinkle on fruits, cereals, ice cream, yogurt, vegetables and salads.  Mix with raisins, dried fruits and chocolate chips for a snack.  Nut butters  Note: For children under 3, discuss with the child's care team.  Spread on sandwiches, toast, muffins, crackers, waffles, pancakes and fruit slices.  Use as a dip for raw vegetables.  Blend with milk drinks, or swirl through ice cream, yogurt or hot cereal.  Nutrition supplements (nutrition bars, drinks and powders)  Add powders to milk drinks and desserts.  Mix with ice cream, milk and fruit for a high-protein milk shake.    For informational purposes only. Not to replace the advice of your health care provider. Clinically reviewed by Yanira Pandya, ALISON, LD, and the Clinical Nutrition Service Line. Copyright   2005 [facility]. All rights reserved. [employer] 244545 - REV 04/24.      -----------------------------------------------------------------------------------------------------------------  Learning About High-Protein Foods  What foods are high in protein?     The foods you eat contain nutrients, such as vitamins and minerals. Protein is a nutrient. Your body needs the right amount to  "stay healthy and work as it should. You can use the list below to help you make choices about which foods to eat.  Here are some examples of foods that are high in protein.  Dairy and dairy alternatives  Cheese  Milk  Soy milk  Yogurt (especially Greek)  Meat  Beef  Chicken  Ham  Lamb  Lunch meat  Pork  Sausage  Turkey  Other protein foods  Hemp seeds!  Beans (black, garbanzo, kidney, lima)  Eggs  Hummus  Lentils  Nuts  Peanut butter and other nut butters  Peas  Soybeans  Tofu  Veggie or soy shereen (Check the nutrition label for the amount of protein in each serving.)  Seafood  Anchovies  Cod  Crab  Halibut  [place]  Sardines  Shrimp  [place]  [place]  Tuna  Protein supplements  Bars (Check the nutrition label for the amount of protein in each serving.)  Drinks  Powders  Work with your doctor to find out how much of this nutrient you need. Depending on your health, you may need more or less of it in your diet.  Where can you learn more?  Go to https://www.[facility].net/patiented  Enter P335 in the search box to learn more about \"Learning About High-Protein Foods.\"  Current as of: September 20, 2023               Content Version: 14.0    7190-7292 [employer].   Care instructions adapted under license by your healthcare professional. If you have questions about a medical condition or this instruction, always ask your healthcare professional. [employer] disclaims any warranty or liability for your use of this information.         "

## 2025-03-12 NOTE — Clinical Note
Yancy is doing GREAT! No issues with Ozempic, so will increase to 0.5 mg for MACE benefit. She also was taking vitamin C, not calcium, so we corrected that today.  Micki

## 2025-03-12 NOTE — NURSING NOTE
Current patient location: [address] [place] [id number]    Is the patient currently in the state of [place]? YES    Visit mode: VIDEO    If the visit is dropped, the patient can be reconnected by:VIDEO VISIT: Text to cell phone:   Telephone Information:   Mobile [phone]    and VIDEO VISIT: Send to e-mail at: [email or web address]    Will anyone else be joining the visit? NO  (If patient encounters technical issues they should call [phone]    Are changes needed to the allergy or medication list? No    Are refills needed on medications prescribed by this physician? NO    Rooming Documentation:  Questionnaire(s) not pre-assigned    Reason for visit: Medication Therapy Management    Ny MENDOZA

## 2025-03-12 NOTE — PROGRESS NOTES
Medication Therapy Management (MTM) Encounter    ASSESSMENT:                            Medication Adherence/Access: See below for considerations.    S/p sleeve gastrectomy/diabetes:   Stable. Ok to increase Ozempic to 0.5mg - minimum clinical dose for MACE benefit given tolerating well and meeting nutritional goals. Education provided on supplements s/p sleeve gastrectomy (vitamin C not needed, education on where to find calcium citrate supplement).      Hypertension/history stroke: Ok to increase Ozempic to 0.5mg - minimum clinical dose for MACE benefit. Continue to monitor blood pressure at home.    Meeting goal aspirin and high intensity statin. LDL at goal <70 mg/dl        PLAN:                            Stop Vitamin C.    Find a Calcium CItrate supplement and start taking daily (depending on what you can find in the pharmacy): 500 mg 3 times daily or 600 mg 2 times daily     - Separate the calcium from your multivitamin or iron by at least 2 hours.     - Must be a chewable calcium citrate until post-op 3 months     - Options for calcium citrate: Juan Jose calcium citrate chewable, bariatric advantage calcium citrate chewable, Celebrate vitamins calcium citrate chewable, Bariatric Fusion calcium citrate chewable    You may increase Ozempic to 0.5mg after 4 weeks on 0.25 mg. If not able to meet nutritional goals or experience side effects, decrease back to  0.25 mg weekly    Follow-up:   Appointments in Next Year      Mar 13, 2025 2:30 PM  (Arrive by 2:15 PM)  Return Bariatric Nutrition Visit with Mary Anne Harrison RD  [facility] [place] ([facility] ) [phone]     May 27, 2025 8:00 AM  Pharmacist Visit with Micki Martins RPH  [facility] [place] ([facility] ) [phone]     Jul 11, 2025 1:30 PM  (Arrive by 1:15 PM)  Return Bariatric Visit with Jeannie Noe NP  [facility]  [facility] ) [phone]            SUBJECTIVE/OBJECTIVE:                          Yancy Hoover is a 54 year old female seen for a follow-up visit.       Reason for visit: Medication Therapy Management - post ольга surgery check-in.    Allergies/ADRs: Reviewed in chart  Past Medical History: Reviewed in chart  Tobacco: She reports that she quit smoking about 20 months ago. Her smoking use included cigarettes. She has a 10 pack-year smoking history. She has never used smokeless tobacco.  Alcohol: not currently using    Medication Adherence/Access: no issues reported.      Weight Management /Diabetes:  Ozempic 0.25 mg weekly  Simethicone 125 mg - 4 times daily as needed for gas.  Topiramate 25 mg twice daily (for migraine -- tried stopping recently, but migraine returned; so restarted and now no issues).    Supplements  Tabavite with iron from behind pharmacy counter purchased for cash $3/100 quantity, better cost for patient - can't afford Barimelts:  Per tab of tabavite:   Calcium: none   Vitamin d: 10 mcg   Iron: 15 MG   Vitamin B12 1 MCG  Calcium -  not taking (patient found vitamin C instead and was confused) - hasn't taken calcium that she can recall.  Vitamin d 125 mcg once daily   Vitamin B12 -500 mcg once daily      Sleeve gastrectomy  completed by Dr. Alcaraz on 10/29/24.   Bariatric Surgery Follow-up with Pia Pandya PA-C 12/3/24  Registered dietician visit 12/3/24 with Sharon Osborn Registered Dietician     Patient feels wonderful overall she notes- more energy and losing weight. Has some stalling weight loss a few weeks ago, but now has started to lose again and feels good. Meeting nutritional goals - restarted Ozempic for secondary stroke prevention - no side effects, no issues meeting nutritional needs. No more gas with meals - has simethicone occasionally if needed, but rare.      Goals with registered dietician: regular bariatric  "diet      Activity: much more energy - has been working out with nephew. Working to find balance of pushing self and not over-doing activity, especially with abdominal exercises - acknowledges still healing.     Medication History:  Topiramate: takes for migraine - not helpful for appetite/cravings   Ozempic: more effective - had some concerns re: side effects, now believes not related. Patient denies personal or family history of MEN Type2, MTC, Pancreatitis.   Bydureon - worked well when on previously in 2023, less effective retrial 2024  Jardiance - UTI  Bupropion - (for depression previously) not effective for appetite/reward suppression and not needed for mental health at this time.    Per visit with Jeannie Noe 5/2/24  NBS 7/2020 296lb   Ongoing MWM with Dr. Velarde wasn't seen in last 1.5 years until this month- titration up on topiramate, on saxenda but other agents not covered?   4/2023 glenn shelley RD     Starting weight (lbs): 296 lb  Surgery date weight: 282 lb         Current weight today: 217 lbs 0 oz  Cumulative Weight Loss: -79 lb, -26.7% from baseline    Wt Readings from Last 4 Encounters:   03/12/25 217 lb (98.4 kg)   02/05/25 230 lb (104.3 kg)   01/29/25 230 lb (104.3 kg)   01/15/25 235 lb (106.6 kg)     Estimated body mass index is 36.67 kg/m  as calculated from the following:    Height as of this encounter: 5' 4.5\" (1.638 m).    Weight as of this encounter: 217 lb (98.4 kg).      Hypertension/history stroke:  Ozempic 0.25mg weekly x 4 weeks  Aspirin 81 mg once daily  Rosuvastatin 20 mg once daily    Verapamil ER 120mg ER once daily     Patient reports no current medication side effects. Patient notes high blood pressure readings she thinks may be related to stress in life at this time.  Patient self-monitors blood pressure.  Home BP monitoring 112/89 most recent reading..      Today's Vitals: Ht 5' 4.5\" (1.638 m)   Wt 217 lb (98.4 kg)   BMI 36.67 kg/m    ----------------      I spent 22 minutes " with this patient today. All changes were made via collaborative practice agreement with Jeannie Noe.     A summary of these recommendations was sent via [employer].    Micki Martins, Pharm D., MPH    Medication Therapy Management Pharmacist   [facility]      Telemedicine Visit Details  The patient's medications can be safely assessed via a telemedicine encounter.  Type of service:  Video Conference via [email or web address]  Originating Location (pt. Location): Home    Distant Location (provider location):  Off-site  Start Time:  8:05 AM  End Time:  8:27 AM     Medication Therapy Recommendations  History of stroke   1 Current Medication: semaglutide (OZEMPIC) 2 MG/3ML pen (Discontinued)   Current Medication Sig: Inject 0.25 mg subcutaneously every 7 days.   Rationale: Dose too low - Dosage too low - Effectiveness   Recommendation: Increase Dose   Status: Accepted per CPA   Identified Date: 3/12/2025 Completed Date: 3/12/2025         S/P laparoscopic sleeve gastrectomy   1 Current Medication: calcium citrate (CITRACAL) 950 (200 Ca) MG tablet   Current Medication Sig: Take 1 tablet (950 mg) by mouth 2 times daily.   Rationale: Does not understand instructions - Adherence - Adherence   Recommendation: Provide Education   Status: Patient Agreed - Adherence/Education   Identified Date: 3/12/2025 Completed Date: 3/12/2025

## 2025-03-13 ENCOUNTER — VIRTUAL VISIT (OUTPATIENT)
Dept: ENDOCRINOLOGY | Facility: CLINIC | Age: 55
End: 2025-03-13
Payer: COMMERCIAL

## 2025-03-13 VITALS — BODY MASS INDEX: 36.15 KG/M2 | WEIGHT: 217 LBS | HEIGHT: 65 IN

## 2025-03-13 DIAGNOSIS — Z71.3 NUTRITIONAL COUNSELING: Primary | ICD-10-CM

## 2025-03-13 DIAGNOSIS — E66.9 OBESITY: ICD-10-CM

## 2025-03-13 DIAGNOSIS — Z98.84 S/P LAPAROSCOPIC SLEEVE GASTRECTOMY: ICD-10-CM

## 2025-03-13 DIAGNOSIS — E11.9 TYPE 2 DIABETES MELLITUS WITHOUT COMPLICATION, WITHOUT LONG-TERM CURRENT USE OF INSULIN (H): ICD-10-CM

## 2025-03-13 NOTE — NURSING NOTE
Is the patient currently in the state of [place]? YES    Location: home    Visit mode:VIDEO    If the visit is dropped, the patient can be reconnected by: VIDEO VISIT: Text to cell phone:   Telephone Information:   Mobile [phone]    and VIDEO VISIT: Send to e-mail at: [email or web address]    Will anyone else be joining the visit? NO  (If patient encounters technical issues they should call [phone]    Are changes needed to the allergy or medication list? No    Are refills needed on medications prescribed by this physician? NO    Reason for visit: JUAN Foote, Virtual Visit Facilitator    QNR Status: NA

## 2025-03-13 NOTE — PATIENT INSTRUCTIONS
Goals:  1) Follow bariatric regular diet   2) Consume 60+ grams of protein/day.  3) Sip on 48-64+ oz of fluids/day- between meals only.  4) Eat slowly (>20 min/meal), chewing foods well (to applesauce-like consistency).  5) Limit portions to ~3/4 cup/meal until 6 months post op  6) Schedule 6 month provider/RD appointments  7) Take vitamins/minerals as recommended   8) Due for 3 month post op labs     Protein Supplements:   Unflavored protein powder: Genepro, Isopure (25-30 gm protein per 1 scoop); Vital Proteins collagen powder (1 tbsp = 5 gm pro)  You may also use flavored protein powder if you prefer.   Protein shots: https://store.PriceShoppers.com/collections/protein-shots  Pre-made protein shakes (no more than 210 Calories, at least 20 grams of protein, and less than 10 grams of sugar):   Premier Protein (160 Calories, 30 g protein)  Boost/Ensure Max (160 calories, 30 gm protein)   Prepared Response Core Power (170 calories, 26 gm protein)  Aldi's Elevation Protein Shake (160 calories, 30 gm protein)   Equate Protein Shake (160 calories, 30 gm protein)  Slim Fast Advanced Nutrition (180 Calories, 20 g protein)  Muscle Milk, lactose-free, 17 oz bottle (210 Calories, 30 g protein)  Glucerna Protein Smart (150 paul, 30 gm protein)  Clear Protein Drinks:  BiPro  Premier Protein clear  [id number]  M Health Protein 15 Concentrate  Bone broth  Beneprotein protein powder mixed with 4-6 oz of fluid  [id number]  Gatorade Zero with Protein   Vital Proteins collagen powder mixed with clear fluid      Post-op Diet Handouts:  Diet Guidelines after Weight-loss Surgery  [email or web address]     Your Stage 4 Diet: Soft Foods  [email or web address]    Your Stage 5 Diet: Regular Foods  [email or web address]    Supplements after Sleeve Gastrectomy, Gastric Bypass or Single Anastomosis Duodenal Switch  [email or web address]    Keeping Track of Fluids  [email or web address]    Exercises after  Weight Loss Surgery (strengthening, when no weight lifting restrictions)  [email or web address]      At Home Exercise Resources  ACE Fitness Library - Exercises by Muscle Group  https://www.[employer]L [employer].org/resources/everyone/exercise-library/    Channels with Exercise Modifications:  Coach Diaz (strengthening) https://www.Outrigger Mediaube.com/@Rocky  HASfit (strengthening): https://www.[employer] Mediaube.com/channel/LFVMN7-DREYb01EP-o4YKgmU  Yoga with Zelinda: https://www.[employer] Mediaube.com/@yogawithzelinda  Lyx3Ojnb (strengthening for any age, functional strength training, exercise for seniors): https://www.[employer]ger Mediaube.com/@xlx7iyhs  Improved Health (low-impact, chair exercise, exercise for seniors): https://www.Out[employer]ger Mediaube.com/@ImprovedHealth  Seated core exercise: https://www.Avtozaper/blog/10-minute-chair-core-workout/    Size-Inclusive Fitness Routines:   Standing (low-impact) Exercise:   https://www.youInforceProube.com/watch?v=gC_L9qAHVJ8  https://www.Out[employer]ger Mediaube.com/watch?v=0FiTFEv2QBU      Yoga  https://www.Out[employer]ger Mediaube.com/watch?v=VdIX8auOH_M&t=36s  https://www.Out[employer]ger Mediaube.com/watch?v=zUnjJdJitPw    Pilates  https://www.Out[employer]ger Mediaube.com/watch?v=uTRojfXR7Qu    Full Body Strengthening:  https://www.youInforceProube.com/watch?v=3DqHC2q-gd4   https://www.youInforceProube.com/watch?v=R2k09xWpmhY  https://www.Out[employer]ger Mediaube.com/watch?v=gC_L9qAHVJ8    Handouts Relating to Exercise :    1.     Learning About Being Physically Active     2.      Muscle Conditionin Exercises    3.     Resistance Training with Free Weights: 3 Exercises    4.      Resistance Training with Surgical Tubin Exercises    5.     Stretchin Exercises    6.     Seated Exercises for Arms and Legs: 11 Exercises            Follow-Up:    at 11:30 am    ROBERTH Dye, RD, LD  Clinic #: [phone]

## 2025-03-13 NOTE — LETTER
"3/13/2025       RE: Yancy Hoover  [address]  [facility] [address]     Dear Colleague,    Thank you for referring your patient, Yancy Hoover, to the [facility] [place] at [facility]. Please see a copy of my visit note below.    Video-Visit Details    Type of service:  Video Visit    Video Start Time: 10:01 am   Video End Time: 10:18 am    Originating Location (pt. Location): Home    Distant Location (provider location):  Offsite (providers home) [facility] [place]     Platform used for Video Visit: Scirra    Nutrition Reassessment  Reason For Visit:  Yancy Hoover is a 54 year old female presenting today for nutrition follow-up, s/p laparoscopic sleeve gastrectomy with Dr. Alcaraz on 10/29/24.      Patient referred by Jeannie Noe NP  on November 4, 2024.    Anthropometrics  Initial Consult Weight: 296 lbs  Day of Surgery Weight(10/29/24): 282 lbs    Estimated body mass index is 36.67 kg/m  as calculated from the following:    Height as of this encounter: 1.638 m (5' 4.5\").    Weight as of this encounter: 98.4 kg (217 lb).    Current weight: 217 lbs    Weight loss: -79 lbs (26.7%) from initial consult; -65 lbs from day of surgery    Weight loss medications:  Ozempic restarted 2/5/25 for MACE risk reduction per Jeannie Noe    Topiramate - migraines     Current Vitamins/Minerals:   Tabavite - with 15 mg iron  B12 500 mcg/day  Vit D 125 mcg/day    Saw Micki Martins, Pharm D, yesterday. Education provided on calcium citrate. Pt accidentally was taking Vitamin C instead.    Nutrition History:  1 month post op:  Difficulty tolerating pureed diet at first. Pt sent Audrahart msg 11/14 - \"I have been on the purée diet for 4 days now and it s not going really well. I can take 1 or 2 bites of whatever I purée, I don t deviate from the list of foods, I automatically get stomach " "cramps after the first 2 bites. I m using a baby spoon and eating slow.\"     Pt states today she is not tolerating pureed meat and beans feeling really gassy, some pain. Taking Gas-x with meals, which is helping. Can tolerate fruits (peaches, pears), veggies (mashed potato, avocado), eggs, hummus, cottage cheese.   Protein: 75 gm per day - Drinking 2.5 Premier protein shakes daily.   Fluids: 70-80 oz/d - Water (48 oz), may add sugar-free liquid IV. Protein shakes (30 oz).    Recent Diet Recall:  Breakfast: mashed boiled egg.   Lunch: 1/4 c avocado and hummus; cottage cheese with fruit   Dinner: low-fat tomato basil soup     PA: walking daily for 15 mins, doing infinity hoop for 10 mins TID.     March 2025:  Doing well overall. Saw Micki Martins, Pharm D, yesterday.   No longer having gas with meals.     Had some weight stalls and was discouraged    Meeting protein goals.  Doing protein shakes, powders, grilled chicken, beans, yogurt, cottage cheese occ, and shrimp. Has been able to get some fruits/vegetables in as well.    Portions: a little over 1/4 cup, less if meat protein    Hydration: getting 52-60 oz/day. Does two protein shakes/day (11 oz each). Not huge on plain water - likes flavor or cold. Adding zero sugar liquid IV to water 1x/day. Also doing zero sugar crystal light.    PA: working out with nephew     Progress with Previous Goals:  1) Follow soft diet for 4 weeks, then to progress to bariatric regular diet (12/24).  - Met  2) Consume 60+ grams of protein/day. - Met  3) Sip on 48-64+ oz of fluids/day- between meals only. - Met  4) Eat slowly (>20 min/meal), chewing foods well (to applesauce-like consistency). - Met  5) Limit portions to ~1/2 cup/meal until 3 months post op - Met  6) Schedule 3 month provider/RD appointments - Met  7) Get 3 month labs done before next appointments - Orders in, still needs to get labs done  8) Take vitamins/minerals as recommended  - Met    Nutrition Prescription:  Grams " Protein: 60 (minimum)   Amount of Fluid: 48-64 oz    Nutrition Diagnosis  Previous: Food and nutrition-related knowledge deficit r/t lack of prior exposure to diet advancements beyond bariatric low-fat full liquid diet aeb recent bariatric surgery and pt interest in diet education/review     Current: Food and nutrition-related knowledge deficit r/t lack of prior exposure to diet advancements beyond bariatric pureed diet aeb recent bariatric surgery and pt interest in diet education/review     Intervention  Materials/Education provided on bariatric soft and regular consistency diets, protein intake, fluid intake, eating pace, chewing foods well, portion control, sugar/fat intake, recommended vitamin/mineral supplements. Patient demonstrates understanding.     Expected Engagement: good    Goals:  1) Follow bariatric regular diet   2) Consume 60+ grams of protein/day.  3) Sip on 48-64+ oz of fluids/day- between meals only.  4) Eat slowly (>20 min/meal), chewing foods well (to applesauce-like consistency).  5) Limit portions to ~3/4 cup/meal until 6 months post op  6) Schedule 6 month provider/RD appointments  7) Take vitamins/minerals as recommended   8) Due for 3 month post op labs     Protein Supplements:   Unflavored protein powder: Genepro, Isopure (25-30 gm protein per 1 scoop); Vital Proteins collagen powder (1 tbsp = 5 gm pro)  You may also use flavored protein powder if you prefer.   Protein shots: https://store.bariatricPortfolioLauncher Inc..com/collections/protein-shots  Pre-made protein shakes (no more than 210 Calories, at least 20 grams of protein, and less than 10 grams of sugar):   Premier Protein (160 Calories, 30 g protein)  Boost/Ensure Max (160 calories, 30 gm protein)   Fairlife Core Power (170 calories, 26 gm protein)  Aldi's Elevation Protein Shake (160 calories, 30 gm protein)   Equate Protein Shake (160 calories, 30 gm protein)  Slim Fast Advanced Nutrition (180 Calories, 20 g protein)  Muscle Milk, lactose-free,  17 oz bottle (210 Calories, 30 g protein)  Glucerna Protein Smart (150 paul, 30 gm protein)  Clear Protein Drinks:  BiPro  Premier Protein clear  [id number]  M Health Protein 15 Concentrate  Bone broth  Beneprotein protein powder mixed with 4-6 oz of fluid  [id number]  Gatorade Zero with Protein   Vital Proteins collagen powder mixed with clear fluid      Post-op Diet Handouts:  Diet Guidelines after Weight-loss Surgery  [email or web address]     Your Stage 4 Diet: Soft Foods  [email or web address]    Your Stage 5 Diet: Regular Foods  [email or web address]    Supplements after Sleeve Gastrectomy, Gastric Bypass or Single Anastomosis Duodenal Switch  [email or web address]    Keeping Track of Fluids  [email or web address]    Exercises after Weight Loss Surgery (strengthening, when no weight lifting restrictions)  [email or web address]      At Home Exercise Resources  obiwon Library - Exercises by Muscle Group  https://www.WineDemon.org/resources/everyone/exercise-library/    Channels with Exercise Modifications:  Coach Diaz (strengthening) https://www.Banter!ube.com/@Rocky  HASfit (strengthening): https://www.Banter!ube.com/channel/RKQOC7-HQPDc91VZ-h8JJmlA  Yoga with Zelinda: https://www.Banter!ube.com/@yogawithzelinda  Rch5Quvq (strengthening for any age, functional strength training, exercise for seniors): https://www.Banter!ube.com/@wtr0suoi  Improved Health (low-impact, chair exercise, exercise for seniors): https://www.Banter!ube.com/@ImprovedHealth  Seated core exercise: https://www.Envisage Technologies.InterMetro Communications/blog/10-minute-chair-core-workout/    Size-Inclusive Fitness Routines:   Standing (low-impact) Exercise:   https://www.Banter!ube.com/watch?v=gC_L9qAHVJ8  https://www.Banter!ube.com/watch?v=4InNVJw4KLE      Yoga  https://www.youtube.com/watch?v=VdIX8auOH_M&t=36s  https://www.youtube.com/watch?v=zUnjJdJitPw    Pilates  https://www.youtube.com/watch?v=oFItqdYD7Bv    Full  Body Strengthening:  https://www.youtube.com/watch?v=9LwAQ1z-by0   https://www.youtube.com/watch?v=K9o30iHbuyV  https://www.youBlast Rampube.com/watch?v=gC_L9qAHVJ8    Handouts Relating to Exercise :    1.     Learning About Being Physically Active     2.      Muscle Conditionin Exercises    3.     Resistance Training with Free Weights: 3 Exercises    4.      Resistance Training with Surgical Tubin Exercises    5.     Stretchin Exercises    6.     Seated Exercises for Arms and Legs: 11 Exercises            Follow-Up:    at 11:30 am    Time spent with patient: 17 minutes.  ROBERTH Dye, ALISON, LD  Clinic #: [phone]        Again, thank you for allowing me to participate in the care of your patient.      Sincerely,    Mary Anne Harrison RD

## 2025-03-17 ENCOUNTER — LAB (OUTPATIENT)
Dept: LAB | Facility: CLINIC | Age: 55
End: 2025-03-17
Payer: COMMERCIAL

## 2025-03-17 DIAGNOSIS — Z98.84 S/P LAPAROSCOPIC SLEEVE GASTRECTOMY: ICD-10-CM

## 2025-03-17 LAB
ALBUMIN SERPL BCG-MCNC: 4 G/DL (ref 3.5–5.2)
ALP SERPL-CCNC: 71 U/L (ref 40–150)
ALT SERPL W P-5'-P-CCNC: 13 U/L (ref 0–50)
ANION GAP SERPL CALCULATED.3IONS-SCNC: 10 MMOL/L (ref 7–15)
AST SERPL W P-5'-P-CCNC: 21 U/L (ref 0–45)
BILIRUB SERPL-MCNC: 0.2 MG/DL
BUN SERPL-MCNC: 11.6 MG/DL (ref 6–20)
CALCIUM SERPL-MCNC: 9.4 MG/DL (ref 8.8–10.4)
CHLORIDE SERPL-SCNC: 109 MMOL/L (ref 98–107)
CHOLEST SERPL-MCNC: 177 MG/DL
CREAT SERPL-MCNC: 0.9 MG/DL (ref 0.51–0.95)
EGFRCR SERPLBLD CKD-EPI 2021: 76 ML/MIN/1.73M2
ERYTHROCYTE [DISTWIDTH] IN BLOOD BY AUTOMATED COUNT: 14.6 % (ref 10–15)
EST. AVERAGE GLUCOSE BLD GHB EST-MCNC: 131 MG/DL
FASTING STATUS PATIENT QL REPORTED: NO
FASTING STATUS PATIENT QL REPORTED: NO
GLUCOSE SERPL-MCNC: 95 MG/DL (ref 70–99)
HBA1C MFR BLD: 6.2 %
HCO3 SERPL-SCNC: 25 MMOL/L (ref 22–29)
HCT VFR BLD AUTO: 40.2 % (ref 35–47)
HDLC SERPL-MCNC: 51 MG/DL
HGB BLD-MCNC: 13.4 G/DL (ref 11.7–15.7)
LDLC SERPL CALC-MCNC: 109 MG/DL
MCH RBC QN AUTO: 28.4 PG (ref 26.5–33)
MCHC RBC AUTO-ENTMCNC: 33.3 G/DL (ref 31.5–36.5)
MCV RBC AUTO: 85 FL (ref 78–100)
NONHDLC SERPL-MCNC: 126 MG/DL
PLATELET # BLD AUTO: 242 10E3/UL (ref 150–450)
POTASSIUM SERPL-SCNC: 4.1 MMOL/L (ref 3.4–5.3)
PROT SERPL-MCNC: 7.4 G/DL (ref 6.4–8.3)
PTH-INTACT SERPL-MCNC: 31 PG/ML (ref 15–65)
RBC # BLD AUTO: 4.72 10E6/UL (ref 3.8–5.2)
SODIUM SERPL-SCNC: 144 MMOL/L (ref 135–145)
TRIGL SERPL-MCNC: 85 MG/DL
WBC # BLD AUTO: 8.4 10E3/UL (ref 4–11)

## 2025-03-17 PROCEDURE — 99000 SPECIMEN HANDLING OFFICE-LAB: CPT | Performed by: PATHOLOGY

## 2025-03-17 PROCEDURE — 36415 COLL VENOUS BLD VENIPUNCTURE: CPT | Performed by: PATHOLOGY

## 2025-03-17 PROCEDURE — 82306 VITAMIN D 25 HYDROXY: CPT

## 2025-03-17 PROCEDURE — 83036 HEMOGLOBIN GLYCOSYLATED A1C: CPT

## 2025-03-17 PROCEDURE — 83970 ASSAY OF PARATHORMONE: CPT | Performed by: PATHOLOGY

## 2025-03-17 PROCEDURE — 80061 LIPID PANEL: CPT | Performed by: PATHOLOGY

## 2025-03-17 PROCEDURE — 82607 VITAMIN B-12: CPT

## 2025-03-17 PROCEDURE — 84590 ASSAY OF VITAMIN A: CPT | Mod: 90 | Performed by: PATHOLOGY

## 2025-03-17 PROCEDURE — 85027 COMPLETE CBC AUTOMATED: CPT | Performed by: PATHOLOGY

## 2025-03-17 PROCEDURE — 80053 COMPREHEN METABOLIC PANEL: CPT | Performed by: PATHOLOGY

## 2025-03-18 LAB
VIT B12 SERPL-MCNC: 530 PG/ML (ref 232–1245)
VIT D+METAB SERPL-MCNC: 53 NG/ML (ref 20–50)

## 2025-03-19 LAB
ANNOTATION COMMENT IMP: NORMAL
RETINYL PALMITATE SERPL-MCNC: <0.02 MG/L
VIT A SERPL-MCNC: 0.44 MG/L

## 2025-04-21 ENCOUNTER — MYC MEDICAL ADVICE (OUTPATIENT)
Dept: PHARMACY | Facility: CLINIC | Age: 55
End: 2025-04-21
Payer: COMMERCIAL

## 2025-04-21 DIAGNOSIS — Z98.84 S/P LAPAROSCOPIC SLEEVE GASTRECTOMY: Primary | ICD-10-CM

## 2025-04-22 NOTE — TELEPHONE ENCOUNTER
Clinical Pharmacy Note    Patient reviewed vitamins - not taking vitamin B12. Requested prescription - sent this prescription  using CPA with ROSANNE Milton, Pharm D., MPH    Medication Therapy Management Pharmacist   [facility]

## 2025-05-27 ENCOUNTER — VIRTUAL VISIT (OUTPATIENT)
Dept: PHARMACY | Facility: CLINIC | Age: 55
End: 2025-05-27
Attending: NURSE PRACTITIONER
Payer: COMMERCIAL

## 2025-05-27 VITALS — HEIGHT: 64 IN | BODY MASS INDEX: 33.29 KG/M2 | WEIGHT: 195 LBS

## 2025-05-27 DIAGNOSIS — Z86.73 HISTORY OF STROKE: ICD-10-CM

## 2025-05-27 DIAGNOSIS — Z98.84 S/P LAPAROSCOPIC SLEEVE GASTRECTOMY: Primary | ICD-10-CM

## 2025-05-27 RX ORDER — PRAZOSIN HYDROCHLORIDE 1 MG/1
2 CAPSULE ORAL AT BEDTIME
COMMUNITY
Start: 2025-05-15

## 2025-05-27 RX ORDER — SERTRALINE HYDROCHLORIDE 100 MG/1
100 TABLET, FILM COATED ORAL DAILY
COMMUNITY
Start: 2025-05-15

## 2025-05-27 ASSESSMENT — PAIN SCALES - GENERAL: PAINLEVEL_OUTOF10: NO PAIN (0)

## 2025-05-27 NOTE — PROGRESS NOTES
Medication Therapy Management (MTM) Encounter    ASSESSMENT:                            Medication Adherence/Access: see below.    Diabetes/s/p sleeve gastrectomy :   Significant improvements overall seen with adherence to supplements (education provided on safe to break calcium tabs and also petite calcium tabs available - does not tolerate chewable historically) given lifestyle modifications with registered dietician. Patient managing appetite and weight management well with Ozempic. Patient on lowest studied clinical dose for secondary ascvd benefit - do not recommend increasing dose further at this time given tolerating well and able to meet nutritional goals. Increasing dose may risk side effects and difficulty meeting nutritional goals.    Suspect continued improvements in labs given proper supplementation and lifestyle modifications - starting Ozempic also expected to contribute to improvements in A1C. Re-ordered labs to collect ahead of follow up with Jeannie Noe CNP  7/11/25 to follow up 6 mo s/p sleeve gastrectomy labs and some labs out of range when collected in March.    PLAN:                            We will have you repeat labs 1 week before your follow up visit with Jeannie Noe CNP  in July. These have been ordered. Please schedule a lab visit at any [facility] lab to collect these.     break your calcium tabs in half - take 2 halves (1 whole tab) twice daily is recommended. Once you have used these up - then ask your local pharmacist to help you find calcium citrate petite tabs for your next bottle. Your dose is either to find 600 mg calcium twice daily or 500 mg three times daily.    Continue all other meds as prescriibed - you are doing great!!! I refilled Ozempic 0.5mg today (enough to get you to our next visit in August)    Follow-up:   Appointments in Next Year      Jun 12, 2025 11:30 AM  (Arrive by 11:15 AM)  Return Bariatric Nutrition Visit with ALISON [facility]  [facility] [place] ([facility] [place] ) [phone]     Jul 11, 2025 1:30 PM  (Arrive by 1:15 PM)  Return Bariatric Visit with Jeannie Noe NP  [facility] [place] ([facility] [place] ) [phone]     Aug 19, 2025 8:00 AM  Pharmacist Visit with Micki Martins RPH  [facility] ([facility] ) [phone]            SUBJECTIVE/OBJECTIVE:                          Yancy Hoover is a 54 year old female seen for a follow-up visit.       Reason for visit: Medication Therapy Management - weight management s/p sleeve gastrectomy .    Allergies/ADRs: Reviewed in chart  Past Medical History: Reviewed in chart  Tobacco: She reports that she quit smoking about 22 months ago. Her smoking use included cigarettes. She has a 10 pack-year smoking history. She has never used smokeless tobacco.  Alcohol: not currently using    Medication Adherence/Access: no issues reported. Supplements not covered by insurance so has been purchasing over the counter and this is affordable at this time.      Weight Management /Diabetes/s/p sleeve gastrectomy :  Ozempic 0.5 mg weekly x 2 months  Simethicone 125 mg - 4 times daily as needed for gas.  Topiramate 25 mg twice daily (for migraine -- tried stopping previously, but migraine returned; so restarted and now no issues).  Aspirin 81mg once daily (diabetes and secondary stroke prevention)    Supplements  Tabavite multivitamin x 2 per day with iron from behind pharmacy counter purchased for cash $3/100 quantity, better cost for patient - can't afford Barimelts:  Per tab of tabavite:   Calcium: none   Vitamin d: 10 mcg   Iron: 15 MG   Vitamin B12 1 MCG  Calcium carbonate - not taking given got large tabs and difficult to swallow  Vitamin B12 -500 mcg once daily (only started 4/21/25 - had believed to have been taking before then - last  vitamin B12 lab was without supplementation).    Vitamin d 125 mcg once daily - stopped in 2 months ago due to high levels 3/17/24       Sleeve gastrectomy  completed by Dr. Alcaraz on 10/29/24.   Bariatric Surgery Follow-up with Pia Pandya PA-C 12/3/24; next visit Jeannie Neo, ROSANNE  7/11/25  Registered dietician visit 3/13/25 with Mary Anne Harrison, Registered Dietician; next visit 6/12/25    Patient feels wonderful overall she notes- more energy and losing weight. Denies side effects. Has restarted Ozempic for secondary stroke prevention. This at first made significant decrease in appetite and a little nausea with this - but notes after several weeks of focus on nutrition all above improved. Daughter also recently had sleeve gastrectomy - following on lifestyle modifications together and this feels great to support eachother.    3mo ольга labs 3/17/25 - stopped vitamin d given elevations at that time. Ozempic started after this given secondary stroke prevention, and A1C remains slightly elevated (pre-diabetes range - but significant improvement overall). Other labs within normal limits or slight deviations ok to continue to monitor .    Nutrition: protein and water focus. Working with registered dietician     Water: 64 oz from water, Liquid IV, Propel    Goals with registered dietician:    1) Follow bariatric regular diet   2) Consume 60+ grams of protein/day.  3) Sip on 48-64+ oz of fluids/day- between meals only.  4) Eat slowly (>20 min/meal), chewing foods well (to applesauce-like consistency).  5) Limit portions to ~3/4 cup/meal until 6 months post op  6) Schedule 6 month provider/RD appointments  7) Take vitamins/minerals as recommended   8) Due for 3 month post op labs          Activity: much more energy - has been working out with nephew - weight lifting several days per week. Going for 2-3 mile walk most days.    Medication History:  Topiramate: takes for migraine - not helpful for appetite/cravings   Ozempic:  "more effective - had some concerns re: side effects, now believes not related. Patient denies personal or family history of MEN Type2, MTC, Pancreatitis.   Bydureon - worked well when on previously in 2023, less effective retrial 2024  Jardiance - UTI  Bupropion - (for depression previously) not effective for appetite/reward suppression and not needed for mental health at this time.    Per visit with Jeannie Noe 5/2/24  NBS 7/2020 296lb   Ongoing MWM with Dr. Velarde wasn't seen in last 1.5 years until this month- titration up on topiramate, on saxenda but other agents not covered?   4/2023 glenn shelley RD     Starting weight (lbs): 296 lb  Surgery date weight: 282 lb           Current weight today: 195 lbs 0 oz  Cumulative Weight Loss: -101 lb, -34% from baseline    Wt Readings from Last 4 Encounters:   05/27/25 195 lb (88.5 kg)   03/13/25 217 lb (98.4 kg)   03/12/25 217 lb (98.4 kg)   02/05/25 230 lb (104.3 kg)     Estimated body mass index is 33.47 kg/m  as calculated from the following:    Height as of this encounter: 5' 4\" (1.626 m).    Weight as of this encounter: 195 lb (88.5 kg).        Today's Vitals: Ht 5' 4\" (1.626 m)   Wt 195 lb (88.5 kg)   BMI 33.47 kg/m    ----------------      I spent 20 minutes with this patient today. All changes were made via collaborative practice agreement with Jeannie Noe.     A summary of these recommendations was sent via [employer].    Micki Martins, Pharm D., MPH    Medication Therapy Management Pharmacist   [facility]     Telemedicine Visit Details  The patient's medications can be safely assessed via a telemedicine encounter.  Type of service:  Video Conference via Marfeel  Originating Location (pt. Location): Home    Distant Location (provider location):  Off-site  Start Time: 8:02 AM  End Time: 8:22 AM     Medication Therapy Recommendations  S/P laparoscopic sleeve gastrectomy   1 Current Medication: Calcium Citrate-Vitamin D (CALCIUM " CITRATE CHEWY BITE) 500-12.5 MG-MCG CHEW (Discontinued)   Current Medication Sig: Take 1 chew tab by mouth 2 times daily.   Rationale: Cannot swallow/administer medication - Adherence - Adherence   Recommendation: Provide Education   Status: Patient Agreed - Adherence/Education   Identified Date: 5/27/2025 Completed Date: 5/27/2025

## 2025-05-27 NOTE — NURSING NOTE
Current patient location: home     Is the patient currently in the state of [place]? YES    Visit mode: VIDEO    If the visit is dropped, the patient can be reconnected by:VIDEO VISIT: Text to cell phone:   Telephone Information:   Mobile [phone]       Will anyone else be joining the visit? NO  (If patient encounters technical issues they should call [phone] :[phone])    Are changes needed to the allergy or medication list? Pt stated no med changes    Are refills needed on medications prescribed by this physician? NO    Rooming Documentation:  Not applicable      Reason for visit: Medication Therapy Management    Wt other than 24 hrs:    Pain more than one location:  no  [place] MENDOZA

## 2025-05-27 NOTE — Clinical Note
Yancy is doing FABULOUSLY!! I think finally understanding supplements better (her daughter also had sleeve gastrectomy this spring and they help each other understand supplements, nutrition, etc).  I re-ordered labs ahead of kg visit in July. Has great follow up with registered dietician and Medication Therapy Management too!  Micki

## 2025-05-29 NOTE — PATIENT INSTRUCTIONS
Recommendations from MTM Pharmacist visit:                                                    MTM (medication therapy management) is a service provided by a clinical pharmacist designed to help you get the most of out of your medicines.  You may be sent a phone or email survey evaluating today's visit.  Please provide feedback you have for the service he received today if you are able.          We will have you repeat labs 1 week before your follow up visit with Jeannie Noe CNP  in July. These have been ordered. Please schedule a lab visit at any [facility] lab to collect these.     break your calcium tabs in half - take 2 halves (1 whole tab) twice daily is recommended. Once you have used these up - then ask your local pharmacist to help you find calcium citrate petite tabs for your next bottle. Your dose is either to find 600 mg calcium twice daily or 500 mg three times daily.    Continue all other meds as prescriibed - you are doing great!!! I refilled Ozempic 0.5mg today (enough to get you to our next visit in August)    Follow-up:   Appointments in Next Year      Jun 12, 2025 11:30 AM  (Arrive by 11:15 AM)  Return Bariatric Nutrition Visit with Mary Anne Harrison RD  [facility] ([facility] [place] ) [phone]     Jul 11, 2025 1:30 PM  (Arrive by 1:15 PM)  Return Bariatric Visit with Jeannie Noe NP  [facility] ([facility] [place] ) [phone]     Aug 19, 2025 8:00 AM  Pharmacist Visit with Micki Martins RPH  [facility] ([facility] [place] ) [phone]            It was great speaking with you today.  I value your experience and would be very thankful for your time in providing feedback in our clinic survey. In the next few days, you may receive an email or text message from [employer] with a link to a survey  "related to your  clinical pharmacist.\"     To schedule another MT appointment, please call the clinic directly ([facility] Phone Number: [phone] (schedules for [facility] and [facility] - providers, dietitians, health coaches) or you may call the MTM scheduling line at [phone] or toll-free at [phone].     My Clinical Pharmacist's contact information:                                                      Please feel free to contact me with any questions or concerns you have.      Micki Martins, Pharm D., MPH    Medication Therapy Management Pharmacist   [facility]      Try doing the \"Healthy Eating Plate\" method at home by following these rules: Start with a 9-inch dinner plate.         Meal Replacement Shake Options:   *Protein Shake Criteria: no more than 210 Calories, at least 20 grams of protein, and less than 10 grams of sugar   Premier Protein (160 Calories, 30 g protein)  Slim Fast Advanced Nutrition (180 Calories, 20 g protein)  Muscle Milk, lactose-free, 17 oz bottle (210 Calories, 30 g protein)  Integrated Supplements, no artificial sugars (110 Calories, 20 g protein)  Boost/Ensure Max (160 calories, 30 gm protein)   Fairlife Protein Shakes (160-230 calories, 26-42 gm protein)  Aldi's Elevation Protein Powder (180 calories, 30 gm protein)   Orgain Protein Shakes (130-160 calories, 20-26 gm protein)     Meal Replacement Bar Options:  Quest Protein Bars (190 Calories, 20 g protein)  Built Bar (170 Calories, 15-20 g protein)  One Protein Bar (210 calories, 20 g protein)  Santiago Signature Protein Bar ([employer]co) (190 Calories, 21 g protein)  Pure Protein Bars (180 Calories, 21 g protein)    Low Calorie Frozen Meal:  Healthy Choice Power Bowls  Lean Cuisine  Smart Ones  Jaspal Funk      ---------------------------------------------------------------  Tips to Increase the Protein in Your Diet  You may " need more protein in your diet to help you heal from an illness, surgery or wound. Extra protein can also help you gain weight. Here are some ideas for adding high-protein foods to your meals.  Meat and fish  Add chopped cooked meat to vegetables, salads, casseroles, soups, sauces and biscuit dough.  Use in omelets, soufflés, quiches, sandwich fillings and chicken or turkey stuffing.  Wrap in pie crust or biscuit dough to make a turnover.  Add to stuffed baked potatoes.  Make a dip with diced meat or flaked fish mixed with sour cream and spices.  Chopped, hard-cooked eggs  Add to salads.  Use for snacks and sandwich filling.  High-protein milk  To make high-protein milk, mix 1 quart whole milk with 1 cup powdered milk.  Add to cream soups, mashed potatoes, scrambled eggs, cereals and dried eggnog mix.  Use as an ingredient in puddings, custards, hot chocolate, milk shakes and pancakes.  Powdered milk  If you don't have any high-protein milk on hand, you can use powdered milk. Add 3 tablespoons to:  gravies, sauces, cream soups, mayonnaise  casseroles, meat patties, meatloaf, tuna salad, deviled ham  scalloped or mashed potatoes, creamed spinach  scrambled eggs, egg salad  cereals  yogurt, milk drinks, ice cream, frozen desserts, puddings, custards.  Add 4 to 6 tablespoons powdered milk to make:  cream sauces  breads, muffins, pancakes, waffles, cookies, cakes  cream pies, frostings, cake fillings  fruit cobblers, bread or rice pudding, gelatin desserts.  For high-protein eggnog, add 3 to 6 tablespoons powdered milk to prepared eggnog.  Hard or soft cheese  Melt on sandwiches, breads, tortillas, hamburgers, hot dogs, other meats, vegetables, eggs and pies.  Grate into soups, chili, sauces, casseroles, vegetables, potatoes, rice, noodles or meatloaf.  Eat with toast or crackers, or melt for edna dip.  Cottage cheese or ricotta cheese  Mix with or scoop on top of fruits and vegetables.  Add to ailyn medellin,  spaghetti, noodles and egg dishes (omelets, scrambled eggs, soufflés).  Use in gelatin, pudding-type desserts, cheesecake and pancake batter.  Use to stuff crepes, pasta shells or manicotti.  Fruit yogurt  Blend with fruits for a fruit smoothie.  Use as a dip for fruits and vegetables.  Scoop on top of pancakes or waffles.  Tofu  Blend silken tofu with fruits and juices for a smoothie.  Add chunks of firm tofu to soups and stews, or crumble into meatloaf.  Blend dried onion soup mix into soft or silken tofu for dip.  Use pureed silken tofu for part of the mayonnaise, sour cream, cream cheese or ricotta cheese called for in recipes.  Beans  Use cooked beans or peas in soups, casseroles, pasta, tacos and burritos.  Nuts and seeds  Note: For children under 3, discuss with the child's care team.  Use in casseroles, breads, muffins, pancakes, cookies and waffles.  Sprinkle on fruits, cereals, ice cream, yogurt, vegetables and salads.  Mix with raisins, dried fruits and chocolate chips for a snack.  Nut butters  Note: For children under 3, discuss with the child's care team.  Spread on sandwiches, toast, muffins, crackers, waffles, pancakes and fruit slices.  Use as a dip for raw vegetables.  Blend with milk drinks, or swirl through ice cream, yogurt or hot cereal.  Nutrition supplements (nutrition bars, drinks and powders)  Add powders to milk drinks and desserts.  Mix with ice cream, milk and fruit for a high-protein milk shake.    For informational purposes only. Not to replace the advice of your health care provider. Clinically reviewed by Yanira Pandya RD, RAJI, and the Clinical Nutrition Service Line. Copyright   2005 [facility]. All rights reserved. StepOut 917851 - REV 04/24.      -----------------------------------------------------------------------------------------------------------------  Learning About High-Protein Foods  What foods are high in protein?     The foods you eat contain  "nutrients, such as vitamins and minerals. Protein is a nutrient. Your body needs the right amount to stay healthy and work as it should. You can use the list below to help you make choices about which foods to eat.  Here are some examples of foods that are high in protein.  Dairy and dairy alternatives  Cheese  Milk  Soy milk  Yogurt (especially Greek)  Meat  Beef  Chicken  Ham  Lamb  Lunch meat  Pork  Sausage  Turkey  Other protein foods  Hemp seeds!  Beans (black, garbanzo, kidney, lima)  Eggs  Hummus  Lentils  Nuts  Peanut butter and other nut butters  Peas  Soybeans  Tofu  Veggie or soy shereen (Check the nutrition label for the amount of protein in each serving.)  Seafood  Anchovies  Cod  Crab  Halibut  [place]  Sardines  Shrimp  Tilapia  [place]  Tuna  Protein supplements  Bars (Check the nutrition label for the amount of protein in each serving.)  Drinks  Powders  Work with your doctor to find out how much of this nutrient you need. Depending on your health, you may need more or less of it in your diet.  Where can you learn more?  Go to https://www.Tadcast.net/patiented  Enter P335 in the search box to learn more about \"Learning About High-Protein Foods.\"  Current as of: September 20, 2023               Content Version: 14.0    [id number] [employer].   Care instructions adapted under license by your healthcare professional. If you have questions about a medical condition or this instruction, always ask your healthcare professional. Healthwise, [employer] disclaims any warranty or liability for your use of this information.         "

## 2025-06-02 NOTE — PROGRESS NOTES
"Video-Visit Details    Type of service:  Video Visit    Video Start Time: 11:29 AM  Video End Time: 11:57 AM    Originating Location (pt. Location): Home    Distant Location (provider location):  Offsite (providers home) [facility] [place]     Platform used for Video Visit: Radha    Nutrition Reassessment  Reason For Visit:  Yancy Hoover is a 54 year old female presenting today for nutrition follow-up, s/p laparoscopic sleeve gastrectomy with Dr. Alcaraz on 10/29/24.      Patient referred by Jeannie Noe NP  on November 4, 2024.    Anthropometrics  Initial Consult Weight: 296 lbs  Day of Surgery Weight (10/29/24): 282 lbs    Estimated body mass index is 32.11 kg/m  as calculated from the following:    Height as of this encounter: 1.638 m (5' 4.5\").    Weight as of this encounter: 86.2 kg (190 lb).    Current weight: 190 lbs    Weight loss: -106 lbs (35.8%) from initial consult; -92 lbs from day of surgery    Weight loss medications:  Ozempic restarted 2/5/25 for MACE risk reduction per Jeannie Noe    Topiramate - migraines   Buproprion- depression    Current Vitamins/Minerals:   Tabavite - with 15 mg iron x 2 daily  B12 500 mcg/day  Vit D 125 mcg/day-stopped due to elevated labs  B3 for metabolism daily  Calcium 600 mg daily         Nutrition History:  1 month post op:  Difficulty tolerating pureed diet at first. Pt sent [employer] msg 11/14 - \"I have been on the purée diet for 4 days now and it s not going really well. I can take 1 or 2 bites of whatever I purée, I don t deviate from the list of foods, I automatically get stomach cramps after the first 2 bites. I m using a baby spoon and eating slow.\"     Pt states today she is not tolerating pureed meat and beans feeling really gassy, some pain. Taking Gas-x with meals, which is helping. Can tolerate fruits (peaches, pears), veggies (mashed potato, avocado), eggs, hummus, cottage cheese.   Protein: 75 gm per day - Drinking " 2.5 Premier protein shakes daily.   Fluids: 70-80 oz/d - Water (48 oz), may add sugar-free liquid IV. Protein shakes (30 oz).    Recent Diet Recall:  Breakfast: mashed boiled egg.   Lunch: 1/4 c avocado and hummus; cottage cheese with fruit   Dinner: low-fat tomato basil soup     PA: walking daily for 15 mins, doing infinity hoop for 10 mins TID.     March 2025:  Doing well overall. Saw Micki Martins, Pharm D, yesterday.   No longer having gas with meals.     Had some weight stalls and was discouraged    Meeting protein goals.  Doing protein shakes, powders, grilled chicken, beans, yogurt, cottage cheese occ, and shrimp. Has been able to get some fruits/vegetables in as well.    Portions: a little over 1/4 cup, less if meat protein    Hydration: getting 52-60 oz/day. Does two protein shakes/day (11 oz each). Not huge on plain water - likes flavor or cold. Adding zero sugar liquid IV to water 1x/day. Also doing zero sugar crystal light.    PA: working out with nephew     June 2025: Patient is ~6 months post-op. Things are going pretty well. Hit another weight plateau the past 2-3 weeks. Currently is staying ~190 lbs.     Portions remain small at 3/4 cup. Is eating 3 meals per day with a snack. Getting adequate protein intake with aide of supplements x 2 daily. Found chicken breast and chicken thighs harder to digest at times. Uses lean ground turkey or beef at times.   Has found fruit such as strawberries and grapes hard to tolerate however can blend into a smoothie and be fine.    Continues to eat slow; taking ~30 minutes at each meal time. Does occasionally still have gas at meals-Gas X has helped alleviate symptoms when arise.     Diet recall:  Breakfast: protein shake (Premier protein) OR makes a smoothie with protein powder  Lunch: grilled chicken breast + small salad OR low fat chicken salad made with cottage cheese + low fat wheat thin crackers (4 crackers)  Dinner: chicken breast + vegetable or salad  Snacks:  protein shake    Hydration: hates drinking water. Uses SF liquid IV  w/ 1 bottle of water, 2-3 bottles of zero sugar propel water, 1 regular bottle water    Physical activity: walks 2.5-3 miles 4 x per week with 8 lb weights on ankles. Bought hand weights for arm strengthening and uses every other day.     Bowel movements- every 2-3 days and stool is hard. Suggested adding a fiber supplement to help with bowel regulation.    Progress with Previous Goals:  1) Follow bariatric regular diet   2) Consume 60+ grams of protein/day. - met, continues  3) Sip on 48-64+ oz of fluids/day- between meals only.  met, continues  4) Eat slowly (>20 min/meal), chewing foods well (to applesauce-like consistency).  met, continues  5) Limit portions to ~3/4 cup/meal until 6 months post op  met, continues  6) Schedule 6 month provider/RD appointments  7) Take vitamins/minerals as recommended  met, continues  8) Due for 3 month post op labs - reminded patient to get labs completed prior to provider visit    Nutrition Prescription:  Grams Protein: 60 (minimum)   Amount of Fluid: 48-64 oz    Nutrition Diagnosis  Previous: Food and nutrition-related knowledge deficit r/t lack of prior exposure to diet advancements beyond bariatric low-fat full liquid diet aeb recent bariatric surgery and pt interest in diet education/review     Current: Food and nutrition-related knowledge deficit r/t lack of prior exposure to diet advancements beyond bariatric pureed diet aeb recent bariatric surgery and pt interest in diet education/review     Intervention  Materials/Education provided on bariatric soft and regular consistency diets, protein intake, fluid intake, eating pace, chewing foods well, portion control, sugar/fat intake, recommended vitamin/mineral supplements. Patient demonstrates understanding.     Expected Engagement: good    Goals:  1) Follow bariatric regular diet   2) Consume 60+ grams of protein/day.  3) Sip on 48-64+ oz of fluids/day-  between meals only.  4) Eat slowly (>20 min/meal), chewing foods well (to applesauce-like consistency).  5) Limit portions to 3/4-1 cup/meal until 12 months post op  6) Schedule 6 month provider/RD appointments  7) Take vitamins/minerals as recommended      Protein Supplements:   Unflavored protein powder: Genepro, Isopure (25-30 gm protein per 1 scoop); Vital Proteins collagen powder (1 tbsp = 5 gm pro)  You may also use flavored protein powder if you prefer.   Protein shots: https://store.THREAT STREAM/collections/protein-shots  Pre-made protein shakes (no more than 210 Calories, at least 20 grams of protein, and less than 10 grams of sugar):   Premier Protein (160 Calories, 30 g protein)  Boost/Ensure Max (160 calories, 30 gm protein)   Fairlife Core Power (170 calories, 26 gm protein)  Aldi's Elevation Protein Shake (160 calories, 30 gm protein)   Equate Protein Shake (160 calories, 30 gm protein)  Slim Fast Advanced Nutrition (180 Calories, 20 g protein)  Muscle Milk, lactose-free, 17 oz bottle (210 Calories, 30 g protein)  Glucerna Protein Smart (150 paul, 30 gm protein)  Clear Protein Drinks:  BiPro  Premier Protein clear  [id number]  M Health Protein 15 Concentrate  Bone broth  Beneprotein protein powder mixed with 4-6 oz of fluid  [id number]  Gatorade Zero with Protein   Vital Proteins collagen powder mixed with clear fluid      Post-op Diet Handouts:  Diet Guidelines after Weight-loss Surgery  [email or web address]     Your Stage 4 Diet: Soft Foods  [email or web address]    Your Stage 5 Diet: Regular Foods  [email or web address]    Supplements after Sleeve Gastrectomy, Gastric Bypass or Single Anastomosis Duodenal Switch  [email or web address]    Keeping Track of Fluids  [email or web address]    Exercises after Weight Loss Surgery (strengthening, when no weight lifting restrictions)  [email or web address]      At Home Exercise Resources  ACE Fitness  Library - Exercises by Muscle Group  https://www.GenArts.org/resources/everyone/exercise-library/    Channels with Exercise Modifications:  Coach Diaz (strengthening) https://www.Runteqube.com/@Rocky Castañeda (strengthening): https://www.Runteqube.com/channel/LPRLL3-IMEQd09MS-b7NSekR  Yoga with Zelinda: https://www.Runteqube.com/@yogawithzelinda  Gtt2Gxgg (strengthening for any age, functional strength training, exercise for seniors): https://www.Runteqube.com/@jie4toak  Improved Health (low-impact, chair exercise, exercise for seniors): https://www.Runteqube.com/@ImprovedHealth  Seated core exercise: https://www.SmartEquip/blog/10-minute-chair-core-workout/    Size-Inclusive Fitness Routines:   Standing (low-impact) Exercise:   https://www.Runteqube.com/watch?v=gC_L9qAHVJ8  https://www.Runteqube.com/watch?v=2ZzBXZj9RSB      Yoga  https://www.Runteqube.com/watch?v=VdIX8auOH_M&t=36s  https://www.Runteqube.com/watch?v=zUnjJdJitPw    Pilates  https://www.Runteqube.com/watch?v=wDVaaxCP6Yt    Full Body Strengthening:  https://www.Runteqube.com/watch?v=7QhHW2y-st5   https://www.Runteqube.com/watch?v=U6f10xBlctC  https://www.Runteqube.com/watch?v=gC_L9qAHVJ8    Handouts Relating to Exercise :    1.     Learning About Being Physically Active     2.      Muscle Conditionin Exercises    3.     Resistance Training with Free Weights: 3 Exercises    4.      Resistance Training with Surgical Tubin Exercises    5.     Stretchin Exercises    6.     Seated Exercises for Arms and Legs: 11 Exercises    Follow-Up: 6 months or as needed    Time spent with patient: 28 minutes.    Hafsa Yusuf, MS, RD, LD  Clinic # [phone]

## 2025-06-12 ENCOUNTER — VIRTUAL VISIT (OUTPATIENT)
Dept: ENDOCRINOLOGY | Facility: CLINIC | Age: 55
End: 2025-06-12
Payer: COMMERCIAL

## 2025-06-12 VITALS — WEIGHT: 190 LBS | BODY MASS INDEX: 31.65 KG/M2 | HEIGHT: 65 IN

## 2025-06-12 DIAGNOSIS — E66.811 CLASS 1 OBESITY DUE TO EXCESS CALORIES WITH BODY MASS INDEX (BMI) OF 32.0 TO 32.9 IN ADULT, UNSPECIFIED WHETHER SERIOUS COMORBIDITY PRESENT: ICD-10-CM

## 2025-06-12 DIAGNOSIS — Z71.3 NUTRITIONAL COUNSELING: Primary | ICD-10-CM

## 2025-06-12 DIAGNOSIS — E66.09 CLASS 1 OBESITY DUE TO EXCESS CALORIES WITH BODY MASS INDEX (BMI) OF 32.0 TO 32.9 IN ADULT, UNSPECIFIED WHETHER SERIOUS COMORBIDITY PRESENT: ICD-10-CM

## 2025-06-12 DIAGNOSIS — Z98.84 S/P LAPAROSCOPIC SLEEVE GASTRECTOMY: ICD-10-CM

## 2025-06-12 ASSESSMENT — PAIN SCALES - GENERAL: PAINLEVEL_OUTOF10: NO PAIN (0)

## 2025-06-12 NOTE — LETTER
"6/12/2025       RE: Yancy Hoover  [address]  [facility] [address]     Dear Colleague,    Thank you for referring your patient, Yancy Hoover, to the [facility] [place] at [facility]. Please see a copy of my visit note below.    Video-Visit Details    Type of service:  Video Visit    Video Start Time: 11:29 AM  Video End Time: 11:57 AM    Originating Location (pt. Location): Home    Distant Location (provider location):  Offsite (providers home) [facility] [place]     Platform used for Video Visit: [employer]    Nutrition Reassessment  Reason For Visit:  Yancy Hoover is a 54 year old female presenting today for nutrition follow-up, s/p laparoscopic sleeve gastrectomy with Dr. Alcaraz on 10/29/24.      Patient referred by Jeannie Noe NP  on November 4, 2024.    Anthropometrics  Initial Consult Weight: 296 lbs  Day of Surgery Weight (10/29/24): 282 lbs    Estimated body mass index is 32.11 kg/m  as calculated from the following:    Height as of this encounter: 1.638 m (5' 4.5\").    Weight as of this encounter: 86.2 kg (190 lb).    Current weight: 190 lbs    Weight loss: -106 lbs (35.8%) from initial consult; -92 lbs from day of surgery    Weight loss medications:  Ozempic restarted 2/5/25 for MACE risk reduction per Jeannie Noe    Topiramate - migraines   Buproprion- depression    Current Vitamins/Minerals:   Tabavite - with 15 mg iron x 2 daily  B12 500 mcg/day  Vit D 125 mcg/day-stopped due to elevated labs  B3 for metabolism daily  Calcium 600 mg daily         Nutrition History:  1 month post op:  Difficulty tolerating pureed diet at first. Pt sent Mychart msg 11/14 - \"I have been on the purée diet for 4 days now and it s not going really well. I can take 1 or 2 bites of whatever I purée, I don t deviate from the list of foods, I automatically get stomach cramps " "after the first 2 bites. I m using a baby spoon and eating slow.\"     Pt states today she is not tolerating pureed meat and beans feeling really gassy, some pain. Taking Gas-x with meals, which is helping. Can tolerate fruits (peaches, pears), veggies (mashed potato, avocado), eggs, hummus, cottage cheese.   Protein: 75 gm per day - Drinking 2.5 Premier protein shakes daily.   Fluids: 70-80 oz/d - Water (48 oz), may add sugar-free liquid IV. Protein shakes (30 oz).    Recent Diet Recall:  Breakfast: mashed boiled egg.   Lunch: 1/4 c avocado and hummus; cottage cheese with fruit   Dinner: low-fat tomato basil soup     PA: walking daily for 15 mins, doing infinity hoop for 10 mins TID.     March 2025:  Doing well overall. Saw Micki Martins, Pharm D, yesterday.   No longer having gas with meals.     Had some weight stalls and was discouraged    Meeting protein goals.  Doing protein shakes, powders, grilled chicken, beans, yogurt, cottage cheese occ, and shrimp. Has been able to get some fruits/vegetables in as well.    Portions: a little over 1/4 cup, less if meat protein    Hydration: getting 52-60 oz/day. Does two protein shakes/day (11 oz each). Not huge on plain water - likes flavor or cold. Adding zero sugar liquid IV to water 1x/day. Also doing zero sugar crystal light.    PA: working out with nephew     June 2025: Patient is ~6 months post-op. Things are going pretty well. Hit another weight plateau the past 2-3 weeks. Currently is staying ~190 lbs.     Portions remain small at 3/4 cup. Is eating 3 meals per day with a snack. Getting adequate protein intake with aide of supplements x 2 daily. Found chicken breast and chicken thighs harder to digest at times. Uses lean ground turkey or beef at times.   Has found fruit such as strawberries and grapes hard to tolerate however can blend into a smoothie and be fine.    Continues to eat slow; taking ~30 minutes at each meal time. Does occasionally still have gas at " meals-Gas X has helped alleviate symptoms when arise.     Diet recall:  Breakfast: protein shake (Premier protein) OR makes a smoothie with protein powder  Lunch: grilled chicken breast + small salad OR low fat chicken salad made with cottage cheese + low fat wheat thin crackers (4 crackers)  Dinner: chicken breast + vegetable or salad  Snacks: protein shake    Hydration: hates drinking water. Uses SF liquid IV  w/ 1 bottle of water, 2-3 bottles of zero sugar propel water, 1 regular bottle water    Physical activity: walks 2.5-3 miles 4 x per week with 8 lb weights on ankles. Bought hand weights for arm strengthening and uses every other day.     Bowel movements- every 2-3 days and stool is hard. Suggested adding a fiber supplement to help with bowel regulation.    Progress with Previous Goals:  1) Follow bariatric regular diet   2) Consume 60+ grams of protein/day. - met, continues  3) Sip on 48-64+ oz of fluids/day- between meals only.  met, continues  4) Eat slowly (>20 min/meal), chewing foods well (to applesauce-like consistency).  met, continues  5) Limit portions to ~3/4 cup/meal until 6 months post op  met, continues  6) Schedule 6 month provider/RD appointments  7) Take vitamins/minerals as recommended  met, continues  8) Due for 3 month post op labs - reminded patient to get labs completed prior to provider visit    Nutrition Prescription:  Grams Protein: 60 (minimum)   Amount of Fluid: 48-64 oz    Nutrition Diagnosis  Previous: Food and nutrition-related knowledge deficit r/t lack of prior exposure to diet advancements beyond bariatric low-fat full liquid diet aeb recent bariatric surgery and pt interest in diet education/review     Current: Food and nutrition-related knowledge deficit r/t lack of prior exposure to diet advancements beyond bariatric pureed diet aeb recent bariatric surgery and pt interest in diet education/review     Intervention  Materials/Education provided on bariatric soft and regular  consistency diets, protein intake, fluid intake, eating pace, chewing foods well, portion control, sugar/fat intake, recommended vitamin/mineral supplements. Patient demonstrates understanding.     Expected Engagement: good    Goals:  1) Follow bariatric regular diet   2) Consume 60+ grams of protein/day.  3) Sip on 48-64+ oz of fluids/day- between meals only.  4) Eat slowly (>20 min/meal), chewing foods well (to applesauce-like consistency).  5) Limit portions to 3/4-1 cup/meal until 12 months post op  6) Schedule 6 month provider/RD appointments  7) Take vitamins/minerals as recommended      Protein Supplements:   Unflavored protein powder: Genepro, Isopure (25-30 gm protein per 1 scoop); Vital Proteins collagen powder (1 tbsp = 5 gm pro)  You may also use flavored protein powder if you prefer.   Protein shots: https://store.[employer]ias [employer]/collections/protein-shots  Pre-made protein shakes (no more than 210 Calories, at least 20 grams of protein, and less than 10 grams of sugar):   Premier Protein (160 Calories, 30 g protein)  Boost/Ensure Max (160 calories, 30 gm protein)   Ning by [employer] Core Power (170 calories, 26 gm protein)  Aldi's Elevation Protein Shake (160 calories, 30 gm protein)   Equate Protein Shake (160 calories, 30 gm protein)  Slim Fast Advanced Nutrition (180 Calories, 20 g protein)  Muscle Milk, lactose-free, 17 oz bottle (210 Calories, 30 g protein)  Glucerna Protein Smart (150 paul, 30 gm protein)  Clear Protein Drinks:  BiPro  Premier Protein clear  [id number]  M Health Protein 15 Concentrate  Bone broth  Beneprotein protein powder mixed with 4-6 oz of fluid  [id number]  Gatorade Zero with Protein   Vital Proteins collagen powder mixed with clear fluid      Post-op Diet Handouts:  Diet Guidelines after Weight-loss Surgery  [email or web address]     Your Stage 4 Diet: Soft Foods  [email or web address]    Your Stage 5 Diet: Regular Foods  [email or web address]    Supplements  after Sleeve Gastrectomy, Gastric Bypass or Single Anastomosis Duodenal Switch  [email or web address]    Keeping Track of Fluids  [email or web address]    Exercises after Weight Loss Surgery (strengthening, when no weight lifting restrictions)  [email or web address]      At Home Exercise Resources  TutorDudes - Exercises by Muscle Group  https://www.Cabify.org/resources/everyone/exercise-library/    Channels with Exercise Modifications:  Coach Diaz (strengthening) https://www.Lyxiaube.com/@Rocky  HASfit (strengthening): https://www.Lyxiaube.com/channel/DDEQC3-AXCYp31MA-z1QZppH  Yoga with Zelinda: https://www.Lyxiaube.Songtradr/@yogawithzelinda  Ixt6Iwjx (strengthening for any age, functional strength training, exercise for seniors): https://www.Lyxiaube.com/@dnd6snnu  Improved Health (low-impact, chair exercise, exercise for seniors): https://www.Lyxiaube.com/@ImprovedHealth  Seated core exercise: https://www.WISHI.Songtradr/blog/10-minute-chair-core-workout/    Size-Inclusive Fitness Routines:   Standing (low-impact) Exercise:   https://www.Lyxiaube.com/watch?v=gC_L9qAHVJ8  https://www.Lyxiaube.com/watch?v=3EgTZLv1NWT      Yoga  https://www.Lyxiaube.com/watch?v=VdIX8auOH_M&t=36s  https://www.Lyxiaube.com/watch?v=zUnjJdJitPw    Pilates  https://www.Lyxiaube.com/watch?v=ePIcloWC2Mp    Full Body Strengthening:  https://www.Lyxiaube.com/watch?v=1IkKC7k-ux6   https://www.Lyxiaube.com/watch?v=C6c96fGdcwA  https://www.Lyxiaube.com/watch?v=gC_L9qAHVJ8    Handouts Relating to Exercise :    1.     Learning About Being Physically Active     2.      Muscle Conditionin Exercises    3.     Resistance Training with Free Weights: 3 Exercises    4.      Resistance Training with Surgical Tubin Exercises    5.     Stretchin Exercises    6.     Seated Exercises for Arms and Legs: 11 Exercises    Follow-Up: 6 months or as needed    Time spent with patient: 28 minutes.    Hafsa Yusuf,  MS, RD, LD  Clinic # [phone]          Again, thank you for allowing me to participate in the care of your patient.      Sincerely,    Hfasa Yusuf RD

## 2025-06-12 NOTE — NURSING NOTE
Current patient location: home    Is the patient currently in the state of [place]? YES    Visit mode: VIDEO    If the visit is dropped, the patient can be reconnected by:VIDEO VISIT: Text to cell phone:   Telephone Information:   Mobile [phone]       Will anyone else be joining the visit? NO  (If patient encounters technical issues they should call [phone])    Are changes needed to the allergy or medication list? N/A    Are refills needed on medications prescribed by this physician? NO    Rooming Documentation:  Not applicable      Reason for visit: RECHECK    Wt other than 24 hrs:    Pain more than one location:  no  Maine MENDOZA

## 2025-06-12 NOTE — PATIENT INSTRUCTIONS
Juan J Haines,    It was a pleasure to meet with you today.    Follow-up with RD in 6 months or as needed    Thank you,    Hafsa Yusuf, RD, LD  If you would like to schedule or reschedule an appointment with the RD, please call [phone]    Nutrition Goals  1) Follow bariatric regular diet   2) Consume 60+ grams of protein/day.  3) Sip on 48-64+ oz of fluids/day- between meals only.  4) Eat slowly (>20 min/meal), chewing foods well (to applesauce-like consistency).  5) Limit portions to 3/4-1 cup/meal until 12 months post op  6) Schedule 6 month provider/RD appointments  7) Take vitamins/minerals as recommended      Protein Supplements:   Unflavored protein powder: Genepro, Isopure (25-30 gm protein per 1 scoop); Vital Proteins collagen powder (1 tbsp = 5 gm pro)  You may also use flavored protein powder if you prefer.   Protein shots: https://store.[employer] EMPTY [employer]INT/collections/protein-shots  Pre-made protein shakes (no more than 210 Calories, at least 20 grams of protein, and less than 10 grams of sugar):   Premier Protein (160 Calories, 30 g protein)  Boost/Ensure Max (160 calories, 30 gm protein)   Fairlife Core Power (170 calories, 26 gm protein)  Aldi's Elevation Protein Shake (160 calories, 30 gm protein)   Equate Protein Shake (160 calories, 30 gm protein)  Slim Fast Advanced Nutrition (180 Calories, 20 g protein)  Muscle Milk, lactose-free, 17 oz bottle (210 Calories, 30 g protein)  Glucerna Protein Smart (150 paul, 30 gm protein)  Clear Protein Drinks:  BiPro  Premier Protein clear  [id number]  M Health Protein 15 Concentrate  Bone broth  Beneprotein protein powder mixed with 4-6 oz of fluid  [id number]  Gatorade Zero with Protein   Vital Proteins collagen powder mixed with clear fluid      Post-op Diet Handouts:  Diet Guidelines after Weight-loss Surgery  [email or web address]     Your Stage 4 Diet: Soft Foods  [email or web address]    Your Stage 5 Diet: Regular  Foods  [email or web address]    Supplements after Sleeve Gastrectomy, Gastric Bypass or Single Anastomosis Duodenal Switch  [email or web address]    Keeping Track of Fluids  [email or web address]    Exercises after Weight Loss Surgery (strengthening, when no weight lifting restrictions)  [email or web address]      At Home Exercise Resources  "[employer] S[employer]A." Library - Exercises by Muscle Group  https://www.Parsimotion.org/resources/everyone/exercise-library/    Channels with Exercise Modifications:  Coach Diaz (strengthening) https://www.FlightStatsube.com/@Rocky  HASfit (strengthening): https://www.FlightStatsube.CoreXchange/channel/KXAFG4-UGWRu11FM-f0QBujM  Yoga with Zelinda: https://www.FlightStatsube.com/@yogawithzelinda  Qwy0Kuyo (strengthening for any age, functional strength training, exercise for seniors): https://www.UrbanTakeover.com/@hca1zgph  Improved Health (low-impact, chair exercise, exercise for seniors): https://www.FlightStatsube.com/@ImprovedHealth  Seated core exercise: https://www.M[employer] Arts.CoreXchange/blog/10-minute-chair-core-workout/    Size-Inclusive Fitness Routines:   Standing (low-impact) Exercise:   https://www.FlightStatsube.com/watch?v=gC_L9qAHVJ8  https://www.FlightStatsube.com/watch?v=2MyBLQb6VQB      Yoga  https://www.FlightStatsube.com/watch?v=VdIX8auOH_M&t=36s  https://www.FlightStatsube.com/watch?v=zUnjJdJitPw    Pilates  https://www.FlightStatsube.com/watch?v=rGJaacLZ0Vq    Full Body Strengthening:  https://www.FlightStatsube.com/watch?v=5AdRE6g-vo7   https://www.FlightStatsube.com/watch?v=Z0w91wFakkJ  https://www.UrbanTakeover.com/watch?v=gC_L9qAHVJ8    Handouts Relating to Exercise :    1.     Learning About Being Physically Active     2.      Muscle Conditionin Exercises    3.     Resistance Training with Free Weights: 3 Exercises    4.      Resistance Training with Surgical Tubin Exercises    5.     Stretchin Exercises    6.     Seated Exercises for Arms and Legs: 11 Exercises    COMPREHENSIVE WEIGHT MANAGEMENT  PROGRAM  VIRTUAL SUPPORT GROUPS    At [facility], our Comprehensive Weight Management program offers on-line support groups for patients who are working on weight loss and considering, preparing for, or have had weight loss surgery.     There is no cost for this opportunity.  You are invited to attend the?Virtual Support Groups?provided by any of the following locations:    [facility] via Microsoft Teams with Larisa Werner RD, RN  2.   [place] via Telligent Systems with Dariusz Yang, PhD, LP  3.   [place] via Telligent Systems with Darya Cuello RN  4.   [facility] via a Zoom Meeting with FRANSISCO Sams    The following Support Group information can also be found on our website:  https://www.[facility].org/treatments/weight-loss-and-weight-loss-surgery-support-groups      [facility]   WEIGHT LOSS SURGERY SUPPORT GROUP  The support group is a patient-lead forum that meets monthly to share experiences, encouragement and education. It is open to those who have had weight loss surgery, are scheduled for surgery, or are considering surgery.   WHEN: 3rd Wednesday of each month from 5:00PM - 6:00PM using Microsoft Teams.   FACILITATOR: Led by Larisa Cuba RD, RAJI, RN, the program's Clinical Coordinator.   TO REGISTER: Please contact the clinic via Strands or call the nurse line directly at [phone] to inform our staff that you would like an invite sent to you and to let us know the email you would like the invite sent to. Prior to the meeting, a link with directions on how to join the meeting will be sent to you.    2025 Meetings, 3rd Wednesday, 5:00pm - 6:00pm    January 15: Let's Talk  February19: Let's Talk  March 19: Speaker: Israel Boles RD, RAJI  April 16: Let's Talk  May 21: Speaker: Anca Deras RD, RAJI  June18: Let's Talk  July 16: Let's Talk  August 20: Let's Talk  September 17: No meeting.  October 15: Speaker: Sanjana Acosta PsyD, YASIR  November 19: Let's  Talk  December 17: Let's Talk    [facility] BARIATRIC CARE SUPPORT GROUP  This is open to all pre- and post- operative bariatric surgery patients as well as their support system.   WHEN: 3rd Tuesday of each month from 6:30 PM - 8:00 PM using Microsoft Teams.   FACILITATOR: Led by Dariusz Yang, Ph.D who is a Licensed Psychologist with the [facility] Comprehensive Weight Management Program.   TO REGISTER: Please send an email to Dariusz Yang, Ph.D., LP at?[email or web address]?if you would like an invitation to the group. Prior to the meeting, a link with directions on how to join the meeting will be sent to you.    2025 Meetings, 3rd Tuesday January 21st: Open Forum  February 18th: Medications and Bariatric Surgery  Speaker: Mariah Martinez, [facility] Resident  March 18th: Open Forum  April 15th: Genetics of Obesity as well as Q&A, Speaker: Sosa Ornelas MD, [facility] Comprehensive Weight Management Program.  May 20th: Open Forum  June 17th: Nutritional Labeling, Speaker: ROEL  July 15th: Open Forum  August 19th: Open Forum  September 16th: Open Forum  October 21st: Open Forum  November 18th: Holiday Eating, Speaker: TBD  December 16th: Open Forum    [facility] POST-OPERATIVE BARIATRIC SURGERY SUPPORT GROUP  This is a support group for [facility] bariatric patients (and those external to [facility]) who have had bariatric surgery and are at least 3 months post-surgery.  WHEN: 4th Thursday of the month from 11:00 AM - 12:00 PM using Microsoft Teams.   FACILITATOR: Led by Certified Bariatric Nurse, Darya Cuello RN, CBN.   TO REGISTER: Please send an email to Darya at [email or web address] if you would like an invitation to the group.  Prior to the meeting, a link with directions on how to join the meeting will be sent to you.    2025 Meetings, 4th Thursday,  11:00am - 12:pm  January 23  February 27  March 27  April 24  May 22  Rhonda 26  July 24  August 28  September 25  October 23  November 27: Thanksgiving Day, No meeting.      December 25: Kareen Day, No meeting.        [facility]   HEALTHY LIFESTYLE COACHING GROUP  This is a 60 minute virtual coaching group for those who want to lead a healthier lifestyle. Come together to set goals and overcome barriers in a supportive group environment. We will address the four pillars of health: nutrition, exercise, sleep, and emotional well-being.   WHEN: 1st Friday of the month, 12:30 PM - 1:30 PM   using a Zoom meeting.     FACILITATOR: Led by National Board-Certified Health and , Darya Mccloud [facility].  TO REGISTER: Please call the MediCard at [phone] to register. You will get an appointment to attend in CiviQ. Fifteen minutes prior to the meeting, complete the e-check in and you will get the link to join the meeting.  There is no charge to attend this group and space is limited.  Please register for each month you wish to attend    2025 Meetings, 1st Friday, 12:30pm-1:30pm  January 3  February 7  March 7: No meeting.  April 4  May 2  Rhonda 6  July 4: Fourth of July Holiday, No meeting.    August 1  September 5  October 3  November 7  December 5

## 2025-06-14 ENCOUNTER — HEALTH MAINTENANCE LETTER (OUTPATIENT)
Age: 55
End: 2025-06-14

## 2025-07-03 ENCOUNTER — TELEPHONE (OUTPATIENT)
Dept: ENDOCRINOLOGY | Facility: CLINIC | Age: 55
End: 2025-07-03
Payer: COMMERCIAL

## 2025-07-03 NOTE — TELEPHONE ENCOUNTER
Patient confirmed scheduled appointment:  Date: 12/12/25  Time: 11:30 am  Visit type: RET ABDOUL Nutrition  Provider: Hafsa Yusuf  Location: virtual  Testing/imaging: n/a  Additional notes: n/a

## 2025-07-05 ENCOUNTER — HEALTH MAINTENANCE LETTER (OUTPATIENT)
Age: 55
End: 2025-07-05

## 2025-07-10 ENCOUNTER — LAB (OUTPATIENT)
Dept: LAB | Facility: CLINIC | Age: 55
End: 2025-07-10
Payer: COMMERCIAL

## 2025-07-10 ENCOUNTER — OFFICE VISIT (OUTPATIENT)
Dept: URGENT CARE | Facility: URGENT CARE | Age: 55
End: 2025-07-10
Payer: COMMERCIAL

## 2025-07-10 VITALS
SYSTOLIC BLOOD PRESSURE: 100 MMHG | OXYGEN SATURATION: 96 % | BODY MASS INDEX: 31.82 KG/M2 | DIASTOLIC BLOOD PRESSURE: 60 MMHG | HEART RATE: 90 BPM | TEMPERATURE: 97.5 F | WEIGHT: 191 LBS | HEIGHT: 65 IN | RESPIRATION RATE: 18 BRPM

## 2025-07-10 DIAGNOSIS — G43.819 OTHER MIGRAINE WITHOUT STATUS MIGRAINOSUS, INTRACTABLE: Primary | ICD-10-CM

## 2025-07-10 DIAGNOSIS — T67.5XXA HEAT EXHAUSTION, INITIAL ENCOUNTER: ICD-10-CM

## 2025-07-10 DIAGNOSIS — Z98.84 S/P LAPAROSCOPIC SLEEVE GASTRECTOMY: ICD-10-CM

## 2025-07-10 LAB
ALBUMIN SERPL BCG-MCNC: 3.9 G/DL (ref 3.5–5.2)
ALP SERPL-CCNC: 68 U/L (ref 40–150)
ALT SERPL W P-5'-P-CCNC: 10 U/L (ref 0–50)
ANION GAP SERPL CALCULATED.3IONS-SCNC: 10 MMOL/L (ref 7–15)
AST SERPL W P-5'-P-CCNC: 18 U/L (ref 0–45)
BILIRUB SERPL-MCNC: 0.2 MG/DL
BUN SERPL-MCNC: 11.6 MG/DL (ref 6–20)
CALCIUM SERPL-MCNC: 9.5 MG/DL (ref 8.8–10.4)
CHLORIDE SERPL-SCNC: 106 MMOL/L (ref 98–107)
CHOLEST SERPL-MCNC: 214 MG/DL
CREAT SERPL-MCNC: 0.85 MG/DL (ref 0.51–0.95)
EGFRCR SERPLBLD CKD-EPI 2021: 81 ML/MIN/1.73M2
ERYTHROCYTE [DISTWIDTH] IN BLOOD BY AUTOMATED COUNT: 13.8 % (ref 10–15)
EST. AVERAGE GLUCOSE BLD GHB EST-MCNC: 111 MG/DL
FASTING STATUS PATIENT QL REPORTED: ABNORMAL
FASTING STATUS PATIENT QL REPORTED: ABNORMAL
GLUCOSE SERPL-MCNC: 102 MG/DL (ref 70–99)
HBA1C MFR BLD: 5.5 %
HCO3 SERPL-SCNC: 25 MMOL/L (ref 22–29)
HCT VFR BLD AUTO: 40.8 % (ref 35–47)
HDLC SERPL-MCNC: 50 MG/DL
HGB BLD-MCNC: 13.3 G/DL (ref 11.7–15.7)
HOLD SPECIMEN: NORMAL
LDLC SERPL CALC-MCNC: 144 MG/DL
MCH RBC QN AUTO: 29.3 PG (ref 26.5–33)
MCHC RBC AUTO-ENTMCNC: 32.6 G/DL (ref 31.5–36.5)
MCV RBC AUTO: 90 FL (ref 78–100)
NONHDLC SERPL-MCNC: 164 MG/DL
PLATELET # BLD AUTO: 237 10E3/UL (ref 150–450)
POTASSIUM SERPL-SCNC: 4.1 MMOL/L (ref 3.4–5.3)
PROT SERPL-MCNC: 7 G/DL (ref 6.4–8.3)
PTH-INTACT SERPL-MCNC: 28 PG/ML (ref 15–65)
RBC # BLD AUTO: 4.54 10E6/UL (ref 3.8–5.2)
SODIUM SERPL-SCNC: 141 MMOL/L (ref 135–145)
TRIGL SERPL-MCNC: 101 MG/DL
VIT B12 SERPL-MCNC: 515 PG/ML (ref 232–1245)
VIT D+METAB SERPL-MCNC: 35 NG/ML (ref 20–50)
WBC # BLD AUTO: 7.4 10E3/UL (ref 4–11)

## 2025-07-10 PROCEDURE — 82306 VITAMIN D 25 HYDROXY: CPT | Performed by: NURSE PRACTITIONER

## 2025-07-10 PROCEDURE — 99000 SPECIMEN HANDLING OFFICE-LAB: CPT | Performed by: PATHOLOGY

## 2025-07-10 PROCEDURE — 83036 HEMOGLOBIN GLYCOSYLATED A1C: CPT | Performed by: NURSE PRACTITIONER

## 2025-07-10 PROCEDURE — 82607 VITAMIN B-12: CPT | Performed by: NURSE PRACTITIONER

## 2025-07-10 RX ORDER — KETOROLAC TROMETHAMINE 30 MG/ML
30 INJECTION, SOLUTION INTRAMUSCULAR; INTRAVENOUS ONCE
Status: COMPLETED | OUTPATIENT
Start: 2025-07-10 | End: 2025-07-10

## 2025-07-10 RX ORDER — SUMATRIPTAN 50 MG/1
50 TABLET, FILM COATED ORAL
Qty: 15 TABLET | Refills: 0 | Status: SHIPPED | OUTPATIENT
Start: 2025-07-10

## 2025-07-10 RX ADMIN — KETOROLAC TROMETHAMINE 30 MG: 30 INJECTION, SOLUTION INTRAMUSCULAR; INTRAVENOUS at 18:59

## 2025-07-10 ASSESSMENT — ENCOUNTER SYMPTOMS
FEVER: 0
HEADACHES: 1
FACIAL ASYMMETRY: 0
SEIZURES: 0
LIGHT-HEADEDNESS: 1
SHORTNESS OF BREATH: 0
AGITATION: 1
TREMORS: 0
SPEECH DIFFICULTY: 0
NERVOUS/ANXIOUS: 1
DIZZINESS: 0
PALPITATIONS: 0
WEAKNESS: 0
DYSPHORIC MOOD: 1
DECREASED CONCENTRATION: 1
SLEEP DISTURBANCE: 1
FATIGUE: 0
PHOTOPHOBIA: 0
CONFUSION: 0

## 2025-07-10 NOTE — PROGRESS NOTES
Other migraine without status migrainosus, intractable  - ketorolac (TORADOL) injection 30 mg  - SUMAtriptan (IMITREX) 50 MG tablet; Take 1 tablet (50 mg) by mouth at onset of headache for migraine. May repeat in 2 hours. Max 4 tablets/24 hours.       Migraine Headache: Care Instructions  Overview     Migraines are painful, throbbing headaches that often start on one side of the head. They may cause nausea and vomiting and make you sensitive to light, sound, or smell.  Without treatment, migraines can last from 4 hours to a few days. Medicines can help prevent migraines or stop them after they have started. Your doctor can help you find which ones work best for you.  Follow-up care is a key part of your treatment and safety. Be sure to make and go to all appointments, and call your doctor if you are having problems. It's also a good idea to know your test results and keep a list of the medicines you take.  How can you care for yourself at home?  Do not drive if you have taken a prescription pain medicine.  Rest in a quiet, dark room until your headache is gone. Close your eyes, and try to relax or go to sleep. Don't watch TV or read.  Put a cold, moist cloth or cold pack on the painful area for 10 to 20 minutes at a time. Put a thin cloth between the cold pack and your skin.  Use a warm, moist towel or a heating pad set on low to relax tight shoulder and neck muscles.  Have someone gently massage your neck and shoulders.  Take your medicines exactly as prescribed. Call your doctor if you think you are having a problem with your medicine. You will get more details on the specific medicines your doctor prescribes.  Don't take medicine for headache pain too often. Talk to your doctor if you are taking medicine more than 2 days a week to stop a headache. Taking too much pain medicine can lead to more headaches. These are called medicine-overuse headaches.  To prevent migraines  Keep a headache diary so you can figure  out what triggers your headaches. Avoiding triggers may help you prevent headaches. Record when each headache began, how long it lasted, and what the pain was like. Write down any other symptoms you had with the headache, such as nausea, flashing lights or dark spots, or sensitivity to bright light or loud noise. Note if the headache occurred near your period. List anything that might have triggered the headache. Triggers may include certain foods (chocolate, cheese, wine) or odors, smoke, bright light, stress, or lack of sleep.  If your doctor has prescribed medicine for your migraines, take it as directed. You may have medicine that you take only when you get a migraine and medicine that you take all the time to help prevent migraines.  If your doctor has prescribed medicine for when you get a headache, take it at the first sign of a migraine, unless your doctor has given you other instructions.  If your doctor has prescribed medicine to prevent migraines, take it exactly as prescribed. Call your doctor if you think you are having a problem with your medicine.  Find healthy ways to deal with stress. Migraines are most common during or right after stressful times. Try finding ways to reduce stress like practicing mindfulness or deep breathing exercises.  Get plenty of sleep and exercise. But be careful to not push yourself too hard during exercise. It may trigger a headache.  Eat meals on a regular schedule. Avoid foods and drinks that often trigger migraines. These include chocolate, alcohol (especially red wine and port), aspartame, monosodium glutamate (MSG), and some additives found in foods (such as hot dogs, juarez, cold cuts, aged cheeses, and pickled foods).  Limit caffeine. Don't drink too much coffee, tea, or soda. But don't quit caffeine suddenly. That can also give you migraines.  Do not smoke or allow others to smoke around you. If you need help quitting, talk to your doctor about stop-smoking programs  "and medicines. These can increase your chances of quitting for good.  If you are taking birth control pills or hormone therapy, talk to your doctor about whether they are triggering your migraines.  When should you call for help?   Call 911 anytime you think you may need emergency care. For example, call if:    You have signs of a stroke. These may include:  Sudden numbness, paralysis, or weakness in your face, arm, or leg, especially on only one side of your body.  Sudden vision changes.  Sudden trouble speaking.  Sudden confusion or trouble understanding simple statements.  Sudden problems with walking or balance.  A sudden, severe headache that is different from past headaches.   Call your doctor now or seek immediate medical care if:    You have a fever and a stiff neck.     Your headache gets much worse.   Watch closely for changes in your health, and be sure to contact your doctor if:    Your headaches get worse, happen more often, or change in some way.     You have new symptoms.     Your life is disrupted by your headaches. For example, you often miss work, school, or other activities.     You do not get better as expected.   Where can you learn more?  Go to https://www.[employer].net/patiented  Enter U690 in the search box to learn more about \"Migraine Headache: Care Instructions.\"  Current as of: December 3, 2024  Content Version: 14.5 2024-2025 [employer].   Care instructions adapted under license by your healthcare professional. If you have questions about a medical condition or this instruction, always ask your healthcare professional. [employer] disclaims any warranty or liability for your use of this information.            Heat exhaustion, initial encounter     Heat Exhaustion: Care Instructions  Your Care Instructions  Heat exhaustion occurs when you are hot, sweat a lot, and do not drink enough to replace the lost fluids. Heat exhaustion is not the same as heatstroke, " which is much more serious. Heatstroke can lead to problems with many different organs and can be life-threatening.  After medical care for heat exhaustion, you will need to limit your activities and take good care of your body while it recovers.  Follow-up care is a key part of your treatment and safety. Be sure to make and go to all appointments, and call your doctor if you are having problems. It's also a good idea to know your test results and keep a list of the medicines you take.  How can you care for yourself at home?  Reduce your activities, and get plenty of rest. Your doctor will give you instructions on when you can resume your normal schedule.  Stay in a cool room for at least the next 24 hours.  Drink rehydration drinks, juices, and water to replace fluids. Drinks such as sports drinks that contain electrolytes work best, because they have salt and minerals. You need salt and minerals as well as water. If you have kidney, heart, or liver disease and have to limit fluids or salt, talk with your doctor before you increase your fluid or salt intake.  Avoid drinks that have alcohol.  To prevent heat exhaustion  Drink plenty of fluids. If you have kidney, heart, or liver disease and have to limit fluids, talk with your doctor before you increase the amount of fluids you drink.  Drink plenty of water before, during, and after you are active. This is very important when it is hot out and when you do intense exercise.  During hot weather, wear light-colored clothing that fits loosely and a hat with a brim to reflect the sun.  Limit or avoid strenuous activity during hot or humid weather, especially during the hottest part of the day (10 a.m. to 4 p.m.). Heat exhaustion and heatstroke usually develop when you are working or exercising in hot weather. Humidity makes hot weather even more dangerous.  Cars can get very hot inside. Open the windows or turn on the air conditioning before you get in and close the  "doors.  Try to stay cool during hot weather. If your home is not air-conditioned, seek an air-conditioned place. That could be in the library, a neighborhood café, or a friend's home. [place] yourself with a cool mist. Take a cool shower, bath, or sponge bath.  Be aware that some medicines, such as major tranquilizers, can raise the risk of heat exhaustion. Ask your doctor whether any medicine you take raises your chance of getting heat exhaustion.  When should you call for help?   Call 911 anytime you think you may need emergency care. For example, call if:    You feel very hot and:  You have a seizure.  You feel confused.  Your skin is red, hot, and dry.  You passed out (lost consciousness).   Call your doctor now or seek immediate medical care if:    You cannot keep fluids down.     After returning to your normal activities, you have symptoms of heat exhaustion, such as sweating a lot, fatigue, dizziness, or nausea.   Watch closely for changes in your health, and be sure to contact your doctor if:    You do not get better as expected.   Where can you learn more?  Go to https://www.[employer]li[employer]/patiented  Enter S222 in the search box to learn more about \"Heat Exhaustion: Care Instructions.\"  Current as of: July 31, 2024  Content Version: 14.5 2024-2025 [employer].   Care instructions adapted under license by your healthcare professional. If you have questions about a medical condition or this instruction, always ask your healthcare professional. [employer] disclaims any warranty or liability for your use of this information.          Patient was advised to return to clinic for reevaluation (either UC or PCP) if symptoms do not improve in 2 days. Patient educated on red flag symptoms and asked to go directly to the ED if these symptoms present themselves.     Lobo Galvan PA-C  [facility]    Subjective   54 year old who presents to clinic today for the following " health issues:    Urgent Care       HPI     Patient having headache. Mouth feels tingling on right side. Headache is in front, left side and back. Patient states that she is having some double vision and trouble focusing. Patient has has some lightheadedness when standing. Feeling worse throughout the day. Has had a stroke back in July 2023. Patient denies any head injuries. Patient states that she may be dehydrated because it's been hot and she may not have been drinking enough water. She has also been in the heat most of the day. Denies any confusion, light sensitivity, sound sensitivity, nausea, and vomiting.     Patient has a history of migraines and was taking topamax. They took her off of topamax and placed her a seizure medication (patient does not recall the name of this medication) 2 weeks ago.     Review of Systems   Review of Systems   Constitutional:  Negative for fatigue and fever.   Eyes:  Negative for photophobia and visual disturbance.   Respiratory:  Negative for shortness of breath.    Cardiovascular:  Negative for chest pain and palpitations.   Neurological:  Positive for light-headedness and headaches. Negative for dizziness, tremors, seizures, syncope, facial asymmetry, speech difficulty and weakness.   Psychiatric/Behavioral:  Positive for agitation, decreased concentration, dysphoric mood and sleep disturbance (Not sleeping well). Negative for confusion and self-injury. The patient is nervous/anxious.       See HPI    Objective    Temp: 97.5  F (36.4  C) Temp src: Oral BP: 100/60 Pulse: 90   Resp: 18 SpO2: 96 %       Physical Exam   Physical Exam  Constitutional:       General: She is not in acute distress.     Appearance: Normal appearance. She is normal weight. She is not ill-appearing, toxic-appearing or diaphoretic.   HENT:      Head: Normocephalic and atraumatic.   Eyes:      General:         Right eye: No discharge.         Left eye: No discharge.      Extraocular Movements: Extraocular  movements intact.      Conjunctiva/sclera: Conjunctivae normal.      Pupils: Pupils are equal, round, and reactive to light.   Cardiovascular:      Rate and Rhythm: Normal rate.      Pulses: Normal pulses.   Pulmonary:      Effort: Pulmonary effort is normal. No respiratory distress.   Neurological:      General: No focal deficit present.      Mental Status: She is alert and oriented to person, place, and time. Mental status is at baseline.      Cranial Nerves: No cranial nerve deficit.      Sensory: No sensory deficit.      Motor: No weakness.      Coordination: Coordination normal.      Gait: Gait normal.   Psychiatric:         Mood and Affect: Mood normal.         Behavior: Behavior normal.         Thought Content: Thought content normal.         Judgment: Judgment normal.          Recent Results (from the past 24 hours)   Parathyroid Hormone Intact   Result Value Ref Range    Parathyroid Hormone Intact 28 15 - 65 pg/mL    Narrative    This result was obtained with the Roche Elecsys PTH STAT assay.   This reference range differs from PTH assays used in other [facility] laboratories.   Lipid panel reflex to direct LDL Fasting   Result Value Ref Range    Cholesterol 214 (H) <200 mg/dL    Triglycerides 101 <150 mg/dL    Direct Measure HDL 50 >=50 mg/dL    LDL Cholesterol Calculated 144 (H) <100 mg/dL    Non HDL Cholesterol 164 (H) <130 mg/dL    Patient Fasting > 8hrs? Unknown     Narrative    Cholesterol  Desirable: < 200 mg/dL  Borderline High: 200 - 239 mg/dL  High: >= 240 mg/dL    Triglycerides  Normal: < 150 mg/dL  Borderline High: 150 - 199 mg/dL  High: 200-499 mg/dL  Very High: >= 500 mg/dL    Direct Measure HDL  Female: >= 50 mg/dL   Male: >= 40 mg/dL    LDL Cholesterol  Desirable: < 100 mg/dL  Above Desirable: 100 - 129 mg/dL   Borderline High: 130 - 159 mg/dL   High:  160 - 189 mg/dL   Very High: >= 190 mg/dL    Non HDL Cholesterol  Desirable: < 130 mg/dL  Above Desirable: 130 - 159 mg/dL  Borderline  High: 160 - 189 mg/dL  High: 190 - 219 mg/dL  Very High: >= 220 mg/dL   Hemoglobin A1c   Result Value Ref Range    Estimated Average Glucose 111 <117 mg/dL    Hemoglobin A1C 5.5 <5.7 %   Comprehensive metabolic panel   Result Value Ref Range    Sodium 141 135 - 145 mmol/L    Potassium 4.1 3.4 - 5.3 mmol/L    Carbon Dioxide (CO2) 25 22 - 29 mmol/L    Anion Gap 10 7 - 15 mmol/L    Urea Nitrogen 11.6 6.0 - 20.0 mg/dL    Creatinine 0.85 0.51 - 0.95 mg/dL    GFR Estimate 81 >60 mL/min/1.73m2    Calcium 9.5 8.8 - 10.4 mg/dL    Chloride 106 98 - 107 mmol/L    Glucose 102 (H) 70 - 99 mg/dL    Alkaline Phosphatase 68 40 - 150 U/L    AST 18 0 - 45 U/L    ALT 10 0 - 50 U/L    Protein Total 7.0 6.4 - 8.3 g/dL    Albumin 3.9 3.5 - 5.2 g/dL    Bilirubin Total 0.2 <=1.2 mg/dL    Patient Fasting > 8hrs? Unknown    CBC with Platelets and Reflex to Iron Studies    Narrative    The following orders were created for panel order CBC with Platelets and Reflex to Iron Studies.  Procedure                               Abnormality         Status                     ---------                               -----------         ------                     CBC with Platelets and ...[[id number]]  Normal              Final result               Extra Green Top (Lithiu...[[id number]]                      Final result                 Please view results for these tests on the individual orders.   CBC with Platelets and Reflex to Iron Studies   Result Value Ref Range    WBC Count 7.4 4.0 - 11.0 10e3/uL    RBC Count 4.54 3.80 - 5.20 10e6/uL    Hemoglobin 13.3 11.7 - 15.7 g/dL    Hematocrit 40.8 35.0 - 47.0 %    MCV 90 78 - 100 fL    MCH 29.3 26.5 - 33.0 pg    MCHC 32.6 31.5 - 36.5 g/dL    RDW 13.8 10.0 - 15.0 %    Platelet Count 237 150 - 450 10e3/uL   Extra Green Top (Lithium Heparin) Tube   Result Value Ref Range    Hold Specimen JI        All labs that were finalized during the visit were reviewed by me personally. Any pending labs will be reviewed  by urgent care staff in the following days. Patient will be notified either via DEMANDIThart message or phone call of any pertinent abnormal results. Patient was informed that we will not necessarily reach out if pending lab results are normal.

## 2025-08-13 ENCOUNTER — VIRTUAL VISIT (OUTPATIENT)
Dept: ENDOCRINOLOGY | Facility: CLINIC | Age: 55
End: 2025-08-13
Payer: COMMERCIAL

## 2025-08-13 VITALS — HEIGHT: 64 IN | BODY MASS INDEX: 29.88 KG/M2 | WEIGHT: 175 LBS

## 2025-08-13 DIAGNOSIS — E66.9 OBESITY: ICD-10-CM

## 2025-08-13 DIAGNOSIS — Z98.84 S/P LAPAROSCOPIC SLEEVE GASTRECTOMY: ICD-10-CM

## 2025-08-13 DIAGNOSIS — Z71.3 NUTRITIONAL COUNSELING: Primary | ICD-10-CM

## 2025-08-13 DIAGNOSIS — E11.9 TYPE 2 DIABETES MELLITUS WITHOUT COMPLICATION, WITHOUT LONG-TERM CURRENT USE OF INSULIN (H): ICD-10-CM

## 2025-08-13 PROCEDURE — 97803 MED NUTRITION INDIV SUBSEQ: CPT | Mod: 95 | Performed by: DIETITIAN, REGISTERED

## 2025-08-13 PROCEDURE — 1126F AMNT PAIN NOTED NONE PRSNT: CPT | Mod: 95 | Performed by: DIETITIAN, REGISTERED

## 2025-08-13 PROCEDURE — 99207 PR NO CHARGE LOS: CPT | Mod: 95 | Performed by: DIETITIAN, REGISTERED

## 2025-08-13 ASSESSMENT — PAIN SCALES - GENERAL: PAINLEVEL_OUTOF10: NO PAIN (0)

## 2025-08-19 ENCOUNTER — VIRTUAL VISIT (OUTPATIENT)
Dept: PHARMACY | Facility: CLINIC | Age: 55
End: 2025-08-19
Attending: NURSE PRACTITIONER
Payer: COMMERCIAL

## 2025-08-19 VITALS — HEIGHT: 64 IN | WEIGHT: 175 LBS | BODY MASS INDEX: 29.88 KG/M2

## 2025-08-19 DIAGNOSIS — Z86.73 HISTORY OF STROKE: ICD-10-CM

## 2025-08-19 DIAGNOSIS — E66.811 OBESITY, CLASS I, BMI 30-34.9: ICD-10-CM

## 2025-08-19 DIAGNOSIS — E11.9 TYPE 2 DIABETES MELLITUS WITHOUT COMPLICATION, WITHOUT LONG-TERM CURRENT USE OF INSULIN (H): ICD-10-CM

## 2025-08-19 DIAGNOSIS — Z98.84 S/P LAPAROSCOPIC SLEEVE GASTRECTOMY: Primary | ICD-10-CM

## 2025-08-19 RX ORDER — ASPIRIN 81 MG/1
81 TABLET ORAL DAILY
Qty: 90 TABLET | Refills: 1 | Status: SHIPPED | OUTPATIENT
Start: 2025-08-19

## 2025-08-19 RX ORDER — SIMETHICONE 125 MG
125 TABLET,CHEWABLE ORAL 4 TIMES DAILY PRN
Qty: 120 TABLET | Refills: 1 | Status: SHIPPED | OUTPATIENT
Start: 2025-08-19

## 2025-08-19 RX ORDER — ROSUVASTATIN CALCIUM 20 MG/1
20 TABLET, COATED ORAL DAILY
Qty: 90 TABLET | Refills: 1 | Status: SHIPPED | OUTPATIENT
Start: 2025-08-19

## 2025-08-19 ASSESSMENT — PAIN SCALES - GENERAL: PAINLEVEL_OUTOF10: NO PAIN (0)

## 2025-08-25 ENCOUNTER — DOCUMENTATION ONLY (OUTPATIENT)
Dept: SLEEP MEDICINE | Facility: CLINIC | Age: 55
End: 2025-08-25
Payer: COMMERCIAL

## 2025-08-25 DIAGNOSIS — G47.33 OBSTRUCTIVE SLEEP APNEA (ADULT) (PEDIATRIC): Primary | ICD-10-CM

## (undated) DEVICE — SU MONOCRYL 4-0 PS-2 18" UND Y496G

## (undated) DEVICE — SUCTION IRR STRYKERFLOW II W/TIP 250-070-520

## (undated) DEVICE — SU DERMABOND ADVANCED .7ML DNX6

## (undated) DEVICE — CLIP APPLIER ENDO 5MM M/L LIGAMAX EL5ML

## (undated) DEVICE — GLOVE PROTEXIS POWDER FREE 6.5 ORTHOPEDIC 2D73ET65

## (undated) DEVICE — ENDO TROCAR FIRST ENTRY KII FIOS Z-THRD 12X150MM CTF71

## (undated) DEVICE — ESU GROUND PAD ADULT W/CORD E7507

## (undated) DEVICE — ENDO TROCAR OPTICAL ACCESS KII Z-THRD 15X100MM C0R37

## (undated) DEVICE — Device

## (undated) DEVICE — CATH INTERMITTENT CLEAN-CATH FEMALE 14FR 6" VINYL LF 420614

## (undated) DEVICE — SYSTEM CALIBRATION GASTRECTOMY SLEEVE VISIGI 3D 40FR 5240

## (undated) DEVICE — LINEN TOWEL PACK X5 5464

## (undated) DEVICE — DRSG PRIMAPORE 02X3" 7133

## (undated) DEVICE — NDL INSUFFLATION 13GA 150MM C2202

## (undated) DEVICE — NDL SPINAL 22GA 3.5" QUINCKE 405181

## (undated) DEVICE — PREP DURAPREP 26ML APL 8630

## (undated) DEVICE — ENDO TROCAR SLEEVE KII ADV FIXATION 05X100MM CFS02

## (undated) DEVICE — TUBING SMOKE EVAC PNEUMOCLEAR HIGH FLOW 0620050250

## (undated) DEVICE — ESU LIGASURE MARYLAND VESSEL LAP 44CM XLONG LF1944

## (undated) DEVICE — STPL RELOAD REG TISSUE ECHELON 60 X 3.6MM BLUE GST60B

## (undated) DEVICE — STPL SKIN 35W ROTATING HEAD PRW35

## (undated) DEVICE — ESU GROUND PAD UNIVERSAL W/O CORD

## (undated) DEVICE — ENDO SCOPE WARMER LF TM500

## (undated) DEVICE — GLOVE PROTEXIS W/NEU-THERA 6.0  2D73TE60

## (undated) DEVICE — ENDO POUCH UNIVERSAL RETRIEVAL SYSTEM INZII 12/15MM CD004

## (undated) DEVICE — SOL NACL 0.9% IRRIG 1000ML BOTTLE 2F7124

## (undated) DEVICE — ENDO TROCAR FIRST ENTRY KII FIOS ADV FIX 05X100MM CFF03

## (undated) DEVICE — ENDO TROCAR FIRST ENTRY KII FIOS Z-THRD 11X100MM CTF33

## (undated) DEVICE — LINEN TOWEL PACK X6 WHITE 5487

## (undated) DEVICE — SOL WATER IRRIG 1000ML BOTTLE 2F7114

## (undated) DEVICE — SURGICEL POWDER ABSORBABLE HEMOSTAT 3GM 3013SP

## (undated) DEVICE — SUCTION MANIFOLD NEPTUNE 2 SYS 4 PORT 0702-020-000

## (undated) DEVICE — GLOVE BIOGEL PI ULTRATOUCH SZ 7.5 41175

## (undated) DEVICE — STPL POWERED ECHELON LONG 60MM PLEE60A

## (undated) DEVICE — ANTIFOG SOLUTION W/FOAM PAD 31142527

## (undated) DEVICE — ESU HOLDER LAP INST DISP PURPLE LONG 330MM H-PRO-330

## (undated) DEVICE — SUCTION CANISTER MEDIVAC LINER 3000ML W/LID 65651-530

## (undated) DEVICE — ENDO TROCAR FIRST ENTRY KII FIOS Z-THRD 05X150MM CTF01

## (undated) DEVICE — SOL NACL 0.9% INJ 1000ML BAG 2B1324X

## (undated) DEVICE — PREP CHLORAPREP 26ML TINTED HI-LITE ORANGE 930815

## (undated) DEVICE — SYR 30ML LL W/O NDL 302832

## (undated) RX ORDER — HYDROMORPHONE HCL IN WATER/PF 6 MG/30 ML
PATIENT CONTROLLED ANALGESIA SYRINGE INTRAVENOUS
Status: DISPENSED
Start: 2024-10-29

## (undated) RX ORDER — NEOSTIGMINE METHYLSULFATE 1 MG/ML
VIAL (ML) INJECTION
Status: DISPENSED
Start: 2020-01-23

## (undated) RX ORDER — FENTANYL CITRATE 50 UG/ML
INJECTION, SOLUTION INTRAMUSCULAR; INTRAVENOUS
Status: DISPENSED
Start: 2024-10-29

## (undated) RX ORDER — ENOXAPARIN SODIUM 100 MG/ML
INJECTION SUBCUTANEOUS
Status: DISPENSED
Start: 2024-10-29

## (undated) RX ORDER — FENTANYL CITRATE 50 UG/ML
INJECTION, SOLUTION INTRAMUSCULAR; INTRAVENOUS
Status: DISPENSED
Start: 2020-01-23

## (undated) RX ORDER — KETOROLAC TROMETHAMINE 30 MG/ML
INJECTION, SOLUTION INTRAMUSCULAR; INTRAVENOUS
Status: DISPENSED
Start: 2020-01-23

## (undated) RX ORDER — ONDANSETRON 2 MG/ML
INJECTION INTRAMUSCULAR; INTRAVENOUS
Status: DISPENSED
Start: 2024-10-29

## (undated) RX ORDER — PROPOFOL 10 MG/ML
INJECTION, EMULSION INTRAVENOUS
Status: DISPENSED
Start: 2024-10-29

## (undated) RX ORDER — APREPITANT 40 MG/1
CAPSULE ORAL
Status: DISPENSED
Start: 2024-10-29

## (undated) RX ORDER — GLYCOPYRROLATE 0.2 MG/ML
INJECTION, SOLUTION INTRAMUSCULAR; INTRAVENOUS
Status: DISPENSED
Start: 2020-01-23

## (undated) RX ORDER — DEXAMETHASONE SODIUM PHOSPHATE 4 MG/ML
INJECTION, SOLUTION INTRA-ARTICULAR; INTRALESIONAL; INTRAMUSCULAR; INTRAVENOUS; SOFT TISSUE
Status: DISPENSED
Start: 2024-10-29

## (undated) RX ORDER — CEFAZOLIN SODIUM/WATER 3 G/30 ML
SYRINGE (ML) INTRAVENOUS
Status: DISPENSED
Start: 2024-10-29

## (undated) RX ORDER — HYDROMORPHONE HYDROCHLORIDE 1 MG/ML
INJECTION, SOLUTION INTRAMUSCULAR; INTRAVENOUS; SUBCUTANEOUS
Status: DISPENSED
Start: 2024-10-29

## (undated) RX ORDER — PROPOFOL 10 MG/ML
INJECTION, EMULSION INTRAVENOUS
Status: DISPENSED
Start: 2020-01-23

## (undated) RX ORDER — HYDROMORPHONE HYDROCHLORIDE 1 MG/ML
INJECTION, SOLUTION INTRAMUSCULAR; INTRAVENOUS; SUBCUTANEOUS
Status: DISPENSED
Start: 2020-01-23

## (undated) RX ORDER — REGADENOSON 0.08 MG/ML
INJECTION, SOLUTION INTRAVENOUS
Status: DISPENSED
Start: 2024-09-20

## (undated) RX ORDER — FENTANYL CITRATE-0.9 % NACL/PF 10 MCG/ML
PLASTIC BAG, INJECTION (ML) INTRAVENOUS
Status: DISPENSED
Start: 2024-10-29

## (undated) RX ORDER — ACETAMINOPHEN 325 MG/1
TABLET ORAL
Status: DISPENSED
Start: 2024-10-29

## (undated) RX ORDER — HYDROCODONE BITARTRATE AND ACETAMINOPHEN 5; 325 MG/1; MG/1
TABLET ORAL
Status: DISPENSED
Start: 2020-01-23

## (undated) RX ORDER — OXYCODONE HYDROCHLORIDE 5 MG/1
TABLET ORAL
Status: DISPENSED
Start: 2024-10-29